# Patient Record
Sex: FEMALE | Race: WHITE | NOT HISPANIC OR LATINO | Employment: OTHER | URBAN - METROPOLITAN AREA
[De-identification: names, ages, dates, MRNs, and addresses within clinical notes are randomized per-mention and may not be internally consistent; named-entity substitution may affect disease eponyms.]

---

## 2017-01-12 ENCOUNTER — TELEPHONE (OUTPATIENT)
Dept: INTERNAL MEDICINE | Facility: CLINIC | Age: 82
End: 2017-01-12

## 2017-01-12 DIAGNOSIS — M48.061 SPINAL STENOSIS OF LUMBAR REGION: Primary | ICD-10-CM

## 2017-01-12 RX ORDER — OXYCODONE HYDROCHLORIDE 5 MG/1
5 TABLET ORAL EVERY 4 HOURS PRN
Qty: 120 TABLET | Refills: 0 | Status: SHIPPED | OUTPATIENT
Start: 2017-01-12 | End: 2017-02-16

## 2017-01-12 NOTE — TELEPHONE ENCOUNTER
Reason for Call:  Medication or medication refill:    Do you use a [facility]?  Name of the pharmacy and phone number for the current request:  [facility] [address] [facility] [phone]    Name of the medication requested: oxycodone    Other request: none    Can we leave a detailed message on this number? YES    Phone number patient can be reached at: Home number on file [phone] (home)    Best Time: any    Call taken on 1/12/2017 at 11:59 AM by Natasha Conner

## 2017-02-15 DIAGNOSIS — M48.061 SPINAL STENOSIS OF LUMBAR REGION: ICD-10-CM

## 2017-02-15 NOTE — TELEPHONE ENCOUNTER
Reason for Call:  Medication or medication refill:Med  Do you use a [facility]?  Name of the pharmacy and phone number for the current request:  [facility] [address] [facility] - [phone]    Name of the medication requested: oxyCODONE (ROXICODONE) 5 MG IR tablet    Other request: Plz mail script, needs refill    Can we leave a detailed message on this number? YES    Phone number patient can be reached at: Home number on file [phone] (home)    Best Time: anytime    Call taken on 2/15/2017 at 3:04 PM by HIRAM ANSARI

## 2017-02-16 RX ORDER — OXYCODONE HYDROCHLORIDE 5 MG/1
5 TABLET ORAL EVERY 4 HOURS PRN
Qty: 120 TABLET | Refills: 0 | Status: SHIPPED | OUTPATIENT
Start: 2017-02-16 | End: 2017-03-21

## 2017-02-16 NOTE — TELEPHONE ENCOUNTER
Oxycodone      Last Written Prescription Date:  1/12/17  Last Fill Quantity: 120,   # refills: 0  Last Office Visit with [facility] prescribing provider: 8/18/16  Future Office visit:    Next 5 appointments (look out 90 days)     Feb 28, 2017  2:20 PM CST   SHORT with Emily Marie MD   [facility]    [address]  [facility] [address]   [phone]                   Routing refill request to provider for review/approval because:  Drug not on the [facility] or [facility] refill protocol or controlled substance

## 2017-02-21 DIAGNOSIS — E78.5 HYPERLIPIDEMIA LDL GOAL <100: ICD-10-CM

## 2017-02-21 DIAGNOSIS — E11.9 TYPE 2 DIABETES MELLITUS WITHOUT COMPLICATION (H): ICD-10-CM

## 2017-02-21 LAB
ANION GAP SERPL CALCULATED.3IONS-SCNC: 7 MMOL/L (ref 3–14)
BUN SERPL-MCNC: 11 MG/DL (ref 7–30)
CALCIUM SERPL-MCNC: 9.5 MG/DL (ref 8.5–10.1)
CHLORIDE SERPL-SCNC: 103 MMOL/L (ref 94–109)
CHOLEST SERPL-MCNC: 155 MG/DL
CO2 SERPL-SCNC: 31 MMOL/L (ref 20–32)
CREAT SERPL-MCNC: 0.62 MG/DL (ref 0.52–1.04)
GFR SERPL CREATININE-BSD FRML MDRD: NORMAL ML/MIN/1.7M2
GLUCOSE SERPL-MCNC: 81 MG/DL (ref 70–99)
HBA1C MFR BLD: 6.7 % (ref 4.3–6)
HDLC SERPL-MCNC: 64 MG/DL
LDLC SERPL CALC-MCNC: 72 MG/DL
NONHDLC SERPL-MCNC: 91 MG/DL
POTASSIUM SERPL-SCNC: 3.9 MMOL/L (ref 3.4–5.3)
SODIUM SERPL-SCNC: 141 MMOL/L (ref 133–144)
TRIGL SERPL-MCNC: 95 MG/DL

## 2017-02-21 PROCEDURE — 83036 HEMOGLOBIN GLYCOSYLATED A1C: CPT | Performed by: INTERNAL MEDICINE

## 2017-02-21 PROCEDURE — 36415 COLL VENOUS BLD VENIPUNCTURE: CPT | Performed by: INTERNAL MEDICINE

## 2017-02-21 PROCEDURE — 80048 BASIC METABOLIC PNL TOTAL CA: CPT | Performed by: INTERNAL MEDICINE

## 2017-02-21 PROCEDURE — 80061 LIPID PANEL: CPT | Performed by: INTERNAL MEDICINE

## 2017-02-22 ENCOUNTER — TELEPHONE (OUTPATIENT)
Dept: INTERNAL MEDICINE | Facility: CLINIC | Age: 82
End: 2017-02-22

## 2017-02-22 DIAGNOSIS — J06.9 UPPER RESPIRATORY TRACT INFECTION, UNSPECIFIED TYPE: Primary | ICD-10-CM

## 2017-02-22 RX ORDER — CODEINE PHOSPHATE AND GUAIFENESIN 10; 100 MG/5ML; MG/5ML
1 SOLUTION ORAL EVERY 4 HOURS PRN
Qty: 120 ML | Refills: 1 | Status: SHIPPED | OUTPATIENT
Start: 2017-02-22 | End: 2017-06-15

## 2017-02-22 NOTE — TELEPHONE ENCOUNTER
Pt states she has a cold and is coughing all night for the last 2-3 nights. She is asking for a prescription cough syrup. The OTC is not working for her. She thinks she had Robitussin AC before.     Last OV 8/18/16.     Provider approval needed.

## 2017-02-25 ENCOUNTER — APPOINTMENT (OUTPATIENT)
Dept: GENERAL RADIOLOGY | Facility: CLINIC | Age: 82
DRG: 190 | End: 2017-02-25
Attending: EMERGENCY MEDICINE
Payer: COMMERCIAL

## 2017-02-25 ENCOUNTER — HOSPITAL ENCOUNTER (INPATIENT)
Facility: CLINIC | Age: 82
LOS: 3 days | Discharge: HOME HEALTH | DRG: 190 | End: 2017-02-28
Attending: EMERGENCY MEDICINE | Admitting: INTERNAL MEDICINE
Payer: COMMERCIAL

## 2017-02-25 DIAGNOSIS — R09.02 HYPOXIA: ICD-10-CM

## 2017-02-25 DIAGNOSIS — J44.1 COPD EXACERBATION (H): ICD-10-CM

## 2017-02-25 LAB
ANION GAP SERPL CALCULATED.3IONS-SCNC: 8 MMOL/L (ref 3–14)
BASOPHILS # BLD AUTO: 0 10E9/L (ref 0–0.2)
BASOPHILS NFR BLD AUTO: 0.1 %
BUN SERPL-MCNC: 17 MG/DL (ref 7–30)
CALCIUM SERPL-MCNC: 9.2 MG/DL (ref 8.5–10.1)
CHLORIDE SERPL-SCNC: 104 MMOL/L (ref 94–109)
CO2 SERPL-SCNC: 28 MMOL/L (ref 20–32)
CREAT SERPL-MCNC: 0.62 MG/DL (ref 0.52–1.04)
DIFFERENTIAL METHOD BLD: ABNORMAL
EOSINOPHIL # BLD AUTO: 0.1 10E9/L (ref 0–0.7)
EOSINOPHIL NFR BLD AUTO: 0.9 %
ERYTHROCYTE [DISTWIDTH] IN BLOOD BY AUTOMATED COUNT: 13.5 % (ref 10–15)
FLUAV+FLUBV AG SPEC QL: NEGATIVE
FLUAV+FLUBV AG SPEC QL: NORMAL
GFR SERPL CREATININE-BSD FRML MDRD: ABNORMAL ML/MIN/1.7M2
GLUCOSE BLDC GLUCOMTR-MCNC: 183 MG/DL (ref 70–99)
GLUCOSE BLDC GLUCOMTR-MCNC: 216 MG/DL (ref 70–99)
GLUCOSE BLDC GLUCOMTR-MCNC: 264 MG/DL (ref 70–99)
GLUCOSE SERPL-MCNC: 193 MG/DL (ref 70–99)
HCT VFR BLD AUTO: 43.5 % (ref 35–47)
HGB BLD-MCNC: 14.3 G/DL (ref 11.7–15.7)
IMM GRANULOCYTES # BLD: 0 10E9/L (ref 0–0.4)
IMM GRANULOCYTES NFR BLD: 0.1 %
INTERPRETATION ECG - MUSE: NORMAL
LYMPHOCYTES # BLD AUTO: 1.6 10E9/L (ref 0.8–5.3)
LYMPHOCYTES NFR BLD AUTO: 19.5 %
MCH RBC QN AUTO: 27.3 PG (ref 26.5–33)
MCHC RBC AUTO-ENTMCNC: 32.9 G/DL (ref 31.5–36.5)
MCV RBC AUTO: 83 FL (ref 78–100)
MONOCYTES # BLD AUTO: 0.5 10E9/L (ref 0–1.3)
MONOCYTES NFR BLD AUTO: 5.8 %
NEUTROPHILS # BLD AUTO: 6 10E9/L (ref 1.6–8.3)
NEUTROPHILS NFR BLD AUTO: 73.6 %
NRBC # BLD AUTO: 0 10*3/UL
NRBC BLD AUTO-RTO: 0 /100
NT-PROBNP SERPL-MCNC: 149 PG/ML (ref 0–1800)
PLATELET # BLD AUTO: 145 10E9/L (ref 150–450)
POTASSIUM SERPL-SCNC: 3.7 MMOL/L (ref 3.4–5.3)
PROCALCITONIN SERPL-MCNC: NORMAL NG/ML
RBC # BLD AUTO: 5.23 10E12/L (ref 3.8–5.2)
SODIUM SERPL-SCNC: 140 MMOL/L (ref 133–144)
SPECIMEN SOURCE: NORMAL
WBC # BLD AUTO: 8.1 10E9/L (ref 4–11)

## 2017-02-25 PROCEDURE — 99223 1ST HOSP IP/OBS HIGH 75: CPT | Mod: AI | Performed by: INTERNAL MEDICINE

## 2017-02-25 PROCEDURE — 25000132 ZZH RX MED GY IP 250 OP 250 PS 637: Performed by: INTERNAL MEDICINE

## 2017-02-25 PROCEDURE — 25000128 H RX IP 250 OP 636: Performed by: INTERNAL MEDICINE

## 2017-02-25 PROCEDURE — 25000125 ZZHC RX 250: Performed by: EMERGENCY MEDICINE

## 2017-02-25 PROCEDURE — 80048 BASIC METABOLIC PNL TOTAL CA: CPT | Performed by: EMERGENCY MEDICINE

## 2017-02-25 PROCEDURE — 83880 ASSAY OF NATRIURETIC PEPTIDE: CPT | Performed by: EMERGENCY MEDICINE

## 2017-02-25 PROCEDURE — 99285 EMERGENCY DEPT VISIT HI MDM: CPT | Mod: 25

## 2017-02-25 PROCEDURE — 84145 PROCALCITONIN (PCT): CPT | Performed by: EMERGENCY MEDICINE

## 2017-02-25 PROCEDURE — 00000146 ZZHCL STATISTIC GLUCOSE BY METER IP

## 2017-02-25 PROCEDURE — 12000000 ZZH R&B MED SURG/OB

## 2017-02-25 PROCEDURE — 71020 XR CHEST 2 VW: CPT

## 2017-02-25 PROCEDURE — 94640 AIRWAY INHALATION TREATMENT: CPT

## 2017-02-25 PROCEDURE — 85025 COMPLETE CBC W/AUTO DIFF WBC: CPT | Performed by: EMERGENCY MEDICINE

## 2017-02-25 PROCEDURE — 25000131 ZZH RX MED GY IP 250 OP 636 PS 637: Performed by: INTERNAL MEDICINE

## 2017-02-25 PROCEDURE — 93005 ELECTROCARDIOGRAM TRACING: CPT

## 2017-02-25 PROCEDURE — 87804 INFLUENZA ASSAY W/OPTIC: CPT | Performed by: EMERGENCY MEDICINE

## 2017-02-25 RX ORDER — POTASSIUM CHLORIDE 1.5 G/1.58G
20-40 POWDER, FOR SOLUTION ORAL
Status: DISCONTINUED | OUTPATIENT
Start: 2017-02-25 | End: 2017-02-28 | Stop reason: HOSPADM

## 2017-02-25 RX ORDER — PREDNISONE 20 MG/1
40 TABLET ORAL ONCE
Status: COMPLETED | OUTPATIENT
Start: 2017-02-25 | End: 2017-02-25

## 2017-02-25 RX ORDER — IPRATROPIUM BROMIDE AND ALBUTEROL SULFATE 2.5; .5 MG/3ML; MG/3ML
3 SOLUTION RESPIRATORY (INHALATION) EVERY 4 HOURS PRN
Status: DISCONTINUED | OUTPATIENT
Start: 2017-02-25 | End: 2017-02-28 | Stop reason: HOSPADM

## 2017-02-25 RX ORDER — BISACODYL 10 MG
10 SUPPOSITORY, RECTAL RECTAL DAILY PRN
Status: DISCONTINUED | OUTPATIENT
Start: 2017-02-25 | End: 2017-02-28 | Stop reason: HOSPADM

## 2017-02-25 RX ORDER — POLYETHYLENE GLYCOL 3350 17 G/17G
17 POWDER, FOR SOLUTION ORAL DAILY PRN
Status: DISCONTINUED | OUTPATIENT
Start: 2017-02-25 | End: 2017-02-28 | Stop reason: HOSPADM

## 2017-02-25 RX ORDER — IPRATROPIUM BROMIDE AND ALBUTEROL SULFATE 2.5; .5 MG/3ML; MG/3ML
3 SOLUTION RESPIRATORY (INHALATION) ONCE
Status: COMPLETED | OUTPATIENT
Start: 2017-02-25 | End: 2017-02-25

## 2017-02-25 RX ORDER — NICOTINE POLACRILEX 4 MG
15-30 LOZENGE BUCCAL
Status: DISCONTINUED | OUTPATIENT
Start: 2017-02-25 | End: 2017-02-28 | Stop reason: HOSPADM

## 2017-02-25 RX ORDER — ONDANSETRON 2 MG/ML
4 INJECTION INTRAMUSCULAR; INTRAVENOUS EVERY 6 HOURS PRN
Status: DISCONTINUED | OUTPATIENT
Start: 2017-02-25 | End: 2017-02-28 | Stop reason: HOSPADM

## 2017-02-25 RX ORDER — ACETAMINOPHEN 650 MG/1
650 SUPPOSITORY RECTAL EVERY 4 HOURS PRN
Status: DISCONTINUED | OUTPATIENT
Start: 2017-02-25 | End: 2017-02-28 | Stop reason: HOSPADM

## 2017-02-25 RX ORDER — PREDNISONE 20 MG/1
40 TABLET ORAL DAILY
Status: DISCONTINUED | OUTPATIENT
Start: 2017-02-26 | End: 2017-02-28 | Stop reason: HOSPADM

## 2017-02-25 RX ORDER — ASPIRIN 81 MG/1
81 TABLET ORAL DAILY
Status: DISCONTINUED | OUTPATIENT
Start: 2017-02-26 | End: 2017-02-28 | Stop reason: HOSPADM

## 2017-02-25 RX ORDER — OXYCODONE HYDROCHLORIDE 5 MG/1
5 TABLET ORAL EVERY 4 HOURS PRN
Status: DISCONTINUED | OUTPATIENT
Start: 2017-02-25 | End: 2017-02-28 | Stop reason: HOSPADM

## 2017-02-25 RX ORDER — AMOXICILLIN 250 MG
1-2 CAPSULE ORAL 2 TIMES DAILY PRN
Status: DISCONTINUED | OUTPATIENT
Start: 2017-02-25 | End: 2017-02-28 | Stop reason: HOSPADM

## 2017-02-25 RX ORDER — POTASSIUM CHLORIDE 7.45 MG/ML
10 INJECTION INTRAVENOUS
Status: DISCONTINUED | OUTPATIENT
Start: 2017-02-25 | End: 2017-02-28 | Stop reason: HOSPADM

## 2017-02-25 RX ORDER — BENZONATATE 100 MG/1
100 CAPSULE ORAL 3 TIMES DAILY PRN
Status: DISCONTINUED | OUTPATIENT
Start: 2017-02-25 | End: 2017-02-28 | Stop reason: HOSPADM

## 2017-02-25 RX ORDER — LIDOCAINE 40 MG/G
CREAM TOPICAL
Status: DISCONTINUED | OUTPATIENT
Start: 2017-02-25 | End: 2017-02-28 | Stop reason: HOSPADM

## 2017-02-25 RX ORDER — SODIUM PHOSPHATE,MONO-DIBASIC 19G-7G/118
1 ENEMA (ML) RECTAL
Status: DISCONTINUED | OUTPATIENT
Start: 2017-02-25 | End: 2017-02-28 | Stop reason: HOSPADM

## 2017-02-25 RX ORDER — DEXTROSE MONOHYDRATE 25 G/50ML
25-50 INJECTION, SOLUTION INTRAVENOUS
Status: DISCONTINUED | OUTPATIENT
Start: 2017-02-25 | End: 2017-02-28 | Stop reason: HOSPADM

## 2017-02-25 RX ORDER — GLIPIZIDE 5 MG/1
5 TABLET ORAL
Status: DISCONTINUED | OUTPATIENT
Start: 2017-02-26 | End: 2017-02-28 | Stop reason: HOSPADM

## 2017-02-25 RX ORDER — ALBUTEROL SULFATE 90 UG/1
2 AEROSOL, METERED RESPIRATORY (INHALATION) EVERY 4 HOURS PRN
Status: DISCONTINUED | OUTPATIENT
Start: 2017-02-25 | End: 2017-02-28 | Stop reason: HOSPADM

## 2017-02-25 RX ORDER — AZITHROMYCIN 250 MG/1
250 TABLET, FILM COATED ORAL DAILY
Status: DISCONTINUED | OUTPATIENT
Start: 2017-02-26 | End: 2017-02-28 | Stop reason: HOSPADM

## 2017-02-25 RX ORDER — PRAVASTATIN SODIUM 40 MG
80 TABLET ORAL EVERY EVENING
Status: DISCONTINUED | OUTPATIENT
Start: 2017-02-25 | End: 2017-02-28 | Stop reason: HOSPADM

## 2017-02-25 RX ORDER — ACETAMINOPHEN 325 MG/1
650 TABLET ORAL EVERY 4 HOURS PRN
Status: DISCONTINUED | OUTPATIENT
Start: 2017-02-25 | End: 2017-02-28 | Stop reason: HOSPADM

## 2017-02-25 RX ORDER — ONDANSETRON 4 MG/1
4 TABLET, ORALLY DISINTEGRATING ORAL EVERY 6 HOURS PRN
Status: DISCONTINUED | OUTPATIENT
Start: 2017-02-25 | End: 2017-02-28 | Stop reason: HOSPADM

## 2017-02-25 RX ORDER — POTASSIUM CHLORIDE 1500 MG/1
20-40 TABLET, EXTENDED RELEASE ORAL
Status: DISCONTINUED | OUTPATIENT
Start: 2017-02-25 | End: 2017-02-28 | Stop reason: HOSPADM

## 2017-02-25 RX ORDER — NALOXONE HYDROCHLORIDE 0.4 MG/ML
.1-.4 INJECTION, SOLUTION INTRAMUSCULAR; INTRAVENOUS; SUBCUTANEOUS
Status: DISCONTINUED | OUTPATIENT
Start: 2017-02-25 | End: 2017-02-28 | Stop reason: HOSPADM

## 2017-02-25 RX ORDER — POTASSIUM CHLORIDE 29.8 MG/ML
20 INJECTION INTRAVENOUS
Status: DISCONTINUED | OUTPATIENT
Start: 2017-02-25 | End: 2017-02-28 | Stop reason: HOSPADM

## 2017-02-25 RX ADMIN — Medication 1 TABLET: at 14:23

## 2017-02-25 RX ADMIN — Medication 1 CAPSULE: at 17:49

## 2017-02-25 RX ADMIN — OXYCODONE HYDROCHLORIDE 5 MG: 5 TABLET ORAL at 14:23

## 2017-02-25 RX ADMIN — SENNOSIDES AND DOCUSATE SODIUM 1 TABLET: 8.6; 5 TABLET ORAL at 18:30

## 2017-02-25 RX ADMIN — PRAVASTATIN SODIUM 80 MG: 40 TABLET ORAL at 19:52

## 2017-02-25 RX ADMIN — INSULIN ASPART 4 UNITS: 100 INJECTION, SOLUTION INTRAVENOUS; SUBCUTANEOUS at 18:46

## 2017-02-25 RX ADMIN — ACETAMINOPHEN 650 MG: 325 TABLET, FILM COATED ORAL at 14:23

## 2017-02-25 RX ADMIN — OXYCODONE HYDROCHLORIDE 5 MG: 5 TABLET ORAL at 22:06

## 2017-02-25 RX ADMIN — ACETAMINOPHEN 650 MG: 325 TABLET, FILM COATED ORAL at 18:30

## 2017-02-25 RX ADMIN — PREDNISONE 40 MG: 20 TABLET ORAL at 11:46

## 2017-02-25 RX ADMIN — IPRATROPIUM BROMIDE AND ALBUTEROL SULFATE 3 ML: .5; 3 SOLUTION RESPIRATORY (INHALATION) at 10:25

## 2017-02-25 RX ADMIN — AZITHROMYCIN MONOHYDRATE 500 MG: 500 INJECTION, POWDER, LYOPHILIZED, FOR SOLUTION INTRAVENOUS at 14:25

## 2017-02-25 RX ADMIN — OXYCODONE HYDROCHLORIDE 5 MG: 5 TABLET ORAL at 18:30

## 2017-02-25 RX ADMIN — FLUTICASONE FUROATE AND VILANTEROL TRIFENATATE 1 PUFF: 100; 25 POWDER RESPIRATORY (INHALATION) at 22:04

## 2017-02-25 RX ADMIN — GUAIFENESIN 10 ML: 100 SOLUTION ORAL at 22:04

## 2017-02-25 ASSESSMENT — ENCOUNTER SYMPTOMS
CHILLS: 0
SHORTNESS OF BREATH: 1
COUGH: 1
FEVER: 0

## 2017-02-25 NOTE — PLAN OF CARE
BP a little elevated (174/77, 161/91). O2 92-93% on 1.5 L O2. Ambulating SBA with cane. Infrequent cough. Lungs diminished at bases. Rates lower back pain 5/10, decreased with tylenol and oxycodone. Occasional cramping in LLE. PCD's on in bed. Voiding adequately.    Blood sugar elevated at 183 this afternoon. Contacted pharmacy for insulin pen, waiting >1 hour and still not available. Will recheck blood sugar before dinner.

## 2017-02-25 NOTE — LETTER
Transition Communication Hand-off for Care Transitions to Next Level of Care Provider    Name: Celeste Chacko  MRN #: [id number]  Primary Care Provider: Emily Marie  Primary Care MD Name: Dr Marie  Primary Clinic: [facility] [address] [facility] [address]  [facility] [address]  Primary Care Clinic Name: [facility]  Reason for Hospitalization:  Acute respiratory failure with hypoxia (H) [J96.01]  Acute bronchitis, unspecified organism [J20.9]  Admit Date/Time: 2/25/2017  8:55 AM  Discharge Date: 2/28/17         Reason for Communication Hand-off Referral: Admission diagnoses: COPD    Discharge Plan:Home with home care    Concern for non-adherence with plan of care:   Y/N No    Already enrolled in Tele-monitoring program and name of program:  No  Follow-up specialty is recommended:No  Follow-up plan:  Future Appointments  Date Time Provider Department Center   3/6/2017 4:40 PM [facility]       Any outstanding tests or procedures:        Referrals     Future Labs/Procedures    Home care nursing referral     Comments:    RN skilled nursing visit. RN to assess respiratory status and medication check  Your provider has ordered home care nursing services. If you have not been contacted within 2 days of your discharge please call the inpatient department phone number at [phone] .    Home Care PT Referral for Hospital Discharge     Comments:    PT to eval and treat    Your provider has ordered home care - physical therapy. If you have not been contacted within 2 days of your discharge please call the department phone number listed on the top of this document.          Key Recommendations:  Pt discharged home today with [facility] RN and PT.  She was able to be weaned off O2.  ANABELL score 3 and education given. Pt understands her meds well.   Minnie Ramos    AVS/Discharge Summary is the source of truth; this is a helpful guide for improved communication of patient story

## 2017-02-25 NOTE — IP AVS SNAPSHOT
[facility] [address] [facility]    [address] [place] [address]    Phone:  [phone]                                       After Visit Summary   2/25/2017    Celeste Chacko    MRN: [id number]           After Visit Summary Signature Page     I have received my discharge instructions, and my questions have been answered. I have discussed any challenges I see with this plan with the nurse or doctor.    ..........................................................................................................................................  Patient/Patient Representative Signature      ..........................................................................................................................................  Patient Representative Print Name and Relationship to Patient    ..................................................               ................................................  Date                                            Time    ..........................................................................................................................................  Reviewed by Signature/Title    ...................................................              ..............................................  Date                                                            Time

## 2017-02-25 NOTE — PHARMACY-ADMISSION MEDICATION HISTORY
Admission medication history interview status for the 2/25/2017  admission is complete. See EPIC admission navigator for prior to admission medications     Medication history source reliability:Good    Actions taken by pharmacist (provider contacted, etc):None     Additional medication history information not noted on PTA med list :    -Changed senna/docusate from once to twice daily  -Patient takes her fosamax ever Sunday   -Removed gabapentin as patient said this medication was not helping and she has stopped taking it starting this week    Medication reconciliation/reorder completed by provider prior to medication history? No    Time spent in this activity: 30 mins    Prior to Admission medications    Medication Sig Last Dose Taking? Auth Provider   Multiple Vitamins-Minerals (MULTIVITAMIN ADULT PO) Take 1 tablet by mouth daily 2/24/2017 at Unknown time Yes Unknown, Entered By History   guaiFENesin-codeine (ROBITUSSIN AC) 100-10 MG/5ML SOLN solution Take 5 mLs by mouth every 4 hours as needed for cough 2/24/2017 at prn Yes Darline Hawkins MD   oxyCODONE (ROXICODONE) 5 MG IR tablet Take 1 tablet (5 mg) by mouth every 4 hours as needed for moderate to severe pain 2/25/2017 at Unknown time Yes Emily Marie MD   amoxicillin (AMOXIL) 500 MG capsule TAKE FOUR CAPSULES (2 GRAMS) BY MOUTH ONE HOUR BEFORE DENTAL APPOINTMENT  at prn Yes Reported, Patient   glipiZIDE (GLUCOTROL) 5 MG tablet Take 1 tablet (5 mg) by mouth every morning (before breakfast) 2/24/2017 at Unknown time Yes Emily Marie MD   tiotropium (SPIRIVA HANDIHALER) 18 MCG inhalation capsule Inhale contents of one capsule daily. 2/24/2017 at Unknown time Yes Emily Marie MD   alendronate (FOSAMAX) 70 MG tablet Take 1 tablet (70 mg) by mouth every 7 days Take with 8 ounces water and wait at least 30 minutes before eating, do not lie down until after eating 2/19/2017 Yes Emily Marie MD   fluticasone-salmeterol (ADVAIR DISKUS) 250-50 MCG/DOSE diskus  inhaler Inhale 1 puff into the lungs 2 times daily 2/25/2017 at Unknown time Yes Emily Marie MD   ASPIRIN EC PO Take 81 mg by mouth daily 2/25/2017 at Unknown time Yes Reported, Patient   senna-docusate (SENOKOT-S;PERICOLACE) 8.6-50 MG per tablet Take 1 tablet by mouth 2 times daily  2/24/2017 at Unknown time Yes Reported, Patient   albuterol (ALBUTEROL) 108 (90 BASE) MCG/ACT inhaler Inhale 2 puffs into the lungs every 4 hours as needed for shortness of breath / dyspnea 2/25/2017 at Unknown time Yes Emily Marie MD   pravastatin (PRAVACHOL) 80 MG tablet Take 1 tablet (80 mg) by mouth every evening 2/24/2017 at Unknown time Yes Emily Marie MD   Acetaminophen (TYLENOL PO) Take 650 mg by mouth 3 times daily Patient takes TID with Oxycodone.  Yes Reported, Patient   calcium-vitamin D (CALTRATE) 600-400 MG-UNIT per tablet Take 1 tablet by mouth daily 2/24/2017 at Unknown time Yes Reported, Patient   Glucosamine-Chondroitin (GLUCOSAMINE CHONDROITIN COMPLX) 500-250 MG CAPS Take 1 tablet by mouth 2 times daily 2/24/2017 at Unknown time Yes Liss Saravia MD   Calcium Polycarbophil (FIBERCON PO) Take 1 tablet by mouth once , one in the morning and one in the evening 2/24/2017 at Unknown time Yes Unknown, Entered By History   VITAMIN E PO Take 400 Int'l Units by mouth daily 2/24/2017 at Unknown time Yes Unknown, Entered By History   ascorbic acid (VITAMIN C) 500 MG tablet Take 1 tablet (500 mg) by mouth daily 2/24/2017 at Unknown time Yes Cynthia, Ronna, MD   ONETOUCH DELICA LANCETS 33G MISC    Reported, Patient   ONE TOUCH ULTRA test strip    Reported, Patient

## 2017-02-25 NOTE — ED PROVIDER NOTES
"  History     Chief Complaint:  Shortness of Breath    HPI   Celeste Chacko is a 85 year old female who presents with shortness of breath. The patient reports that she developed a \"cold\" a few days ago that has increasingly gotten worse with productive cough with yellow sputum, cold sweats, and shortness of breath last night. This morning, she states that her shortness of breath was \"scary\" and she called and ambulance and was brought to the ED for evaluation. While in the ED, she states that she feels better with oxygen. She noted that she had difficulty sleeping last night and is currently fatigued but denies current fever, chest pain, or new leg swelling. She denies a history of heart failure.    Of note, the patient has chronic back pain for which she takes oxycodone. While in the ED, she reports back pain and brought her medication from home to take.     Allergies:  Sulfamethoxazole     Medications:    Robitussin  Roxicodone   Amoxil  Glucotrol  Spiriva Handihaler  Fosamax  Advair  Neurontin  Aspirin  Senokot  Albuterol  Pravachol  Tylenol  Caltrate  Glucosamine  Fibercon  Vitamin E  Vitamin C     Past Medical History:    Chronic airway obstruction  Complete rupture of rotator cuff  Diosrder of bone and cartilage, unspecified  History of blood transfusion  Mixed hyperlipidemia  Osteoarthritis  Skin cancer  Spinal stenosis  Type II diabetes mellitus  Chronic back pain    Past Surgical History:    Breast biopsies  Pilonidal sinus repair  T&A  Shoulder arthroplasty  Right shoulder replacement, RCT repair  Arthroplasty knee  Inject sacroiliac joint x3  Inject epidural lumbar/sacral single, left    Family History:  Father: Arthritis  Brother: Diabetes, breast cancer, cerebrovascular disease    Social History:  Relationship status: Single  The patient formerly smoked, 0.5 packs/day for 54 years (Quit date 4/27/2000).   The patient drinks alcohol once a week.      Review of Systems   Constitutional: Negative for " "chills and fever.   Respiratory: Positive for cough and shortness of breath.    Cardiovascular: Negative for chest pain and leg swelling.   All other systems reviewed and are negative.      Physical Exam     Patient Vitals for the past 24 hrs:   BP Temp Temp src Heart Rate Resp SpO2 Height Weight   02/25/17 1025 150/80 - - - - 92 % - -   02/25/17 0902 169/76 - - - - - - -   02/25/17 0901 - 98.4  F (36.9  C) Oral - - - - -   02/25/17 0858 - - - 92 18 95 % 1.6 m (5' 3\") 64.4 kg (142 lb)         Physical Exam  Gen: Pleasant, appears stated age.    Eye:   Pupils are equal, round, and reactive.     Sclera non-injected.    ENT:   Moist mucus membranes.     Normal tongue.    Oropharynx without lesions.    Cardiac:     Normal rate and regular rhythm.    No murmurs, gallops, or rubs.    Pulmonary:     Decreased air movement throughout, no wheezes or rhonchi.   Speaking in full sentences.     No respiratory distress.    Abdomen:     Normal active bowel sounds.     Abdomen is soft and non-distended, without focal tenderness.    Musculoskeletal:     Normal movement of all extremities without evidence for deficit.    Extremities:    No edema.   Calves non-tender   2+ radial pulses    Skin:   Warm and dry.    Neurologic:    Non-focal exam without asymmetric weakness or numbness.    Normal tone    Psychiatric:     Normal affect with appropriate interaction with examiner.      Emergency Department Course     ECG @ 1054  Indication: Shortness of breath  Rate 87 bpm.   KS interval 156 ms.   QRS duration 88 ms.   QT/QTc 380/457 ms.   P-R-T axes -18.  Notes: normal sinus rhythm. No previous EKG  Time read 1059    Imaging:  Radiographic findings were communicated with the patient who voiced understanding of the findings.    Chest XR per radiology:   IMPRESSION: Previously seen left basilar minimal atelectatic changes  resolved. Mild hyperaeration of the lungs may indicate obstructive  airways disease. Otherwise currently there is no " active  cardiopulmonary disease. Reverse type right shoulder arthroplasty  again noted.    Laboratory:  CBC: WBC 8.1 (WNL) HGB 14.3 (WNL)  (L) RBC 5.23 (H)  BMP: Cr 0.62 (WNL) Glucose 193 (H)  Rest WNL  BNP: 149 (WNL)  Influenza A/B antigen: A:Negative  B:Negative     Interventions:  1025: Duoneb, 3 mLs, Nebulizer  1146: Deltasone, 40 mg, PO    ED Course:  The patient arrived in triage where her vitals were measured and recorded. The patient was then escorted back to the emergency department.  Nursing notes and past medical history reviewed.   I performed a physical examination of the patient as documented above.  I explained the plan with the patient who consents to this.   Blood was drawn and sent to the laboratory for testing, see above results.   A peripheral IV was established.   The patient underwent the above imaging studies.   The patient received the above interventions.   1031: Patient was rechecked and is sating at 89% on room air. No respiratory distress.  Feels that oxygen helped more than nebs.  Ambulatory sat is 85%.    1205: Discussed the patient with Dr. Jaimes from the hospitalist service.  1210: Patient and daughter Irene were updated.  I personally reviewed the laboratory and imaging results with the Patient and answered all related questions prior to admitting.  Findings and plan explained to the Patient who consents to admission. Discussed the patient with Dr. Jaimes, who will admit the patient to a in-patient bed for further monitoring, evaluation, and treatment.    Impression & Plan      Medical Decision Making:  This is 85 year old female with history of COPD, although no exacerbation for some time who presents today with cough for the last week and increased difficulty breathing over the last day. On exam, the patient has some decreased air movement throughout. She is afebrile. She's been hypoxic throughout her ED stay, requiring 2-3 liters to maintain sats. She is hypoxic to  85% with ambulation. Chest radiograph does not demonstrate any pneumonia and there is no evidence of fluid overload. Flu swab is negative. She is feeling a little bit better with supportive care including duoneb and supplemental oxygen. She was given a dose of prednisone. She will be admitted to the hospital for additional nebs and treatment.     Diagnosis:    ICD-10-CM    1. Acute respiratory failure with hypoxia (H) J96.01    2. Acute bronchitis, unspecified organism J20.9        Disposition:   Admit to an in[facility] bed under the care of Dr. Jaimes.       Sharlene MCCULLOUGH, am serving as a scribe on 2/25/2017 at 9:23 AM to personally document services performed by Enma Aparicio MD, based on my observations and the provider's statements to me.         Enma Aparicio MD  02/25/17 3093

## 2017-02-25 NOTE — ED NOTES
Bed: ED29  Expected date:   Expected time:   Means of arrival:   Comments:  blanca - 515 - SOB eta [id number]

## 2017-02-25 NOTE — ED NOTES
"[facility]  ED Nurse Handoff Report    ED Chief complaint: Shortness of Breath (Pt has felt SAOB since this am and has had a bad cold for the last week)      ED Diagnosis:   Final diagnoses:   Acute respiratory failure with hypoxia (H)   Acute bronchitis, unspecified organism       Code Status:  Not on File    Allergies:   Allergies   Allergen Reactions     Sulfamethoxazole Anaphylaxis and Hives       Activity level:  Stand with Assist     Needed?: No    Isolation: No  Infection: Not Applicable    Bariatric?: No      Vital Signs:   Vitals:    02/25/17 0858 02/25/17 0901 02/25/17 0902 02/25/17 1025   BP:   169/76 150/80   Resp: 18      Temp:  98.4  F (36.9  C)     TempSrc:  Oral     SpO2: 95%   92%   Weight: 64.4 kg (142 lb)      Height: 1.6 m (5' 3\")          Cardiac Rhythm: ,        Pain level: 0-10 Pain Scale: 0    Is this patient confused?: No    Patient Report: Initial Complaint: Pt presented to the ED complaining of worsening SOB this AM.  Pt states she has had a cold for the last week.    Focused Assessment: Pt O2 sats in the mid 80s without supplemental O2, lung sounds diminished in bases, pt denies pain, pt slightly hypertensive.  Hypoxia resolved with 2 liters O2 via NC.  Tests Performed: BMP, CBC, Influenza Swab, 12 lead EKG, Chest X-ray, BNP.  Abnormal Results: None  Treatments provided: Pt given duoneb x1.    Family Comments: Daughter at bedside, involved in cares    OBS brochure/video discussed/provided to patient: Yes    ED Medications:   Medications   ipratropium - albuterol 0.5 mg/2.5 mg/3 mL (DUONEB) neb solution 3 mL (3 mLs Nebulization Given 2/25/17 1025)       Drips infusing?:  No      ED NURSE PHONE NUMBER: [phone]         "

## 2017-02-25 NOTE — IP AVS SNAPSHOT
MRN:[id number]                      After Visit Summary   2/25/2017    Celeste Chacko    MRN: [id number]           Thank you!     Thank you for choosing [facility] for your care. Our goal is always to provide you with excellent care. Hearing back from our patients is one way we can continue to improve our services. Please take a few minutes to complete the written survey that you may receive in the mail after you visit with us. Thank you!        Patient Information     Date Of Birth          5/11/1931        About your hospital stay     You were admitted on:  February 25, 2017 You last received care in the:  [facility] [address] [facility]    You were discharged on:  February 28, 2017        Reason for your hospital stay       This is an 85 year old female admitted with a COPD exacerbation.                  Who to Call     For medical emergencies, please call 911.  For non-urgent questions about your medical care, please call your primary care provider or clinic, [phone]          Attending Provider     Provider Specialty    Enma Aparicio MD Emergency Medicine    [facility]    [facility]       Primary Care Provider Office Phone # Fax #    Emily Marie [phone]       [facility] [address] [place] [address]        After Care Instructions     Activity       Your activity upon discharge: activity as tolerated            Diet       Follow this diet upon discharge: Orders Placed This Encounter      Combination Diet Regular Diet Adult                  Follow-up Appointments     Follow-up and recommended labs and tests        Follow up with primary care provider, Emily Marie, within 7 days for hospital follow- up.  No follow up labs or test are needed.                  Your next 10 appointments already scheduled     Mar 06, 2017  4:40 PM CST   Office Visit with Emily aMrie,  "MD   [facility]    [address]  [facility] [id number] [phone]           Bring a current list of meds and any records pertaining to this visit.  For Physicals, please bring immunization records and any forms needing to be filled out.  Please arrive 10 minutes early to complete paperwork.              Additional Services     Home Care PT Referral for Hospital Discharge       PT to eval and treat    Your provider has ordered home care - physical therapy. If you have not been contacted within 2 days of your discharge please call the department phone number listed on the top of this document.            Home care nursing referral       RN skilled nursing visit. RN to assess respiratory status and medication check  Your provider has ordered home care nursing services. If you have not been contacted within 2 days of your discharge please call the inpatient department phone number at [phone] .                  Further instructions from your care team       [facility] will call you before coming to your home. Their number is [phone].    Pending Results     No orders found from 2/23/2017 to 2/26/2017.            Statement of Approval     Ordered          02/28/17 1144  I have reviewed and agree with all the recommendations and orders detailed in this document.  EFFECTIVE NOW     Approved and electronically signed by:  Clint Lugo MD             Admission Information     Date & Time Provider Department Dept. Phone    2/25/2017 Arthur Jaimes MD [place] [address] [facility] [phone]      Your Vitals Were     Blood Pressure Pulse Temperature Respirations Height Weight    119/44 (BP Location: Right arm) 75 98.4  F (36.9  C) (Oral) 18 1.6 m (5' 3\") 64.1 kg (141 lb 6.4 oz)    Pulse Oximetry BMI (Body Mass Index)                92% 25.05 kg/m2          MyChart Information     [facility] lets you send messages to your " "doctor, view your test results, renew your prescriptions, schedule appointments and more. To sign up, go to [email or web address] . Click on \"Log in\" on the left side of the screen, which will take you to the Welcome page. Then click on \"Sign up Now\" on the right side of the page.     You will be asked to enter the access code listed below, as well as some personal information. Please follow the directions to create your username and password.     Your access code is: [email or web address]  Expires: 2017  9:16 AM     Your access code will  in 90 days. If you need help or a new code, please call your [facility] clinic or [phone].        Care EveryWhere ID     This is your Care EveryWhere ID. This could be used by other organizations to access your [facility] medical records  [id number]           Review of your medicines      START taking        Dose / Directions    azithromycin 250 MG tablet   Commonly known as:  ZITHROMAX   Indication:  Community Acquired Pneumonia        Dose:  250 mg   Start taking on:  3/1/2017   Take 1 tablet (250 mg) by mouth daily   Quantity:  1 tablet   Refills:  0       predniSONE 20 MG tablet   Commonly known as:  DELTASONE        Dose:  40 mg   Start taking on:  3/1/2017   Take 2 tablets (40 mg) by mouth daily   Quantity:  2 tablet   Refills:  0         CONTINUE these medicines which have NOT CHANGED        Dose / Directions    albuterol 108 (90 BASE) MCG/ACT Inhaler   Commonly known as:  albuterol   Used for:  Chronic obstructive pulmonary disease, unspecified COPD type (H)        Dose:  2 puff   Inhale 2 puffs into the lungs every 4 hours as needed for shortness of breath / dyspnea   Quantity:  1 Inhaler   Refills:  6       alendronate 70 MG tablet   Commonly known as:  FOSAMAX   Used for:  Osteopenia        Dose:  70 mg   Take 1 tablet (70 mg) by mouth every 7 days Take with 8 ounces water and wait at least 30 minutes before eating, do not lie down until after eating   Quantity:  " 12 tablet   Refills:  3       amoxicillin 500 MG capsule   Commonly known as:  AMOXIL        TAKE FOUR CAPSULES (2 GRAMS) BY MOUTH ONE HOUR BEFORE DENTAL APPOINTMENT   Refills:  3       ascorbic acid 500 MG tablet   Commonly known as:  VITAMIN C        Dose:  500 mg   Take 1 tablet (500 mg) by mouth daily   Quantity:  30 tablet   Refills:  0       ASPIRIN EC PO        Dose:  81 mg   Take 81 mg by mouth daily   Refills:  0       calcium-vitamin D 600-400 MG-UNIT per tablet   Commonly known as:  CALTRATE        Dose:  1 tablet   Take 1 tablet by mouth daily   Refills:  0       FIBERCON PO        Dose:  1 tablet   Take 1 tablet by mouth once , one in the morning and one in the evening   Refills:  0       fluticasone-salmeterol 250-50 MCG/DOSE diskus inhaler   Commonly known as:  ADVAIR DISKUS   Used for:  Chronic obstructive pulmonary disease, unspecified COPD type (H)        Dose:  1 puff   Inhale 1 puff into the lungs 2 times daily   Quantity:  3 Inhaler   Refills:  3       glipiZIDE 5 MG tablet   Commonly known as:  GLUCOTROL   Used for:  Type 2 diabetes mellitus without complication (H)        Dose:  5 mg   Take 1 tablet (5 mg) by mouth every morning (before breakfast)   Quantity:  90 tablet   Refills:  1       Glucosamine-Chondroitin 500-250 MG Caps   Commonly known as:  GLUCOSAMINE CHONDROITIN COMPLX   Used for:  Generalized osteoarthrosis, unspecified site        Dose:  1 tablet   Take 1 tablet by mouth 2 times daily   Refills:  0       guaiFENesin-codeine 100-10 MG/5ML Soln solution   Commonly known as:  ROBITUSSIN AC   Used for:  Upper respiratory tract infection, unspecified type        Dose:  1 tsp.   Take 5 mLs by mouth every 4 hours as needed for cough   Quantity:  120 mL   Refills:  1       MULTIVITAMIN ADULT PO        Dose:  1 tablet   Take 1 tablet by mouth daily   Refills:  0       ONE TOUCH ULTRA test strip   Generic drug:  blood glucose monitoring        Refills:  0       ONETOUCH DELICA LANCETS 33G  Misc        Refills:  0       oxyCODONE 5 MG IR tablet   Commonly known as:  ROXICODONE   Used for:  Spinal stenosis of lumbar region        Dose:  5 mg   Take 1 tablet (5 mg) by mouth every 4 hours as needed for moderate to severe pain   Quantity:  120 tablet   Refills:  0       pravastatin 80 MG tablet   Commonly known as:  PRAVACHOL   Used for:  Hyperlipidemia LDL goal <100        Dose:  80 mg   Take 1 tablet (80 mg) by mouth every evening   Quantity:  90 tablet   Refills:  3       senna-docusate 8.6-50 MG per tablet   Commonly known as:  SENOKOT-S;PERICOLACE        Dose:  1 tablet   Take 1 tablet by mouth 2 times daily   Refills:  0       tiotropium 18 MCG capsule   Commonly known as:  SPIRIVA HANDIHALER   Used for:  Chronic obstructive pulmonary disease, unspecified COPD type (H)        Inhale contents of one capsule daily.   Quantity:  90 capsule   Refills:  3       TYLENOL PO        Dose:  650 mg   Take 650 mg by mouth 3 times daily Patient takes TID with Oxycodone.   Refills:  0       VITAMIN E PO        Dose:  400 Int'l Units   Take 400 Int'l Units by mouth daily   Refills:  0         STOP taking     MULTIVITAMIN TABS   OR                Where to get your medicines      These medications were sent to [facility] [place], [place] - [address]  [address] [facility] [address], [place] [address]     Phone:  [phone]     azithromycin 250 MG tablet    predniSONE 20 MG tablet                Protect others around you: Learn how to safely use, store and throw away your medicines at www.disposemymeds.org.             Medication List: This is a list of all your medications and when to take them. Check marks below indicate your daily home schedule. Keep this list as a reference.      Medications           Morning Afternoon Evening Bedtime As Needed    albuterol 108 (90 BASE) MCG/ACT Inhaler   Commonly known as:  albuterol   Inhale 2 puffs into the lungs every 4 hours as needed for shortness of breath /  dyspnea                                   alendronate 70 MG tablet   Commonly known as:  FOSAMAX   Take 1 tablet (70 mg) by mouth every 7 days Take with 8 ounces water and wait at least 30 minutes before eating, do not lie down until after eating                                amoxicillin 500 MG capsule   Commonly known as:  AMOXIL   TAKE FOUR CAPSULES (2 GRAMS) BY MOUTH ONE HOUR BEFORE DENTAL APPOINTMENT                                ascorbic acid 500 MG tablet   Commonly known as:  VITAMIN C   Take 1 tablet (500 mg) by mouth daily   Last time this was given:  500 mg on 2/28/2017  8:36 AM                                   ASPIRIN EC PO   Take 81 mg by mouth daily   Last time this was given:  81 mg on 2/28/2017  8:39 AM                                azithromycin 250 MG tablet   Commonly known as:  ZITHROMAX   Take 1 tablet (250 mg) by mouth daily   Start taking on:  3/1/2017   Last time this was given:  250 mg on 2/28/2017 10:02 AM                                   calcium-vitamin D 600-400 MG-UNIT per tablet   Commonly known as:  CALTRATE   Take 1 tablet by mouth daily   Last time this was given:  1 tablet on 2/28/2017  8:42 AM                                   FIBERCON PO   Take 1 tablet by mouth once , one in the morning and one in the evening   Last time this was given:  625 mg on 2/28/2017  8:36 AM                                      fluticasone-salmeterol 250-50 MCG/DOSE diskus inhaler   Commonly known as:  ADVAIR DISKUS   Inhale 1 puff into the lungs 2 times daily                                   glipiZIDE 5 MG tablet   Commonly known as:  GLUCOTROL   Take 1 tablet (5 mg) by mouth every morning (before breakfast)   Last time this was given:  5 mg on 2/28/2017  8:35 AM                                   Glucosamine-Chondroitin 500-250 MG Caps   Commonly known as:  GLUCOSAMINE CHONDROITIN COMPLX   Take 1 tablet by mouth 2 times daily                                      guaiFENesin-codeine 100-10 MG/5ML Soln  solution   Commonly known as:  ROBITUSSIN AC   Take 5 mLs by mouth every 4 hours as needed for cough                                   MULTIVITAMIN ADULT PO   Take 1 tablet by mouth daily                                   ONE TOUCH ULTRA test strip   Generic drug:  blood glucose monitoring                                BOOGIETOUCH DELICA LANCETS 33G Misc                                oxyCODONE 5 MG IR tablet   Commonly known as:  ROXICODONE   Take 1 tablet (5 mg) by mouth every 4 hours as needed for moderate to severe pain   Last time this was given:  5 mg on 2/28/2017  1:01 PM                                   pravastatin 80 MG tablet   Commonly known as:  PRAVACHOL   Take 1 tablet (80 mg) by mouth every evening   Last time this was given:  80 mg on 2/27/2017  9:14 PM                                   predniSONE 20 MG tablet   Commonly known as:  DELTASONE   Take 2 tablets (40 mg) by mouth daily   Start taking on:  3/1/2017   Last time this was given:  40 mg on 2/28/2017  8:37 AM                                   senna-docusate 8.6-50 MG per tablet   Commonly known as:  SENOKOT-S;PERICOLACE   Take 1 tablet by mouth 2 times daily   Last time this was given:  1 tablet on 2/28/2017  8:36 AM                                      tiotropium 18 MCG capsule   Commonly known as:  SPIRIVA HANDIHALER   Inhale contents of one capsule daily.                                   TYLENOL PO   Take 650 mg by mouth 3 times daily Patient takes TID with Oxycodone.   Last time this was given:  650 mg on 2/28/2017  1:01 PM                            With oxycodone.       VITAMIN E PO   Take 400 Int'l Units by mouth daily   Last time this was given:  400 Units on 2/27/2017  8:09 AM

## 2017-02-25 NOTE — H&P
PRIMARY CARE PROVIDER:  [facility] Marie MD        DATE OF ADMISSION:  02/25/2017      CHIEF COMPLAINT:  Shortness of breath, cough.      HISTORY OF PRESENT ILLNESS:  Ms. Celeste Chacko is an 85-year-old female with known history of COPD, not on home oxygen, hyperlipidemia, osteoarthritis, diabetes mellitus type 2, chronic back pain who presents to the emergency room with shortness of breath.  The patient has been battling a cold for the past 1 week or so.  She has gotten weaker than normal.  For the past couple of days she also has started noticing that she was getting more dyspneic.  She waited out it yesterday as she got slightly better towards the end of the day, however, this morning when she got up she was weak and her shortness of breath had progressed so came to the emergency room.  In questioning further, she has been coughing.  Initially she was coughing quite a bit and was prescribed codeine by her primary care provider after which the cough has been suppressed, but intermittently she is still coughing yellowish sputum production.  She has not had any fever.  Denies any chest pain.  No prolonged travel history or bed bound state.  Denies orthopnea or PND.  No ankle swelling.  No diarrhea or vomiting.  No dysuria, urinary urgency or frequency.  No lightheadedness or dizzy spells.  No falls.  She denies any sick contacts.      In the emergency room, the patient was evaluated there by Dr. Aparicio.  Basic lab work showed normal sodium and potassium.  BNP was normal.  Procalcitonin was less than 0.05.  CBC was unremarkable except for a platelet of 145.  Flu swab was negative.  Chest x-ray showed improvement of left basilar atelectasis, mild hyperaeration of the lungs.     PAST MEDICAL HISTORY:  Past Medical History   Diagnosis Date     Chronic airway obstruction, not elsewhere classified      Complete rupture of rotator cuff      Disorder of bone and cartilage, unspecified      History of blood transfusion      Mixed  "hyperlipidemia 4/00     Osteoarthritis      Personal history of other malignant neoplasm of skin      Spinal stenosis      Type II or unspecified type diabetes mellitus without mention of complication, not stated as uncontrolled         SOCIAL AND PERSONAL HISTORY:  Social History     Social History     Marital status: Single     Spouse name: N/A     Number of children: N/A     Years of education: N/A     Occupational History     Not on file.     Social History Main Topics     Smoking status: Former Smoker     Packs/day: 0.50     Years: 54.00     Types: Cigarettes     Quit date: 4/27/2000     Smokeless tobacco: Never Used      Comment: using nicotine gum     Alcohol use 0.5 oz/week     1 Glasses of wine per week      Comment: \"A glass of wine once a week.\"     Drug use: No     Sexual activity: Not Currently     Other Topics Concern     Not on file     Social History Narrative     FAMILY HISTORY:  Family History   Problem Relation Age of Onset     Arthritis Father      DIABETES Brother      CANCER Brother      breast     CEREBROVASCULAR DISEASE Brother      HOME MEDICATION:  Prescriptions Prior to Admission   Medication Sig Dispense Refill Last Dose     Multiple Vitamins-Minerals (MULTIVITAMIN ADULT PO) Take 1 tablet by mouth daily   2/24/2017 at Unknown time     guaiFENesin-codeine (ROBITUSSIN AC) 100-10 MG/5ML SOLN solution Take 5 mLs by mouth every 4 hours as needed for cough 120 mL 1 2/24/2017 at prn     oxyCODONE (ROXICODONE) 5 MG IR tablet Take 1 tablet (5 mg) by mouth every 4 hours as needed for moderate to severe pain 120 tablet 0 2/25/2017 at Unknown time     amoxicillin (AMOXIL) 500 MG capsule TAKE FOUR CAPSULES (2 GRAMS) BY MOUTH ONE HOUR BEFORE DENTAL APPOINTMENT  3  at prn     glipiZIDE (GLUCOTROL) 5 MG tablet Take 1 tablet (5 mg) by mouth every morning (before breakfast) 90 tablet 1 2/24/2017 at Unknown time     tiotropium (SPIRIVA HANDIHALER) 18 MCG inhalation capsule Inhale contents of one capsule " daily. 90 capsule 3 2/24/2017 at Unknown time     alendronate (FOSAMAX) 70 MG tablet Take 1 tablet (70 mg) by mouth every 7 days Take with 8 ounces water and wait at least 30 minutes before eating, do not lie down until after eating 12 tablet 3 2/19/2017     fluticasone-salmeterol (ADVAIR DISKUS) 250-50 MCG/DOSE diskus inhaler Inhale 1 puff into the lungs 2 times daily 3 Inhaler 3 2/25/2017 at Unknown time     ASPIRIN EC PO Take 81 mg by mouth daily   2/25/2017 at Unknown time     senna-docusate (SENOKOT-S;PERICOLACE) 8.6-50 MG per tablet Take 1 tablet by mouth 2 times daily    2/24/2017 at Unknown time     albuterol (ALBUTEROL) 108 (90 BASE) MCG/ACT inhaler Inhale 2 puffs into the lungs every 4 hours as needed for shortness of breath / dyspnea 1 Inhaler 6 2/25/2017 at Unknown time     pravastatin (PRAVACHOL) 80 MG tablet Take 1 tablet (80 mg) by mouth every evening 90 tablet 3 2/24/2017 at Unknown time     Acetaminophen (TYLENOL PO) Take 650 mg by mouth 3 times daily Patient takes TID with Oxycodone.   11/25/2016 at 1015     calcium-vitamin D (CALTRATE) 600-400 MG-UNIT per tablet Take 1 tablet by mouth daily   2/24/2017 at Unknown time     Glucosamine-Chondroitin (GLUCOSAMINE CHONDROITIN COMPLX) 500-250 MG CAPS Take 1 tablet by mouth 2 times daily   2/24/2017 at Unknown time     Calcium Polycarbophil (FIBERCON PO) Take 1 tablet by mouth once , one in the morning and one in the evening   2/24/2017 at Unknown time     VITAMIN E PO Take 400 Int'l Units by mouth daily   2/24/2017 at Unknown time     ascorbic acid (VITAMIN C) 500 MG tablet Take 1 tablet (500 mg) by mouth daily 30 tablet  2/24/2017 at Unknown time     ONETOUCH DELICA LANCETS 33G MISC    Unknown at Unknown time     ONE TOUCH ULTRA test strip    Unknown at Unknown time       REVIEW OF SYSTEMS:  -A complete review of systems was done and was negative for anything other than that mentioned on HPI.    PHYSICAL EXAMINATION:  Vital signs:  Temp: 96.3  F (35.7  " C) Temp src: Oral BP: 153/83 Pulse: 93 Heart Rate: 92 Resp: 15 SpO2: 93 % O2 Device: Nasal cannula Oxygen Delivery: 2 LPM Height: 160 cm (5' 3\") Weight: 64.1 kg (141 lb 6.4 oz)  Estimated body mass index is 25.05 kg/(m^2) as calculated from the following:    Height as of this encounter: 1.6 m (5' 3\").    Weight as of this encounter: 64.1 kg (141 lb 6.4 oz).     GENERAL: Awake alert oriented ×3, appears comfortable  HEENT: No pallor or icterus  NECK: Supple with good range of motion, normal JVD  RESPIRATORY: Scattered expiratory wheeze bilaterally, normal effort of breathing  CVS: Regular rate and rhythm no murmur  GI: Soft, nontender, normoactive bowel sounds   SKIN: Warm and dry, no rashes  MSK: No joint deformity or effusion  NEURO: Awake alert oriented ×3, CN 2-12 intact, nonfocal    DIAGNOSTIC DATA:  -normal WBC except mild thrombocytopenia, flu-negative  -CXR without infiltrate  -12 lead EKG- NSR, no ischemic changes    ASSESSMENT AND PLAN:  Mr. Celeste Chacko is an 85-year-old female with known history of COPD, not on home oxygen, hyperlipidemia, diabetes mellitus type 2 who presents to the emergency room with a 1-week history of upper respiratory tract symptoms, cough and dyspnea.   1.  COPD, acute exacerbation, most likely due to acute bronchitis with hypoxia.  The patient presents with worsening dyspnea for the past 2 days after she initially started with a cold.  She does have cough with yellowish sputum production.  There is no pneumonia on her x-ray, however, this could be bronchitis.  More than likely this is a viral illness.  I will put her on prednisone 60 mg daily.  Continue her Advair.  Will also put her on DuoNebs.  More than likely this represents a viral illness, however, given her history of COPD, I will empirically cover with azithromycin for 5 days.  This was discussed with the patient.  In the emergency room, the patient's oxygenation dropped to 85% on room air with ambulation.  At this time, " she will be on oxygen, will wean as tolerated.   2.  Diabetes mellitus type 2.  She is on glipizide at home.  Her last hemoglobin A1c was on 2017 and it was 6.7.  At this time, I am not going to repeat A1c.  I will put her on a high intensity sliding scale, continue her glipizide.  I anticipate her sugars will be difficult to control because of the use of prednisone.   3.  Chronic back pain.  She is on Oxycodone IR 5 mg every 4 hours as needed, will continue that.   4.  Hyperlipidemia.  She is on pravastatin 80 mg every evening, will continue that.      Anticipated length of stay more than 2 midnights.      CODE STATUS:  Full code.         PIETER LOPEZ MD             D: 2017 12:55   T: 2017 13:34   MT: JENNIFER      Name:     LEXII LIVINGSTON   MRN:      [id number]        Account:      [id number]   :      1931           Admitted:     [id number]      Document: [id number]

## 2017-02-25 NOTE — ED NOTES
Pt oxygen sats around 84-85% when walking down the crooks without supplemental O2, pt stated she felt weak but was unable to ambulate for 40 feet with cane.

## 2017-02-26 ENCOUNTER — APPOINTMENT (OUTPATIENT)
Dept: PHYSICAL THERAPY | Facility: CLINIC | Age: 82
DRG: 190 | End: 2017-02-26
Attending: INTERNAL MEDICINE
Payer: COMMERCIAL

## 2017-02-26 LAB
ANION GAP SERPL CALCULATED.3IONS-SCNC: 9 MMOL/L (ref 3–14)
BUN SERPL-MCNC: 13 MG/DL (ref 7–30)
CALCIUM SERPL-MCNC: 8.7 MG/DL (ref 8.5–10.1)
CHLORIDE SERPL-SCNC: 106 MMOL/L (ref 94–109)
CO2 SERPL-SCNC: 27 MMOL/L (ref 20–32)
CREAT SERPL-MCNC: 0.58 MG/DL (ref 0.52–1.04)
ERYTHROCYTE [DISTWIDTH] IN BLOOD BY AUTOMATED COUNT: 13.4 % (ref 10–15)
GFR SERPL CREATININE-BSD FRML MDRD: ABNORMAL ML/MIN/1.7M2
GLUCOSE BLDC GLUCOMTR-MCNC: 129 MG/DL (ref 70–99)
GLUCOSE BLDC GLUCOMTR-MCNC: 174 MG/DL (ref 70–99)
GLUCOSE BLDC GLUCOMTR-MCNC: 204 MG/DL (ref 70–99)
GLUCOSE BLDC GLUCOMTR-MCNC: 267 MG/DL (ref 70–99)
GLUCOSE BLDC GLUCOMTR-MCNC: 81 MG/DL (ref 70–99)
GLUCOSE SERPL-MCNC: 138 MG/DL (ref 70–99)
HCT VFR BLD AUTO: 43.4 % (ref 35–47)
HGB BLD-MCNC: 14.1 G/DL (ref 11.7–15.7)
MCH RBC QN AUTO: 27 PG (ref 26.5–33)
MCHC RBC AUTO-ENTMCNC: 32.5 G/DL (ref 31.5–36.5)
MCV RBC AUTO: 83 FL (ref 78–100)
PLATELET # BLD AUTO: 161 10E9/L (ref 150–450)
POTASSIUM SERPL-SCNC: 3.4 MMOL/L (ref 3.4–5.3)
RBC # BLD AUTO: 5.22 10E12/L (ref 3.8–5.2)
SODIUM SERPL-SCNC: 142 MMOL/L (ref 133–144)
WBC # BLD AUTO: 8.3 10E9/L (ref 4–11)

## 2017-02-26 PROCEDURE — 36415 COLL VENOUS BLD VENIPUNCTURE: CPT | Performed by: INTERNAL MEDICINE

## 2017-02-26 PROCEDURE — 80048 BASIC METABOLIC PNL TOTAL CA: CPT | Performed by: INTERNAL MEDICINE

## 2017-02-26 PROCEDURE — 40000193 ZZH STATISTIC PT WARD VISIT

## 2017-02-26 PROCEDURE — 25000132 ZZH RX MED GY IP 250 OP 250 PS 637

## 2017-02-26 PROCEDURE — 12000000 ZZH R&B MED SURG/OB

## 2017-02-26 PROCEDURE — 97116 GAIT TRAINING THERAPY: CPT | Mod: GP

## 2017-02-26 PROCEDURE — 99233 SBSQ HOSP IP/OBS HIGH 50: CPT | Performed by: INTERNAL MEDICINE

## 2017-02-26 PROCEDURE — 97161 PT EVAL LOW COMPLEX 20 MIN: CPT | Mod: GP

## 2017-02-26 PROCEDURE — 25000132 ZZH RX MED GY IP 250 OP 250 PS 637: Performed by: INTERNAL MEDICINE

## 2017-02-26 PROCEDURE — 00000146 ZZHCL STATISTIC GLUCOSE BY METER IP

## 2017-02-26 PROCEDURE — 25000125 ZZHC RX 250: Performed by: INTERNAL MEDICINE

## 2017-02-26 PROCEDURE — 85027 COMPLETE CBC AUTOMATED: CPT | Performed by: INTERNAL MEDICINE

## 2017-02-26 RX ORDER — AMOXICILLIN 250 MG
1 CAPSULE ORAL 2 TIMES DAILY
Status: DISCONTINUED | OUTPATIENT
Start: 2017-02-26 | End: 2017-02-28 | Stop reason: HOSPADM

## 2017-02-26 RX ORDER — ELECTROLYTES/DEXTROSE
SOLUTION, ORAL ORAL DAILY
Status: DISCONTINUED | OUTPATIENT
Start: 2017-02-26 | End: 2017-02-26 | Stop reason: CLARIF

## 2017-02-26 RX ORDER — MULTIVITAMIN,THERAPEUTIC
1 TABLET ORAL DAILY
Status: DISCONTINUED | OUTPATIENT
Start: 2017-02-26 | End: 2017-02-28 | Stop reason: HOSPADM

## 2017-02-26 RX ORDER — VITAMIN E 268 MG
400 CAPSULE ORAL DAILY
Status: DISCONTINUED | OUTPATIENT
Start: 2017-02-26 | End: 2017-02-28 | Stop reason: HOSPADM

## 2017-02-26 RX ORDER — CALCIUM POLYCARBOPHIL 625 MG 625 MG/1
625 TABLET ORAL 2 TIMES DAILY
Status: DISCONTINUED | OUTPATIENT
Start: 2017-02-26 | End: 2017-02-28 | Stop reason: HOSPADM

## 2017-02-26 RX ORDER — ASCORBIC ACID 500 MG
500 TABLET ORAL DAILY
Status: DISCONTINUED | OUTPATIENT
Start: 2017-02-26 | End: 2017-02-28 | Stop reason: HOSPADM

## 2017-02-26 RX ADMIN — ACETAMINOPHEN 650 MG: 325 TABLET, FILM COATED ORAL at 20:02

## 2017-02-26 RX ADMIN — Medication 1 CAPSULE: at 18:02

## 2017-02-26 RX ADMIN — Medication 1 TABLET: at 07:55

## 2017-02-26 RX ADMIN — OXYCODONE HYDROCHLORIDE 5 MG: 5 TABLET ORAL at 20:02

## 2017-02-26 RX ADMIN — Medication 1 CAPSULE: at 07:55

## 2017-02-26 RX ADMIN — INSULIN ASPART 6 UNITS: 100 INJECTION, SOLUTION INTRAVENOUS; SUBCUTANEOUS at 17:43

## 2017-02-26 RX ADMIN — OXYCODONE HYDROCHLORIDE AND ACETAMINOPHEN 500 MG: 500 TABLET ORAL at 10:38

## 2017-02-26 RX ADMIN — OXYCODONE HYDROCHLORIDE 5 MG: 5 TABLET ORAL at 13:37

## 2017-02-26 RX ADMIN — Medication 1 MG: at 00:04

## 2017-02-26 RX ADMIN — AZITHROMYCIN 250 MG: 250 TABLET, FILM COATED ORAL at 10:38

## 2017-02-26 RX ADMIN — ASPIRIN 81 MG: 81 TABLET, COATED ORAL at 07:55

## 2017-02-26 RX ADMIN — SENNOSIDES AND DOCUSATE SODIUM 1 TABLET: 8.6; 5 TABLET ORAL at 07:55

## 2017-02-26 RX ADMIN — CALCIUM POLYCARBOPHIL 625 MG: 625 TABLET, FILM COATED ORAL at 13:32

## 2017-02-26 RX ADMIN — PRAVASTATIN SODIUM 80 MG: 40 TABLET ORAL at 20:02

## 2017-02-26 RX ADMIN — VITAMIN E CAP 400 UNIT 400 UNITS: 400 CAP at 13:32

## 2017-02-26 RX ADMIN — PREDNISONE 40 MG: 20 TABLET ORAL at 07:55

## 2017-02-26 RX ADMIN — SENNOSIDES AND DOCUSATE SODIUM 1 TABLET: 8.6; 5 TABLET ORAL at 20:03

## 2017-02-26 RX ADMIN — GLIPIZIDE 5 MG: 5 TABLET ORAL at 07:02

## 2017-02-26 RX ADMIN — UMECLIDINIUM 1 PUFF: 62.5 AEROSOL, POWDER ORAL at 07:57

## 2017-02-26 RX ADMIN — INSULIN ASPART 3 UNITS: 100 INJECTION, SOLUTION INTRAVENOUS; SUBCUTANEOUS at 13:32

## 2017-02-26 RX ADMIN — CALCIUM POLYCARBOPHIL 625 MG: 625 TABLET, FILM COATED ORAL at 20:02

## 2017-02-26 RX ADMIN — FLUTICASONE FUROATE AND VILANTEROL TRIFENATATE 1 PUFF: 100; 25 POWDER RESPIRATORY (INHALATION) at 20:06

## 2017-02-26 RX ADMIN — THERA TABS 1 TABLET: TAB at 10:38

## 2017-02-26 RX ADMIN — OXYCODONE HYDROCHLORIDE 5 MG: 5 TABLET ORAL at 01:52

## 2017-02-26 RX ADMIN — ACETAMINOPHEN 650 MG: 325 TABLET, FILM COATED ORAL at 13:37

## 2017-02-26 ASSESSMENT — PAIN DESCRIPTION - DESCRIPTORS: DESCRIPTORS: ACHING;SORE

## 2017-02-26 NOTE — PLAN OF CARE
Problem: Goal Outcome Summary  Goal: Goal Outcome Summary  Outcome: No Change  Pt alert/oriented x4, up w/ 1/ cane, VSS on 3L O2, 89% on 2L, SOB w/ exertion. Pt c/o of chronic lower back pain, oxy q4 hours with some relief. Pt unable to sleep, PRN melatonin given. D/c pending O2 sats.

## 2017-02-26 NOTE — PLAN OF CARE
Problem: Goal Outcome Summary  Goal: Goal Outcome Summary  Outcome: No Change  Ambulated halls x2 SBA with oxygen at 3L. BAKER, had to stop a couple of times on walk. Infrequent NPC, especially with deep breathing. LS diminished. BG higher d/t steroids. Not on oxygen at baseline, patient would like to attempt weaning tomorrow morning. DC 1-2 days to home.

## 2017-02-26 NOTE — PROGRESS NOTES
Patient transferred up from Observation. Pt settled in bed. Up with 1/walker. VSS. 2L oxgyen 93%. , SSI given. Oxycodone/Tylenol given with relief. Pt c/o of constipation. Senna given.  Pt hoping to d/c home soon.

## 2017-02-26 NOTE — PROGRESS NOTES
" 02/26/17 0930   Quick Adds   Type of Visit Initial PT Evaluation   Living Environment   Lives With alone   Living Arrangements condominium  (elevator accessible)   Home Accessibility tub/shower is not walk in   Number of Stairs to Enter Home 0   Number of Stairs Within Home 0   Transportation Available car;family or friend will provide  (pt drives at baseline)   Living Environment Comment Pt lives alone in an accessible condo, has a cleaning service 1x/mo, pt IND with bathing/showering, shopping, cooking, laundry.   Self-Care   Dominant Hand right   Usual Activity Tolerance good   Current Activity Tolerance moderate   Regular Exercise yes   Activity/Exercise Type walking   Exercise Amount/Frequency 3-5 times/wk  (3x/wk at [employer])   Equipment Currently Used at Home other (see comments)  (tripod based cane)   Functional Level Prior   Ambulation 1-->assistive equipment   Transferring 0-->independent   Toileting 0-->independent   Bathing 0-->independent   Dressing 0-->independent   Eating 0-->independent   Communication 0-->understands/communicates without difficulty   Swallowing 0-->swallows foods/liquids without difficulty   Cognition 0 - no cognition issues reported   Fall history within last six months no   Which of the above functional risks had a recent onset or change? ambulation  (2/2 feeling SOB, fatigue)   Prior Functional Level Comment uses small tripod based cane   General Information   Onset of Illness/Injury or Date of Surgery - Date 02/25/17   Referring Physician Arthur Jaimes MD   Patient/Family Goals Statement hopeful to return home at discharge   Pertinent History of Current Problem (include personal factors and/or comorbidities that impact the POC) 85 year old female who presents with shortness of breath. The patient reports that she developed a \"cold\" a few days ago that has increasingly gotten worse with productive cough with yellow sputum, cold sweats, and shortness of breath last " night   Precautions/Limitations fall precautions;oxygen therapy device and L/min  (2-3L)   General Observations Pt on 3L with SpO2 at 98% on PT entry, turned to 2L pt able to maintain sats >94%     General Info Comments Activity: up with assist   Cognitive Status Examination   Orientation orientation to person, place and time   Level of Consciousness alert   Follows Commands and Answers Questions 100% of the time;able to follow multistep instructions   Personal Safety and Judgment intact   Memory intact   Pain Assessment   Patient Currently in Pain No  (pt does have chronic low back pain at baseline)   Integumentary/Edema   Integumentary/Edema no deficits were identifed   Posture    Posture Forward head position   Range of Motion (ROM)   ROM Quick Adds No deficits were identified   ROM Comment B LEs WFL against gravity   Strength   Manual Muscle Testing Quick Adds No deficits were identified   Strength Comments B LEs WFL 4/5 globally   Bed Mobility   Bed Mobility Comments pt IND with supine<>sit   Transfer Skills   Transfer Comments pt IND with sit<>stand   Gait   Gait Comments gait 1x15' with tripod based cane and SBA/assist for O2 tank and line   Balance   Balance Comments pt with fair balance with gait with use of cane, no LOB or pathway deviations noted   Sensory Examination   Sensory Perception no deficits were identified   Sensory Perception Comments per pt report   General Therapy Interventions   Planned Therapy Interventions gait training;progressive activity/exercise   Clinical Impression   Criteria for Skilled Therapeutic Intervention yes, treatment indicated   PT Diagnosis difficulty with gait   Influenced by the following impairments decreased respiratory status, decreased activity tolerance   Functional limitations due to impairments difficulty with gait   Clinical Presentation Evolving/Changing   Clinical Presentation Rationale 85-year-old female with known history of COPD, not on home oxygen, O2 needs  "fluctuating during stay between 2-3L   Clinical Decision Making (Complexity) Low complexity   Therapy Frequency` daily   Predicted Duration of Therapy Intervention (days/wks) 2 days   Anticipated Discharge Disposition Home with Home Therapy  (home safety eval)   Risk & Benefits of therapy have been explained Yes   Patient, Family & other staff in agreement with plan of care Yes   Health system TM \"6 Clicks\"   2016, Trustees of [facility], under license to "[employer]".  All rights reserved.   6 Clicks Short Forms Basic Mobility Inpatient Short Form   [facility]  \"6 Clicks\" V.2 Basic Mobility Inpatient Short Form   1. Turning from your back to your side while in a flat bed without using bedrails? 4 - None   2. Moving from lying on your back to sitting on the side of a flat bed without using bedrails? 4 - None   3. Moving to and from a bed to a chair (including a wheelchair)? 4 - None   4. Standing up from a chair using your arms (e.g., wheelchair, or bedside chair)? 4 - None   5. To walk in hospital room? 3 - A Little   6. Climbing 3-5 steps with a railing? 3 - A Little   Basic Mobility Raw Score (Score out of 24.Lower scores equate to lower levels of function) 22   Total Evaluation Time   Total Evaluation Time (Minutes) 10     "

## 2017-02-26 NOTE — PLAN OF CARE
Problem: Goal Outcome Summary  Goal: Goal Outcome Summary  PT: Order received, chart reviewed, eval completed and treatment. Pt is an 84 y/o male admitted 2/25/17 for increasing shortness of breath. Pt with PMH for COPD and does not use home O2. Pt IND at baseline with daily activities, has cleaning service 1x/month, uses small tripod based cane for gait, [facility] 3x/wk [facility]. Currently pt IND with supine<>sit, sit<>stand, SBA for gait with cane x300' (pt requires 5 standing rest breaks 2/2 fatigue and feeling short of breath, O2 sats >90% throughout). Pt limited at this time by decreased respiratory status and fatigue, balance and strength within functional limits at this time. PT recommendation: home with home safety eval/home PT to progress activity tolerance. If pt requires home O2 at discharge recommend pulmonary rehab.      Recommend ambulation 4x/day while here with nursing staff, pt and nursing aide in agreement.

## 2017-02-26 NOTE — PROGRESS NOTES
[facility]    Hospitalist Progress Note    Assessment & Plan   Mr. Celeste Chacko is an 85-year-old female with known history of COPD, not on home oxygen, hyperlipidemia, diabetes mellitus type 2 who presented to the emergency room with a 1-week history of upper respiratory tract symptoms, cough and dyspnea.     1. COPD, acute exacerbation, most likely due to acute bronchitis with hypoxia.   -The patient presents with worsening dyspnea for the past 2 days after she initially started with a cold.   -he does have cough with yellowish sputum production.   -Continue prednisone 40 mg daily  -Continue  Advair.   -DuoNebs p.r.n.   -Continue azithromycin for 5 days total.   -Wean oxygen as tolerated  -Patient is still requiring 2 L oxygen anticipate will need another day or two in the hospital    2. Diabetes mellitus type 2.   -She is on glipizide at home.   -Her last hemoglobin A1c on 02/21/2017 was 6.7.   -high intensity sliding scale   -continue her glipizide.   - Blood sugars reasonably well controlled    3. Chronic back pain.   -Continue prior to admission Oxycodone IR 5 mg every 4 hours as needed.    4. Hyperlipidemia.  Continue pravastatin 80 mg every evening.         D/W: RN  DVT Prophylaxis: Pneumatic Compression Devices  Code Status: Full Code    Disposition: Expected discharge in 1-3 days    Arthur Jaimes MD    Interval History   Dyspnea slightly better.  Unable to wean off oxygen.  Intermittent cough.  No chest pain    -Data reviewed today: I reviewed all new labs and imaging results over the last 24 hours. I personally reviewed no images or EKG's today.    Physical Exam   Temp: 99  F (37.2  C) Temp src: Oral BP: 158/78 Pulse: 82 Heart Rate: 73 Resp: 16 SpO2: 93 % O2 Device: Nasal cannula Oxygen Delivery: 3 LPM  Vitals:    02/25/17 0858 02/25/17 1142   Weight: 64.4 kg (142 lb) 64.1 kg (141 lb 6.4 oz)     Vital Signs with Ranges  Temp:  [96.1  F (35.6  C)-99  F (37.2  C)] 99  F (37.2   C)  Pulse:  [82-93] 82  Heart Rate:  [73-93] 73  Resp:  [15-18] 16  BP: (124-174)/(69-91) 158/78  SpO2:  [89 %-95 %] 93 %  I/O last 3 completed shifts:  In: 1120 [P.O.:1120]  Out: 1300 [Urine:1300]    Constitutional: AAOX3, NAD, Appears comfortable  HEENT: Moist oral mucosa, no oral lesions, No pallor or icterus  Neck- Supple, Good ROM, No JVD  Respiratory:  Scattered wheezes bilaterally, Normal WOB  Cardiovascular: RRR, No murmur  GI: Soft, Non- tender, BS- normoactive, No Guarding/rebound/rigidity  Skin/Integument: Warm and dry, no rashes  MSK: No joint deformity or swelling, no edema  Neuro: CN- grossly intact      Medications        vitamin E  400 Units Oral Daily     ascorbic acid  500 mg Oral Daily     calcium polycarbophil  625 mg Oral BID     MULTIVITAMIN ADULT   Oral Daily     senna-docusate  1 tablet Oral BID     sodium chloride (PF)  3 mL Intracatheter Q8H     predniSONE  40 mg Oral Daily     azithromycin  250 mg Oral Daily     aspirin EC EC tablet 81 mg  81 mg Oral Daily     calcium-vitamin D  1 tablet Oral Daily     fluticasone-vilanterol  1 puff Inhalation Daily     glipiZIDE  5 mg Oral QAM AC     glucosamine-chondroitin  1 capsule Oral BID     pravastatin  80 mg Oral QPM     umeclidinium  1 puff Inhalation Daily     insulin aspart  1-10 Units Subcutaneous TID AC     insulin aspart  1-7 Units Subcutaneous At Bedtime       Data     Recent Labs  Lab 02/26/17  0833 02/25/17  0905 02/21/17  0825   WBC 8.3 8.1  --    HGB 14.1 14.3  --    MCV 83 83  --     145*  --     140 141   POTASSIUM 3.4 3.7 3.9   CHLORIDE 106 104 103   CO2 27 28 31   BUN 13 17 11   CR 0.58 0.62 0.62   ANIONGAP 9 8 7   NARCISA 8.7 9.2 9.5   * 193* 81       Recent Results (from the past 24 hour(s))   XR Chest 2 Views    Narrative    CHEST TWO VIEWS  2/25/2017 9:50 AM     HISTORY: Shortness of breath.    COMPARISON: 7/22/2011.      Impression    IMPRESSION: Previously seen left basilar minimal atelectatic  changes  resolved. Mild hyperaeration of the lungs may indicate obstructive  airways disease. Otherwise currently there is no active  cardiopulmonary disease. Reverse type right shoulder arthroplasty  again noted.    KAYA SANTOS MD

## 2017-02-27 LAB
GLUCOSE BLDC GLUCOMTR-MCNC: 116 MG/DL (ref 70–99)
GLUCOSE BLDC GLUCOMTR-MCNC: 197 MG/DL (ref 70–99)
GLUCOSE BLDC GLUCOMTR-MCNC: 209 MG/DL (ref 70–99)
GLUCOSE BLDC GLUCOMTR-MCNC: 216 MG/DL (ref 70–99)
GLUCOSE BLDC GLUCOMTR-MCNC: 99 MG/DL (ref 70–99)

## 2017-02-27 PROCEDURE — 25000125 ZZHC RX 250: Performed by: INTERNAL MEDICINE

## 2017-02-27 PROCEDURE — 00000146 ZZHCL STATISTIC GLUCOSE BY METER IP

## 2017-02-27 PROCEDURE — 99232 SBSQ HOSP IP/OBS MODERATE 35: CPT | Performed by: INTERNAL MEDICINE

## 2017-02-27 PROCEDURE — 25000132 ZZH RX MED GY IP 250 OP 250 PS 637: Performed by: INTERNAL MEDICINE

## 2017-02-27 PROCEDURE — 25000132 ZZH RX MED GY IP 250 OP 250 PS 637

## 2017-02-27 PROCEDURE — 40000275 ZZH STATISTIC RCP TIME EA 10 MIN

## 2017-02-27 PROCEDURE — 12000000 ZZH R&B MED SURG/OB

## 2017-02-27 RX ADMIN — IPRATROPIUM BROMIDE AND ALBUTEROL SULFATE 3 ML: .5; 3 SOLUTION RESPIRATORY (INHALATION) at 08:46

## 2017-02-27 RX ADMIN — ACETAMINOPHEN 650 MG: 325 TABLET, FILM COATED ORAL at 13:00

## 2017-02-27 RX ADMIN — VITAMIN E CAP 400 UNIT 400 UNITS: 400 CAP at 08:09

## 2017-02-27 RX ADMIN — OXYCODONE HYDROCHLORIDE 5 MG: 5 TABLET ORAL at 08:15

## 2017-02-27 RX ADMIN — INSULIN ASPART 3 UNITS: 100 INJECTION, SOLUTION INTRAVENOUS; SUBCUTANEOUS at 13:43

## 2017-02-27 RX ADMIN — OXYCODONE HYDROCHLORIDE 5 MG: 5 TABLET ORAL at 13:00

## 2017-02-27 RX ADMIN — AZITHROMYCIN 250 MG: 250 TABLET, FILM COATED ORAL at 10:21

## 2017-02-27 RX ADMIN — PREDNISONE 40 MG: 20 TABLET ORAL at 08:09

## 2017-02-27 RX ADMIN — Medication 1 CAPSULE: at 16:56

## 2017-02-27 RX ADMIN — ASPIRIN 81 MG: 81 TABLET, COATED ORAL at 08:09

## 2017-02-27 RX ADMIN — OXYCODONE HYDROCHLORIDE 5 MG: 5 TABLET ORAL at 17:55

## 2017-02-27 RX ADMIN — PRAVASTATIN SODIUM 80 MG: 40 TABLET ORAL at 21:14

## 2017-02-27 RX ADMIN — FLUTICASONE FUROATE AND VILANTEROL TRIFENATATE 1 PUFF: 100; 25 POWDER RESPIRATORY (INHALATION) at 21:15

## 2017-02-27 RX ADMIN — OXYCODONE HYDROCHLORIDE 5 MG: 5 TABLET ORAL at 22:11

## 2017-02-27 RX ADMIN — CALCIUM POLYCARBOPHIL 625 MG: 625 TABLET, FILM COATED ORAL at 21:14

## 2017-02-27 RX ADMIN — SENNOSIDES AND DOCUSATE SODIUM 1 TABLET: 8.6; 5 TABLET ORAL at 21:15

## 2017-02-27 RX ADMIN — UMECLIDINIUM 1 PUFF: 62.5 AEROSOL, POWDER ORAL at 09:51

## 2017-02-27 RX ADMIN — ACETAMINOPHEN 650 MG: 325 TABLET, FILM COATED ORAL at 17:55

## 2017-02-27 RX ADMIN — Medication 1 MG: at 21:38

## 2017-02-27 RX ADMIN — GLIPIZIDE 5 MG: 5 TABLET ORAL at 06:30

## 2017-02-27 RX ADMIN — OXYCODONE HYDROCHLORIDE AND ACETAMINOPHEN 500 MG: 500 TABLET ORAL at 08:09

## 2017-02-27 RX ADMIN — Medication 1 CAPSULE: at 08:09

## 2017-02-27 RX ADMIN — Medication 1 MG: at 01:19

## 2017-02-27 RX ADMIN — INSULIN ASPART 3 UNITS: 100 INJECTION, SOLUTION INTRAVENOUS; SUBCUTANEOUS at 18:18

## 2017-02-27 RX ADMIN — Medication 1 TABLET: at 08:09

## 2017-02-27 RX ADMIN — CALCIUM POLYCARBOPHIL 625 MG: 625 TABLET, FILM COATED ORAL at 08:09

## 2017-02-27 RX ADMIN — THERA TABS 1 TABLET: TAB at 08:09

## 2017-02-27 RX ADMIN — SENNOSIDES AND DOCUSATE SODIUM 1 TABLET: 8.6; 5 TABLET ORAL at 08:12

## 2017-02-27 NOTE — PROGRESS NOTES
[facility]    Hospitalist Progress Note    Assessment & Plan   Mr. Celeste Chacko is an 85-year-old female with known history of COPD, not on home oxygen, hyperlipidemia, diabetes mellitus type 2 who presented to the emergency room with a 1-week history of upper respiratory tract symptoms, cough and dyspnea.     1. COPD, acute exacerbation, most likely due to acute bronchitis with hypoxia.   -The patient presented with worsening dyspnea for the past 2 days after she initially started with a cold.   -does have cough with yellowish sputum production.   -Continue prednisone 40 mg daily  -Continue Breo-ellipta.   -DuoNebs p.r.n.   -Continue azithromycin for 5 days total.   -Wean oxygen as tolerated  -Patient is still requiring 2 L oxygen; especially desaturating with ambulation  -anticipate will need another day or two in the hospital  -PT recommending pulmonary rehabilitation if she requires O2 at discharge    2. Diabetes mellitus type 2.   -She is on glipizide at home.   -Her last hemoglobin A1c on 02/21/2017 was 6.7.   -high intensity sliding scale   -continue her glipizide.   -Blood sugars reasonably well controlled    3. Chronic back pain.   -Continue prior to admission Oxycodone IR 5 mg every 4 hours as needed.    4. Hyperlipidemia.  Continue pravastatin 80 mg every evening.         D/W: RN  DVT Prophylaxis: Pneumatic Compression Devices  Code Status: Full Code    Disposition: Expected discharge in 1-2 days; likely due to/28 if weaned off oxygen    Arthur Jaimes MD    Interval History   Desaturating with activity.  Dyspnea slightly better today.  Unable to wean off oxygen.  Intermittent cough.        -Data reviewed today: I reviewed all new labs and imaging results over the last 24 hours. I personally reviewed no images or EKG's today.    Physical Exam   Temp: 98.4  F (36.9  C) Temp src: Oral BP: 177/84 Pulse: 81 Heart Rate: 74 Resp: 18 SpO2: 95 % O2 Device: Nasal cannula Oxygen Delivery: 1  LPM  Vitals:    02/25/17 0858 02/25/17 1142   Weight: 64.4 kg (142 lb) 64.1 kg (141 lb 6.4 oz)     Vital Signs with Ranges  Temp:  [97.9  F (36.6  C)-98.4  F (36.9  C)] 98.4  F (36.9  C)  Pulse:  [81] 81  Heart Rate:  [74-75] 74  Resp:  [16-18] 18  BP: (125-177)/(57-84) 177/84  SpO2:  [90 %-95 %] 95 %  I/O last 3 completed shifts:  In: 480 [P.O.:480]  Out: -     Constitutional: AAOX3, NAD, Appears comfortable  Respiratory:  Scattered wheezes bilaterally, Normal WOB  Cardiovascular: RRR, No murmur  GI: Soft, Non- tender, BS- normoactive, No Guarding/rebound/rigidity  Skin/Integument: Warm and dry, no rashes  MSK: No joint deformity or swelling, no edema  Neuro: CN- grossly intact      Medications        vitamin E  400 Units Oral Daily     ascorbic acid  500 mg Oral Daily     calcium polycarbophil  625 mg Oral BID     senna-docusate  1 tablet Oral BID     multivitamin, therapeutic  1 tablet Oral Daily     sodium chloride (PF)  3 mL Intracatheter Q8H     predniSONE  40 mg Oral Daily     azithromycin  250 mg Oral Daily     aspirin EC EC tablet 81 mg  81 mg Oral Daily     calcium-vitamin D  1 tablet Oral Daily     fluticasone-vilanterol  1 puff Inhalation Daily     glipiZIDE  5 mg Oral QAM AC     glucosamine-chondroitin  1 capsule Oral BID     pravastatin  80 mg Oral QPM     umeclidinium  1 puff Inhalation Daily     insulin aspart  1-10 Units Subcutaneous TID AC     insulin aspart  1-7 Units Subcutaneous At Bedtime       Data     Recent Labs  Lab 02/26/17  0833 02/25/17  0905 02/21/17  0825   WBC 8.3 8.1  --    HGB 14.1 14.3  --    MCV 83 83  --     145*  --     140 141   POTASSIUM 3.4 3.7 3.9   CHLORIDE 106 104 103   CO2 27 28 31   BUN 13 17 11   CR 0.58 0.62 0.62   ANIONGAP 9 8 7   NARCISA 8.7 9.2 9.5   * 193* 81       No results found for this or any previous visit (from the past 24 hour(s)).

## 2017-02-27 NOTE — PLAN OF CARE
Problem: Goal Outcome Summary  Goal: Goal Outcome Summary  Outcome: Improving  Pt. Alert and oriented x4. VSS. LS with expiratory wheezes. Oxygen sats dropping to mid 80s on RA. Back on Oxygen 1LPM, sats 88-91%. . SBA

## 2017-02-27 NOTE — PROGRESS NOTES
Patient has been assessed for Home Oxygen needs. Oxygen readings:    *Pulse oximetry (SpO2) = 89% on room air at rest while awake.    *SpO2 improved to 93% on 2liters/minute at rest.    *SpO2 = 83% on room air during activity/with exercise.    *SpO2 improved to 93% on 2liters/minute during activity/with exercise.

## 2017-02-27 NOTE — PLAN OF CARE
Problem: Goal Outcome Summary  Goal: Goal Outcome Summary  Outcome: Improving  VSS and WNL for this pt. Pt transfers with SBA, LS clear with mild exp wheezes. Pt continue to need O2 to maintain sats above 92%. BS have been 115 and 197 and continues to complain of pain to back requiring oxy. Plan is for discharge in 1 to 2 days, continue plan of care, will pass on and continue to monitor.

## 2017-02-27 NOTE — PLAN OF CARE
Problem: Discharge Planning  Goal: Discharge Planning (Adult, OB, Behavioral, Peds)  Outcome: Improving  RN: Alert and oriented X 4, up with SBA with GB/cane. Ambulated X 1 in hallway this shift with mild dyspnea present. O2 stable with ambulation. Weaned to RA, O2 89-95% at rest. LS diminished. LBP controlled with oxycodone/tylenol. Requested O2 for overnight for comfort. Plan to d/c home tomorrow if respiratory status remains stable.

## 2017-02-28 VITALS
SYSTOLIC BLOOD PRESSURE: 119 MMHG | HEART RATE: 75 BPM | RESPIRATION RATE: 18 BRPM | DIASTOLIC BLOOD PRESSURE: 44 MMHG | HEIGHT: 63 IN | BODY MASS INDEX: 25.05 KG/M2 | OXYGEN SATURATION: 92 % | TEMPERATURE: 98.4 F | WEIGHT: 141.4 LBS

## 2017-02-28 LAB
GLUCOSE BLDC GLUCOMTR-MCNC: 108 MG/DL (ref 70–99)
GLUCOSE BLDC GLUCOMTR-MCNC: 117 MG/DL (ref 70–99)
GLUCOSE BLDC GLUCOMTR-MCNC: 213 MG/DL (ref 70–99)
GLUCOSE BLDC GLUCOMTR-MCNC: 292 MG/DL (ref 70–99)

## 2017-02-28 PROCEDURE — 00000146 ZZHCL STATISTIC GLUCOSE BY METER IP

## 2017-02-28 PROCEDURE — 25000132 ZZH RX MED GY IP 250 OP 250 PS 637: Performed by: INTERNAL MEDICINE

## 2017-02-28 PROCEDURE — 25000132 ZZH RX MED GY IP 250 OP 250 PS 637

## 2017-02-28 PROCEDURE — 99239 HOSP IP/OBS DSCHRG MGMT >30: CPT | Performed by: INTERNAL MEDICINE

## 2017-02-28 PROCEDURE — 25000125 ZZHC RX 250: Performed by: INTERNAL MEDICINE

## 2017-02-28 RX ORDER — PREDNISONE 20 MG/1
40 TABLET ORAL DAILY
Qty: 1 TABLET | Refills: 0 | Status: SHIPPED | OUTPATIENT
Start: 2017-03-01 | End: 2017-02-28

## 2017-02-28 RX ORDER — AZITHROMYCIN 250 MG/1
250 TABLET, FILM COATED ORAL DAILY
Qty: 1 TABLET | Refills: 0 | Status: SHIPPED | OUTPATIENT
Start: 2017-03-01 | End: 2017-03-06

## 2017-02-28 RX ORDER — PREDNISONE 20 MG/1
40 TABLET ORAL DAILY
Qty: 2 TABLET | Refills: 0 | Status: SHIPPED | OUTPATIENT
Start: 2017-03-01 | End: 2017-03-06

## 2017-02-28 RX ADMIN — ASPIRIN 81 MG: 81 TABLET, COATED ORAL at 08:39

## 2017-02-28 RX ADMIN — CALCIUM POLYCARBOPHIL 625 MG: 625 TABLET, FILM COATED ORAL at 08:36

## 2017-02-28 RX ADMIN — ACETAMINOPHEN 650 MG: 325 TABLET, FILM COATED ORAL at 08:35

## 2017-02-28 RX ADMIN — ACETAMINOPHEN 650 MG: 325 TABLET, FILM COATED ORAL at 13:01

## 2017-02-28 RX ADMIN — GLIPIZIDE 5 MG: 5 TABLET ORAL at 08:35

## 2017-02-28 RX ADMIN — Medication 1 CAPSULE: at 08:38

## 2017-02-28 RX ADMIN — OXYCODONE HYDROCHLORIDE 5 MG: 5 TABLET ORAL at 08:38

## 2017-02-28 RX ADMIN — THERA TABS 1 TABLET: TAB at 08:37

## 2017-02-28 RX ADMIN — OXYCODONE HYDROCHLORIDE 5 MG: 5 TABLET ORAL at 13:01

## 2017-02-28 RX ADMIN — UMECLIDINIUM 1 PUFF: 62.5 AEROSOL, POWDER ORAL at 08:40

## 2017-02-28 RX ADMIN — Medication 1 TABLET: at 08:42

## 2017-02-28 RX ADMIN — OXYCODONE HYDROCHLORIDE AND ACETAMINOPHEN 500 MG: 500 TABLET ORAL at 08:36

## 2017-02-28 RX ADMIN — PREDNISONE 40 MG: 20 TABLET ORAL at 08:37

## 2017-02-28 RX ADMIN — INSULIN ASPART 3 UNITS: 100 INJECTION, SOLUTION INTRAVENOUS; SUBCUTANEOUS at 14:40

## 2017-02-28 RX ADMIN — SENNOSIDES AND DOCUSATE SODIUM 1 TABLET: 8.6; 5 TABLET ORAL at 08:36

## 2017-02-28 RX ADMIN — AZITHROMYCIN 250 MG: 250 TABLET, FILM COATED ORAL at 10:02

## 2017-02-28 NOTE — PLAN OF CARE
Problem: Goal Outcome Summary  Goal: Goal Outcome Summary  Outcome: Improving  Alert and oriented x 4. VS stable, on 1L NC and did complain of some chorionic back pain. She took Oxycodone and Tylenol x1 with good pain relief. She was up walking the halls x1 and was very stable. Plan to continue to wean off of O2.

## 2017-02-28 NOTE — PLAN OF CARE
Problem: Goal Outcome Summary  Goal: Goal Outcome Summary  Outcome: Improving  VSS on 1 L, up ind with cane (calling appropriately), A&Ox4. Pt asked about more oxygen on nights, discussed that her O2 sat was within range, decided she was ok on 1L. Voided x 2, lung sounds diminished, clear. Continue to monitor.

## 2017-02-28 NOTE — PROGRESS NOTES
Care Coordinator met with pt concerning discharging home today.  Her O2 has been weaned.  Pt was given the ANABELL questionnaire and then given the High level COPD education.  She understands her control and rescue COPD medications.  Home care orders are in.  Discussed with pt and she wants to have [facility].  Referral was sent.  A follow-up appointment has been scheduled for 3/6/17.  Pt has transportation home from her family.

## 2017-02-28 NOTE — PROGRESS NOTES
[facility]  Met with pt to discuss plans for HC.  Pt to be discharged home today and has agreed to have [facility] follow with services of SN and PT. Patient care support center processing referral.  Pt verbalized understanding that initial visit is scheduled for 3/1/17 or 3/2/17.  Pt has 24 hour phone number for [facility] for any questions or concerns. Patient also agreed to 60 day free [facility] trial. [facility] team will contact patient to initiate services.

## 2017-02-28 NOTE — PLAN OF CARE
Problem: Goal Outcome Summary  Goal: Goal Outcome Summary  PT: Attempted to see patient for PT session. Pt states she is discharging in a couple hours but needs to eat lunch and get things ready to go. Declined any PT right now but states she will have home PT. Pt has been up independently in her room with her cane.     PT goals not met.     Physical Therapy Discharge Summary     Reason for therapy discharge:    Discharged to home with home therapy.     Progress towards therapy goal(s). See goals on Care Plan in [facility] electronic health record for goal details.  Goals not met.  Barriers to achieving goals:   discharge from facility.     Therapy recommendation(s):    Continued therapy is recommended.  Rationale/Recommendations:  eval and treat with home PT.

## 2017-02-28 NOTE — PLAN OF CARE
Problem: Goal Outcome Summary  Goal: Goal Outcome Summary  Outcome: No Change  Pt is A&Ox4, VSS on RA. Baseline back pain.  Tolerating diet, voiding adequately. Up independently. AVS reviewed with patient, d/c meds given, waiting on belongings from security.

## 2017-02-28 NOTE — PLAN OF CARE
Problem: Goal Outcome Summary  Goal: Goal Outcome Summary  Outcome: Improving  Pt able to wean down to 1 liter and asked to have it on during the night. Pt states she would feel anxious if it was off wants to try and wean in the am.  Complained of some back pain and medicated once with relief. Pt up with sba and tolerated well.

## 2017-02-28 NOTE — DISCHARGE INSTRUCTIONS
[facility] will call you before coming to your home. Their number is [phone].

## 2017-03-01 ENCOUNTER — CARE COORDINATION (OUTPATIENT)
Dept: CARE COORDINATION | Facility: CLINIC | Age: 82
End: 2017-03-01

## 2017-03-01 ENCOUNTER — TELEPHONE (OUTPATIENT)
Dept: INTERNAL MEDICINE | Facility: CLINIC | Age: 82
End: 2017-03-01

## 2017-03-01 ENCOUNTER — DOCUMENTATION ONLY (OUTPATIENT)
Dept: CARE COORDINATION | Facility: CLINIC | Age: 82
End: 2017-03-01

## 2017-03-01 NOTE — TELEPHONE ENCOUNTER
IP F/U    Date: 2/28/17  Diagnosis:   Is patient active in care coordination? Yes - Route to Care Coordination (P 92615)  Was patient in TCU? No    Next 5 appointments (look out 90 days)     Mar 06, 2017  4:40 PM CST   [facility]    [address]  [facility] [address]   [phone]

## 2017-03-01 NOTE — DISCHARGE SUMMARY
HOSPITAL COURSE:  Celeste Chacko is an 85-year-old female admitted with shortness of breath, Chest x-ray demonstrates no acute infiltrate.  The patient was felt to have a COPD exacerbation treated with prednisone, azithromycin and DuoNebs.  Oxygen was gradually weaned off and patient was discharged to home.      DISCHARGE DIAGNOSES:   1.  Chronic obstructive pulmonary disease exacerbation.     2.  Diabetes mellitus type 2.     3.  Chronic back pain.    4.  Hyperlipidemia.     5.  Complete rotator cuff tear.   6.  Disorders of bone and cartilage, unspecified.   7.  History of blood transfusion.     8.  Osteoarthritis   9.  History of skin cancer.     10.  Spinal stenosis.      DISCHARGE MEDICATIONS:   1.  Azithromycin 250 mg daily.   2.  Prednisone 40 mg daily.   3.  Multivitamin daily.   4.  Robitussin-AC 5 mL every 4 hours as needed.   5.  Oxycodone 5 mg every 4 hours as needed.   6.  Amoxicillin 2 grams prior to dental appointment.   7.  Glipizide 5 mg daily.   8.  Spiriva 18 mcg daily.   9.  Alendronate 70 mg weekly.   10.  Advair 250/50 one puff twice daily.   11.  Aspirin 81 mg daily.   12.  Senokot-S 1 tab twice daily.   13.  Albuterol 2 puffs every 4 hours as needed.   14.  Pravastatin 80 mg daily.   15.  Acetaminophen 650 three times daily with oxycodone.   16.  Calcium plus vitamin D 1 tab daily.   17.  Glucosamine chondroitin 1 tab twice daily.   18.  FiberCon 1 tab twice daily.   19.  Vitamin E 400 international units daily.   20.  Vitamin C 500 mg daily.      DISCHARGE PHYSICAL EXAMINATION:   VITAL SIGNS:  Temperature 98.1, pulse 75, respiratory rate 18, blood pressure 119/44, satting 92% on room air.   GENERAL:  Well-nourished, well-developed, elderly female sitting comfortably in a chair.   HEENT:  Atraumatic, normocephalic.   NECK:  Supple without any lymphadenopathy.   LUNGS:  Demonstrate scattered rhonchi.   HEART:  Regular rate and rhythm without murmurs, gallops, rubs.   ABDOMEN:  Bowel sounds  positive.  Soft, nontender, nondistended, no hepatosplenomegaly.   EXTREMITIES:  No clubbing, cyanosis or edema.      DISPOSITION:  The patient will be discharged to home.      FOLLOWUP:  With primary care physician in 1 week.      Greater than 30 minutes discharge planning were spent with this patient.         NEISHA HUTCHINS MD             D: 2017 15:55   T: 2017 18:47   MT:       Name:     LEXII LIVINGSTON   MRN:      [id number]        Account:        [id number]   :      [id number]           Admit Date:                                       Discharge Date: 2017      Document: [id number]

## 2017-03-01 NOTE — PROGRESS NOTES
Clinic Care Coordination Contact  Care Team Conversations    Received a Care transition referral. Reviewed chart and pt discharged home with [facility].  Pt's RN/CM is Emelia Hallman and will sent a request for notification once pt is ready to discharge from services and provide contact information for CC.  Will continue to partner with home care to provide care coordination for pt.    Kane Prado RN/CC  Care Coordinator [facility]  [phone]

## 2017-03-02 ENCOUNTER — CARE COORDINATION (OUTPATIENT)
Dept: CARE COORDINATION | Facility: CLINIC | Age: 82
End: 2017-03-02

## 2017-03-02 ENCOUNTER — TELEPHONE (OUTPATIENT)
Dept: INTERNAL MEDICINE | Facility: CLINIC | Age: 82
End: 2017-03-02

## 2017-03-02 NOTE — TELEPHONE ENCOUNTER
[facility] calls for orders.     2x a week for 2 weeks  1 x a week for 1 week     For strengthening, safety and fall prevention.     [phone]    Also saw message below.     Call to pt and informed her of [facility] for [facility]5.00 to [facility]. [phone]. She is going to call them tomorrow. They need 24 hour notice.     There is also [facility] transportation for [facility] residents ([phone]) but she lives in [place]. She is going to call them though to see if they can give her the # for [place] volunteer transportation. services.

## 2017-03-02 NOTE — PROGRESS NOTES
Clinic Care Coordination Contact  Care Team Conversations    Spoke to Triage RN who stated that pt needs transportation resources. Pt's daughter has exhausted her time off and thus cannot provide transportation for pt. SW provided Triage RN with the following options:  -[employer] (PCP will need to complete part of the application - link given to Triage RN)  -[place]: for seniors 55+ within city limits    Pt is reportedly open to [facility]. MARCELO will update RN clinic care coordinator as pt has an appointment with PCP on 03/06/17.    JAYLYN Caceres, LGSW  Social Work - Care Coordinator  [facility]- [place], [place], and [place]  Phone: [phone]

## 2017-03-02 NOTE — TELEPHONE ENCOUNTER
Patient has a hospital follow up appt with PCP on Mon 3/6/17 and calls to report that she has transportation problems.  Daughter has already missed enough work and pt does not have any friends, family, neighbor, [employer] people that she can ask for a ride.      Doesn't want to take a cab to appt as she is concerned about being late for appt or getting caught in traffic not to mention the cost.    Does not want to apply for [employer] as she believes them to have problems picking up and dropping off on time.     Discussed [employer] but pt states she is not low income.  But did accept the phone number to see if they had ideas.    Given phone number for [email or web address]     Given phone number for [facility].    Patient does not use a computer so any other contact information needs to be given by phone or in person.      NEERU Fong R.N.

## 2017-03-02 NOTE — PROGRESS NOTES
[facility] now requests orders and shares plan of care/discharge summaries for some patients through [employer].  Please REPLY TO THIS MESSAGE in order to give authorization for orders when needed.  This is considered a verbal order, you will still receive a faxed copy of orders for signature.  Thank you for your assistance in improving collaboration for our patients.    ORDER    Skilled nurse visit 2week4, 1week5 plus 3 PRN visits for cardiopulmonary assessment, activity tolerance, safety assessment  PT eval and treat  OT eval and treat  HA service starting week of 3/5/17 for bathing assistance and other personal cares

## 2017-03-06 ENCOUNTER — OFFICE VISIT (OUTPATIENT)
Dept: INTERNAL MEDICINE | Facility: CLINIC | Age: 82
End: 2017-03-06
Payer: COMMERCIAL

## 2017-03-06 VITALS
SYSTOLIC BLOOD PRESSURE: 123 MMHG | TEMPERATURE: 98 F | RESPIRATION RATE: 12 BRPM | WEIGHT: 140 LBS | HEART RATE: 93 BPM | HEIGHT: 63 IN | BODY MASS INDEX: 24.8 KG/M2 | DIASTOLIC BLOOD PRESSURE: 68 MMHG | OXYGEN SATURATION: 90 %

## 2017-03-06 DIAGNOSIS — J44.9 CHRONIC OBSTRUCTIVE PULMONARY DISEASE, UNSPECIFIED COPD TYPE (H): Primary | ICD-10-CM

## 2017-03-06 DIAGNOSIS — E11.9 TYPE 2 DIABETES MELLITUS WITHOUT COMPLICATION, WITHOUT LONG-TERM CURRENT USE OF INSULIN (H): ICD-10-CM

## 2017-03-06 DIAGNOSIS — R25.1 TREMOR: ICD-10-CM

## 2017-03-06 PROCEDURE — 99496 TRANSJ CARE MGMT HIGH F2F 7D: CPT | Performed by: INTERNAL MEDICINE

## 2017-03-06 RX ORDER — ALBUTEROL SULFATE 90 UG/1
2 AEROSOL, METERED RESPIRATORY (INHALATION) EVERY 6 HOURS PRN
Qty: 1 INHALER | Refills: 11 | Status: SHIPPED | OUTPATIENT
Start: 2017-03-06 | End: 2018-05-21

## 2017-03-06 NOTE — PROGRESS NOTES
SUBJECTIVE:                                                    Celeste Chacko is a 85 year old female who presents to clinic today for the following health issues:      Hospital Follow-up Visit:    Hospital/Nursing Home/IP Rehab Facility: [facility]  Date of Admission: 02/25/2017  Date of Discharge: 02/28/2017  Reason(s) for Admission: COPD Exacerbation            Problems taking medications regularly:  None       Medication changes since discharge: None       Problems adhering to non-medication therapy:  None    Summary of hospitalization:  [facility] discharge summary reviewed  Diagnostic Tests/Treatments reviewed.  Follow up needed: none  Other Healthcare Providers Involved in Patient s Care:         None  Update since discharge: improved.   She reports her breathing is not back to baseline but continues to improve. She has some cough but no significant mucus production. No fever, chills. No chest pains.   She was notified that insurance is not covering advair so wondering about alternatives.   Her appetite is not optimal yet.     She reports she has noted more tremor the past year. Not interfering with eating or activities. It seems more pronounced with more neb treat,ments.   Her sugars were mildly elevated. She had had regular labs not long before admission.     Patient Active Problem List   Diagnosis     Disorder of bone and cartilage     Generalized osteoarthrosis, unspecified site     Chronic airway obstruction (H)     Spinal stenosis     Type 2 diabetes mellitus (HCC)     HYPERLIPIDEMIA LDL GOAL <100     History of basal cell carcinoma     Lumbago     Controlled substance agreement signed     History of actinic keratoses     Chronic pain syndrome     Current Outpatient Prescriptions   Medication Sig Dispense Refill     albuterol (ALBUTEROL) 108 (90 BASE) MCG/ACT Inhaler Inhale 2 puffs into the lungs every 6 hours as needed for shortness of breath / dyspnea or wheezing 1 Inhaler 11  "    Multiple Vitamins-Minerals (MULTIVITAMIN ADULT PO) Take 1 tablet by mouth daily       oxyCODONE (ROXICODONE) 5 MG IR tablet Take 1 tablet (5 mg) by mouth every 4 hours as needed for moderate to severe pain 120 tablet 0     amoxicillin (AMOXIL) 500 MG capsule TAKE FOUR CAPSULES (2 GRAMS) BY MOUTH ONE HOUR BEFORE DENTAL APPOINTMENT  3     glipiZIDE (GLUCOTROL) 5 MG tablet Take 1 tablet (5 mg) by mouth every morning (before breakfast) 90 tablet 1     tiotropium (SPIRIVA HANDIHALER) 18 MCG inhalation capsule Inhale contents of one capsule daily. 90 capsule 3     alendronate (FOSAMAX) 70 MG tablet Take 1 tablet (70 mg) by mouth every 7 days Take with 8 ounces water and wait at least 30 minutes before eating, do not lie down until after eating 12 tablet 3     fluticasone-salmeterol (ADVAIR DISKUS) 250-50 MCG/DOSE diskus inhaler Inhale 1 puff into the lungs 2 times daily 3 Inhaler 3     ASPIRIN EC PO Take 81 mg by mouth daily       senna-docusate (SENOKOT-S;PERICOLACE) 8.6-50 MG per tablet Take 1 tablet by mouth 2 times daily        albuterol (ALBUTEROL) 108 (90 BASE) MCG/ACT inhaler Inhale 2 puffs into the lungs every 4 hours as needed for shortness of breath / dyspnea 1 Inhaler 6     pravastatin (PRAVACHOL) 80 MG tablet Take 1 tablet (80 mg) by mouth every evening 90 tablet 3        Social History   Substance Use Topics     Smoking status: Former Smoker     Packs/day: 0.50     Years: 54.00     Types: Cigarettes     Quit date: 4/27/2000     Smokeless tobacco: Never Used      Comment: using nicotine gum     Alcohol use 0.5 oz/week     1 Glasses of wine per week      Comment: \"A glass of wine once a week.\"      ROS:   No fever, chills, chest pain, palpitations, lightheadedness, abdominal pain, bowel changes, sinus pain     Objective:  Patient alert in NAD  /68 (BP Location: Right arm, Patient Position: Chair, Cuff Size: Adult Large)  Pulse 93  Temp 98  F (36.7  C) (Oral)  Resp 12  Ht 5' 3\" (1.6 m)  Wt 140 lb (63.5 " kg)  SpO2 90%  Breastfeeding? No  BMI 24.8 kg/m2    Lungs: decreased breath sounds, mild wheezes, no crackles.   Some tremor with rest and use, no cogwheeling    ASSESSMENT:     (J44.9) Chronic obstructive pulmonary disease, unspecified COPD type (H)  (primary encounter diagnosis)  Comment: improving, continue nebs, meds  Plan: albuterol (ALBUTEROL) 108 (90 BASE) MCG/ACT         Inhaler        Given names of alternative inhalers, can check with insurance and call for alternative    (R25.1) Tremor  Comment: reassured likely benign essential tremor  Plan: refer neurology if worsening    (E11.9) Type 2 diabetes mellitus without complication, without long-term current use of insulin (H)  Comment: controlled  Plan: Basic metabolic panel, Hemoglobin A1c, Albumin         Random Urine Quantitative, TSH with free T4         reflex        Due for recheck August         Post Discharge Medication Reconciliation: discharge medications reconciled, continue medications without change.  Plan of care communicated with patient     Coding guidelines for this visit:  Type of Medical   Decision Making Face-to-Face Visit       within 7 Days of discharge Face-to-Face Visit        within 14 days of discharge   Moderate Complexity [id number]   High Complexity [id number]

## 2017-03-06 NOTE — MR AVS SNAPSHOT
"              After Visit Summary   3/6/2017    Celeste Chacko    MRN: [id number]           Patient Information     Date Of Birth          5/11/1931        Visit Information        Provider Department      3/6/2017 4:40 PM Emily Marie MD [facility]        Today's Diagnoses     Chronic obstructive pulmonary disease, unspecified COPD type (H)          Care Instructions    Alternatives to the advair would be Dulera 200-5, 2 puffs twice a day, Symbicort 160-4.5 1 puff twice a day or Breo 200-25 1 puff daily.         Follow-ups after your visit        Who to contact     If you have questions or need follow up information about today's clinic visit or your schedule please contact [facility] directly at [phone].  Normal or non-critical lab and imaging results will be communicated to you by [employer], letter or phone within 4 business days after the clinic has received the results. If you do not hear from us within 7 days, please contact the clinic through [employer] or phone. If you have a critical or abnormal lab result, we will notify you by phone as soon as possible.  Submit refill requests through [employer] or call your pharmacy and they will forward the refill request to us. Please allow 3 business days for your refill to be completed.          Additional Information About Your Visit        MyChart Information     veriCAR lets you send messages to your doctor, view your test results, renew your prescriptions, schedule appointments and more. To sign up, go to [email or web address] . Click on \"Log in\" on the left side of the screen, which will take you to the Welcome page. Then click on \"Sign up Now\" on the right side of the page.     You will be asked to enter the access code listed below, as well as some personal information. Please follow the directions to create your username and password.     Your access code is: [email or web address]  Expires: 5/26/2017  9:16 AM     Your access code will " " in 90 days. If you need help or a new code, please call your [place] clinic or [phone].        Care EveryWhere ID     This is your Care EveryWhere ID. This could be used by other organizations to access your [place] medical records  [id number]        Your Vitals Were     Pulse Temperature Respirations Height Pulse Oximetry Breastfeeding?    93 98  F (36.7  C) (Oral) 12 5' 3\" (1.6 m) 90% No    BMI (Body Mass Index)                   24.8 kg/m2            Blood Pressure from Last 3 Encounters:   17 123/68   17 119/44   16 178/87    Weight from Last 3 Encounters:   17 140 lb (63.5 kg)   17 141 lb 6.4 oz (64.1 kg)   16 143 lb 1.6 oz (64.9 kg)              Today, you had the following     No orders found for display         Today's Medication Changes          These changes are accurate as of: 3/6/17  5:06 PM.  If you have any questions, ask your nurse or doctor.               These medicines have changed or have updated prescriptions.        Dose/Directions    * albuterol 108 (90 BASE) MCG/ACT Inhaler   Commonly known as:  albuterol   This may have changed:  Another medication with the same name was added. Make sure you understand how and when to take each.   Used for:  Chronic obstructive pulmonary disease, unspecified COPD type (H)   Changed by:  Emily Marie MD        Dose:  2 puff   Inhale 2 puffs into the lungs every 4 hours as needed for shortness of breath / dyspnea   Quantity:  1 Inhaler   Refills:  6       * albuterol 108 (90 BASE) MCG/ACT Inhaler   Commonly known as:  albuterol   This may have changed:  You were already taking a medication with the same name, and this prescription was added. Make sure you understand how and when to take each.   Used for:  Chronic obstructive pulmonary disease, unspecified COPD type (H)   Changed by:  Emily Marie MD        Dose:  2 puff   Inhale 2 puffs into the lungs every 6 hours as needed for shortness of breath / dyspnea " or wheezing   Quantity:  1 Inhaler   Refills:  11       * Notice:  This list has 2 medication(s) that are the same as other medications prescribed for you. Read the directions carefully, and ask your doctor or other care provider to review them with you.         Where to get your medicines      Some of these will need a paper prescription and others can be bought over the counter.  Ask your nurse if you have questions.     Bring a paper prescription for each of these medications     albuterol 108 (90 BASE) MCG/ACT Inhaler                Primary Care Provider Office Phone # Fax #    Emily Marie [phone]       [facility] [address] [facility] [address]  [facility] [address]        Thank you!     Thank you for choosing [facility]  for your care. Our goal is always to provide you with excellent care. Hearing back from our patients is one way we can continue to improve our services. Please take a few minutes to complete the written survey that you may receive in the mail after your visit with us. Thank you!             Your Updated Medication List - Protect others around you: Learn how to safely use, store and throw away your medicines at www.disposemymeds.org.          This list is accurate as of: 3/6/17  5:06 PM.  Always use your most recent med list.                   Brand Name Dispense Instructions for use    * albuterol 108 (90 BASE) MCG/ACT Inhaler    albuterol    1 Inhaler    Inhale 2 puffs into the lungs every 4 hours as needed for shortness of breath / dyspnea       * albuterol 108 (90 BASE) MCG/ACT Inhaler    albuterol    1 Inhaler    Inhale 2 puffs into the lungs every 6 hours as needed for shortness of breath / dyspnea or wheezing       alendronate 70 MG tablet    FOSAMAX    12 tablet    Take 1 tablet (70 mg) by mouth every 7 days Take with 8 ounces water and wait at least 30 minutes before eating, do not lie down until after eating       amoxicillin 500 MG capsule     AMOXIL     TAKE FOUR CAPSULES (2 GRAMS) BY MOUTH ONE HOUR BEFORE DENTAL APPOINTMENT       ascorbic acid 500 MG tablet    VITAMIN C    30 tablet    Take 1 tablet (500 mg) by mouth daily       ASPIRIN EC PO      Take 81 mg by mouth daily       calcium-vitamin D 600-400 MG-UNIT per tablet    CALTRATE     Take 1 tablet by mouth daily       FIBERCON PO      Take 1 tablet by mouth once , one in the morning and one in the evening       fluticasone-salmeterol 250-50 MCG/DOSE diskus inhaler    ADVAIR DISKUS    3 Inhaler    Inhale 1 puff into the lungs 2 times daily       glipiZIDE 5 MG tablet    GLUCOTROL    90 tablet    Take 1 tablet (5 mg) by mouth every morning (before breakfast)       Glucosamine-Chondroitin 500-250 MG Caps    GLUCOSAMINE CHONDROITIN COMPLX     Take 1 tablet by mouth 2 times daily       guaiFENesin-codeine 100-10 MG/5ML Soln solution    ROBITUSSIN AC    120 mL    Take 5 mLs by mouth every 4 hours as needed for cough       MULTIVITAMIN ADULT PO      Take 1 tablet by mouth daily       ONE TOUCH ULTRA test strip   Generic drug:  blood glucose monitoring      Reported on 3/6/2017       ONETOUCH DELICA LANCETS 33G Misc      Reported on 3/6/2017       oxyCODONE 5 MG IR tablet    ROXICODONE    120 tablet    Take 1 tablet (5 mg) by mouth every 4 hours as needed for moderate to severe pain       pravastatin 80 MG tablet    PRAVACHOL    90 tablet    Take 1 tablet (80 mg) by mouth every evening       senna-docusate 8.6-50 MG per tablet    SENOKOT-S;PERICOLACE     Take 1 tablet by mouth 2 times daily       tiotropium 18 MCG capsule    SPIRIVA HANDIHALER    90 capsule    Inhale contents of one capsule daily.       TYLENOL PO      Take 650 mg by mouth 3 times daily Patient takes TID with Oxycodone.       VITAMIN E PO      Take 400 Int'l Units by mouth daily       * Notice:  This list has 2 medication(s) that are the same as other medications prescribed for you. Read the directions carefully, and ask your doctor or  other care provider to review them with you.

## 2017-03-06 NOTE — PATIENT INSTRUCTIONS
Alternatives to the advair would be Dulera 200-5, 2 puffs twice a day, Symbicort 160-4.5 1 puff twice a day or Breo 200-25 1 puff daily.

## 2017-03-06 NOTE — NURSING NOTE
"Chief Complaint   Patient presents with     Hospital F/U       Initial /68 (BP Location: Right arm, Patient Position: Chair, Cuff Size: Adult Large)  Pulse 93  Temp 98  F (36.7  C) (Oral)  Resp 12  Ht 5' 3\" (1.6 m)  Wt 140 lb (63.5 kg)  SpO2 90%  Breastfeeding? No  BMI 24.8 kg/m2 Estimated body mass index is 24.8 kg/(m^2) as calculated from the following:    Height as of this encounter: 5' 3\" (1.6 m).    Weight as of this encounter: 140 lb (63.5 kg).  Medication Reconciliation: complete   Janny HIGUERA      "

## 2017-03-07 ENCOUNTER — TELEPHONE (OUTPATIENT)
Dept: INTERNAL MEDICINE | Facility: CLINIC | Age: 82
End: 2017-03-07

## 2017-03-07 NOTE — TELEPHONE ENCOUNTER
Fax received from [facility] for review and signature.  Put in Dr. Marie's in basket.

## 2017-03-09 ENCOUNTER — TELEPHONE (OUTPATIENT)
Dept: INTERNAL MEDICINE | Facility: CLINIC | Age: 82
End: 2017-03-09

## 2017-03-11 PROBLEM — J44.1 COPD EXACERBATION (H): Status: RESOLVED | Noted: 2017-02-25 | Resolved: 2017-03-11

## 2017-03-13 ENCOUNTER — TELEPHONE (OUTPATIENT)
Dept: INTERNAL MEDICINE | Facility: CLINIC | Age: 82
End: 2017-03-13

## 2017-03-13 NOTE — TELEPHONE ENCOUNTER
Fax received from [facility] for review and signature.  Put in Dr. Marie's in basket.

## 2017-03-14 DIAGNOSIS — E11.9 TYPE 2 DIABETES MELLITUS WITHOUT COMPLICATION, WITHOUT LONG-TERM CURRENT USE OF INSULIN (H): Primary | ICD-10-CM

## 2017-03-14 NOTE — TELEPHONE ENCOUNTER
Glipizide         Last Written Prescription Date: 08/18/16  Last Fill Quantity: 90, # refills: 1  Last Office Visit with [facility] or [facility] prescribing provider:  03/06/17        BP Readings from Last 3 Encounters:   03/06/17 123/68   02/28/17 119/44   11/25/16 178/87     Lab Results   Component Value Date    MICROL 9 08/11/2016     No results found for: MICROALBUMIN  Creatinine   Date Value Ref Range Status   02/26/2017 0.58 0.52 - 1.04 mg/dL Final   ]  GFR Estimate   Date Value Ref Range Status   02/26/2017 >90  Non  GFR Calc   >60 mL/min/1.7m2 Final   02/25/2017 >90  Non  GFR Calc   >60 mL/min/1.7m2 Final   02/21/2017 >90  Non  GFR Calc   >60 mL/min/1.7m2 Final     GFR Estimate If Black   Date Value Ref Range Status   02/26/2017 >90   GFR Calc   >60 mL/min/1.7m2 Final   02/25/2017 >90   GFR Calc   >60 mL/min/1.7m2 Final   02/21/2017 >90   GFR Calc   >60 mL/min/1.7m2 Final     Lab Results   Component Value Date    CHOL 155 02/21/2017     Lab Results   Component Value Date    HDL 64 02/21/2017     Lab Results   Component Value Date    LDL 72 02/21/2017     Lab Results   Component Value Date    TRIG 95 02/21/2017     Lab Results   Component Value Date    CHOLHDLRATIO 3.4 07/23/2015     Lab Results   Component Value Date    AST 28 01/27/2009     Lab Results   Component Value Date    ALT 41 04/17/2014     Lab Results   Component Value Date    A1C 6.7 02/21/2017    A1C 6.6 08/11/2016    A1C 6.7 02/02/2016    A1C 6.9 07/23/2015    A1C 6.8 02/12/2015     Potassium   Date Value Ref Range Status   02/26/2017 3.4 3.4 - 5.3 mmol/L Final       Labs showing if normal/abnormal  Lab Results   Component Value Date    UCRR 57 08/11/2016    MICROL 9 08/11/2016     Lab Results   Component Value Date    CHOL 155 02/21/2017    TRIG 95 02/21/2017    HDL 64 02/21/2017    LDL 72 02/21/2017    VLDL 28 07/23/2015    CHOLHDLRATIO 3.4 07/23/2015      Lab Results   Component Value Date    A1C 6.7 (H) 02/21/2017    A1C 6.6 (H) 08/11/2016    A1C 6.7 (H) 02/02/2016

## 2017-03-15 ENCOUNTER — CARE COORDINATION (OUTPATIENT)
Dept: CARE COORDINATION | Facility: CLINIC | Age: 82
End: 2017-03-15

## 2017-03-15 RX ORDER — GLIPIZIDE 5 MG/1
5 TABLET ORAL
Qty: 90 TABLET | Refills: 1 | Status: SHIPPED | OUTPATIENT
Start: 2017-03-15 | End: 2017-09-01

## 2017-03-15 NOTE — PROGRESS NOTES
Clinic Care Coordination Contact  Care Team Conversations    Reviewed chart and reached out to [facility] and they reported that pt continues to receive home care services.  Will continue to partner with home care to provide care coordination for pt.    Kane Prado RN/CC  Care Coordinator [facility]  [phone]

## 2017-03-20 ENCOUNTER — TELEPHONE (OUTPATIENT)
Dept: INTERNAL MEDICINE | Facility: CLINIC | Age: 82
End: 2017-03-20

## 2017-03-20 NOTE — TELEPHONE ENCOUNTER
Pt calling.  Having 2 dental appts tomorrow--12:40p (f/u from piece of tooth embedded in gum) and 1:45p (endodontist appt--issue with crown).    Takes Amox prior to dental appts.  Does she need to take abx only once to cover both appts?    Please advise, thanks.

## 2017-03-21 DIAGNOSIS — M48.061 SPINAL STENOSIS OF LUMBAR REGION: ICD-10-CM

## 2017-03-21 RX ORDER — OXYCODONE HYDROCHLORIDE 5 MG/1
5 TABLET ORAL EVERY 4 HOURS PRN
Qty: 120 TABLET | Refills: 0 | Status: SHIPPED | OUTPATIENT
Start: 2017-03-21 | End: 2017-04-20

## 2017-03-21 NOTE — TELEPHONE ENCOUNTER
oxycodone      Last Written Prescription Date:  2/16/17  Last Fill Quantity: 120,   # refills: 0  [facility] provider: 3/6/17  Future Office visit:       Routing refill request to provider for review/approval because:  Drug not on the [facility] refill protocol or controlled substance    Asks to be mailed to [facility] [place]

## 2017-03-31 ENCOUNTER — CARE COORDINATION (OUTPATIENT)
Dept: CARE COORDINATION | Facility: CLINIC | Age: 82
End: 2017-03-31

## 2017-03-31 NOTE — PROGRESS NOTES
Clinic Care Coordination Contact  OUTREACH    Referral Information:  Referral Source: CTS  Reason for Contact: Home Care discharge follow up        Universal Utilization:   ED Visits in last year:  (na)  Hospital visits in last year:  (na)  Last PCP appointment:  (na)  Missed Appointments:  (na)  Concerns:  (na)  Multiple Providers or Specialists:  (na)    Clinical Concerns:  Current Medical Concerns: Pt stated she has been doing very well since home care discharged her from services.  We discussed care coordination and she stated that at this time she would not be interested but would reach ou t if needs presented.  She accepted contact information.    Medication Management:  SHe is compliant with her medicaitons and has no questions.     Functional Status:  Mobility Status:  (na)  Equipment Currently Used at Home: other (see comments) (tripod based cane)             Psychosocial:  Current living arrangement:: Not Assessed     Resources and Interventions:  Current Resources:  ;          Advanced Care Plans/Directives on file::  (na)     Patient/Caregiver understanding: Pt has a good understanding of her medical management  Frequency of Care Coordination:  (na)  Upcoming appointment: 03/06/17     Plan: Pt will follow up as needed and as scheduled with providers  Pt is not active in care coordination at this time.    Kane Prado RN/CC  Care Coordinator [facility]  [phone]

## 2017-04-12 ENCOUNTER — OFFICE VISIT (OUTPATIENT)
Dept: URGENT CARE | Facility: URGENT CARE | Age: 82
End: 2017-04-12
Payer: COMMERCIAL

## 2017-04-12 VITALS
HEART RATE: 86 BPM | WEIGHT: 142.6 LBS | SYSTOLIC BLOOD PRESSURE: 118 MMHG | HEIGHT: 63 IN | DIASTOLIC BLOOD PRESSURE: 70 MMHG | OXYGEN SATURATION: 92 % | BODY MASS INDEX: 25.27 KG/M2 | TEMPERATURE: 98.1 F

## 2017-04-12 DIAGNOSIS — R19.7 DIARRHEA, UNSPECIFIED TYPE: Primary | ICD-10-CM

## 2017-04-12 PROCEDURE — 99213 OFFICE O/P EST LOW 20 MIN: CPT | Performed by: FAMILY MEDICINE

## 2017-04-12 NOTE — MR AVS SNAPSHOT
"              After Visit Summary   4/12/2017    Celeste Chacko    MRN: [id number]           Patient Information     Date Of Birth          5/11/1931        Visit Information        Provider Department      4/12/2017 3:45 PM Clint Browne,  [facility]        Today's Diagnoses     Diarrhea, unspecified type    -  1       Follow-ups after your visit        Future tests that were ordered for you today     Open Future Orders        Priority Expected Expires Ordered    Enteric Bacteria and Virus Panel by DIVINA Stool Routine  4/12/2018 4/12/2017    Clostridium difficile Toxin B PCR Routine 4/12/2017 4/19/2017 4/12/2017    Ova and Parasite Exam Routine Routine  4/12/2018 4/12/2017            Who to contact     If you have questions or need follow up information about today's clinic visit or your schedule please contact [facility] directly at [phone].  Normal or non-critical lab and imaging results will be communicated to you by MyChart, letter or phone within 4 business days after the clinic has received the results. If you do not hear from us within 7 days, please contact the clinic through MyChart or phone. If you have a critical or abnormal lab result, we will notify you by phone as soon as possible.  Submit refill requests through [email or web address] or call your pharmacy and they will forward the refill request to us. Please allow 3 business days for your refill to be completed.          Additional Information About Your Visit        MyChart Information     [email or web address] lets you send messages to your doctor, view your test results, renew your prescriptions, schedule appointments and more. To sign up, go to [email or web address] . Click on \"Log in\" on the left side of the screen, which will take you to the Welcome page. Then click on \"Sign up Now\" on the right side of the page.     You will be asked to enter the access code listed below, as well as some personal " "information. Please follow the directions to create your username and password.     Your access code is: [email or web address]  Expires: 2017 10:16 AM     Your access code will  in 90 days. If you need help or a new code, please call your [place] clinic or [phone].        Care EveryWhere ID     This is your Care EveryWhere ID. This could be used by other organizations to access your [facility] medical records  [id number]        Your Vitals Were     Pulse Temperature Height Pulse Oximetry BMI (Body Mass Index)       86 98.1  F (36.7  C) (Oral) 5' 3\" (1.6 m) 92% 25.26 kg/m2        Blood Pressure from Last 3 Encounters:   17 118/70   17 123/68   17 119/44    Weight from Last 3 Encounters:   17 142 lb 9.6 oz (64.7 kg)   17 140 lb (63.5 kg)   17 141 lb 6.4 oz (64.1 kg)               Primary Care Provider Office Phone # Fax #    Emily Marie [phone]       [facility] [address]  [facility] [address]        Thank you!     Thank you for choosing [facility]  for your care. Our goal is always to provide you with excellent care. Hearing back from our patients is one way we can continue to improve our services. Please take a few minutes to complete the written survey that you may receive in the mail after your visit with us. Thank you!             Your Updated Medication List - Protect others around you: Learn how to safely use, store and throw away your medicines at www.disposemymeds.org.          This list is accurate as of: 17  4:18 PM.  Always use your most recent med list.                   Brand Name Dispense Instructions for use    * albuterol 108 (90 BASE) MCG/ACT Inhaler    albuterol    1 Inhaler    Inhale 2 puffs into the lungs every 4 hours as needed for shortness of breath / dyspnea       * albuterol 108 (90 BASE) MCG/ACT Inhaler    albuterol    1 Inhaler    Inhale 2 puffs into the lungs every 6 " hours as needed for shortness of breath / dyspnea or wheezing       alendronate 70 MG tablet    FOSAMAX    12 tablet    Take 1 tablet (70 mg) by mouth every 7 days Take with 8 ounces water and wait at least 30 minutes before eating, do not lie down until after eating       amoxicillin 500 MG capsule    AMOXIL     TAKE FOUR CAPSULES (2 GRAMS) BY MOUTH ONE HOUR BEFORE DENTAL APPOINTMENT       ascorbic acid 500 MG tablet    VITAMIN C    30 tablet    Take 1 tablet (500 mg) by mouth daily       ASPIRIN EC PO      Take 81 mg by mouth daily       calcium-vitamin D 600-400 MG-UNIT per tablet    CALTRATE     Take 1 tablet by mouth daily       FIBERCON PO      Take 1 tablet by mouth once , one in the morning and one in the evening       fluticasone-salmeterol 250-50 MCG/DOSE diskus inhaler    ADVAIR DISKUS    3 Inhaler    Inhale 1 puff into the lungs 2 times daily       glipiZIDE 5 MG tablet    GLUCOTROL    90 tablet    Take 1 tablet (5 mg) by mouth every morning (before breakfast)       Glucosamine-Chondroitin 500-250 MG Caps    GLUCOSAMINE CHONDROITIN COMPLX     Take 1 tablet by mouth 2 times daily       guaiFENesin-codeine 100-10 MG/5ML Soln solution    ROBITUSSIN AC    120 mL    Take 5 mLs by mouth every 4 hours as needed for cough       MULTIVITAMIN ADULT PO      Take 1 tablet by mouth daily       ONE TOUCH ULTRA test strip   Generic drug:  blood glucose monitoring      Reported on 4/12/2017       ONETOUCH DELICA LANCETS 33G Misc      Reported on 4/12/2017       oxyCODONE 5 MG IR tablet    ROXICODONE    120 tablet    Take 1 tablet (5 mg) by mouth every 4 hours as needed for moderate to severe pain       pravastatin 80 MG tablet    PRAVACHOL    90 tablet    Take 1 tablet (80 mg) by mouth every evening       senna-docusate 8.6-50 MG per tablet    SENOKOT-S;PERICOLACE     Take 1 tablet by mouth 2 times daily Reported on 4/12/2017       tiotropium 18 MCG capsule    SPIRIVA HANDIHALER    90 capsule    Inhale contents of one  capsule daily.       TYLENOL PO      Take 650 mg by mouth 3 times daily Patient takes TID with Oxycodone.       VITAMIN E PO      Take 400 Int'l Units by mouth daily       * Notice:  This list has 2 medication(s) that are the same as other medications prescribed for you. Read the directions carefully, and ask your doctor or other care provider to review them with you.

## 2017-04-12 NOTE — NURSING NOTE
"Chief Complaint   Patient presents with     Diarrhea     loose brown stool, sore  1x week        Initial /70 (BP Location: Left arm, Patient Position: Chair, Cuff Size: Adult Regular)  Pulse 86  Temp 98.1  F (36.7  C) (Oral)  Ht 5' 3\" (1.6 m)  Wt 142 lb 9.6 oz (64.7 kg)  SpO2 92%  BMI 25.26 kg/m2 Estimated body mass index is 25.26 kg/(m^2) as calculated from the following:    Height as of this encounter: 5' 3\" (1.6 m).    Weight as of this encounter: 142 lb 9.6 oz (64.7 kg).  Medication Reconciliation: complete    "

## 2017-04-13 NOTE — PROGRESS NOTES
"SUBJECTIVE:  Chief Complaint   Patient presents with     Diarrhea     loose brown stool, sore  1x week    .ident whose symptoms began 5-6 days ago and include diarrhea.    Symptoms are still present and moderate.    Aggravating factors: nothing.    Alleviating factors:nothing  Associated symptoms:Pain:No  Fever: no noted fevers  Diarrhea:  consists of multiple stools/day and is persisting  Stools: watery and runny  Appetite: fair  Risk factors: none    Past Medical History:   Diagnosis Date     Chronic airway obstruction, not elsewhere classified      Complete rupture of rotator cuff      Disorder of bone and cartilage, unspecified      History of blood transfusion      Mixed hyperlipidemia 4/00     Osteoarthritis      Personal history of other malignant neoplasm of skin      Spinal stenosis      Type II or unspecified type diabetes mellitus without mention of complication, not stated as uncontrolled      Allergies   Allergen Reactions     Sulfamethoxazole Anaphylaxis and Hives     Social History   Substance Use Topics     Smoking status: Former Smoker     Packs/day: 0.50     Years: 54.00     Types: Cigarettes     Quit date: 4/27/2000     Smokeless tobacco: Never Used      Comment: using nicotine gum     Alcohol use 0.5 oz/week     1 Glasses of wine per week      Comment: \"A glass of wine once a week.\"       ROS:  SKIN: no rash  HEENT: no ST  LUNGS: no cough  ABD: cramping abd discomfort    OBJECTIVE:  /70 (BP Location: Left arm, Patient Position: Chair, Cuff Size: Adult Regular)  Pulse 86  Temp 98.1  F (36.7  C) (Oral)  Ht 5' 3\" (1.6 m)  Wt 142 lb 9.6 oz (64.7 kg)  SpO2 92%  BMI 25.26 kg/q5DHEHSBQ APPEARANCE: healthy, alert and no distress  ABDOMEN: soft, hyperactive bowel sounds, non tender  SKIN: no suspicious lesions or rashes      ICD-10-CM    1. Diarrhea, unspecified type R19.7 Enteric Bacteria and Virus Panel by [facility] Stool     Clostridium difficile Toxin B PCR     Ova and Parasite Exam Routine "     Diet: small amounts clear fluids frequently,soups,juices,water,advance diet as toleratedSee EPIC for orders: for  lab, imaging, meds.Follow up with primary physician if not improving

## 2017-04-20 ENCOUNTER — TELEPHONE (OUTPATIENT)
Dept: ANESTHESIOLOGY | Facility: CLINIC | Age: 82
End: 2017-04-20

## 2017-04-20 DIAGNOSIS — M48.061 SPINAL STENOSIS OF LUMBAR REGION: ICD-10-CM

## 2017-04-20 RX ORDER — OXYCODONE HYDROCHLORIDE 5 MG/1
5 TABLET ORAL EVERY 4 HOURS PRN
Qty: 120 TABLET | Refills: 0 | Status: SHIPPED | OUTPATIENT
Start: 2017-04-20 | End: 2017-05-22

## 2017-04-20 NOTE — TELEPHONE ENCOUNTER
Pt requesting Oxycodone refill. Mail to pharmacy. CSA signed.     Oxycodone       Last Written Prescription Date:  3/21/17  Last Fill Quantity: 120,   # refills: 0    Last Office Visit with [facility] prescribing provider: 3/6/17    Future Office visit:       Routing refill request to provider for review/approval because:  Drug not on the [facility] refill protocol or controlled substance

## 2017-04-20 NOTE — TELEPHONE ENCOUNTER
LPN read through the Pre-procedure instructions with pt.     Pt is scheduled to come for Bilateral SI joint injections with Dr. Holland on 4/25/17.    Pt verbalized understanding to holding appropriate medication per protocol, and was agreeable to NPO policy and needing a .  Pt asked to hold Asa and Vitamin E 5-7 days before, per protocol.     Instructions printed and emailed to patient.   [email or web address]    Alicia Davis LPN

## 2017-04-21 ENCOUNTER — TELEPHONE (OUTPATIENT)
Dept: ANESTHESIOLOGY | Facility: CLINIC | Age: 82
End: 2017-04-21

## 2017-04-21 DIAGNOSIS — M46.1 SACROILIITIS (H): Primary | ICD-10-CM

## 2017-04-21 NOTE — TELEPHONE ENCOUNTER
----- Message from Brielle Bloom sent at 4/17/2017  2:26 PM CDT -----  This is a [place] Patient and she would like a SI injection so I will need a worksheet order and the okay.    brielle

## 2017-04-25 ENCOUNTER — HOSPITAL ENCOUNTER (OUTPATIENT)
Facility: AMBULATORY SURGERY CENTER | Age: 82
End: 2017-04-25
Attending: ANESTHESIOLOGY

## 2017-04-25 ENCOUNTER — SURGERY (OUTPATIENT)
Age: 82
End: 2017-04-25

## 2017-04-25 VITALS
HEART RATE: 79 BPM | BODY MASS INDEX: 24.8 KG/M2 | TEMPERATURE: 97.9 F | SYSTOLIC BLOOD PRESSURE: 151 MMHG | OXYGEN SATURATION: 94 % | HEIGHT: 63 IN | WEIGHT: 140 LBS | RESPIRATION RATE: 18 BRPM | DIASTOLIC BLOOD PRESSURE: 69 MMHG

## 2017-04-25 LAB — GLUCOSE BLDC GLUCOMTR-MCNC: 120 MG/DL (ref 70–99)

## 2017-04-25 RX ORDER — BUPIVACAINE HYDROCHLORIDE 2.5 MG/ML
INJECTION, SOLUTION EPIDURAL; INFILTRATION; INTRACAUDAL PRN
Status: DISCONTINUED | OUTPATIENT
Start: 2017-04-25 | End: 2017-04-25 | Stop reason: HOSPADM

## 2017-04-25 RX ORDER — TRIAMCINOLONE ACETONIDE 40 MG/ML
INJECTION, SUSPENSION INTRA-ARTICULAR; INTRAMUSCULAR PRN
Status: DISCONTINUED | OUTPATIENT
Start: 2017-04-25 | End: 2017-04-25 | Stop reason: HOSPADM

## 2017-04-25 RX ORDER — LIDOCAINE HYDROCHLORIDE 10 MG/ML
INJECTION, SOLUTION EPIDURAL; INFILTRATION; INTRACAUDAL; PERINEURAL PRN
Status: DISCONTINUED | OUTPATIENT
Start: 2017-04-25 | End: 2017-04-25 | Stop reason: HOSPADM

## 2017-04-25 RX ADMIN — TRIAMCINOLONE ACETONIDE 80 MG: 40 INJECTION, SUSPENSION INTRA-ARTICULAR; INTRAMUSCULAR at 12:12

## 2017-04-25 RX ADMIN — BUPIVACAINE HYDROCHLORIDE 4 ML: 2.5 INJECTION, SOLUTION EPIDURAL; INFILTRATION; INTRACAUDAL at 12:09

## 2017-04-25 RX ADMIN — LIDOCAINE HYDROCHLORIDE 5 ML: 10 INJECTION, SOLUTION EPIDURAL; INFILTRATION; INTRACAUDAL; PERINEURAL at 12:10

## 2017-04-25 NOTE — IP AVS SNAPSHOT
MRN:[id number]                      After Visit Summary   4/25/2017    Celeste Chacko    MRN: [id number]           Thank you!     Thank you for choosing [facility] for your care. Our goal is always to provide you with excellent care. Hearing back from our patients is one way we can continue to improve our services. Please take a few minutes to complete the written survey that you may receive in the mail after you visit with us. Thank you!        Patient Information     Date Of Birth          5/11/1931        About your hospital stay     You were admitted on:  April 25, 2017 [facility]    You were discharged on:  April 25, 2017       Who to Call     For medical emergencies, please call 911.  For non-urgent questions about your medical care, please call your primary care provider or clinic, [phone]  For questions related to your surgery, please call your surgery clinic        Attending Provider     Provider Specialty    Ascencion Esposito MD Anesthesiology       Primary Care Provider Office Phone # Fax #    Emily Marie [phone]       [facility] [address] [address]        Further instructions from your care team       Home Care Instructions after a Sacroiliac Joint Injection    The sacroiliac joints lie next to the spine and connect the sacrum(the bottom of the spine) with the hip on both sides. There are two sacroiliac joints, one on the right and one on the left. Joint inflammation and/or dysfunction in this area can cause pain. In a sacroiliac joint injection, a local anesthetic (numbing medicine) is injected in or near the joint space. Steroids are often used to help with the anti-inflammatory process.     Activity  -You may resume most normal activity levels with the exception of strenuous activity. It is important for us to know if your pain with normal activity is relieved after  this injection.  -DO NOT shower for 24 hours  -DO NOT remove bandaid for 24 hours    Pain  -You may experience soreness at the injection site for one or two days  -You may use an ice pack for 20 minutes every 2 hours for the first 24 hours  -You may use a heating pad after the first 24 hours  -You may use Tylenol (acetaminophen) every 4 hours or other pain medicines as     directed by your physician    You may experience numbness radiating into your legs. This numbness may last several hours. Until sensation returns to normal; please use caution in walking, climbing stairs, and stepping out of your vehicle, etc.    DID YOU RECEIVE SEDATION TODAY?  No    If you received sedation please follow these additional safety measures.  Sedation medicine, if given, may remain active for many hours. It is important for the next 24 hours that you do not:  -Drive a car  -Operate machines or power tools  -Consume alcohol, including beer  -Sign any important papers or legal documents    DID YOU RECEIVE STEROIDS TODAY?  Yes    Common side effects of steroids:  Not everyone will experience corticosteroid side effects. If side effects are experienced, they will gradually subside in the 7-10 day period following an injection. Most common side effects include:  -Flushed face and/or chest  -Feeling of warmth, particularly in the face but could be an overall feeling of warmth  -Increased blood sugar in diabetic patients  -Menstrual irregularities my occur. If taking hormone-based birth control an alternate method of birth control is recommended  -Sleep disturbances and/or mood swings are possible  -Leg cramps      PLEASE KEEP TRACK OF YOUR SYMPTOMS AND NOTE YOUR IMPROVEMENT FOR YOUR DOCTOR.     Please contact us if you have:  -Severe pain  -Fever more than 101.5 degrees Fahrenheit  -Signs of infection at the injection site (redness, swelling, or drainage)    If you have questions, please contact our office at [phone] between the  "hours of 7:00 am and 3:00 pm Monday through Friday. After office hours you can contact the on call provider by dialing [phone]. If you need immediate attention, we recommend that you go to a hospital emergency room or dial 911.      Pending Results     Date and Time Order Name Status Description    2017 1140 XR SURGERY RENETTA FLUORO LESS THAN 5 MIN W STILLS In process             Admission Information     Date & Time Provider Department Dept. Phone    2017 Ascencion Esposito MD [facility] [phone]      Your Vitals Were     Blood Pressure Pulse Temperature Respirations Height Weight    184/82 80 97.9  F (36.6  C) (Oral) 18 1.6 m (5' 3\") 63.5 kg (140 lb)    Pulse Oximetry BMI (Body Mass Index)                95% 24.8 kg/m2          [email or web address] Information     [employer] is an electronic gateway that provides easy, online access to your medical records. With [employer], you can request a clinic appointment, read your test results, renew a prescription or communicate with your care team.     To sign up for [employer] visit the website at [email or web address]   You will be asked to enter the access code listed below, as well as some personal information. Please follow the directions to create your username and password.     Your access code is: [email or web address]  Expires: 2017 10:16 AM     Your access code will  in 90 days. If you need help or a new code, please contact your [facility] or call [phone] for assistance.        Care EveryWhere ID     This is your Care EveryWhere ID. This could be used by other organizations to access your [place] medical records  [id number]           Review of your medicines      UNREVIEWED medicines. Ask your doctor about these medicines        Dose / Directions    * albuterol 108 (90 BASE) MCG/ACT Inhaler   Commonly known as:  albuterol   Used for:  Chronic obstructive pulmonary disease, unspecified COPD type " (H)        Dose:  2 puff   Inhale 2 puffs into the lungs every 4 hours as needed for shortness of breath / dyspnea   Quantity:  1 Inhaler   Refills:  6       * albuterol 108 (90 BASE) MCG/ACT Inhaler   Commonly known as:  albuterol   Used for:  Chronic obstructive pulmonary disease, unspecified COPD type (H)        Dose:  2 puff   Inhale 2 puffs into the lungs every 6 hours as needed for shortness of breath / dyspnea or wheezing   Quantity:  1 Inhaler   Refills:  11       alendronate 70 MG tablet   Commonly known as:  FOSAMAX   Used for:  Osteopenia        Dose:  70 mg   Take 1 tablet (70 mg) by mouth every 7 days Take with 8 ounces water and wait at least 30 minutes before eating, do not lie down until after eating   Quantity:  12 tablet   Refills:  3       amoxicillin 500 MG capsule   Commonly known as:  AMOXIL        TAKE FOUR CAPSULES (2 GRAMS) BY MOUTH ONE HOUR BEFORE DENTAL APPOINTMENT   Refills:  3       ascorbic acid 500 MG tablet   Commonly known as:  VITAMIN C        Dose:  500 mg   Take 1 tablet (500 mg) by mouth daily   Quantity:  30 tablet   Refills:  0       ASPIRIN EC PO        Dose:  81 mg   Take 81 mg by mouth daily   Refills:  0       calcium-vitamin D 600-400 MG-UNIT per tablet   Commonly known as:  CALTRATE        Dose:  1 tablet   Take 1 tablet by mouth daily   Refills:  0       FIBERCON PO        Dose:  1 tablet   Take 1 tablet by mouth once , one in the morning and one in the evening   Refills:  0       fluticasone-salmeterol 250-50 MCG/DOSE diskus inhaler   Commonly known as:  ADVAIR DISKUS   Used for:  Chronic obstructive pulmonary disease, unspecified COPD type (H)        Dose:  1 puff   Inhale 1 puff into the lungs 2 times daily   Quantity:  3 Inhaler   Refills:  3       glipiZIDE 5 MG tablet   Commonly known as:  GLUCOTROL   Used for:  Type 2 diabetes mellitus without complication, without long-term current use of insulin (H)        Dose:  5 mg   Take 1 tablet (5 mg) by mouth every  morning (before breakfast)   Quantity:  90 tablet   Refills:  1       Glucosamine-Chondroitin 500-250 MG Caps   Commonly known as:  GLUCOSAMINE CHONDROITIN COMPLX   Used for:  Generalized osteoarthrosis, unspecified site        Dose:  1 tablet   Take 1 tablet by mouth 2 times daily   Refills:  0       guaiFENesin-codeine 100-10 MG/5ML Soln solution   Commonly known as:  ROBITUSSIN AC   Used for:  Upper respiratory tract infection, unspecified type        Dose:  1 tsp.   Take 5 mLs by mouth every 4 hours as needed for cough   Quantity:  120 mL   Refills:  1       MULTIVITAMIN ADULT PO        Dose:  1 tablet   Take 1 tablet by mouth daily   Refills:  0       oxyCODONE 5 MG IR tablet   Commonly known as:  ROXICODONE   Used for:  Spinal stenosis of lumbar region        Dose:  5 mg   Take 1 tablet (5 mg) by mouth every 4 hours as needed for moderate to severe pain   Quantity:  120 tablet   Refills:  0       pravastatin 80 MG tablet   Commonly known as:  PRAVACHOL   Used for:  Hyperlipidemia LDL goal <100        Dose:  80 mg   Take 1 tablet (80 mg) by mouth every evening   Quantity:  90 tablet   Refills:  3       senna-docusate 8.6-50 MG per tablet   Commonly known as:  SENOKOT-S;PERICOLACE        Dose:  1 tablet   Take 1 tablet by mouth 2 times daily Reported on 4/12/2017   Refills:  0       tiotropium 18 MCG capsule   Commonly known as:  SPIRIVA HANDIHALER   Used for:  Chronic obstructive pulmonary disease, unspecified COPD type (H)        Inhale contents of one capsule daily.   Quantity:  90 capsule   Refills:  3       TYLENOL PO        Dose:  650 mg   Take 650 mg by mouth 3 times daily Patient takes TID with Oxycodone.   Refills:  0       VITAMIN E PO        Dose:  400 Int'l Units   Take 400 Int'l Units by mouth daily   Refills:  0       * Notice:  This list has 2 medication(s) that are the same as other medications prescribed for you. Read the directions carefully, and ask your doctor or other care provider to review  them with you.      CONTINUE these medicines which have NOT CHANGED        Dose / Directions    ONE TOUCH ULTRA test strip   Generic drug:  blood glucose monitoring        Reported on 4/12/2017   Refills:  0       ONETOUCH DELICA LANCETS 33G Misc        Reported on 4/12/2017   Refills:  0                Protect others around you: Learn how to safely use, store and throw away your medicines at www.disposemymeds.org.             Medication List: This is a list of all your medications and when to take them. Check marks below indicate your daily home schedule. Keep this list as a reference.      Medications           Morning Afternoon Evening Bedtime As Needed    * albuterol 108 (90 BASE) MCG/ACT Inhaler   Commonly known as:  albuterol   Inhale 2 puffs into the lungs every 4 hours as needed for shortness of breath / dyspnea                                * albuterol 108 (90 BASE) MCG/ACT Inhaler   Commonly known as:  albuterol   Inhale 2 puffs into the lungs every 6 hours as needed for shortness of breath / dyspnea or wheezing                                alendronate 70 MG tablet   Commonly known as:  FOSAMAX   Take 1 tablet (70 mg) by mouth every 7 days Take with 8 ounces water and wait at least 30 minutes before eating, do not lie down until after eating                                amoxicillin 500 MG capsule   Commonly known as:  AMOXIL   TAKE FOUR CAPSULES (2 GRAMS) BY MOUTH ONE HOUR BEFORE DENTAL APPOINTMENT                                ascorbic acid 500 MG tablet   Commonly known as:  VITAMIN C   Take 1 tablet (500 mg) by mouth daily                                ASPIRIN EC PO   Take 81 mg by mouth daily                                calcium-vitamin D 600-400 MG-UNIT per tablet   Commonly known as:  CALTRATE   Take 1 tablet by mouth daily                                FIBERCON PO   Take 1 tablet by mouth once , one in the morning and one in the evening                                fluticasone-salmeterol  250-50 MCG/DOSE diskus inhaler   Commonly known as:  ADVAIR DISKUS   Inhale 1 puff into the lungs 2 times daily                                glipiZIDE 5 MG tablet   Commonly known as:  GLUCOTROL   Take 1 tablet (5 mg) by mouth every morning (before breakfast)                                Glucosamine-Chondroitin 500-250 MG Caps   Commonly known as:  GLUCOSAMINE CHONDROITIN COMPLX   Take 1 tablet by mouth 2 times daily                                guaiFENesin-codeine 100-10 MG/5ML Soln solution   Commonly known as:  ROBITUSSIN AC   Take 5 mLs by mouth every 4 hours as needed for cough                                MULTIVITAMIN ADULT PO   Take 1 tablet by mouth daily                                ONE TOUCH ULTRA test strip   Reported on 4/12/2017   Generic drug:  blood glucose monitoring                                ONETOUCH DELICA LANCETS 33G Misc   Reported on 4/12/2017                                oxyCODONE 5 MG IR tablet   Commonly known as:  ROXICODONE   Take 1 tablet (5 mg) by mouth every 4 hours as needed for moderate to severe pain                                pravastatin 80 MG tablet   Commonly known as:  PRAVACHOL   Take 1 tablet (80 mg) by mouth every evening                                senna-docusate 8.6-50 MG per tablet   Commonly known as:  SENOKOT-S;PERICOLACE   Take 1 tablet by mouth 2 times daily Reported on 4/12/2017                                tiotropium 18 MCG capsule   Commonly known as:  SPIRIVA HANDIHALER   Inhale contents of one capsule daily.                                TYLENOL PO   Take 650 mg by mouth 3 times daily Patient takes TID with Oxycodone.                                VITAMIN E PO   Take 400 Int'l Units by mouth daily                                * Notice:  This list has 2 medication(s) that are the same as other medications prescribed for you. Read the directions carefully, and ask your doctor or other care provider to review them with you.

## 2017-04-25 NOTE — IP AVS SNAPSHOT
[facility]    [address] [place] [address]    Phone:  [phone]    Fax:  [phone]                                       After Visit Summary   4/25/2017    Celeste Chacko    MRN: [id number]           After Visit Summary Signature Page     I have received my discharge instructions, and my questions have been answered. I have discussed any challenges I see with this plan with the nurse or doctor.    ..........................................................................................................................................  Patient/Patient Representative Signature      ..........................................................................................................................................  Patient Representative Print Name and Relationship to Patient    ..................................................               ................................................  Date                                            Time    ..........................................................................................................................................  Reviewed by Signature/Title    ...................................................              ..............................................  Date                                                            Time

## 2017-04-25 NOTE — DISCHARGE INSTRUCTIONS
Home Care Instructions after a Sacroiliac Joint Injection    The sacroiliac joints lie next to the spine and connect the sacrum(the bottom of the spine) with the hip on both sides. There are two sacroiliac joints, one on the right and one on the left. Joint inflammation and/or dysfunction in this area can cause pain. In a sacroiliac joint injection, a local anesthetic (numbing medicine) is injected in or near the joint space. Steroids are often used to help with the anti-inflammatory process.     Activity  -You may resume most normal activity levels with the exception of strenuous activity. It is important for us to know if your pain with normal activity is relieved after this injection.  -DO NOT shower for 24 hours  -DO NOT remove bandaid for 24 hours    Pain  -You may experience soreness at the injection site for one or two days  -You may use an ice pack for 20 minutes every 2 hours for the first 24 hours  -You may use a heating pad after the first 24 hours  -You may use Tylenol (acetaminophen) every 4 hours or other pain medicines as     directed by your physician    You may experience numbness radiating into your legs. This numbness may last several hours. Until sensation returns to normal; please use caution in walking, climbing stairs, and stepping out of your vehicle, etc.    DID YOU RECEIVE SEDATION TODAY?  No    If you received sedation please follow these additional safety measures.  Sedation medicine, if given, may remain active for many hours. It is important for the next 24 hours that you do not:  -Drive a car  -Operate machines or power tools  -Consume alcohol, including beer  -Sign any important papers or legal documents    DID YOU RECEIVE STEROIDS TODAY?  Yes    Common side effects of steroids:  Not everyone will experience corticosteroid side effects. If side effects are experienced, they will gradually subside in the 7-10 day period following an injection. Most common side effects  include:  -Flushed face and/or chest  -Feeling of warmth, particularly in the face but could be an overall feeling of warmth  -Increased blood sugar in diabetic patients  -Menstrual irregularities my occur. If taking hormone-based birth control an alternate method of birth control is recommended  -Sleep disturbances and/or mood swings are possible  -Leg cramps      PLEASE KEEP TRACK OF YOUR SYMPTOMS AND NOTE YOUR IMPROVEMENT FOR YOUR DOCTOR.     Please contact us if you have:  -Severe pain  -Fever more than 101.5 degrees Fahrenheit  -Signs of infection at the injection site (redness, swelling, or drainage)    If you have questions, please contact our office at [phone] between the hours of 7:00 am and 3:00 pm Monday through Friday. After office hours you can contact the on call provider by dialing [phone]. If you need immediate attention, we recommend that you go to a hospital emergency room or dial [phone].

## 2017-05-08 ENCOUNTER — RADIANT APPOINTMENT (OUTPATIENT)
Dept: GENERAL RADIOLOGY | Facility: CLINIC | Age: 82
End: 2017-05-08
Attending: PHYSICIAN ASSISTANT
Payer: COMMERCIAL

## 2017-05-08 ENCOUNTER — OFFICE VISIT (OUTPATIENT)
Dept: URGENT CARE | Facility: URGENT CARE | Age: 82
End: 2017-05-08
Payer: COMMERCIAL

## 2017-05-08 VITALS
OXYGEN SATURATION: 95 % | HEART RATE: 84 BPM | TEMPERATURE: 98.5 F | DIASTOLIC BLOOD PRESSURE: 70 MMHG | BODY MASS INDEX: 24.3 KG/M2 | WEIGHT: 137.2 LBS | SYSTOLIC BLOOD PRESSURE: 130 MMHG

## 2017-05-08 DIAGNOSIS — M79.644 PAIN OF RIGHT THUMB: ICD-10-CM

## 2017-05-08 DIAGNOSIS — M79.644 PAIN OF RIGHT THUMB: Primary | ICD-10-CM

## 2017-05-08 PROCEDURE — 73140 X-RAY EXAM OF FINGER(S): CPT | Mod: RT

## 2017-05-08 PROCEDURE — 99213 OFFICE O/P EST LOW 20 MIN: CPT | Performed by: PHYSICIAN ASSISTANT

## 2017-05-08 NOTE — MR AVS SNAPSHOT
"              After Visit Summary   2017    Celeste Chacko    MRN: [id number]           Patient Information     Date Of Birth          1931        Visit Information        Provider Department      2017 3:40 PM Ritchie Laureano PA-C [facility]        Today's Diagnoses     Pain of right thumb    -  1       Follow-ups after your visit        Who to contact     If you have questions or need follow up information about today's clinic visit or your schedule please contact [facility] directly at [phone].  Normal or non-critical lab and imaging results will be communicated to you by basnohart, letter or phone within 4 business days after the clinic has received the results. If you do not hear from us within 7 days, please contact the clinic through basnohart or phone. If you have a critical or abnormal lab result, we will notify you by phone as soon as possible.  Submit refill requests through [employer]omsys or call your pharmacy and they will forward the refill request to us. Please allow 3 business days for your refill to be completed.          Additional Information About Your Visit        MyChart Information     Xoomsys lets you send messages to your doctor, view your test results, renew your prescriptions, schedule appointments and more. To sign up, go to [email or web address] . Click on \"Log in\" on the left side of the screen, which will take you to the Welcome page. Then click on \"Sign up Now\" on the right side of the page.     You will be asked to enter the access code listed below, as well as some personal information. Please follow the directions to create your username and password.     Your access code is: [email or web address]  Expires: 2017 10:16 AM     Your access code will  in 90 days. If you need help or a new code, please call your [place] clinic or [phone].        Care EveryWhere ID     This is your Care EveryWhere ID. This could " be used by other organizations to access your [place] medical records  [id number]        Your Vitals Were     Pulse Temperature Pulse Oximetry BMI (Body Mass Index)          84 98.5  F (36.9  C) (Oral) 95% 24.3 kg/m2         Blood Pressure from Last 3 Encounters:   05/08/17 130/70   04/25/17 151/69   04/12/17 118/70    Weight from Last 3 Encounters:   05/08/17 137 lb 3.2 oz (62.2 kg)   04/25/17 140 lb (63.5 kg)   04/12/17 142 lb 9.6 oz (64.7 kg)               Primary Care Provider Office Phone # Fax #    Emily Marie [phone]       [facility] [address] [facility] [address]  [facility] [address]        Thank you!     Thank you for choosing [facility]  for your care. Our goal is always to provide you with excellent care. Hearing back from our patients is one way we can continue to improve our services. Please take a few minutes to complete the written survey that you may receive in the mail after your visit with us. Thank you!             Your Updated Medication List - Protect others around you: Learn how to safely use, store and throw away your medicines at www.disposemymeds.org.          This list is accurate as of: 5/8/17 11:59 PM.  Always use your most recent med list.                   Brand Name Dispense Instructions for use    * albuterol 108 (90 BASE) MCG/ACT Inhaler    albuterol    1 Inhaler    Inhale 2 puffs into the lungs every 4 hours as needed for shortness of breath / dyspnea       * albuterol 108 (90 BASE) MCG/ACT Inhaler    albuterol    1 Inhaler    Inhale 2 puffs into the lungs every 6 hours as needed for shortness of breath / dyspnea or wheezing       alendronate 70 MG tablet    FOSAMAX    12 tablet    Take 1 tablet (70 mg) by mouth every 7 days Take with 8 ounces water and wait at least 30 minutes before eating, do not lie down until after eating       amoxicillin 500 MG capsule    AMOXIL     TAKE FOUR CAPSULES (2 GRAMS) BY MOUTH ONE HOUR  BEFORE DENTAL APPOINTMENT       ascorbic acid 500 MG tablet    VITAMIN C    30 tablet    Take 1 tablet (500 mg) by mouth daily       ASPIRIN EC PO      Take 81 mg by mouth daily       calcium-vitamin D 600-400 MG-UNIT per tablet    CALTRATE     Take 1 tablet by mouth daily       FIBERCON PO      Take 1 tablet by mouth once , one in the morning and one in the evening       fluticasone-salmeterol 250-50 MCG/DOSE diskus inhaler    ADVAIR DISKUS    3 Inhaler    Inhale 1 puff into the lungs 2 times daily       glipiZIDE 5 MG tablet    GLUCOTROL    90 tablet    Take 1 tablet (5 mg) by mouth every morning (before breakfast)       Glucosamine-Chondroitin 500-250 MG Caps    GLUCOSAMINE CHONDROITIN COMPLX     Take 1 tablet by mouth 2 times daily       guaiFENesin-codeine 100-10 MG/5ML Soln solution    ROBITUSSIN AC    120 mL    Take 5 mLs by mouth every 4 hours as needed for cough       MULTIVITAMIN ADULT PO      Take 1 tablet by mouth daily       ONE TOUCH ULTRA test strip   Generic drug:  blood glucose monitoring      Reported on 4/12/2017       ONETOUCH DELICA LANCETS 33G Misc      Reported on 4/12/2017       oxyCODONE 5 MG IR tablet    ROXICODONE    120 tablet    Take 1 tablet (5 mg) by mouth every 4 hours as needed for moderate to severe pain       pravastatin 80 MG tablet    PRAVACHOL    90 tablet    Take 1 tablet (80 mg) by mouth every evening       senna-docusate 8.6-50 MG per tablet    SENOKOT-S;PERICOLACE     Take 1 tablet by mouth 2 times daily Reported on 4/12/2017       tiotropium 18 MCG capsule    SPIRIVA HANDIHALER    90 capsule    Inhale contents of one capsule daily.       TYLENOL PO      Take 650 mg by mouth 3 times daily Patient takes TID with Oxycodone.       VITAMIN E PO      Take 400 Int'l Units by mouth daily       * Notice:  This list has 2 medication(s) that are the same as other medications prescribed for you. Read the directions carefully, and ask your doctor or other care provider to review them  with you.

## 2017-05-08 NOTE — NURSING NOTE
"Chief Complaint   Patient presents with     Hand Injury     Rt thumb injury from fall, swelling, pain when applying pressure x 4 days.        Initial /70 (BP Location: Left arm, Patient Position: Chair, Cuff Size: Adult Regular)  Pulse 84  Temp 98.5  F (36.9  C) (Oral)  Wt 137 lb 3.2 oz (62.2 kg)  SpO2 95%  BMI 24.3 kg/m2 Estimated body mass index is 24.3 kg/(m^2) as calculated from the following:    Height as of 4/25/17: 5' 3\" (1.6 m).    Weight as of this encounter: 137 lb 3.2 oz (62.2 kg).  Medication Reconciliation: complete    "

## 2017-05-09 NOTE — PROGRESS NOTES
"SUBJECTIVE:  Chief Complaint   Patient presents with     Hand Injury     Rt thumb injury from fall, swelling, pain when applying pressure x 4 days.      Celeste Chacko is a 85 year old female presents with a chief complaint of right thumb injury with tenderness and bruising .  The injury occurred 2 day(s) ago.   The injury happened while at home. How: injured.  The patient complained of mild pain  and has not had decreased ROM.  Pain exacerbated by movement.  Relieved by rest.  She treated it initially with no therapy. This is the first time this type of injury has occurred to this patient.     Past Medical History:   Diagnosis Date     Chronic airway obstruction, not elsewhere classified      Complete rupture of rotator cuff      Disorder of bone and cartilage, unspecified      History of blood transfusion      Mixed hyperlipidemia 4/00     Osteoarthritis      Personal history of other malignant neoplasm of skin      Spinal stenosis      Type II or unspecified type diabetes mellitus without mention of complication, not stated as uncontrolled      Allergies   Allergen Reactions     Sulfamethoxazole Anaphylaxis and Hives     Social History   Substance Use Topics     Smoking status: Former Smoker     Packs/day: 0.50     Years: 54.00     Types: Cigarettes     Quit date: 4/27/2000     Smokeless tobacco: Never Used      Comment: using nicotine gum     Alcohol use 0.5 oz/week     1 Glasses of wine per week      Comment: \"A glass of wine once a week.\"       ROS:  CONSTITUTIONAL:NEGATIVE for fever, chills, change in weight  INTEGUMENTARY/SKIN: POSITIVE for mild bruising of thumb  MUSCULOSKELETAL: Positive for right thumb injury, tenderness, localized pain  NEURO: NEGATIVE for weakness, dizziness or paresthesias    EXAM:   /70 (BP Location: Left arm, Patient Position: Chair, Cuff Size: Adult Regular)  Pulse 84  Temp 98.5  F (36.9  C) (Oral)  Wt 137 lb 3.2 oz (62.2 kg)  SpO2 95%  BMI 24.3 kg/m2  Gen: " healthy,alert,no distress  Extremity: thumb has point tenderness, localized swelling.   There is not compromise to the distal circulation.  Pulses are +2 and CRT is brisk  EXTREMITIES: peripheral pulses normal  MS:  Positive for right thumb tenderness, localized pain with DROM  SKIN: Positive for right thumb bruising  NEURO: Normal strength and tone, sensory exam grossly normal, mentation intact and speech normal    X-RAY was done and negative for acute changes including fracture Xray read by Ritchie Laureano at time of visit    ASSESSMENT/PLAN:      ICD-10-CM    1. Pain of right thumb M79.644 XR Finger Right G/E 2 Views       Rest, Ice  Return to activity as tolerated  Follow up as needed

## 2017-05-18 ENCOUNTER — CARE COORDINATION (OUTPATIENT)
Dept: ANESTHESIOLOGY | Facility: CLINIC | Age: 82
End: 2017-05-18

## 2017-05-18 NOTE — PROGRESS NOTES
LPN called pt to follow up after procedure:    Doctor:  Amalia  Procedure Performed: Bilateral SI joint injection  Diagnosis: Sacroilitis  Date of Procedure:4/25/17   Patient stated their % of Overall Pain relief: 20%  Follow up appointment: Pt will call to schedule another injection when pain increases. Or will follow up in clinic as needed.  Comments:  Pt stated that she is taking one less pain pill a day, and is able to wait longer in between doses.   (Pt stats that they normally take 5 oxycodone a day and is able to take only 4 a day now.)       Alicia Davis LPN

## 2017-05-22 DIAGNOSIS — M48.061 SPINAL STENOSIS OF LUMBAR REGION: ICD-10-CM

## 2017-05-22 RX ORDER — OXYCODONE HYDROCHLORIDE 5 MG/1
5 TABLET ORAL EVERY 4 HOURS PRN
Qty: 120 TABLET | Refills: 0 | Status: SHIPPED | OUTPATIENT
Start: 2017-05-22 | End: 2017-06-19

## 2017-05-22 NOTE — TELEPHONE ENCOUNTER
Pt calls for refill of Oxycodone.  Last written 4/20/17 #120 with 0 refills.  Last OV 3/6/17.    Please mail to the [facility] in [place].

## 2017-05-22 NOTE — PROCEDURES
Sacro-iliac Joint Injection    The patient s identity, the procedure to be performed and the specific site of the procedure was verified in accordance with [facility] Protocol.    Procedure Note:   Informed consent was obtained and the patient was positioned comfortably in the prone position.  There was no evidence of infection at the sites of needle insertion.  The patient was prepped and draped in a sterile fashion.  Skeletal landmarks were identified with the fluoroscope.  22 gauge spinal needle was then placed within the Sacro-iliac joint in the lower 1/3 of the joint.    The ligaments medial and superior to the joint were not infiltrated with local anesthetic and steroid.     SIDE:  Bilateral  Medication: 40mg of Depo-Medrol  3ml of 0.25% Bupivacaine    The patient was given discharge instructions and verbalizes understanding, including understanding of those signs and symptoms that would require emergency care.     Counseling: Greater than 50% of this patient visit was spent in counseling the patient regarding the treatment of their pain, coordinating their overall treatment plan and assessing their progress.

## 2017-05-30 DIAGNOSIS — E78.5 HYPERLIPIDEMIA LDL GOAL <100: ICD-10-CM

## 2017-05-30 RX ORDER — PRAVASTATIN SODIUM 80 MG/1
TABLET ORAL
Qty: 90 TABLET | Refills: 2 | Status: SHIPPED | OUTPATIENT
Start: 2017-05-30 | End: 2018-03-09

## 2017-05-30 NOTE — TELEPHONE ENCOUNTER
Pravastatin     Last Written Prescription Date: 4/28/16   Last Fill Quantity: 90, # refills: 3  Last Office Visit with [facility], [facility] or [facility] prescribing provider: 3/6/17       Lab Results   Component Value Date    CHOL 155 02/21/2017     Lab Results   Component Value Date    HDL 64 02/21/2017     Lab Results   Component Value Date    LDL 72 02/21/2017     Lab Results   Component Value Date    TRIG 95 02/21/2017     Lab Results   Component Value Date    CHOLHDLRATIO 3.4 07/23/2015       Labs showing if normal/abnormal  Lab Results   Component Value Date    CHOL 155 02/21/2017    TRIG 95 02/21/2017    HDL 64 02/21/2017    LDL 72 02/21/2017    VLDL 28 07/23/2015    CHOLHDLRATIO 3.4 07/23/2015     Prescription approved per [facility] Refill Protocol.

## 2017-06-09 ENCOUNTER — TELEPHONE (OUTPATIENT)
Dept: INTERNAL MEDICINE | Facility: CLINIC | Age: 82
End: 2017-06-09

## 2017-06-09 DIAGNOSIS — J44.9 CHRONIC OBSTRUCTIVE PULMONARY DISEASE, UNSPECIFIED COPD TYPE (H): Primary | ICD-10-CM

## 2017-06-09 RX ORDER — PREDNISONE 20 MG/1
40 TABLET ORAL DAILY
Qty: 10 TABLET | Refills: 0 | Status: SHIPPED | OUTPATIENT
Start: 2017-06-09 | End: 2017-06-14

## 2017-06-09 NOTE — TELEPHONE ENCOUNTER
Pt transferred to triage for SOB. Pt stated she has COPD but for the past 2-4d she has been having increase in SOB w/movement, like walking. Pt is has Albuterol inhaler, Spiriva, Advair. Pt says they help but not completely. Pt does not have O2 at home. Pt sched for appt on 6/15, ok to wait til then? Denies any kind of illness. Pt has air conditioning at home.

## 2017-06-09 NOTE — TELEPHONE ENCOUNTER
Is she having a cough? Any mucus production? Was she able to sleep or is she awakening with dyspnea? Is she short of breath with just a few steps or can she walk a block? Is is getting worse every day or stable?

## 2017-06-09 NOTE — TELEPHONE ENCOUNTER
Called pt-relayed below.       1. Pt does not have cough  2. Pt sometimes spits up a little mucous, but no more than usual  3. Pt could walk a block, if she took her time and walked slow. SOB usually occurs when she is walking fast, or rushing  4. Pt feels its stable

## 2017-06-13 ENCOUNTER — TRANSFERRED RECORDS (OUTPATIENT)
Dept: HEALTH INFORMATION MANAGEMENT | Facility: CLINIC | Age: 82
End: 2017-06-13

## 2017-06-15 ENCOUNTER — TELEPHONE (OUTPATIENT)
Dept: INTERNAL MEDICINE | Facility: CLINIC | Age: 82
End: 2017-06-15

## 2017-06-15 ENCOUNTER — OFFICE VISIT (OUTPATIENT)
Dept: INTERNAL MEDICINE | Facility: CLINIC | Age: 82
End: 2017-06-15
Payer: COMMERCIAL

## 2017-06-15 VITALS
BODY MASS INDEX: 24.64 KG/M2 | DIASTOLIC BLOOD PRESSURE: 78 MMHG | SYSTOLIC BLOOD PRESSURE: 140 MMHG | OXYGEN SATURATION: 90 % | TEMPERATURE: 97.7 F | HEIGHT: 63 IN | HEART RATE: 107 BPM | WEIGHT: 139.1 LBS

## 2017-06-15 DIAGNOSIS — J44.1 CHRONIC OBSTRUCTIVE PULMONARY DISEASE WITH ACUTE EXACERBATION (H): Primary | ICD-10-CM

## 2017-06-15 PROCEDURE — 99213 OFFICE O/P EST LOW 20 MIN: CPT | Performed by: NURSE PRACTITIONER

## 2017-06-15 NOTE — MR AVS SNAPSHOT
"              After Visit Summary   6/15/2017    Celeste Chacko    MRN: [id number]           Patient Information     Date Of Birth          1931        Visit Information        Provider Department      6/15/2017 2:20 PM Elisa Landry NP [facility]        Today's Diagnoses     Chronic obstructive pulmonary disease with acute exacerbation (H)    -  1       Follow-ups after your visit        Who to contact     If you have questions or need follow up information about today's clinic visit or your schedule please contact [facility] directly at [phone].  Normal or non-critical lab and imaging results will be communicated to you by rollApphart, letter or phone within 4 business days after the clinic has received the results. If you do not hear from us within 7 days, please contact the clinic through [employer] or phone. If you have a critical or abnormal lab result, we will notify you by phone as soon as possible.  Submit refill requests through [employer] or call your pharmacy and they will forward the refill request to us. Please allow 3 business days for your refill to be completed.          Additional Information About Your Visit        MyChart Information     Enubila lets you send messages to your doctor, view your test results, renew your prescriptions, schedule appointments and more. To sign up, go to [email or web address] . Click on \"Log in\" on the left side of the screen, which will take you to the Welcome page. Then click on \"Sign up Now\" on the right side of the page.     You will be asked to enter the access code listed below, as well as some personal information. Please follow the directions to create your username and password.     Your access code is: [email or web address]  Expires: 2017  3:22 PM     Your access code will  in 90 days. If you need help or a new code, please call your [facility] or [phone].        Care EveryWhere ID     This is your Care " "EveryWhere ID. This could be used by other organizations to access your [place] medical records  [id number]        Your Vitals Were     Pulse Temperature Height Pulse Oximetry BMI (Body Mass Index)       107 97.7  F (36.5  C) (Oral) 5' 3\" (1.6 m) 90% 24.64 kg/m2        Blood Pressure from Last 3 Encounters:   06/15/17 140/78   05/08/17 130/70   04/25/17 151/69    Weight from Last 3 Encounters:   06/15/17 139 lb 1.6 oz (63.1 kg)   05/08/17 137 lb 3.2 oz (62.2 kg)   04/25/17 140 lb (63.5 kg)              Today, you had the following     No orders found for display       Primary Care Provider Office Phone # Fax #    Emily Marie [phone]       [facility] [address] [facility] [address]  [facility] [address]        Thank you!     Thank you for choosing [facility]  for your care. Our goal is always to provide you with excellent care. Hearing back from our patients is one way we can continue to improve our services. Please take a few minutes to complete the written survey that you may receive in the mail after your visit with us. Thank you!             Your Updated Medication List - Protect others around you: Learn how to safely use, store and throw away your medicines at www.disposemymeds.org.          This list is accurate as of: 6/15/17  3:22 PM.  Always use your most recent med list.                   Brand Name Dispense Instructions for use    * albuterol 108 (90 BASE) MCG/ACT Inhaler    albuterol    1 Inhaler    Inhale 2 puffs into the lungs every 4 hours as needed for shortness of breath / dyspnea       * albuterol 108 (90 BASE) MCG/ACT Inhaler    albuterol    1 Inhaler    Inhale 2 puffs into the lungs every 6 hours as needed for shortness of breath / dyspnea or wheezing       alendronate 70 MG tablet    FOSAMAX    12 tablet    Take 1 tablet (70 mg) by mouth every 7 days Take with 8 ounces water and wait at least 30 minutes before eating, do not lie down until after eating "       amoxicillin 500 MG capsule    AMOXIL     TAKE FOUR CAPSULES (2 GRAMS) BY MOUTH ONE HOUR BEFORE DENTAL APPOINTMENT       ascorbic acid 500 MG tablet    VITAMIN C    30 tablet    Take 1 tablet (500 mg) by mouth daily       ASPIRIN EC PO      Take 81 mg by mouth daily       calcium-vitamin D 600-400 MG-UNIT per tablet    CALTRATE     Take 1 tablet by mouth daily       FIBERCON PO      Take 1 tablet by mouth once , one in the morning and one in the evening       fluticasone-salmeterol 250-50 MCG/DOSE diskus inhaler    ADVAIR DISKUS    3 Inhaler    Inhale 1 puff into the lungs 2 times daily       glipiZIDE 5 MG tablet    GLUCOTROL    90 tablet    Take 1 tablet (5 mg) by mouth every morning (before breakfast)       Glucosamine-Chondroitin 500-250 MG Caps    GLUCOSAMINE CHONDROITIN COMPLX     Take 1 tablet by mouth 2 times daily       MULTIVITAMIN ADULT PO      Take 1 tablet by mouth daily       ONE TOUCH ULTRA test strip   Generic drug:  blood glucose monitoring      Reported on 4/12/2017       ONETOUCH DELICA LANCETS 33G Misc      Reported on 4/12/2017       oxyCODONE 5 MG IR tablet    ROXICODONE    120 tablet    Take 1 tablet (5 mg) by mouth every 4 hours as needed for moderate to severe pain       pravastatin 80 MG tablet    PRAVACHOL    90 tablet    TAKE 1 TABLET (80 MG) BY MOUTH EVERY EVENING       senna-docusate 8.6-50 MG per tablet    SENOKOT-S;PERICOLACE     Take 1 tablet by mouth 2 times daily Reported on 4/12/2017       tiotropium 18 MCG capsule    SPIRIVA HANDIHALER    90 capsule    Inhale contents of one capsule daily.       TYLENOL PO      Take 650 mg by mouth 3 times daily Patient takes TID with Oxycodone.       VITAMIN E PO      Take 400 Int'l Units by mouth daily       * Notice:  This list has 2 medication(s) that are the same as other medications prescribed for you. Read the directions carefully, and ask your doctor or other care provider to review them with you.

## 2017-06-15 NOTE — PROGRESS NOTES
SUBJECTIVE:                                                    Celeste Chacko is a 86 year old female who presents to clinic today for the following health issues:    COPD Follow-Up    Symptoms are currently: improved    Current fatigue or dyspnea with ambulation: stable     Shortness of breath: Only when exerting herself too much - seems to be worse in the morning    Increased or change in Cough/Sputum: No    Fever(s): No    Baseline ambulation without stopping to rest 1-2 blocks. Able to walk up 1 flights of stairs without stopping to rest.    Any ER/UC or hospital admissions since your last visit? No     History   Smoking Status     Former Smoker     Packs/day: 0.50     Years: 54.00     Types: Cigarettes     Quit date: 4/27/2000   Smokeless Tobacco     Never Used     Comment: using nicotine gum     No results found for: FEV1, CDF1JBI       Amount of exercise or physical activity: 2-3 days/week for an average of 30-45 minutes    Problems taking medications regularly: No    Medication side effects: none    Diet: low fat/cholesterol and diabetic        Patient Active Problem List   Diagnosis     Disorder of bone and cartilage     Generalized osteoarthrosis, unspecified site     Chronic airway obstruction (H)     Spinal stenosis     Type 2 diabetes mellitus (HCC)     HYPERLIPIDEMIA LDL GOAL <100     History of basal cell carcinoma     Lumbago     Controlled substance agreement signed     History of actinic keratoses     Chronic pain syndrome     Past Surgical History:   Procedure Laterality Date     ARTHROPLASTY KNEE Left 9/15/2014    Procedure: ARTHROPLASTY KNEE;  Surgeon: Carlos Alberto Kaminski MD;  Location: [facility] OR     C NONSPECIFIC PROCEDURE      Breast biopsies      C NONSPECIFIC PROCEDURE      Pilonidal sinus repair      C NONSPECIFIC PROCEDURE      T & A      C NONSPECIFIC PROCEDURE  5/02    Shoulder arthropasty     C NONSPECIFIC PROCEDURE  5/07    Right shoulder replacement, RCT repair     HEAD & NECK SURGERY   "05/14/15    forehead-skin cancer removed     INJECT EPIDURAL LUMBAR / SACRAL SINGLE Left 7/14/2016    Procedure: INJECT EPIDURAL LUMBAR / SACRAL SINGLE;  Surgeon: Luisito New MD;  [facility]     INJECT SACROILIAC JOINT N/A 12/1/2015    Procedure: INJECT SACROILIAC JOINT;  Surgeon: Luisito New MD;  Location: [facility]     INJECT SACROILIAC JOINT Bilateral 5/19/2016    Procedure: INJECT SACROILIAC JOINT;  Surgeon: Luisito New MD;  [facility]     INJECT SACROILIAC JOINT Bilateral 11/25/2016    Procedure: INJECT SACROILIAC JOINT;  Surgeon: Elmira Bennett MD;  [facility]     INJECT SACROILIAC JOINT Bilateral 4/25/2017    Procedure: INJECT SACROILIAC JOINT;  Bilateral Sacroiliac Joint Injection   Injection of local anesthetic and steroid into area around spine for pain relief;  Surgeon: Alvaro Holland MD;  Location: [facility]       Social History   Substance Use Topics     Smoking status: Former Smoker     Packs/day: 0.50     Years: 54.00     Types: Cigarettes     Quit date: 4/27/2000     Smokeless tobacco: Never Used      Comment: using nicotine gum     Alcohol use 0.5 oz/week     1 Glasses of wine per week      Comment: \"A glass of wine once a week.\"     Family History   Problem Relation Age of Onset     Arthritis Father      DIABETES Brother      CANCER Brother      breast     CEREBROVASCULAR DISEASE Brother          Current Outpatient Prescriptions   Medication Sig Dispense Refill     pravastatin (PRAVACHOL) 80 MG tablet TAKE 1 TABLET (80 MG) BY MOUTH EVERY EVENING 90 tablet 2     oxyCODONE (ROXICODONE) 5 MG IR tablet Take 1 tablet (5 mg) by mouth every 4 hours as needed for moderate to severe pain 120 tablet 0     glipiZIDE (GLUCOTROL) 5 MG tablet Take 1 tablet (5 mg) by mouth every morning (before breakfast) 90 tablet 1     albuterol (ALBUTEROL) 108 (90 BASE) MCG/ACT Inhaler Inhale 2 puffs into the lungs every 6 hours as needed for shortness of breath / dyspnea or wheezing 1 Inhaler 11     " Multiple Vitamins-Minerals (MULTIVITAMIN ADULT PO) Take 1 tablet by mouth daily       tiotropium (SPIRIVA HANDIHALER) 18 MCG inhalation capsule Inhale contents of one capsule daily. 90 capsule 3     alendronate (FOSAMAX) 70 MG tablet Take 1 tablet (70 mg) by mouth every 7 days Take with 8 ounces water and wait at least 30 minutes before eating, do not lie down until after eating 12 tablet 3     fluticasone-salmeterol (ADVAIR DISKUS) 250-50 MCG/DOSE diskus inhaler Inhale 1 puff into the lungs 2 times daily 3 Inhaler 3     ASPIRIN EC PO Take 81 mg by mouth daily       senna-docusate (SENOKOT-S;PERICOLACE) 8.6-50 MG per tablet Take 1 tablet by mouth 2 times daily Reported on 4/12/2017       albuterol (ALBUTEROL) 108 (90 BASE) MCG/ACT inhaler Inhale 2 puffs into the lungs every 4 hours as needed for shortness of breath / dyspnea 1 Inhaler 6     Acetaminophen (TYLENOL PO) Take 650 mg by mouth 3 times daily Patient takes TID with Oxycodone.       ONETOUCH DELICA LANCETS 33G MISC Reported on 4/12/2017       ONE TOUCH ULTRA test strip Reported on 4/12/2017       calcium-vitamin D (CALTRATE) 600-400 MG-UNIT per tablet Take 1 tablet by mouth daily       Glucosamine-Chondroitin (GLUCOSAMINE CHONDROITIN COMPLX) 500-250 MG CAPS Take 1 tablet by mouth 2 times daily       Calcium Polycarbophil (FIBERCON PO) Take 1 tablet by mouth once , one in the morning and one in the evening       VITAMIN E PO Take 400 Int'l Units by mouth daily       ascorbic acid (VITAMIN C) 500 MG tablet Take 1 tablet (500 mg) by mouth daily 30 tablet      amoxicillin (AMOXIL) 500 MG capsule TAKE FOUR CAPSULES (2 GRAMS) BY MOUTH ONE HOUR BEFORE DENTAL APPOINTMENT  3     BP Readings from Last 3 Encounters:   06/15/17 140/78   05/08/17 130/70   04/25/17 151/69    Wt Readings from Last 3 Encounters:   06/15/17 139 lb 1.6 oz (63.1 kg)   05/08/17 137 lb 3.2 oz (62.2 kg)   04/25/17 140 lb (63.5 kg)                    Reviewed and updated as needed this visit by  "clinical staff  Tobacco  Allergies  Med Hx  Surg Hx  Fam Hx  Soc Hx      Reviewed and updated as needed this visit by Provider         ROS:  C: NEGATIVE for fever, chills, change in weight  E/M: NEGATIVE for ear, mouth and throat problems  RESP:NEGATIVE for wheezing  CV: NEGATIVE for chest pain, palpitations or peripheral edema    OBJECTIVE:                                                    /78 (BP Location: Right arm, Patient Position: Chair, Cuff Size: Adult Regular)  Pulse 107  Temp 97.7  F (36.5  C) (Oral)  Ht 5' 3\" (1.6 m)  Wt 139 lb 1.6 oz (63.1 kg)  SpO2 90%  BMI 24.64 kg/m2  Body mass index is 24.64 kg/(m^2).  GENERAL: no distress, frail and elderly  HENT: ear canals and TM's normal, nose and mouth without ulcers or lesions  NECK: no adenopathy, no asymmetry, masses, or scars and thyroid normal to palpation  RESP: lungs clear to auscultation - no rales, rhonchi or wheezes  CV: regular rate and rhythm, normal S1 S2, no S3 or S4, no murmur, click or rub, no peripheral edema and peripheral pulses strong         ASSESSMENT/PLAN:                                                              ICD-10-CM    1. Chronic obstructive pulmonary disease with acute exacerbation (H) J44.1        Continue all inhalers      Elisa Landry NP  [facility]    "

## 2017-06-15 NOTE — TELEPHONE ENCOUNTER
Call from pt stating she just saw Elisa Landry this afternoon. States she forgot to ask her an important question. States she is planning to take a 5 day trip to [place] to visit her sister sometime this summer. States it will not be a strenuous trip but wants provider's opinion on if she thinks this will be OK or if she has any input on a time line for how long this exacerbation may last and when may be a good (or better) time to travel.

## 2017-06-16 ASSESSMENT — PATIENT HEALTH QUESTIONNAIRE - PHQ9: SUM OF ALL RESPONSES TO PHQ QUESTIONS 1-9: 2

## 2017-06-16 NOTE — TELEPHONE ENCOUNTER
Pt calls, they had a power outage and voicemail was not working. Pt informed of recommendation from Denisse

## 2017-06-19 DIAGNOSIS — M48.061 SPINAL STENOSIS OF LUMBAR REGION: ICD-10-CM

## 2017-06-19 DIAGNOSIS — F11.20 NARCOTIC DEPENDENCE (H): Primary | ICD-10-CM

## 2017-06-19 DIAGNOSIS — Z79.899 CONTROLLED SUBSTANCE AGREEMENT SIGNED: ICD-10-CM

## 2017-06-19 RX ORDER — OXYCODONE HYDROCHLORIDE 5 MG/1
5 TABLET ORAL EVERY 4 HOURS PRN
Qty: 120 TABLET | Refills: 0 | Status: SHIPPED | OUTPATIENT
Start: 2017-06-19 | End: 2017-07-21

## 2017-06-19 NOTE — TELEPHONE ENCOUNTER
Pt is calling for 2 reasons.   Oxycodone. MAIL to pharmacy         Last Written Prescription Date:  5/22/17  Last Fill Quantity: 120,   # refills: 0    Last Office Visit with [facility] or  Health prescribing provider: 6/15/17  Future Office visit:       Routing refill request to provider for review/approval because:  Drug not on the [facility], [facility] or  Health refill protocol or controlled substance    2. Would like Dr Marie to call her Cell #[phone], tomorrow is OK.   She would like to travel to [place] this summer to see her sister one more time, but has concerns due to her breathing and would like to discuss ideas with Dr Marie about this.   Please call her.

## 2017-06-19 NOTE — TELEPHONE ENCOUNTER
I am not sure I will get to call her but you can tell her she should not fly, high risk for her degree of COPD. There can also be issues with driving a long way if there were any acute changes it may be hard to get to a hospital or clinic, being out in hot and humid weather can cause worsening of COPD. Overall I would recommend against long travel but it is her choice.   rx printed, mail.

## 2017-06-20 NOTE — TELEPHONE ENCOUNTER
Pt calls back. Gave her the message. She would still like Dr Marie to give her a call to discuss. She wants to discuss having Prednisone on hand as a possibility.

## 2017-06-21 NOTE — TELEPHONE ENCOUNTER
Pt calls back. She scheduled an appt with Dr Marie next week for other issues. She will discuss then.

## 2017-06-29 ENCOUNTER — OFFICE VISIT (OUTPATIENT)
Dept: INTERNAL MEDICINE | Facility: CLINIC | Age: 82
End: 2017-06-29
Payer: COMMERCIAL

## 2017-06-29 VITALS
TEMPERATURE: 98 F | HEART RATE: 104 BPM | HEIGHT: 63 IN | OXYGEN SATURATION: 90 % | DIASTOLIC BLOOD PRESSURE: 72 MMHG | RESPIRATION RATE: 16 BRPM | SYSTOLIC BLOOD PRESSURE: 148 MMHG | BODY MASS INDEX: 25.18 KG/M2 | WEIGHT: 142.1 LBS

## 2017-06-29 DIAGNOSIS — J44.9 COPD, SEVERE (H): Primary | ICD-10-CM

## 2017-06-29 PROCEDURE — 99213 OFFICE O/P EST LOW 20 MIN: CPT | Performed by: INTERNAL MEDICINE

## 2017-06-29 NOTE — PROGRESS NOTES
SUBJECTIVE:                                                    Celeste Chacko is a 86 year old female who presents to clinic today for the following health issues:      COPD follow-up: She is primarily here to discuss traveling with her COPD. She is not normally on oxygen, she and does not understand issues with flying.  She reports her breathing is fairly stable now. She has some questions about the inhalers. She has some questions about potential triggers.    Patient Active Problem List   Diagnosis     Disorder of bone and cartilage     Generalized osteoarthrosis, unspecified site     Chronic airway obstruction (H)     Spinal stenosis     Type 2 diabetes mellitus (HCC)     HYPERLIPIDEMIA LDL GOAL <100     History of basal cell carcinoma     Lumbago     Controlled substance agreement signed     History of actinic keratoses     Chronic pain syndrome     Narcotic dependence (H)      Current Outpatient Prescriptions   Medication Sig Dispense Refill     oxyCODONE (ROXICODONE) 5 MG IR tablet Take 1 tablet (5 mg) by mouth every 4 hours as needed for moderate to severe pain 120 tablet 0     pravastatin (PRAVACHOL) 80 MG tablet TAKE 1 TABLET (80 MG) BY MOUTH EVERY EVENING 90 tablet 2     glipiZIDE (GLUCOTROL) 5 MG tablet Take 1 tablet (5 mg) by mouth every morning (before breakfast) 90 tablet 1     albuterol (ALBUTEROL) 108 (90 BASE) MCG/ACT Inhaler Inhale 2 puffs into the lungs every 6 hours as needed for shortness of breath / dyspnea or wheezing 1 Inhaler 11     Multiple Vitamins-Minerals (MULTIVITAMIN ADULT PO) Take 1 tablet by mouth daily       tiotropium (SPIRIVA HANDIHALER) 18 MCG inhalation capsule Inhale contents of one capsule daily. 90 capsule 3     alendronate (FOSAMAX) 70 MG tablet Take 1 tablet (70 mg) by mouth every 7 days Take with 8 ounces water and wait at least 30 minutes before eating, do not lie down until after eating 12 tablet 3     fluticasone-salmeterol (ADVAIR DISKUS) 250-50 MCG/DOSE diskus  "inhaler Inhale 1 puff into the lungs 2 times daily 3 Inhaler 3     ASPIRIN EC PO Take 81 mg by mouth daily       senna-docusate (SENOKOT-S;PERICOLACE) 8.6-50 MG per tablet Take 1 tablet by mouth 2 times daily Reported on 4/12/2017       Acetaminophen (TYLENOL PO) Take 325 mg by mouth 3 times daily Patient takes TID with Oxycodone.        ONETOUCH DELICA LANCETS 33G MISC Reported on 4/12/2017       ONE TOUCH ULTRA test strip Reported on 4/12/2017       calcium-vitamin D (CALTRATE) 600-400 MG-UNIT per tablet Take 1 tablet by mouth daily       Glucosamine-Chondroitin (GLUCOSAMINE CHONDROITIN COMPLX) 500-250 MG CAPS Take 1 tablet by mouth 2 times daily       Calcium Polycarbophil (FIBERCON PO) Take 1 tablet by mouth once , one in the morning and one in the evening       VITAMIN E PO Take 400 Int'l Units by mouth daily       ascorbic acid (VITAMIN C) 500 MG tablet Take 1 tablet (500 mg) by mouth daily 30 tablet      amoxicillin (AMOXIL) 500 MG capsule TAKE FOUR CAPSULES (2 GRAMS) BY MOUTH ONE HOUR BEFORE DENTAL APPOINTMENT  3      Social History   Substance Use Topics     Smoking status: Former Smoker     Packs/day: 0.50     Years: 54.00     Types: Cigarettes     Quit date: 4/27/2000     Smokeless tobacco: Never Used      Comment: using nicotine gum     Alcohol use 0.5 oz/week     1 Glasses of wine per week      Comment: \"A glass of wine once a week.\"        Reviewed and updated as needed this visit by clinical staff  Tobacco  Allergies  Meds  Med Hx  Surg Hx  Fam Hx  Soc Hx      Reviewed and updated as needed this visit by Provider         ROS:  Negative    OBJECTIVE:     /72  Pulse 104  Temp 98  F (36.7  C) (Oral)  Resp 16  Ht 5' 3\" (1.6 m)  Wt 142 lb 1.6 oz (64.5 kg)  SpO2 90%  BMI 25.17 kg/m2  Body mass index is 25.17 kg/(m^2).    Not examined.       ASSESSMENT/PLAN:             1. COPD, severe (H)  Discussed with her the problems with, potential Issues with becoming ill on a flight or when " traveling.  Advised that she would have to have oxygen if she were to consider traveling. She can consider checking with insurance about qualifying for oxygen for flying, airline restrictions and issues. She may consider. She is otherwise stable.           Emily Marie MD  [facility]    20 minutes spent with the patient, >50% of time spent counseling about oxygen, travel with COPD.

## 2017-06-29 NOTE — MR AVS SNAPSHOT
"              After Visit Summary   2017    Celeste Chacko    MRN: [id number]           Patient Information     Date Of Birth          1931        Visit Information        Provider Department      2017 3:40 PM Emily Marie MD [facility]        Today's Diagnoses     COPD, severe (H)    -  1       Follow-ups after your visit        Who to contact     If you have questions or need follow up information about today's clinic visit or your schedule please contact [facility] directly at [phone].  Normal or non-critical lab and imaging results will be communicated to you by MyChart, letter or phone within 4 business days after the clinic has received the results. If you do not hear from us within 7 days, please contact the clinic through [employer] or phone. If you have a critical or abnormal lab result, we will notify you by phone as soon as possible.  Submit refill requests through [employer] or call your pharmacy and they will forward the refill request to us. Please allow 3 business days for your refill to be completed.          Additional Information About Your Visit        MyChart Information     [employer] lets you send messages to your doctor, view your test results, renew your prescriptions, schedule appointments and more. To sign up, go to [email or web address] . Click on \"Log in\" on the left side of the screen, which will take you to the Welcome page. Then click on \"Sign up Now\" on the right side of the page.     You will be asked to enter the access code listed below, as well as some personal information. Please follow the directions to create your username and password.     Your access code is: [email or web address]  Expires: 2017  3:22 PM     Your access code will  in 90 days. If you need help or a new code, please call your [facility] or [phone].        Care EveryWhere ID     This is your Care EveryWhere ID. This could be used by other organizations " "to access your [facility] medical records  [id number]        Your Vitals Were     Pulse Temperature Respirations Height Pulse Oximetry BMI (Body Mass Index)    104 98  F (36.7  C) (Oral) 16 5' 3\" (1.6 m) 90% 25.17 kg/m2       Blood Pressure from Last 3 Encounters:   06/29/17 148/72   06/15/17 140/78   05/08/17 130/70    Weight from Last 3 Encounters:   06/29/17 142 lb 1.6 oz (64.5 kg)   06/15/17 139 lb 1.6 oz (63.1 kg)   05/08/17 137 lb 3.2 oz (62.2 kg)              Today, you had the following     No orders found for display         Today's Medication Changes          These changes are accurate as of: 6/29/17 11:59 PM.  If you have any questions, ask your nurse or doctor.               These medicines have changed or have updated prescriptions.        Dose/Directions    albuterol 108 (90 BASE) MCG/ACT Inhaler   Commonly known as:  albuterol   This may have changed:  Another medication with the same name was removed. Continue taking this medication, and follow the directions you see here.   Used for:  Chronic obstructive pulmonary disease, unspecified COPD type (H)   Changed by:  Emily Marie MD        Dose:  2 puff   Inhale 2 puffs into the lungs every 6 hours as needed for shortness of breath / dyspnea or wheezing   Quantity:  1 Inhaler   Refills:  11                Primary Care Provider Office Phone # Fax #    Emily Marie [phone]       [facility] [address]  [facility] [address]        Equal Access to Services     SIMRAN PALACIO : Hadii harvey ferro Sobrittany, waaxda luqadaha, qaybta kaalmada omar harris. So [facility] [phone].    ATENCIÓN: Si habla español, tiene a harvey disposición servicios gratuitos de asistencia lingüística. Llame al [phone].    We comply with applicable federal civil rights laws and [place] laws. We do not discriminate on the basis of race, color, national origin, age, disability sex, sexual " orientation or gender identity.            Thank you!     Thank you for choosing [facility]  for your care. Our goal is always to provide you with excellent care. Hearing back from our patients is one way we can continue to improve our services. Please take a few minutes to complete the written survey that you may receive in the mail after your visit with us. Thank you!             Your Updated Medication List - Protect others around you: Learn how to safely use, store and throw away your medicines at www.disposemymeds.org.          This list is accurate as of: 6/29/17 11:59 PM.  Always use your most recent med list.                   Brand Name Dispense Instructions for use Diagnosis    albuterol 108 (90 BASE) MCG/ACT Inhaler    albuterol    1 Inhaler    Inhale 2 puffs into the lungs every 6 hours as needed for shortness of breath / dyspnea or wheezing    Chronic obstructive pulmonary disease, unspecified COPD type (H)       alendronate 70 MG tablet    FOSAMAX    12 tablet    Take 1 tablet (70 mg) by mouth every 7 days Take with 8 ounces water and wait at least 30 minutes before eating, do not lie down until after eating    Osteopenia       amoxicillin 500 MG capsule    AMOXIL     TAKE FOUR CAPSULES (2 GRAMS) BY MOUTH ONE HOUR BEFORE DENTAL APPOINTMENT        ascorbic acid 500 MG tablet    VITAMIN C    30 tablet    Take 1 tablet (500 mg) by mouth daily        ASPIRIN EC PO      Take 81 mg by mouth daily        calcium-vitamin D 600-400 MG-UNIT per tablet    CALTRATE     Take 1 tablet by mouth daily        FIBERCON PO      Take 1 tablet by mouth once , one in the morning and one in the evening        fluticasone-salmeterol 250-50 MCG/DOSE diskus inhaler    ADVAIR DISKUS    3 Inhaler    Inhale 1 puff into the lungs 2 times daily    Chronic obstructive pulmonary disease, unspecified COPD type (H)       glipiZIDE 5 MG tablet    GLUCOTROL    90 tablet    Take 1 tablet (5 mg) by mouth every morning  (before breakfast)    Type 2 diabetes mellitus without complication, without long-term current use of insulin (H)       Glucosamine-Chondroitin 500-250 MG Caps    GLUCOSAMINE CHONDROITIN COMPLX     Take 1 tablet by mouth 2 times daily    Generalized osteoarthrosis, unspecified site       MULTIVITAMIN ADULT PO      Take 1 tablet by mouth daily        ONE TOUCH ULTRA test strip   Generic drug:  blood glucose monitoring      Reported on 4/12/2017        ONETOUCH DELICA LANCETS 33G Misc      Reported on 4/12/2017        oxyCODONE 5 MG IR tablet    ROXICODONE    120 tablet    Take 1 tablet (5 mg) by mouth every 4 hours as needed for moderate to severe pain    Spinal stenosis of lumbar region       pravastatin 80 MG tablet    PRAVACHOL    90 tablet    TAKE 1 TABLET (80 MG) BY MOUTH EVERY EVENING    Hyperlipidemia LDL goal <100       senna-docusate 8.6-50 MG per tablet    SENOKOT-S;PERICOLACE     Take 1 tablet by mouth 2 times daily Reported on 4/12/2017        tiotropium 18 MCG capsule    SPIRIVA HANDIHALER    90 capsule    Inhale contents of one capsule daily.    Chronic obstructive pulmonary disease, unspecified COPD type (H)       TYLENOL PO      Take 325 mg by mouth 3 times daily Patient takes TID with Oxycodone.        VITAMIN E PO      Take 400 Int'l Units by mouth daily

## 2017-07-14 ENCOUNTER — TELEPHONE (OUTPATIENT)
Dept: ANESTHESIOLOGY | Facility: CLINIC | Age: 82
End: 2017-07-14

## 2017-07-14 DIAGNOSIS — M46.1 SACROILIITIS (H): Primary | ICD-10-CM

## 2017-07-14 NOTE — PROGRESS NOTES
Celeste called to request to schedule a repeat bilateral SI joint injection. She states the last injection she had in April has relieved her pain enough so that she is taking one less oxycodone per day and she is satisfied with this.     Celeste requested pre-procedure instructions be sent by mail.

## 2017-07-19 ENCOUNTER — TELEPHONE (OUTPATIENT)
Dept: ANESTHESIOLOGY | Facility: CLINIC | Age: 82
End: 2017-07-19

## 2017-07-19 NOTE — TELEPHONE ENCOUNTER
Left message on patients phone stating that her procedure on 7/31 needed to be rescheduled due to a conflict in Dr. Bennett's schedule. Patient was asked to call back and schedule a time with Brielle at [phone]. It was stated that Dr. Bennett opened a new morning for procedures on July 28th.

## 2017-07-21 DIAGNOSIS — M48.061 SPINAL STENOSIS OF LUMBAR REGION: ICD-10-CM

## 2017-07-21 RX ORDER — OXYCODONE HYDROCHLORIDE 5 MG/1
5 TABLET ORAL EVERY 4 HOURS PRN
Qty: 120 TABLET | Refills: 0 | Status: SHIPPED | OUTPATIENT
Start: 2017-07-21 | End: 2017-08-22

## 2017-07-21 NOTE — TELEPHONE ENCOUNTER
Oxycodone      Last Written Prescription Date:  6/19/17  Last Fill Quantity: 120,   # refills: 0  Last Office Visit with [facility] or [facility] provider: 6/29/17  Future Office visit:    Next 5 appointments (look out 90 days)     Sep 01, 2017 12:40 PM CDT   SHORT with Emily Marie MD   [facility]    [address]  [facility] [id number] [phone]                   Routing refill request to provider for review/approval because:  Drug not on the [facility], [facility] or [facility] refill protocol or controlled substance.   Signed CSA form in chart.  Please Mail to [facility] in [place].  Call Patient when Rx mailed.

## 2017-07-21 NOTE — LETTER
[facility]  [address], [address]  [place], [place]  [address]                                            TEL:[phone]  FAX:[phone]      Celeste Chacko  [address]    [facility][1] [phone]

## 2017-07-28 ENCOUNTER — HOSPITAL ENCOUNTER (OUTPATIENT)
Facility: AMBULATORY SURGERY CENTER | Age: 82
End: 2017-07-28
Attending: ANESTHESIOLOGY

## 2017-07-28 ENCOUNTER — SURGERY (OUTPATIENT)
Age: 82
End: 2017-07-28

## 2017-07-28 VITALS
RESPIRATION RATE: 16 BRPM | HEART RATE: 80 BPM | WEIGHT: 140 LBS | DIASTOLIC BLOOD PRESSURE: 59 MMHG | TEMPERATURE: 97.1 F | BODY MASS INDEX: 24.8 KG/M2 | SYSTOLIC BLOOD PRESSURE: 161 MMHG | HEIGHT: 63 IN | OXYGEN SATURATION: 94 %

## 2017-07-28 RX ORDER — IOPAMIDOL 408 MG/ML
INJECTION, SOLUTION INTRATHECAL PRN
Status: DISCONTINUED | OUTPATIENT
Start: 2017-07-28 | End: 2017-07-28 | Stop reason: HOSPADM

## 2017-07-28 RX ORDER — LIDOCAINE HYDROCHLORIDE 10 MG/ML
INJECTION, SOLUTION EPIDURAL; INFILTRATION; INTRACAUDAL; PERINEURAL PRN
Status: DISCONTINUED | OUTPATIENT
Start: 2017-07-28 | End: 2017-07-28 | Stop reason: HOSPADM

## 2017-07-28 RX ORDER — BUPIVACAINE HYDROCHLORIDE 2.5 MG/ML
INJECTION, SOLUTION EPIDURAL; INFILTRATION; INTRACAUDAL PRN
Status: DISCONTINUED | OUTPATIENT
Start: 2017-07-28 | End: 2017-07-28 | Stop reason: HOSPADM

## 2017-07-28 RX ORDER — TRIAMCINOLONE ACETONIDE 40 MG/ML
INJECTION, SUSPENSION INTRA-ARTICULAR; INTRAMUSCULAR PRN
Status: DISCONTINUED | OUTPATIENT
Start: 2017-07-28 | End: 2017-07-28 | Stop reason: HOSPADM

## 2017-07-28 RX ADMIN — LIDOCAINE HYDROCHLORIDE 5 ML: 10 INJECTION, SOLUTION EPIDURAL; INFILTRATION; INTRACAUDAL; PERINEURAL at 12:00

## 2017-07-28 RX ADMIN — IOPAMIDOL 1 ML: 408 INJECTION, SOLUTION INTRATHECAL at 11:50

## 2017-07-28 RX ADMIN — TRIAMCINOLONE ACETONIDE 1 ML: 40 INJECTION, SUSPENSION INTRA-ARTICULAR; INTRAMUSCULAR at 11:55

## 2017-07-28 RX ADMIN — BUPIVACAINE HYDROCHLORIDE 9 ML: 2.5 INJECTION, SOLUTION EPIDURAL; INFILTRATION; INTRACAUDAL at 11:56

## 2017-07-28 NOTE — OP NOTE
Patient: Celeste Chacko Age: 86 year old   MRN: [id number] Attending: Dr. eBnnett     Date of Visit: July 28, 2017      PAIN MEDICINE CLINIC PROCEDURE NOTE    ATTENDING CLINICIAN:    Elmira Bennett MD    ASSISTANT CLINICIAN:  Kai Douglas MD    PREPROCEDURE DIAGNOSES:  1.  Bilateral low back pain   2.  Sacroiliitis - bilateral    POSTPROCEDURE DIAGNOSES:  1.  Bilateral low back pain   2.  Sacroiliitis - bilateral      PROCEDURE(S) PERFORMED:  1.  Bilateral sacroiliac joint injection  2.  Bilateral Sacroiliac joint arthrography  3.  Fluoroscopic guidance for the above-named procedure(s)      ANESTHESIA:  Local.    BLOOD LOSS:  Minimal.    DRAINS AND SPECIMENS:  None.    COMPLICATIONS:  None.    INDICATIONS:  Celeste Chacko is a 86 year old female with a history of  chronic low back pain secondary to sacrolitis .  She has good pain relief with prior injection. The last injection was performed in April 2017 by Dr. Holland. She had 3 months of pain relief from last injection. The patient stated that the patient was in their usual state of health and denied recent anticoagulant use or recent infections.  Therefore, the plan is to perform above mentioned procedures.     Procedure Details:  The patient was met in the procedure room, where the patient was identified by name, medical record number and date of birth.  All of the patient s last minute questions were answered. Written informed consent was obtained and saved in the electronic medical record, after the risks, benefits, and alternatives were discussed with the patient.      A formal time-out procedure was performed, as per protocol, including patient name, title of procedure, and site of procedure, and all in the room concurred.  Routine monitors were applied.      The patient was placed in the prone position on the procedure room table.  All pressure points were checked and comfortably padded.  Routine monitors were placed.  Vital signs were stable.    A  chlorhexidine prep was completed followed by sterile draping per standard procedure.     AP fluoroscopic guidance was used to identify the right SI joint(s), with slight contralateral oblique tilt, until the joint was maximally visualized.   After 1% lidocaine infiltration using a 25 gauge 1.5 inch needle, a 3.5 inch spinal needle was introduced and advanced through the anesthetized plane and advanced to the joint space.  After negative aspiration for heme, we injected 0.5 ml of Isovue contrast into SI joint. Images obtained.      After negative aspiration for heme, 2.5 mL of a treatment mixture containing 1 mL of triamcinolone (40 mg/ml) and 9 mL of bupivacaine 0.25% was injected into the right SI joint, then the needle was slightly withdrawn and 2.5 mL of the above treatment solution was injected into the periarticular space.  The needle was then removed.  The same procedure was performed on the left side.    Light pressure was held at the puncture site(s) to prevent ecchymosis and oozing.  The patient's skin was cleansed, and hemostasis was confirmed.  Band-aids were applied to the needle injection site(s).      Condition:    The patient remained awake and alert throughout the procedure.  The patient tolerated the procedure well and was monitored for approximately 15 minutes afterward in the post procedure area.  There were no immediate post procedure complications noted.  The patient was then discharged to home as per protocol.      Pre-procedure pain score: 6/10  Post-procedure pain score: 2/10

## 2017-07-28 NOTE — DISCHARGE INSTRUCTIONS
Home Care Instructions after a Sacroiliac Joint Injection    The sacroiliac joints lie next to the spine and connect the sacrum(the bottom of the spine) with the hip on both sides. There are two sacroiliac joints, one on the right and one on the left. Joint inflammation and/or dysfunction in this area can cause pain. In a sacroiliac joint injection, a local anesthetic (numbing medicine) is injected in or near the joint space. Steroids are often used to help with the anti-inflammatory process.     Activity  -You may resume most normal activity levels with the exception of strenuous activity. It is important for us to know if your pain with normal activity is relieved after this injection.  -DO NOT shower for 24 hours  -DO NOT remove bandaid for 24 hours    Pain  -You may experience soreness at the injection site for one or two days  -You may use an ice pack for 20 minutes every 2 hours for the first 24 hours  -You may use a heating pad after the first 24 hours  -You may use Tylenol (acetaminophen) every 4 hours or other pain medicines as     directed by your physician    You may experience numbness radiating into your legs. This numbness may last several hours. Until sensation returns to normal; please use caution in walking, climbing stairs, and stepping out of your vehicle, etc.    DID YOU RECEIVE SEDATION TODAY?  No    If you received sedation please follow these additional safety measures.  Sedation medicine, if given, may remain active for many hours. It is important for the next 24 hours that you do not:  -Drive a car  -Operate machines or power tools  -Consume alcohol, including beer  -Sign any important papers or legal documents    DID YOU RECEIVE STEROIDS TODAY?  Yes    Common side effects of steroids:  Not everyone will experience corticosteroid side effects. If side effects are experienced, they will gradually subside in the 7-10 day period following an injection. Most common side effects  include:  -Flushed face and/or chest  -Feeling of warmth, particularly in the face but could be an overall feeling of warmth  -Increased blood sugar in diabetic patients  -Menstrual irregularities my occur. If taking hormone-based birth control an alternate method of birth control is recommended  -Sleep disturbances and/or mood swings are possible  -Leg cramps      PLEASE KEEP TRACK OF YOUR SYMPTOMS AND NOTE YOUR IMPROVEMENT FOR YOUR DOCTOR.     Please contact us if you have:  -Severe pain  -Fever more than 101.5 degrees Fahrenheit  -Signs of infection at the injection site (redness, swelling, or drainage)    If you have questions, please contact our office at [phone] between the hours of 7:00 am and 3:00 pm Monday through Friday. After office hours you can contact the on call provider by dialing [phone]. If you need immediate attention, we recommend that you go to a hospital emergency room or dial [phone].

## 2017-07-28 NOTE — IP AVS SNAPSHOT
MRN:[id number]                      After Visit Summary   7/28/2017    Celeste Chacko    MRN: [id number]           Thank you!     Thank you for choosing [facility] for your care. Our goal is always to provide you with excellent care. Hearing back from our patients is one way we can continue to improve our services. Please take a few minutes to complete the written survey that you may receive in the mail after you visit with us. Thank you!        Patient Information     Date Of Birth          5/11/1931        About your hospital stay     You were admitted on:  July 28, 2017 [facility]    You were discharged on:  July 28, 2017       Who to Call     For medical emergencies, please call 911.  For non-urgent questions about your medical care, please call your primary care provider or clinic, [phone]  For questions related to your surgery, please call your surgery clinic        Attending Provider     Provider Specialty    Elmira Bennett MD Anesthesiology       Primary Care Provider Office Phone # Fax #    Emily Marie [phone]      Your next 10 appointments already scheduled     Aug 22, 2017  8:30 AM CDT   [facility]    [address]  [facility] [address] [phone]           Patient must bring picture ID. Patient should be prepared to give a urine specimen  Please do not eat 10-12 hours before your appointment if you are coming in fasting for labs on lipids, cholesterol, or glucose (sugar). Pregnant women should follow their Care Team instructions. Water with medications is okay. Do not drink coffee or other fluids. If you have concerns about taking  your medications, please ask at office or if scheduling via [employer], send a message by clicking on Secure Messaging, Message Your Care Team.            Sep 01, 2017 12:40 PM CDT   [facility]  [facility]    [address]  [facility] [address] [phone]              Further instructions from your care team       Home Care Instructions after a Sacroiliac Joint Injection    The sacroiliac joints lie next to the spine and connect the sacrum(the bottom of the spine) with the hip on both sides. There are two sacroiliac joints, one on the right and one on the left. Joint inflammation and/or dysfunction in this area can cause pain. In a sacroiliac joint injection, a local anesthetic (numbing medicine) is injected in or near the joint space. Steroids are often used to help with the anti-inflammatory process.     Activity  -You may resume most normal activity levels with the exception of strenuous activity. It is important for us to know if your pain with normal activity is relieved after this injection.  -DO NOT shower for 24 hours  -DO NOT remove bandaid for 24 hours    Pain  -You may experience soreness at the injection site for one or two days  -You may use an ice pack for 20 minutes every 2 hours for the first 24 hours  -You may use a heating pad after the first 24 hours  -You may use Tylenol (acetaminophen) every 4 hours or other pain medicines as     directed by your physician    You may experience numbness radiating into your legs. This numbness may last several hours. Until sensation returns to normal; please use caution in walking, climbing stairs, and stepping out of your vehicle, etc.    DID YOU RECEIVE SEDATION TODAY?  No    If you received sedation please follow these additional safety measures.  Sedation medicine, if given, may remain active for many hours. It is important for the next 24 hours that you do not:  -Drive a car  -Operate machines or power tools  -Consume alcohol, including beer  -Sign any important papers or legal documents    DID YOU RECEIVE STEROIDS TODAY?  Yes    Common side effects of steroids:  Not  "everyone will experience corticosteroid side effects. If side effects are experienced, they will gradually subside in the 7-10 day period following an injection. Most common side effects include:  -Flushed face and/or chest  -Feeling of warmth, particularly in the face but could be an overall feeling of warmth  -Increased blood sugar in diabetic patients  -Menstrual irregularities my occur. If taking hormone-based birth control an alternate method of birth control is recommended  -Sleep disturbances and/or mood swings are possible  -Leg cramps      PLEASE KEEP TRACK OF YOUR SYMPTOMS AND NOTE YOUR IMPROVEMENT FOR YOUR DOCTOR.     Please contact us if you have:  -Severe pain  -Fever more than 101.5 degrees Fahrenheit  -Signs of infection at the injection site (redness, swelling, or drainage)    If you have questions, please contact our office at [phone] between the hours of 7:00 am and 3:00 pm Monday through Friday. After office hours you can contact the on call provider by dialing [phone]. If you need immediate attention, we recommend that you go to a hospital emergency room or dial [phone].        Pending Results     No orders found from 7/26/2017 to 7/29/2017.            Admission Information     Date & Time Provider Department Dept. Phone    7/28/2017 Elmira Bennett MD [facility] [phone]      Your Vitals Were     Blood Pressure Pulse Temperature Respirations Height Weight    161/59 80 97.1  F (36.2  C) (Temporal) 16 1.6 m (5' 3\") 63.5 kg (140 lb)    Pulse Oximetry BMI (Body Mass Index)                94% 24.8 kg/m2          [employer] Information     [employer] is an electronic gateway that provides easy, online access to your medical records. With [employer], you can request a clinic appointment, read your test results, renew a prescription or communicate with your care team.     To sign up for [employer] visit the website at [email or web address]   You will be asked to enter the " access code listed below, as well as some personal information. Please follow the directions to create your username and password.     Your access code is: [email or web address]  Expires: 2017  3:22 PM     Your access code will  in 90 days. If you need help or a new code, please contact your [facility] or call [phone] for assistance.        Care EveryWhere ID     This is your Care EveryWhere ID. This could be used by other organizations to access your [facility] medical records  [id number]        Equal Access to Services     [facility]: Hadii harvey goins hadmae Sobrittany, waaxda luqadaha, qaybdarcie kaalmacalvin harris, omar chao . So [facility] [phone].    ATENCIÓN: Si habla español, tiene a harvey disposición servicios gratuitos de asistencia lingüística. David al [phone].    We comply with applicable federal civil rights laws and [place] laws. We do not discriminate on the basis of race, color, national origin, age, disability sex, sexual orientation or gender identity.               Review of your medicines      UNREVIEWED medicines. Ask your doctor about these medicines        Dose / Directions    albuterol 108 (90 BASE) MCG/ACT Inhaler   Commonly known as:  albuterol   Used for:  Chronic obstructive pulmonary disease, unspecified COPD type (H)        Dose:  2 puff   Inhale 2 puffs into the lungs every 6 hours as needed for shortness of breath / dyspnea or wheezing   Quantity:  1 Inhaler   Refills:  11       alendronate 70 MG tablet   Commonly known as:  FOSAMAX   Used for:  Osteopenia        Dose:  70 mg   Take 1 tablet (70 mg) by mouth every 7 days Take with 8 ounces water and wait at least 30 minutes before eating, do not lie down until after eating   Quantity:  12 tablet   Refills:  3       amoxicillin 500 MG capsule   Commonly known as:  AMOXIL        TAKE FOUR CAPSULES (2 GRAMS) BY MOUTH ONE HOUR BEFORE DENTAL APPOINTMENT   Refills:  3        ascorbic acid 500 MG tablet   Commonly known as:  VITAMIN C        Dose:  500 mg   Take 1 tablet (500 mg) by mouth daily   Quantity:  30 tablet   Refills:  0       ASPIRIN EC PO        Dose:  81 mg   Take 81 mg by mouth daily   Refills:  0       calcium-vitamin D 600-400 MG-UNIT per tablet   Commonly known as:  CALTRATE        Dose:  1 tablet   Take 1 tablet by mouth daily   Refills:  0       FIBERCON PO        Dose:  1 tablet   Take 1 tablet by mouth once , one in the morning and one in the evening   Refills:  0       fluticasone-salmeterol 250-50 MCG/DOSE diskus inhaler   Commonly known as:  ADVAIR DISKUS   Used for:  Chronic obstructive pulmonary disease, unspecified COPD type (H)        Dose:  1 puff   Inhale 1 puff into the lungs 2 times daily   Quantity:  3 Inhaler   Refills:  3       glipiZIDE 5 MG tablet   Commonly known as:  GLUCOTROL   Used for:  Type 2 diabetes mellitus without complication, without long-term current use of insulin (H)        Dose:  5 mg   Take 1 tablet (5 mg) by mouth every morning (before breakfast)   Quantity:  90 tablet   Refills:  1       Glucosamine-Chondroitin 500-250 MG Caps   Commonly known as:  GLUCOSAMINE CHONDROITIN COMPLX   Used for:  Generalized osteoarthrosis, unspecified site        Dose:  1 tablet   Take 1 tablet by mouth 2 times daily   Refills:  0       MULTIVITAMIN ADULT PO        Dose:  1 tablet   Take 1 tablet by mouth daily   Refills:  0       oxyCODONE 5 MG IR tablet   Commonly known as:  ROXICODONE   Used for:  Spinal stenosis of lumbar region        Dose:  5 mg   Take 1 tablet (5 mg) by mouth every 4 hours as needed for moderate to severe pain   Quantity:  120 tablet   Refills:  0       pravastatin 80 MG tablet   Commonly known as:  PRAVACHOL   Used for:  Hyperlipidemia LDL goal <100        TAKE 1 TABLET (80 MG) BY MOUTH EVERY EVENING   Quantity:  90 tablet   Refills:  2       senna-docusate 8.6-50 MG per tablet   Commonly known as:  SENOKOT-S;PERICOLACE         Dose:  1 tablet   Take 1 tablet by mouth 2 times daily Reported on 4/12/2017   Refills:  0       tiotropium 18 MCG capsule   Commonly known as:  SPIRIVA HANDIHALER   Used for:  Chronic obstructive pulmonary disease, unspecified COPD type (H)        Inhale contents of one capsule daily.   Quantity:  90 capsule   Refills:  3       TYLENOL PO        Dose:  325 mg   Take 325 mg by mouth 3 times daily Patient takes TID with Oxycodone.   Refills:  0       VITAMIN E PO        Dose:  400 Int'l Units   Take 400 Int'l Units by mouth daily   Refills:  0         CONTINUE these medicines which have NOT CHANGED        Dose / Directions    ONE TOUCH ULTRA test strip   Generic drug:  blood glucose monitoring        Reported on 4/12/2017   Refills:  0       ONETOUCH DELICA LANCETS 33G Misc        Reported on 4/12/2017   Refills:  0                Protect others around you: Learn how to safely use, store and throw away your medicines at www.disposemymeds.org.             Medication List: This is a list of all your medications and when to take them. Check marks below indicate your daily home schedule. Keep this list as a reference.      Medications           Morning Afternoon Evening Bedtime As Needed    albuterol 108 (90 BASE) MCG/ACT Inhaler   Commonly known as:  albuterol   Inhale 2 puffs into the lungs every 6 hours as needed for shortness of breath / dyspnea or wheezing                                alendronate 70 MG tablet   Commonly known as:  FOSAMAX   Take 1 tablet (70 mg) by mouth every 7 days Take with 8 ounces water and wait at least 30 minutes before eating, do not lie down until after eating                                amoxicillin 500 MG capsule   Commonly known as:  AMOXIL   TAKE FOUR CAPSULES (2 GRAMS) BY MOUTH ONE HOUR BEFORE DENTAL APPOINTMENT                                ascorbic acid 500 MG tablet   Commonly known as:  VITAMIN C   Take 1 tablet (500 mg) by mouth daily                                ASPIRIN EC  PO   Take 81 mg by mouth daily                                calcium-vitamin D 600-400 MG-UNIT per tablet   Commonly known as:  CALTRATE   Take 1 tablet by mouth daily                                FIBERCON PO   Take 1 tablet by mouth once , one in the morning and one in the evening                                fluticasone-salmeterol 250-50 MCG/DOSE diskus inhaler   Commonly known as:  ADVAIR DISKUS   Inhale 1 puff into the lungs 2 times daily                                glipiZIDE 5 MG tablet   Commonly known as:  GLUCOTROL   Take 1 tablet (5 mg) by mouth every morning (before breakfast)                                Glucosamine-Chondroitin 500-250 MG Caps   Commonly known as:  GLUCOSAMINE CHONDROITIN COMPLX   Take 1 tablet by mouth 2 times daily                                MULTIVITAMIN ADULT PO   Take 1 tablet by mouth daily                                ONE TOUCH ULTRA test strip   Reported on 4/12/2017   Generic drug:  blood glucose monitoring                                ONETOUCH DELICA LANCETS 33G Misc   Reported on 4/12/2017                                oxyCODONE 5 MG IR tablet   Commonly known as:  ROXICODONE   Take 1 tablet (5 mg) by mouth every 4 hours as needed for moderate to severe pain                                pravastatin 80 MG tablet   Commonly known as:  PRAVACHOL   TAKE 1 TABLET (80 MG) BY MOUTH EVERY EVENING                                senna-docusate 8.6-50 MG per tablet   Commonly known as:  SENOKOT-S;PERICOLACE   Take 1 tablet by mouth 2 times daily Reported on 4/12/2017                                tiotropium 18 MCG capsule   Commonly known as:  SPIRIVA HANDIHALER   Inhale contents of one capsule daily.                                TYLENOL PO   Take 325 mg by mouth 3 times daily Patient takes TID with Oxycodone.                                VITAMIN E PO   Take 400 Int'l Units by mouth daily

## 2017-07-28 NOTE — H&P
"Abbreviated History and Physical    Patient Name: Celeste Chacko  YOB: 1931    Reason for Procedure: low back pain    History:    Past Medical History:   Diagnosis Date     Chronic airway obstruction, not elsewhere classified      Complete rupture of rotator cuff      Disorder of bone and cartilage, unspecified      History of blood transfusion      Mixed hyperlipidemia 4/00     Osteoarthritis      Personal history of other malignant neoplasm of skin      Spinal stenosis      Type II or unspecified type diabetes mellitus without mention of complication, not stated as uncontrolled        History of obstructive sleep apnea? no    History of problems with sedation? no    Physical exam:  /84  Pulse 74  Temp 97.1  F (36.2  C) (Temporal)  Resp 16  Ht 1.6 m (5' 3\")  Wt 63.5 kg (140 lb)  SpO2 94%  BMI 24.8 kg/m2  General: in no apparent distress   Neuro: At least antigravity strength noted in all 4 extremities  Respiratory: Clear to ausculation bilaterally   Cardiac: Regular rate and rhythm  Skin: No rashes or lesions on exposed areas of skin    Medications:   Current Outpatient Prescriptions   Medication     oxyCODONE (ROXICODONE) 5 MG IR tablet     pravastatin (PRAVACHOL) 80 MG tablet     glipiZIDE (GLUCOTROL) 5 MG tablet     albuterol (ALBUTEROL) 108 (90 BASE) MCG/ACT Inhaler     Multiple Vitamins-Minerals (MULTIVITAMIN ADULT PO)     amoxicillin (AMOXIL) 500 MG capsule     tiotropium (SPIRIVA HANDIHALER) 18 MCG inhalation capsule     alendronate (FOSAMAX) 70 MG tablet     fluticasone-salmeterol (ADVAIR DISKUS) 250-50 MCG/DOSE diskus inhaler     ASPIRIN EC PO     senna-docusate (SENOKOT-S;PERICOLACE) 8.6-50 MG per tablet     Acetaminophen (TYLENOL PO)     ONETOUCH DELICA LANCETS 33G MISC     ONE TOUCH ULTRA test strip     calcium-vitamin D (CALTRATE) 600-400 MG-UNIT per tablet     Glucosamine-Chondroitin (GLUCOSAMINE CHONDROITIN COMPLX) 500-250 MG CAPS     ascorbic acid (VITAMIN C) 500 MG " tablet     Calcium Polycarbophil (FIBERCON PO)     VITAMIN E PO     No current facility-administered medications for this encounter.        Allergies:     Allergies   Allergen Reactions     Sulfamethoxazole Anaphylaxis and Hives       ASA Classification: 3    OK for local anesthetic use.     Elmira Bennett MD  Pain Medicine  [facility] Department of Anesthesia  7/28/2017

## 2017-08-21 ENCOUNTER — TELEPHONE (OUTPATIENT)
Dept: INTERNAL MEDICINE | Facility: CLINIC | Age: 82
End: 2017-08-21

## 2017-08-21 DIAGNOSIS — M48.061 SPINAL STENOSIS OF LUMBAR REGION: ICD-10-CM

## 2017-08-21 NOTE — TELEPHONE ENCOUNTER
Oxycodone      Last Written Prescription Date:  7/21/2017  Last Fill Quantity: 120,   # refills: 0  Last Office Visit with [facility] prescribing provider: 6/29/2017  Future Office visit:    Next 5 appointments (look out 90 days)     Sep 01, 2017 12:40 PM CDT   SHORT with Emily Marie MD   [facility]    [address]  [facility] [address]   [phone]                   Routing refill request to provider for review/approval because:  Drug not on the [facility], [facility] or [facility] refill protocol or controlled substance

## 2017-08-21 NOTE — TELEPHONE ENCOUNTER
Reason for Call:  Medication or medication refill:Refill     Do you use a [facility]?  Name of the pharmacy and phone number for the current request:  [facility] [address] [facility] - [phone]    Name of the medication requested: Oxycododone    Other request: Needs refill has about 6pills left    Can we leave a detailed message on this number? YES    Phone number patient can be reached at: Cell number on file:    Telephone Information:   Mobile [phone]       Best Time: anytime    Call taken on 8/21/2017 at 2:35 PM by HIRAM ANSARI

## 2017-08-22 DIAGNOSIS — E11.9 TYPE 2 DIABETES MELLITUS WITHOUT COMPLICATION, WITHOUT LONG-TERM CURRENT USE OF INSULIN (H): ICD-10-CM

## 2017-08-22 DIAGNOSIS — M85.80 OSTEOPENIA, UNSPECIFIED LOCATION: Primary | ICD-10-CM

## 2017-08-22 LAB
ANION GAP SERPL CALCULATED.3IONS-SCNC: 7 MMOL/L (ref 3–14)
BUN SERPL-MCNC: 16 MG/DL (ref 7–30)
CALCIUM SERPL-MCNC: 9.2 MG/DL (ref 8.5–10.1)
CHLORIDE SERPL-SCNC: 107 MMOL/L (ref 94–109)
CO2 SERPL-SCNC: 29 MMOL/L (ref 20–32)
CREAT SERPL-MCNC: 0.66 MG/DL (ref 0.52–1.04)
CREAT UR-MCNC: 97 MG/DL
GFR SERPL CREATININE-BSD FRML MDRD: 85 ML/MIN/1.7M2
GLUCOSE SERPL-MCNC: 85 MG/DL (ref 70–99)
HBA1C MFR BLD: 7.1 % (ref 4.3–6)
MICROALBUMIN UR-MCNC: 31 MG/L
MICROALBUMIN/CREAT UR: 32.51 MG/G CR (ref 0–25)
POTASSIUM SERPL-SCNC: 3.6 MMOL/L (ref 3.4–5.3)
SODIUM SERPL-SCNC: 143 MMOL/L (ref 133–144)
TSH SERPL DL<=0.005 MIU/L-ACNC: 1.68 MU/L (ref 0.4–4)

## 2017-08-22 PROCEDURE — 83036 HEMOGLOBIN GLYCOSYLATED A1C: CPT | Performed by: INTERNAL MEDICINE

## 2017-08-22 PROCEDURE — 36415 COLL VENOUS BLD VENIPUNCTURE: CPT | Performed by: INTERNAL MEDICINE

## 2017-08-22 PROCEDURE — 84443 ASSAY THYROID STIM HORMONE: CPT | Performed by: INTERNAL MEDICINE

## 2017-08-22 PROCEDURE — 80048 BASIC METABOLIC PNL TOTAL CA: CPT | Performed by: INTERNAL MEDICINE

## 2017-08-22 PROCEDURE — 82043 UR ALBUMIN QUANTITATIVE: CPT | Performed by: INTERNAL MEDICINE

## 2017-08-22 RX ORDER — OXYCODONE HYDROCHLORIDE 5 MG/1
5 TABLET ORAL EVERY 4 HOURS PRN
Qty: 120 TABLET | Refills: 0 | Status: SHIPPED | OUTPATIENT
Start: 2017-08-22 | End: 2017-09-01

## 2017-08-22 RX ORDER — ALENDRONATE SODIUM 70 MG/1
70 TABLET ORAL
Qty: 12 TABLET | Refills: 3 | Status: SHIPPED | OUTPATIENT
Start: 2017-08-22 | End: 2018-07-15

## 2017-08-22 NOTE — TELEPHONE ENCOUNTER
Alendronate    Last Written Prescription Date: 08/18/16  Last Fill Quantity: 12, # refills: 3  Last Office Visit with [facility] or [facility] prescribing provider: 06/29/17  Next 5 appointments (look out 90 days)     Sep 01, 2017 12:40 PM CDT   SHORT with Emily Marie MD   [facility] ([facility])    [address]  [facility] [id number] [phone]                   DEXA Scan:  Last order of DX HIP/PELVIS/SPINE was found on 9/2/2015 from [facility] Appointment on 9/2/2015     No order of DX HIP/PELVIS/SPINE W LAT FRACTION ANALYSIS is found.       Creatinine   Date Value Ref Range Status   02/26/2017 0.58 0.52 - 1.04 mg/dL Final

## 2017-08-22 NOTE — TELEPHONE ENCOUNTER
Spoke to patient and confirmed that prescription will be mail to [facility] pharmacy on [address] in [place] not [facility].

## 2017-08-30 ENCOUNTER — TRANSFERRED RECORDS (OUTPATIENT)
Dept: HEALTH INFORMATION MANAGEMENT | Facility: CLINIC | Age: 82
End: 2017-08-30

## 2017-08-31 ENCOUNTER — CARE COORDINATION (OUTPATIENT)
Dept: ANESTHESIOLOGY | Facility: CLINIC | Age: 82
End: 2017-08-31

## 2017-09-01 ENCOUNTER — OFFICE VISIT (OUTPATIENT)
Dept: INTERNAL MEDICINE | Facility: CLINIC | Age: 82
End: 2017-09-01
Payer: COMMERCIAL

## 2017-09-01 VITALS
RESPIRATION RATE: 16 BRPM | SYSTOLIC BLOOD PRESSURE: 136 MMHG | WEIGHT: 140.7 LBS | TEMPERATURE: 98.3 F | DIASTOLIC BLOOD PRESSURE: 68 MMHG | BODY MASS INDEX: 24.93 KG/M2 | HEIGHT: 63 IN | HEART RATE: 82 BPM | OXYGEN SATURATION: 92 %

## 2017-09-01 DIAGNOSIS — F11.20 NARCOTIC DEPENDENCE (H): ICD-10-CM

## 2017-09-01 DIAGNOSIS — M48.061 SPINAL STENOSIS OF LUMBAR REGION: ICD-10-CM

## 2017-09-01 DIAGNOSIS — E11.9 TYPE 2 DIABETES MELLITUS WITHOUT COMPLICATION, WITHOUT LONG-TERM CURRENT USE OF INSULIN (H): Primary | ICD-10-CM

## 2017-09-01 DIAGNOSIS — Z23 NEED FOR PROPHYLACTIC VACCINATION AND INOCULATION AGAINST INFLUENZA: ICD-10-CM

## 2017-09-01 DIAGNOSIS — J44.9 CHRONIC OBSTRUCTIVE PULMONARY DISEASE, UNSPECIFIED COPD TYPE (H): ICD-10-CM

## 2017-09-01 DIAGNOSIS — E78.5 HYPERLIPIDEMIA LDL GOAL <100: ICD-10-CM

## 2017-09-01 PROCEDURE — 99207 C FOOT EXAM  NO CHARGE: CPT | Performed by: INTERNAL MEDICINE

## 2017-09-01 PROCEDURE — 90662 IIV NO PRSV INCREASED AG IM: CPT | Performed by: INTERNAL MEDICINE

## 2017-09-01 PROCEDURE — G0008 ADMIN INFLUENZA VIRUS VAC: HCPCS | Performed by: INTERNAL MEDICINE

## 2017-09-01 PROCEDURE — 99214 OFFICE O/P EST MOD 30 MIN: CPT | Mod: 25 | Performed by: INTERNAL MEDICINE

## 2017-09-01 RX ORDER — OXYCODONE HYDROCHLORIDE 5 MG/1
TABLET ORAL
Qty: 150 TABLET | Refills: 0
Start: 2017-08-19 | End: 2017-09-11

## 2017-09-01 NOTE — MR AVS SNAPSHOT
"              After Visit Summary   9/1/2017    Celeste Chacko    MRN: [id number]           Patient Information     Date Of Birth          5/11/1931        Visit Information        Provider Department      9/1/2017 12:40 PM Emily Marie MD [facility]        Today's Diagnoses     Need for prophylactic vaccination and inoculation against influenza    -  1    Spinal stenosis of lumbar region        Chronic obstructive pulmonary disease, unspecified COPD type (H)        Type 2 diabetes mellitus without complication, without long-term current use of insulin (H)          Care Instructions    For the toe you can ice it when sore. You can try using Aspercream with lidocaine rubbed on it also.           Follow-ups after your visit        Follow-up notes from your care team     Return in about 6 months (around 3/1/2018) for Lab Work with urine and see me a week later.      Who to contact     If you have questions or need follow up information about today's clinic visit or your schedule please contact [facility] directly at [phone].  Normal or non-critical lab and imaging results will be communicated to you by [employer], letter or phone within 4 business days after the clinic has received the results. If you do not hear from us within 7 days, please contact the clinic through [employer] or phone. If you have a critical or abnormal lab result, we will notify you by phone as soon as possible.  Submit refill requests through "[employer] or call your pharmacy and they will forward the refill request to us. Please allow 3 business days for your refill to be completed.          Additional Information About Your Visit        NetMinderhart Information     "[employer] lets you send messages to your doctor, view your test results, renew your prescriptions, schedule appointments and more. To sign up, go to [email or web address] . Click on \"Log in\" on the left side of the screen, which will take you to the Welcome " "page. Then click on \"Sign up Now\" on the right side of the page.     You will be asked to enter the access code listed below, as well as some personal information. Please follow the directions to create your username and password.     Your access code is: [email or web address]  Expires: 2017  3:22 PM     Your access code will  in 90 days. If you need help or a new code, please call your [place] clinic or [phone].        Care EveryWhere ID     This is your Care EveryWhere ID. This could be used by other organizations to access your [place] medical records  [id number]        Your Vitals Were     Pulse Temperature Respirations Height Pulse Oximetry BMI (Body Mass Index)    82 98.3  F (36.8  C) (Oral) 16 5' 3\" (1.6 m) 92% 24.92 kg/m2       Blood Pressure from Last 3 Encounters:   17 136/68   17 161/59   17 148/72    Weight from Last 3 Encounters:   17 140 lb 11.2 oz (63.8 kg)   17 140 lb (63.5 kg)   17 142 lb 1.6 oz (64.5 kg)              We Performed the Following     FLU VACCINE, INCREASED ANTIGEN, PRESV FREE, AGE 65+ [49838]     Vaccine Administration, Initial [88026]          Today's Medication Changes          These changes are accurate as of: 17  1:23 PM.  If you have any questions, ask your nurse or doctor.               These medicines have changed or have updated prescriptions.        Dose/Directions    oxyCODONE 5 MG IR tablet   Commonly known as:  ROXICODONE   This may have changed:    - how much to take  - how to take this  - when to take this  - reasons to take this  - additional instructions   Used for:  Spinal stenosis of lumbar region   Changed by:  Emily Marie MD        May take 5 tabs per day   Quantity:  150 tablet   Refills:  0            Where to get your medicines      Some of these will need a paper prescription and others can be bought over the counter.  Ask your nurse if you have questions.     You don't need a prescription for these " medications     oxyCODONE 5 MG IR tablet                Primary Care Provider Office Phone # Fax #    Emily Marie [phone]       [address]  [facility] [address]        Equal Access to Services     DAVID PALACIO : Dolores harvey goins noelo Sobrittany, waaxda luqadaha, qaybta kaalmada adeegyada, omar gaitann zainab abad zhanna monson. So [facility] [phone].    ATENCIÓN: Si habla español, tiene a harvey disposición servicios gratuitos de asistencia lingüística. Llame al [phone].    We comply with applicable federal civil rights laws and [place] laws. We do not discriminate on the basis of race, color, national origin, age, disability sex, sexual orientation or gender identity.            Thank you!     Thank you for choosing [facility]  for your care. Our goal is always to provide you with excellent care. Hearing back from our patients is one way we can continue to improve our services. Please take a few minutes to complete the written survey that you may receive in the mail after your visit with us. Thank you!             Your Updated Medication List - Protect others around you: Learn how to safely use, store and throw away your medicines at www.disposemymeds.org.          This list is accurate as of: 9/1/17  1:23 PM.  Always use your most recent med list.                   Brand Name Dispense Instructions for use Diagnosis    albuterol 108 (90 BASE) MCG/ACT Inhaler    PROAIR HFA    1 Inhaler    Inhale 2 puffs into the lungs every 6 hours as needed for shortness of breath / dyspnea or wheezing    Chronic obstructive pulmonary disease, unspecified COPD type (H)       alendronate 70 MG tablet    FOSAMAX    12 tablet    Take 1 tablet (70 mg) by mouth every 7 days    Osteopenia, unspecified location       amoxicillin 500 MG capsule    AMOXIL     TAKE FOUR CAPSULES (2 GRAMS) BY MOUTH ONE HOUR BEFORE DENTAL APPOINTMENT        ascorbic acid 500 MG tablet    VITAMIN C    30  tablet    Take 1 tablet (500 mg) by mouth daily        ASPIRIN EC PO      Take 81 mg by mouth daily        calcium-vitamin D 600-400 MG-UNIT per tablet    CALTRATE     Take 1 tablet by mouth daily        FIBERCON PO      Take 1 tablet by mouth once , one in the morning and one in the evening        fluticasone-salmeterol 250-50 MCG/DOSE diskus inhaler    ADVAIR DISKUS    3 Inhaler    Inhale 1 puff into the lungs 2 times daily    Chronic obstructive pulmonary disease, unspecified COPD type (H)       glipiZIDE 5 MG tablet    GLUCOTROL    90 tablet    Take 1 tablet (5 mg) by mouth every morning (before breakfast)    Type 2 diabetes mellitus without complication, without long-term current use of insulin (H)       Glucosamine-Chondroitin 500-250 MG Caps    GLUCOSAMINE CHONDROITIN COMPLX     Take 1 tablet by mouth 2 times daily    Generalized osteoarthrosis, unspecified site       MULTIVITAMIN ADULT PO      Take 1 tablet by mouth daily        ONE TOUCH ULTRA test strip   Generic drug:  blood glucose monitoring      Reported on 4/12/2017        ONETOUCH DELICA LANCETS 33G Misc      Reported on 4/12/2017        oxyCODONE 5 MG IR tablet    ROXICODONE    150 tablet    May take 5 tabs per day    Spinal stenosis of lumbar region       pravastatin 80 MG tablet    PRAVACHOL    90 tablet    TAKE 1 TABLET (80 MG) BY MOUTH EVERY EVENING    Hyperlipidemia LDL goal <100       senna-docusate 8.6-50 MG per tablet    SENOKOT-S;PERICOLACE     Take 1 tablet by mouth 2 times daily Reported on 4/12/2017        tiotropium 18 MCG capsule    SPIRIVA HANDIHALER    90 capsule    Inhale contents of one capsule daily.    Chronic obstructive pulmonary disease, unspecified COPD type (H)       TYLENOL PO      Take 325 mg by mouth 6 times daily Patient takes TID with Oxycodone.        VITAMIN E PO      Take 400 Int'l Units by mouth daily

## 2017-09-01 NOTE — NURSING NOTE
"Chief Complaint   Patient presents with     RECHECK     LOV DM visit- 08/18/2016: labs completed     Recheck Medication     Discuss increasing oxycontin. Pt taking 4 a day but would like to take 5 a day.      Toe Pain     Right big toe pain occasionally at night.        Initial /68  Pulse 82  Temp 98.3  F (36.8  C) (Oral)  Resp 16  Ht 5' 3\" (1.6 m)  Wt 140 lb 11.2 oz (63.8 kg)  SpO2 92%  BMI 24.92 kg/m2 Estimated body mass index is 24.92 kg/(m^2) as calculated from the following:    Height as of this encounter: 5' 3\" (1.6 m).    Weight as of this encounter: 140 lb 11.2 oz (63.8 kg).  Medication Reconciliation: complete      Irene Carter, DAVIDA      "

## 2017-09-01 NOTE — PATIENT INSTRUCTIONS
For the toe you can ice it when sore. You can try using Aspercream with lidocaine rubbed on it also.

## 2017-09-01 NOTE — PROGRESS NOTES
SUBJECTIVE:   Celeste Chacko is a 86 year old female who presents to clinic today for the following health issues:      Diabetes Follow-up      Patient is checking blood sugars: not at all    Diabetic concerns: None     Symptoms of hypoglycemia (low blood sugar): shaky     Paresthesias (numbness or burning in feet) or sores: Yes possible right big toe.     Date of last diabetic eye exam: 2 days ago    Hyperlipidemia Follow-Up      Rate your low fat/cholesterol diet?: good    Taking statin?  Yes, leg cramps at night     Other lipid medications/supplements?:  none          Amount of exercise or physical activity: usually walking 3 times a week but not lately.     Problems taking medications regularly: No    Medication side effects: none  Diet: low salt        Other problems:   1. COPD: gradually worsening. She is regular with her inhalers  2. Chronic pain: She reports some gradual worsening of her pain. The oxycodone is not lasting the full 6 hours. She is still on for a day but is requesting increased to 5 a day.  3.       Current concerns:   She's been having pain in her right great toe at the PIP for about a year. This can vary, seems burning when at present, mostly noticeable at night when she is trying to go to sleep. Can be very sensitive with any pressure on it. It does not swell up or get red and hot.    Patient Active Problem List   Diagnosis     Disorder of bone and cartilage     Generalized osteoarthrosis, unspecified site     COPD, severe (H)     Spinal stenosis     Type 2 diabetes mellitus (HCC)     HYPERLIPIDEMIA LDL GOAL <100     History of basal cell carcinoma     Lumbago     Controlled substance agreement signed     History of actinic keratoses     Chronic pain syndrome     Narcotic dependence (H)       Current Outpatient Prescriptions   Medication Sig Dispense Refill     alendronate (FOSAMAX) 70 MG tablet Take 1 tablet (70 mg) by mouth every 7 days 12 tablet 3     oxyCODONE (ROXICODONE) 5 MG IR  tablet Take 1 tablet (5 mg) by mouth every 4 hours as needed for moderate to severe pain 120 tablet 0     pravastatin (PRAVACHOL) 80 MG tablet TAKE 1 TABLET (80 MG) BY MOUTH EVERY EVENING 90 tablet 2     glipiZIDE (GLUCOTROL) 5 MG tablet Take 1 tablet (5 mg) by mouth every morning (before breakfast) 90 tablet 1     albuterol (ALBUTEROL) 108 (90 BASE) MCG/ACT Inhaler Inhale 2 puffs into the lungs every 6 hours as needed for shortness of breath / dyspnea or wheezing 1 Inhaler 11     Multiple Vitamins-Minerals (MULTIVITAMIN ADULT PO) Take 1 tablet by mouth daily       tiotropium (SPIRIVA HANDIHALER) 18 MCG inhalation capsule Inhale contents of one capsule daily. 90 capsule 3     fluticasone-salmeterol (ADVAIR DISKUS) 250-50 MCG/DOSE diskus inhaler Inhale 1 puff into the lungs 2 times daily 3 Inhaler 3     ASPIRIN EC PO Take 81 mg by mouth daily       senna-docusate (SENOKOT-S;PERICOLACE) 8.6-50 MG per tablet Take 1 tablet by mouth 2 times daily Reported on 4/12/2017       Acetaminophen (TYLENOL PO) Take 325 mg by mouth 6 times daily Patient takes TID with Oxycodone.        calcium-vitamin D (CALTRATE) 600-400 MG-UNIT per tablet Take 1 tablet by mouth daily       Glucosamine-Chondroitin (GLUCOSAMINE CHONDROITIN COMPLX) 500-250 MG CAPS Take 1 tablet by mouth 2 times daily       Calcium Polycarbophil (FIBERCON PO) Take 1 tablet by mouth once , one in the morning and one in the evening       VITAMIN E PO Take 400 Int'l Units by mouth daily       ascorbic acid (VITAMIN C) 500 MG tablet Take 1 tablet (500 mg) by mouth daily 30 tablet      amoxicillin (AMOXIL) 500 MG capsule TAKE FOUR CAPSULES (2 GRAMS) BY MOUTH ONE HOUR BEFORE DENTAL APPOINTMENT  3     ONETOUCH DELICA LANCETS 33G MISC Reported on 4/12/2017       ONE TOUCH ULTRA test strip Reported on 4/12/2017         Social History   Substance Use Topics     Smoking status: Former Smoker     Packs/day: 0.50     Years: 54.00     Types: Cigarettes     Quit date: 4/27/2000      "Smokeless tobacco: Never Used      Comment: using nicotine gum     Alcohol use 0.5 oz/week     1 Glasses of wine per week      Comment: \"A glass of wine once a week.\"        ROS:  General: no fever, chills  Weight: stable  Vision:negative. Last eye exam 8/17.  ENT: negative  Respiratory as above.  Cardiac: no chest pain or pressure  Abdominal: no nausea, vomiting, abdominal pain, bowel changes  Vascular no complaints of claudication  Neurologic:no complaints of neuropathy  Feet no lesions, in grown nails, edema   : no polyuria, hematuria, dysuria    Objective:  Patient alert in NAD  /68  Pulse 82  Temp 98.3  F (36.8  C) (Oral)  Resp 16  Ht 5' 3\" (1.6 m)  Wt 140 lb 11.2 oz (63.8 kg)  SpO2 92%  BMI 24.92 kg/m2       Wt Readings from Last 4 Encounters:   09/01/17 140 lb 11.2 oz (63.8 kg)   07/28/17 140 lb (63.5 kg)   06/29/17 142 lb 1.6 oz (64.5 kg)   06/15/17 139 lb 1.6 oz (63.1 kg)       CV: CV: normal S1, S2 without murmur, S3 or S4.  Carotid pulses: full  LUNGS: Decreased breath sounsd, minimal wheezing  Feet: pulses full, normal capillary refill  No lesions, sores or skin changes  Nails normal  Sensation able to feel fine filament  There is minimal tenderness at the right PIP joint, there is moderate deformity present    Lab Results   Component Value Date    A1C 7.1 08/22/2017    A1C 6.7 02/21/2017    A1C 6.6 08/11/2016    A1C 6.7 02/02/2016    A1C 6.9 07/23/2015    A1C 6.8 02/12/2015    A1C 7.7 09/16/2014    A1C 7.1 08/12/2014    A1C 9.7 04/17/2014    A1C 7.0 09/27/2013     Lab Results   Component Value Date    CHOL 155 02/21/2017    HDL 64 02/21/2017    LDL 72 02/21/2017    TRIG 95 02/21/2017    CHOLHDLRATIO 3.4 07/23/2015       ASSESSMENT:   (E11.9) Type 2 diabetes mellitus without complication, without long-term current use of insulin (H)  (primary encounter diagnosis)  Comment: Well-controlled, Urine protein is very slightly elevated, will recheck next time. Blood pressure is good today.  Plan: " glipiZIDE (GLUCOTROL) 5 MG tablet, FOOT EXAM        Continue medication, recheck 6 months    (J44.9) Chronic obstructive pulmonary disease, unspecified COPD type (H)  Comment: Slowly progressive disease,   Plan: fluticasone-salmeterol (ADVAIR DISKUS) 250-50         MCG/DOSE diskus inhaler, tiotropium (SPIRIVA         HANDIHALER) 18 MCG capsule        Continue meds    (F11.20) Narcotic dependence (H)  Comment: Painful spinal stenosis, gradually worsening  Plan: We will increase her oxycodone to 5 per day    (M48.06) Spinal stenosis of lumbar region  Comment:   Plan: oxyCODONE (ROXICODONE) 5 MG IR tablet            (E78.5) Hyperlipidemia LDL goal <100  Comment: controlled  Plan: continue med    (Z23) Need for prophylactic vaccination and inoculation against influenza  Comment:   Plan: FLU VACCINE, INCREASED ANTIGEN, PRESV FREE, AGE        65+ [34346], Vaccine Administration, Initial         [78404]            Emily Marie MD  [facility]           Injectable Influenza Immunization Documentation    1.  Is the person to be vaccinated sick today?  No    2. Does the person to be vaccinated have an allergy to eggs or to a component of the vaccine?  No    3. Has the person to be vaccinated today ever had a serious reaction to influenza vaccine in the past?  No    4. Has the person to be vaccinated ever had Guillain-Elberta syndrome?  No     Form completed by  Irene Carter [facility]

## 2017-09-04 RX ORDER — GLIPIZIDE 5 MG/1
5 TABLET ORAL
Qty: 90 TABLET | Refills: 3 | Status: SHIPPED | OUTPATIENT
Start: 2017-09-04 | End: 2018-09-10

## 2017-09-04 RX ORDER — TIOTROPIUM BROMIDE 18 UG/1
CAPSULE ORAL; RESPIRATORY (INHALATION)
Qty: 90 CAPSULE | Refills: 3 | Status: SHIPPED | OUTPATIENT
Start: 2017-09-04 | End: 2018-09-07

## 2017-09-11 ENCOUNTER — TELEPHONE (OUTPATIENT)
Dept: INTERNAL MEDICINE | Facility: CLINIC | Age: 82
End: 2017-09-11

## 2017-09-11 DIAGNOSIS — M48.061 SPINAL STENOSIS OF LUMBAR REGION: ICD-10-CM

## 2017-09-11 NOTE — TELEPHONE ENCOUNTER
Reason for Call:  Medication or medication refill:    Do you use a [facility]?  Name of the pharmacy and phone number for the current request:  [facility] in [place]    Name of the medication requested: oxycodone    Other request: none    Can we leave a detailed message on this number? YES    Phone number patient can be reached at: Home number on file [phone] (home)    Best Time: any    Call taken on 9/11/2017 at 2:29 PM by Natasha Conner

## 2017-09-11 NOTE — TELEPHONE ENCOUNTER
Mail to [place], [place].  Signed CSA form in chart.    Oxycodone      Last Written Prescription Date:  8/19/17  Last Fill Quantity: 150,   # refills: 0  Last Office Visit with [facility] or  Health prescribing provider: 9/1/17  Future Office visit:       Routing refill request to provider for review/approval because:  Drug not on the [facility] or  Health refill protocol or controlled substance.

## 2017-09-12 RX ORDER — OXYCODONE HYDROCHLORIDE 5 MG/1
TABLET ORAL
Qty: 150 TABLET | Refills: 0 | Status: SHIPPED | OUTPATIENT
Start: 2017-09-12 | End: 2017-10-19

## 2017-10-18 ENCOUNTER — TELEPHONE (OUTPATIENT)
Dept: INTERNAL MEDICINE | Facility: CLINIC | Age: 82
End: 2017-10-18

## 2017-10-18 NOTE — TELEPHONE ENCOUNTER
Fax received from [facility] for review and signature.  Put in Dr. Marie's in basket.

## 2017-10-19 DIAGNOSIS — M48.062 SPINAL STENOSIS OF LUMBAR REGION WITH NEUROGENIC CLAUDICATION: Primary | ICD-10-CM

## 2017-10-19 RX ORDER — OXYCODONE HYDROCHLORIDE 5 MG/1
TABLET ORAL
Qty: 150 TABLET | Refills: 0 | Status: SHIPPED | OUTPATIENT
Start: 2017-10-19 | End: 2017-11-24

## 2017-10-19 NOTE — TELEPHONE ENCOUNTER
Prescription mail to [facility] in [place]. Spoke to patient- advised mail will go out tomorrow morning. Pt understands no questions.

## 2017-10-19 NOTE — TELEPHONE ENCOUNTER
Pt calling for refill on Oxycodone--req to be mailed to pharm.    Oxycodone      Last Written Prescription Date:  9-12-17  Last Fill Quantity: 150,   # refills: 0  Future Office visit: 9-1-17      Routing refill request to provider for review/approval because:  Drug not on the [facility] or [facility] refill protocol or controlled substance    Signed CSA form in chart.    Please advise, thanks.

## 2017-10-25 ENCOUNTER — TELEPHONE (OUTPATIENT)
Dept: ANESTHESIOLOGY | Facility: CLINIC | Age: 82
End: 2017-10-25

## 2017-10-25 NOTE — TELEPHONE ENCOUNTER
Call back to pt regarding procedure scheduling.  Pt had SI joint injection on 07/28/17 and is requesting a repeat injection.  Pt last seen in clinic 11/15/16.  RNCC will follow up with Dr. Bennett about a possible repeat injection or if pt needs to be seen in clinic for follow up.

## 2017-10-25 NOTE — LETTER
October 27, 2017      Celeste Chacko  [address]    [facility] [address]        Dear Celeste,      Enclosed are the pre-procedure instructions and scheduling information for your injection.  Please feel free to contact the clinic at any time if needed.       Thanks,  Zulema Randle, RN, BSN  RN Care Coordinator   [facility]  [phone]                                        Please call Brielle at [phone] to change or cancel your scheduled procedure. She is available Monday - Friday from 9-5:30pm, if she is unavailable to take your call, please leave her a voice message with your name, birth date, and phone number and she will call you back.      Your procedure: Bilateral sacroiliac joint injection    On the day of the procedure  1. Arrive 1 hour earlier than your scheduled time, to the [facility]  Address: [address] [place] [id number]  2. Check in on the 5th floor for your procedure    A nurse will call you 2 weeks after your procedure to follow up.    If you must reschedule your procedure more than two times, you must follow up in clinic before rescheduling again.      Patient Pre-Procedure Instructions    CAUTION - FAILURE TO FOLLOW THESE PRE-PROCEDURE INSTRUCTIONS WILL RESULT IN YOUR PROCEDURE BEING RESCHEDULED.            You MUST have a  TO TAKE YOU HOME after your procedure. Transportation by Taxi or Para-transit must have a responsible adult accompany you home (other than the ). Travel by bus or light rail is not acceptable transportation. You must provide your 's full name and contact number at time of check in.   Fasting Protocol You may have NOTHING SOLID TO EAT FOR 8 HOURS prior to arrival at the procedure area.   Broth and candy are considered solid food and require an eight hour fast.   You may have CLEAR LIQUIDS UP TO 2 HOURS prior to arrival at the clinic.   Clear liquids include water, clear  fruit juice (no pulp) carbonated beverages, ice, black coffee, black tea (no milk or cream), chewing gum (un-swallowed), and/or clear jello (no fruit or milk). No alcohol containing beverages.   Medications If you take any medications,  DO NOT STOP. Take your medications as usual the morning of your procedure with a sip of water AT LEAST 2 HOURS PRIOR TO ARRIVAL.    Antibiotics If you are currently taking antibiotics, you must complete the entire dose 7 days prior to your scheduled procedure. You must be clear of any signs or symptoms of infection. If you begin antibiotics, please contact our clinic for instructions.   Fever, Chills, or Rash If you experience a fever of higher than 100 degrees, chills, rash, or open wounds during the one week prior to your procedure, please call the clinic.         Medication Hold List  **Patients under Cardiology/Neurology care should consult their provider prior to the pain procedure to verify pre-procedure medication instructions. The information below contains general guidelines.**    Blood Thinners If you are taking daily ASPIRIN, PLAVIX, OR OTHER BLOOD THINNERS SUCH AS COUMADIN/WARFARIN, we will need your prescribing doctor to sign a release permitting you to stop these medications. Once approved by your prescribing doctor - STOP ALL BLOOD THINNERS BASED ON THE TIME TABLE BELOW PRIOR TO YOUR PROCEDURE. If you have been instructed to stop WARFARIN(COUMADIN), you must have an INR lab drawn the day before your procedure. . Your INR must be within normal limits before we can perform your injection. MEDICATIONS CAN BE RESTARTED AFTER YOUR PROCEDURE.    14 DAY HOLD  Ticlid (ticlopidine)    10 DAY HOLD  Effient (Prasugel)    3 DAY HOLD  Xarelto (rivaroxaban) 7 DAY HOLD  Anacin, Bufferin, Ecotrin, Excedrin, Aggrenox (Aspirin)  Brilinta (ticagrelor)  Coumadin (Warfarin)  Pradexa (Dabigatran)  Elmiron (Pentosan)  Plavix (Clopidogrel Bisulfate)  Pletal (Cilostazol)    24 DAY  HOLD  Lovenox (enoxaparin)  Agrylin (Anagrelide)        Non-steroidal Anti-inflammatories (NSAIDs) DO NOT TAKE any non-steroidal anti-inflammatory medications (NSAIDs) listed on the table below. MEDICATIONS CAN BE RESTARTED AFTER YOUR PROCEDURE. Celebrex is OK to take and does not need to be discontinued.     Medications to stop:  3 DAY HOLD  Advil, Motrin (Ibuprofen)  Arthrotec (diciofenac sodium/misoprostol)  Clinoril (Sulindac)  Indocin (Indomethacin)  Lodine (Etodolac)  Toradol (Ketorolac)  Vicoprofen (Hydrocodone and Ibuprofen)  Voltaren (Diclotenac)    14 DAY HOLD  Daypro (Oxaprozin)  Feldene (Piroxicam)   7 DAY HOLD  Aleve (Naproxen sodium)  Darvon compound (contains aspirin)  Naprosyn (Naproxen)  Norgesic Forte (contains aspirin)  Mobic (Meloxicam)  Oruvall (Ketoprofen)  Percodan (contains aspirin)  Relafen (Nabumetone)  Salsalate  Trilisate  Vitamin E (more than 400 mg per day)  Any medication containing aspirin                To speak with a nurse, schedule/reschedule/cancel a clinic appointment, or request a medication refill call: [phone]     You can also reach us by AudioTag: [email or web address]

## 2017-10-27 DIAGNOSIS — M46.1 SACROILIITIS (H): Primary | ICD-10-CM

## 2017-10-27 NOTE — NURSING NOTE
Pre-procedure instructions sent via mail to pt.  Address verified with pt.     Zulema Randle RN, BSN

## 2017-10-27 NOTE — TELEPHONE ENCOUNTER
Call back to pt to follow up.  Dr. Bennett is agreeable to pt repeating bilateral SI joint injection.  Pt notified that pre-procedure instructions will be mailed to her, address verified.     Zulema Randle RN, BSN

## 2017-11-10 ENCOUNTER — HOSPITAL ENCOUNTER (OUTPATIENT)
Facility: AMBULATORY SURGERY CENTER | Age: 82
End: 2017-11-10
Attending: ANESTHESIOLOGY

## 2017-11-10 ENCOUNTER — SURGERY (OUTPATIENT)
Age: 82
End: 2017-11-10

## 2017-11-10 VITALS
OXYGEN SATURATION: 96 % | TEMPERATURE: 97.1 F | WEIGHT: 144 LBS | HEART RATE: 81 BPM | DIASTOLIC BLOOD PRESSURE: 78 MMHG | HEIGHT: 63 IN | SYSTOLIC BLOOD PRESSURE: 166 MMHG | BODY MASS INDEX: 25.52 KG/M2 | RESPIRATION RATE: 16 BRPM

## 2017-11-10 RX ORDER — LIDOCAINE HYDROCHLORIDE 10 MG/ML
INJECTION, SOLUTION EPIDURAL; INFILTRATION; INTRACAUDAL; PERINEURAL PRN
Status: DISCONTINUED | OUTPATIENT
Start: 2017-11-10 | End: 2017-11-10 | Stop reason: HOSPADM

## 2017-11-10 RX ORDER — IOPAMIDOL 408 MG/ML
INJECTION, SOLUTION INTRATHECAL PRN
Status: DISCONTINUED | OUTPATIENT
Start: 2017-11-10 | End: 2017-11-10 | Stop reason: HOSPADM

## 2017-11-10 RX ADMIN — IOPAMIDOL 0.5 ML: 408 INJECTION, SOLUTION INTRATHECAL at 09:57

## 2017-11-10 RX ADMIN — LIDOCAINE HYDROCHLORIDE 16 ML: 10 INJECTION, SOLUTION EPIDURAL; INFILTRATION; INTRACAUDAL; PERINEURAL at 09:54

## 2017-11-10 NOTE — OP NOTE
Patient: Celeste Chacko Age: 86 year old   MRN: [id number] Attending: Dr. Bennett      Date of Visit: November 10, 2017        PAIN MEDICINE CLINIC PROCEDURE NOTE     ATTENDING CLINICIAN:    Elmira Bennett MD       PREPROCEDURE DIAGNOSES:  1.  Bilateral low back pain   2.  Sacroiliitis - bilateral     POSTPROCEDURE DIAGNOSES:  1.  Bilateral low back pain   2.  Sacroiliitis - bilateral        PROCEDURE(S) PERFORMED:  1.  Bilateral sacroiliac joint injection  2.  Bilateral Sacroiliac joint arthrography  3.  Fluoroscopic guidance for the above-named procedure(s)        ANESTHESIA:  Local.     BLOOD LOSS:  Minimal.     DRAINS AND SPECIMENS:  None.     COMPLICATIONS:  None.     INDICATIONS:  Celeste Chacko is a 86 year old female with a history of  chronic low back pain secondary to sacrolitis .  She has good pain relief with prior injection. The last injection was performed in 7/28/2017 by me. She had 3 months of pain relief from last injection. The patient stated that the patient was in their usual state of health and denied recent anticoagulant use or recent infections.  Therefore, the plan is to perform above mentioned procedures.      Procedure Details:  The patient was met in the procedure room, where the patient was identified by name, medical record number and date of birth.  All of the patient s last minute questions were answered. Written informed consent was obtained and saved in the electronic medical record, after the risks, benefits, and alternatives were discussed with the patient.       A formal time-out procedure was performed, as per protocol, including patient name, title of procedure, and site of procedure, and all in the room concurred.  Routine monitors were applied.       The patient was placed in the prone position on the procedure room table.  All pressure points were checked and comfortably padded.  Routine monitors were placed.  Vital signs were stable.     A chlorhexidine prep was completed  followed by sterile draping per standard procedure.      AP fluoroscopic guidance was used to identify the right SI joint(s), with slight contralateral oblique tilt, until the joint was maximally visualized.   After 1% lidocaine infiltration using a 25 gauge 1.5 inch needle, a 3.5 inch spinal needle was introduced and advanced through the anesthetized plane and advanced to the joint space.  After negative aspiration for heme, we injected 0.5 ml of Isovue contrast into SI joint. Images obtained.       After negative aspiration for heme, 2.5 mL of a treatment mixture containing 1 mL of triamcinolone (40 mg/ml) and 9 mL of bupivacaine 0.25% was injected into the right SI joint, then the needle was slightly withdrawn and 2.5 mL of the above treatment solution was injected into the periarticular space.  The needle was then removed.  The same procedure was performed on the left side.     Light pressure was held at the puncture site(s) to prevent ecchymosis and oozing.  The patient's skin was cleansed, and hemostasis was confirmed.  Band-aids were applied to the needle injection site(s).       Condition:     The patient remained awake and alert throughout the procedure.  The patient tolerated the procedure well and was monitored for approximately 15 minutes afterward in the post procedure area.  There were no immediate post procedure complications noted.  The patient was then discharged to home as per protocol.       Pre-procedure pain score: 4/10  Post-procedure pain score: 0/10

## 2017-11-10 NOTE — IP AVS SNAPSHOT
MRN:[id number]                      After Visit Summary   11/10/2017    Celeste Chacko    MRN: [id number]           Thank you!     Thank you for choosing [facility] for your care. Our goal is always to provide you with excellent care. Hearing back from our patients is one way we can continue to improve our services. Please take a few minutes to complete the written survey that you may receive in the mail after you visit with us. Thank you!        Patient Information     Date Of Birth          5/11/1931        About your hospital stay     You were admitted on:  November 10, 2017 [facility]    You were discharged on:  November 10, 2017       Who to Call     For medical emergencies, please call 911.  For non-urgent questions about your medical care, please call your primary care provider or clinic, [phone]  For questions related to your surgery, please call your surgery clinic        Attending Provider     Provider Elmira Olvera MD Anesthesiology       Primary Care Provider Office Phone # Fax #    Emily Marie [phone]      Your next 10 appointments already scheduled     Dec 21, 2017  1:00 PM CST   New Visit with Dewayne Harrison MD   [facility] ([facility])    [address] [address] [phone]              Further instructions from your care team       Home Care Instructions after a Sacroiliac Joint Injection    The sacroiliac joints lie next to the spine and connect the sacrum(the bottom of the spine) with the hip on both sides. There are two sacroiliac joints, one on the right and one on the left. Joint inflammation and/or dysfunction in this area can cause pain. In a sacroiliac joint injection, a local anesthetic (numbing medicine) is injected in or near the joint space. Steroids are often used to help with the  anti-inflammatory process.     Activity  -You may resume most normal activity levels with the exception of strenuous activity. It is important for us to know if your pain with normal activity is relieved after this injection.  -DO NOT shower for 24 hours  -DO NOT remove bandaid for 24 hours    Pain  -You may experience soreness at the injection site for one or two days  -You may use an ice pack for 20 minutes every 2 hours for the first 24 hours  -You may use a heating pad after the first 24 hours  -You may use Tylenol (acetaminophen) every 4 hours or other pain medicines as     directed by your physician    You may experience numbness radiating into your legs. This numbness may last several hours. Until sensation returns to normal; please use caution in walking, climbing stairs, and stepping out of your vehicle, etc.    DID YOU RECEIVE SEDATION TODAY?  No    If you received sedation please follow these additional safety measures.  Sedation medicine, if given, may remain active for many hours. It is important for the next 24 hours that you do not:  -Drive a car  -Operate machines or power tools  -Consume alcohol, including beer  -Sign any important papers or legal documents    DID YOU RECEIVE STEROIDS TODAY?  Yes    Common side effects of steroids:  Not everyone will experience corticosteroid side effects. If side effects are experienced, they will gradually subside in the 7-10 day period following an injection. Most common side effects include:  -Flushed face and/or chest  -Feeling of warmth, particularly in the face but could be an overall feeling of warmth  -Increased blood sugar in diabetic patients  -Menstrual irregularities my occur. If taking hormone-based birth control an alternate method of birth control is recommended  -Sleep disturbances and/or mood swings are possible  -Leg cramps      PLEASE KEEP TRACK OF YOUR SYMPTOMS AND NOTE YOUR IMPROVEMENT FOR YOUR DOCTOR.     Please contact us if you  "have:  -Severe pain  -Fever more than 101.5 degrees Fahrenheit  -Signs of infection at the injection site (redness, swelling, or drainage)    If you have questions, please contact our office at [phone] between the hours of 7:00 am and 3:00 pm Monday through Friday. After office hours you can contact the on call provider by dialing [phone]. If you need immediate attention, we recommend that you go to a hospital emergency room or dial 911.      Pending Results     Date and Time Order Name Status Description    11/10/2017 0921 XR SURGERY RENETTA FLUORO LESS THAN 5 MIN W STILLS In process             Admission Information     Date & Time Provider Department Dept. Phone    11/10/2017 Elmira Bennett MD [facility] [phone]      Your Vitals Were     Blood Pressure Pulse Temperature Respirations Height Weight    178/88 79 97.1  F (36.2  C) (Temporal) 16 1.6 m (5' 3\") 65.3 kg (144 lb)    Pulse Oximetry BMI (Body Mass Index)                98% 25.51 kg/m2          MyChart Information     [employer] is an electronic gateway that provides easy, online access to your medical records. With [employer], you can request a clinic appointment, read your test results, renew a prescription or communicate with your care team.     To sign up for [employer] visit the website at [email or web address]   You will be asked to enter the access code listed below, as well as some personal information. Please follow the directions to create your username and password.     Your access code is: [email or web address]  Expires: 2018 10:05 AM     Your access code will  in 90 days. If you need help or a new code, please contact your [facility] or call [phone] for assistance.        Care EveryWhere ID     This is your Care EveryWhere ID. This could be used by other organizations to access your [place] medical records  [id number]        Equal Access to Services     DAVID PALACIO AH: " Hadii aad ku hadbrielleo Soomaali, waaxda luqadaha, qaybta kaalmada adelydia, omar shayin hayaan danielmiller abad lajsscout ermias. So [facility] [phone].    ATENCIÓN: Si habla jake, tiene a harvye disposición servicios gratuitos de asistencia lingüística. Llame al [phone].    We comply with applicable federal civil rights laws and [place] laws. We do not discriminate on the basis of race, color, national origin, age, disability, sex, sexual orientation, or gender identity.               Review of your medicines      UNREVIEWED medicines. Ask your doctor about these medicines        Dose / Directions    albuterol 108 (90 BASE) MCG/ACT Inhaler   Commonly known as:  PROAIR HFA   Used for:  Chronic obstructive pulmonary disease, unspecified COPD type (H)        Dose:  2 puff   Inhale 2 puffs into the lungs every 6 hours as needed for shortness of breath / dyspnea or wheezing   Quantity:  1 Inhaler   Refills:  11       alendronate 70 MG tablet   Commonly known as:  FOSAMAX   Used for:  Osteopenia, unspecified location        Dose:  70 mg   Take 1 tablet (70 mg) by mouth every 7 days   Quantity:  12 tablet   Refills:  3       amoxicillin 500 MG capsule   Commonly known as:  AMOXIL        TAKE FOUR CAPSULES (2 GRAMS) BY MOUTH ONE HOUR BEFORE DENTAL APPOINTMENT   Refills:  3       ascorbic acid 500 MG tablet   Commonly known as:  VITAMIN C        Dose:  500 mg   Take 1 tablet (500 mg) by mouth daily   Quantity:  30 tablet   Refills:  0       ASPIRIN EC PO        Dose:  81 mg   Take 81 mg by mouth daily   Refills:  0       calcium-vitamin D 600-400 MG-UNIT per tablet   Commonly known as:  CALTRATE        Dose:  1 tablet   Take 1 tablet by mouth daily   Refills:  0       FIBERCON PO        Dose:  1 tablet   Take 1 tablet by mouth once , one in the morning and one in the evening   Refills:  0       fluticasone-salmeterol 250-50 MCG/DOSE diskus inhaler   Commonly known as:  ADVAIR DISKUS   Used for:  Chronic obstructive pulmonary  disease, unspecified COPD type (H)        Dose:  1 puff   Inhale 1 puff into the lungs 2 times daily   Quantity:  3 Inhaler   Refills:  3       glipiZIDE 5 MG tablet   Commonly known as:  GLUCOTROL   Used for:  Type 2 diabetes mellitus without complication, without long-term current use of insulin (H)        Dose:  5 mg   Take 1 tablet (5 mg) by mouth every morning (before breakfast)   Quantity:  90 tablet   Refills:  3       Glucosamine-Chondroitin 500-250 MG Caps   Commonly known as:  GLUCOSAMINE CHONDROITIN COMPLX   Used for:  Generalized osteoarthrosis, unspecified site        Dose:  1 tablet   Take 1 tablet by mouth 2 times daily   Refills:  0       MULTIVITAMIN ADULT PO        Dose:  1 tablet   Take 1 tablet by mouth daily   Refills:  0       oxyCODONE IR 5 MG tablet   Commonly known as:  ROXICODONE   Used for:  Spinal stenosis of lumbar region with neurogenic claudication        May take 5 tabs per day   Quantity:  150 tablet   Refills:  0       pravastatin 80 MG tablet   Commonly known as:  PRAVACHOL   Used for:  Hyperlipidemia LDL goal <100        TAKE 1 TABLET (80 MG) BY MOUTH EVERY EVENING   Quantity:  90 tablet   Refills:  2       senna-docusate 8.6-50 MG per tablet   Commonly known as:  SENOKOT-S;PERICOLACE        Dose:  1 tablet   Take 1 tablet by mouth 2 times daily Reported on 4/12/2017   Refills:  0       tiotropium 18 MCG capsule   Commonly known as:  SPIRIVA HANDIHALER   Used for:  Chronic obstructive pulmonary disease, unspecified COPD type (H)        Inhale contents of one capsule daily.   Quantity:  90 capsule   Refills:  3       TYLENOL PO        Dose:  325 mg   Take 325 mg by mouth 6 times daily Patient takes TID with Oxycodone.   Refills:  0       VITAMIN E PO        Dose:  400 Int'l Units   Take 400 Int'l Units by mouth daily   Refills:  0         CONTINUE these medicines which have NOT CHANGED        Dose / Directions    ONETOUCH DELICA LANCETS 33G Misc        Reported on 4/12/2017    Refills:  0       ONETOUCH ULTRA test strip   Generic drug:  blood glucose monitoring        Reported on 4/12/2017   Refills:  0                Protect others around you: Learn how to safely use, store and throw away your medicines at www.disposemymeds.org.             Medication List: This is a list of all your medications and when to take them. Check marks below indicate your daily home schedule. Keep this list as a reference.      Medications           Morning Afternoon Evening Bedtime As Needed    albuterol 108 (90 BASE) MCG/ACT Inhaler   Commonly known as:  PROAIR HFA   Inhale 2 puffs into the lungs every 6 hours as needed for shortness of breath / dyspnea or wheezing                                alendronate 70 MG tablet   Commonly known as:  FOSAMAX   Take 1 tablet (70 mg) by mouth every 7 days                                amoxicillin 500 MG capsule   Commonly known as:  AMOXIL   TAKE FOUR CAPSULES (2 GRAMS) BY MOUTH ONE HOUR BEFORE DENTAL APPOINTMENT                                ascorbic acid 500 MG tablet   Commonly known as:  VITAMIN C   Take 1 tablet (500 mg) by mouth daily                                ASPIRIN EC PO   Take 81 mg by mouth daily                                calcium-vitamin D 600-400 MG-UNIT per tablet   Commonly known as:  CALTRATE   Take 1 tablet by mouth daily                                FIBERCON PO   Take 1 tablet by mouth once , one in the morning and one in the evening                                fluticasone-salmeterol 250-50 MCG/DOSE diskus inhaler   Commonly known as:  ADVAIR DISKUS   Inhale 1 puff into the lungs 2 times daily                                glipiZIDE 5 MG tablet   Commonly known as:  GLUCOTROL   Take 1 tablet (5 mg) by mouth every morning (before breakfast)                                Glucosamine-Chondroitin 500-250 MG Caps   Commonly known as:  GLUCOSAMINE CHONDROITIN COMPLX   Take 1 tablet by mouth 2 times daily                                 MULTIVITAMIN ADULT PO   Take 1 tablet by mouth daily                                ONETOUCH DELICA LANCETS 33G Misc   Reported on 4/12/2017                                ONETOUCH ULTRA test strip   Reported on 4/12/2017   Generic drug:  blood glucose monitoring                                oxyCODONE IR 5 MG tablet   Commonly known as:  ROXICODONE   May take 5 tabs per day                                pravastatin 80 MG tablet   Commonly known as:  PRAVACHOL   TAKE 1 TABLET (80 MG) BY MOUTH EVERY EVENING                                senna-docusate 8.6-50 MG per tablet   Commonly known as:  SENOKOT-S;PERICOLACE   Take 1 tablet by mouth 2 times daily Reported on 4/12/2017                                tiotropium 18 MCG capsule   Commonly known as:  SPIRIVA HANDIHALER   Inhale contents of one capsule daily.                                TYLENOL PO   Take 325 mg by mouth 6 times daily Patient takes TID with Oxycodone.                                VITAMIN E PO   Take 400 Int'l Units by mouth daily

## 2017-11-10 NOTE — DISCHARGE INSTRUCTIONS
Home Care Instructions after a Sacroiliac Joint Injection    The sacroiliac joints lie next to the spine and connect the sacrum(the bottom of the spine) with the hip on both sides. There are two sacroiliac joints, one on the right and one on the left. Joint inflammation and/or dysfunction in this area can cause pain. In a sacroiliac joint injection, a local anesthetic (numbing medicine) is injected in or near the joint space. Steroids are often used to help with the anti-inflammatory process.     Activity  -You may resume most normal activity levels with the exception of strenuous activity. It is important for us to know if your pain with normal activity is relieved after this injection.  -DO NOT shower for 24 hours  -DO NOT remove bandaid for 24 hours    Pain  -You may experience soreness at the injection site for one or two days  -You may use an ice pack for 20 minutes every 2 hours for the first 24 hours  -You may use a heating pad after the first 24 hours  -You may use Tylenol (acetaminophen) every 4 hours or other pain medicines as     directed by your physician    You may experience numbness radiating into your legs. This numbness may last several hours. Until sensation returns to normal; please use caution in walking, climbing stairs, and stepping out of your vehicle, etc.    DID YOU RECEIVE SEDATION TODAY?  No    If you received sedation please follow these additional safety measures.  Sedation medicine, if given, may remain active for many hours. It is important for the next 24 hours that you do not:  -Drive a car  -Operate machines or power tools  -Consume alcohol, including beer  -Sign any important papers or legal documents    DID YOU RECEIVE STEROIDS TODAY?  Yes    Common side effects of steroids:  Not everyone will experience corticosteroid side effects. If side effects are experienced, they will gradually subside in the 7-10 day period following an injection. Most common side effects  include:  -Flushed face and/or chest  -Feeling of warmth, particularly in the face but could be an overall feeling of warmth  -Increased blood sugar in diabetic patients  -Menstrual irregularities my occur. If taking hormone-based birth control an alternate method of birth control is recommended  -Sleep disturbances and/or mood swings are possible  -Leg cramps      PLEASE KEEP TRACK OF YOUR SYMPTOMS AND NOTE YOUR IMPROVEMENT FOR YOUR DOCTOR.     Please contact us if you have:  -Severe pain  -Fever more than 101.5 degrees Fahrenheit  -Signs of infection at the injection site (redness, swelling, or drainage)    If you have questions, please contact our office at [phone] between the hours of 7:00 am and 3:00 pm Monday through Friday. After office hours you can contact the on call provider by dialing [phone]. If you need immediate attention, we recommend that you go to a hospital emergency room or dial [phone].

## 2017-11-10 NOTE — H&P
"Abbreviated History and Physical    Patient Name: Celeste Chacko  YOB: 1931    Reason for Procedure: back pain    History:    Past Medical History:   Diagnosis Date     Chronic airway obstruction, not elsewhere classified      Complete rupture of rotator cuff      Disorder of bone and cartilage, unspecified      History of blood transfusion      Mixed hyperlipidemia 4/00     Osteoarthritis      Personal history of other malignant neoplasm of skin      Spinal stenosis      Type II or unspecified type diabetes mellitus without mention of complication, not stated as uncontrolled        History of obstructive sleep apnea? no    History of problems with sedation? no    Physical exam:  /78  Pulse 81  Temp 97.1  F (36.2  C) (Temporal)  Resp 16  Ht 1.6 m (5' 3\")  Wt 65.3 kg (144 lb)  SpO2 96%  BMI 25.51 kg/m2  General: in no apparent distress   Neuro: At least antigravity strength noted in all 4 extremities  Respiratory: Clear to ausculation bilaterally   Cardiac: Regular rate and rhythm  Skin: No rashes or lesions on exposed areas of skin    Medications:   Current Outpatient Prescriptions   Medication     oxyCODONE (ROXICODONE) 5 MG IR tablet     fluticasone-salmeterol (ADVAIR DISKUS) 250-50 MCG/DOSE diskus inhaler     glipiZIDE (GLUCOTROL) 5 MG tablet     pravastatin (PRAVACHOL) 80 MG tablet     albuterol (ALBUTEROL) 108 (90 BASE) MCG/ACT Inhaler     Multiple Vitamins-Minerals (MULTIVITAMIN ADULT PO)     ASPIRIN EC PO     senna-docusate (SENOKOT-S;PERICOLACE) 8.6-50 MG per tablet     Acetaminophen (TYLENOL PO)     calcium-vitamin D (CALTRATE) 600-400 MG-UNIT per tablet     Glucosamine-Chondroitin (GLUCOSAMINE CHONDROITIN COMPLX) 500-250 MG CAPS     Calcium Polycarbophil (FIBERCON PO)     VITAMIN E PO     ascorbic acid (VITAMIN C) 500 MG tablet     tiotropium (SPIRIVA HANDIHALER) 18 MCG capsule     alendronate (FOSAMAX) 70 MG tablet     amoxicillin (AMOXIL) 500 MG capsule     ONETOUCH DELICA " LANCETS 33G MISC     ONE TOUCH ULTRA test strip     No current facility-administered medications for this encounter.        Allergies:     Allergies   Allergen Reactions     Sulfamethoxazole Anaphylaxis and Hives       ASA Classification: 2    OK for local anesthetic use.     Elmira Bennett MD  Pain Medicine  [facility] Department of Anesthesia  11/10/2017

## 2017-11-10 NOTE — IP AVS SNAPSHOT
[facility]    [address] [place] [id number]    Phone:  [phone]    Fax:  [phone]                                       After Visit Summary   11/10/2017    Celeste Chacko    MRN: [id number]           After Visit Summary Signature Page     I have received my discharge instructions, and my questions have been answered. I have discussed any challenges I see with this plan with the nurse or doctor.    ..........................................................................................................................................  Patient/Patient Representative Signature      ..........................................................................................................................................  Patient Representative Print Name and Relationship to Patient    ..................................................               ................................................  Date                                            Time    ..........................................................................................................................................  Reviewed by Signature/Title    ...................................................              ..............................................  Date                                                            Time

## 2017-11-24 ENCOUNTER — TELEPHONE (OUTPATIENT)
Dept: INTERNAL MEDICINE | Facility: CLINIC | Age: 82
End: 2017-11-24

## 2017-11-24 DIAGNOSIS — M48.062 SPINAL STENOSIS OF LUMBAR REGION WITH NEUROGENIC CLAUDICATION: ICD-10-CM

## 2017-11-24 NOTE — TELEPHONE ENCOUNTER
Reason for Call:  Medication or medication refill:    Do you use a [facility]?  Name of the pharmacy and phone number for the current request:  [id number] [address] [place] - [phone] Fax [phone]    Name of the medication requested: oxyCODONE (ROXICODONE) 5 MG IR tablet    Other request: Please mail Rx for above medication to [facility] in [place]    Can we leave a detailed message on this number? YES    Phone number patient can be reached at: Home number on file [phone] (home)    Best Time: any    Call taken on 11/24/2017 at 11:02 AM by Margarita Tamayo

## 2017-11-24 NOTE — TELEPHONE ENCOUNTER
Mail to [facility] pharmacy    oxycodone      Last Office Visit:    Last Written Prescription Date:  140/19/17  Last Fill Quantity: 150,   # refills: 0  Future Office visit:       Routing refill request to provider for review/approval because:  Drug not on the [facility], P or [facility] refill protocol or controlled substance

## 2017-11-27 NOTE — TELEPHONE ENCOUNTER
Pt calling.  States she will be out of med on 12-1-17.    Routed to partner.    Please advise, thanks.

## 2017-11-28 NOTE — TELEPHONE ENCOUNTER
PCP not in office today.  Routed to partner d/t pt will be out of med on 12-1-17.    Please advise, thanks.

## 2017-11-30 RX ORDER — OXYCODONE HYDROCHLORIDE 5 MG/1
TABLET ORAL
Qty: 150 TABLET | Refills: 0 | Status: SHIPPED | OUTPATIENT
Start: 2017-11-30 | End: 2017-12-26

## 2017-11-30 NOTE — TELEPHONE ENCOUNTER
Spoke to patient- advised Dr Cynthia holt for her to  at . Pt agrees with plan,. Will  on Friday.

## 2017-12-21 ENCOUNTER — TELEPHONE (OUTPATIENT)
Dept: INTERNAL MEDICINE | Facility: CLINIC | Age: 82
End: 2017-12-21

## 2017-12-21 ENCOUNTER — OFFICE VISIT (OUTPATIENT)
Dept: DERMATOLOGY | Facility: CLINIC | Age: 82
End: 2017-12-21
Payer: COMMERCIAL

## 2017-12-21 VITALS
WEIGHT: 144 LBS | SYSTOLIC BLOOD PRESSURE: 145 MMHG | HEIGHT: 63 IN | HEART RATE: 82 BPM | DIASTOLIC BLOOD PRESSURE: 73 MMHG | BODY MASS INDEX: 25.52 KG/M2 | OXYGEN SATURATION: 97 %

## 2017-12-21 DIAGNOSIS — L82.1 SK (SEBORRHEIC KERATOSIS): ICD-10-CM

## 2017-12-21 DIAGNOSIS — D18.00 ANGIOMA: ICD-10-CM

## 2017-12-21 DIAGNOSIS — Z85.828 HISTORY OF SKIN CANCER: Primary | ICD-10-CM

## 2017-12-21 DIAGNOSIS — L81.4 LENTIGO: ICD-10-CM

## 2017-12-21 DIAGNOSIS — M48.062 SPINAL STENOSIS OF LUMBAR REGION WITH NEUROGENIC CLAUDICATION: ICD-10-CM

## 2017-12-21 PROCEDURE — 99203 OFFICE O/P NEW LOW 30 MIN: CPT | Performed by: DERMATOLOGY

## 2017-12-21 ASSESSMENT — PAIN SCALES - GENERAL: PAINLEVEL: NO PAIN (0)

## 2017-12-21 NOTE — TELEPHONE ENCOUNTER
Reason for Call:  Medication or medication refill:    Do you use a [facility]?  Name of the pharmacy and phone number for the current request:  [facility] [id number] [facility] - [phone]    Name of the medication requested: oxycodone    Other request: has 10 day supply, calling in early due to holiday, please mail RX    Can we leave a detailed message on this number? YES    Phone number patient can be reached at: Home number on file [phone] (home)    Best Time: any    Call taken on 12/21/2017 at 1:36 PM by Linnette Oswald

## 2017-12-21 NOTE — PROGRESS NOTES
Celeste Chacko is a 86 year old year old female patient here today for hx of non-melanoma skin cancer.  She notes browm spots on neck.  .  Patient states this has been present for a while.  Patient reports the following symptoms:  none .  Patient reports the following previous treatments none.  Patient reports the following modifying factors none.  Associated symptoms: none.  Patient has no other skin complaints today.  Remainder of the HPI, Meds, PMH, Allergies, FH, and SH was reviewed in chart.    Pertinent Hx:   Non-melanoma skin cancer   Past Medical History:   Diagnosis Date     Chronic airway obstruction, not elsewhere classified      Complete rupture of rotator cuff      Disorder of bone and cartilage, unspecified      History of blood transfusion      Mixed hyperlipidemia 4/00     Osteoarthritis      Personal history of other malignant neoplasm of skin      Spinal stenosis      Type II or unspecified type diabetes mellitus without mention of complication, not stated as uncontrolled        Past Surgical History:   Procedure Laterality Date     ARTHROPLASTY KNEE Left 9/15/2014    Procedure: ARTHROPLASTY KNEE;  Surgeon: Carlos Alberto Kaminski MD;  Location: [facility] OR     C NONSPECIFIC PROCEDURE      Breast biopsies      C NONSPECIFIC PROCEDURE      Pilonidal sinus repair      C NONSPECIFIC PROCEDURE      T & A      C NONSPECIFIC PROCEDURE  5/02    Shoulder arthropasty     C NONSPECIFIC PROCEDURE  5/07    Right shoulder replacement, RCT repair     HEAD & NECK SURGERY  05/14/15    forehead-skin cancer removed     INJECT EPIDURAL LUMBAR / SACRAL SINGLE Left 7/14/2016    Procedure: INJECT EPIDURAL LUMBAR / SACRAL SINGLE;  Surgeon: Luisito New MD;  Location:  OR     INJECT SACROILIAC JOINT N/A 12/1/2015    Procedure: INJECT SACROILIAC JOINT;  Surgeon: Luisito New MD;  Location:  GI     INJECT SACROILIAC JOINT Bilateral 5/19/2016    Procedure: INJECT SACROILIAC JOINT;  Surgeon: Luisito New MD;  Location:   "OR     INJECT SACROILIAC JOINT Bilateral 11/25/2016    Procedure: INJECT SACROILIAC JOINT;  Surgeon: Elmira Bennett MD;  Location: [facility] OR     INJECT SACROILIAC JOINT Bilateral 4/25/2017    Procedure: INJECT SACROILIAC JOINT;  Bilateral Sacroiliac Joint Injection   Injection of local anesthetic and steroid into area around spine for pain relief;  Surgeon: Alvaro Holland MD;  Location: [facility] OR     INJECT SACROILIAC JOINT Bilateral 7/28/2017    Procedure: INJECT SACROILIAC JOINT;  Bilateral Sacroiliac Joint Injection;  Surgeon: Elmira Bennett MD;  Location: [facility] OR     INJECT SACROILIAC JOINT Bilateral 11/10/2017    Procedure: INJECT SACROILIAC JOINT;  Bilateral Sacroiliac Joint Injection;  Surgeon: Elmira Bennett MD;  Location: [facility] OR        Family History   Problem Relation Age of Onset     Arthritis Father      DIABETES Brother      CANCER Brother      breast     CEREBROVASCULAR DISEASE Brother        Social History     Social History     Marital status: Single     Spouse name: N/A     Number of children: N/A     Years of education: N/A     Occupational History     Not on file.     Social History Main Topics     Smoking status: Former Smoker     Packs/day: 0.50     Years: 54.00     Types: Cigarettes     Quit date: 4/27/2000     Smokeless tobacco: Never Used      Comment: using nicotine gum     Alcohol use 0.5 oz/week     1 Glasses of wine per week      Comment: \"A glass of wine once a week.\"     Drug use: No     Sexual activity: Not Currently     Other Topics Concern     Not on file     Social History Narrative       Outpatient Encounter Prescriptions as of 12/21/2017   Medication Sig Dispense Refill     oxyCODONE IR (ROXICODONE) 5 MG tablet May take 5 tabs per day 150 tablet 0     fluticasone-salmeterol (ADVAIR DISKUS) 250-50 MCG/DOSE diskus inhaler Inhale 1 puff into the lungs 2 times daily 3 Inhaler 3     tiotropium (SPIRIVA HANDIHALER) 18 MCG capsule Inhale contents of one capsule daily. 90 capsule 3     " glipiZIDE (GLUCOTROL) 5 MG tablet Take 1 tablet (5 mg) by mouth every morning (before breakfast) 90 tablet 3     alendronate (FOSAMAX) 70 MG tablet Take 1 tablet (70 mg) by mouth every 7 days 12 tablet 3     pravastatin (PRAVACHOL) 80 MG tablet TAKE 1 TABLET (80 MG) BY MOUTH EVERY EVENING 90 tablet 2     albuterol (ALBUTEROL) 108 (90 BASE) MCG/ACT Inhaler Inhale 2 puffs into the lungs every 6 hours as needed for shortness of breath / dyspnea or wheezing 1 Inhaler 11     Multiple Vitamins-Minerals (MULTIVITAMIN ADULT PO) Take 1 tablet by mouth daily       amoxicillin (AMOXIL) 500 MG capsule TAKE FOUR CAPSULES (2 GRAMS) BY MOUTH ONE HOUR BEFORE DENTAL APPOINTMENT  3     ASPIRIN EC PO Take 81 mg by mouth daily       senna-docusate (SENOKOT-S;PERICOLACE) 8.6-50 MG per tablet Take 1 tablet by mouth 2 times daily Reported on 4/12/2017       Acetaminophen (TYLENOL PO) Take 325 mg by mouth 6 times daily Patient takes TID with Oxycodone.        ONETOUCH DELICA LANCETS 33G MISC Reported on 4/12/2017       ONE TOUCH ULTRA test strip Reported on 4/12/2017       calcium-vitamin D (CALTRATE) 600-400 MG-UNIT per tablet Take 1 tablet by mouth daily       Glucosamine-Chondroitin (GLUCOSAMINE CHONDROITIN COMPLX) 500-250 MG CAPS Take 1 tablet by mouth 2 times daily       Calcium Polycarbophil (FIBERCON PO) Take 1 tablet by mouth once , one in the morning and one in the evening       VITAMIN E PO Take 400 Int'l Units by mouth daily       ascorbic acid (VITAMIN C) 500 MG tablet Take 1 tablet (500 mg) by mouth daily 30 tablet      No facility-administered encounter medications on file as of 12/21/2017.              Review Of Systems  Skin: As above  Eyes: negative  Ears/Nose/Throat: negative  Respiratory: No shortness of breath, dyspnea on exertion, cough, or hemoptysis  Cardiovascular: negative  Gastrointestinal: negative  Genitourinary: negative  Musculoskeletal: negative  Neurologic: negative  Psychiatric:  "negative  Hematologic/Lymphatic/Immunologic: negative  Endocrine: negative      O:   NAD, WDWN, Alert & Oriented, Mood & Affect wnl, Vitals stable   Here today alone   /73  Pulse 82  Ht 1.6 m (5' 3\")  Wt 65.3 kg (144 lb)  SpO2 97%  BMI 25.51 kg/m2   General appearance normal   Vitals stable   Alert, oriented and in no acute distress      Following lymph nodes palpated: Occipital, Cervical, Supraclavicular no lad   Stuck on papules and brown macules on trunk and ext    Red papules on trunk           The remainder of the full exam was unremarkable; the following areas were examined:  conjunctiva/lids, oral mucosa, neck, peripheral vascular system, abdomen, lymph nodes, digits/nails, eccrine and apocrine glands, scalp/hair, face, neck, chest, abdomen, buttocks, back, RUE, LUE, RLE, LLE       Eyes: Conjunctivae/lids:Normal     ENT: Lips, buccal mucosa, tongue: normal    MSK:Normal    Cardiovascular: peripheral edema none    Pulm: Breathing Normal    Lymph Nodes: No Head and Neck Lymphadenopathy     Neuro/Psych: Orientation:Normal; Mood/Affect:Normal      A/P:  1. Hx of non-melanoma skin cancer, seborrheic keratosis, lentigo, angioma  BENIGN LESIONS DISCUSSED WITH PATIENT:  I discussed the specifics of tumor, prognosis, and genetics of benign lesions.  I explained that treatment of these lesions would be purely cosmetic and not medically neccessary.  I discussed with patient different removal options including excision, cautery and /or laser.      Nature and genetics of benign skin lesions dicussed with patient.  Signs and Symptoms of skin cancer discussed with patient.  Patient encouraged to perform monthly skin exams.  UV precautions reviewed with patient.  Skin care regimen reviewed with patient: Eliminate harsh soaps, i.e. Dial, zest, irsih spring; Mild soaps such as Cetaphil or Dove sensitive skin, avoid hot or cold showers, aggressive use of emollients including vanicream, cetaphil or cerave discussed " with patient.    Risks of non-melanoma skin cancer discussed with patient   Return to clinic 12 months

## 2017-12-21 NOTE — MR AVS SNAPSHOT
"              After Visit Summary   2017    Celeste Chacko    MRN: [id number]           Patient Information     Date Of Birth          1931        Visit Information        Provider Department      2017 1:00 PM Dewayne Harrison MD [facility]        Today's Diagnoses     History of skin cancer    -  1    Lentigo        SK (seborrheic keratosis)        Angioma           Follow-ups after your visit        Who to contact     If you have questions or need follow up information about today's clinic visit or your schedule please contact [facility] directly at [phone].  Normal or non-critical lab and imaging results will be communicated to you by evolsohart, letter or phone within 4 business days after the clinic has received the results. If you do not hear from us within 7 days, please contact the clinic through evolsohart or phone. If you have a critical or abnormal lab result, we will notify you by phone as soon as possible.  Submit refill requests through Cell[employer] or call your pharmacy and they will forward the refill request to us. Please allow 3 business days for your refill to be completed.          Additional Information About Your Visit        MyChart Information     [employer] lets you send messages to your doctor, view your test results, renew your prescriptions, schedule appointments and more. To sign up, go to [email or web address] . Click on \"Log in\" on the left side of the screen, which will take you to the Welcome page. Then click on \"Sign up Now\" on the right side of the page.     You will be asked to enter the access code listed below, as well as some personal information. Please follow the directions to create your username and password.     Your access code is: [email or web address]  Expires: 2018 10:05 AM     Your access code will  in 90 days. If you need help or a new code, please call your [facility] or [phone].      " "  Care EveryWhere ID     This is your Care EveryWhere ID. This could be used by other organizations to access your [facility] medical records  [id number]        Your Vitals Were     Pulse Height Pulse Oximetry BMI (Body Mass Index)          82 1.6 m (5' 3\") 97% 25.51 kg/m2         Blood Pressure from Last 3 Encounters:   12/21/17 145/73   11/10/17 166/78   09/01/17 136/68    Weight from Last 3 Encounters:   12/21/17 65.3 kg (144 lb)   11/10/17 65.3 kg (144 lb)   09/01/17 63.8 kg (140 lb 11.2 oz)              Today, you had the following     No orders found for display       Primary Care Provider Office Phone # Fax #    Emily Marie [phone]       [address]  [facility] [address]        Equal Access to Services     [facility]: Hadii aad ku hadasho Soomaali, waaxda luqadaha, qaybta kaalmada adeegyada, omar ervin haybinta chao . So [facility] [phone].    ATENCIÓN: Si habla español, tiene a harvey disposición servicios gratuitos de asistencia lingüística. Sherryame al [phone].    We comply with applicable federal civil rights laws and [place] laws. We do not discriminate on the basis of race, color, national origin, age, disability, sex, sexual orientation, or gender identity.            Thank you!     Thank you for choosing [facility]  for your care. Our goal is always to provide you with excellent care. Hearing back from our patients is one way we can continue to improve our services. Please take a few minutes to complete the written survey that you may receive in the mail after your visit with us. Thank you!             Your Updated Medication List - Protect others around you: Learn how to safely use, store and throw away your medicines at www.disposemymeds.org.          This list is accurate as of: 12/21/17  1:31 PM.  Always use your most recent med list.                   Brand Name Dispense Instructions for use Diagnosis    albuterol 108 (90 " BASE) MCG/ACT Inhaler    PROAIR HFA    1 Inhaler    Inhale 2 puffs into the lungs every 6 hours as needed for shortness of breath / dyspnea or wheezing    Chronic obstructive pulmonary disease, unspecified COPD type (H)       alendronate 70 MG tablet    FOSAMAX    12 tablet    Take 1 tablet (70 mg) by mouth every 7 days    Osteopenia, unspecified location       amoxicillin 500 MG capsule    AMOXIL     TAKE FOUR CAPSULES (2 GRAMS) BY MOUTH ONE HOUR BEFORE DENTAL APPOINTMENT        ascorbic acid 500 MG tablet    VITAMIN C    30 tablet    Take 1 tablet (500 mg) by mouth daily        ASPIRIN EC PO      Take 81 mg by mouth daily        calcium-vitamin D 600-400 MG-UNIT per tablet    CALTRATE     Take 1 tablet by mouth daily        FIBERCON PO      Take 1 tablet by mouth once , one in the morning and one in the evening        fluticasone-salmeterol 250-50 MCG/DOSE diskus inhaler    ADVAIR DISKUS    3 Inhaler    Inhale 1 puff into the lungs 2 times daily    Chronic obstructive pulmonary disease, unspecified COPD type (H)       glipiZIDE 5 MG tablet    GLUCOTROL    90 tablet    Take 1 tablet (5 mg) by mouth every morning (before breakfast)    Type 2 diabetes mellitus without complication, without long-term current use of insulin (H)       Glucosamine-Chondroitin 500-250 MG Caps    GLUCOSAMINE CHONDROITIN COMPLX     Take 1 tablet by mouth 2 times daily    Generalized osteoarthrosis, unspecified site       MULTIVITAMIN ADULT PO      Take 1 tablet by mouth daily        ONETOUCH DELICA LANCETS 33G Misc      Reported on 4/12/2017        ONETOUCH ULTRA test strip   Generic drug:  blood glucose monitoring      Reported on 4/12/2017        oxyCODONE IR 5 MG tablet    ROXICODONE    150 tablet    May take 5 tabs per day    Spinal stenosis of lumbar region with neurogenic claudication       pravastatin 80 MG tablet    PRAVACHOL    90 tablet    TAKE 1 TABLET (80 MG) BY MOUTH EVERY EVENING    Hyperlipidemia LDL goal <100        senna-docusate 8.6-50 MG per tablet    SENOKOT-S;PERICOLACE     Take 1 tablet by mouth 2 times daily Reported on 4/12/2017        tiotropium 18 MCG capsule    SPIRIVA HANDIHALER    90 capsule    Inhale contents of one capsule daily.    Chronic obstructive pulmonary disease, unspecified COPD type (H)       TYLENOL PO      Take 325 mg by mouth 6 times daily Patient takes TID with Oxycodone.        VITAMIN E PO      Take 400 Int'l Units by mouth daily

## 2017-12-21 NOTE — NURSING NOTE
"Chief Complaint   Patient presents with     Derm Problem     skin check       Initial /73  Pulse 82  Ht 1.6 m (5' 3\")  Wt 65.3 kg (144 lb)  SpO2 97%  BMI 25.51 kg/m2 Estimated body mass index is 25.51 kg/(m^2) as calculated from the following:    Height as of this encounter: 1.6 m (5' 3\").    Weight as of this encounter: 65.3 kg (144 lb).  Medication Reconciliation: complete    "

## 2017-12-21 NOTE — LETTER
12/21/2017         RE: Celeste Chacko  [address]    [place] [address]        Dear Colleague,    [facility] you [facility] Please see a copy of my visit note below.    Celeste Chacko is a 86 year old year old female patient here today for hx of non-melanoma skin cancer.  She notes browm spots on neck.  .  Patient states this has been present for a while.  Patient reports the following symptoms:  none .  Patient reports the following previous treatments none.  Patient reports the following modifying factors none.  Associated symptoms: none.  Patient has no other skin complaints today.  Remainder of the HPI, Meds, PMH, Allergies, FH, and SH was reviewed in chart.    Pertinent Hx:   Non-melanoma skin cancer   Past Medical History:   Diagnosis Date     Chronic airway obstruction, not elsewhere classified      Complete rupture of rotator cuff      Disorder of bone and cartilage, unspecified      History of blood transfusion      Mixed hyperlipidemia 4/00     Osteoarthritis      Personal history of other malignant neoplasm of skin      Spinal stenosis      Type II or unspecified type diabetes mellitus without mention of complication, not stated as uncontrolled        Past Surgical History:   Procedure Laterality Date     ARTHROPLASTY KNEE Left 9/15/2014    Procedure: ARTHROPLASTY KNEE;  Surgeon: Carlos Alberto Kaminski MD;  Location: [facility] OR     C NONSPECIFIC PROCEDURE      Breast biopsies      C NONSPECIFIC PROCEDURE      Pilonidal sinus repair      C NONSPECIFIC PROCEDURE      T & A      C NONSPECIFIC PROCEDURE  5/02    Shoulder arthropasty     C NONSPECIFIC PROCEDURE  5/07    Right shoulder replacement, RCT repair     HEAD & NECK SURGERY  05/14/15    forehead-skin cancer removed     INJECT EPIDURAL LUMBAR / SACRAL SINGLE Left 7/14/2016    Procedure: INJECT EPIDURAL LUMBAR / SACRAL SINGLE;  Surgeon: Luisito New MD;  Location:  "[facility] OR     INJECT SACROILIAC JOINT N/A 12/1/2015    Procedure: INJECT SACROILIAC JOINT;  Surgeon: Luisito New MD;  Location: [facility]     INJECT SACROILIAC JOINT Bilateral 5/19/2016    Procedure: INJECT SACROILIAC JOINT;  Surgeon: Luisito New MD;  [facility]     INJECT SACROILIAC JOINT Bilateral 11/25/2016    Procedure: INJECT SACROILIAC JOINT;  Surgeon: Elmira Bennett MD;  [facility]     INJECT SACROILIAC JOINT Bilateral 4/25/2017    Procedure: INJECT SACROILIAC JOINT;  Bilateral Sacroiliac Joint Injection   Injection of local anesthetic and steroid into area around spine for pain relief;  Surgeon: Alvaro Holland MD;  [facility]     INJECT SACROILIAC JOINT Bilateral 7/28/2017    Procedure: INJECT SACROILIAC JOINT;  Bilateral Sacroiliac Joint Injection;  Surgeon: Elmira Bennett MD;  [facility]     INJECT SACROILIAC JOINT Bilateral 11/10/2017    Procedure: INJECT SACROILIAC JOINT;  Bilateral Sacroiliac Joint Injection;  Surgeon: Elmira Bennett MD;  [facility]        Family History   Problem Relation Age of Onset     Arthritis Father      DIABETES Brother      CANCER Brother      breast     CEREBROVASCULAR DISEASE Brother        Social History     Social History     Marital status: Single     Spouse name: N/A     Number of children: N/A     Years of education: N/A     Occupational History     Not on file.     Social History Main Topics     Smoking status: Former Smoker     Packs/day: 0.50     Years: 54.00     Types: Cigarettes     Quit date: 4/27/2000     Smokeless tobacco: Never Used      Comment: using nicotine gum     Alcohol use 0.5 oz/week     1 Glasses of wine per week      Comment: \"A glass of wine once a week.\"     Drug use: No     Sexual activity: Not Currently     Other Topics Concern     Not on file     Social History Narrative       Outpatient Encounter Prescriptions as of 12/21/2017   Medication Sig Dispense Refill     oxyCODONE IR (ROXICODONE) 5 MG tablet May take 5 " tabs per day 150 tablet 0     fluticasone-salmeterol (ADVAIR DISKUS) 250-50 MCG/DOSE diskus inhaler Inhale 1 puff into the lungs 2 times daily 3 Inhaler 3     tiotropium (SPIRIVA HANDIHALER) 18 MCG capsule Inhale contents of one capsule daily. 90 capsule 3     glipiZIDE (GLUCOTROL) 5 MG tablet Take 1 tablet (5 mg) by mouth every morning (before breakfast) 90 tablet 3     alendronate (FOSAMAX) 70 MG tablet Take 1 tablet (70 mg) by mouth every 7 days 12 tablet 3     pravastatin (PRAVACHOL) 80 MG tablet TAKE 1 TABLET (80 MG) BY MOUTH EVERY EVENING 90 tablet 2     albuterol (ALBUTEROL) 108 (90 BASE) MCG/ACT Inhaler Inhale 2 puffs into the lungs every 6 hours as needed for shortness of breath / dyspnea or wheezing 1 Inhaler 11     Multiple Vitamins-Minerals (MULTIVITAMIN ADULT PO) Take 1 tablet by mouth daily       amoxicillin (AMOXIL) 500 MG capsule TAKE FOUR CAPSULES (2 GRAMS) BY MOUTH ONE HOUR BEFORE DENTAL APPOINTMENT  3     ASPIRIN EC PO Take 81 mg by mouth daily       senna-docusate (SENOKOT-S;PERICOLACE) 8.6-50 MG per tablet Take 1 tablet by mouth 2 times daily Reported on 4/12/2017       Acetaminophen (TYLENOL PO) Take 325 mg by mouth 6 times daily Patient takes TID with Oxycodone.        ONETOUCH DELICA LANCETS 33G MISC Reported on 4/12/2017       ONE TOUCH ULTRA test strip Reported on 4/12/2017       calcium-vitamin D (CALTRATE) 600-400 MG-UNIT per tablet Take 1 tablet by mouth daily       Glucosamine-Chondroitin (GLUCOSAMINE CHONDROITIN COMPLX) 500-250 MG CAPS Take 1 tablet by mouth 2 times daily       Calcium Polycarbophil (FIBERCON PO) Take 1 tablet by mouth once , one in the morning and one in the evening       VITAMIN E PO Take 400 Int'l Units by mouth daily       ascorbic acid (VITAMIN C) 500 MG tablet Take 1 tablet (500 mg) by mouth daily 30 tablet      No facility-administered encounter medications on file as of 12/21/2017.              Review Of Systems  Skin: As above  Eyes:  "negative  Ears/Nose/Throat: negative  Respiratory: No shortness of breath, dyspnea on exertion, cough, or hemoptysis  Cardiovascular: negative  Gastrointestinal: negative  Genitourinary: negative  Musculoskeletal: negative  Neurologic: negative  Psychiatric: negative  Hematologic/Lymphatic/Immunologic: negative  Endocrine: negative      O:   NAD, WDWN, Alert & Oriented, Mood & Affect wnl, Vitals stable   Here today alone   /73  Pulse 82  Ht 1.6 m (5' 3\")  Wt 65.3 kg (144 lb)  SpO2 97%  BMI 25.51 kg/m2   General appearance normal   Vitals stable   Alert, oriented and in no acute distress      Following lymph nodes palpated: Occipital, Cervical, Supraclavicular no lad   Stuck on papules and brown macules on trunk and ext    Red papules on trunk           The remainder of the full exam was unremarkable; the following areas were examined:  conjunctiva/lids, oral mucosa, neck, peripheral vascular system, abdomen, lymph nodes, digits/nails, eccrine and apocrine glands, scalp/hair, face, neck, chest, abdomen, buttocks, back, RUE, LUE, RLE, LLE       Eyes: Conjunctivae/lids:Normal     ENT: Lips, buccal mucosa, tongue: normal    MSK:Normal    Cardiovascular: peripheral edema none    Pulm: Breathing Normal    Lymph Nodes: No Head and Neck Lymphadenopathy     Neuro/Psych: Orientation:Normal; Mood/Affect:Normal      A/P:  1. Hx of non-melanoma skin cancer, seborrheic keratosis, lentigo, angioma  BENIGN LESIONS DISCUSSED WITH PATIENT:  I discussed the specifics of tumor, prognosis, and genetics of benign lesions.  I explained that treatment of these lesions would be purely cosmetic and not medically neccessary.  I discussed with patient different removal options including excision, cautery and /or laser.      Nature and genetics of benign skin lesions dicussed with patient.  Signs and Symptoms of skin cancer discussed with patient.  Patient encouraged to perform monthly skin exams.  UV precautions reviewed with " patient.  Skin care regimen reviewed with patient: Eliminate harsh soaps, i.e. Dial, zest, irsih spring; Mild soaps such as Cetaphil or Dove sensitive skin, avoid hot or cold showers, aggressive use of emollients including vanicream, cetaphil or cerave discussed with patient.    Risks of non-melanoma skin cancer discussed with patient   Return to clinic 12 months      Again, thank you for allowing me to participate in the care of your patient.        Sincerely,        Dewayne Harrison MD

## 2017-12-22 NOTE — TELEPHONE ENCOUNTER
ZENAIDA signed. Mail to selected pharmacy.    Oxycodone      Last Written Prescription Date:  11/30/17  Last Fill Quantity: 150,   # refills: 0  Last Office Visit: 9/1/17  Future Office visit:       Routing refill request to provider for review/approval because:  Drug not on the FMG, UMP or  Health refill protocol or controlled substance    RX monitoring program ([place]MP) reviewed: unable to access for this provider    [place] profile:  [email or web address]

## 2017-12-26 RX ORDER — OXYCODONE HYDROCHLORIDE 5 MG/1
TABLET ORAL
Qty: 150 TABLET | Refills: 0 | Status: SHIPPED | OUTPATIENT
Start: 2017-12-26 | End: 2018-02-01

## 2018-01-04 ENCOUNTER — DOCUMENTATION ONLY (OUTPATIENT)
Dept: ANESTHESIOLOGY | Facility: CLINIC | Age: 83
End: 2018-01-04

## 2018-01-14 ENCOUNTER — RADIANT APPOINTMENT (OUTPATIENT)
Dept: GENERAL RADIOLOGY | Facility: CLINIC | Age: 83
End: 2018-01-14
Attending: PHYSICIAN ASSISTANT
Payer: COMMERCIAL

## 2018-01-14 ENCOUNTER — OFFICE VISIT (OUTPATIENT)
Dept: URGENT CARE | Facility: URGENT CARE | Age: 83
End: 2018-01-14
Payer: COMMERCIAL

## 2018-01-14 VITALS
TEMPERATURE: 97.4 F | BODY MASS INDEX: 25.07 KG/M2 | SYSTOLIC BLOOD PRESSURE: 181 MMHG | HEART RATE: 82 BPM | RESPIRATION RATE: 16 BRPM | WEIGHT: 141.5 LBS | OXYGEN SATURATION: 90 % | DIASTOLIC BLOOD PRESSURE: 74 MMHG

## 2018-01-14 DIAGNOSIS — R06.02 SOB (SHORTNESS OF BREATH): ICD-10-CM

## 2018-01-14 DIAGNOSIS — R09.89 CHEST CONGESTION: Primary | ICD-10-CM

## 2018-01-14 DIAGNOSIS — R09.89 CHEST CONGESTION: ICD-10-CM

## 2018-01-14 DIAGNOSIS — E11.8 TYPE 2 DIABETES MELLITUS WITH COMPLICATION, WITHOUT LONG-TERM CURRENT USE OF INSULIN (H): ICD-10-CM

## 2018-01-14 DIAGNOSIS — R05.9 COUGH: ICD-10-CM

## 2018-01-14 DIAGNOSIS — J44.1 COPD EXACERBATION (H): ICD-10-CM

## 2018-01-14 PROCEDURE — 99214 OFFICE O/P EST MOD 30 MIN: CPT | Mod: 25 | Performed by: PHYSICIAN ASSISTANT

## 2018-01-14 PROCEDURE — 94640 AIRWAY INHALATION TREATMENT: CPT | Performed by: PHYSICIAN ASSISTANT

## 2018-01-14 PROCEDURE — 71046 X-RAY EXAM CHEST 2 VIEWS: CPT | Mod: FY

## 2018-01-14 RX ORDER — LEVALBUTEROL INHALATION SOLUTION 0.63 MG/3ML
SOLUTION RESPIRATORY (INHALATION)
Start: 2018-01-14 | End: 2018-03-09

## 2018-01-14 RX ORDER — PREDNISONE 20 MG/1
20 TABLET ORAL 2 TIMES DAILY
Qty: 10 TABLET | Refills: 0 | Status: SHIPPED | OUTPATIENT
Start: 2018-01-14 | End: 2018-03-09

## 2018-01-14 RX ORDER — AZITHROMYCIN 250 MG/1
TABLET, FILM COATED ORAL
Qty: 6 TABLET | Refills: 0 | Status: SHIPPED | OUTPATIENT
Start: 2018-01-14 | End: 2018-03-09

## 2018-01-14 NOTE — NURSING NOTE
"Chief Complaint   Patient presents with     Urgent Care     Pt states cold sxs x 9days has COPD        Initial /74  Pulse 82  Temp 97.4  F (36.3  C) (Oral)  Resp 16  Wt 141 lb 8 oz (64.2 kg)  SpO2 90%  BMI 25.07 kg/m2 Estimated body mass index is 25.07 kg/(m^2) as calculated from the following:    Height as of 12/21/17: 5' 3\" (1.6 m).    Weight as of this encounter: 141 lb 8 oz (64.2 kg).  Medication Reconciliation: complete      "

## 2018-01-14 NOTE — MR AVS SNAPSHOT
"              After Visit Summary   2018    Celeste Chacko    MRN: [id number]           Patient Information     Date Of Birth          1931        Visit Information        Provider Department      2018 3:05 PM Ritchie Laureano PA-C [facility]        Today's Diagnoses     Chest congestion    -  1    Cough        SOB (shortness of breath)        COPD exacerbation (H)        Type 2 diabetes mellitus with complication, without long-term current use of insulin (H)           Follow-ups after your visit        Who to contact     If you have questions or need follow up information about today's clinic visit or your schedule please contact [facility] directly at [phone].  Normal or non-critical lab and imaging results will be communicated to you by MyChart, letter or phone within 4 business days after the clinic has received the results. If you do not hear from us within 7 days, please contact the clinic through [employer] or phone. If you have a critical or abnormal lab result, we will notify you by phone as soon as possible.  Submit refill requests through [employer] or call your pharmacy and they will forward the refill request to us. Please allow 3 business days for your refill to be completed.          Additional Information About Your Visit        MyChart Information     [employer] lets you send messages to your doctor, view your test results, renew your prescriptions, schedule appointments and more. To sign up, go to [email or web address] . Click on \"Log in\" on the left side of the screen, which will take you to the Welcome page. Then click on \"Sign up Now\" on the right side of the page.     You will be asked to enter the access code listed below, as well as some personal information. Please follow the directions to create your username and password.     Your access code is: [email or web address]  Expires: 2018 10:05 AM     Your access code will  " in 90 days. If you need help or a new code, please call your [place] clinic or [phone].        Care EveryWhere ID     This is your Care EveryWhere ID. This could be used by other organizations to access your [place] medical records  [id number]        Your Vitals Were     Pulse Temperature Respirations Pulse Oximetry BMI (Body Mass Index)       82 97.4  F (36.3  C) (Oral) 16 90% 25.07 kg/m2        Blood Pressure from Last 3 Encounters:   01/14/18 181/74   12/21/17 145/73   11/10/17 166/78    Weight from Last 3 Encounters:   01/14/18 141 lb 8 oz (64.2 kg)   12/21/17 144 lb (65.3 kg)   11/10/17 144 lb (65.3 kg)              We Performed the Following     INHALATION/NEBULIZER TREATMENT, INITIAL     INHALATION/NEBULIZER TREATMENT, INITIAL     LEVALBUTEROL UNIT DOSE, 0.5 MG          Today's Medication Changes          These changes are accurate as of: 1/14/18  6:11 PM.  If you have any questions, ask your nurse or doctor.               Start taking these medicines.        Dose/Directions    azithromycin 250 MG tablet   Commonly known as:  ZITHROMAX   Used for:  Chest congestion, Cough, COPD exacerbation (H)   Started by:  Ritchie Laureano PA-C        2 tabs po qd day 1, then 1 tab po qd days 2-5   Quantity:  6 tablet   Refills:  0       levalbuterol 0.63 MG/3ML neb solution   Commonly known as:  XOPENEX   Used for:  Chest congestion, Cough, SOB (shortness of breath)   Started by:  Ritchie Laureano PA-C        I vial in neb   Refills:  0       predniSONE 20 MG tablet   Commonly known as:  DELTASONE   Used for:  COPD exacerbation (H), SOB (shortness of breath)   Started by:  Ritchie Laureano PA-C        Dose:  20 mg   Take 1 tablet (20 mg) by mouth 2 times daily   Quantity:  10 tablet   Refills:  0            Where to get your medicines      These medications were sent to [facility]/pharmacy #[id number] [place], [place] - [address] [address]     Phone:  [phone]     azithromycin  250 MG tablet    predniSONE 20 MG tablet         Some of these will need a paper prescription and others can be bought over the counter.  Ask your nurse if you have questions.     You don't need a prescription for these medications     levalbuterol 0.63 MG/3ML neb solution                Primary Care Provider Office Phone # Fax #    Emily Marie [phone]       Claude AYOUBTRACY [facility] [address]  [facility] [address]        Equal Access to Services     DAVID PALACIO : Hadii aad ku hadasho Soomaali, waaxda luqadaha, qaybta kaalmada adeegyada, waxay idiin hayaan adeeg mirianraphaeldar chao . So [facility] [phone].    ATENCIÓN: Si habla español, tiene a harvey disposición servicios gratuitos de asistencia lingüística. David al [phone].    We comply with applicable federal civil rights laws and [place] laws. We do not discriminate on the basis of race, color, national origin, age, disability, sex, sexual orientation, or gender identity.            Thank you!     Thank you for choosing [facility]  for your care. Our goal is always to provide you with excellent care. Hearing back from our patients is one way we can continue to improve our services. Please take a few minutes to complete the written survey that you may receive in the mail after your visit with us. Thank you!             Your Updated Medication List - Protect others around you: Learn how to safely use, store and throw away your medicines at www.disposemymeds.org.          This list is accurate as of: 1/14/18  6:11 PM.  Always use your most recent med list.                   Brand Name Dispense Instructions for use Diagnosis    albuterol 108 (90 BASE) MCG/ACT Inhaler    PROAIR HFA    1 Inhaler    Inhale 2 puffs into the lungs every 6 hours as needed for shortness of breath / dyspnea or wheezing    Chronic obstructive pulmonary disease, unspecified COPD type (H)       alendronate 70 MG tablet    FOSAMAX    12 tablet    Take 1  tablet (70 mg) by mouth every 7 days    Osteopenia, unspecified location       amoxicillin 500 MG capsule    AMOXIL     TAKE FOUR CAPSULES (2 GRAMS) BY MOUTH ONE HOUR BEFORE DENTAL APPOINTMENT        ascorbic acid 500 MG tablet    VITAMIN C    30 tablet    Take 1 tablet (500 mg) by mouth daily        ASPIRIN EC PO      Take 81 mg by mouth daily        azithromycin 250 MG tablet    ZITHROMAX    6 tablet    2 tabs po qd day 1, then 1 tab po qd days 2-5    Chest congestion, Cough, COPD exacerbation (H)       calcium-vitamin D 600-400 MG-UNIT per tablet    CALTRATE     Take 1 tablet by mouth daily        FIBERCON PO      Take 1 tablet by mouth once , one in the morning and one in the evening        fluticasone-salmeterol 250-50 MCG/DOSE diskus inhaler    ADVAIR DISKUS    3 Inhaler    Inhale 1 puff into the lungs 2 times daily    Chronic obstructive pulmonary disease, unspecified COPD type (H)       glipiZIDE 5 MG tablet    GLUCOTROL    90 tablet    Take 1 tablet (5 mg) by mouth every morning (before breakfast)    Type 2 diabetes mellitus without complication, without long-term current use of insulin (H)       Glucosamine-Chondroitin 500-250 MG Caps    GLUCOSAMINE CHONDROITIN COMPLX     Take 1 tablet by mouth 2 times daily    Generalized osteoarthrosis, unspecified site       levalbuterol 0.63 MG/3ML neb solution    XOPENEX     I vial in neb    Chest congestion, Cough, SOB (shortness of breath)       MULTIVITAMIN ADULT PO      Take 1 tablet by mouth daily        ONETOUCH DELICA LANCETS 33G Misc      Reported on 4/12/2017        ONETOUCH ULTRA test strip   Generic drug:  blood glucose monitoring      Reported on 4/12/2017        oxyCODONE IR 5 MG tablet    ROXICODONE    150 tablet    May take 5 tabs per day    Spinal stenosis of lumbar region with neurogenic claudication       pravastatin 80 MG tablet    PRAVACHOL    90 tablet    TAKE 1 TABLET (80 MG) BY MOUTH EVERY EVENING    Hyperlipidemia LDL goal <100       predniSONE  20 MG tablet    DELTASONE    10 tablet    Take 1 tablet (20 mg) by mouth 2 times daily    COPD exacerbation (H), SOB (shortness of breath)       senna-docusate 8.6-50 MG per tablet    SENOKOT-S;PERICOLACE     Take 1 tablet by mouth 2 times daily Reported on 4/12/2017        tiotropium 18 MCG capsule    SPIRIVA HANDIHALER    90 capsule    Inhale contents of one capsule daily.    Chronic obstructive pulmonary disease, unspecified COPD type (H)       TYLENOL PO      Take 325 mg by mouth 6 times daily Patient takes TID with Oxycodone.        VITAMIN E PO      Take 400 Int'l Units by mouth daily

## 2018-01-15 NOTE — PROGRESS NOTES
"SUBJECTIVE:   Celeste Chacko is a 86 year old female presenting with a chief complaint of coughing, chest congestion, COPD with worsening breathing.  Onset of symptoms was 9 day(s) ago.  Course of illness is worsening.    Severity moderate  Current and Associated symptoms: coughing, chest congestion  Treatment measures tried include OTC Cough med.  Predisposing factors include recent illness, COPD.    Past Medical History:   Diagnosis Date     Chronic airway obstruction, not elsewhere classified      Complete rupture of rotator cuff      Disorder of bone and cartilage, unspecified      History of blood transfusion      Mixed hyperlipidemia 4/00     Osteoarthritis      Personal history of other malignant neoplasm of skin      Spinal stenosis      Type II or unspecified type diabetes mellitus without mention of complication, not stated as uncontrolled         Allergies   Allergen Reactions     Sulfamethoxazole Anaphylaxis and Hives         Social History   Substance Use Topics     Smoking status: Former Smoker     Packs/day: 0.50     Years: 54.00     Types: Cigarettes     Quit date: 4/27/2000     Smokeless tobacco: Never Used      Comment: using nicotine gum     Alcohol use 0.5 oz/week     1 Glasses of wine per week      Comment: \"A glass of wine once a week.\"       ROS:  CONSTITUTIONAL:NEGATIVE for fever, chills, change in weight  INTEGUMENTARY/SKIN: NEGATIVE for worrisome rashes, moles or lesions  ENT/MOUTH: POSITIVE for nasal congestion  RESP:POSITIVE for cough-productive, dyspnea on exertion and Hx COPD  CV: NEGATIVE for chest pain, palpitations or peripheral edema  GI: NEGATIVE for nausea, abdominal pain, heartburn, or change in bowel habits  MUSCULOSKELETAL: NEGATIVE for significant arthralgias or myalgia  NEURO: NEGATIVE for weakness, dizziness or paresthesias    OBJECTIVE  :/74  Pulse 82  Temp 97.4  F (36.3  C) (Oral)  Resp 16  Wt 141 lb 8 oz (64.2 kg)  SpO2 90%  BMI 25.07 kg/m2  GENERAL " APPEARANCE: healthy, alert and no distress  EYES: EOMI,  PERRL, conjunctiva clear  HENT: TM's normal bilaterally and nasal turbinates erythematous, swollen  NECK: supple, nontender, no lymphadenopathy  RESP: Positive for wheezing, COPD exacerbation, coughing  CV: regular rates and rhythm, normal S1 S2, no murmur noted  NEURO: Normal strength and tone, sensory exam grossly normal,  normal speech and mentation  SKIN: no suspicious lesions or rashes    Chest xray Negative for acute findings, read by Ritchie Laureano PA-C [facility] at time of visit.    Xopenex 0.63% neb given in clinic    ASSESSMENT/PLAN      ICD-10-CM    1. Chest congestion R09.89 XR Chest 2 Views     levalbuterol (XOPENEX) 0.63 MG/3ML neb solution     azithromycin (ZITHROMAX) 250 MG tablet   2. Cough R05 XR Chest 2 Views     levalbuterol (XOPENEX) 0.63 MG/3ML neb solution     azithromycin (ZITHROMAX) 250 MG tablet   3. SOB (shortness of breath) R06.02 levalbuterol (XOPENEX) 0.63 MG/3ML neb solution     INHALATION/NEBULIZER TREATMENT, INITIAL     predniSONE (DELTASONE) 20 MG tablet     LEVALBUTEROL UNIT DOSE, 0.5 MG     INHALATION/NEBULIZER TREATMENT, INITIAL   4. COPD exacerbation (H) J44.1 azithromycin (ZITHROMAX) 250 MG tablet     predniSONE (DELTASONE) 20 MG tablet   5. Type 2 diabetes mellitus with complication, without long-term current use of insulin (H) E11.8 Monitor blood sugars       Chest xray to rule out Pneumonia  Prednisone for wheezing, COPD  zpak  Follow up as needed  Go to the ED if symptoms worsen  See orders in Epic

## 2018-02-01 DIAGNOSIS — M48.062 SPINAL STENOSIS OF LUMBAR REGION WITH NEUROGENIC CLAUDICATION: ICD-10-CM

## 2018-02-01 RX ORDER — OXYCODONE HYDROCHLORIDE 5 MG/1
TABLET ORAL
Qty: 150 TABLET | Refills: 0 | Status: SHIPPED | OUTPATIENT
Start: 2018-02-01 | End: 2018-03-09

## 2018-02-01 NOTE — TELEPHONE ENCOUNTER
Pt calling for refill on Oxy.    Mail rx to pharm.    Oxycodone      Last Written Prescription Date:  12-26-17  Last Fill Quantity: 150,   # refills: 0  Last Office Visit: 9-1-17  Future Office visit:    Next 5 appointments (look out 90 days)     Mar 02, 2018  1:20 PM CST   [facility]    [address]  [facility] [address]   [phone]                   Routing refill request to provider for review/approval because:  Drug not on the FMG, UMP or [facility] refill protocol or controlled substance    Signed CSA form in chart.    RX monitoring program ([place]MP) reviewed: access not granted by provider.    [place]MP profile:  [email or web address]    Please advise, thanks.

## 2018-03-01 DIAGNOSIS — E78.5 HYPERLIPIDEMIA LDL GOAL <100: ICD-10-CM

## 2018-03-01 DIAGNOSIS — E11.9 TYPE 2 DIABETES MELLITUS WITHOUT COMPLICATION, WITHOUT LONG-TERM CURRENT USE OF INSULIN (H): ICD-10-CM

## 2018-03-01 LAB
ANION GAP SERPL CALCULATED.3IONS-SCNC: 3 MMOL/L (ref 3–14)
BUN SERPL-MCNC: 15 MG/DL (ref 7–30)
CALCIUM SERPL-MCNC: 8.9 MG/DL (ref 8.5–10.1)
CHLORIDE SERPL-SCNC: 105 MMOL/L (ref 94–109)
CHOLEST SERPL-MCNC: 151 MG/DL
CO2 SERPL-SCNC: 32 MMOL/L (ref 20–32)
CREAT SERPL-MCNC: 0.61 MG/DL (ref 0.52–1.04)
CREAT UR-MCNC: 92 MG/DL
GFR SERPL CREATININE-BSD FRML MDRD: >90 ML/MIN/1.7M2
GLUCOSE SERPL-MCNC: 90 MG/DL (ref 70–99)
HBA1C MFR BLD: 6.8 % (ref 4.3–6)
HDLC SERPL-MCNC: 53 MG/DL
LDLC SERPL CALC-MCNC: 72 MG/DL
MICROALBUMIN UR-MCNC: 43 MG/L
MICROALBUMIN/CREAT UR: 46.73 MG/G CR (ref 0–25)
NONHDLC SERPL-MCNC: 98 MG/DL
POTASSIUM SERPL-SCNC: 3.7 MMOL/L (ref 3.4–5.3)
SODIUM SERPL-SCNC: 140 MMOL/L (ref 133–144)
TRIGL SERPL-MCNC: 128 MG/DL

## 2018-03-01 PROCEDURE — 80048 BASIC METABOLIC PNL TOTAL CA: CPT | Performed by: INTERNAL MEDICINE

## 2018-03-01 PROCEDURE — 82043 UR ALBUMIN QUANTITATIVE: CPT | Performed by: INTERNAL MEDICINE

## 2018-03-01 PROCEDURE — 36415 COLL VENOUS BLD VENIPUNCTURE: CPT | Performed by: INTERNAL MEDICINE

## 2018-03-01 PROCEDURE — 83036 HEMOGLOBIN GLYCOSYLATED A1C: CPT | Performed by: INTERNAL MEDICINE

## 2018-03-01 PROCEDURE — 80061 LIPID PANEL: CPT | Performed by: INTERNAL MEDICINE

## 2018-03-07 ENCOUNTER — TELEPHONE (OUTPATIENT)
Dept: INTERNAL MEDICINE | Facility: CLINIC | Age: 83
End: 2018-03-07

## 2018-03-07 DIAGNOSIS — M48.062 SPINAL STENOSIS OF LUMBAR REGION WITH NEUROGENIC CLAUDICATION: ICD-10-CM

## 2018-03-07 RX ORDER — OXYCODONE HYDROCHLORIDE 5 MG/1
TABLET ORAL
Qty: 150 TABLET | Refills: 0 | Status: CANCELLED | OUTPATIENT
Start: 2018-03-07

## 2018-03-07 NOTE — TELEPHONE ENCOUNTER
Oxycodone  MAIL to [facility]., Call pt Once mailed.        Last Written Prescription Date:  2/1/18  Last Fill Quantity: 150,   # refills: 0    Last Office Visit: 9/1/17    Future Office visit:    Next 5 appointments (look out 90 days)     Mar 09, 2018  1:00 PM CST   SHORT with Emily Marie MD   [facility]    [address] [place]  [facility] [id number] [phone]                 Routing refill request to provider for review/approval because:  Drug not on the FMG, UMP or [facility] refill protocol or controlled substance

## 2018-03-07 NOTE — TELEPHONE ENCOUNTER
Reason for Call:  Medication or medication refill:    Do you use a [facility]?  Name of the pharmacy and phone number for the current request:  [address] [facility]    Name of the medication requested: oxycodone    Other request:Pt is hoping this gets done today.  Pls call pt when this is mailed.    Can we leave a detailed message on this number? YES    Phone number patient can be reached at: Home number on file [phone] (home)    Best Time: any    Call taken on 3/7/2018 at 11:26 AM by Natasha Conner

## 2018-03-09 ENCOUNTER — OFFICE VISIT (OUTPATIENT)
Dept: INTERNAL MEDICINE | Facility: CLINIC | Age: 83
End: 2018-03-09
Payer: COMMERCIAL

## 2018-03-09 DIAGNOSIS — E11.9 TYPE 2 DIABETES MELLITUS WITHOUT COMPLICATION, WITHOUT LONG-TERM CURRENT USE OF INSULIN (H): Primary | ICD-10-CM

## 2018-03-09 DIAGNOSIS — M48.062 SPINAL STENOSIS OF LUMBAR REGION WITH NEUROGENIC CLAUDICATION: ICD-10-CM

## 2018-03-09 DIAGNOSIS — F11.20 NARCOTIC DEPENDENCE (H): ICD-10-CM

## 2018-03-09 DIAGNOSIS — E78.5 HYPERLIPIDEMIA LDL GOAL <100: ICD-10-CM

## 2018-03-09 DIAGNOSIS — J44.9 CHRONIC OBSTRUCTIVE PULMONARY DISEASE, UNSPECIFIED COPD TYPE (H): ICD-10-CM

## 2018-03-09 PROCEDURE — 99214 OFFICE O/P EST MOD 30 MIN: CPT | Performed by: INTERNAL MEDICINE

## 2018-03-09 RX ORDER — OXYCODONE HYDROCHLORIDE 5 MG/1
TABLET ORAL
Qty: 150 TABLET | Refills: 0 | Status: SHIPPED | OUTPATIENT
Start: 2018-03-09 | End: 2018-04-09

## 2018-03-09 RX ORDER — PRAVASTATIN SODIUM 80 MG/1
80 TABLET ORAL DAILY
Qty: 90 TABLET | Refills: 3 | Status: SHIPPED | OUTPATIENT
Start: 2018-03-09 | End: 2019-03-28 | Stop reason: SINTOL

## 2018-03-09 NOTE — MR AVS SNAPSHOT
"              After Visit Summary   3/9/2018    Celeste Chacko    MRN: [id number]           Patient Information     Date Of Birth          5/11/1931        Visit Information        Provider Department      3/9/2018 1:00 PM Emily Marie MD [facility]        Today's Diagnoses     Type 2 diabetes mellitus without complication, without long-term current use of insulin (H)    -  1    Chronic obstructive pulmonary disease, unspecified COPD type (H)        Narcotic dependence (H)        Spinal stenosis of lumbar region with neurogenic claudication        Hyperlipidemia LDL goal <100           Follow-ups after your visit        Future tests that were ordered for you today     Open Future Orders        Priority Expected Expires Ordered    Basic metabolic panel Routine 9/10/2018 10/10/2018 3/10/2018    Hemoglobin A1c Routine 9/10/2018 10/10/2018 3/10/2018            Who to contact     If you have questions or need follow up information about today's clinic visit or your schedule please contact [facility] directly at [phone].  Normal or non-critical lab and imaging results will be communicated to you by MyChart, letter or phone within 4 business days after the clinic has received the results. If you do not hear from us within 7 days, please contact the clinic through advisorCONNECThart or phone. If you have a critical or abnormal lab result, we will notify you by phone as soon as possible.  Submit refill requests through [email or web address] or call your pharmacy and they will forward the refill request to us. Please allow 3 business days for your refill to be completed.          Additional Information About Your Visit        advisorCONNECThart Information     [email or web address] lets you send messages to your doctor, view your test results, renew your prescriptions, schedule appointments and more. To sign up, go to [email or web address] . Click on \"Log in\" on the left side of the screen, which will take you to the Welcome page. " "Then click on \"Sign up Now\" on the right side of the page.     You will be asked to enter the access code listed below, as well as some personal information. Please follow the directions to create your username and password.     Your access code is: [email or web address]  Expires: 2018 10:06 AM     Your access code will  in 90 days. If you need help or a new code, please call your [place] clinic or [phone].        Care EveryWhere ID     This is your Care EveryWhere ID. This could be used by other organizations to access your [place] medical records  [id number]        Your Vitals Were     Pulse Temperature Respirations Pulse Oximetry BMI (Body Mass Index)       92 99.1  F (37.3  C) (Oral) 16 92% 25.23 kg/m2        Blood Pressure from Last 3 Encounters:   18 148/62   18 181/74   17 145/73    Weight from Last 3 Encounters:   18 142 lb 6.4 oz (64.6 kg)   18 141 lb 8 oz (64.2 kg)   17 144 lb (65.3 kg)                 Today's Medication Changes          These changes are accurate as of 3/9/18 11:59 PM.  If you have any questions, ask your nurse or doctor.               Start taking these medicines.        Dose/Directions    fluticasone-salmeterol 500-50 MCG/DOSE diskus inhaler   Commonly known as:  ADVAIR   Used for:  Chronic obstructive pulmonary disease, unspecified COPD type (H)   Replaces:  fluticasone-salmeterol 250-50 MCG/DOSE diskus inhaler   Started by:  Emily Marie MD        Dose:  1 puff   Inhale 1 puff into the lungs 2 times daily   Quantity:  3 Inhaler   Refills:  3         These medicines have changed or have updated prescriptions.        Dose/Directions    pravastatin 80 MG tablet   Commonly known as:  PRAVACHOL   This may have changed:  See the new instructions.   Used for:  Hyperlipidemia LDL goal <100   Changed by:  Emily Marie MD        Dose:  80 mg   Take 1 tablet (80 mg) by mouth daily   Quantity:  90 tablet   Refills:  3         Stop taking these " medicines if you haven't already. Please contact your care team if you have questions.     fluticasone-salmeterol 250-50 MCG/DOSE diskus inhaler   Commonly known as:  ADVAIR DISKUS   Replaced by:  fluticasone-salmeterol 500-50 MCG/DOSE diskus inhaler   Stopped by:  Emily Marie MD           levalbuterol 0.63 MG/3ML neb solution   Commonly known as:  XOPENEX   Stopped by:  Emily Marie MD                Where to get your medicines      Some of these will need a paper prescription and others can be bought over the counter.  Ask your nurse if you have questions.     Bring a paper prescription for each of these medications     fluticasone-salmeterol 500-50 MCG/DOSE diskus inhaler    oxyCODONE IR 5 MG tablet    pravastatin 80 MG tablet                Primary Care Provider Office Phone # Fax #    Emily Marie [phone]       [address] [place] [address]        Equal Access to Services     [facility]: Hadii aad ku hadasho Soomaali, waaxda luqadaha, qaybta kaalmada adeegyada, waxay shayin sherif chao . So [facility] [phone].    ATENCIÓN: Si habla español, tiene a harvey disposición servicios gratuitos de asistencia lingüística. Ll[facility] [phone].    We comply with applicable federal civil rights laws and [place] laws. We do not discriminate on the basis of race, color, national origin, age, disability, sex, sexual orientation, or gender identity.            Thank you!     Thank you for choosing [facility]  for your care. Our goal is always to provide you with excellent care. Hearing back from our patients is one way we can continue to improve our services. Please take a few minutes to complete the written survey that you may receive in the mail after your visit with us. Thank you!             Your Updated Medication List - Protect others around you: Learn how to safely use, store and throw away your medicines at www.disposemymeds.org.          This  list is accurate as of 3/9/18 11:59 PM.  Always use your most recent med list.                   Brand Name Dispense Instructions for use Diagnosis    albuterol 108 (90 BASE) MCG/ACT Inhaler    PROAIR HFA    1 Inhaler    Inhale 2 puffs into the lungs every 6 hours as needed for shortness of breath / dyspnea or wheezing    Chronic obstructive pulmonary disease, unspecified COPD type (H)       alendronate 70 MG tablet    FOSAMAX    12 tablet    Take 1 tablet (70 mg) by mouth every 7 days    Osteopenia, unspecified location       amoxicillin 500 MG capsule    AMOXIL     TAKE FOUR CAPSULES (2 GRAMS) BY MOUTH ONE HOUR BEFORE DENTAL APPOINTMENT        ascorbic acid 500 MG tablet    VITAMIN C    30 tablet    Take 1 tablet (500 mg) by mouth daily        ASPIRIN EC PO      Take 81 mg by mouth daily        calcium-vitamin D 600-400 MG-UNIT per tablet    CALTRATE     Take 1 tablet by mouth daily        FIBERCON PO      Take 1 tablet by mouth once , one in the morning and one in the evening        fluticasone-salmeterol 500-50 MCG/DOSE diskus inhaler    ADVAIR    3 Inhaler    Inhale 1 puff into the lungs 2 times daily    Chronic obstructive pulmonary disease, unspecified COPD type (H)       glipiZIDE 5 MG tablet    GLUCOTROL    90 tablet    Take 1 tablet (5 mg) by mouth every morning (before breakfast)    Type 2 diabetes mellitus without complication, without long-term current use of insulin (H)       Glucosamine-Chondroitin 500-250 MG Caps    GLUCOSAMINE CHONDROITIN COMPLX     Take 1 tablet by mouth 2 times daily    Generalized osteoarthrosis, unspecified site       MULTIVITAMIN ADULT PO      Take 1 tablet by mouth daily        ONETOUCH DELICA LANCETS 33G Misc      Reported on 4/12/2017        ONETOUCH ULTRA test strip   Generic drug:  blood glucose monitoring      Reported on 4/12/2017        oxyCODONE IR 5 MG tablet    ROXICODONE    150 tablet    May take 5 tabs per day    Spinal stenosis of lumbar region with neurogenic  claudication       pravastatin 80 MG tablet    PRAVACHOL    90 tablet    Take 1 tablet (80 mg) by mouth daily    Hyperlipidemia LDL goal <100       senna-docusate 8.6-50 MG per tablet    SENOKOT-S;PERICOLACE     Take 1 tablet by mouth 2 times daily Reported on 4/12/2017        tiotropium 18 MCG capsule    SPIRIVA HANDIHALER    90 capsule    Inhale contents of one capsule daily.    Chronic obstructive pulmonary disease, unspecified COPD type (H)       TYLENOL PO      Take 325 mg by mouth 6 times daily Patient takes TID with Oxycodone.        VITAMIN E PO      Take 400 Int'l Units by mouth daily

## 2018-03-09 NOTE — PROGRESS NOTES
SUBJECTIVE:   Celeste Chacko is a 86 year old female who presents to clinic today for the following health issues:      Diabetes Follow-up      Patient is checking blood sugars: not at all    Diabetic concerns: None     Symptoms of hypoglycemia (low blood sugar): shaky     Paresthesias (numbness or burning in feet) or sores: Yes hands tingly     Date of last diabetic eye exam: 07/2017    BP Readings from Last 2 Encounters:   01/14/18 181/74   12/21/17 145/73     Hemoglobin A1C (%)   Date Value   03/01/2018 6.8 (H)   08/22/2017 7.1 (H)     LDL Cholesterol Calculated (mg/dL)   Date Value   03/01/2018 72   02/21/2017 72     Hyperlipidemia Follow-Up      Rate your low fat/cholesterol diet?: good    Taking statin?  Yes, no muscle aches from statin    Other lipid medications/supplements?:  none      Amount of exercise or physical activity: walking    Problems taking medications regularly: No    Medication side effects: none    Diet: regular (no restrictions)        Other problems:   COPD: She reports gradual worsening of dyspnea on exertion.  This is been over a period of few months.  She is not having significant cough.  Chronic pain syndrome: Her back is bothering her more.  She reports she is not taking very much Tylenol, only 1 of the 325 mg tablets twice a day.  Narcotic dependence: She feels she may be developing being a little tolerance to her current dose.      Current concerns:   No acute concerns    Patient Active Problem List   Diagnosis     Disorder of bone and cartilage     Generalized osteoarthrosis, unspecified site     COPD, severe (H)     Spinal stenosis of lumbar region with neurogenic claudication     Type 2 diabetes mellitus (HCC)     HYPERLIPIDEMIA LDL GOAL <100     History of basal cell carcinoma     Lumbago     Controlled substance agreement signed     History of actinic keratoses     Chronic pain syndrome     Narcotic dependence (H)       Current Outpatient Prescriptions   Medication Sig Dispense  Refill     pravastatin (PRAVACHOL) 80 MG tablet Take 1 tablet (80 mg) by mouth daily 90 tablet 3     oxyCODONE IR (ROXICODONE) 5 MG tablet May take 5 tabs per day 150 tablet 0     fluticasone-salmeterol (ADVAIR) 500-50 MCG/DOSE diskus inhaler Inhale 1 puff into the lungs 2 times daily 3 Inhaler 3     tiotropium (SPIRIVA HANDIHALER) 18 MCG capsule Inhale contents of one capsule daily. 90 capsule 3     glipiZIDE (GLUCOTROL) 5 MG tablet Take 1 tablet (5 mg) by mouth every morning (before breakfast) 90 tablet 3     alendronate (FOSAMAX) 70 MG tablet Take 1 tablet (70 mg) by mouth every 7 days 12 tablet 3     albuterol (ALBUTEROL) 108 (90 BASE) MCG/ACT Inhaler Inhale 2 puffs into the lungs every 6 hours as needed for shortness of breath / dyspnea or wheezing 1 Inhaler 11     Multiple Vitamins-Minerals (MULTIVITAMIN ADULT PO) Take 1 tablet by mouth daily       ASPIRIN EC PO Take 81 mg by mouth daily       senna-docusate (SENOKOT-S;PERICOLACE) 8.6-50 MG per tablet Take 1 tablet by mouth 2 times daily Reported on 4/12/2017       Acetaminophen (TYLENOL PO) Take 325 mg by mouth 6 times daily Patient takes TID with Oxycodone.        calcium-vitamin D (CALTRATE) 600-400 MG-UNIT per tablet Take 1 tablet by mouth daily       Glucosamine-Chondroitin (GLUCOSAMINE CHONDROITIN COMPLX) 500-250 MG CAPS Take 1 tablet by mouth 2 times daily       Calcium Polycarbophil (FIBERCON PO) Take 1 tablet by mouth once , one in the morning and one in the evening       VITAMIN E PO Take 400 Int'l Units by mouth daily       ascorbic acid (VITAMIN C) 500 MG tablet Take 1 tablet (500 mg) by mouth daily 30 tablet      amoxicillin (AMOXIL) 500 MG capsule TAKE FOUR CAPSULES (2 GRAMS) BY MOUTH ONE HOUR BEFORE DENTAL APPOINTMENT  3     ONETOUCH DELICA LANCETS 33G MISC Reported on 4/12/2017       ONE TOUCH ULTRA test strip Reported on 4/12/2017         Social History   Substance Use Topics     Smoking status: Former Smoker     Packs/day: 0.50     Years:  "54.00     Types: Cigarettes     Quit date: 4/27/2000     Smokeless tobacco: Never Used      Comment: using nicotine gum     Alcohol use 0.5 oz/week     1 Glasses of wine per week      Comment: \"A glass of wine once a week.\"        ROS:  General: no fever, chills  Weight: stable  Vision:negative. Last eye exam 7/17.  ENT: negative  Respiratory as above.  Cardiac: no chest pain or pressure  Abdominal: no nausea, vomiting, abdominal pain, bowel changes  Vascular no complaints of claudication  Neurologic:no complaints of neuropathy  Feet no lesions, in grown nails, edema   : no polyuria, hematuria, dysuria    Objective:  Patient alert in NAD  /62  Pulse 92  Temp 99.1  F (37.3  C) (Oral)  Resp 16  Wt 142 lb 6.4 oz (64.6 kg)  SpO2 92%  BMI 25.23 kg/m2       Wt Readings from Last 4 Encounters:   03/09/18 142 lb 6.4 oz (64.6 kg)   01/14/18 141 lb 8 oz (64.2 kg)   12/21/17 144 lb (65.3 kg)   11/10/17 144 lb (65.3 kg)       CV: CV: normal S1, S2 without murmur, S3 or S4.  Carotid pulses: full  LUNGS: clear but with diminished breath sounds      Lab Results   Component Value Date    A1C 6.8 03/01/2018    A1C 7.1 08/22/2017    A1C 6.7 02/21/2017    A1C 6.6 08/11/2016    A1C 6.7 02/02/2016       Lab Results   Component Value Date    CHOL 151 03/01/2018    HDL 53 03/01/2018    LDL 72 03/01/2018    TRIG 128 03/01/2018    CHOLHDLRATIO 3.4 07/23/2015                    ASSESSMENT/PLAN:             1. Type 2 diabetes mellitus without complication, without long-term current use of insulin (H)  Well-controlled, continue medication, recheck in 6 months.    2. Chronic obstructive pulmonary disease, unspecified COPD type (H)  Mild increase in symptoms, recommend increase her Advair dose, if not improving may consider referral to pulmonary  - fluticasone-salmeterol (ADVAIR) 500-50 MCG/DOSE diskus inhaler; Inhale 1 puff into the lungs 2 times daily  Dispense: 3 Inhaler; Refill: 3    3. Narcotic dependence (H)  Advised it would " not recommend changing her current dose, add Tylenol 1000 mg 3 times a day    4. Spinal stenosis of lumbar region with neurogenic claudication  Ongoing symptoms, as above  - oxyCODONE IR (ROXICODONE) 5 MG tablet; May take 5 tabs per day  Dispense: 150 tablet; Refill: 0    5. Hyperlipidemia LDL goal <100  Stable, continue med  - pravastatin (PRAVACHOL) 80 MG tablet; Take 1 tablet (80 mg) by mouth daily  Dispense: 90 tablet; Refill: 3        Emily Marie MD  [facility]

## 2018-03-09 NOTE — NURSING NOTE
"Chief Complaint   Patient presents with     Diabetes     Labs completed     Shortness of Breath     o2 is 88%- SOB for a few months- sx worsening with any exertion.       Initial /62  Pulse 92  Temp 99.1  F (37.3  C) (Oral)  Resp 16  Wt 142 lb 6.4 oz (64.6 kg)  SpO2 (!) 88%  BMI 25.23 kg/m2 Estimated body mass index is 25.23 kg/(m^2) as calculated from the following:    Height as of 12/21/17: 5' 3\" (1.6 m).    Weight as of this encounter: 142 lb 6.4 oz (64.6 kg).  Medication Reconciliation: complete      Irene Carter, DAVIDA      "

## 2018-03-10 VITALS
BODY MASS INDEX: 25.23 KG/M2 | DIASTOLIC BLOOD PRESSURE: 62 MMHG | RESPIRATION RATE: 16 BRPM | TEMPERATURE: 99.1 F | OXYGEN SATURATION: 92 % | WEIGHT: 142.4 LBS | HEART RATE: 92 BPM | SYSTOLIC BLOOD PRESSURE: 148 MMHG

## 2018-03-10 PROBLEM — F11.20 NARCOTIC DEPENDENCE (H): Status: ACTIVE | Noted: 2017-06-19

## 2018-03-28 ENCOUNTER — TELEPHONE (OUTPATIENT)
Dept: ANESTHESIOLOGY | Facility: CLINIC | Age: 83
End: 2018-03-28

## 2018-03-28 NOTE — TELEPHONE ENCOUNTER
Celeste called in today to schedule an injection with the [facility]. She did not specify a provider request for the procedure. It does not appear that she has been seen since last year. She can be reached at [phone]. Please call to schedule.

## 2018-03-29 DIAGNOSIS — M46.1 BILATERAL SACROILIITIS (H): Primary | ICD-10-CM

## 2018-04-02 ENCOUNTER — TELEPHONE (OUTPATIENT)
Dept: ANESTHESIOLOGY | Facility: CLINIC | Age: 83
End: 2018-04-02

## 2018-04-02 NOTE — TELEPHONE ENCOUNTER
[facility] Call Center    Phone Message    May a detailed message be left on voicemail: yes    Reason for Call: Symptoms or Concerns     If patient has red-flag symptoms, warm transfer to triage line    Current symptom or concern: pain    Symptoms have been present for:  1 week(s)    Has patient previously been seen for this? Yes    By Dr Bennett: Dr Bennett    Date: 11/10/17    Are there any new or worsening symptoms? Yes: yes      Action Taken: Message routed to:  [facility] ([facility] pain

## 2018-04-03 NOTE — TELEPHONE ENCOUNTER
LPN called pt to follow up with them after their call stating that they were having pain.     Pt stated that they had already scheduled an SI joint injection for 5/4/18 with Dr. Holland.   Pt was assisted to schedule a follow up appointment with Dr. Bennett on 5/18/18.    Pt was directed to contact the clinic if they were to have questions or concerns before hand.     Alicia Davis LPN

## 2018-04-09 DIAGNOSIS — M48.062 SPINAL STENOSIS OF LUMBAR REGION WITH NEUROGENIC CLAUDICATION: ICD-10-CM

## 2018-04-09 RX ORDER — OXYCODONE HYDROCHLORIDE 5 MG/1
TABLET ORAL
Qty: 150 TABLET | Refills: 0 | Status: SHIPPED | OUTPATIENT
Start: 2018-04-09 | End: 2018-05-07

## 2018-04-09 NOTE — TELEPHONE ENCOUNTER
Spoke with pt.  Need to mail rx to [facility].    Oxycodone      Last Written Prescription Date:  3-9-18  Last Fill Quantity: 150,   # refills: 0  Last Office Visit: 3-9-18  Future Office visit:       Routing refill request to provider for review/approval because:  Drug not on the [facility], [facility] or [facility] refill protocol or controlled substance    Signed CSA form in chart.    [place]: access not granted by provider.    [place]  [email or web address]    Please advise, thanks.

## 2018-04-09 NOTE — TELEPHONE ENCOUNTER
Reason for Call:  Medication or medication refill:    Do you use a [facility]?  Name of the pharmacy and phone number for the current request:  P/U    Name of the medication requested: oxycodone    Other request: Pt says she is late in calling it in and will need to  the script in the office.    Can we leave a detailed message on this number? YES    Phone number patient can be reached at: Home number on file [phone] (home)    Best Time: any    Call taken on 4/9/2018 at 12:20 PM by Natasha Conner

## 2018-04-09 NOTE — TELEPHONE ENCOUNTER
Mailed rx to [facility] in [place]- spoke to patient and relayed message.

## 2018-04-11 ENCOUNTER — TELEPHONE (OUTPATIENT)
Dept: ANESTHESIOLOGY | Facility: CLINIC | Age: 83
End: 2018-04-11

## 2018-04-11 NOTE — TELEPHONE ENCOUNTER
Patient called and left message stating she is returning a call from April in the [facility]. She can be reached at [phone].

## 2018-04-11 NOTE — TELEPHONE ENCOUNTER
I called Celeste to let her know that Dr. Holland is unavailable for her procedure on 5/4/2018. She was upset that she has to wait an additional 2 weeks to have her procedure done.     I added her to the wait list and let her know that I would call her as soon as something opens up.

## 2018-04-13 ENCOUNTER — TELEPHONE (OUTPATIENT)
Dept: ANESTHESIOLOGY | Facility: CLINIC | Age: 83
End: 2018-04-13

## 2018-05-04 ENCOUNTER — HOSPITAL ENCOUNTER (OUTPATIENT)
Facility: AMBULATORY SURGERY CENTER | Age: 83
End: 2018-05-04
Payer: COMMERCIAL

## 2018-05-04 ENCOUNTER — SURGERY (OUTPATIENT)
Age: 83
End: 2018-05-04

## 2018-05-04 ENCOUNTER — OFFICE VISIT (OUTPATIENT)
Dept: URGENT CARE | Facility: URGENT CARE | Age: 83
End: 2018-05-04
Payer: COMMERCIAL

## 2018-05-04 VITALS
BODY MASS INDEX: 25.05 KG/M2 | HEART RATE: 94 BPM | DIASTOLIC BLOOD PRESSURE: 68 MMHG | TEMPERATURE: 98.1 F | RESPIRATION RATE: 20 BRPM | OXYGEN SATURATION: 92 % | SYSTOLIC BLOOD PRESSURE: 138 MMHG | WEIGHT: 141.4 LBS

## 2018-05-04 VITALS
RESPIRATION RATE: 16 BRPM | OXYGEN SATURATION: 95 % | DIASTOLIC BLOOD PRESSURE: 92 MMHG | SYSTOLIC BLOOD PRESSURE: 166 MMHG

## 2018-05-04 DIAGNOSIS — M48.062 SPINAL STENOSIS OF LUMBAR REGION WITH NEUROGENIC CLAUDICATION: ICD-10-CM

## 2018-05-04 DIAGNOSIS — E11.9 TYPE 2 DIABETES MELLITUS WITHOUT COMPLICATION, WITHOUT LONG-TERM CURRENT USE OF INSULIN (H): ICD-10-CM

## 2018-05-04 DIAGNOSIS — R03.0 ELEVATED BLOOD PRESSURE READING: ICD-10-CM

## 2018-05-04 DIAGNOSIS — G89.4 CHRONIC PAIN SYNDROME: Primary | ICD-10-CM

## 2018-05-04 LAB — GLUCOSE BLDC GLUCOMTR-MCNC: 75 MG/DL (ref 70–99)

## 2018-05-04 PROCEDURE — 99214 OFFICE O/P EST MOD 30 MIN: CPT | Performed by: PHYSICIAN ASSISTANT

## 2018-05-04 RX ORDER — LISINOPRIL 2.5 MG/1
2.5 TABLET ORAL DAILY
Qty: 30 TABLET | Refills: 0 | Status: SHIPPED | OUTPATIENT
Start: 2018-05-04 | End: 2018-05-17

## 2018-05-04 RX ORDER — HYDROCHLOROTHIAZIDE 12.5 MG/1
12.5 TABLET ORAL DAILY
Qty: 30 TABLET | Refills: 0 | Status: SHIPPED | OUTPATIENT
Start: 2018-05-04 | End: 2018-05-04

## 2018-05-04 NOTE — MR AVS SNAPSHOT
"              After Visit Summary   5/4/2018    Celeste Chacko    MRN: [id number]           Patient Information     Date Of Birth          5/11/1931        Visit Information        Provider Department      5/4/2018 1:10 PM Ritchie Laureano, ANG [facility]        Today's Diagnoses     Chronic pain syndrome    -  1    Type 2 diabetes mellitus without complication, without long-term current use of insulin (H)        Spinal stenosis of lumbar region with neurogenic claudication        Elevated blood pressure reading           Follow-ups after your visit        Your next 10 appointments already scheduled     May 18, 2018 10:40 AM CDT   (Arrive by 10:25 AM)   Return Visit with Elmira Bennett MD   [facility] ([facility])    [address]  [address] [facility] [address] [phone]              Who to contact     If you have questions or need follow up information about today's clinic visit or your schedule please contact [facility] directly at [phone].  Normal or non-critical lab and imaging results will be communicated to you by MyChart, letter or phone within 4 business days after the clinic has received the results. If you do not hear from us within 7 days, please contact the clinic through Zartishart or phone. If you have a critical or abnormal lab result, we will notify you by phone as soon as possible.  Submit refill requests through Userlike Live Chat or call your pharmacy and they will forward the refill request to us. Please allow 3 business days for your refill to be completed.          Additional Information About Your Visit        MyChart Information     Userlike Live Chat lets you send messages to your doctor, view your test results, renew your prescriptions, schedule appointments and more. To sign up, go to [email or web address] . Click on \"Log in\" on the left side of the screen, which will " "take you to the Welcome page. Then click on \"Sign up Now\" on the right side of the page.     You will be asked to enter the access code listed below, as well as some personal information. Please follow the directions to create your username and password.     Your access code is: [email or web address]  Expires: 2018 11:06 AM     Your access code will  in 90 days. If you need help or a new code, please call your [facility] or [phone].        Care EveryWhere ID     This is your Care EveryWhere ID. This could be used by other organizations to access your [facility] medical records  [id number]        Your Vitals Were     Pulse Temperature Respirations Pulse Oximetry BMI (Body Mass Index)       94 98.1  F (36.7  C) (Oral) 20 92% 25.05 kg/m2        Blood Pressure from Last 3 Encounters:   18 138/68   18 (!) 166/92   18 148/62    Weight from Last 3 Encounters:   18 141 lb 6.4 oz (64.1 kg)   18 142 lb 6.4 oz (64.6 kg)   18 141 lb 8 oz (64.2 kg)              Today, you had the following     No orders found for display         Today's Medication Changes          These changes are accurate as of 18  2:12 PM.  If you have any questions, ask your nurse or doctor.               Start taking these medicines.        Dose/Directions    hydrochlorothiazide 12.5 MG Tabs tablet   Used for:  Elevated blood pressure reading   Started by:  Ritchie Laureano PA-C        Dose:  12.5 mg   Take 1 tablet (12.5 mg) by mouth daily   Quantity:  30 tablet   Refills:  0            Where to get your medicines      These medications were sent to [facility]/pharmacy #[id number] - [place], [place] - [address]  [address] [place] [address]     Phone:  [phone]     hydrochlorothiazide 12.5 MG Tabs tablet                Primary Care Provider Office Phone # Fax #    Emily Marie [phone]       [address] [place] [address]        Equal Access to Services     " [facility]ID [facility] : Hadii aad ku kasie Espino, waaxda luqadaha, qaybta kaalmada adelydia, omar aziza haybinta velaraphaeldar chao . So [facility] [phone].    ATENCIÓN: Si habla español, tiene a harvey disposición servicios gratuitos de asistencia lingüística. Llame al [phone].    We comply with applicable federal civil rights laws and [place] laws. We do not discriminate on the basis of race, color, national origin, age, disability, sex, sexual orientation, or gender identity.            Thank you!     Thank you for choosing [facility]  for your care. Our goal is always to provide you with excellent care. Hearing back from our patients is one way we can continue to improve our services. Please take a few minutes to complete the written survey that you may receive in the mail after your visit with us. Thank you!             Your Updated Medication List - Protect others around you: Learn how to safely use, store and throw away your medicines at www.disposemymeds.org.          This list is accurate as of 5/4/18  2:12 PM.  Always use your most recent med list.                   Brand Name Dispense Instructions for use Diagnosis    albuterol 108 (90 Base) MCG/ACT Inhaler    PROAIR HFA    1 Inhaler    Inhale 2 puffs into the lungs every 6 hours as needed for shortness of breath / dyspnea or wheezing    Chronic obstructive pulmonary disease, unspecified COPD type (H)       alendronate 70 MG tablet    FOSAMAX    12 tablet    Take 1 tablet (70 mg) by mouth every 7 days    Osteopenia, unspecified location       amoxicillin 500 MG capsule    AMOXIL     TAKE FOUR CAPSULES (2 GRAMS) BY MOUTH ONE HOUR BEFORE DENTAL APPOINTMENT        ascorbic acid 500 MG tablet    VITAMIN C    30 tablet    Take 1 tablet (500 mg) by mouth daily        ASPIRIN EC PO      Take 81 mg by mouth daily        calcium-vitamin D 600-400 MG-UNIT per tablet    CALTRATE     Take 1 tablet by mouth daily        FIBERCON PO       Take 1 tablet by mouth once , one in the morning and one in the evening        fluticasone-salmeterol 500-50 MCG/DOSE diskus inhaler    ADVAIR    3 Inhaler    Inhale 1 puff into the lungs 2 times daily    Chronic obstructive pulmonary disease, unspecified COPD type (H)       glipiZIDE 5 MG tablet    GLUCOTROL    90 tablet    Take 1 tablet (5 mg) by mouth every morning (before breakfast)    Type 2 diabetes mellitus without complication, without long-term current use of insulin (H)       Glucosamine-Chondroitin 500-250 MG Caps    GLUCOSAMINE CHONDROITIN COMPLX     Take 1 tablet by mouth 2 times daily    Generalized osteoarthrosis, unspecified site       hydrochlorothiazide 12.5 MG Tabs tablet     30 tablet    Take 1 tablet (12.5 mg) by mouth daily    Elevated blood pressure reading       MULTIVITAMIN ADULT PO      Take 1 tablet by mouth daily        ONETOUCH DELICA LANCETS 33G Misc      Reported on 4/12/2017        ONETOUCH ULTRA test strip   Generic drug:  blood glucose monitoring      Reported on 4/12/2017        oxyCODONE IR 5 MG tablet    ROXICODONE    150 tablet    May take 5 tabs per day    Spinal stenosis of lumbar region with neurogenic claudication       pravastatin 80 MG tablet    PRAVACHOL    90 tablet    Take 1 tablet (80 mg) by mouth daily    Hyperlipidemia LDL goal <100       senna-docusate 8.6-50 MG per tablet    SENOKOT-S;PERICOLACE     Take 1 tablet by mouth 2 times daily Reported on 4/12/2017        tiotropium 18 MCG capsule    SPIRIVA HANDIHALER    90 capsule    Inhale contents of one capsule daily.    Chronic obstructive pulmonary disease, unspecified COPD type (H)       TYLENOL PO      Take 325 mg by mouth 6 times daily Patient takes TID with Oxycodone.        VITAMIN E PO      Take 400 Int'l Units by mouth daily

## 2018-05-04 NOTE — OR NURSING
Patient positioned and prepped.  B/P significantly elevated.  Checked opposite arm. Still elevated.  MD notified.  Decision made not to continue with procedure at this time.  Will recheck.  Patient has been advised to follow up with MD regarding hypertension.

## 2018-05-04 NOTE — OR NURSING
Patient in recovery phase and blood pressure trends down. Dr Caraballo in to see patient and tells her to follow up with with primary care today, if not able to get into see primary doctor she should go to urgent care. Instructed on dangers of elevated blood pressure as well as S/S of CVA. Patient and daughter verbalize understanding

## 2018-05-04 NOTE — PROGRESS NOTES
"SUBJECTIVE:   Celeste Chacko is a 86 year old female presenting with a chief complaint of having ongoing chronic pain shots, diabetes and today had a really high blood pressure of 217/86.  Onset of symptoms was noticed today .  Course of illness is seems to be improved.    Severity moderate  Current and Associated symptoms: states that her legs have been weaker at times  Treatment measures tried include seeing her PCP and getting shots for her chronic pain.  Predisposing factors include hx of elevated blood pressures.    Past Medical History:   Diagnosis Date     Chronic airway obstruction, not elsewhere classified      Complete rupture of rotator cuff      Disorder of bone and cartilage, unspecified      History of blood transfusion      Mixed hyperlipidemia 4/00     Osteoarthritis      Personal history of other malignant neoplasm of skin      Spinal stenosis      Type II or unspecified type diabetes mellitus without mention of complication, not stated as uncontrolled         Allergies   Allergen Reactions     Sulfamethoxazole Anaphylaxis and Hives         Social History   Substance Use Topics     Smoking status: Former Smoker     Packs/day: 0.50     Years: 54.00     Types: Cigarettes     Quit date: 4/27/2000     Smokeless tobacco: Never Used      Comment: using nicotine gum     Alcohol use 0.5 oz/week     1 Glasses of wine per week      Comment: \"A glass of wine once a week.\"       ROS:  CONSTITUTIONAL:NEGATIVE for fever, chills, change in weight  INTEGUMENTARY/SKIN: NEGATIVE for worrisome rashes, moles or lesions  ENT/MOUTH: NEGATIVE for ear, mouth and throat problems  RESP:NEGATIVE for significant cough or SOB  CV: NEGATIVE for chest pain, palpitations or peripheral edema  GI: NEGATIVE for nausea, abdominal pain, heartburn, or change in bowel habits  MUSCULOSKELETAL: NEGATIVE for significant arthralgias or myalgia  NEURO: NEGATIVE for weakness, dizziness or paresthesias    OBJECTIVE  :/68  Pulse 94  Temp " 98.1  F (36.7  C) (Oral)  Resp 20  Wt 141 lb 6.4 oz (64.1 kg)  SpO2 92%  BMI 25.05 kg/m2  GENERAL APPEARANCE: healthy, alert and no distress  EYES: EOMI,  PERRL, conjunctiva clear  HENT: ear canals and TM's normal.  Nose and mouth without ulcers, erythema or lesions  NECK: supple, nontender, no lymphadenopathy  RESP: lungs clear to auscultation - no rales, rhonchi or wheezes  CV: regular rates and rhythm, normal S1 S2, no murmur noted  ABDOMEN:  soft, nontender, no HSM or masses and bowel sounds normal  NEURO: Normal strength and tone, sensory exam grossly normal,  normal speech and mentation  SKIN: no suspicious lesions or rashes    ASSESSMENT/PLAN:  /    ICD-10-CM    1. Chronic pain syndrome G89.4 Continue with pain clinic    2. Type 2 diabetes mellitus without complication, without long-term current use of insulin (H) E11.9 Monitor blood sugars   3. Spinal stenosis of lumbar region with neurogenic claudication M48.062    4. Elevated blood pressure reading R03.0 lisinopril (PRINIVIL/ZESTRIL) 2.5 MG tablet       Will start patient on lisinopril 2.5 mg  Advised to monitor blood pressure, if it falls below 120/80 then stop meds  Follow up with PCP next week  See orders in Epic

## 2018-05-07 DIAGNOSIS — M48.062 SPINAL STENOSIS OF LUMBAR REGION WITH NEUROGENIC CLAUDICATION: ICD-10-CM

## 2018-05-07 RX ORDER — OXYCODONE HYDROCHLORIDE 5 MG/1
TABLET ORAL
Qty: 150 TABLET | Refills: 0 | Status: SHIPPED | OUTPATIENT
Start: 2018-05-07 | End: 2018-06-11

## 2018-05-07 NOTE — TELEPHONE ENCOUNTER
Oxycodone    MAIL RX TO [facility]      Last Written Prescription Date:  04/09/18  Last Fill Quantity: 150,   # refills: 0  Last Office Visit: 03/09/18  Future Office visit:    Next 5 appointments (look out 90 days)     May 10, 2018  2:40 PM CDT   Office Visit with Emily Marie MD   [facility] ([facility])    [address]  [facility] [address] [phone]                   Routing refill request to provider for review/approval because:  Drug not on the [facility], [facility] or [facility] refill protocol or controlled substance

## 2018-05-08 NOTE — OP NOTE
Procedure cancelled secondary to hypertensive urgency with SBP in 200s when patient was brought into the procedure room.  Patient was monitored for approximately 30 minutes after being brought back to recovery area where her BP returned to the 160s systolic.  She denied any signs of end organ damage including headache, blurry vision, chest pain, shortness of breath, and was A&O x 4 with normal affect.  She and her accompanying family member were instructed to see her PCP ASAP or go to the ED if she developed any of the above signs or anything concerning to her or her family.     Breanna Caraballo MD

## 2018-05-10 ENCOUNTER — OFFICE VISIT (OUTPATIENT)
Dept: INTERNAL MEDICINE | Facility: CLINIC | Age: 83
End: 2018-05-10
Payer: COMMERCIAL

## 2018-05-10 VITALS
RESPIRATION RATE: 16 BRPM | TEMPERATURE: 98.9 F | BODY MASS INDEX: 25.37 KG/M2 | WEIGHT: 143.2 LBS | SYSTOLIC BLOOD PRESSURE: 162 MMHG | OXYGEN SATURATION: 90 % | DIASTOLIC BLOOD PRESSURE: 68 MMHG

## 2018-05-10 DIAGNOSIS — R49.0 HOARSENESS: ICD-10-CM

## 2018-05-10 DIAGNOSIS — M48.062 SPINAL STENOSIS OF LUMBAR REGION WITH NEUROGENIC CLAUDICATION: ICD-10-CM

## 2018-05-10 DIAGNOSIS — G56.03 BILATERAL CARPAL TUNNEL SYNDROME: ICD-10-CM

## 2018-05-10 DIAGNOSIS — I10 BENIGN ESSENTIAL HYPERTENSION: Primary | ICD-10-CM

## 2018-05-10 PROCEDURE — 99214 OFFICE O/P EST MOD 30 MIN: CPT | Performed by: INTERNAL MEDICINE

## 2018-05-10 NOTE — PROGRESS NOTES
SUBJECTIVE:   Celeste Chacko is a 86 year old female who presents to clinic today for the following health issues:        ED/UC Followup:    Facility:  [facility]  Date of visit: 05/04/2018  Reason for visit: elevated BP  Current Status: stable     She was at the [facility] to have a spinal injection done however her blood pressure was 166/92.  They would not do her injection and referred her to the [facility] urgent care.  At the urgent care blood pressure came down and was 138/68 by the time she left.  The physician there started her on lisinopril 2.5 mg.  She is taking this in the morning and does report some very mild lightheadedness.  She has not been having any acute headaches, chest pain, dyspnea.  She has not been monitoring her blood pressure at home.    Other concerns:  1.  She would like to transfer her pain management to [facility].  She is started at the [facility] when [facility] was not taking new patients after providers were leaving.  She finds the drive is very difficult for her.  She has been getting some improvement with injections.  2.  She complains of hoarseness for some months.  It can vary, throat is not hurting but frequently is dry.  She has not really had any cold type symptoms.  She is not having reflux.  She may have some mild postnasal drainage   3.  She complains of hand tingling on both sides.  She has been wearing braces but intermittently and only at night.    Patient Active Problem List   Diagnosis     Disorder of bone and cartilage     Generalized osteoarthrosis, unspecified site     COPD, severe (H)     Spinal stenosis of lumbar region with neurogenic claudication     Type 2 diabetes mellitus without complication, without long-term current use of insulin (H)     HYPERLIPIDEMIA LDL GOAL <100     History of basal cell carcinoma     Lumbago     Controlled substance agreement signed     History of actinic keratoses     Chronic pain syndrome     Narcotic  dependence (H)     Current Outpatient Prescriptions   Medication Sig Dispense Refill     Acetaminophen (TYLENOL PO) Take 325 mg by mouth 6 times daily Patient takes TID with Oxycodone.        albuterol (ALBUTEROL) 108 (90 BASE) MCG/ACT Inhaler Inhale 2 puffs into the lungs every 6 hours as needed for shortness of breath / dyspnea or wheezing 1 Inhaler 11     alendronate (FOSAMAX) 70 MG tablet Take 1 tablet (70 mg) by mouth every 7 days 12 tablet 3     amoxicillin (AMOXIL) 500 MG capsule TAKE FOUR CAPSULES (2 GRAMS) BY MOUTH ONE HOUR BEFORE DENTAL APPOINTMENT  3     ascorbic acid (VITAMIN C) 500 MG tablet Take 1 tablet (500 mg) by mouth daily 30 tablet      ASPIRIN EC PO Take 81 mg by mouth daily       Calcium Polycarbophil (FIBERCON PO) Take 1 tablet by mouth once , one in the morning and one in the evening       calcium-vitamin D (CALTRATE) 600-400 MG-UNIT per tablet Take 1 tablet by mouth daily 1200mg   1000 mg of vitamin d       fluticasone-salmeterol (ADVAIR) 500-50 MCG/DOSE diskus inhaler Inhale 1 puff into the lungs 2 times daily 3 Inhaler 3     glipiZIDE (GLUCOTROL) 5 MG tablet Take 1 tablet (5 mg) by mouth every morning (before breakfast) 90 tablet 3     Glucosamine-Chondroitin (GLUCOSAMINE CHONDROITIN COMPLX) 500-250 MG CAPS Take 1 tablet by mouth 2 times daily       lisinopril (PRINIVIL/ZESTRIL) 2.5 MG tablet Take 1 tablet (2.5 mg) by mouth daily 30 tablet 0     Multiple Vitamins-Minerals (MULTIVITAMIN ADULT PO) Take 1 tablet by mouth daily       NONFORMULARY Natra sleeping med takes 1 at night.       oxyCODONE IR (ROXICODONE) 5 MG tablet May take 5 tabs per day 150 tablet 0     pravastatin (PRAVACHOL) 80 MG tablet Take 1 tablet (80 mg) by mouth daily 90 tablet 3     senna-docusate (SENOKOT-S;PERICOLACE) 8.6-50 MG per tablet Take 1 tablet by mouth 2 times daily Reported on 4/12/2017       tiotropium (SPIRIVA HANDIHALER) 18 MCG capsule Inhale contents of one capsule daily. 90 capsule 3     VITAMIN E PO Take  "400 Int'l Units by mouth daily       ONE TOUCH ULTRA test strip Reported on 4/12/2017       ONETOUCH DELICA LANCETS 33G MISC Reported on 4/12/2017        Social History   Substance Use Topics     Smoking status: Former Smoker     Packs/day: 0.50     Years: 54.00     Types: Cigarettes     Quit date: 4/27/2000     Smokeless tobacco: Never Used      Comment: using nicotine gum     Alcohol use 0.5 oz/week     1 Glasses of wine per week      Comment: \"A glass of wine once a week.\"        Reviewed and updated as needed this visit by clinical staff       Reviewed and updated as needed this visit by Provider         ROS:  No fever, chills, headache, sinus pain, sore throat, heartburn    OBJECTIVE:     /68  Temp 98.9  F (37.2  C) (Oral)  Resp 16  Wt 143 lb 3.2 oz (65 kg)  SpO2 90%  BMI 25.37 kg/m2  Body mass index is 25.37 kg/(m^2).    Her voice is a little hoarse  Not otherwise examined  ASSESSMENT/PLAN:       1. Benign essential hypertension  Advised that the tiny dose of lisinopril she was started on is not likely to bring her blood pressure down substantially in a short period of time.  Blood pressure is still fairly high today and we would like to get it under control quickly so she can get her injection. Advised to gradually increase and take the medication at bedtime to avoid lightheadedness.  If she is not seeing response may need to use quicker acting medication such as Norvasc.  It is not clear that she truly has hypertension at this time but we will try to keep it down so she can get her injection and help improve her pain which may be influencing her blood pressure.    2. Spinal stenosis of lumbar region with neurogenic claudication  Refer to the [facility]  - PAIN MANAGEMENT REFERRAL    3. Bilateral carpal tunnel syndrome  Advised that she should wear splints at night and as much as she can during the day, talked about what types of splints to wear and advised that if it is not " improving surgery could be considered an option    4. Hoarseness  Recommend referral to ENT for a throat exam for persistent hoarseness.  - OTOLARYNGOLOGY REFERRAL        Emily Marie MD  [facility]

## 2018-05-10 NOTE — PATIENT INSTRUCTIONS
Tomorrow night increase the lisinopril to 2 tablets (5 mg) at bedtime. If you still see BP over 150 by Monday, increase to either 3 tablets or up to 4 tablets if you feel comfortable going up that much.     Call in a few BP readings on Monday the 21st.

## 2018-05-10 NOTE — MR AVS SNAPSHOT
After Visit Summary   5/10/2018    Celeste Chacko    MRN: [id number]           Patient Information     Date Of Birth          5/11/1931        Visit Information        Provider Department      5/10/2018 2:40 PM Emily Marie MD [facility]        Today's Diagnoses     Hoarseness    -  1      Care Instructions    Tomorrow night increase the lisinopril to 2 tablets (5 mg) at bedtime. If you still see BP over 150 by Monday, increase to either 3 tablets or up to 4 tablets if you feel comfortable going up that much.     Call in a few BP readings on Monday the 21st.             Follow-ups after your visit        Additional Services     OTOLARYNGOLOGY REFERRAL       Your provider has referred you to: [facility]: [facility] [phone]   [email or web address]    Please be aware that coverage of these services is subject to the terms and limitations of your health insurance plan.  Call member services at your health plan with any benefit or coverage questions.      Please bring the following with you to your appointment:    (1) Any X-Rays, CTs or MRIs which have been performed.  Contact the facility where they were done to arrange for  prior to your scheduled appointment.   (2) List of current medications  (3) This referral request   (4) Any documents/labs given to you for this referral                  Your next 10 appointments already scheduled     May 18, 2018 10:40 AM CDT   (Arrive by 10:25 AM)   Return Visit with Elmira Bennett MD   [facility] ([facility])    [address] [place] [id number] [phone]              Who to contact     If you have questions or need follow up information about today's clinic visit or your schedule please contact [facility] directly at [phone].  Normal or  "non-critical lab and imaging results will be communicated to you by MyChart, letter or phone within 4 business days after the clinic has received the results. If you do not hear from us within 7 days, please contact the clinic through Hybrid Log[employer] or phone. If you have a critical or abnormal lab result, we will notify you by phone as soon as possible.  Submit refill requests through hoozin or call your pharmacy and they will forward the refill request to us. Please allow 3 business days for your refill to be completed.          Additional Information About Your Visit        hoozin Information     hoozin lets you send messages to your doctor, view your test results, renew your prescriptions, schedule appointments and more. To sign up, go to [email or web address] . Click on \"Log in\" on the left side of the screen, which will take you to the Welcome page. Then click on \"Sign up Now\" on the right side of the page.     You will be asked to enter the access code listed below, as well as some personal information. Please follow the directions to create your username and password.     Your access code is: [email or web address]  Expires: 2018 11:06 AM     Your access code will  in 90 days. If you need help or a new code, please call your [place] clinic or [phone].        Care EveryWhere ID     This is your Care EveryWhere ID. This could be used by other organizations to access your [place] medical records  [id number]        Your Vitals Were     Temperature Respirations Pulse Oximetry BMI (Body Mass Index)          98.9  F (37.2  C) (Oral) 16 90% 25.37 kg/m2         Blood Pressure from Last 3 Encounters:   05/10/18 162/68   18 138/68   18 (!) 166/92    Weight from Last 3 Encounters:   05/10/18 143 lb 3.2 oz (65 kg)   18 141 lb 6.4 oz (64.1 kg)   18 142 lb 6.4 oz (64.6 kg)              We Performed the Following     OTOLARYNGOLOGY REFERRAL        Primary Care Provider Office Phone # Fax #    " Emily Marie [phone]       [address] [facility] [address]  [facility] [address]        Equal Access to Services     DAVID PALACIO : Hadii aad ku hadbriellerashmi Suadbrittany, luis ecalvin friedmanerrolha, lillianata kazoie harris, omar gorman danielmiller abad laEricbinta monson. So [facility] [phone].    ATENCIÓN: Si habla español, tiene a harvey disposición servicios gratuitos de asistencia lingüística. Llame al [phone].    We comply with applicable federal civil rights laws and [place] laws. We do not discriminate on the basis of race, color, national origin, age, disability, sex, sexual orientation, or gender identity.            Thank you!     Thank you for choosing [facility]  for your care. Our goal is always to provide you with excellent care. Hearing back from our patients is one way we can continue to improve our services. Please take a few minutes to complete the written survey that you may receive in the mail after your visit with us. Thank you!             Your Updated Medication List - Protect others around you: Learn how to safely use, store and throw away your medicines at www.disposemymeds.org.          This list is accurate as of 5/10/18  3:31 PM.  Always use your most recent med list.                   Brand Name Dispense Instructions for use Diagnosis    albuterol 108 (90 Base) MCG/ACT Inhaler    PROAIR HFA    1 Inhaler    Inhale 2 puffs into the lungs every 6 hours as needed for shortness of breath / dyspnea or wheezing    Chronic obstructive pulmonary disease, unspecified COPD type (H)       alendronate 70 MG tablet    FOSAMAX    12 tablet    Take 1 tablet (70 mg) by mouth every 7 days    Osteopenia, unspecified location       amoxicillin 500 MG capsule    AMOXIL     TAKE FOUR CAPSULES (2 GRAMS) BY MOUTH ONE HOUR BEFORE DENTAL APPOINTMENT        ascorbic acid 500 MG tablet    VITAMIN C    30 tablet    Take 1 tablet (500 mg) by mouth daily        ASPIRIN EC PO      Take 81 mg by mouth daily         calcium-vitamin D 600-400 MG-UNIT per tablet    CALTRATE     Take 1 tablet by mouth daily 1200mg  1000 mg of vitamin d        FIBERCON PO      Take 1 tablet by mouth once , one in the morning and one in the evening        fluticasone-salmeterol 500-50 MCG/DOSE diskus inhaler    ADVAIR    3 Inhaler    Inhale 1 puff into the lungs 2 times daily    Chronic obstructive pulmonary disease, unspecified COPD type (H)       glipiZIDE 5 MG tablet    GLUCOTROL    90 tablet    Take 1 tablet (5 mg) by mouth every morning (before breakfast)    Type 2 diabetes mellitus without complication, without long-term current use of insulin (H)       Glucosamine-Chondroitin 500-250 MG Caps    GLUCOSAMINE CHONDROITIN COMPLX     Take 1 tablet by mouth 2 times daily    Generalized osteoarthrosis, unspecified site       lisinopril 2.5 MG tablet    PRINIVIL/Zestril    30 tablet    Take 1 tablet (2.5 mg) by mouth daily    Elevated blood pressure reading       MULTIVITAMIN ADULT PO      Take 1 tablet by mouth daily        NONFORMULARY      Natra sleeping med takes 1 at night.        ONETOUCH DELICA LANCETS 33G Misc      Reported on 4/12/2017        ONETOUCH ULTRA test strip   Generic drug:  blood glucose monitoring      Reported on 4/12/2017        oxyCODONE IR 5 MG tablet    ROXICODONE    150 tablet    May take 5 tabs per day    Spinal stenosis of lumbar region with neurogenic claudication       pravastatin 80 MG tablet    PRAVACHOL    90 tablet    Take 1 tablet (80 mg) by mouth daily    Hyperlipidemia LDL goal <100       senna-docusate 8.6-50 MG per tablet    SENOKOT-S;PERICOLACE     Take 1 tablet by mouth 2 times daily Reported on 4/12/2017        tiotropium 18 MCG capsule    SPIRIVA HANDIHALER    90 capsule    Inhale contents of one capsule daily.    Chronic obstructive pulmonary disease, unspecified COPD type (H)       TYLENOL PO      Take 325 mg by mouth 6 times daily Patient takes TID with Oxycodone.        VITAMIN E PO      Take 400  Int'l Units by mouth daily

## 2018-05-15 PROBLEM — I10 BENIGN ESSENTIAL HYPERTENSION: Status: ACTIVE | Noted: 2018-05-15

## 2018-05-16 ENCOUNTER — TELEPHONE (OUTPATIENT)
Dept: INTERNAL MEDICINE | Facility: CLINIC | Age: 83
End: 2018-05-16

## 2018-05-16 DIAGNOSIS — R03.0 ELEVATED BLOOD PRESSURE READING: ICD-10-CM

## 2018-05-16 NOTE — TELEPHONE ENCOUNTER
Patient concerned about elevated BP readings.  Seen in UC 5/4/18 started on Lisinopril 2.5 mg.  Saw PCP 5/10/18 and readings were still high so received the following instructions:  Instructions   Tomorrow night increase the lisinopril to 2 tablets (5 mg) at bedtime. If you still see BP over 150 by Monday, increase to either 3 tablets or up to 4 tablets if you feel comfortable going up that much.      Call in a few BP readings on Monday the 21st.      Increased to 7.5 mg (3 tabs) on Mon. Evening 5/14 as systolic was still 170s  Last evening 10pm 194/83 then at 12am 170/83 so took 10 mg Lisinopril  This morning BP is 161/85    Patient takes Lisinopril at HS per MD instructions.  Denies HA, feels a little weak overall this morning.  Concerned about taking a neighbor to colonoscopy this afternoon without getting further instructions on how to manage hypertension.  NEERU Fong R.N.

## 2018-05-17 RX ORDER — AMLODIPINE BESYLATE 2.5 MG/1
2.5 TABLET ORAL DAILY
Qty: 30 TABLET | Refills: 1 | Status: SHIPPED | OUTPATIENT
Start: 2018-05-17 | End: 2018-07-12

## 2018-05-17 RX ORDER — LISINOPRIL 10 MG/1
10 TABLET ORAL DAILY
Qty: 30 TABLET | Refills: 1 | Status: SHIPPED | OUTPATIENT
Start: 2018-05-17 | End: 2018-05-22

## 2018-05-17 NOTE — TELEPHONE ENCOUNTER
Reviewed Dr Marie's office note from 5/10/2018.    Continue lisinopril at 10 mg daily.   Add amlodipine 5 mg once daily.     Faxed Rx's for both meds to her pharmacy.   Call us with update next week after Dr Marie has returned to the office.     Please advise pt.

## 2018-05-17 NOTE — TELEPHONE ENCOUNTER
Pt calls back.     She has been taking Lisinopril 10 mg, the night before last.   Last night she took Lisinopril 12.5 mg (took one extra-on her own).      Last night her BP was 178/81.  This AM her BP is 172/83    She is getting worried, and doesn't want to wait for Dr Marie next week.     She only has 5 pills of the Lisinopril left, so needs a new refill.     Please advise for the BP.

## 2018-05-21 ENCOUNTER — TELEPHONE (OUTPATIENT)
Dept: INTERNAL MEDICINE | Facility: CLINIC | Age: 83
End: 2018-05-21

## 2018-05-21 DIAGNOSIS — I10 BENIGN ESSENTIAL HYPERTENSION: Primary | ICD-10-CM

## 2018-05-21 DIAGNOSIS — J44.9 CHRONIC OBSTRUCTIVE PULMONARY DISEASE, UNSPECIFIED COPD TYPE (H): ICD-10-CM

## 2018-05-21 NOTE — TELEPHONE ENCOUNTER
Advise increase lisinopril to 20 mg daily. Clarify what amlodipine she is taking; Dr. Ley had recommended 5 mg but I only see order for 2.5 mg.

## 2018-05-21 NOTE — TELEPHONE ENCOUNTER
Reason for Call:  Other call back    Detailed comments: blood pressure readings May 18 179/85 May 163/80 May 20 155/80 May 36424/80.  Call pt with questions about medicines she took.     Phone Number Patient can be reached at: Home number on file [phone] (home)    Best Time: any    Can we leave a detailed message on this number? YES    Call taken on 5/21/2018 at 1:49 PM by Dianna Lindsey

## 2018-05-22 ENCOUNTER — TRANSFERRED RECORDS (OUTPATIENT)
Dept: HEALTH INFORMATION MANAGEMENT | Facility: CLINIC | Age: 83
End: 2018-05-22

## 2018-05-22 RX ORDER — ALBUTEROL SULFATE 90 UG/1
AEROSOL, METERED RESPIRATORY (INHALATION)
Qty: 8.5 INHALER | Refills: 2 | Status: SHIPPED | OUTPATIENT
Start: 2018-05-22 | End: 2018-12-28

## 2018-05-22 RX ORDER — LISINOPRIL 10 MG/1
20 TABLET ORAL DAILY
Qty: 60 TABLET | Refills: 1 | Status: SHIPPED | OUTPATIENT
Start: 2018-05-22 | End: 2018-06-26 | Stop reason: DRUGHIGH

## 2018-05-22 NOTE — TELEPHONE ENCOUNTER
"      Requested Prescriptions   Pending Prescriptions Disp Refills     PROAIR  (90 Base) MCG/ACT inhaler [Pharmacy Med Name: PROAIR HFA 90 MCG INHALER] 8.5 Inhaler 2     Sig: INHALE 2 PUFFS INTO LUNGS EVERY 6HRS AS NEEDED FOR SHORTNESS OF BREATH/DYSPNEA/WHEEZING    Asthma Maintenance Inhalers - Anticholinergics Passed    5/21/2018  3:05 PM       Passed - Patient is age 12 years or older       Passed - Recent (12 mo) or future (30 days) visit within the authorizing provider's specialty    Patient had office visit in the last 12 months or has a visit in the next 30 days with authorizing provider or within the authorizing provider's specialty.  See \"Patient Info\" tab in inbasket, or \"Choose Columns\" in Meds & Orders section of the refill encounter.              "

## 2018-05-22 NOTE — TELEPHONE ENCOUNTER
Call to pt and advised.     She will run out of the lisinopril early since will be taking 2 pills.     She states she takes Amlodipine 2.5 mg one daily.

## 2018-06-05 ENCOUNTER — TELEPHONE (OUTPATIENT)
Dept: INTERNAL MEDICINE | Facility: CLINIC | Age: 83
End: 2018-06-05

## 2018-06-05 DIAGNOSIS — I10 BENIGN ESSENTIAL HYPERTENSION: Primary | ICD-10-CM

## 2018-06-05 RX ORDER — LISINOPRIL 40 MG/1
40 TABLET ORAL DAILY
Qty: 30 TABLET | Refills: 1 | Status: SHIPPED | OUTPATIENT
Start: 2018-06-05 | End: 2018-07-17

## 2018-06-05 NOTE — TELEPHONE ENCOUNTER
Pt called. Stated that she was suppose to call in 2wks with BP numbers-See 5/21 phone note    Pt stated all BP's are after taking medication. Pt denies any symptoms.        5/31- 715am 161/50  6/1- 1pm 166/81  6/2- 12pm 179/79  6/3- 9am 164/70  6/4- 515pm 171/81  6/4- 1030am 160/78

## 2018-06-05 NOTE — TELEPHONE ENCOUNTER
Recommend she increase lisinopril to 40 mg daily.  I will order 40 mg tablets now but she can use up to 10 mg by taking 4. Call in readings again in 2-3 weeks.

## 2018-06-11 DIAGNOSIS — M48.062 SPINAL STENOSIS OF LUMBAR REGION WITH NEUROGENIC CLAUDICATION: ICD-10-CM

## 2018-06-12 RX ORDER — OXYCODONE HYDROCHLORIDE 5 MG/1
TABLET ORAL
Qty: 150 TABLET | Refills: 0 | Status: SHIPPED | OUTPATIENT
Start: 2018-06-12 | End: 2018-07-06

## 2018-06-26 ENCOUNTER — TELEPHONE (OUTPATIENT)
Dept: INTERNAL MEDICINE | Facility: CLINIC | Age: 83
End: 2018-06-26

## 2018-06-26 NOTE — TELEPHONE ENCOUNTER
Advise that these are improving. It can take some weeks to see the full effect of the dose change on lisinopril. Recommend she continue current dose, suggest she check 2-3 per week and send me some more in 3-4 weeks.

## 2018-06-26 NOTE — TELEPHONE ENCOUNTER
Pt calling with BP readings.    6/26  161/80  6/25  161/79  6/24  155/79  6/23  152/72  6/22  141/70  6/21  154/79    Please advise.    Pt wondering about emailing info, gave her info to get signed up on [employer] and support number.

## 2018-07-06 ENCOUNTER — TELEPHONE (OUTPATIENT)
Dept: INTERNAL MEDICINE | Facility: CLINIC | Age: 83
End: 2018-07-06

## 2018-07-06 DIAGNOSIS — M48.062 SPINAL STENOSIS OF LUMBAR REGION WITH NEUROGENIC CLAUDICATION: ICD-10-CM

## 2018-07-06 RX ORDER — OXYCODONE HYDROCHLORIDE 5 MG/1
TABLET ORAL
Qty: 150 TABLET | Refills: 0 | Status: SHIPPED | OUTPATIENT
Start: 2018-07-06 | End: 2018-08-09

## 2018-07-06 NOTE — TELEPHONE ENCOUNTER
Signed CSA form in chart.  Mail to: [facility] [address]      Oxycodone      Last Written Prescription Date:  6/12/18  Last Fill Quantity: 150,   # refills: 0  Last Office Visit: 5/10/18  Future Office visit:       Routing refill request to provider for review/approval because:  Drug not on the FMG, UMP or  Health refill protocol or controlled substance    RX [place] reviewed:  reviewed- no concerns    [place]  [email or web address]

## 2018-07-06 NOTE — TELEPHONE ENCOUNTER
Reason for Call:  Medication or medication refill:    Do you use a [facility]?  Name of the pharmacy and phone number for the current request:  [address] [facility]    Name of the medication requested: Oxycodone 5mg    Other request: no    Can we leave a detailed message on this number? YES    Phone number patient can be reached at: Home number on file [phone] (home)    Best Time: any    Call taken on 7/6/2018 at 11:12 AM by Dianna Lindsey

## 2018-07-12 DIAGNOSIS — R03.0 ELEVATED BLOOD PRESSURE READING: ICD-10-CM

## 2018-07-12 RX ORDER — AMLODIPINE BESYLATE 2.5 MG/1
TABLET ORAL
Qty: 30 TABLET | Refills: 1 | Status: SHIPPED | OUTPATIENT
Start: 2018-07-12 | End: 2018-09-04

## 2018-07-12 NOTE — TELEPHONE ENCOUNTER
"Requested Prescriptions   Pending Prescriptions Disp Refills     amLODIPine (NORVASC) 2.5 MG tablet [Pharmacy Med Name: AMLODIPINE BESYLATE 2.5 MG TAB] 30 tablet 1    Last Written Prescription Date:  05/17/2018  Last Fill Quantity: 30,  # refills: 1   Last office visit: 5/10/2018 with prescribing provider:     Future Office Visit:   Sig: TAKE 1 TABLET BY MOUTH EVERY DAY    Calcium Channel Blockers Protocol  Failed    7/12/2018  8:17 AM       Failed - Blood pressure under 140/90 in past 12 months    BP Readings from Last 3 Encounters:   05/10/18 162/68   05/04/18 138/68   05/04/18 (!) 166/92                Passed - Recent (12 mo) or future (30 days) visit within the authorizing provider's specialty    Patient had office visit in the last 12 months or has a visit in the next 30 days with authorizing provider or within the authorizing provider's specialty.  See \"Patient Info\" tab in inbasket, or \"Choose Columns\" in Meds & Orders section of the refill encounter.           Passed - Patient is age 18 or older       Passed - No active pregnancy on record       Passed - Normal serum creatinine on file in past 12 months    Recent Labs   Lab Test  03/01/18   0842   CR  0.61            Passed - No positive pregnancy test in past 12 months        "

## 2018-07-15 DIAGNOSIS — M85.80 OSTEOPENIA, UNSPECIFIED LOCATION: ICD-10-CM

## 2018-07-16 NOTE — TELEPHONE ENCOUNTER
"Requested Prescriptions   Pending Prescriptions Disp Refills     alendronate (FOSAMAX) 70 MG tablet [Pharmacy Med Name: ALENDRONATE SODIUM 70 MG TAB] 12 tablet 3    Last Written Prescription Date:  08/22/2017  Last Fill Quantity: 12  # refills: 3   Last office visit: 5/10/2018 with prescribing provider:    Future Office Visit:   Sig: TAKE 1 TABLET (70 MG) BY MOUTH EVERY 7 DAYS    Bisphosphonates Failed    7/15/2018  1:34 PM       Failed - Dexa on file within past 2 years    Please review last Dexa result.          Passed - Recent (12 mo) or future (30 days) visit within the authorizing provider's specialty    Patient had office visit in the last 12 months or has a visit in the next 30 days with authorizing provider or within the authorizing provider's specialty.  See \"Patient Info\" tab in inbasket, or \"Choose Columns\" in Meds & Orders section of the refill encounter.           Passed - Patient is age 18 or older       Passed - Normal serum creatinine on file within past 12 months    Recent Labs   Lab Test  03/01/18   0842   CR  0.61             "

## 2018-07-17 DIAGNOSIS — I10 BENIGN ESSENTIAL HYPERTENSION: ICD-10-CM

## 2018-07-17 NOTE — TELEPHONE ENCOUNTER
"Requested Prescriptions   Pending Prescriptions Disp Refills     lisinopril (PRINIVIL/ZESTRIL) 40 MG tablet  Last Written Prescription Date:  6/5/18  Last Fill Quantity: 30,  # refills: 1   Last office visit: 5/10/2018 with prescribing provider:  5/10/18   Future Office Visit:     30 tablet 1     Sig: Take 1 tablet (40 mg) by mouth daily    ACE Inhibitors (Including Combos) Protocol Failed    7/17/2018  9:02 AM       Failed - Blood pressure under 140/90 in past 12 months    BP Readings from Last 3 Encounters:   05/10/18 162/68   05/04/18 138/68   05/04/18 (!) 166/92                Passed - Recent (12 mo) or future (30 days) visit within the authorizing provider's specialty    Patient had office visit in the last 12 months or has a visit in the next 30 days with authorizing provider or within the authorizing provider's specialty.  See \"Patient Info\" tab in inbasket, or \"Choose Columns\" in Meds & Orders section of the refill encounter.           Passed - Patient is age 18 or older       Passed - No active pregnancy on record       Passed - Normal serum creatinine on file in past 12 months    Recent Labs   Lab Test  03/01/18   0842   CR  0.61            Passed - Normal serum potassium on file in past 12 months    Recent Labs   Lab Test  03/01/18   0842   POTASSIUM  3.7            Passed - No positive pregnancy test in past 12 months          "

## 2018-07-18 RX ORDER — ALENDRONATE SODIUM 70 MG/1
TABLET ORAL
Qty: 4 TABLET | Refills: 0 | Status: SHIPPED | OUTPATIENT
Start: 2018-07-18 | End: 2018-08-21

## 2018-07-20 RX ORDER — LISINOPRIL 40 MG/1
40 TABLET ORAL DAILY
Qty: 30 TABLET | Refills: 1 | Status: SHIPPED | OUTPATIENT
Start: 2018-07-20 | End: 2018-09-10

## 2018-07-26 ENCOUNTER — TELEPHONE (OUTPATIENT)
Dept: INTERNAL MEDICINE | Facility: CLINIC | Age: 83
End: 2018-07-26

## 2018-07-26 NOTE — TELEPHONE ENCOUNTER
Patient called. Stated she was suppose to call with some BP numbers. All numbers are below are over a 2wks period, and are taken in the am.       144/75  145/74  150/78  153/78  155/81  166/81

## 2018-07-26 NOTE — TELEPHONE ENCOUNTER
Called patient-relayed below. Patient is not sure about the times, since she did not write the times down, but thinks it was around 715am-12N. Patient will do below, and this time write time of day, then call back with update in 4-5wks

## 2018-07-26 NOTE — TELEPHONE ENCOUNTER
What time of the morning? The BP is always higher in am, especially the first hour someone is up. I recommend she do some readings in the late morning, afternoon and evening to see if lower. Call in 4-5 in a week.

## 2018-08-08 ENCOUNTER — TELEPHONE (OUTPATIENT)
Dept: INTERNAL MEDICINE | Facility: CLINIC | Age: 83
End: 2018-08-08

## 2018-08-08 DIAGNOSIS — M48.062 SPINAL STENOSIS OF LUMBAR REGION WITH NEUROGENIC CLAUDICATION: ICD-10-CM

## 2018-08-08 NOTE — TELEPHONE ENCOUNTER
Reason for Call:  Medication or medication refill:    Do you use a [facility]?  Name of the pharmacy and phone number for the current request:  [address]    Name of the medication requested: oxycodone    Other request: Pt says she has a 7 day supply left.    Can we leave a detailed message on this number? YES    Phone number patient can be reached at: Home number on file [phone] (home)    Best Time: any    Call taken on 8/8/2018 at 2:00 PM by Natasha Conner

## 2018-08-09 RX ORDER — OXYCODONE HYDROCHLORIDE 5 MG/1
TABLET ORAL
Qty: 150 TABLET | Refills: 0 | Status: SHIPPED | OUTPATIENT
Start: 2018-08-09 | End: 2018-09-10

## 2018-08-09 NOTE — TELEPHONE ENCOUNTER
Oxycodone refill request. MAIL to [facility]. Pt has 7 days supply left.         Last Written Prescription Date:  7/6/18  Last Fill Quantity: 150,   # refills: 0    Last Office Visit: 5/10/18    Future Office visit:       Routing refill request to provider for review/approval because:  Drug not on the FMG, P or [facility] refill protocol or controlled substance

## 2018-08-09 NOTE — TELEPHONE ENCOUNTER
Rx mailed to [facility].  Detailed message left on Patient's voice mail.

## 2018-08-21 DIAGNOSIS — M85.80 OSTEOPENIA, UNSPECIFIED LOCATION: ICD-10-CM

## 2018-08-22 ENCOUNTER — RADIANT APPOINTMENT (OUTPATIENT)
Dept: BONE DENSITY | Facility: CLINIC | Age: 83
End: 2018-08-22
Attending: INTERNAL MEDICINE
Payer: COMMERCIAL

## 2018-08-22 DIAGNOSIS — M85.80 OSTEOPENIA, UNSPECIFIED LOCATION: ICD-10-CM

## 2018-08-22 PROCEDURE — 77080 DXA BONE DENSITY AXIAL: CPT | Performed by: INTERNAL MEDICINE

## 2018-08-22 NOTE — TELEPHONE ENCOUNTER
"Requested Prescriptions   Pending Prescriptions Disp Refills     alendronate (FOSAMAX) 70 MG tablet [Pharmacy Med Name: ALENDRONATE SODIUM 70 MG TAB] 4 tablet 0    Last Written Prescription Date:  07/18/2018  Last Fill Quantity: 4,  # refills: 0   Last office visit: 5/10/2018 with prescribing provider:     Future Office Visit:   Next 5 appointments (look out 90 days)     Sep 10, 2018  2:00 PM CDT   [facility]   [facility]    [address]  [facility] [address]   [phone]                Sig: TAKE 1 TABLET (70 MG) BY MOUTH EVERY 7 DAYS    Bisphosphonates Failed    8/21/2018 12:31 PM       Failed - Dexa on file within past 2 years    Please review last Dexa result.          Passed - Recent (12 mo) or future (30 days) visit within the authorizing provider's specialty    Patient had office visit in the last 12 months or has a visit in the next 30 days with authorizing provider or within the authorizing provider's specialty.  See \"Patient Info\" tab in inbasket, or \"Choose Columns\" in Meds & Orders section of the refill encounter.           Passed - Patient is age 18 or older       Passed - Normal serum creatinine on file within past 12 months    Recent Labs   Lab Test  03/01/18   0842   CR  0.61             "

## 2018-08-25 RX ORDER — ALENDRONATE SODIUM 70 MG/1
TABLET ORAL
Qty: 4 TABLET | Refills: 6 | Status: SHIPPED | OUTPATIENT
Start: 2018-08-25 | End: 2019-03-03

## 2018-08-25 NOTE — TELEPHONE ENCOUNTER
Dexa done 8/22/18    Prescription approved per [facility] Refill Protocol.

## 2018-08-30 DIAGNOSIS — E11.9 TYPE 2 DIABETES MELLITUS WITHOUT COMPLICATION, WITHOUT LONG-TERM CURRENT USE OF INSULIN (H): ICD-10-CM

## 2018-08-30 LAB
ANION GAP SERPL CALCULATED.3IONS-SCNC: 9 MMOL/L (ref 3–14)
BUN SERPL-MCNC: 15 MG/DL (ref 7–30)
CALCIUM SERPL-MCNC: 9.1 MG/DL (ref 8.5–10.1)
CHLORIDE SERPL-SCNC: 105 MMOL/L (ref 94–109)
CO2 SERPL-SCNC: 29 MMOL/L (ref 20–32)
CREAT SERPL-MCNC: 0.6 MG/DL (ref 0.52–1.04)
GFR SERPL CREATININE-BSD FRML MDRD: >90 ML/MIN/1.7M2
GLUCOSE SERPL-MCNC: 90 MG/DL (ref 70–99)
HBA1C MFR BLD: 6.7 % (ref 0–5.6)
POTASSIUM SERPL-SCNC: 3.8 MMOL/L (ref 3.4–5.3)
SODIUM SERPL-SCNC: 143 MMOL/L (ref 133–144)

## 2018-08-30 PROCEDURE — 36415 COLL VENOUS BLD VENIPUNCTURE: CPT | Performed by: INTERNAL MEDICINE

## 2018-08-30 PROCEDURE — 83036 HEMOGLOBIN GLYCOSYLATED A1C: CPT | Performed by: INTERNAL MEDICINE

## 2018-08-30 PROCEDURE — 80048 BASIC METABOLIC PNL TOTAL CA: CPT | Performed by: INTERNAL MEDICINE

## 2018-09-04 DIAGNOSIS — R03.0 ELEVATED BLOOD PRESSURE READING: ICD-10-CM

## 2018-09-05 NOTE — TELEPHONE ENCOUNTER
"Requested Prescriptions   Pending Prescriptions Disp Refills     amLODIPine (NORVASC) 2.5 MG tablet [Pharmacy Med Name: AMLODIPINE BESYLATE 2.5 MG TAB] 30 tablet 1    Last Written Prescription Date:  07/12/2018  Last Fill Quantity: 30,  # refills: 1   Last office visit: 5/10/2018 with prescribing provider:     Future Office Visit:   Next 5 appointments (look out 90 days)     Sep 10, 2018  2:00 PM CDT   SHORT with [facility] MD   [facility]    [address]  [facility] [address]   [phone]                Sig: TAKE 1 TABLET BY MOUTH EVERY DAY    Calcium Channel Blockers Protocol  Failed    9/4/2018  2:04 AM       Failed - Blood pressure under 140/90 in past 12 months    BP Readings from Last 3 Encounters:   05/10/18 162/68   05/04/18 138/68   05/04/18 (!) 166/92                Passed - Recent (12 mo) or future (30 days) visit within the authorizing provider's specialty    Patient had office visit in the last 12 months or has a visit in the next 30 days with authorizing provider or within the authorizing provider's specialty.  See \"Patient Info\" tab in inbasket, or \"Choose Columns\" in Meds & Orders section of the refill encounter.           Passed - Patient is age 18 or older       Passed - No active pregnancy on record       Passed - Normal serum creatinine on file in past 12 months    Recent Labs   Lab Test  08/30/18   0811   CR  0.60            Passed - No positive pregnancy test in past 12 months        "

## 2018-09-06 RX ORDER — AMLODIPINE BESYLATE 2.5 MG/1
TABLET ORAL
Qty: 30 TABLET | Refills: 1 | Status: SHIPPED | OUTPATIENT
Start: 2018-09-06 | End: 2018-09-10

## 2018-09-10 ENCOUNTER — OFFICE VISIT (OUTPATIENT)
Dept: INTERNAL MEDICINE | Facility: CLINIC | Age: 83
End: 2018-09-10
Payer: COMMERCIAL

## 2018-09-10 VITALS
BODY MASS INDEX: 25.3 KG/M2 | OXYGEN SATURATION: 91 % | RESPIRATION RATE: 16 BRPM | WEIGHT: 142.8 LBS | DIASTOLIC BLOOD PRESSURE: 54 MMHG | SYSTOLIC BLOOD PRESSURE: 130 MMHG | TEMPERATURE: 98.3 F | HEART RATE: 95 BPM

## 2018-09-10 DIAGNOSIS — E78.5 HYPERLIPIDEMIA LDL GOAL <100: ICD-10-CM

## 2018-09-10 DIAGNOSIS — Z23 NEED FOR PROPHYLACTIC VACCINATION AND INOCULATION AGAINST INFLUENZA: ICD-10-CM

## 2018-09-10 DIAGNOSIS — R61 NIGHT SWEATS: ICD-10-CM

## 2018-09-10 DIAGNOSIS — E11.9 TYPE 2 DIABETES MELLITUS WITHOUT COMPLICATION, WITHOUT LONG-TERM CURRENT USE OF INSULIN (H): Primary | ICD-10-CM

## 2018-09-10 DIAGNOSIS — R25.2 LEG CRAMPS: ICD-10-CM

## 2018-09-10 DIAGNOSIS — J44.9 COPD, SEVERE (H): ICD-10-CM

## 2018-09-10 DIAGNOSIS — M48.062 SPINAL STENOSIS OF LUMBAR REGION WITH NEUROGENIC CLAUDICATION: ICD-10-CM

## 2018-09-10 DIAGNOSIS — I10 BENIGN ESSENTIAL HYPERTENSION: ICD-10-CM

## 2018-09-10 PROCEDURE — 99207 C FOOT EXAM  NO CHARGE: CPT | Performed by: INTERNAL MEDICINE

## 2018-09-10 PROCEDURE — G0008 ADMIN INFLUENZA VIRUS VAC: HCPCS | Performed by: INTERNAL MEDICINE

## 2018-09-10 PROCEDURE — 90662 IIV NO PRSV INCREASED AG IM: CPT | Performed by: INTERNAL MEDICINE

## 2018-09-10 PROCEDURE — 99214 OFFICE O/P EST MOD 30 MIN: CPT | Mod: 25 | Performed by: INTERNAL MEDICINE

## 2018-09-10 RX ORDER — LISINOPRIL 40 MG/1
40 TABLET ORAL DAILY
Qty: 90 TABLET | Refills: 1 | Status: SHIPPED | OUTPATIENT
Start: 2018-09-10 | End: 2019-04-24

## 2018-09-10 RX ORDER — AMLODIPINE BESYLATE 2.5 MG/1
2.5 TABLET ORAL DAILY
Qty: 90 TABLET | Refills: 1 | Status: SHIPPED | OUTPATIENT
Start: 2018-09-10 | End: 2019-03-05

## 2018-09-10 RX ORDER — OXYCODONE HYDROCHLORIDE 5 MG/1
TABLET ORAL
Qty: 150 TABLET | Refills: 0 | Status: SHIPPED | OUTPATIENT
Start: 2018-09-10 | End: 2018-10-08

## 2018-09-10 RX ORDER — GLIPIZIDE 5 MG/1
5 TABLET ORAL
Qty: 90 TABLET | Refills: 3 | Status: SHIPPED | OUTPATIENT
Start: 2018-09-10 | End: 2019-09-02

## 2018-09-10 NOTE — PROGRESS NOTES
SUBJECTIVE:   Celeste Chacko is a 87 year old female who presents to clinic today for the following health issues:      Diabetes Follow-up      Patient is checking blood sugars: not at all    Diabetic concerns: None     Symptoms of hypoglycemia (low blood sugar): shaky     Paresthesias (numbness or burning in feet) or sores: No     Date of last diabetic eye exam: 09/2018    Diabetes Management Resources    Hyperlipidemia Follow-Up      Rate your low fat/cholesterol diet?: fair    Taking statin?  Yes, no muscle aches from statin    Other lipid medications/supplements?:  none    Hypertension Follow-up      Outpatient blood pressures are being checked at home.  Results are 139/70, 141/74, 160/78.    Low Salt Diet: low salt    BP Readings from Last 2 Encounters:   05/10/18 162/68   05/04/18 138/68     Hemoglobin A1C (%)   Date Value   08/30/2018 6.7 (H)   03/01/2018 6.8 (H)     LDL Cholesterol Calculated (mg/dL)   Date Value   03/01/2018 72   02/21/2017 72       Amount of exercise or physical activity[place] for 30 min each    Problems taking medications regularly: No    Medication side effects: none    Diet: low salt      Other problems:   COPD: stable, did not really bother her this summer  Spinal stenosis/chronic pain/narcotic dependence: Overall stable with her pain medication      Current concerns:   1.  Complains of having leg cramps.  This is the lower legs mostly the front and the side legs, occurring about 3 times a week and it is usually in the evening, rarely during the night.  She has known spinal stenosis with chronic neurogenic claudication and various joint issues involving her lower extremities.    2.  Complaints she is had some sweating at night.  This is not drenching, just mild.  No fever or chills, no weight loss, appetite is good, no other focal symptoms    Patient Active Problem List   Diagnosis     Disorder of bone and cartilage     COPD, severe (H)     Spinal  stenosis of lumbar region with neurogenic claudication     Type 2 diabetes mellitus without complication, without long-term current use of insulin (H)     HYPERLIPIDEMIA LDL GOAL <100     History of basal cell carcinoma     Lumbago     Controlled substance agreement signed     Chronic pain syndrome     Narcotic dependence (H)     Benign essential hypertension       Current Outpatient Prescriptions   Medication Sig Dispense Refill     Acetaminophen (TYLENOL PO) Take 325 mg by mouth 6 times daily Patient takes TID with Oxycodone.        alendronate (FOSAMAX) 70 MG tablet TAKE 1 TABLET (70 MG) BY MOUTH EVERY 7 DAYS 4 tablet 6     amLODIPine (NORVASC) 2.5 MG tablet TAKE 1 TABLET BY MOUTH EVERY DAY 30 tablet 1     amoxicillin (AMOXIL) 500 MG capsule TAKE FOUR CAPSULES (2 GRAMS) BY MOUTH ONE HOUR BEFORE DENTAL APPOINTMENT  3     ascorbic acid (VITAMIN C) 500 MG tablet Take 1 tablet (500 mg) by mouth daily 30 tablet      ASPIRIN EC PO Take 81 mg by mouth daily       Calcium Polycarbophil (FIBERCON PO) Take 1 tablet by mouth once , one in the morning and one in the evening       calcium-vitamin D (CALTRATE) 600-400 MG-UNIT per tablet Take 1 tablet by mouth daily 1200mg   1000 mg of vitamin d       fluticasone-salmeterol (ADVAIR) 500-50 MCG/DOSE diskus inhaler Inhale 1 puff into the lungs 2 times daily 3 Inhaler 3     glipiZIDE (GLUCOTROL) 5 MG tablet Take 1 tablet (5 mg) by mouth every morning (before breakfast) 90 tablet 3     Glucosamine-Chondroitin (GLUCOSAMINE CHONDROITIN COMPLX) 500-250 MG CAPS Take 1 tablet by mouth 2 times daily       lisinopril (PRINIVIL/ZESTRIL) 40 MG tablet Take 1 tablet (40 mg) by mouth daily 30 tablet 1     Multiple Vitamins-Minerals (MULTIVITAMIN ADULT PO) Take 1 tablet by mouth daily       NONFORMULARY Natra sleeping med takes 1 at night.       ONE TOUCH ULTRA test strip Reported on 4/12/2017       ONETOUCH DELICA LANCETS 33G MISC Reported on 4/12/2017       oxyCODONE IR (ROXICODONE) 5 MG  "tablet May take 5 tabs per day 150 tablet 0     pravastatin (PRAVACHOL) 80 MG tablet Take 1 tablet (80 mg) by mouth daily 90 tablet 3     PROAIR  (90 Base) MCG/ACT inhaler INHALE 2 PUFFS INTO LUNGS EVERY 6HRS AS NEEDED FOR SHORTNESS OF BREATH/DYSPNEA/WHEEZING 8.5 Inhaler 2     senna-docusate (SENOKOT-S;PERICOLACE) 8.6-50 MG per tablet Take 1 tablet by mouth 2 times daily Reported on 4/12/2017       SPIRIVA HANDIHALER 18 MCG capsule INHALE CONTENTS OF ONE CAPSULE DAILY. 90 capsule 3     VITAMIN E PO Take 400 Int'l Units by mouth daily         Social History   Substance Use Topics     Smoking status: Former Smoker     Packs/day: 0.50     Years: 54.00     Types: Cigarettes     Quit date: 4/27/2000     Smokeless tobacco: Never Used      Comment: using nicotine gum     Alcohol use 0.5 oz/week     1 Glasses of wine per week      Comment: \"A glass of wine once a week.\"        ROS:  General: no fever, chills  Weight: stable  Vision:negative. Last eye exam 9/18.  ENT: negative  Respiratory stable.  Cardiac: no chest pain or pressure  Abdominal: no nausea, vomiting, abdominal pain, bowel changes  Vascular no complaints of claudication  Neurologic:no complaints of neuropathy  Feet no lesions, in grown nails, edema   : no polyuria, hematuria, dysuria    Objective:  Patient alert in NAD  /54  Pulse 95  Temp 98.3  F (36.8  C) (Oral)  Resp 16  Wt 142 lb 12.8 oz (64.8 kg)  SpO2 91%  BMI 25.3 kg/m2       Wt Readings from Last 4 Encounters:   09/10/18 142 lb 12.8 oz (64.8 kg)   05/10/18 143 lb 3.2 oz (65 kg)   05/04/18 141 lb 6.4 oz (64.1 kg)   03/09/18 142 lb 6.4 oz (64.6 kg)     No cervical adenopathy present  CV: CV: normal S1, S2 without murmur, S3 or S4.  Carotid pulses: full  LUNGS: clear  Feet: pulses full, normal capillary refill  No lesions, sores or skin changes  Nails normal  Sensation able to feel fine filament    Lab Results   Component Value Date    A1C 6.7 08/30/2018    A1C 6.8 03/01/2018    A1C " 7.1 08/22/2017    A1C 6.7 02/21/2017    A1C 6.6 08/11/2016       Lab Results   Component Value Date    CHOL 151 03/01/2018    HDL 53 03/01/2018    LDL 72 03/01/2018    TRIG 128 03/01/2018    CHOLHDLRATIO 3.4 07/23/2015              ASSESSMENT/PLAN:             1. Type 2 diabetes mellitus without complication, without long-term current use of insulin (H)  Well-controlled, continue medication, recheck in 6 months  - glipiZIDE (GLUCOTROL) 5 MG tablet; Take 1 tablet (5 mg) by mouth every morning (before breakfast)  Dispense: 90 tablet; Refill: 3  - FOOT EXAM    2. COPD, severe (H)  Stable, continue inhalers  Advised night sweats could be a variety of things including related to lung disease, they are not severe, no abnormal findings.  Follow-up if fever, worsening    3. Benign essential hypertension  Well-controlled in clinic today, she reports some variability at home so wanted to get a machine, we discussed use of home machines, consider bringing him in for nurse check to check its reliability, continue medication  - lisinopril (PRINIVIL/ZESTRIL) 40 MG tablet; Take 1 tablet (40 mg) by mouth daily  Dispense: 90 tablet; Refill: 1    4. Spinal stenosis of lumbar region with neurogenic claudication  Overall fairly stable, continue pain medication  - oxyCODONE IR (ROXICODONE) 5 MG tablet; May take 5 tabs per day  Dispense: 150 tablet; Refill: 0    5. Hyperlipidemia LDL goal <100  Stable, continue medication    6. Leg cramps  Advised it could be related to pravastatin, suggest she hold it for little bit, if it helps, start back at half dose.  May consider insoles for support, advised on stretches.    7. Need for prophylactic vaccination and inoculation against influenza    - FLU VACCINE, INCREASED ANTIGEN, PRESV FREE, AGE 65+ [29711]  - ADMIN INFLUENZA (For MEDICARE Patients ONLY) []        Emily Marie MD  [facility]      Injectable Influenza Immunization Documentation    1.  Is the person to be  vaccinated sick today?   No    2. Does the person to be vaccinated have an allergy to a component   of the vaccine?   No  Egg Allergy Algorithm Link    3. Has the person to be vaccinated ever had a serious reaction   to influenza vaccine in the past?   No    4. Has the person to be vaccinated ever had Guillain-Barré syndrome?   No    Form completed by  Irene Carter [facility]

## 2018-09-10 NOTE — NURSING NOTE
/54  Pulse 95  Temp 98.3  F (36.8  C) (Oral)  Resp 16  Wt 142 lb 12.8 oz (64.8 kg)  SpO2 91%  BMI 25.3 kg/m2

## 2018-09-10 NOTE — MR AVS SNAPSHOT
After Visit Summary   9/10/2018    Celeste Chacko    MRN: [id number]           Patient Information     Date Of Birth          5/11/1931        Visit Information        Provider Department      9/10/2018 2:00 PM Emily Marie MD [facility]        Today's Diagnoses     Need for prophylactic vaccination and inoculation against influenza    -  1    Elevated blood pressure reading        Benign essential hypertension        Spinal stenosis of lumbar region with neurogenic claudication        Type 2 diabetes mellitus without complication, without long-term current use of insulin (H)          Care Instructions    Omron brand blood pressure machine.   Stop the Pravastatin to see if helps leg cramps. If no change in 2-3 weeks, go back on it.     If it helps decrease the cramps, wait a few weeks then try taking 1/2 tablet daily.           Follow-ups after your visit        Follow-up notes from your care team     Return in about 2 weeks (around 9/24/2018) for BP Recheck with home machine.      Who to contact     If you have questions or need follow up information about today's clinic visit or your schedule please contact [facility] directly at [phone].  Normal or non-critical lab and imaging results will be communicated to you by MyChart, letter or phone within 4 business days after the clinic has received the results. If you do not hear from us within 7 days, please contact the clinic through MyChart or phone. If you have a critical or abnormal lab result, we will notify you by phone as soon as possible.  Submit refill requests through [employer] or call your pharmacy and they will forward the refill request to us. Please allow 3 business days for your refill to be completed.          Additional Information About Your Visit        Care EveryWhere ID     This is your Care EveryWhere ID. This could be used by other organizations to access your [facility]  records  [id number]        Your Vitals Were     Pulse Temperature Respirations Pulse Oximetry BMI (Body Mass Index)       95 98.3  F (36.8  C) (Oral) 16 91% 25.3 kg/m2        Blood Pressure from Last 3 Encounters:   09/10/18 130/54   05/10/18 162/68   05/04/18 138/68    Weight from Last 3 Encounters:   09/10/18 142 lb 12.8 oz (64.8 kg)   05/10/18 143 lb 3.2 oz (65 kg)   05/04/18 141 lb 6.4 oz (64.1 kg)              We Performed the Following     ADMIN INFLUENZA (For MEDICARE Patients ONLY) []     FLU VACCINE, INCREASED ANTIGEN, PRESV FREE, AGE 65+ [90134]          Today's Medication Changes          These changes are accurate as of 9/10/18  2:50 PM.  If you have any questions, ask your nurse or doctor.               These medicines have changed or have updated prescriptions.        Dose/Directions    amLODIPine 2.5 MG tablet   Commonly known as:  NORVASC   This may have changed:  See the new instructions.   Used for:  Elevated blood pressure reading   Changed by:  Emily Marie MD        Dose:  2.5 mg   Take 1 tablet (2.5 mg) by mouth daily   Quantity:  90 tablet   Refills:  1            Where to get your medicines      These medications were sent to [facility]/pharmacy #[id number] [address] [address]     Phone:  [phone]     amLODIPine 2.5 MG tablet    glipiZIDE 5 MG tablet    lisinopril 40 MG tablet         Some of these will need a paper prescription and others can be bought over the counter.  Ask your nurse if you have questions.     Bring a paper prescription for each of these medications     oxyCODONE IR 5 MG tablet               Information about OPIOIDS     PRESCRIPTION OPIOIDS: WHAT YOU NEED TO KNOW   We gave you an opioid (narcotic) pain medicine. It is important to manage your pain, but opioids are not always the best choice. You should first try all the other options your care team gave you. Take this medicine for as short a time (and as few  doses) as possible.    Some activities can increase your pain, such as bandage changes or therapy sessions. It may help to take your pain medicine 30 to 60 minutes before these activities. Reduce your stress by getting enough sleep, working on hobbies you enjoy and practicing relaxation or meditation. Talk to your care team about ways to manage your pain beyond prescription opioids.    These medicines have risks:    DO NOT drive when on new or higher doses of pain medicine. These medicines can affect your alertness and reaction times, and you could be arrested for driving under the influence (DUI). If you need to use opioids long-term, talk to your care team about driving.    DO NOT operate heavy machinery    DO NOT do any other dangerous activities while taking these medicines.    DO NOT drink any alcohol while taking these medicines.     If the opioid prescribed includes acetaminophen, DO NOT take with any other medicines that contain acetaminophen. Read all labels carefully. Look for the word  acetaminophen  or  Tylenol.  Ask your pharmacist if you have questions or are unsure.    You can get addicted to pain medicines, especially if you have a history of addiction (chemical, alcohol or substance dependence). Talk to your care team about ways to reduce this risk.    All opioids tend to cause constipation. Drink plenty of water and eat foods that have a lot of fiber, such as fruits, vegetables, prune juice, apple juice and high-fiber cereal. Take a laxative (Miralax, milk of magnesia, Colace, Senna) if you don t move your bowels at least every other day. Other side effects include upset stomach, sleepiness, dizziness, throwing up, tolerance (needing more of the medicine to have the same effect), physical dependence and slowed breathing.    Store your pills in a secure place, locked if possible. We will not replace any lost or stolen medicine. If you don t finish your medicine, please throw away (dispose) as  directed by your pharmacist. The [place] Pollution Control Agency has more information about safe disposal: [email or web address]://[email or web address].[email or web address].us/[email or web address]-green/[email or web address]-[email or web address]-medications         Primary Care Provider Office Phone # Fax #    Emily Marie [phone]       [address]  [facility] [address]        Equal Access to Services     [facility]: Hadii aad ku hadasho Soomaali, waaxda luqadaha, qaybta kaalmada adeegyada, waxay idiin hayaan adeeg kharash la'aan . So [facility] [phone].    ATENCIÓN: Si habla español, tiene a harvey disposición servicios gratuitos de asistencia lingüística. David al [phone].    We comply with applicable federal civil rights laws and [place] laws. We do not discriminate on the basis of race, color, national origin, age, disability, sex, sexual orientation, or gender identity.            Thank you!     Thank you for choosing [facility]  for your care. Our goal is always to provide you with excellent care. Hearing back from our patients is one way we can continue to improve our services. Please take a few minutes to complete the written survey that you may receive in the mail after your visit with us. Thank you!             Your Updated Medication List - Protect others around you: Learn how to safely use, store and throw away your medicines at www.disposemymeds.org.          This list is accurate as of 9/10/18  2:50 PM.  Always use your most recent med list.                   Brand Name Dispense Instructions for use Diagnosis    alendronate 70 MG tablet    FOSAMAX    4 tablet    TAKE 1 TABLET (70 MG) BY MOUTH EVERY 7 DAYS    Osteopenia, unspecified location       amLODIPine 2.5 MG tablet    NORVASC    90 tablet    Take 1 tablet (2.5 mg) by mouth daily    Elevated blood pressure reading       amoxicillin 500 MG capsule    AMOXIL     TAKE FOUR CAPSULES (2 GRAMS) BY MOUTH ONE HOUR BEFORE DENTAL APPOINTMENT        ascorbic acid 500 MG tablet     VITAMIN C    30 tablet    Take 1 tablet (500 mg) by mouth daily        ASPIRIN EC PO      Take 81 mg by mouth daily        calcium carbonate 600 mg-vitamin D 400 units 600-400 MG-UNIT per tablet    CALTRATE     Take 1 tablet by mouth daily 1200mg  1000 mg of vitamin d        FIBERCON PO      Take 1 tablet by mouth once , one in the morning and one in the evening        fluticasone-salmeterol 500-50 MCG/DOSE diskus inhaler    ADVAIR    3 Inhaler    Inhale 1 puff into the lungs 2 times daily    Chronic obstructive pulmonary disease, unspecified COPD type (H)       glipiZIDE 5 MG tablet    GLUCOTROL    90 tablet    Take 1 tablet (5 mg) by mouth every morning (before breakfast)    Type 2 diabetes mellitus without complication, without long-term current use of insulin (H)       Glucosamine-Chondroitin 500-250 MG Caps    GLUCOSAMINE CHONDROITIN COMPLX     Take 1 tablet by mouth 2 times daily    Generalized osteoarthrosis, unspecified site       lisinopril 40 MG tablet    PRINIVIL/ZESTRIL    90 tablet    Take 1 tablet (40 mg) by mouth daily    Benign essential hypertension       MULTIVITAMIN ADULT PO      Take 1 tablet by mouth daily        NONFORMULARY      Natra sleeping med takes 1 at night.        ONETOUCH DELICA LANCETS 33G Misc      Reported on 4/12/2017        ONETOUCH ULTRA test strip   Generic drug:  blood glucose monitoring      Reported on 4/12/2017        oxyCODONE IR 5 MG tablet    ROXICODONE    150 tablet    May take 5 tabs per day    Spinal stenosis of lumbar region with neurogenic claudication       pravastatin 80 MG tablet    PRAVACHOL    90 tablet    Take 1 tablet (80 mg) by mouth daily    Hyperlipidemia LDL goal <100       PROAIR  (90 Base) MCG/ACT inhaler   Generic drug:  albuterol     8.5 Inhaler    INHALE 2 PUFFS INTO LUNGS EVERY 6HRS AS NEEDED FOR SHORTNESS OF BREATH/DYSPNEA/WHEEZING    Chronic obstructive pulmonary disease, unspecified COPD type (H)       senna-docusate 8.6-50 MG per tablet     SENOKOT-S;PERICOLACE     Take 1 tablet by mouth 2 times daily Reported on 4/12/2017        SPIRIVA HANDIHALER 18 MCG capsule   Generic drug:  tiotropium     90 capsule    INHALE CONTENTS OF ONE CAPSULE DAILY.    Chronic obstructive pulmonary disease, unspecified COPD type (H)       TYLENOL PO      Take 325 mg by mouth 6 times daily Patient takes TID with Oxycodone.        VITAMIN E PO      Take 400 Int'l Units by mouth daily

## 2018-09-10 NOTE — PATIENT INSTRUCTIONS
Omron brand blood pressure machine.   Stop the Pravastatin to see if helps leg cramps. If no change in 2-3 weeks, go back on it.     If it helps decrease the cramps, wait a few weeks then try taking 1/2 tablet daily.

## 2018-10-02 ENCOUNTER — ALLIED HEALTH/NURSE VISIT (OUTPATIENT)
Dept: NURSING | Facility: CLINIC | Age: 83
End: 2018-10-02
Payer: COMMERCIAL

## 2018-10-02 VITALS — SYSTOLIC BLOOD PRESSURE: 126 MMHG | DIASTOLIC BLOOD PRESSURE: 54 MMHG

## 2018-10-02 DIAGNOSIS — I10 BENIGN ESSENTIAL HYPERTENSION: Primary | ICD-10-CM

## 2018-10-02 PROCEDURE — 99207 ZZC NO CHARGE NURSE ONLY: CPT

## 2018-10-02 NOTE — Clinical Note
Patient's in clinic blood pressure 126/54. Patient has brought her new new arm cuff along with her, her blood pressure was 134/63.

## 2018-10-02 NOTE — PROGRESS NOTES
Advise BP here is good, home machine is reading higher but was also fine. Call if BP is over 160/90.     , routing note:       Patient's in clinic blood pressure 126/54. Patient has brought her new new arm cuff along with her, her blood pressure was 134/63.

## 2018-10-02 NOTE — MR AVS SNAPSHOT
After Visit Summary   10/2/2018    Celeste Chacko    MRN: [id number]           Patient Information     Date Of Birth          5/11/1931        Visit Information        Provider Department      10/2/2018 3:30 PM RI IM NURSE [facility]        Today's Diagnoses     Benign essential hypertension    -  1       Follow-ups after your visit        Your next 10 appointments already scheduled     Oct 02, 2018  3:30 PM CDT   Nurse Only with RI IM NURSE   [facility] ([facility]    [address]  [facility] [id number] [phone]              Who to contact     If you have questions or need follow up information about today's clinic visit or your schedule please contact [facility] directly at [phone].  Normal or non-critical lab and imaging results will be communicated to you by MyChart, letter or phone within 4 business days after the clinic has received the results. If you do not hear from us within 7 days, please contact the clinic through MyChart or phone. If you have a critical or abnormal lab result, we will notify you by phone as soon as possible.  Submit refill requests through [employer] or call your pharmacy and they will forward the refill request to us. Please allow 3 business days for your refill to be completed.          Additional Information About Your Visit        Care EveryWhere ID     This is your Care EveryWhere ID. This could be used by other organizations to access your [facility] medical records  [id number]        Your Vitals Were     Breastfeeding?                   No            Blood Pressure from Last 3 Encounters:   10/02/18 126/54   09/10/18 130/54   05/10/18 162/68    Weight from Last 3 Encounters:   09/10/18 142 lb 12.8 oz (64.8 kg)   05/10/18 143 lb 3.2 oz (65 kg)   05/04/18 141 lb 6.4 oz (64.1 kg)              Today, you had the following     No orders found for display       Primary Care Provider  Office Phone # Fax #    Emily Marie [phone]       [address] [facility] [address]  [facility] [address]        Equal Access to Services     DAVID PALACIO : Hadii aad ku hadbriellerashmi Espino, walaineda lujohnnyerrolha, qaannta kalynetteda steven, omar shayin hayaascout sanchezmiller abad zhanna monson. So [facility] [phone].    ATENCIÓN: Si habla español, tiene a harvey disposición servicios gratuitos de asistencia lingüística. Llame al [phone].    We comply with applicable federal civil rights laws and [place] laws. We do not discriminate on the basis of race, color, national origin, age, disability, sex, sexual orientation, or gender identity.            Thank you!     Thank you for choosing [facility]  for your care. Our goal is always to provide you with excellent care. Hearing back from our patients is one way we can continue to improve our services. Please take a few minutes to complete the written survey that you may receive in the mail after your visit with us. Thank you!             Your Updated Medication List - Protect others around you: Learn how to safely use, store and throw away your medicines at www.disposemymeds.org.          This list is accurate as of 10/2/18  1:36 PM.  Always use your most recent med list.                   Brand Name Dispense Instructions for use Diagnosis    alendronate 70 MG tablet    FOSAMAX    4 tablet    TAKE 1 TABLET (70 MG) BY MOUTH EVERY 7 DAYS    Osteopenia, unspecified location       amLODIPine 2.5 MG tablet    NORVASC    90 tablet    Take 1 tablet (2.5 mg) by mouth daily        amoxicillin 500 MG capsule    AMOXIL     TAKE FOUR CAPSULES (2 GRAMS) BY MOUTH ONE HOUR BEFORE DENTAL APPOINTMENT        ascorbic acid 500 MG tablet    VITAMIN C    30 tablet    Take 1 tablet (500 mg) by mouth daily        ASPIRIN EC PO      Take 81 mg by mouth daily        calcium carbonate 600 mg-vitamin D 400 units 600-400 MG-UNIT per tablet    CALTRATE     Take 1 tablet by mouth daily  1200mg  1000 mg of vitamin d        FIBERCON PO      Take 1 tablet by mouth once , one in the morning and one in the evening        fluticasone-salmeterol 500-50 MCG/DOSE diskus inhaler    ADVAIR    3 Inhaler    Inhale 1 puff into the lungs 2 times daily    Chronic obstructive pulmonary disease, unspecified COPD type (H)       glipiZIDE 5 MG tablet    GLUCOTROL    90 tablet    Take 1 tablet (5 mg) by mouth every morning (before breakfast)    Type 2 diabetes mellitus without complication, without long-term current use of insulin (H)       Glucosamine-Chondroitin 500-250 MG Caps    GLUCOSAMINE CHONDROITIN COMPLX     Take 1 tablet by mouth 2 times daily    Generalized osteoarthrosis, unspecified site       lisinopril 40 MG tablet    PRINIVIL/ZESTRIL    90 tablet    Take 1 tablet (40 mg) by mouth daily    Benign essential hypertension       MULTIVITAMIN ADULT PO      Take 1 tablet by mouth daily        NONFORMULARY      Natra sleeping med takes 1 at night.        ONETOUCH DELICA LANCETS 33G Misc      Reported on 4/12/2017        ONETOUCH ULTRA test strip   Generic drug:  blood glucose monitoring      Reported on 4/12/2017        oxyCODONE IR 5 MG tablet    ROXICODONE    150 tablet    May take 5 tabs per day    Spinal stenosis of lumbar region with neurogenic claudication       pravastatin 80 MG tablet    PRAVACHOL    90 tablet    Take 1 tablet (80 mg) by mouth daily    Hyperlipidemia LDL goal <100       PROAIR  (90 Base) MCG/ACT inhaler   Generic drug:  albuterol     8.5 Inhaler    INHALE 2 PUFFS INTO LUNGS EVERY 6HRS AS NEEDED FOR SHORTNESS OF BREATH/DYSPNEA/WHEEZING    Chronic obstructive pulmonary disease, unspecified COPD type (H)       senna-docusate 8.6-50 MG per tablet    SENOKOT-S;PERICOLACE     Take 1 tablet by mouth 2 times daily Reported on 4/12/2017        SPIRIVA HANDIHALER 18 MCG capsule   Generic drug:  tiotropium     90 capsule    INHALE CONTENTS OF ONE CAPSULE DAILY.    Chronic obstructive  pulmonary disease, unspecified COPD type (H)       TYLENOL PO      Take 325 mg by mouth 6 times daily Patient takes TID with Oxycodone.        VITAMIN E PO      Take 400 Int'l Units by mouth daily

## 2018-10-05 ENCOUNTER — APPOINTMENT (RX ONLY)
Dept: URBAN - METROPOLITAN AREA CLINIC 52 | Facility: CLINIC | Age: 83
Setting detail: DERMATOLOGY
End: 2018-10-05

## 2018-10-05 ENCOUNTER — APPOINTMENT (RX ONLY)
Dept: URBAN - METROPOLITAN AREA CLINIC 51 | Facility: CLINIC | Age: 83
Setting detail: DERMATOLOGY
End: 2018-10-05

## 2018-10-05 DIAGNOSIS — L57.0 ACTINIC KERATOSIS: ICD-10-CM

## 2018-10-05 DIAGNOSIS — L57.8 OTHER SKIN CHANGES DUE TO CHRONIC EXPOSURE TO NONIONIZING RADIATION: ICD-10-CM

## 2018-10-05 PROBLEM — L29.8 OTHER PRURITUS: Status: ACTIVE | Noted: 2018-10-05

## 2018-10-05 PROBLEM — H91.90 UNSPECIFIED HEARING LOSS, UNSPECIFIED EAR: Status: ACTIVE | Noted: 2018-10-05

## 2018-10-05 PROBLEM — I48.91 UNSPECIFIED ATRIAL FIBRILLATION: Status: ACTIVE | Noted: 2018-10-05

## 2018-10-05 PROBLEM — M12.9 ARTHROPATHY, UNSPECIFIED: Status: ACTIVE | Noted: 2018-10-05

## 2018-10-05 PROCEDURE — 17000 DESTRUCT PREMALG LESION: CPT

## 2018-10-05 PROCEDURE — 99213 OFFICE O/P EST LOW 20 MIN: CPT | Mod: 25

## 2018-10-05 PROCEDURE — ? COUNSELING

## 2018-10-05 PROCEDURE — 17003 DESTRUCT PREMALG LES 2-14: CPT

## 2018-10-05 PROCEDURE — ? LIQUID NITROGEN

## 2018-10-05 ASSESSMENT — LOCATION SIMPLE DESCRIPTION DERM
LOCATION SIMPLE: RIGHT PRETIBIAL REGION
LOCATION SIMPLE: LEFT TEMPLE
LOCATION SIMPLE: LEFT TEMPLE
LOCATION SIMPLE: NECK
LOCATION SIMPLE: RIGHT UPPER BACK
LOCATION SIMPLE: RIGHT PRETIBIAL REGION
LOCATION SIMPLE: NECK
LOCATION SIMPLE: RIGHT UPPER BACK

## 2018-10-05 ASSESSMENT — LOCATION DETAILED DESCRIPTION DERM
LOCATION DETAILED: RIGHT SUPERIOR MEDIAL UPPER BACK
LOCATION DETAILED: RIGHT PROXIMAL PRETIBIAL REGION
LOCATION DETAILED: RIGHT PROXIMAL PRETIBIAL REGION
LOCATION DETAILED: RIGHT SUPERIOR MEDIAL UPPER BACK
LOCATION DETAILED: RIGHT SUPERIOR LATERAL NECK
LOCATION DETAILED: RIGHT SUPERIOR LATERAL NECK
LOCATION DETAILED: LEFT MID TEMPLE
LOCATION DETAILED: LEFT MID TEMPLE

## 2018-10-05 ASSESSMENT — LOCATION ZONE DERM
LOCATION ZONE: TRUNK
LOCATION ZONE: LEG
LOCATION ZONE: FACE
LOCATION ZONE: NECK
LOCATION ZONE: FACE
LOCATION ZONE: NECK
LOCATION ZONE: TRUNK
LOCATION ZONE: LEG

## 2018-10-05 NOTE — PROCEDURE: LIQUID NITROGEN
Consent: The patient's consent was obtained including but not limited to risks of crusting, scabbing, blistering, scarring, darker or lighter pigmentary change, recurrence, incomplete removal and infection.
Detail Level: Detailed
Render Post-Care Instructions In Note?: no
Duration Of Freeze Thaw-Cycle (Seconds): 0
Post-Care Instructions: I reviewed with the patient in detail post-care instructions. Patient is to wear sunprotection, and avoid picking at any of the treated lesions. Pt may apply Vaseline to crusted or scabbing areas.
Number Of Freeze-Thaw Cycles: 3 freeze-thaw cycles

## 2018-10-05 NOTE — PROCEDURE: LIQUID NITROGEN
Consent: The patient's consent was obtained including but not limited to risks of crusting, scabbing, blistering, scarring, darker or lighter pigmentary change, recurrence, incomplete removal and infection.
Detail Level: Detailed
Duration Of Freeze Thaw-Cycle (Seconds): 0
Post-Care Instructions: I reviewed with the patient in detail post-care instructions. Patient is to wear sunprotection, and avoid picking at any of the treated lesions. Pt may apply Vaseline to crusted or scabbing areas.
Render Post-Care Instructions In Note?: no
Number Of Freeze-Thaw Cycles: 3 freeze-thaw cycles

## 2018-10-08 DIAGNOSIS — M48.062 SPINAL STENOSIS OF LUMBAR REGION WITH NEUROGENIC CLAUDICATION: ICD-10-CM

## 2018-10-08 RX ORDER — OXYCODONE HYDROCHLORIDE 5 MG/1
TABLET ORAL
Qty: 150 TABLET | Refills: 0 | Status: SHIPPED | OUTPATIENT
Start: 2018-10-08 | End: 2018-11-05

## 2018-10-08 NOTE — TELEPHONE ENCOUNTER
Rx put in envelope and will be walked over to the hospital mail box to go out in today's mail.     Pt was advised.

## 2018-10-08 NOTE — TELEPHONE ENCOUNTER
PT calls for refill of Oxycodone. MAIL to [facility] pharmacy.   Use PRE-STAMPED Envelope, then give to Kirsten to take.     Last refill on 9/10/18 for #150    Last OV 9/10/18    Routing refill request to provider for review/approval because:  Drug not on the [facility] refill protocol

## 2018-10-26 ENCOUNTER — TELEPHONE (OUTPATIENT)
Dept: INTERNAL MEDICINE | Facility: CLINIC | Age: 83
End: 2018-10-26

## 2018-10-26 ENCOUNTER — OFFICE VISIT (OUTPATIENT)
Dept: URGENT CARE | Facility: URGENT CARE | Age: 83
End: 2018-10-26
Payer: COMMERCIAL

## 2018-10-26 ENCOUNTER — HOSPITAL ENCOUNTER (OUTPATIENT)
Dept: ULTRASOUND IMAGING | Facility: CLINIC | Age: 83
Discharge: HOME | End: 2018-10-26
Attending: FAMILY MEDICINE | Admitting: FAMILY MEDICINE
Payer: COMMERCIAL

## 2018-10-26 ENCOUNTER — RADIANT APPOINTMENT (OUTPATIENT)
Dept: GENERAL RADIOLOGY | Facility: CLINIC | Age: 83
End: 2018-10-26
Attending: FAMILY MEDICINE
Payer: COMMERCIAL

## 2018-10-26 VITALS
DIASTOLIC BLOOD PRESSURE: 60 MMHG | RESPIRATION RATE: 16 BRPM | TEMPERATURE: 98.7 F | OXYGEN SATURATION: 93 % | BODY MASS INDEX: 25.14 KG/M2 | WEIGHT: 141.9 LBS | SYSTOLIC BLOOD PRESSURE: 140 MMHG | HEART RATE: 90 BPM

## 2018-10-26 DIAGNOSIS — M79.605 PAIN OF LEFT LOWER EXTREMITY: ICD-10-CM

## 2018-10-26 DIAGNOSIS — M79.605 PAIN OF LEFT LOWER EXTREMITY: Primary | ICD-10-CM

## 2018-10-26 PROCEDURE — 73590 X-RAY EXAM OF LOWER LEG: CPT | Mod: LT

## 2018-10-26 PROCEDURE — 99214 OFFICE O/P EST MOD 30 MIN: CPT | Performed by: FAMILY MEDICINE

## 2018-10-26 PROCEDURE — 93971 EXTREMITY STUDY: CPT | Mod: LT

## 2018-10-26 NOTE — MR AVS SNAPSHOT
After Visit Summary   10/26/2018    Celeste Chacko    MRN: [id number]           Patient Information     Date Of Birth          5/11/1931        Visit Information        Provider Department      10/26/2018 10:35 AM Steve Bonner MD [facility]        Today's Diagnoses     Pain of left lower extremity    -  1       Follow-ups after your visit        Your next 10 appointments already scheduled     Nov 09, 2018 12:10 PM CST   [facility] with Della Nicole PT   [facility] ([facility] [place]  )    [address] [id number] [phone]            Jan 02, 2019  1:00 PM CST   [facility]    [address] [place] [id number] [phone]              Who to contact     If you have questions or need follow up information about today's clinic visit or your schedule please contact [facility] directly at [phone].  Normal or non-critical lab and imaging results will be communicated to you by MyChart, letter or phone within 4 business days after the clinic has received the results. If you do not hear from us within 7 days, please contact the clinic through MyChart or phone. If you have a critical or abnormal lab result, we will notify you by phone as soon as possible.  Submit refill requests through [employer] or call your pharmacy and they will forward the refill request to us. Please allow 3 business days for your refill to be completed.          Additional Information About Your Visit        Care EveryWhere ID     This is your Care EveryWhere ID. This could be used by other organizations to access your [facility] medical records  [id number]        Your Vitals Were     Pulse Temperature Respirations Pulse Oximetry BMI (Body Mass Index)       90 98.7   F (37.1  C) (Tympanic) 16 93% 25.14 kg/m2        Blood Pressure from Last 3 Encounters:   10/30/18 148/76   10/27/18 148/72   10/26/18 140/60    Weight from Last 3 Encounters:   10/30/18 141 lb 4.8 oz (64.1 kg)   10/27/18 141 lb (64 kg)   10/26/18 141 lb 14.4 oz (64.4 kg)               Primary Care Provider Office Phone # Fax #    Emily Marie [phone]       [address] [facility] [address]  [facility] [address]        Equal Access to Services     [facility]: Hadii aad ku hadasho Soomaali, waaxda luqadaha, qaybta kaalmada adeegyada, omar chao . So [facility] [phone].    ATENCIÓN: Si habla español, tiene a harvey disposición servicios gratuitos de asistencia lingüística. Llame al [phone].    We comply with applicable federal civil rights laws and [place] laws. We do not discriminate on the basis of race, color, national origin, age, disability, sex, sexual orientation, or gender identity.            Thank you!     Thank you for choosing [facility]  for your care. Our goal is always to provide you with excellent care. Hearing back from our patients is one way we can continue to improve our services. Please take a few minutes to complete the written survey that you may receive in the mail after your visit with us. Thank you!             Your Updated Medication List - Protect others around you: Learn how to safely use, store and throw away your medicines at www.disposemymeds.org.          This list is accurate as of 10/26/18 11:59 PM.  Always use your most recent med list.                   Brand Name Dispense Instructions for use Diagnosis    alendronate 70 MG tablet    FOSAMAX    4 tablet    TAKE 1 TABLET (70 MG) BY MOUTH EVERY 7 DAYS    Osteopenia, unspecified location       amLODIPine 2.5 MG tablet    NORVASC    90 tablet    Take 1 tablet (2.5 mg) by mouth daily        amoxicillin 500 MG capsule    AMOXIL     TAKE FOUR CAPSULES (2 GRAMS) BY  MOUTH ONE HOUR BEFORE DENTAL APPOINTMENT        ascorbic acid 500 MG tablet    VITAMIN C    30 tablet    Take 1 tablet (500 mg) by mouth daily        ASPIRIN EC PO      Take 81 mg by mouth daily        calcium carbonate 600 mg-vitamin D 400 units 600-400 MG-UNIT per tablet    CALTRATE     Take 1 tablet by mouth daily 1200mg  1000 mg of vitamin d        FIBERCON PO      Take 1 tablet by mouth once , one in the morning and one in the evening        fluticasone-salmeterol 500-50 MCG/DOSE diskus inhaler    ADVAIR    3 Inhaler    Inhale 1 puff into the lungs 2 times daily    Chronic obstructive pulmonary disease, unspecified COPD type (H)       glipiZIDE 5 MG tablet    GLUCOTROL    90 tablet    Take 1 tablet (5 mg) by mouth every morning (before breakfast)    Type 2 diabetes mellitus without complication, without long-term current use of insulin (H)       Glucosamine-Chondroitin 500-250 MG Caps    GLUCOSAMINE CHONDROITIN COMPLX     Take 1 tablet by mouth 2 times daily    Generalized osteoarthrosis, unspecified site       lisinopril 40 MG tablet    PRINIVIL/ZESTRIL    90 tablet    Take 1 tablet (40 mg) by mouth daily    Benign essential hypertension       MULTIVITAMIN ADULT PO      Take 1 tablet by mouth daily        NONFORMULARY      Natra sleeping med takes 1 at night.        ONETOUCH DELICA LANCETS 33G Misc      Reported on 4/12/2017        ONETOUCH ULTRA test strip   Generic drug:  blood glucose monitoring      Reported on 4/12/2017        pravastatin 80 MG tablet    PRAVACHOL    90 tablet    Take 1 tablet (80 mg) by mouth daily    Hyperlipidemia LDL goal <100       PROAIR  (90 Base) MCG/ACT inhaler   Generic drug:  albuterol     8.5 Inhaler    INHALE 2 PUFFS INTO LUNGS EVERY 6HRS AS NEEDED FOR SHORTNESS OF BREATH/DYSPNEA/WHEEZING    Chronic obstructive pulmonary disease, unspecified COPD type (H)       senna-docusate 8.6-50 MG per tablet    SENOKOT-S;PERICOLACE     Take 1 tablet by mouth 2 times daily Reported  on 4/12/2017        SPIRIVA HANDIHALER 18 MCG capsule   Generic drug:  tiotropium     90 capsule    INHALE CONTENTS OF ONE CAPSULE DAILY.    Chronic obstructive pulmonary disease, unspecified COPD type (H)       TYLENOL PO      Take 325 mg by mouth 6 times daily Patient takes TID with Oxycodone.        VITAMIN E PO      Take 400 Int'l Units by mouth daily

## 2018-10-26 NOTE — TELEPHONE ENCOUNTER
Call to pt and advised.   She states the pain is on the Calf on the left side, hard to define. Feels more like the muscle.   Down from the knee to ankle.     It is a Constant pain. Movement or activity doesn't make a difference.   Started  yesterday. Couldn't sleep at all last night.     She already takes Tylenol 8 tabs a day.     She is going to try her Lidocaine cream and try drinking more water over the weekend. She states she hasn't been drinking very much fluid, just coffee.     If not improving on Monday, she will schedule OV.     DALIA

## 2018-10-26 NOTE — PROGRESS NOTES
SUBJECTIVE:   Celeste Chacko is a 87 year old female presenting with a chief complaint of   Chief Complaint   Patient presents with     Urgent Care     Pt states left leg pain sxs 1x days      She has had pain over the last day that seems to be increasing in intensity.  Left lower leg lateral.  No fever no chills she is concerned about a blood clot.    She is on pain medication but she is not on blood thinners  .    This did wake her up from sleep last night she is not complaining that her leg is cold    She is an established patient of [facility].  She has no complaint of shortness of breath        Review of Systems   Musculoskeletal:        Left lower leg musculoskeletal pain       Past Medical History:   Diagnosis Date     Chronic airway obstruction, not elsewhere classified      Complete rupture of rotator cuff      Disorder of bone and cartilage, unspecified      History of blood transfusion      Mixed hyperlipidemia 4/00     Osteoarthritis      Personal history of other malignant neoplasm of skin      Spinal stenosis      Type II or unspecified type diabetes mellitus without mention of complication, not stated as uncontrolled      Family History   Problem Relation Age of Onset     Arthritis Father      Diabetes Brother      Cancer Brother      breast     Cerebrovascular Disease Brother      Current Outpatient Prescriptions   Medication Sig Dispense Refill     Acetaminophen (TYLENOL PO) Take 325 mg by mouth 6 times daily Patient takes TID with Oxycodone.        alendronate (FOSAMAX) 70 MG tablet TAKE 1 TABLET (70 MG) BY MOUTH EVERY 7 DAYS 4 tablet 6     amLODIPine (NORVASC) 2.5 MG tablet Take 1 tablet (2.5 mg) by mouth daily 90 tablet 1     amoxicillin (AMOXIL) 500 MG capsule TAKE FOUR CAPSULES (2 GRAMS) BY MOUTH ONE HOUR BEFORE DENTAL APPOINTMENT  3     ascorbic acid (VITAMIN C) 500 MG tablet Take 1 tablet (500 mg) by mouth daily 30 tablet      ASPIRIN EC PO Take 81 mg by mouth daily       Calcium Polycarbophil  "(FIBERCON PO) Take 1 tablet by mouth once , one in the morning and one in the evening       calcium-vitamin D (CALTRATE) 600-400 MG-UNIT per tablet Take 1 tablet by mouth daily 1200mg   1000 mg of vitamin d       fluticasone-salmeterol (ADVAIR) 500-50 MCG/DOSE diskus inhaler Inhale 1 puff into the lungs 2 times daily 3 Inhaler 3     glipiZIDE (GLUCOTROL) 5 MG tablet Take 1 tablet (5 mg) by mouth every morning (before breakfast) 90 tablet 3     Glucosamine-Chondroitin (GLUCOSAMINE CHONDROITIN COMPLX) 500-250 MG CAPS Take 1 tablet by mouth 2 times daily       lisinopril (PRINIVIL/ZESTRIL) 40 MG tablet Take 1 tablet (40 mg) by mouth daily 90 tablet 1     Multiple Vitamins-Minerals (MULTIVITAMIN ADULT PO) Take 1 tablet by mouth daily       NONFORMULARY Natra sleeping med takes 1 at night.       ONE TOUCH ULTRA test strip Reported on 4/12/2017       ONETOUCH DELICA LANCETS 33G MISC Reported on 4/12/2017       oxyCODONE IR (ROXICODONE) 5 MG tablet May take 5 tabs per day 150 tablet 0     pravastatin (PRAVACHOL) 80 MG tablet Take 1 tablet (80 mg) by mouth daily 90 tablet 3     PROAIR  (90 Base) MCG/ACT inhaler INHALE 2 PUFFS INTO LUNGS EVERY 6HRS AS NEEDED FOR SHORTNESS OF BREATH/DYSPNEA/WHEEZING 8.5 Inhaler 2     senna-docusate (SENOKOT-S;PERICOLACE) 8.6-50 MG per tablet Take 1 tablet by mouth 2 times daily Reported on 4/12/2017       SPIRIVA HANDIHALER 18 MCG capsule INHALE CONTENTS OF ONE CAPSULE DAILY. 90 capsule 3     VITAMIN E PO Take 400 Int'l Units by mouth daily       Social History   Substance Use Topics     Smoking status: Former Smoker     Packs/day: 0.50     Years: 54.00     Types: Cigarettes     Quit date: 4/27/2000     Smokeless tobacco: Never Used      Comment: using nicotine gum     Alcohol use 0.5 oz/week     1 Glasses of wine per week      Comment: \"A glass of wine once a week.\"       OBJECTIVE  /60  Pulse 90  Temp 98.7  F (37.1  C) (Tympanic)  Resp 16  Wt 141 lb 14.4 oz (64.4 kg)  SpO2 " 93%  BMI 25.14 kg/m2    Physical Exam   Constitutional: She is oriented to person, place, and time. She appears well-developed.   HENT:   Head: Normocephalic.   Right Ear: External ear normal.   Eyes: Pupils are equal, round, and reactive to light.   Neck: Normal range of motion.   Cardiovascular: Normal rate.    Pulmonary/Chest: Effort normal.   Abdominal: Soft.   Musculoskeletal:   Range of motion was appropriate for her age she had no edema in the lower extremities no obvious deformities.    Some redness of her skin on the left lower side of her lower extremity with minimal pain to palpation.         Neurological: She is alert and oriented to person, place, and time.   Skin: Skin is warm.   Psychiatric: She has a normal mood and affect.   Nursing note and vitals reviewed.      Labs:  No results found for this or any previous visit (from the past 24 hour(s)).    X-Ray was done, my findings are: However we will be sending her for an ultrasound of the lower leg    ASSESSMENT:      ICD-10-CM    1. Pain of left lower extremity M79.605 XR Tibia & Fibula Left 2 Views     US Lower Extremity Venous Duplex Left        Medical Decision Making:    Differential Diagnosis:  Musculoskeletal pain, DVT, leg trauma, cellulitis,    Serious Comorbid Conditions:  Adult:  None    PLAN:    1.  We will be setting her up for a lower extremity ultrasound.  If this is positive she will need anticoagulation and she will go through the emergency room        Followup:    She needs to be seen by her primary care in the next 10 days    I am awaiting the ultrasound report.  If it is positive she will be anticoagulated if it is negative she will use for pain medication and she will follow-up with her primary care.      Addendum; several hours later the ultrasound was done and this is negative she did not appear to have a DVT and I thought this was low risk but could not be completely ruled out.  In addition she had no shortness of breath no  evidence that she is having a PE    Remainder of plan is as stated above        There are no Patient Instructions on file for this visit.

## 2018-10-26 NOTE — TELEPHONE ENCOUNTER
Patient calls stating she had US of left lower extremity today and was informed it was negative. Requesting to know if she should up her dose of oxycodone for next couple days because her current dose she takes for her back isn't helping her leg pain. Also open to suggestions of what else to do for the pain as she doesn't want to take too much medication either.    Provider please review and advise. Thanks.

## 2018-10-26 NOTE — TELEPHONE ENCOUNTER
Reason for Call:  Other pt was in Urgent Care 10/26/18 in morning-  This is FYI     Detailed comments: left leg hurt from knee and down.  Xray shows nothing Pt going for MRI to [facility] appt 10/26/18 at 2pm. Pt thinks its a blood clot as she's had them before.    Phone Number Patient can be reached at: Home number on file [phone] (home)    Best Time: any    Can we leave a detailed message on this number? YES    Call taken on 10/26/2018 at 12:30 PM by Dianna Lindsey

## 2018-10-26 NOTE — TELEPHONE ENCOUNTER
She should not increase her oxycodone. Please get more information about where exactly the pain is located or where the worst pain is located, knee joint, calf muscle, lateral muscle? What is the pain like? What else has she tried?

## 2018-10-27 ENCOUNTER — HOSPITAL ENCOUNTER (EMERGENCY)
Facility: CLINIC | Age: 83
Discharge: HOME | End: 2018-10-27
Attending: EMERGENCY MEDICINE | Admitting: EMERGENCY MEDICINE
Payer: COMMERCIAL

## 2018-10-27 VITALS
BODY MASS INDEX: 24.98 KG/M2 | DIASTOLIC BLOOD PRESSURE: 72 MMHG | RESPIRATION RATE: 20 BRPM | WEIGHT: 141 LBS | HEART RATE: 89 BPM | SYSTOLIC BLOOD PRESSURE: 148 MMHG | HEIGHT: 63 IN | TEMPERATURE: 97.8 F | OXYGEN SATURATION: 96 %

## 2018-10-27 DIAGNOSIS — G89.29 CHRONIC LEFT-SIDED LOW BACK PAIN WITH LEFT-SIDED SCIATICA: ICD-10-CM

## 2018-10-27 DIAGNOSIS — M54.42 CHRONIC LEFT-SIDED LOW BACK PAIN WITH LEFT-SIDED SCIATICA: ICD-10-CM

## 2018-10-27 LAB
ALBUMIN UR-MCNC: 30 MG/DL
APPEARANCE UR: ABNORMAL
BILIRUB UR QL STRIP: NEGATIVE
COLOR UR AUTO: YELLOW
GLUCOSE UR STRIP-MCNC: NEGATIVE MG/DL
HGB UR QL STRIP: ABNORMAL
KETONES UR STRIP-MCNC: 5 MG/DL
LEUKOCYTE ESTERASE UR QL STRIP: ABNORMAL
NITRATE UR QL: NEGATIVE
PH UR STRIP: 5.5 PH (ref 5–7)
RBC #/AREA URNS AUTO: 1 /HPF (ref 0–2)
SOURCE: ABNORMAL
SP GR UR STRIP: 1.03 (ref 1–1.03)
UROBILINOGEN UR STRIP-MCNC: 2 MG/DL (ref 0–2)
WBC #/AREA URNS AUTO: 1 /HPF (ref 0–5)

## 2018-10-27 PROCEDURE — 99284 EMERGENCY DEPT VISIT MOD MDM: CPT | Mod: 25

## 2018-10-27 PROCEDURE — 81001 URINALYSIS AUTO W/SCOPE: CPT | Performed by: EMERGENCY MEDICINE

## 2018-10-27 PROCEDURE — 25000132 ZZH RX MED GY IP 250 OP 250 PS 637: Performed by: EMERGENCY MEDICINE

## 2018-10-27 PROCEDURE — 76775 US EXAM ABDO BACK WALL LIM: CPT

## 2018-10-27 RX ORDER — LIDOCAINE 4 G/G
1 PATCH TOPICAL
Status: DISCONTINUED | OUTPATIENT
Start: 2018-10-27 | End: 2018-10-28 | Stop reason: HOSPADM

## 2018-10-27 RX ORDER — LIDOCAINE 50 MG/G
1 PATCH TOPICAL EVERY 24 HOURS
Qty: 10 PATCH | Refills: 0 | Status: SHIPPED | OUTPATIENT
Start: 2018-10-27 | End: 2018-11-06

## 2018-10-27 RX ADMIN — LIDOCAINE 1 PATCH: 560 PATCH PERCUTANEOUS; TOPICAL; TRANSDERMAL at 21:48

## 2018-10-27 ASSESSMENT — ENCOUNTER SYMPTOMS
CONSTIPATION: 0
FEVER: 0
BACK PAIN: 1
ABDOMINAL PAIN: 0
DIARRHEA: 0

## 2018-10-27 NOTE — ED AVS SNAPSHOT
Emergency Department    [address] [place] [id number]    Phone:  [phone]    Fax:  [phone]                                       Celeste Chacko   MRN: [id number]    Department:   Emergency Department   Date of Visit:  10/27/2018           After Visit Summary Signature Page     I have received my discharge instructions, and my questions have been answered. I have discussed any challenges I see with this plan with the nurse or doctor.    ..........................................................................................................................................  Patient/Patient Representative Signature      ..........................................................................................................................................  Patient Representative Print Name and Relationship to Patient    ..................................................               ................................................  Date                                   Time    ..........................................................................................................................................  Reviewed by Signature/Title    ...................................................              ..............................................  Date                                               Time          22EPIC Rev 08/18

## 2018-10-27 NOTE — ED AVS SNAPSHOT
Emergency Department    [address] [facility] [address]    Phone:  [phone]    Fax:  [phone]                                       Celeste Chacko   MRN: [id number]    Department:   Emergency Department   Date of Visit:  10/27/2018           Patient Information     Date Of Birth          5/11/1931        Your diagnoses for this visit were:     Chronic left-sided low back pain with left-sided sciatica        You were seen by Ralph Oliveira MD.      Follow-up Information     Call Emily Marie MD.    Specialty:  Internal Medicine    Contact information:    [address]  [facility] [address]  [phone]          Follow up with  Emergency Department.    Specialty:  EMERGENCY MEDICINE    Why:  As needed, If symptoms worsen    Contact information:    [address] [facility] [address] [phone]        Discharge Instructions       Discharge Instructions  Back Pain  You were seen today for back pain. Back pain can have many causes, but most will get better without surgery or other specific treatment. Sometimes there is a herniated ( slipped ) disc. We do not usually do MRI scans to look for these right away, since most herniated discs will get better on their own with time.  Today, we did not find any evidence that your back pain was caused by a serious condition. However, sometimes symptoms develop over time and cannot be found during an emergency visit, so it is very important that you follow up with your primary provider.  Generally, every Emergency Department visit should have a follow-up clinic visit with either a primary or a specialty clinic/provider. Please follow-up as instructed by your emergency provider today.    Return to the Emergency Department if:    You develop a fever with your back pain.     You have weakness or change in sensation in one or both legs.    You lose control of your bowels or bladder, or cannot empty your bladder (cannot  maurice).    Your pain gets much worse.     Follow-up with your provider:    Unless your pain has completely gone away, please make an appointment with your provider within one week. Most of the routine care for back pain is available in a clinic and not the Emergency Department. You may need further management of your back pain, such as more pain medication, imaging such as an X-ray or MRI, or physical therapy.    What can I do to help myself?    Remain Active -- People are often afraid that they will hurt their back further or delay recovery by remaining active, but this is one of the best things you can do for your back. In fact, staying in bed for a long time to rest is not recommended. Studies have shown that people with low back pain recover faster when they remain active. Movement helps to bring blood flow to the muscles and relieve muscle spasms as well as preventing loss of muscle strength.    Heat -- Using a heating pad can help with low back pain during the first few weeks. Do not sleep with a heating pad, as you can be burned.     Pain medications - You may take a pain medication such as Tylenol  (acetaminophen), Advil , Motrin  (ibuprofen) or Aleve  (naproxen).  If you were given a prescription for medicine here today, be sure to read all of the information (including the package insert) that comes with your prescription.  This will include important information about the medicine, its side effects, and any warnings that you need to know about.  The pharmacist who fills the prescription can provide more information and answer questions you may have about the medicine.  If you have questions or concerns that the pharmacist cannot address, please call or return to the Emergency Department.   Remember that you can always come back to the Emergency Department if you are not able to see your regular provider in the amount of time listed above, if you get any new symptoms, or if there is anything that worries  "you.        * Sciatica    Sciatica (\"Lumbar Radiculopathy\") causes a pain that spreads from the lower back down into the buttock, hip and leg. Sometimes leg pain can occur without any back pain. Sciatica is due to irritation or pressure on a spinal nerve as it comes out of the spinal canal. This is most often due to a bulge or rupture of a nearby spinal disk (the cartilage cushion between each spinal bone), which presses on a nearby nerve. Other causes include spinal stenosis (narrowing of the spinal canal) and spasm of the piriformis muscle (a muscle in the buttocks that the sciatic nerve passes through).  Sciatica may begin after a sudden twisting/bending force (such as in a car accident), or sometimes after a simple awkward movement. In either case, muscle spasm is commonly present and contributes to the pain.  The diagnosis of sciatica is made from the symptoms and physical exam. Unless you had a physical injury (such as a car accident or fall), X-rays are usually not ordered for the initial evaluation of sciatica because the nerves and disks cannot be seen on an X-ray. Most sciatica (80-90%) gets better with time.  What can I do about my low back pain?  There are three main things you can do to ease low back pain and help it go away.    Use heat or cold packs.    Take medicine as directed.    Use positions, movements and exercises. Stay active! Too much rest can make your symptoms worse.  Using heat or cold packs  Try cold packs or gentle heat to ease your pain. Use whichever gives the most relief. Apply the cold pack or heat for 15 minutes at a time, as often as needed.  Taking medicine  If taking over-the-counter medicine:    Take ibuprofen (Advil, Motrin) 600 mg. three times a day as needed for pain.  OR  ? Take Aleve (naproxen sodium) 220 to 440 mg. two times a day as needed for pain  If your doctor prescribed a muscle relaxant (cyclobenzapine 10 mg.):    Take one half ( ) to 1 tablet at bedtime    Do not " drive when taking this medicine. This drug may make you sleepy.  Using positions, movements and exercises  Research tells us that moving your joints and muscles can help you recover from back pain. Such activity should be simple and gentle.  Use the positions below as well as walking to help relieve your discomfort. Try taking a short walk every 3 to 4 hours during the day. Walk for a few minutes inside your home or take longer walks outside, on a treadmill or at a mall. Slowly increase the amount of time you walk. Expect discomfort when you begin, but it should lessen as your back starts to recover.  Finding a position that is comfortable  When your back pain is new, you may find that certain positions will ease your pain. Gently try each of the following positions until you find one that eases your pain. Once you find a position of comfort, use it as often as you like while you recover. Return to your daily routine as soon as possible.     Lie on your back with your legs bent. You can do this by placing a pillow under your knees or lie on the floor and rest your lower legs on the seat of a chair.    Lie on your side with your knees bent and place a pillow between your knees.    Lie on your stomach over pillows.  When should I call my doctor?  Your back pain should improve over the first couple of weeks. As it improves, you should be able to return to your normal activities. But call your doctor if:    You have a sudden change in your ability to control? your bladder or bowels.    You begin to feel tingling in your groin or legs.    The pain spreads down your leg and into your foot.    Your toes, feet or leg muscles begin to feel weak.    You feel generally unwell or sick.    Your pain gets worse.    7779-5433 [employer]. [address] [place] [address], [place] [address]. All rights reserved. This information is not intended as a substitute for professional medical care. Always follow your healthcare  professional's instructions.  This information has been modified by your health care provider with permission from the publisher.          Your next 10 appointments already scheduled     Jan 02, 2019  1:00 PM CST   Return Visit with Alicia Neal PA-C   [facility] ([facility])    [address] [id number] [phone]              24 Hour Appointment Hotline       To make an appointment at any [facility], call [phone] ([phone]). If you don't have a family doctor or clinic, we will help you find one. [facility] are conveniently located to serve the needs of you and your family.          ED Discharge Orders     [facility] PT, HAND, AND CHIROPRACTIC REFERRAL       Physical Therapy, Hand Therapy and Chiropractic Care are available through:  *[facility]  *Hand Therapy (Occupational Therapy or Physical Therapy)  *[facility]    Call one number to schedule at any of the above locations: [phone].    Physical therapy, Hand therapy and/or Chiropractic care has been recommended by your physician as an excellent treatment option to reduce pain and help people return to normal activities, including sports.  Therapy and/or chiropractic care services are a great complement or alternative to expensive and invasive surgery, injections, or long-term use of prescription medications. The primary goal is to identify the underlying problem and provide you the tools to manage your condition on your own.     Please be aware that coverage of these services is subject to the terms and limitations of your health insurance plan.  Call member services at your health plan with any benefit or coverage questions.      Please bring the following to your appointment:  *Your personal calendar for scheduling future appointments  *Comfortable clothing                     Review of your medicines       START taking        Dose / Directions Last dose taken    lidocaine 5 % Patch   Commonly known as:  LIDODERM   Dose:  1 patch   Quantity:  10 patch        Place 1 patch onto the skin every 24 hours for 10 days   Refills:  0          Our records show that you are taking the medicines listed below. If these are incorrect, please call your family doctor or clinic.        Dose / Directions Last dose taken    alendronate 70 MG tablet   Commonly known as:  FOSAMAX   Quantity:  4 tablet        TAKE 1 TABLET (70 MG) BY MOUTH EVERY 7 DAYS   Refills:  6        amLODIPine 2.5 MG tablet   Commonly known as:  NORVASC   Dose:  2.5 mg   Quantity:  90 tablet        Take 1 tablet (2.5 mg) by mouth daily   Refills:  1        amoxicillin 500 MG capsule   Commonly known as:  AMOXIL        TAKE FOUR CAPSULES (2 GRAMS) BY MOUTH ONE HOUR BEFORE DENTAL APPOINTMENT   Refills:  3        ascorbic acid 500 MG tablet   Commonly known as:  VITAMIN C   Dose:  500 mg   Quantity:  30 tablet        Take 1 tablet (500 mg) by mouth daily   Refills:  0        ASPIRIN EC PO   Dose:  81 mg        Take 81 mg by mouth daily   Refills:  0        calcium carbonate 600 mg-vitamin D 400 units 600-400 MG-UNIT per tablet   Commonly known as:  CALTRATE   Dose:  1 tablet        Take 1 tablet by mouth daily 1200mg  1000 mg of vitamin d   Refills:  0        FIBERCON PO   Dose:  1 tablet        Take 1 tablet by mouth once , one in the morning and one in the evening   Refills:  0        fluticasone-salmeterol 500-50 MCG/DOSE diskus inhaler   Commonly known as:  ADVAIR   Dose:  1 puff   Quantity:  3 Inhaler        Inhale 1 puff into the lungs 2 times daily   Refills:  3        glipiZIDE 5 MG tablet   Commonly known as:  GLUCOTROL   Dose:  5 mg   Quantity:  90 tablet        Take 1 tablet (5 mg) by mouth every morning (before breakfast)   Refills:  3        Glucosamine-Chondroitin 500-250 MG Caps   Commonly known as:  GLUCOSAMINE CHONDROITIN COMPLX   Dose:  1 tablet         Take 1 tablet by mouth 2 times daily   Refills:  0        lisinopril 40 MG tablet   Commonly known as:  PRINIVIL/ZESTRIL   Dose:  40 mg   Quantity:  90 tablet        Take 1 tablet (40 mg) by mouth daily   Refills:  1        MULTIVITAMIN ADULT PO   Dose:  1 tablet        Take 1 tablet by mouth daily   Refills:  0        NONFORMULARY        Natra sleeping med takes 1 at night.   Refills:  0        ONETOUCH DELICA LANCETS 33G Misc        Reported on 4/12/2017   Refills:  0        ONETOUCH ULTRA test strip   Generic drug:  blood glucose monitoring        Reported on 4/12/2017   Refills:  0        oxyCODONE IR 5 MG tablet   Commonly known as:  ROXICODONE   Quantity:  150 tablet        May take 5 tabs per day   Refills:  0        pravastatin 80 MG tablet   Commonly known as:  PRAVACHOL   Dose:  80 mg   Quantity:  90 tablet        Take 1 tablet (80 mg) by mouth daily   Refills:  3        PROAIR  (90 Base) MCG/ACT inhaler   Quantity:  8.5 Inhaler   Generic drug:  albuterol        INHALE 2 PUFFS INTO LUNGS EVERY 6HRS AS NEEDED FOR SHORTNESS OF BREATH/DYSPNEA/WHEEZING   Refills:  2        senna-docusate 8.6-50 MG per tablet   Commonly known as:  SENOKOT-S;PERICOLACE   Dose:  1 tablet        Take 1 tablet by mouth 2 times daily Reported on 4/12/2017   Refills:  0        SPIRIVA HANDIHALER 18 MCG capsule   Quantity:  90 capsule   Generic drug:  tiotropium        INHALE CONTENTS OF ONE CAPSULE DAILY.   Refills:  3        TYLENOL PO   Dose:  325 mg        Take 325 mg by mouth 6 times daily Patient takes TID with Oxycodone.   Refills:  0        VITAMIN E PO   Dose:  400 Int'l Units        Take 400 Int'l Units by mouth daily   Refills:  0                Prescriptions were sent or printed at these locations (1 Prescription)                   Other Prescriptions                Printed at Department/Unit printer (1 of 1)         lidocaine (LIDODERM) 5 % Patch                Procedures and tests performed during your visit      POC US RETROPERITONEAL LIMITED    UA with Microscopic      Orders Needing Specimen Collection     None      Pending Results     No orders found from 10/25/2018 to 10/28/2018.            Pending Culture Results     No orders found from 10/25/2018 to 10/28/2018.            Pending Results Instructions     If you had any lab results that were not finalized at the time of your Discharge, you can call the ED Lab Result RN at [phone]. You will be contacted by this team for any positive Lab results or changes in treatment. The nurses are available 7 days a week from 10A to 6:30P.  You can leave a message 24 hours per day and they will return your call.        Test Results From Your Hospital Stay        10/27/2018 10:12 PM      Component Results     Component Value Ref Range & Units Status    Color Urine Yellow  Final    Appearance Urine Slightly Cloudy  Final    Glucose Urine Negative NEG^Negative mg/dL Final    Bilirubin Urine Negative NEG^Negative Final    Ketones Urine 5 (A) NEG^Negative mg/dL Final    Specific Gravity Urine 1.026 1.003 - 1.035 Final    Blood Urine Trace (A) NEG^Negative Final    pH Urine 5.5 5.0 - 7.0 pH Final    Protein Albumin Urine 30 (A) NEG^Negative mg/dL Final    Urobilinogen mg/dL 2.0 0.0 - 2.0 mg/dL Final    Nitrite Urine Negative NEG^Negative Final    Leukocyte Esterase Urine Small (A) NEG^Negative Corrected    CORRECTED ON 10/27 AT 2212: PREVIOUSLY REPORTED AS Negative    Source Midstream Urine  Final    WBC Urine 1 0 - 5 /HPF Final    RBC Urine 1 0 - 2 /HPF Final         10/27/2018 10:03 PM      [facility] Procedure Note      Limited Bedside ED Aorta Ultrasound:    PROCEDURE: PERFORMED BY: Dr. Ralph Oliveira  INDICATIONS:  Back Pain    PROBE:  Low frequency convex probe  BODY LOCATION: Abdomen  FINDINGS:  The ultrasound was performed using longitudinal and transverse views.   Normal: Abdominal aorta < 4 cm.  MEASUREMENT:  1.5 cm    INTERPRETATION:  The  evaluation of the aorta was of normal caliber (ie < 4cm in the transverse/longitudinal views) without evidence of aneurysm or dilation.  Aorta visualized in entirety from insertion into the intra-abdominal cavity to bifurcation into iliac vessels. There was no abdominal free fluid.  IMAGE DOCUMENTATION: Images were archived to PACs system.                  Clinical Quality Measure: Blood Pressure Screening     Your blood pressure was checked while you were in the emergency department today. The last reading we obtained was  BP: 156/60 . Please read the guidelines below about what these numbers mean and what you should do about them.  If your systolic blood pressure (the top number) is less than 120 and your diastolic blood pressure (the bottom number) is less than 80, then your blood pressure is normal. There is nothing more that you need to do about it.  If your systolic blood pressure (the top number) is 120-139 or your diastolic blood pressure (the bottom number) is 80-89, your blood pressure may be higher than it should be. You should have your blood pressure rechecked within a year by a primary care provider.  If your systolic blood pressure (the top number) is 140 or greater or your diastolic blood pressure (the bottom number) is 90 or greater, you may have high blood pressure. High blood pressure is treatable, but if left untreated over time it can put you at risk for heart attack, stroke, or kidney failure. You should have your blood pressure rechecked by a primary care provider within the next 4 weeks.  If your provider in the emergency department today gave you specific instructions to follow-up with your doctor or provider even sooner than that, you should follow that instruction and not wait for up to 4 weeks for your follow-up visit.        Thank you for choosing [facility]       Thank you for choosing [facility] for your care. Our goal is always to provide you with excellent care. Hearing back from our  patients is one way we can continue to improve our services. Please take a few minutes to complete the written survey that you may receive in the mail after you visit with us. Thank you!        Care EveryWhere ID     This is your Care EveryWhere ID. This could be used by other organizations to access your [facility] medical records  [id number]        Equal Access to Services     DAVID PALACIO : Dolores Espino, paul mcdonald, omar corral. So [facility] [phone].    ATENCIÓN: Si habla español, tiene a harvey disposición servicios gratuitos de asistencia lingüística. Llame al [phone].    We comply with applicable federal civil rights laws and [place] laws. We do not discriminate on the basis of race, color, national origin, age, disability, sex, sexual orientation, or gender identity.            After Visit Summary       This is your record. Keep this with you and show to your community pharmacist(s) and doctor(s) at your next visit.

## 2018-10-28 NOTE — DISCHARGE INSTRUCTIONS
Discharge Instructions  Back Pain  You were seen today for back pain. Back pain can have many causes, but most will get better without surgery or other specific treatment. Sometimes there is a herniated ( slipped ) disc. We do not usually do MRI scans to look for these right away, since most herniated discs will get better on their own with time.  Today, we did not find any evidence that your back pain was caused by a serious condition. However, sometimes symptoms develop over time and cannot be found during an emergency visit, so it is very important that you follow up with your primary provider.  Generally, every Emergency Department visit should have a follow-up clinic visit with either a primary or a specialty clinic/provider. Please follow-up as instructed by your emergency provider today.    Return to the Emergency Department if:    You develop a fever with your back pain.     You have weakness or change in sensation in one or both legs.    You lose control of your bowels or bladder, or cannot empty your bladder (cannot pee).    Your pain gets much worse.     Follow-up with your provider:    Unless your pain has completely gone away, please make an appointment with your provider within one week. Most of the routine care for back pain is available in a clinic and not the Emergency Department. You may need further management of your back pain, such as more pain medication, imaging such as an X-ray or MRI, or physical therapy.    What can I do to help myself?    Remain Active -- People are often afraid that they will hurt their back further or delay recovery by remaining active, but this is one of the best things you can do for your back. In fact, staying in bed for a long time to rest is not recommended. Studies have shown that people with low back pain recover faster when they remain active. Movement helps to bring blood flow to the muscles and relieve muscle spasms as well as preventing loss of muscle  "strength.    Heat -- Using a heating pad can help with low back pain during the first few weeks. Do not sleep with a heating pad, as you can be burned.     Pain medications - You may take a pain medication such as Tylenol  (acetaminophen), Advil , Motrin  (ibuprofen) or Aleve  (naproxen).  If you were given a prescription for medicine here today, be sure to read all of the information (including the package insert) that comes with your prescription.  This will include important information about the medicine, its side effects, and any warnings that you need to know about.  The pharmacist who fills the prescription can provide more information and answer questions you may have about the medicine.  If you have questions or concerns that the pharmacist cannot address, please call or return to the Emergency Department.   Remember that you can always come back to the Emergency Department if you are not able to see your regular provider in the amount of time listed above, if you get any new symptoms, or if there is anything that worries you.        * Sciatica    Sciatica (\"Lumbar Radiculopathy\") causes a pain that spreads from the lower back down into the buttock, hip and leg. Sometimes leg pain can occur without any back pain. Sciatica is due to irritation or pressure on a spinal nerve as it comes out of the spinal canal. This is most often due to a bulge or rupture of a nearby spinal disk (the cartilage cushion between each spinal bone), which presses on a nearby nerve. Other causes include spinal stenosis (narrowing of the spinal canal) and spasm of the piriformis muscle (a muscle in the buttocks that the sciatic nerve passes through).  Sciatica may begin after a sudden twisting/bending force (such as in a car accident), or sometimes after a simple awkward movement. In either case, muscle spasm is commonly present and contributes to the pain.  The diagnosis of sciatica is made from the symptoms and physical exam. " Unless you had a physical injury (such as a car accident or fall), X-rays are usually not ordered for the initial evaluation of sciatica because the nerves and disks cannot be seen on an X-ray. Most sciatica (80-90%) gets better with time.  What can I do about my low back pain?  There are three main things you can do to ease low back pain and help it go away.    Use heat or cold packs.    Take medicine as directed.    Use positions, movements and exercises. Stay active! Too much rest can make your symptoms worse.  Using heat or cold packs  Try cold packs or gentle heat to ease your pain. Use whichever gives the most relief. Apply the cold pack or heat for 15 minutes at a time, as often as needed.  Taking medicine  If taking over-the-counter medicine:    Take ibuprofen (Advil, Motrin) 600 mg. three times a day as needed for pain.  OR  ? Take Aleve (naproxen sodium) 220 to 440 mg. two times a day as needed for pain  If your doctor prescribed a muscle relaxant (cyclobenzapine 10 mg.):    Take one half ( ) to 1 tablet at bedtime    Do not drive when taking this medicine. This drug may make you sleepy.  Using positions, movements and exercises  Research tells us that moving your joints and muscles can help you recover from back pain. Such activity should be simple and gentle.  Use the positions below as well as walking to help relieve your discomfort. Try taking a short walk every 3 to 4 hours during the day. Walk for a few minutes inside your home or take longer walks outside, on a treadmill or at a mall. Slowly increase the amount of time you walk. Expect discomfort when you begin, but it should lessen as your back starts to recover.  Finding a position that is comfortable  When your back pain is new, you may find that certain positions will ease your pain. Gently try each of the following positions until you find one that eases your pain. Once you find a position of comfort, use it as often as you like while you  recover. Return to your daily routine as soon as possible.     Lie on your back with your legs bent. You can do this by placing a pillow under your knees or lie on the floor and rest your lower legs on the seat of a chair.    Lie on your side with your knees bent and place a pillow between your knees.    Lie on your stomach over pillows.  When should I call my doctor?  Your back pain should improve over the first couple of weeks. As it improves, you should be able to return to your normal activities. But call your doctor if:    You have a sudden change in your ability to control? your bladder or bowels.    You begin to feel tingling in your groin or legs.    The pain spreads down your leg and into your foot.    Your toes, feet or leg muscles begin to feel weak.    You feel generally unwell or sick.    Your pain gets worse.    2685-5123 [employer]. [address] [place] [address], [place] [address]. All rights reserved. This information is not intended as a substitute for professional medical care. Always follow your healthcare professional's instructions.  This information has been modified by your health care provider with permission from the publisher.

## 2018-10-28 NOTE — ED PROVIDER NOTES
History     Chief Complaint:  Leg Pain     HPI   Celeste Chacko is a 87 year old female with degenerative disc disease who presents to the emergency department today for evaluation of leg pain. The patient was seen here yesterday, sent from  for an ultrasound after a negative XR of her leg. Results of both are below. She states that she has been having pain in her lower left leg for the past two days, similar in nature to that of a prior clot in her ankle.  She does have chronic lower back pain secondary to degenerative disc disease. She has oxycodone to treat her DDD, but that has not been helping much for her leg or back pain. She has also been taking Tylenol and ibuprofen. She just took Tylenol, ibuprofen, and oxycodone prior to arrival and is having relief of her symptoms, but states the pain gets up to a 9/10. Of note, in December of last year she had a check up with her PMD and was taken off of her pravastatin because she was noting some cramping muscle spasms in her left leg. This helped, but she has started taking that again within the past week. She states that her pain is not at all similar to the cramping she had in December. She further denies any injuries, abdominal pain, chest pain, fever, diarrhea, and constipation.    TIBIA AND FIBULA LEFT TWO VIEW   10/26/2018 11:36 AM   No acute fracture or dislocation. Knee arthroplasty.    VENOUS ULTRASOUND LEFT LOWER EXTREMITY  10/26/2018 2:26 PM   Negative for deep venous thrombosis in the left lower  extremity.    Allergies:  Sulfamethoxazole    Medications:    Acetaminophen (TYLENOL PO)  alendronate (FOSAMAX) 70 MG tablet  amLODIPine (NORVASC) 2.5 MG tablet  amoxicillin (AMOXIL) 500 MG capsule  ascorbic acid (VITAMIN C) 500 MG tablet  ASPIRIN EC PO  Calcium Polycarbophil (FIBERCON PO)  calcium-vitamin D (CALTRATE) 600-400 MG-UNIT per tablet  fluticasone-salmeterol (ADVAIR) 500-50 MCG/DOSE diskus inhaler  glipiZIDE (GLUCOTROL) 5 MG  "tablet  Glucosamine-Chondroitin (GLUCOSAMINE CHONDROITIN COMPLX) 500-250 MG CAPS  lisinopril (PRINIVIL/ZESTRIL) 40 MG tablet  Multiple Vitamins-Minerals (MULTIVITAMIN ADULT PO)  NONFORMULARY  ONE TOUCH ULTRA test strip  ONETOUCH DELICA LANCETS 33G MISC  oxyCODONE IR (ROXICODONE) 5 MG tablet  pravastatin (PRAVACHOL) 80 MG tablet  PROAIR  (90 Base) MCG/ACT inhaler  senna-docusate (SENOKOT-S;PERICOLACE) 8.6-50 MG per tablet  SPIRIVA HANDIHALER 18 MCG capsule  VITAMIN E PO    Past Medical History:    Complete rupture of rotator cuff   Disorder of bone and cartilage, unspecified    Osteoarthritis   Spinal stenosis    Past Surgical History:    SI Joint Injections    Family History:    CVD Brother    Social History:  The patient was accompanied to the ED by her daughter.  Smoking Status: Former Smoker  Smokeless Tobacco: Positive  Alcohol Use: Positive   Marital Status:  Single     Review of Systems   Constitutional: Negative for fever.   Cardiovascular: Negative for chest pain.   Gastrointestinal: Negative for abdominal pain, constipation and diarrhea.   Musculoskeletal: Positive for back pain.        Leg pain (L)   All other systems reviewed and are negative.    Physical Exam     Patient Vitals for the past 24 hrs:   BP Temp Temp src Heart Rate Resp SpO2 Height Weight   10/27/18 2040 156/60 97.8  F (36.6  C) Temporal 100 17 95 % 1.6 m (5' 3\") 64 kg (141 lb)      Physical Exam  General: Appears well-developed and well-nourished.   Head: No signs of trauma.   Mouth/Throat: Oropharynx is clear and moist.   Eyes: Conjunctivae are normal. Pupils are equal, round, and reactive to light.   Neck: Normal range of motion. No nuchal rigidity. No cervical adenopathy  CV: Normal rate and regular rhythm.    Resp: Effort normal and breath sounds normal. No respiratory distress.   GI: Soft. There is no tenderness.  No rebound or guarding.  Normal bowel sounds.  No CVA tenderness.  MSK: Normal range of motion. no edema. No Calf " tenderness.  Mild left SI tenderness and pain with left straight leg raise.  +neurovascularly intact distally.   Neuro: The patient is alert and oriented to person, place, and time.  PERRLA, EOMI, strength in upper/lower extremities normal and symmetrical.   Sensation normal including in saddle region. Speech normal.  GCS 15  Skin: Skin is warm and dry. No rash noted.   Psych: normal mood and affect. behavior is normal.       Emergency Department Course     Imaging:  Radiology findings were communicated with the patient who voiced understanding of the findings.    Limited Bedside ED Aorta Ultrasound:  The evaluation of the aorta was of normal caliber (ie < 4cm in the transverse/longitudinal views) without evidence of aneurysm or dilation.  Aorta visualized in entirety from insertion into the intra-abdominal cavity to bifurcation into iliac vessels. There was no abdominal free fluid.  Performed and interpreted by Ralph Oliveira MD     Laboratory:  Laboratory findings were communicated with the patient who voiced understanding of the findings.    UA: Ketones 5, Blood trace, Albumin 30, LeukEst small    Interventions:  2148 Lidocaine 4% patch x1 TD    Emergency Department Course:    2110 Nursing notes and vitals reviewed.    2117 I performed an exam of the patient as documented above.     2130 Bedside ultrasound. Results above.    2156 The patient provided a urine sample here in the emergency department. This was sent for laboratory testing, findings above.     2224 I personally reviewed the lab and imaging results with the patient and answered all related questions prior to discharge.    Impression & Plan      Medical Decision Making:  Celeste Chacko is a 87 year old female who presents to the emergency department today for evaluation of back pain and leg pain.  It seems that she has chronic low back pain, but approximately a week ago she had some worsening pain along some pain to the left leg.  She been seen in  the clinic for evaluation for a blood clot as she has had a history of this and this was negative and she also had a normal x-ray.  Given the continuation of her symptoms, she came to the ER.  My evaluation, her symptoms seem to be very much musculoskeletal in nature with symptoms consistent with sciatica on straight leg test.  I did do a bedside ultrasound to evaluate the aorta which was normal in caliber and UA did not from signs of infection or blood.  Given the remainder of her exam was normal, I do not feel that further imaging or workup was indicated.  Patient is on chronic opiates for her back pain and I discussed that these may have limited utility for her chronic symptoms.  She had been using Tylenol and ibuprofen with some relief and recommend she continue with this, but did discuss that she should try and limit the ibuprofen use as it can have negative consequences potentially.  She is also given a Lidoderm patch.  We discussed the importance of physical activity and physical therapy and the patient was appreciative of the physical therapy referral.  She is recommended for the primary care doctor to return the ER for any new worsening symptoms or further concerns.    Diagnosis:    ICD-10-CM    1. Chronic left-sided low back pain with left-sided sciatica M54.42     G89.29      Disposition:   Discharge    Scribe Disclosure:  I, Suleman Chacko, am serving as a scribe at 9:07 PM on 10/27/2018 to document services personally performed by Ralph Oliveira MD based on my observations and the provider's statements to me.      EMERGENCY DEPARTMENT       Ralph Oliveira MD  10/28/18 0037

## 2018-10-30 ENCOUNTER — OFFICE VISIT (OUTPATIENT)
Dept: INTERNAL MEDICINE | Facility: CLINIC | Age: 83
End: 2018-10-30
Payer: COMMERCIAL

## 2018-10-30 VITALS
OXYGEN SATURATION: 97 % | WEIGHT: 141.3 LBS | RESPIRATION RATE: 24 BRPM | TEMPERATURE: 97.8 F | DIASTOLIC BLOOD PRESSURE: 76 MMHG | SYSTOLIC BLOOD PRESSURE: 148 MMHG | HEART RATE: 100 BPM | BODY MASS INDEX: 25.03 KG/M2

## 2018-10-30 DIAGNOSIS — M79.662 PAIN OF LEFT LOWER LEG: Primary | ICD-10-CM

## 2018-10-30 PROCEDURE — 99214 OFFICE O/P EST MOD 30 MIN: CPT | Performed by: INTERNAL MEDICINE

## 2018-10-30 NOTE — PROGRESS NOTES
SUBJECTIVE:   Celeste Chacko is a 87 year old female who presents to clinic today for the following health issues:      ED/UC Followup:    Facility:  [facility]  Date of visit: 10/27/2018  Reason for visit: Left lower leg pain   Current Status: no change she had initially gone to urgent care on 10/26/18, that note is incomplete but she did have an x-ray and an ultrasound which ruled out DVT.  She went to the ED the next day due to continued pain.  They thought it may be sciatica but did not change anything for treatment.  She is following up today.     She reports that the pain is primarily in the lower leg, laterally, below the knee.  This is  sometimes sharp, sometimes deep aching but not a burning type sensation.  She occasionally gets some soreness in the lateral thigh towards the groin.  Her low back pain is overall stable.  It hurts to walk after a while, it really bothers lying on the left side,  is very sensitive with any pressure on it and it is interfering with her sleep a lot.  The knee itself is not necessarily particularly painful with standing and walking.  She was given lidocaine patches to wear on her back which seemed to help her back a little bit but have not change she isd the leg pain at all.    Using a little bit of Advil, not sure if not helping much.  She has an appointment for physical therapy later today that was arranged through the ED.     Patient Active Problem List   Diagnosis     Disorder of bone and cartilage     COPD, severe (H)     Spinal stenosis of lumbar region with neurogenic claudication     Type 2 diabetes mellitus without complication, without long-term current use of insulin (H)     HYPERLIPIDEMIA LDL GOAL <100     History of basal cell carcinoma     Controlled substance agreement signed     Chronic pain syndrome     Narcotic dependence (H)     Benign essential hypertension     Current Outpatient Prescriptions   Medication Sig Dispense Refill     Acetaminophen (TYLENOL PO) Take  325 mg by mouth 6 times daily Patient takes TID with Oxycodone.        alendronate (FOSAMAX) 70 MG tablet TAKE 1 TABLET (70 MG) BY MOUTH EVERY 7 DAYS 4 tablet 6     amLODIPine (NORVASC) 2.5 MG tablet Take 1 tablet (2.5 mg) by mouth daily 90 tablet 1     amoxicillin (AMOXIL) 500 MG capsule TAKE FOUR CAPSULES (2 GRAMS) BY MOUTH ONE HOUR BEFORE DENTAL APPOINTMENT  3     ascorbic acid (VITAMIN C) 500 MG tablet Take 1 tablet (500 mg) by mouth daily 30 tablet      ASPIRIN EC PO Take 81 mg by mouth daily       Calcium Polycarbophil (FIBERCON PO) Take 1 tablet by mouth once , one in the morning and one in the evening       calcium-vitamin D (CALTRATE) 600-400 MG-UNIT per tablet Take 1 tablet by mouth daily 1200mg   1000 mg of vitamin d       fluticasone-salmeterol (ADVAIR) 500-50 MCG/DOSE diskus inhaler Inhale 1 puff into the lungs 2 times daily 3 Inhaler 3     glipiZIDE (GLUCOTROL) 5 MG tablet Take 1 tablet (5 mg) by mouth every morning (before breakfast) 90 tablet 3     Glucosamine-Chondroitin (GLUCOSAMINE CHONDROITIN COMPLX) 500-250 MG CAPS Take 1 tablet by mouth 2 times daily       lidocaine (LIDODERM) 5 % Patch Place 1 patch onto the skin every 24 hours for 10 days 10 patch 0     lisinopril (PRINIVIL/ZESTRIL) 40 MG tablet Take 1 tablet (40 mg) by mouth daily 90 tablet 1     Multiple Vitamins-Minerals (MULTIVITAMIN ADULT PO) Take 1 tablet by mouth daily       NONFORMULARY Natra sleeping med takes 1 at night.       ONE TOUCH ULTRA test strip Reported on 4/12/2017       ONETOUCH DELICA LANCETS 33G MISC Reported on 4/12/2017       oxyCODONE IR (ROXICODONE) 5 MG tablet May take 5 tabs per day 150 tablet 0     pravastatin (PRAVACHOL) 80 MG tablet Take 1 tablet (80 mg) by mouth daily 90 tablet 3     PROAIR  (90 Base) MCG/ACT inhaler INHALE 2 PUFFS INTO LUNGS EVERY 6HRS AS NEEDED FOR SHORTNESS OF BREATH/DYSPNEA/WHEEZING 8.5 Inhaler 2     senna-docusate (SENOKOT-S;PERICOLACE) 8.6-50 MG per tablet Take 1 tablet by mouth 2  "times daily Reported on 4/12/2017       SPIRIVA HANDIHALER 18 MCG capsule INHALE CONTENTS OF ONE CAPSULE DAILY. 90 capsule 3     VITAMIN E PO Take 400 Int'l Units by mouth daily        Social History   Substance Use Topics     Smoking status: Former Smoker     Packs/day: 0.50     Years: 54.00     Types: Cigarettes     Quit date: 4/27/2000     Smokeless tobacco: Never Used      Comment: using nicotine gum     Alcohol use 0.5 oz/week     1 Glasses of wine per week      Comment: \"A glass of wine once a week.\"          Reviewed and updated as needed this visit by clinical staff       Reviewed and updated as needed this visit by Provider         ROS:  No fever, chills, numbness, tingling or weakness, no loss of control of bowel or bladder, no gait changes, no abdominal pain, no hematuria.      OBJECTIVE:     /76  Pulse 100  Temp 97.8  F (36.6  C) (Oral)  Resp 24  Wt 141 lb 4.8 oz (64.1 kg)  SpO2 97%  BMI 25.03 kg/m2  Body mass index is 25.03 kg/(m^2).    There is moderate tenderness of the muscles of the lower lateral left leg.  There is some tenderness of the lateral thigh muscles and IT band.  There is significant tenderness along the lateral knee and proximal fibula.  With waking there is some imbalance of her pelvis, little bit of limping    Note: She reported that she did have some thoughts of feeling she might be better off dead in the past few days since this leg pain started but she reports she would not do anything to hurt herself.    ASSESSMENT/PLAN:       1. Pain of left lower leg   her pain is probably primarily muscular, possibly partly knee.  It is less consistent with sciatica because of the character of the pain, affected is not radiating all the way from the buttock and thigh into the lower leg.  She is markedly tender, especially at the IT band, possible fibular area.  Recommend she go ahead with the physical therapy and I will contact a therapist to advise her to focus on the lower leg as " well as her back.  Recommend she consider trying the lidocaine patch or lidocaine cream around the left knee and muscles to see if that helps her sleep.  Advised on some gentle stretching of the IT band and a clam exercise.      Emily Marie MD  [facility]

## 2018-10-30 NOTE — MR AVS SNAPSHOT
After Visit Summary   10/30/2018    Celeste Chacko    MRN: [id number]           Patient Information     Date Of Birth          5/11/1931        Visit Information        Provider Department      10/30/2018 10:00 AM Emily Marie MD [facility]        Today's Diagnoses     Pain of left lower leg    -  1       Follow-ups after your visit        Your next 10 appointments already scheduled     Oct 31, 2018  4:00 PM CDT   (Arrive by 3:45 PM)   [facility] with Shirley Ann PT   [facility] ([facility]  )    [address] [id number] [phone]            Jan 02, 2019  1:00 PM CST   Return Visit with Alicia Neal PA-C   [facility] ([facility]    [address] [place] [id number] [phone]              Who to contact     If you have questions or need follow up information about today's clinic visit or your schedule please contact [facility] directly at [phone].  Normal or non-critical lab and imaging results will be communicated to you by MyChart, letter or phone within 4 business days after the clinic has received the results. If you do not hear from us within 7 days, please contact the clinic through MyChart or phone. If you have a critical or abnormal lab result, we will notify you by phone as soon as possible.  Submit refill requests through [employer]t or call your pharmacy and they will forward the refill request to us. Please allow 3 business days for your refill to be completed.          Additional Information About Your Visit        Care EveryWhere ID     This is your Care EveryWhere ID. This could be used by other organizations to access your [facility] medical records  [id number]        Your Vitals Were     Pulse Temperature Respirations Pulse Oximetry BMI (Body Mass Index)       100 97.8  F (36.6  C)  (Oral) 24 97% 25.03 kg/m2        Blood Pressure from Last 3 Encounters:   10/30/18 148/76   10/27/18 148/72   10/26/18 140/60    Weight from Last 3 Encounters:   10/30/18 141 lb 4.8 oz (64.1 kg)   10/27/18 141 lb (64 kg)   10/26/18 141 lb 14.4 oz (64.4 kg)              Today, you had the following     No orders found for display       Primary Care Provider Office Phone # Fax #    Emily Marie [phone]       [address]  [facility] [address]        Equal Access to Services     [facility]: Hadii harvey goins hadbrielleo Sobrittany, waaxda lujohnnyadaha, qaybta kaalmada steven, omar chao . So [facility] [phone].    ATENCIÓN: Si habla español, tiene a harvey disposición servicios gratuitos de asistencia lingüística. [facility] [phone].    We comply with applicable federal civil rights laws and [place] laws. We do not discriminate on the basis of race, color, national origin, age, disability, sex, sexual orientation, or gender identity.            Thank you!     Thank you for choosing [facility]  for your care. Our goal is always to provide you with excellent care. Hearing back from our patients is one way we can continue to improve our services. Please take a few minutes to complete the written survey that you may receive in the mail after your visit with us. Thank you!             Your Updated Medication List - Protect others around you: Learn how to safely use, store and throw away your medicines at www.disposemymeds.org.          This list is accurate as of 10/30/18 11:59 PM.  Always use your most recent med list.                   Brand Name Dispense Instructions for use Diagnosis    alendronate 70 MG tablet    FOSAMAX    4 tablet    TAKE 1 TABLET (70 MG) BY MOUTH EVERY 7 DAYS    Osteopenia, unspecified location       amLODIPine 2.5 MG tablet    NORVASC    90 tablet    Take 1 tablet (2.5 mg) by mouth daily        amoxicillin 500 MG capsule    AMOXIL      TAKE FOUR CAPSULES (2 GRAMS) BY MOUTH ONE HOUR BEFORE DENTAL APPOINTMENT        ascorbic acid 500 MG tablet    VITAMIN C    30 tablet    Take 1 tablet (500 mg) by mouth daily        ASPIRIN EC PO      Take 81 mg by mouth daily        calcium carbonate 600 mg-vitamin D 400 units 600-400 MG-UNIT per tablet    CALTRATE     Take 1 tablet by mouth daily 1200mg  1000 mg of vitamin d        FIBERCON PO      Take 1 tablet by mouth once , one in the morning and one in the evening        fluticasone-salmeterol 500-50 MCG/DOSE diskus inhaler    ADVAIR    3 Inhaler    Inhale 1 puff into the lungs 2 times daily    Chronic obstructive pulmonary disease, unspecified COPD type (H)       glipiZIDE 5 MG tablet    GLUCOTROL    90 tablet    Take 1 tablet (5 mg) by mouth every morning (before breakfast)    Type 2 diabetes mellitus without complication, without long-term current use of insulin (H)       Glucosamine-Chondroitin 500-250 MG Caps    GLUCOSAMINE CHONDROITIN COMPLX     Take 1 tablet by mouth 2 times daily    Generalized osteoarthrosis, unspecified site       lidocaine 5 % Patch    LIDODERM    10 patch    Place 1 patch onto the skin every 24 hours for 10 days        lisinopril 40 MG tablet    PRINIVIL/ZESTRIL    90 tablet    Take 1 tablet (40 mg) by mouth daily    Benign essential hypertension       MULTIVITAMIN ADULT PO      Take 1 tablet by mouth daily        NONFORMULARY      Natra sleeping med takes 1 at night.        ANA DELICA LANCETS 33G Misc      Reported on 4/12/2017        ONETOUCH ULTRA test strip   Generic drug:  blood glucose monitoring      Reported on 4/12/2017        oxyCODONE IR 5 MG tablet    ROXICODONE    150 tablet    May take 5 tabs per day    Spinal stenosis of lumbar region with neurogenic claudication       pravastatin 80 MG tablet    PRAVACHOL    90 tablet    Take 1 tablet (80 mg) by mouth daily    Hyperlipidemia LDL goal <100       PROAIR  (90 Base) MCG/ACT inhaler   Generic drug:   albuterol     8.5 Inhaler    INHALE 2 PUFFS INTO LUNGS EVERY 6HRS AS NEEDED FOR SHORTNESS OF BREATH/DYSPNEA/WHEEZING    Chronic obstructive pulmonary disease, unspecified COPD type (H)       senna-docusate 8.6-50 MG per tablet    SENOKOT-S;PERICOLACE     Take 1 tablet by mouth 2 times daily Reported on 4/12/2017        SPIRIVA HANDIHALER 18 MCG capsule   Generic drug:  tiotropium     90 capsule    INHALE CONTENTS OF ONE CAPSULE DAILY.    Chronic obstructive pulmonary disease, unspecified COPD type (H)       TYLENOL PO      Take 325 mg by mouth 6 times daily Patient takes TID with Oxycodone.        VITAMIN E PO      Take 400 Int'l Units by mouth daily

## 2018-10-30 NOTE — NURSING NOTE
/76  Pulse 100  Temp 97.8  F (36.6  C) (Oral)  Resp 24  Wt 141 lb 4.8 oz (64.1 kg)  SpO2 97%  BMI 25.03 kg/m2

## 2018-10-31 ENCOUNTER — THERAPY VISIT (OUTPATIENT)
Dept: PHYSICAL THERAPY | Facility: CLINIC | Age: 83
End: 2018-10-31
Attending: EMERGENCY MEDICINE
Payer: COMMERCIAL

## 2018-10-31 DIAGNOSIS — M54.42 CHRONIC LEFT-SIDED LOW BACK PAIN WITH LEFT-SIDED SCIATICA: ICD-10-CM

## 2018-10-31 DIAGNOSIS — G89.29 CHRONIC LEFT-SIDED LOW BACK PAIN WITH LEFT-SIDED SCIATICA: ICD-10-CM

## 2018-10-31 DIAGNOSIS — M54.16 LUMBAR RADICULOPATHY: Primary | ICD-10-CM

## 2018-10-31 PROCEDURE — 97161 PT EVAL LOW COMPLEX 20 MIN: CPT | Mod: GP | Performed by: PHYSICAL THERAPIST

## 2018-10-31 PROCEDURE — 97110 THERAPEUTIC EXERCISES: CPT | Mod: GP | Performed by: PHYSICAL THERAPIST

## 2018-10-31 ASSESSMENT — PATIENT HEALTH QUESTIONNAIRE - PHQ9: SUM OF ALL RESPONSES TO PHQ QUESTIONS 1-9: 5

## 2018-10-31 NOTE — PROGRESS NOTES
[facility] Initial Evaluation  Subjective:  Patient is a 87 year old female presenting with rehab back hpi. The history is provided by the patient. No  was used.   Celeste Chacko is a 87 year old female with a lumbar condition.      This is a new condition  Insidious onset of R lower leg/lateral calf/shin pain on 10/26.  Went urgent care on 10/26/18, had an x-ray and an ultrasound which ruled out DVT.  She went to the ED the next day due to continued pain.  They thought it may be sciatica but did not change anything for treatment.  She reports having L lateral hip and thigh pain that has gradually developed since onset.  Symptoms have gradually improved.  She does report a history of LB issues for many years--reports DDD and stenosis.    Site of Pain: L hip/lateral thigh, L lateral calf/shin.    Pain is described as aching and sharp and is constant and reported as 4/10.   Pain is the same all the time.  Exacerbated by: walking 1/4, going out--has limited social outings. and relieved by rest.  Since onset symptoms are gradually improving.  Special testing: CT and US to r/o DVT in L calf--negative.  Previous treatment: none.    General health as reported by patient is fair.  Pertinent medical history includes:  Diabetes, high blood pressure, menopausal and osteoarthritis.  Medical allergies: yes (sulfa).  Other surgeries include:  No.  Current medications:  Anti-inflammatory, meds to increase bone density and high blood pressure medication.  Current occupation is Retired.        Barriers include:  None as reported by the patient.    Red flags:  None as reported by the patient.                        Objective:    Gait:  Using SEC, normal gait                Ankle/Foot Evaluation  ROM:        Strength wnl ankle: no L calf pain with double-leg calf raises.                 Lumbar/SI Evaluation  ROM:    AROM Lumbar:   Flexion:          WNL, no pain  Ext:                    25%, no  pain   Side Bend:        Left:     Right:   Rotation:           Left:     Right:   Side Glide:        Left:  50%, produced L hip/thigh pain    Right:  50%, NE        Strength: SHAMIR--NE during or after, abolished pain with L SG after                                                      Hip Evaluation  Hip PROM:  : mildly restricted hip IR and ER but no pain.                          Hip Strength:  : L hip strength:  flexion 5/5, abduction 4/5--no pain.                                         General     ROS    Assessment/Plan:    Patient is a 87 year old female with L LE complaints.  Symptoms were mild with evaluation today.  The only movement that produced pain--L hip/lateral thigh--was L SG.  This pain was abolished after repeated lumbar extension in lying.  No pain produced with hip ROM or strength testing or with L calf testing.  Treatment will focus on lumbar extension exercises and posture training to improve mechanics, decrease pain, and improve overall mobility and function.      Patient has the following significant findings with corresponding treatment plan.                Diagnosis 1:  L thigh and calf pain--lumbar  Pain -  self management, education, directional preference exercise and home program  Decreased ROM/flexibility - therapeutic exercise and home program  Decreased function - therapeutic activities and home program  Impaired posture - neuro re-education and home program    Therapy Evaluation Codes:   1) History comprised of:   Personal factors that impact the plan of care:      None.    Comorbidity factors that impact the plan of care are:      Diabetes.     Medications impacting care: None.  2) Examination of Body Systems comprised of:   Body structures and functions that impact the plan of care:      L LE.   Activity limitations that impact the plan of care are:      Walking.  3) Clinical presentation characteristics are:   Stable/Uncomplicated.  4) Decision-Making    Low complexity using  standardized patient assessment instrument and/or measureable assessment of functional outcome.  Cumulative Therapy Evaluation is: Low complexity.    Previous and current functional limitations:  (See Goal Flow Sheet for this information)    Short term and Long term goals: (See Goal Flow Sheet for this information)     Communication ability:  Patient appears to be able to clearly communicate and understand verbal and written communication and follow directions correctly.  Treatment Explanation - The following has been discussed with the patient:   RX ordered/plan of care  Anticipated outcomes  Possible risks and side effects  This patient would benefit from PT intervention to resume normal activities.   Rehab potential is good.    Frequency:  1 X week, once daily  Duration:  for 4 weeks tapering to 2 X a month over 1 month  Discharge Plan:  Achieve all LTG.  Independent in home treatment program.  Reach maximal therapeutic benefit.    Please refer to the daily flowsheet for treatment today, total treatment time and time spent performing 1:1 timed codes.

## 2018-11-05 DIAGNOSIS — M48.062 SPINAL STENOSIS OF LUMBAR REGION WITH NEUROGENIC CLAUDICATION: ICD-10-CM

## 2018-11-05 NOTE — TELEPHONE ENCOUNTER
Patient calling for refill on Oxycodone.    Mail prescription to [facility] pharmacy.    Oxycodone      Last Written Prescription Date:  10-8-18  Last Fill Quantity: 150,   # refills: 0  Last Office Visit: 10-30-18  Future Office visit:    Next 5 appointments (look out 90 days)     Jan 02, 2019  1:00 PM CST   Return Visit with Alicia Neal PA-C   [facility] ([facility]    [address] [phone] [phone]                   Routing refill request to provider for review/approval because:  Drug not on the FMG, UMP or  Health refill protocol or controlled substance    Signed CSA form in chart.    RX monitoring [place] ([place]) reviewed:  reviewed- no concerns.  Med last dispensed to patient on 10-12-18.    [place] profile:  [email or web address]

## 2018-11-06 RX ORDER — OXYCODONE HYDROCHLORIDE 5 MG/1
TABLET ORAL
Qty: 150 TABLET | Refills: 0 | Status: SHIPPED | OUTPATIENT
Start: 2018-11-06 | End: 2018-12-06

## 2018-11-09 ENCOUNTER — THERAPY VISIT (OUTPATIENT)
Dept: PHYSICAL THERAPY | Facility: CLINIC | Age: 83
End: 2018-11-09
Payer: COMMERCIAL

## 2018-11-09 DIAGNOSIS — M54.16 LUMBAR RADICULOPATHY: ICD-10-CM

## 2018-11-09 PROCEDURE — 97110 THERAPEUTIC EXERCISES: CPT | Mod: GP | Performed by: PHYSICAL THERAPIST

## 2018-11-19 ENCOUNTER — THERAPY VISIT (OUTPATIENT)
Dept: PHYSICAL THERAPY | Facility: CLINIC | Age: 83
End: 2018-11-19
Payer: COMMERCIAL

## 2018-11-19 DIAGNOSIS — M54.16 LUMBAR RADICULOPATHY: ICD-10-CM

## 2018-11-19 PROCEDURE — 97110 THERAPEUTIC EXERCISES: CPT | Mod: GP | Performed by: PHYSICAL THERAPIST

## 2018-11-28 ENCOUNTER — THERAPY VISIT (OUTPATIENT)
Dept: PHYSICAL THERAPY | Facility: CLINIC | Age: 83
End: 2018-11-28
Payer: COMMERCIAL

## 2018-11-28 DIAGNOSIS — M54.16 LUMBAR RADICULOPATHY: ICD-10-CM

## 2018-11-28 PROCEDURE — 97110 THERAPEUTIC EXERCISES: CPT | Mod: GP | Performed by: PHYSICAL THERAPIST

## 2018-12-05 ENCOUNTER — OFFICE VISIT (OUTPATIENT)
Dept: INTERNAL MEDICINE | Facility: CLINIC | Age: 83
End: 2018-12-05
Payer: COMMERCIAL

## 2018-12-05 VITALS
HEART RATE: 97 BPM | SYSTOLIC BLOOD PRESSURE: 124 MMHG | BODY MASS INDEX: 25.04 KG/M2 | TEMPERATURE: 98.8 F | HEIGHT: 63 IN | OXYGEN SATURATION: 91 % | RESPIRATION RATE: 16 BRPM | WEIGHT: 141.3 LBS | DIASTOLIC BLOOD PRESSURE: 56 MMHG

## 2018-12-05 DIAGNOSIS — J44.9 COPD, SEVERE (H): Primary | ICD-10-CM

## 2018-12-05 PROCEDURE — 99213 OFFICE O/P EST LOW 20 MIN: CPT | Performed by: NURSE PRACTITIONER

## 2018-12-05 ASSESSMENT — ANXIETY QUESTIONNAIRES
7. FEELING AFRAID AS IF SOMETHING AWFUL MIGHT HAPPEN: NOT AT ALL
1. FEELING NERVOUS, ANXIOUS, OR ON EDGE: NOT AT ALL
2. NOT BEING ABLE TO STOP OR CONTROL WORRYING: NOT AT ALL
IF YOU CHECKED OFF ANY PROBLEMS ON THIS QUESTIONNAIRE, HOW DIFFICULT HAVE THESE PROBLEMS MADE IT FOR YOU TO DO YOUR WORK, TAKE CARE OF THINGS AT HOME, OR GET ALONG WITH OTHER PEOPLE: NOT DIFFICULT AT ALL
3. WORRYING TOO MUCH ABOUT DIFFERENT THINGS: NOT AT ALL
5. BEING SO RESTLESS THAT IT IS HARD TO SIT STILL: NOT AT ALL
6. BECOMING EASILY ANNOYED OR IRRITABLE: NOT AT ALL
GAD7 TOTAL SCORE: 0

## 2018-12-05 ASSESSMENT — PATIENT HEALTH QUESTIONNAIRE - PHQ9: 5. POOR APPETITE OR OVEREATING: NOT AT ALL

## 2018-12-05 NOTE — PROGRESS NOTES
SUBJECTIVE:   Celeste Chacko is a 87 year old female who presents to clinic today for the following health issues:      BAKER      Duration: 2-3 weeks    Description (location/character/radiation): reports feeling shallow breathing with walk hallways of her building, not when crossing a room.    Intensity:  mild    Accompanying signs and symptoms: no wheezing, URI sx    History (similar episodes/previous evaluation): h/o COPD    Precipitating or alleviating factors: using albuterol in AM, advair before noon and PM, Spiriva daily    Therapies tried and outcome: None           Problem list and histories reviewed & adjusted, as indicated.  Additional history: as documented    Patient Active Problem List   Diagnosis     Disorder of bone and cartilage     COPD, severe (H)     Spinal stenosis of lumbar region with neurogenic claudication     Type 2 diabetes mellitus without complication, without long-term current use of insulin (H)     HYPERLIPIDEMIA LDL GOAL <100     History of basal cell carcinoma     Controlled substance agreement signed     Chronic pain syndrome     Narcotic dependence (H)     Benign essential hypertension     Lumbar radiculopathy     Past Surgical History:   Procedure Laterality Date     ARTHROPLASTY KNEE Left 9/15/2014    Procedure: ARTHROPLASTY KNEE;  Surgeon: Carlos Alberto Kaminski MD;  Location: [facility] OR     C NONSPECIFIC PROCEDURE      Breast biopsies      C NONSPECIFIC PROCEDURE      Pilonidal sinus repair      C NONSPECIFIC PROCEDURE      T & A      C NONSPECIFIC PROCEDURE  5/02    Shoulder arthropasty     C NONSPECIFIC PROCEDURE  5/07    Right shoulder replacement, RCT repair     HEAD & NECK SURGERY  05/14/15    forehead-skin cancer removed     INJECT EPIDURAL LUMBAR / SACRAL SINGLE Left 7/14/2016    Procedure: INJECT EPIDURAL LUMBAR / SACRAL SINGLE;  Surgeon: Luisito New MD;  Location: [facility]     INJECT SACROILIAC JOINT N/A 12/1/2015    Procedure: INJECT SACROILIAC JOINT;  Surgeon: Luisito New  "MD CARLOS;  Location: [facility]     INJECT SACROILIAC JOINT Bilateral 5/19/2016    Procedure: INJECT SACROILIAC JOINT;  Surgeon: Luisito New MD;  [facility]: [facility]     INJECT SACROILIAC JOINT Bilateral 11/25/2016    Procedure: INJECT SACROILIAC JOINT;  Surgeon: Elmira Bennett MD;  Location: [facility]     INJECT SACROILIAC JOINT Bilateral 4/25/2017    Procedure: INJECT SACROILIAC JOINT;  Bilateral Sacroiliac Joint Injection   Injection of local anesthetic and steroid into area around spine for pain relief;  Surgeon: Alvaro Holland MD;  Location: [facility] OR     INJECT SACROILIAC JOINT Bilateral 7/28/2017    Procedure: INJECT SACROILIAC JOINT;  Bilateral Sacroiliac Joint Injection;  Surgeon: Elmira Bennett MD;  Location: [facility] OR     INJECT SACROILIAC JOINT Bilateral 11/10/2017    Procedure: INJECT SACROILIAC JOINT;  Bilateral Sacroiliac Joint Injection;  Surgeon: Elmira Bennett MD;  Location: [facility] OR       Social History   Substance Use Topics     Smoking status: Former Smoker     Packs/day: 0.50     Years: 54.00     Types: Cigarettes     Quit date: 4/27/2000     Smokeless tobacco: Never Used      Comment: using nicotine gum     Alcohol use 0.5 oz/week     1 Glasses of wine per week      Comment: \"A glass of wine once a week.\"     Family History   Problem Relation Age of Onset     Arthritis Father      Diabetes Brother      Cancer Brother      breast     Cerebrovascular Disease Brother          Current Outpatient Prescriptions   Medication Sig Dispense Refill     Acetaminophen (TYLENOL PO) Take 325 mg by mouth 6 times daily Patient takes TID with Oxycodone.        alendronate (FOSAMAX) 70 MG tablet TAKE 1 TABLET (70 MG) BY MOUTH EVERY 7 DAYS 4 tablet 6     amLODIPine (NORVASC) 2.5 MG tablet Take 1 tablet (2.5 mg) by mouth daily 90 tablet 1     amoxicillin (AMOXIL) 500 MG capsule TAKE FOUR CAPSULES (2 GRAMS) BY MOUTH ONE HOUR BEFORE DENTAL APPOINTMENT  3     ascorbic acid (VITAMIN C) 500 MG tablet Take 1 tablet (500 mg) by mouth " daily 30 tablet      ASPIRIN EC PO Take 81 mg by mouth daily       Calcium Polycarbophil (FIBERCON PO) Take 1 tablet by mouth once , one in the morning and one in the evening       calcium-vitamin D (CALTRATE) 600-400 MG-UNIT per tablet Take 1 tablet by mouth daily 1200mg   1000 mg of vitamin d       fluticasone-salmeterol (ADVAIR) 500-50 MCG/DOSE diskus inhaler Inhale 1 puff into the lungs 2 times daily 3 Inhaler 3     glipiZIDE (GLUCOTROL) 5 MG tablet Take 1 tablet (5 mg) by mouth every morning (before breakfast) 90 tablet 3     Glucosamine-Chondroitin (GLUCOSAMINE CHONDROITIN COMPLX) 500-250 MG CAPS Take 1 tablet by mouth 2 times daily       lisinopril (PRINIVIL/ZESTRIL) 40 MG tablet Take 1 tablet (40 mg) by mouth daily 90 tablet 1     Multiple Vitamins-Minerals (MULTIVITAMIN ADULT PO) Take 1 tablet by mouth daily       NONFORMULARY Natra sleeping med takes 1 at night.       oxyCODONE IR (ROXICODONE) 5 MG tablet May take 5 tabs per day 150 tablet 0     pravastatin (PRAVACHOL) 80 MG tablet Take 1 tablet (80 mg) by mouth daily 90 tablet 3     PROAIR  (90 Base) MCG/ACT inhaler INHALE 2 PUFFS INTO LUNGS EVERY 6HRS AS NEEDED FOR SHORTNESS OF BREATH/DYSPNEA/WHEEZING 8.5 Inhaler 2     senna-docusate (SENOKOT-S;PERICOLACE) 8.6-50 MG per tablet Take 1 tablet by mouth 2 times daily Reported on 4/12/2017       SPIRIVA HANDIHALER 18 MCG capsule INHALE CONTENTS OF ONE CAPSULE DAILY. 90 capsule 3     VITAMIN E PO Take 400 Int'l Units by mouth daily       BP Readings from Last 3 Encounters:   12/05/18 124/56   10/30/18 148/76   10/27/18 148/72    Wt Readings from Last 3 Encounters:   12/05/18 141 lb 4.8 oz (64.1 kg)   10/30/18 141 lb 4.8 oz (64.1 kg)   10/27/18 141 lb (64 kg)                    Reviewed and updated as needed this visit by clinical staff  Tobacco  Allergies  Meds  Med Hx  Surg Hx  Fam Hx  Soc Hx      Reviewed and updated as needed this visit by Provider         ROS:  CONSTITUTIONAL: NEGATIVE for  "fever, chills, change in weight  ENT/MOUTH: NEGATIVE for ear, mouth and throat problems  CV: NEGATIVE for chest pain, palpitations or peripheral edema  ROS otherwise negative    OBJECTIVE:     /56  Pulse 97  Temp 98.8  F (37.1  C) (Oral)  Resp 16  Ht 5' 3\" (1.6 m)  Wt 141 lb 4.8 oz (64.1 kg)  SpO2 91%  BMI 25.03 kg/m2  Body mass index is 25.03 kg/(m^2).  GENERAL: alert, no distress, frail and elderly  RESP: lungs clear to auscultation - no rales, rhonchi or wheezes  CV: regular rate and rhythm, normal S1 S2, no S3 or S4, no murmur, click or rub, no peripheral edema and peripheral pulses strong    O2 sat on RA ambulating 89-90    ASSESSMENT/PLAN:               ICD-10-CM    1. COPD, severe (H) J44.9        F/u PCP 2 weeks.  Adjust inhaler schedule and albuterol prior to activities.    Elisa Landry NP  [facility]    "

## 2018-12-05 NOTE — MR AVS SNAPSHOT
After Visit Summary   12/5/2018    Celeste Chcako    MRN: [id number]           Patient Information     Date Of Birth          5/11/1931        Visit Information        Provider Department      12/5/2018 2:20 PM Elisa Landry NP [facility]        Today's Diagnoses     COPD, severe (H)    -  1       Follow-ups after your visit        Follow-up notes from your care team     Return in about 2 weeks (around 12/19/2018), or pcp, for follow up visit.      Your next 10 appointments already scheduled     Dec 11, 2018  2:00 PM CST   [facility] with Shirley Ann, PT   [facility] ([facility] [place]  )    [address] [address] [phone]            Jan 02, 2019  1:00 PM CST   Return Visit with Alicia Neal PA-C   [facility] ([facility])    [address] [place] [address] [phone]              Who to contact     If you have questions or need follow up information about today's clinic visit or your schedule please contact [facility] directly at [phone].  Normal or non-critical lab and imaging results will be communicated to you by MyChart, letter or phone within 4 business days after the clinic has received the results. If you do not hear from us within 7 days, please contact the clinic through MyChart or phone. If you have a critical or abnormal lab result, we will notify you by phone as soon as possible.  Submit refill requests through [employer] or call your pharmacy and they will forward the refill request to us. Please allow 3 business days for your refill to be completed.          Additional Information About Your Visit        MyChart Information     [employer] lets you send messages to your doctor, view your test results, renew your prescriptions, schedule appointments and more. To sign up, go to  [email or web address] . Click on \"Log in\" on the left side of the screen, which will take you to the Welcome page. Then click on \"Sign up Now\" on the right side of the page.     You will be asked to enter the access code listed below, as well as some personal information. Please follow the directions to create your username and password.     Your access code is: [email or web address]  Expires: 2019  8:31 PM     Your access code will  in 90 days. If you need help or a new code, please call your [place] clinic or [phone].        Care EveryWhere ID     This is your Care EveryWhere ID. This could be used by other organizations to access your [place] medical records  [id number]        Your Vitals Were     Pulse Temperature Respirations Height Pulse Oximetry BMI (Body Mass Index)    97 98.8  F (37.1  C) (Oral) 16 5' 3\" (1.6 m) 91% 25.03 kg/m2       Blood Pressure from Last 3 Encounters:   18 124/56   10/30/18 148/76   10/27/18 148/72    Weight from Last 3 Encounters:   18 141 lb 4.8 oz (64.1 kg)   10/30/18 141 lb 4.8 oz (64.1 kg)   10/27/18 141 lb (64 kg)              Today, you had the following     No orders found for display       Primary Care Provider Office Phone # Fax #    Emily Marie [phone]       [facility] [address] [facility] [address]  [facility] [address]        Equal Access to Services     [facility]: Hadii harvey goins hadasho Sogamaali, waaxda luqadaha, qaybta kaalmada steven, omar chao . So [facility] [phone].    ATENCIÓN: Si habla español, tiene a harvey disposición servicios gratuitos de asistencia lingüística. Llame al [phone].    We comply with applicable federal civil rights laws and [place] laws. We do not discriminate on the basis of race, color, national origin, age, disability, sex, sexual orientation, or gender identity.            Thank you!     Thank you for choosing [facility]  for your care. Our goal is always to " provide you with excellent care. Hearing back from our patients is one way we can continue to improve our services. Please take a few minutes to complete the written survey that you may receive in the mail after your visit with us. Thank you!             Your Updated Medication List - Protect others around you: Learn how to safely use, store and throw away your medicines at www.disposemymeds.org.          This list is accurate as of 12/5/18  3:12 PM.  Always use your most recent med list.                   Brand Name Dispense Instructions for use Diagnosis    alendronate 70 MG tablet    FOSAMAX    4 tablet    TAKE 1 TABLET (70 MG) BY MOUTH EVERY 7 DAYS    Osteopenia, unspecified location       amLODIPine 2.5 MG tablet    NORVASC    90 tablet    Take 1 tablet (2.5 mg) by mouth daily        amoxicillin 500 MG capsule    AMOXIL     TAKE FOUR CAPSULES (2 GRAMS) BY MOUTH ONE HOUR BEFORE DENTAL APPOINTMENT        ASPIRIN EC PO      Take 81 mg by mouth daily        calcium carbonate 600 mg-vitamin D 400 units 600-400 MG-UNIT per tablet    CALTRATE     Take 1 tablet by mouth daily 1200mg  1000 mg of vitamin d        FIBERCON PO      Take 1 tablet by mouth once , one in the morning and one in the evening        fluticasone-salmeterol 500-50 MCG/DOSE inhaler    ADVAIR    3 Inhaler    Inhale 1 puff into the lungs 2 times daily    Chronic obstructive pulmonary disease, unspecified COPD type (H)       glipiZIDE 5 MG tablet    GLUCOTROL    90 tablet    Take 1 tablet (5 mg) by mouth every morning (before breakfast)    Type 2 diabetes mellitus without complication, without long-term current use of insulin (H)       Glucosamine-Chondroitin 500-250 MG Caps    GLUCOSAMINE CHONDROITIN COMPLX     Take 1 tablet by mouth 2 times daily    Generalized osteoarthrosis, unspecified site       lisinopril 40 MG tablet    PRINIVIL/ZESTRIL    90 tablet    Take 1 tablet (40 mg) by mouth daily    Benign essential hypertension       MULTIVITAMIN  ADULT PO      Take 1 tablet by mouth daily        NONFORMULARY      Natra sleeping med takes 1 at night.        oxyCODONE 5 MG tablet    ROXICODONE    150 tablet    May take 5 tabs per day    Spinal stenosis of lumbar region with neurogenic claudication       pravastatin 80 MG tablet    PRAVACHOL    90 tablet    Take 1 tablet (80 mg) by mouth daily    Hyperlipidemia LDL goal <100       PROAIR  (90 Base) MCG/ACT inhaler   Generic drug:  albuterol     8.5 Inhaler    INHALE 2 PUFFS INTO LUNGS EVERY 6HRS AS NEEDED FOR SHORTNESS OF BREATH/DYSPNEA/WHEEZING    Chronic obstructive pulmonary disease, unspecified COPD type (H)       senna-docusate 8.6-50 MG tablet    SENOKOT-S/PERICOLACE     Take 1 tablet by mouth 2 times daily Reported on 4/12/2017        SPIRIVA HANDIHALER 18 MCG inhaled capsule   Generic drug:  tiotropium     90 capsule    INHALE CONTENTS OF ONE CAPSULE DAILY.    Chronic obstructive pulmonary disease, unspecified COPD type (H)       TYLENOL PO      Take 325 mg by mouth 6 times daily Patient takes TID with Oxycodone.        vitamin C 500 MG tablet    ASCORBIC ACID    30 tablet    Take 1 tablet (500 mg) by mouth daily        VITAMIN E PO      Take 400 Int'l Units by mouth daily

## 2018-12-06 DIAGNOSIS — M48.062 SPINAL STENOSIS OF LUMBAR REGION WITH NEUROGENIC CLAUDICATION: ICD-10-CM

## 2018-12-06 NOTE — TELEPHONE ENCOUNTER
oxycodone      Last Written Prescription Date:  11/6/18  Last Fill Quantity: 150,   # refills: 0  Last Office Visit: Landry  Future Office visit:    Next 5 appointments (look out 90 days)     Dec 28, 2018  1:20 PM CST   SHORT with Emily Marie MD   [facility]    [address]  [facility] [address]   [phone]            Jan 02, 2019  1:00 PM CST   Return Visit with Alicia Neal PA-C   [facility]    [address] [address]   [phone]                   Routing refill request to provider for review/approval because:  Drug not on the [facility], [facility] or [facility] refill protocol or controlled substance

## 2018-12-07 RX ORDER — OXYCODONE HYDROCHLORIDE 5 MG/1
TABLET ORAL
Qty: 150 TABLET | Refills: 0 | Status: SHIPPED | OUTPATIENT
Start: 2018-12-07 | End: 2019-01-14

## 2018-12-07 ASSESSMENT — ANXIETY QUESTIONNAIRES: GAD7 TOTAL SCORE: 0

## 2018-12-18 ENCOUNTER — THERAPY VISIT (OUTPATIENT)
Dept: PHYSICAL THERAPY | Facility: CLINIC | Age: 83
End: 2018-12-18
Payer: COMMERCIAL

## 2018-12-18 DIAGNOSIS — M54.16 LUMBAR RADICULOPATHY: ICD-10-CM

## 2018-12-18 PROCEDURE — 97110 THERAPEUTIC EXERCISES: CPT | Mod: GP | Performed by: PHYSICAL THERAPIST

## 2018-12-18 PROCEDURE — 97530 THERAPEUTIC ACTIVITIES: CPT | Mod: GP | Performed by: PHYSICAL THERAPIST

## 2018-12-19 NOTE — PROGRESS NOTES
Subjective:  HPI  Oswestry Score: 22.22 %                 Objective:  System    Physical Exam    General     ROS    Assessment/Plan:    DISCHARGE REPORT    Progress reporting period is from 10/31/2018 to 12/18/2018.       SUBJECTIVE  Subjective: Patient reports her L LE pain is better but not completely gone.  She reports 75% improvement since her initial visit.  She can walk farther than previously and is more limited by shortness of breath than her L LE.  She can walk at least 0.2 miles at the mall before sitting.  She continues to have episodes of L LE pain to the calf at least once per day, but it is much less than previously.      Current Pain level: 1/10.     Initial Pain level: 4/10.   Changes in function:  Yes, improved walking.  Adverse reaction to treatment or activity: None    OBJECTIVE  Objective: Lumbar AROM:  flexion WNL, NE; extension 15%, NE.  Reports decreased pain after SHAMIR and MAREN positioning.         ASSESSMENT/PLAN  Patient has been seen for 5 visits with treatment focusing on lumbar extension exercises and posture training to address her L LE pain of lumbar origin.  She has responded well to lumbar extension exercises with consistent decrease in symptoms and improved function overall.  She has a good understanding of her HEP and should do well continuing with this independently at this point to further improve her function and mobility.    Updated problem list and treatment plan: Diagnosis 1:  L lumbar radiulopathy  Pain -  self management, education, directional preference exercise and home program  Decreased ROM/flexibility - home program  Decreased function - home program  Impaired posture - home program  STG/LTGs have been met or progress has been made towards goals:  Yes (See Goal flow sheet completed today.)  Assessment of Progress: The patient's condition is improving.  Self Management Plans:  Patient has been instructed in a home treatment program.  Patient is independent in a home  treatment program.  Patient  has been instructed in self management of symptoms.  Patient is independent in self management of symptoms.  Celeste continues to require the following intervention to meet STG and LTG's:  PT intervention is no longer required to meet STG/LTG.    Recommendations:  This patient is ready to be discharged from therapy and continue their home treatment program.    Please refer to the daily flowsheet for treatment today, total treatment time and time spent performing 1:1 timed codes.

## 2018-12-28 ENCOUNTER — OFFICE VISIT (OUTPATIENT)
Dept: INTERNAL MEDICINE | Facility: CLINIC | Age: 83
End: 2018-12-28
Payer: COMMERCIAL

## 2018-12-28 VITALS
HEART RATE: 99 BPM | DIASTOLIC BLOOD PRESSURE: 68 MMHG | TEMPERATURE: 98.4 F | OXYGEN SATURATION: 93 % | WEIGHT: 138.4 LBS | SYSTOLIC BLOOD PRESSURE: 108 MMHG | RESPIRATION RATE: 17 BRPM | BODY MASS INDEX: 24.52 KG/M2

## 2018-12-28 DIAGNOSIS — J44.9 CHRONIC OBSTRUCTIVE PULMONARY DISEASE, UNSPECIFIED COPD TYPE (H): ICD-10-CM

## 2018-12-28 PROCEDURE — 99213 OFFICE O/P EST LOW 20 MIN: CPT | Performed by: INTERNAL MEDICINE

## 2018-12-28 RX ORDER — ALBUTEROL SULFATE 90 UG/1
2 AEROSOL, METERED RESPIRATORY (INHALATION) EVERY 6 HOURS PRN
Qty: 1 INHALER | Refills: 11 | Status: SHIPPED | OUTPATIENT
Start: 2018-12-28 | End: 2019-09-30

## 2018-12-28 NOTE — NURSING NOTE
"/68   Pulse 99   Temp 98.4  F (36.9  C) (Oral)   Resp 17   Wt 62.8 kg (138 lb 6.4 oz)   SpO2 93%   BMI 24.52 kg/m    Patient in for follow up on breathing issues. Still having SOB although \"it's not as bad\" per pt.  Kristen Armstrong, CMA    "

## 2018-12-28 NOTE — PROGRESS NOTES
SUBJECTIVE:   Celeste Chacko is a 87 year old female who presents to clinic today for the following health issues:      Follow-up COPD: She had been seen here about 3 weeks ago with some increased dyspnea.  There has been no clear triggers such as viral allergies.  Her symptoms were primarily with exertion so it was recommended she do her steroid inhaler first thing in the morning and evening, approximately 12 hours apart, rather than at noon and p.m.  She is having improvement in her symptoms for about the last week or so.  Her symptoms stayed fairly stable though she did feel a little better but with her morning walk after moving the inhaler timing.  She has not tended to take her albuterol before walking.  That was suggested she try to do that but she tends to forget a fair amount.  She does not take her inhaler with her if she walks at the mall.    She still has no significant cough, chest pain, sore throat or other URI symptoms.    She has no other acute concerns today.    Patient Active Problem List   Diagnosis     Disorder of bone and cartilage     COPD, severe (H)     Spinal stenosis of lumbar region with neurogenic claudication     Type 2 diabetes mellitus without complication, without long-term current use of insulin (H)     HYPERLIPIDEMIA LDL GOAL <100     History of basal cell carcinoma     Controlled substance agreement signed     Chronic pain syndrome     Narcotic dependence (H)     Benign essential hypertension     Lumbar radiculopathy     Current Outpatient Medications   Medication Sig Dispense Refill     Acetaminophen (TYLENOL PO) Take 325 mg by mouth 6 times daily Patient takes TID with Oxycodone.        albuterol (PROAIR HFA) 108 (90 Base) MCG/ACT inhaler Inhale 2 puffs into the lungs every 6 hours as needed for shortness of breath / dyspnea or wheezing 1 Inhaler 11     alendronate (FOSAMAX) 70 MG tablet TAKE 1 TABLET (70 MG) BY MOUTH EVERY 7 DAYS 4 tablet 6     amLODIPine (NORVASC) 2.5 MG  tablet Take 1 tablet (2.5 mg) by mouth daily 90 tablet 1     ascorbic acid (VITAMIN C) 500 MG tablet Take 1 tablet (500 mg) by mouth daily 30 tablet      ASPIRIN EC PO Take 81 mg by mouth daily       Calcium Polycarbophil (FIBERCON PO) Take 1 tablet by mouth once , one in the morning and one in the evening       calcium-vitamin D (CALTRATE) 600-400 MG-UNIT per tablet Take 1 tablet by mouth daily 1200mg   1000 mg of vitamin d       fluticasone-salmeterol (ADVAIR) 500-50 MCG/DOSE diskus inhaler Inhale 1 puff into the lungs 2 times daily 3 Inhaler 3     glipiZIDE (GLUCOTROL) 5 MG tablet Take 1 tablet (5 mg) by mouth every morning (before breakfast) 90 tablet 3     Glucosamine-Chondroitin (GLUCOSAMINE CHONDROITIN COMPLX) 500-250 MG CAPS Take 1 tablet by mouth 2 times daily       lisinopril (PRINIVIL/ZESTRIL) 40 MG tablet Take 1 tablet (40 mg) by mouth daily 90 tablet 1     Multiple Vitamins-Minerals (MULTIVITAMIN ADULT PO) Take 1 tablet by mouth daily       NONFORMULARY Natra sleeping med takes 1 at night.       oxyCODONE (ROXICODONE) 5 MG tablet May take 5 tabs per day 150 tablet 0     pravastatin (PRAVACHOL) 80 MG tablet Take 1 tablet (80 mg) by mouth daily 90 tablet 3     senna-docusate (SENOKOT-S;PERICOLACE) 8.6-50 MG per tablet Take 1 tablet by mouth 2 times daily Reported on 2017       SPIRIVA HANDIHALER 18 MCG capsule INHALE CONTENTS OF ONE CAPSULE DAILY. 90 capsule 3     VITAMIN E PO Take 400 Int'l Units by mouth daily       amoxicillin (AMOXIL) 500 MG capsule TAKE FOUR CAPSULES (2 GRAMS) BY MOUTH ONE HOUR BEFORE DENTAL APPOINTMENT  3      Social History     Tobacco Use     Smoking status: Former Smoker     Packs/day: 0.50     Years: 54.00     Pack years: 27.00     Types: Cigarettes     Last attempt to quit: 2000     Years since quittin.6     Smokeless tobacco: Never Used     Tobacco comment: using nicotine gum   Substance Use Topics     Alcohol use: Yes     Alcohol/week: 0.5 oz     Types: 1  "Glasses of wine per week     Comment: \"A glass of wine once a week.\"     Drug use: No             ROS:  No fever, chills, sore throat, postnasal drainage, sinus pain, significant cough, chest pain, palpitations, PND, orthopnea, edema    OBJECTIVE:     /68   Pulse 99   Temp 98.4  F (36.9  C) (Oral)   Resp 17   Wt 62.8 kg (138 lb 6.4 oz)   SpO2 93%   BMI 24.52 kg/m    Body mass index is 24.52 kg/m .    Lungs: Mild decrease in breath sounds throughout, no wheezes  CV: normal S1, S2 without murmur, S3 or S4.       ASSESSMENT/PLAN:       1. Chronic obstructive pulmonary disease, unspecified COPD type (H)  She has some mild increase in dyspnea on exertion which is improving.  She may have had a very minor exacerbation without clear underlying triggers such as a viral URI or allergies.  Encouraged her to continue the every 12-hour inhaler at 12 hours and suggest she consider caring her albuterol in her purse so that she can use it at the mall if she has forgotten to use it before going to the mall, try to remember to use it before exertion.  Follow-up in 6 months or sooner if there is worsening breathing issues, new cough or fevers.  - albuterol (PROAIR HFA) 108 (90 Base) MCG/ACT inhaler; Inhale 2 puffs into the lungs every 6 hours as needed for shortness of breath / dyspnea or wheezing  Dispense: 1 Inhaler; Refill: 11        Emily Marie MD  [facility]    "

## 2019-01-10 DIAGNOSIS — M48.062 SPINAL STENOSIS OF LUMBAR REGION WITH NEUROGENIC CLAUDICATION: ICD-10-CM

## 2019-01-10 NOTE — TELEPHONE ENCOUNTER
Celeste is needing a letter sent over to her [facility] on [address] to refill her Oxy medication. Please call Celeste back if there is a problem with this or if you have any questions. She can be reached at [phone].

## 2019-01-14 RX ORDER — OXYCODONE HYDROCHLORIDE 5 MG/1
TABLET ORAL
Qty: 150 TABLET | Refills: 0 | Status: SHIPPED | OUTPATIENT
Start: 2019-01-14 | End: 2019-03-05

## 2019-01-14 NOTE — TELEPHONE ENCOUNTER
Oxycodone   MAIL to [facility] pharmacy        Last Written Prescription Date:  12/7/18  Last Fill Quantity: 150,   # refills: 0    Last Office Visit: 12/28/18    Future Office visit:    Next 5 appointments (look out 90 days)    Jan 23, 2019  1:00 PM CST  Return Visit with Alicia Neal PA-C  [facility] ([facility]) [address] [address] [phone]           Routing refill request to provider for review/approval because:  Drug not on the [facility], [facility] or [facility] refill protocol or controlled substance

## 2019-01-22 ENCOUNTER — TRANSFERRED RECORDS (OUTPATIENT)
Dept: HEALTH INFORMATION MANAGEMENT | Facility: CLINIC | Age: 84
End: 2019-01-22

## 2019-02-13 ENCOUNTER — OFFICE VISIT (OUTPATIENT)
Dept: DERMATOLOGY | Facility: CLINIC | Age: 84
End: 2019-02-13
Payer: COMMERCIAL

## 2019-02-13 VITALS — OXYGEN SATURATION: 91 % | HEART RATE: 92 BPM | DIASTOLIC BLOOD PRESSURE: 72 MMHG | SYSTOLIC BLOOD PRESSURE: 150 MMHG

## 2019-02-13 DIAGNOSIS — L82.0 INFLAMED SEBORRHEIC KERATOSIS: Primary | ICD-10-CM

## 2019-02-13 DIAGNOSIS — L82.1 SEBORRHEIC KERATOSES: ICD-10-CM

## 2019-02-13 DIAGNOSIS — D22.9 MULTIPLE BENIGN NEVI: ICD-10-CM

## 2019-02-13 DIAGNOSIS — D18.01 CHERRY ANGIOMA: ICD-10-CM

## 2019-02-13 DIAGNOSIS — L81.4 LENTIGINES: ICD-10-CM

## 2019-02-13 DIAGNOSIS — Z85.828 HISTORY OF BASAL CELL CARCINOMA (BCC): ICD-10-CM

## 2019-02-13 PROCEDURE — 17110 DESTRUCTION B9 LES UP TO 14: CPT | Performed by: PHYSICIAN ASSISTANT

## 2019-02-13 PROCEDURE — 99213 OFFICE O/P EST LOW 20 MIN: CPT | Mod: 25 | Performed by: PHYSICIAN ASSISTANT

## 2019-02-13 RX ORDER — CALCIUM CARBONATE/VITAMIN D3 500-10/5ML
LIQUID (ML) ORAL
COMMUNITY
End: 2020-07-10

## 2019-02-13 NOTE — LETTER
2/13/2019         RE: Celeste Chacko  [address]  [place] [facility] [place] [id number]        Dear Colleague,    Thank you for referring your patient, Celeste Chacko, to the [facility]. Please see a copy of my visit note below.    HPI:  Celeste Chacko is a 87 year old female patient here today for FBE. Has a history of BCC on glabella. Pt has an irritated spot on chest .  Patient states this has been present for a while.  Patient reports the following symptoms: rubs on clothing and jewelry .  Patient reports the following previous treatments: none.  Patient reports the following modifying factors: none.  Associated symptoms: none.  Patient has no other skin complaints today.  Remainder of the HPI, Meds, PMH, Allergies, FH, and SH was reviewed in chart.    Pertinent Hx:   BCC   Past Medical History:   Diagnosis Date     Basal cell carcinoma      Chronic airway obstruction, not elsewhere classified      Complete rupture of rotator cuff      Disorder of bone and cartilage, unspecified      History of blood transfusion      Mixed hyperlipidemia 4/00     Osteoarthritis      Personal history of other malignant neoplasm of skin      Spinal stenosis      Type II or unspecified type diabetes mellitus without mention of complication, not stated as uncontrolled        Past Surgical History:   Procedure Laterality Date     ARTHROPLASTY KNEE Left 9/15/2014    Procedure: ARTHROPLASTY KNEE;  Surgeon: Carlos Alberto Kaminski MD;  Location: [facility] OR     C NONSPECIFIC PROCEDURE      Breast biopsies      C NONSPECIFIC PROCEDURE      Pilonidal sinus repair      C NONSPECIFIC PROCEDURE      T & A      C NONSPECIFIC PROCEDURE  5/02    Shoulder arthropasty     C NONSPECIFIC PROCEDURE  5/07    Right shoulder replacement, RCT repair     HEAD & NECK SURGERY  05/14/15    forehead-skin cancer removed     INJECT EPIDURAL LUMBAR / SACRAL SINGLE Left 7/14/2016    Procedure: INJECT EPIDURAL LUMBAR / SACRAL SINGLE;   Surgeon: Luisito New MD;  Location: [facility]     INJECT SACROILIAC JOINT N/A 2015    Procedure: INJECT SACROILIAC JOINT;  Surgeon: Luisito New MD;  Location: [facility]     INJECT SACROILIAC JOINT Bilateral 2016    Procedure: INJECT SACROILIAC JOINT;  Surgeon: Luisito New MD;  Location: [facility]     INJECT SACROILIAC JOINT Bilateral 2016    Procedure: INJECT SACROILIAC JOINT;  Surgeon: Elmira Bennett MD;  Location: [facility]     INJECT SACROILIAC JOINT Bilateral 2017    Procedure: INJECT SACROILIAC JOINT;  Bilateral Sacroiliac Joint Injection   Injection of local anesthetic and steroid into area around spine for pain relief;  Surgeon: Alvaro Holland MD;  Location: [facility]     INJECT SACROILIAC JOINT Bilateral 2017    Procedure: INJECT SACROILIAC JOINT;  Bilateral Sacroiliac Joint Injection;  Surgeon: Elmira Bennett MD;  Location: [facility]     INJECT SACROILIAC JOINT Bilateral 11/10/2017    Procedure: INJECT SACROILIAC JOINT;  Bilateral Sacroiliac Joint Injection;  Surgeon: Elmira Bennett MD;  Location: [facility]        Family History   Problem Relation Age of Onset     Arthritis Father      Diabetes Brother      Cancer Brother         breast     Cerebrovascular Disease Brother        Social History     Socioeconomic History     Marital status: Single     Spouse name: Not on file     Number of children: Not on file     Years of education: Not on file     Highest education level: Not on file   Social Needs     Financial resource strain: Not on file     Food insecurity - worry: Not on file     Food insecurity - inability: Not on file     Transportation needs - medical: Not on file     Transportation needs - non-medical: Not on file   Occupational History     Not on file   Tobacco Use     Smoking status: Former Smoker     Packs/day: 0.50     Years: 54.00     Pack years: 27.00     Types: Cigarettes     Last attempt to quit: 2000     Years since quittin.8     Smokeless tobacco: Never Used  "    Tobacco comment: using nicotine gum   Substance and Sexual Activity     Alcohol use: Yes     Alcohol/week: 0.5 oz     Types: 1 Glasses of wine per week     Comment: \"A glass of wine once a week.\"     Drug use: No     Sexual activity: Not Currently   Other Topics Concern     Parent/sibling w/ CABG, MI or angioplasty before 65F 55M? Not Asked   Social History Narrative     Not on file       Outpatient Encounter Medications as of 2019   Medication Sig Dispense Refill     magnesium oxide 400 MG CAPS        Acetaminophen (TYLENOL PO) Take 325 mg by mouth 6 times daily Patient takes TID with Oxycodone.        albuterol (PROAIR HFA) 108 (90 Base) MCG/ACT inhaler Inhale 2 puffs into the lungs every 6 hours as needed for shortness of breath / dyspnea or wheezing 1 Inhaler 11     alendronate (FOSAMAX) 70 MG tablet TAKE 1 TABLET (70 MG) BY MOUTH EVERY 7 DAYS 4 tablet 6     amLODIPine (NORVASC) 2.5 MG tablet Take 1 tablet (2.5 mg) by mouth daily 90 tablet 1     amoxicillin (AMOXIL) 500 MG capsule TAKE FOUR CAPSULES (2 GRAMS) BY MOUTH ONE HOUR BEFORE DENTAL APPOINTMENT  3     ascorbic acid (VITAMIN C) 500 MG tablet Take 1 tablet (500 mg) by mouth daily 30 tablet      ASPIRIN EC PO Take 81 mg by mouth daily       Calcium Polycarbophil (FIBERCON PO) Take 1 tablet by mouth once , one in the morning and one in the evening       calcium-vitamin D (CALTRATE) 600-400 MG-UNIT per tablet Take 1 tablet by mouth daily 1200mg   1000 mg of vitamin d       fluticasone-salmeterol (ADVAIR) 500-50 MCG/DOSE diskus inhaler Inhale 1 puff into the lungs 2 times daily 3 Inhaler 3     glipiZIDE (GLUCOTROL) 5 MG tablet Take 1 tablet (5 mg) by mouth every morning (before breakfast) 90 tablet 3     Glucosamine-Chondroitin (GLUCOSAMINE CHONDROITIN COMPLX) 500-250 MG CAPS Take 1 tablet by mouth 2 times daily       [] lidocaine (LIDODERM) 5 % Patch Place 1 patch onto the skin every 24 hours for 10 days 10 patch 0     lisinopril " (PRINIVIL/ZESTRIL) 40 MG tablet Take 1 tablet (40 mg) by mouth daily 90 tablet 1     Multiple Vitamins-Minerals (MULTIVITAMIN ADULT PO) Take 1 tablet by mouth daily       NONFORMULARY Natra sleeping med takes 1 at night.       oxyCODONE (ROXICODONE) 5 MG tablet May take 5 tabs per day 150 tablet 0     pravastatin (PRAVACHOL) 80 MG tablet Take 1 tablet (80 mg) by mouth daily 90 tablet 3     senna-docusate (SENOKOT-S;PERICOLACE) 8.6-50 MG per tablet Take 1 tablet by mouth 2 times daily Reported on 4/12/2017       SPIRIVA HANDIHALER 18 MCG capsule INHALE CONTENTS OF ONE CAPSULE DAILY. 90 capsule 3     VITAMIN E PO Take 400 Int'l Units by mouth daily       No facility-administered encounter medications on file as of 2/13/2019.        Review Of Systems:  Skin: As above  Eyes: negative  Ears/Nose/Throat: negative  Respiratory: No shortness of breath, dyspnea on exertion, cough, or hemoptysis  Cardiovascular: negative  Gastrointestinal: negative  Genitourinary: negative  Musculoskeletal: negative  Neurologic: negative  Psychiatric: negative  Hematologic/Lymphatic/Immunologic: negative  Endocrine: negative      Objective:     /72   Pulse 92   SpO2 91%   Eyes: Conjunctivae/lids: Normal   ENT: Lips:  Normal  MSK: Normal  Cardiovascular: Peripheral edema none  Pulm: Breathing Normal  Neuro/Psych: Orientation: Normal; Mood/Affect: Normal, NAD, WDWN  Pt accompanied by: self  Following areas examined: Scalp, face, eyelids, lips, neck, chest, abdomen, back, buttocks, and R&L upper and lower extremities.  Lopez skin type:i   Findings:  Red smooth well-defined macules on trunk and extremities.  Brown, stuck-on scaly appearing papules on trunk and extremities.  Well circumscribed macules with symmetric color distribution on trunk and extremities 2-3mm.  Peguero WD smooth macules on face, neck, trunk, and extremities.  Inflamed skin colored smooth pedunculated papules on left upper chest x 1  Hypopigmented smooth patch on  glabella    Assessment and Plan:     1) Cherry angiomas, Seborrheic keratoses, Benign nevi, Lentigines     I discussed the specifics of tumor, prognosis, and genetics of benign lesions.  I explained that treatment of these lesions would be purely cosmetic and not medically neccessary.  I discussed with patient different removal options including excision, cryotherapy, cautery and /or laser.  Lesion may recur and/or may not completely resolve. May need additional treatment.     2) history of BCC  No evidence of recurrence.  Signs and Symptoms of non-melanoma skin cancer and ABCDEs of melanoma reviewed with patient. Patient encouraged to perform monthly self skin exams and educated on how to perform them. UV precautions reviewed with patient. Patient was asked about new or changing moles/lesions on body.   Wear a sunscreen with at least SPF 30 on your face, ears, neck and V of the chest daily. Wear sunscreen on other areas of the body if those areas are exposed to the sun throughout the day. Sunscreens can contain physical and/or chemical blockers. Physical blockers are less likely to clog pores, these include zinc oxide and titanium dioxide. Reapply every two hour and after swimming. Sunscreen examples include Neutrogena, CeraVe, Blue Lizard, Elta MD and many others.    3) ISK x 1  Benign etiology and course of lesion.  LN2: Treated with LN2 for 5s for 1-2 cycles. Warned risks of blistering, pain, pigment change, scarring, and incomplete resolution.  Advised patient to return if lesions do not completely resolve within 2-3 months.  Wound care sheet given.      Proper skin care from [facility]:    -Eliminate harsh soaps as they strip the natural oils from the skin, often resulting in dry itchy skin ( i.e. Dial, Zest, [place] Spring)  -Use mild soaps such as Cetaphil or Dove Sensitive Skin in the shower. You do not need to use soap on arms, legs, and trunk every time you shower unless visibly soiled.   -Avoid  hot or cold showers.  -After showering, lightly dry off and apply moisturizing within 2-3 minutes. This will help trap moisture in the skin.   -Aggressive use of a moisturizer at least 1-2 times a day to the entire body (including -Vanicream, Cetaphil, Aquaphor or Cerave) and moisturize hands after every washing.  -We recommend using moisturizers that come in a tub that needs to be scooped out, not a pump. This has more of an oil base. It will hold moisture in your skin much better than a water base moisturizer. The above recommended are non-pore clogging.       Follow up in 1 year      Again, thank you for allowing me to participate in the care of your patient.        Sincerely,        Alicia Neal PA-C

## 2019-02-13 NOTE — PATIENT INSTRUCTIONS
WOUND CARE INSTRUCTIONS  FOR CRYOSURGERY        This area treated with liquid nitrogen will form a blister. You do not need to bandage the area until after the blister forms and breaks (which may be a few days).  When the blister breaks, begin daily dressing changes as follows:    1) Clean and dry the area with tap water using clean Q-tip or sterile gauze pad.    2) Apply Polysporin ointment or Bacitracin ointment over entire wound.  Do NOT use Neosporin ointment.    3) Cover the wound with a band-aid or sterile non-stick gauze pad and micropore paper tape.      REPEAT THESE INSTRUCTIONS AT LEAST ONCE A DAY UNTIL THE WOUND HAS COMPLETELY HEALED.        It is an old wives tale that a wound heals better when it is exposed to air and allowed to dry out. The wound will heal faster with a better cosmetic result if it is kept moist with ointment and covered with a bandage.  Do not let the wound dry out.      IMPORTANT INFORMATION ON REVERSE SIDE    Supplies Needed:     *Cotton tipped applicators (Q-tips)   *Polysporin ointment or Bacitracin ointment (NOT NEOSPORIN)   *Band-aids, or non stick gauze pads and micropore paper tape                PATIENT INFORMATION    During the healing process you will notice a number of changes. All wounds develop a small halo of redness surrounding the wound.  This means healing is occurring. Severe itching with extensive redness usually indicates sensitivity to the ointment or bandage tape used to dress the wound.  You should call our office if this develops.      Swelling and/or discoloration around your surgical site is common, particularly when performed around the eye.    All wounds normally drain.  The larger the wound the more drainage there will be.  After 7-10 days, you will notice the wound beginning to shrink and new skin will begin to grow.  The wound is healed when you can see skin has formed over the entire area.  A healed wound has a healthy, shiny look to the surface and is  red to dark pink in color to normalize.  Wounds may take approximately 4-6 weeks to heal.  Larger wounds may take 6-8 weeks.  After the wound is healed you may discontinue dressing changes.    You may experience a sensation of tightness as your wound heals. This is normal and will gradually subside.    Your healed wound may be sensitive to temperature changes. This sensitivity improves with time, but if you re having a lot of discomfort, try to avoid temperature extremes.    Patients frequently experience itching after their wound appears to have healed because of the continue healing under the skin.  Plain Vaseline will help relieve the itching.             Proper skin care from [facility]:    -Eliminate harsh soaps as they strip the natural oils from the skin, often resulting in dry itchy skin ( i.e. Dial, Zest, [place] Spring)  -Use mild soaps such as Cetaphil or Dove Sensitive Skin in the shower. You do not need to use soap on arms, legs, and trunk every time you shower unless visibly soiled.   -Avoid hot or cold showers.  -After showering, lightly dry off and apply moisturizing within 2-3 minutes. This will help trap moisture in the skin.   -Aggressive use of a moisturizer at least 1-2 times a day to the entire body (including -Vanicream, Cetaphil, Aquaphor or Cerave) and moisturize hands after every washing.  -We recommend using moisturizers that come in a tub that needs to be scooped out, not a pump. This has more of an oil base. It will hold moisture in your skin much better than a water base moisturizer. The above recommended are non-pore clogging.         Wear a sunscreen with at least SPF 30 on your face, ears, neck and V of the chest daily. Wear sunscreen on other areas of the body if those areas are exposed to the sun throughout the day. Sunscreens can contain physical and/or chemical blockers. Physical blockers are less likely to clog pores, these include zinc oxide and titanium dioxide.  Reapply every two hour and after swimming. Sunscreen examples include Neutrogena, CeraVe, Blue Lizard, Elta MD and many others.    UV radiation  UVA radiation remains constant throughout the day and throughout the year. It is a longer wavelength than UVB and therefore penetrates deeper into the skin leading to immediate and delayed tanning, photoaging, and skin cancer. 70-80% of UVA and UVB radiation occurs between the hours of 10am-2pm.  UVB radiation  UVB radiation causes the most harmful effects and is more significant during the summer months. However, snow and ice can reflect UVB radiation leading to skin damage during the winter months as well. UVB radiation is responsible for tanning, burning, inflammation, delayed erythema (pinkness), pigmentation (brown spots), and skin cancer.   Just because you do not burn or are not developing a tan does not mean that you are not damaging your skin. A 15 minute drive to and from work for 30 years an lead to chronic sun damage of the skin. It is important to wear a broad spectrum (both UVA and UVB) sunscreen EVERY day with at least 30 SPF. Apply to face, ears, neck and v of the chest as this is where most of our sun exposure is. Reapply sunscreen every two hours if you plan on being outside.   Dane Hunter. Clinical Dermatology: A Color Guide to Diagnosis and Therapy. [employer], 2016.

## 2019-02-13 NOTE — PROGRESS NOTES
HPI:  Celeste Chacko is a 87 year old female patient here today for FBE. Has a history of BCC on glabella. Pt has an irritated spot on chest .  Patient states this has been present for a while.  Patient reports the following symptoms: rubs on clothing and jewelry .  Patient reports the following previous treatments: none.  Patient reports the following modifying factors: none.  Associated symptoms: none.  Patient has no other skin complaints today.  Remainder of the HPI, Meds, PMH, Allergies, FH, and SH was reviewed in chart.    Pertinent Hx:   BCC   Past Medical History:   Diagnosis Date     Basal cell carcinoma      Chronic airway obstruction, not elsewhere classified      Complete rupture of rotator cuff      Disorder of bone and cartilage, unspecified      History of blood transfusion      Mixed hyperlipidemia 4/00     Osteoarthritis      Personal history of other malignant neoplasm of skin      Spinal stenosis      Type II or unspecified type diabetes mellitus without mention of complication, not stated as uncontrolled        Past Surgical History:   Procedure Laterality Date     ARTHROPLASTY KNEE Left 9/15/2014    Procedure: ARTHROPLASTY KNEE;  Surgeon: Carlos Alberto Kaminski MD;  Location: [facility]     C NONSPECIFIC PROCEDURE      Breast biopsies      C NONSPECIFIC PROCEDURE      Pilonidal sinus repair      C NONSPECIFIC PROCEDURE      T & A      C NONSPECIFIC PROCEDURE  5/02    Shoulder arthropasty     C NONSPECIFIC PROCEDURE  5/07    Right shoulder replacement, RCT repair     HEAD & NECK SURGERY  05/14/15    forehead-skin cancer removed     INJECT EPIDURAL LUMBAR / SACRAL SINGLE Left 7/14/2016    Procedure: INJECT EPIDURAL LUMBAR / SACRAL SINGLE;  Surgeon: Luisito New MD;  [facility]     INJECT SACROILIAC JOINT N/A 12/1/2015    Procedure: INJECT SACROILIAC JOINT;  Surgeon: Luisito New MD;  Location: [facility]     INJECT SACROILIAC JOINT Bilateral 5/19/2016    Procedure: INJECT SACROILIAC JOINT;  Surgeon:  "Luisito New MD;  [facility]     INJECT SACROILIAC JOINT Bilateral 2016    Procedure: INJECT SACROILIAC JOINT;  Surgeon: Elmira Bennett MD;  [facility]     INJECT SACROILIAC JOINT Bilateral 2017    Procedure: INJECT SACROILIAC JOINT;  Bilateral Sacroiliac Joint Injection   Injection of local anesthetic and steroid into area around spine for pain relief;  Surgeon: Alvaro Holland MD;  [facility]     INJECT SACROILIAC JOINT Bilateral 2017    Procedure: INJECT SACROILIAC JOINT;  Bilateral Sacroiliac Joint Injection;  Surgeon: Elmira Bennett MD;  [facility]     INJECT SACROILIAC JOINT Bilateral 11/10/2017    Procedure: INJECT SACROILIAC JOINT;  Bilateral Sacroiliac Joint Injection;  Surgeon: Elmira Bennett MD;  [facility]        Family History   Problem Relation Age of Onset     Arthritis Father      Diabetes Brother      Cancer Brother         breast     Cerebrovascular Disease Brother        Social History     Socioeconomic History     Marital status: Single     Spouse name: Not on file     Number of children: Not on file     Years of education: Not on file     Highest education level: Not on file   Social Needs     Financial resource strain: Not on file     Food insecurity - worry: Not on file     Food insecurity - inability: Not on file     Transportation needs - medical: Not on file     Transportation needs - non-medical: Not on file   Occupational History     Not on file   Tobacco Use     Smoking status: Former Smoker     Packs/day: 0.50     Years: 54.00     Pack years: 27.00     Types: Cigarettes     Last attempt to quit: 2000     Years since quittin.8     Smokeless tobacco: Never Used     Tobacco comment: using nicotine gum   Substance and Sexual Activity     Alcohol use: Yes     Alcohol/week: 0.5 oz     Types: 1 Glasses of wine per week     Comment: \"A glass of wine once a week.\"     Drug use: No     Sexual activity: Not Currently   Other Topics " Concern     Parent/sibling w/ CABG, MI or angioplasty before 65F 55M? Not Asked   Social History Narrative     Not on file       Outpatient Encounter Medications as of 2019   Medication Sig Dispense Refill     magnesium oxide 400 MG CAPS        Acetaminophen (TYLENOL PO) Take 325 mg by mouth 6 times daily Patient takes TID with Oxycodone.        albuterol (PROAIR HFA) 108 (90 Base) MCG/ACT inhaler Inhale 2 puffs into the lungs every 6 hours as needed for shortness of breath / dyspnea or wheezing 1 Inhaler 11     alendronate (FOSAMAX) 70 MG tablet TAKE 1 TABLET (70 MG) BY MOUTH EVERY 7 DAYS 4 tablet 6     amLODIPine (NORVASC) 2.5 MG tablet Take 1 tablet (2.5 mg) by mouth daily 90 tablet 1     amoxicillin (AMOXIL) 500 MG capsule TAKE FOUR CAPSULES (2 GRAMS) BY MOUTH ONE HOUR BEFORE DENTAL APPOINTMENT  3     ascorbic acid (VITAMIN C) 500 MG tablet Take 1 tablet (500 mg) by mouth daily 30 tablet      ASPIRIN EC PO Take 81 mg by mouth daily       Calcium Polycarbophil (FIBERCON PO) Take 1 tablet by mouth once , one in the morning and one in the evening       calcium-vitamin D (CALTRATE) 600-400 MG-UNIT per tablet Take 1 tablet by mouth daily 1200mg   1000 mg of vitamin d       fluticasone-salmeterol (ADVAIR) 500-50 MCG/DOSE diskus inhaler Inhale 1 puff into the lungs 2 times daily 3 Inhaler 3     glipiZIDE (GLUCOTROL) 5 MG tablet Take 1 tablet (5 mg) by mouth every morning (before breakfast) 90 tablet 3     Glucosamine-Chondroitin (GLUCOSAMINE CHONDROITIN COMPLX) 500-250 MG CAPS Take 1 tablet by mouth 2 times daily       [] lidocaine (LIDODERM) 5 % Patch Place 1 patch onto the skin every 24 hours for 10 days 10 patch 0     lisinopril (PRINIVIL/ZESTRIL) 40 MG tablet Take 1 tablet (40 mg) by mouth daily 90 tablet 1     Multiple Vitamins-Minerals (MULTIVITAMIN ADULT PO) Take 1 tablet by mouth daily       NONFORMULARY Natra sleeping med takes 1 at night.       oxyCODONE (ROXICODONE) 5 MG tablet May take 5 tabs  per day 150 tablet 0     pravastatin (PRAVACHOL) 80 MG tablet Take 1 tablet (80 mg) by mouth daily 90 tablet 3     senna-docusate (SENOKOT-S;PERICOLACE) 8.6-50 MG per tablet Take 1 tablet by mouth 2 times daily Reported on 4/12/2017       SPIRIVA HANDIHALER 18 MCG capsule INHALE CONTENTS OF ONE CAPSULE DAILY. 90 capsule 3     VITAMIN E PO Take 400 Int'l Units by mouth daily       No facility-administered encounter medications on file as of 2/13/2019.        Review Of Systems:  Skin: As above  Eyes: negative  Ears/Nose/Throat: negative  Respiratory: No shortness of breath, dyspnea on exertion, cough, or hemoptysis  Cardiovascular: negative  Gastrointestinal: negative  Genitourinary: negative  Musculoskeletal: negative  Neurologic: negative  Psychiatric: negative  Hematologic/Lymphatic/Immunologic: negative  Endocrine: negative      Objective:     /72   Pulse 92   SpO2 91%   Eyes: Conjunctivae/lids: Normal   ENT: Lips:  Normal  MSK: Normal  Cardiovascular: Peripheral edema none  Pulm: Breathing Normal  Neuro/Psych: Orientation: Normal; Mood/Affect: Normal, NAD, WDWN  Pt accompanied by: self  Following areas examined: Scalp, face, eyelids, lips, neck, chest, abdomen, back, buttocks, and R&L upper and lower extremities.  Lopez skin type:i   Findings:  Red smooth well-defined macules on trunk and extremities.  Brown, stuck-on scaly appearing papules on trunk and extremities.  Well circumscribed macules with symmetric color distribution on trunk and extremities 2-3mm.  Peguero WD smooth macules on face, neck, trunk, and extremities.  Inflamed skin colored smooth pedunculated papules on left upper chest x 1  Hypopigmented smooth patch on glabella    Assessment and Plan:     1) Cherry angiomas, Seborrheic keratoses, Benign nevi, Lentigines     I discussed the specifics of tumor, prognosis, and genetics of benign lesions.  I explained that treatment of these lesions would be purely cosmetic and not medically  neccessary.  I discussed with patient different removal options including excision, cryotherapy, cautery and /or laser.  Lesion may recur and/or may not completely resolve. May need additional treatment.     2) history of BCC  No evidence of recurrence.  Signs and Symptoms of non-melanoma skin cancer and ABCDEs of melanoma reviewed with patient. Patient encouraged to perform monthly self skin exams and educated on how to perform them. UV precautions reviewed with patient. Patient was asked about new or changing moles/lesions on body.   Wear a sunscreen with at least SPF 30 on your face, ears, neck and V of the chest daily. Wear sunscreen on other areas of the body if those areas are exposed to the sun throughout the day. Sunscreens can contain physical and/or chemical blockers. Physical blockers are less likely to clog pores, these include zinc oxide and titanium dioxide. Reapply every two hour and after swimming. Sunscreen examples include Neutrogena, CeraVe, Blue Lizard, Elta MD and many others.    3) ISK x 1  Benign etiology and course of lesion.  LN2: Treated with LN2 for 5s for 1-2 cycles. Warned risks of blistering, pain, pigment change, scarring, and incomplete resolution.  Advised patient to return if lesions do not completely resolve within 2-3 months.  Wound care sheet given.      Proper skin care from [facility]:    -Eliminate harsh soaps as they strip the natural oils from the skin, often resulting in dry itchy skin ( i.e. Dial, Zest, Zeny Spring)  -Use mild soaps such as Cetaphil or Dove Sensitive Skin in the shower. You do not need to use soap on arms, legs, and trunk every time you shower unless visibly soiled.   -Avoid hot or cold showers.  -After showering, lightly dry off and apply moisturizing within 2-3 minutes. This will help trap moisture in the skin.   -Aggressive use of a moisturizer at least 1-2 times a day to the entire body (including -Vanicream, Cetaphil, Aquaphor or Cerave)  and moisturize hands after every washing.  -We recommend using moisturizers that come in a tub that needs to be scooped out, not a pump. This has more of an oil base. It will hold moisture in your skin much better than a water base moisturizer. The above recommended are non-pore clogging.    Celeste to follow up with Primary Care provider regarding elevated blood pressure.         Follow up in 1 year

## 2019-02-13 NOTE — NURSING NOTE
"Initial /72   Pulse 92   SpO2 91%  Estimated body mass index is 24.52 kg/m  as calculated from the following:    Height as of 12/5/18: 1.6 m (5' 3\").    Weight as of 12/28/18: 62.8 kg (138 lb 6.4 oz). .    RAZIA Mcguire-BSN  [facility]  [phone]  "

## 2019-02-25 DIAGNOSIS — M48.062 SPINAL STENOSIS OF LUMBAR REGION WITH NEUROGENIC CLAUDICATION: ICD-10-CM

## 2019-02-25 NOTE — TELEPHONE ENCOUNTER
Pt would like to get a refill started asap. Pt has made an appt to see PCP (LEYDA) on 03/28. Please contact pt if there are any questions/concerns.     MEDICATION - oxyCODONE (ROXICODONE) 5 MG tablet     PHARMACY - [facility]; [address] [place] [address]; P - [phone], F - [phone]    PHONE - [phone]    DETAIL MSG - YES

## 2019-02-25 NOTE — TELEPHONE ENCOUNTER
Controlled Substance Refill Request for Oxycodone.   MAIL to [facility] in [place]    Last refill: 1/14/19 for #150    Last clinic visit: 12/28/18.   Has upcoming OV, 3/28/19.      Clinic visit frequency required: Q 3 months  Next appt: 3/28/19    Controlled substance agreement on file: Yes:  Date 5/2016.    Documentation in problem list reviewed:  Yes    Processing:  Mail to [facility] pharmacy

## 2019-03-03 DIAGNOSIS — M85.80 OSTEOPENIA, UNSPECIFIED LOCATION: ICD-10-CM

## 2019-03-05 DIAGNOSIS — I10 BENIGN ESSENTIAL HYPERTENSION: Primary | ICD-10-CM

## 2019-03-05 RX ORDER — ALENDRONATE SODIUM 70 MG/1
TABLET ORAL
Qty: 12 TABLET | Refills: 3 | Status: SHIPPED | OUTPATIENT
Start: 2019-03-05 | End: 2020-02-12

## 2019-03-05 RX ORDER — OXYCODONE HYDROCHLORIDE 5 MG/1
TABLET ORAL
Qty: 150 TABLET | Refills: 0 | Status: SHIPPED | OUTPATIENT
Start: 2019-03-05 | End: 2019-03-28

## 2019-03-05 RX ORDER — AMLODIPINE BESYLATE 2.5 MG/1
TABLET ORAL
Qty: 90 TABLET | Refills: 1 | Status: SHIPPED | OUTPATIENT
Start: 2019-03-05 | End: 2019-09-30

## 2019-03-05 NOTE — TELEPHONE ENCOUNTER
Patient calls to check on status of request. She left message for request 1 week ago and cannot wait for it to be mailed to [facility]. Patient requesting to  prescription at  instead today due to delay in processing.     Routed to covering provider to review.     Please call patient back when prescription is ready for .   PHONE - [phone]  DETAIL MSG - YES

## 2019-03-05 NOTE — TELEPHONE ENCOUNTER
Contacted patient, informed her prescription was approved and placed at  for her to  since there was a delay in processing her request.

## 2019-03-05 NOTE — TELEPHONE ENCOUNTER
Last OV 12/28/18    BP Readings from Last 3 Encounters:   02/13/19 150/72   12/28/18 108/68   12/05/18 124/56     Prescription approved per [facility] Refill Protocol.

## 2019-03-21 DIAGNOSIS — E78.5 HYPERLIPIDEMIA LDL GOAL <100: ICD-10-CM

## 2019-03-21 DIAGNOSIS — E11.9 TYPE 2 DIABETES MELLITUS WITHOUT COMPLICATION, WITHOUT LONG-TERM CURRENT USE OF INSULIN (H): ICD-10-CM

## 2019-03-21 DIAGNOSIS — R61 NIGHT SWEATS: ICD-10-CM

## 2019-03-21 DIAGNOSIS — M48.062 SPINAL STENOSIS OF LUMBAR REGION WITH NEUROGENIC CLAUDICATION: ICD-10-CM

## 2019-03-21 LAB
AMPHETAMINES UR QL: NOT DETECTED NG/ML
ANION GAP SERPL CALCULATED.3IONS-SCNC: 6 MMOL/L (ref 3–14)
BARBITURATES UR QL SCN: NOT DETECTED NG/ML
BASOPHILS # BLD AUTO: 0 10E9/L (ref 0–0.2)
BASOPHILS NFR BLD AUTO: 0.5 %
BENZODIAZ UR QL SCN: NOT DETECTED NG/ML
BUN SERPL-MCNC: 17 MG/DL (ref 7–30)
BUPRENORPHINE UR QL: NOT DETECTED NG/ML
CALCIUM SERPL-MCNC: 9.5 MG/DL (ref 8.5–10.1)
CANNABINOIDS UR QL: NOT DETECTED NG/ML
CHLORIDE SERPL-SCNC: 106 MMOL/L (ref 94–109)
CHOLEST SERPL-MCNC: 206 MG/DL
CO2 SERPL-SCNC: 30 MMOL/L (ref 20–32)
COCAINE UR QL SCN: NOT DETECTED NG/ML
CREAT SERPL-MCNC: 0.56 MG/DL (ref 0.52–1.04)
CREAT UR-MCNC: 23 MG/DL
D-METHAMPHET UR QL: NOT DETECTED NG/ML
DIFFERENTIAL METHOD BLD: ABNORMAL
EOSINOPHIL # BLD AUTO: 0.1 10E9/L (ref 0–0.7)
EOSINOPHIL NFR BLD AUTO: 3.2 %
ERYTHROCYTE [DISTWIDTH] IN BLOOD BY AUTOMATED COUNT: 14.6 % (ref 10–15)
GFR SERPL CREATININE-BSD FRML MDRD: 83 ML/MIN/{1.73_M2}
GLUCOSE SERPL-MCNC: 104 MG/DL (ref 70–99)
HBA1C MFR BLD: 6.9 % (ref 0–5.6)
HCT VFR BLD AUTO: 43.8 % (ref 35–47)
HDLC SERPL-MCNC: 59 MG/DL
HGB BLD-MCNC: 13.8 G/DL (ref 11.7–15.7)
LDLC SERPL CALC-MCNC: 115 MG/DL
LYMPHOCYTES # BLD AUTO: 1.8 10E9/L (ref 0.8–5.3)
LYMPHOCYTES NFR BLD AUTO: 40 %
MCH RBC QN AUTO: 26.5 PG (ref 26.5–33)
MCHC RBC AUTO-ENTMCNC: 31.5 G/DL (ref 31.5–36.5)
MCV RBC AUTO: 84 FL (ref 78–100)
METHADONE UR QL SCN: NOT DETECTED NG/ML
MICROALBUMIN UR-MCNC: 26 MG/L
MICROALBUMIN/CREAT UR: 110.26 MG/G CR (ref 0–25)
MONOCYTES # BLD AUTO: 0.5 10E9/L (ref 0–1.3)
MONOCYTES NFR BLD AUTO: 10.9 %
NEUTROPHILS # BLD AUTO: 2 10E9/L (ref 1.6–8.3)
NEUTROPHILS NFR BLD AUTO: 45.4 %
NONHDLC SERPL-MCNC: 147 MG/DL
OPIATES UR QL SCN: NOT DETECTED NG/ML
OXYCODONE UR QL SCN: ABNORMAL NG/ML
PCP UR QL SCN: NOT DETECTED NG/ML
PLATELET # BLD AUTO: 185 10E9/L (ref 150–450)
POTASSIUM SERPL-SCNC: 3.7 MMOL/L (ref 3.4–5.3)
PROPOXYPH UR QL: NOT DETECTED NG/ML
RBC # BLD AUTO: 5.21 10E12/L (ref 3.8–5.2)
SODIUM SERPL-SCNC: 142 MMOL/L (ref 133–144)
TRICYCLICS UR QL SCN: NOT DETECTED NG/ML
TRIGL SERPL-MCNC: 161 MG/DL
TSH SERPL DL<=0.005 MIU/L-ACNC: 1.44 MU/L (ref 0.4–4)
WBC # BLD AUTO: 4.4 10E9/L (ref 4–11)

## 2019-03-21 PROCEDURE — 84443 ASSAY THYROID STIM HORMONE: CPT | Performed by: INTERNAL MEDICINE

## 2019-03-21 PROCEDURE — 36415 COLL VENOUS BLD VENIPUNCTURE: CPT | Performed by: INTERNAL MEDICINE

## 2019-03-21 PROCEDURE — 80048 BASIC METABOLIC PNL TOTAL CA: CPT | Performed by: INTERNAL MEDICINE

## 2019-03-21 PROCEDURE — 82043 UR ALBUMIN QUANTITATIVE: CPT | Performed by: INTERNAL MEDICINE

## 2019-03-21 PROCEDURE — 85025 COMPLETE CBC W/AUTO DIFF WBC: CPT | Performed by: INTERNAL MEDICINE

## 2019-03-21 PROCEDURE — 83036 HEMOGLOBIN GLYCOSYLATED A1C: CPT | Performed by: INTERNAL MEDICINE

## 2019-03-21 PROCEDURE — 80061 LIPID PANEL: CPT | Performed by: INTERNAL MEDICINE

## 2019-03-21 PROCEDURE — 80306 DRUG TEST PRSMV INSTRMNT: CPT | Performed by: INTERNAL MEDICINE

## 2019-03-28 ENCOUNTER — OFFICE VISIT (OUTPATIENT)
Dept: INTERNAL MEDICINE | Facility: CLINIC | Age: 84
End: 2019-03-28
Payer: COMMERCIAL

## 2019-03-28 VITALS
RESPIRATION RATE: 14 BRPM | OXYGEN SATURATION: 93 % | BODY MASS INDEX: 24.63 KG/M2 | HEART RATE: 84 BPM | WEIGHT: 139 LBS | DIASTOLIC BLOOD PRESSURE: 64 MMHG | HEIGHT: 63 IN | SYSTOLIC BLOOD PRESSURE: 132 MMHG | TEMPERATURE: 97.8 F

## 2019-03-28 DIAGNOSIS — M48.062 SPINAL STENOSIS OF LUMBAR REGION WITH NEUROGENIC CLAUDICATION: ICD-10-CM

## 2019-03-28 DIAGNOSIS — E11.8 TYPE 2 DIABETES MELLITUS WITH COMPLICATION, WITHOUT LONG-TERM CURRENT USE OF INSULIN (H): Primary | ICD-10-CM

## 2019-03-28 DIAGNOSIS — J44.9 CHRONIC OBSTRUCTIVE PULMONARY DISEASE, UNSPECIFIED COPD TYPE (H): ICD-10-CM

## 2019-03-28 DIAGNOSIS — E78.5 HYPERLIPIDEMIA LDL GOAL <100: ICD-10-CM

## 2019-03-28 DIAGNOSIS — I10 BENIGN ESSENTIAL HYPERTENSION: ICD-10-CM

## 2019-03-28 DIAGNOSIS — F11.20 NARCOTIC DEPENDENCE (H): ICD-10-CM

## 2019-03-28 DIAGNOSIS — N18.1 CKD (CHRONIC KIDNEY DISEASE) STAGE 1, GFR 90 ML/MIN OR GREATER: ICD-10-CM

## 2019-03-28 PROCEDURE — 99214 OFFICE O/P EST MOD 30 MIN: CPT | Performed by: INTERNAL MEDICINE

## 2019-03-28 RX ORDER — PRAVASTATIN SODIUM 80 MG/1
80 TABLET ORAL DAILY
Qty: 90 TABLET | Refills: 3 | Status: CANCELLED | OUTPATIENT
Start: 2019-03-28

## 2019-03-28 RX ORDER — PRAVASTATIN SODIUM 40 MG
40 TABLET ORAL DAILY
Qty: 90 TABLET | Refills: 3 | Status: SHIPPED | OUTPATIENT
Start: 2019-03-28 | End: 2019-11-04

## 2019-03-28 RX ORDER — OXYCODONE HYDROCHLORIDE 5 MG/1
TABLET ORAL
Qty: 150 TABLET | Refills: 0 | Status: SHIPPED | OUTPATIENT
Start: 2019-03-28 | End: 2019-07-15

## 2019-03-28 RX ORDER — OXYCODONE HYDROCHLORIDE 5 MG/1
TABLET ORAL
Qty: 150 TABLET | Refills: 0 | Status: SHIPPED | OUTPATIENT
Start: 2019-03-28 | End: 2019-03-28

## 2019-03-28 ASSESSMENT — MIFFLIN-ST. JEOR: SCORE: 1037.63

## 2019-03-28 NOTE — PROGRESS NOTES
SUBJECTIVE:   Celeste Chacko is a 87 year old female who presents to clinic today for the following health issues:      Diabetes Follow-up      Patient is checking blood sugars: not at all    Diabetic concerns: None     Symptoms of hypoglycemia (low blood sugar): shaky     Paresthesias (numbness or burning in feet) or sores: No     Date of last diabetic eye exam: 09/2018    BP Readings from Last 2 Encounters:   03/28/19 132/64   02/13/19 150/72     Hemoglobin A1C (%)   Date Value   03/21/2019 6.9 (H)   08/30/2018 6.7 (H)     LDL Cholesterol Calculated (mg/dL)   Date Value   03/21/2019 115 (H)   03/01/2018 72       Diabetes Management Resources  Hyperlipidemia Follow-Up      Rate your low fat/cholesterol diet?: good    Taking statin?  Yes, she is having a lot of leg cramps when her dose is 80 mg but it has improved when she decreased to 40 mg, feels she could stay on this dose.    Other lipid medications/supplements?:  none    Hypertension Follow-up      Outpatient blood pressures are not being checked.    Low Salt Diet: low salt      Amount of exercise or physical activity: walks three times a week for 30 minutes.    Problems taking medications regularly: No    Medication side effects: none    Diet: low salt      Other problems:   COPD: Breathing is overall stable, no recent flares.  Chronic pain, narcotic dependence: Pain is stable and managed well.  She is compliant with her controlled substance agreement.        Current concerns:   none    Patient Active Problem List   Diagnosis     Disorder of bone and cartilage     COPD, severe (H)     Spinal stenosis of lumbar region with neurogenic claudication     Type 2 diabetes mellitus without complication, without long-term current use of insulin (H)     HYPERLIPIDEMIA LDL GOAL <100     History of basal cell carcinoma     Controlled substance agreement signed     Chronic pain syndrome     Narcotic dependence (H)     Benign essential hypertension     Lumbar  radiculopathy       Current Outpatient Medications   Medication Sig Dispense Refill     albuterol (PROAIR HFA) 108 (90 Base) MCG/ACT inhaler Inhale 2 puffs into the lungs every 6 hours as needed for shortness of breath / dyspnea or wheezing 1 Inhaler 11     alendronate (FOSAMAX) 70 MG tablet TAKE 1 TABLET (70 MG) BY MOUTH EVERY 7 DAYS 12 tablet 3     amLODIPine (NORVASC) 2.5 MG tablet TAKE 1 TABLET BY MOUTH EVERY DAY 90 tablet 1     ascorbic acid (VITAMIN C) 500 MG tablet Take 1 tablet (500 mg) by mouth daily 30 tablet      ASPIRIN EC PO Take 81 mg by mouth daily       Calcium Polycarbophil (FIBERCON PO) Take 1 tablet by mouth once , one in the morning and one in the evening       calcium-vitamin D (CALTRATE) 600-400 MG-UNIT per tablet Take 1 tablet by mouth daily 1200mg   1000 mg of vitamin d       fluticasone-salmeterol (ADVAIR) 500-50 MCG/DOSE diskus inhaler Inhale 1 puff into the lungs 2 times daily 3 Inhaler 3     glipiZIDE (GLUCOTROL) 5 MG tablet Take 1 tablet (5 mg) by mouth every morning (before breakfast) 90 tablet 3     Glucosamine-Chondroitin (GLUCOSAMINE CHONDROITIN COMPLX) 500-250 MG CAPS Take 1 tablet by mouth 2 times daily       lisinopril (PRINIVIL/ZESTRIL) 40 MG tablet Take 1 tablet (40 mg) by mouth daily 90 tablet 1     magnesium oxide 400 MG CAPS        Multiple Vitamins-Minerals (MULTIVITAMIN ADULT PO) Take 1 tablet by mouth daily       NONFORMULARY Natra sleeping med takes 1 at night.       oxyCODONE (ROXICODONE) 5 MG tablet May take 5 tabs per day 150 tablet 0     pravastatin (PRAVACHOL) 80 MG tablet Take 1 tablet (80 mg) by mouth daily 90 tablet 3     senna-docusate (SENOKOT-S;PERICOLACE) 8.6-50 MG per tablet Take 1 tablet by mouth 2 times daily Reported on 4/12/2017       SPIRIVA HANDIHALER 18 MCG capsule INHALE CONTENTS OF ONE CAPSULE DAILY. 90 capsule 3     VITAMIN E PO Take 400 Int'l Units by mouth daily       Acetaminophen (TYLENOL PO) Take 325 mg by mouth 6 times daily Patient takes TID  "with Oxycodone.          Social History     Tobacco Use     Smoking status: Former Smoker     Packs/day: 0.50     Years: 54.00     Pack years: 27.00     Types: Cigarettes     Last attempt to quit: 2000     Years since quittin.9     Smokeless tobacco: Never Used     Tobacco comment: using nicotine gum   Substance Use Topics     Alcohol use: Yes     Alcohol/week: 0.5 oz     Types: 1 Glasses of wine per week     Comment: \"A glass of wine once a week.\"     Drug use: No        ROS:  General: no fever, chills  Weight: stable  Vision:negative. Last eye exam .  ENT: negative  Respiratory stable dyspnea on exertion .  Cardiac: no chest pain or pressure  Abdominal: no nausea, vomiting, abdominal pain, bowel changes  Vascular no complaints of claudication  Neurologic:no complaints of neuropathy  Feet no lesions, in grown nails, edema   : no polyuria, hematuria, dysuria    Objective:  Patient alert in NAD  /64 (BP Location: Right arm, Patient Position: Sitting, Cuff Size: Adult Regular)   Pulse 84   Temp 97.8  F (36.6  C) (Oral)   Resp 14   Ht 1.605 m (5' 3.19\")   Wt 63 kg (139 lb)   SpO2 93%   Breastfeeding? No   BMI 24.48 kg/m         Wt Readings from Last 4 Encounters:   19 63 kg (139 lb)   18 62.8 kg (138 lb 6.4 oz)   18 64.1 kg (141 lb 4.8 oz)   10/30/18 64.1 kg (141 lb 4.8 oz)       CV: CV: normal S1, S2 without murmur, S3 or S4.  Carotid pulses: full  LUNGS: Decreased breath sounds but clear      Lab Results   Component Value Date    A1C 6.9 2019    A1C 6.7 2018    A1C 6.8 2018    A1C 7.1 2017    A1C 6.7 2017       Lab Results   Component Value Date    CHOL 206 2019    HDL 59 2019     2019    TRIG 161 2019    CHOLHDLRATIO 3.4 2015           ASSESSMENT/PLAN:         1. Type 2 diabetes mellitus with complication, without long-term current use of insulin (H)  Well-controlled, continue diet, recheck 6 " months    2. Narcotic dependence (H)  Stable, continue medication    3. Chronic obstructive pulmonary disease, unspecified COPD type (H)  Stable, continue medication    4. Benign essential hypertension  Well-controlled, urine protein has gone up so we will recheck next time, continue current medication    5. Hyperlipidemia LDL goal <100  LDL is gone up but she is intolerant to higher doses of statins so continue the 40 mg  - pravastatin (PRAVACHOL) 40 MG tablet; Take 1 tablet (40 mg) by mouth daily  Dispense: 90 tablet; Refill: 3    6. Spinal stenosis of lumbar region with neurogenic claudication  Stable, continue medication  - oxyCODONE (ROXICODONE) 5 MG tablet; May take 5 tabs per day  Dispense: 150 tablet; Refill: 0        Emily Marie MD  [facility]

## 2019-04-05 PROBLEM — N18.1 CKD (CHRONIC KIDNEY DISEASE) STAGE 1, GFR 90 ML/MIN OR GREATER: Status: ACTIVE | Noted: 2019-04-05

## 2019-04-24 DIAGNOSIS — I10 BENIGN ESSENTIAL HYPERTENSION: ICD-10-CM

## 2019-04-24 NOTE — TELEPHONE ENCOUNTER
"Requested Prescriptions   Pending Prescriptions Disp Refills     lisinopril (PRINIVIL/ZESTRIL) 40 MG tablet [Pharmacy Med Name: LISINOPRIL 40 MG TABLET] 90 tablet 1     Sig: TAKE 1 TABLET BY MOUTH EVERY DAY   Last Written Prescription Date:  09/10/2018  Last Fill Quantity: 90,  # refills: 1   Last office visit: 3/28/2019 with prescribing provider:     Future Office Visit:      ACE Inhibitors (Including Combos) Protocol Passed - 4/24/2019 10:41 AM        Passed - Blood pressure under 140/90 in past 12 months     BP Readings from Last 3 Encounters:   03/28/19 132/64   02/13/19 150/72   12/28/18 108/68                 Passed - Recent (12 mo) or future (30 days) visit within the authorizing provider's specialty     Patient had office visit in the last 12 months or has a visit in the next 30 days with authorizing provider or within the authorizing provider's specialty.  See \"Patient Info\" tab in inbasket, or \"Choose Columns\" in Meds & Orders section of the refill encounter.              Passed - Medication is active on med list        Passed - Patient is age 18 or older        Passed - No active pregnancy on record        Passed - Normal serum creatinine on file in past 12 months     Recent Labs   Lab Test 03/21/19  0805   CR 0.56             Passed - Normal serum potassium on file in past 12 months     Recent Labs   Lab Test 03/21/19  0805   POTASSIUM 3.7             Passed - No positive pregnancy test within past 12 months        "

## 2019-04-25 RX ORDER — LISINOPRIL 40 MG/1
TABLET ORAL
Qty: 90 TABLET | Refills: 3 | Status: SHIPPED | OUTPATIENT
Start: 2019-04-25 | End: 2020-04-14

## 2019-04-25 NOTE — TELEPHONE ENCOUNTER
Prescription approved per [facility] Refill Protocol.  Ciara Mehta RN

## 2019-05-08 DIAGNOSIS — J44.9 CHRONIC OBSTRUCTIVE PULMONARY DISEASE, UNSPECIFIED COPD TYPE (H): ICD-10-CM

## 2019-05-08 NOTE — TELEPHONE ENCOUNTER
"Requested Prescriptions   Pending Prescriptions Disp Refills     ADVAIR DISKUS 500-50 MCG/DOSE inhaler [Pharmacy Med Name: ADVAIR 500-50 DISKUS]  3     Sig: INHALE 1 PUFF INTO THE LUNGS 2 TIMES DAILY   Last Written Prescription Date:  03/09/2018  Last Fill Quantity: 3 inhaler,  # refills: 3   Last office visit: 3/28/2019 with prescribing provider:     Future Office Visit:      Inhaled Steroids Protocol Passed - 5/8/2019  1:35 AM        Passed - Patient is age 12 or older        Passed - Recent (12 mo) or future (30 days) visit within the authorizing provider's specialty     Patient had office visit in the last 12 months or has a visit in the next 30 days with authorizing provider or within the authorizing provider's specialty.  See \"Patient Info\" tab in inbasket, or \"Choose Columns\" in Meds & Orders section of the refill encounter.              Passed - Medication is active on med list        "

## 2019-05-18 ENCOUNTER — OFFICE VISIT (OUTPATIENT)
Dept: URGENT CARE | Facility: URGENT CARE | Age: 84
End: 2019-05-18
Payer: COMMERCIAL

## 2019-05-18 VITALS
HEART RATE: 85 BPM | DIASTOLIC BLOOD PRESSURE: 82 MMHG | SYSTOLIC BLOOD PRESSURE: 179 MMHG | TEMPERATURE: 97.9 F | WEIGHT: 141.7 LBS | OXYGEN SATURATION: 90 % | BODY MASS INDEX: 24.95 KG/M2

## 2019-05-18 DIAGNOSIS — S92.416A: Primary | ICD-10-CM

## 2019-05-18 DIAGNOSIS — L03.031 CELLULITIS OF TOE OF RIGHT FOOT: ICD-10-CM

## 2019-05-18 PROCEDURE — 99214 OFFICE O/P EST MOD 30 MIN: CPT | Performed by: PHYSICIAN ASSISTANT

## 2019-05-18 RX ORDER — CEPHALEXIN 500 MG/1
500 CAPSULE ORAL 3 TIMES DAILY
Qty: 30 CAPSULE | Refills: 0 | Status: SHIPPED | OUTPATIENT
Start: 2019-05-18 | End: 2019-09-30

## 2019-05-18 NOTE — PROGRESS NOTES
"SUBJECTIVE:   Celeste Chacko is a 88 year old female presenting with a chief complaint of Positive right great toe tenderness and erythema along the sides of great toe.  Onset of symptoms was 3 day(s) ago.  Course of illness is worsening.    Severity mild  Current and Associated symptoms: erythema, swelling around left great toe  Treatment measures tried include none.  Predisposing factors include fungus and hypertrophic nails.    Past Medical History:   Diagnosis Date     Basal cell carcinoma      Chronic airway obstruction, not elsewhere classified      Complete rupture of rotator cuff      Disorder of bone and cartilage, unspecified      History of blood transfusion      Mixed hyperlipidemia      Osteoarthritis      Personal history of other malignant neoplasm of skin      Spinal stenosis      Type II or unspecified type diabetes mellitus without mention of complication, not stated as uncontrolled         Allergies   Allergen Reactions     Sulfamethoxazole Anaphylaxis and Hives     Family History   Problem Relation Age of Onset     Arthritis Father      Diabetes Brother      Cancer Brother         breast     Cerebrovascular Disease Brother        Social History     Tobacco Use     Smoking status: Former Smoker     Packs/day: 0.50     Years: 54.00     Pack years: 27.00     Types: Cigarettes     Last attempt to quit: 2000     Years since quittin.0     Smokeless tobacco: Never Used     Tobacco comment: using nicotine gum   Substance Use Topics     Alcohol use: Yes     Alcohol/week: 0.5 oz     Types: 1 Glasses of wine per week     Comment: \"A glass of wine once a week.\"       ROS:  CONSTITUTIONAL:NEGATIVE for fever, chills, change in weight  INTEGUMENTARY/SKIN: POSITIVE for erythema around edge of right great toenail  ENT/MOUTH: NEGATIVE for ear, mouth and throat problems  RESP:NEGATIVE for significant cough or SOB  CV: NEGATIVE for chest pain, palpitations or peripheral edema  MUSCULOSKELETAL: POSITIVE " for right great toenail erythema, swelling  NEURO: NEGATIVE for weakness, dizziness or paresthesias    OBJECTIVE  :/82   Pulse 85   Temp 97.9  F (36.6  C) (Oral)   Wt 64.3 kg (141 lb 11.2 oz)   SpO2 90%   BMI 24.95 kg/m    GENERAL APPEARANCE: healthy, alert and no distress  RESP: lungs clear to auscultation - no rales, rhonchi or wheezes  CV: regular rates and rhythm, normal S1 S2, no murmur noted  ABDOMEN:  soft, nontender, no HSM or masses and bowel sounds normal  NEURO: Normal strength and tone, sensory exam grossly normal,  normal speech and mentation  SKIN: Positive for right great toe erythema, swelling    ASSESSMENT/PLAN      ICD-10-CM    1. Closed nondisplaced fracture of proximal phalanx of great toe, unspecified laterality, initial encounter S92.416A    2. Cellulitis of toe of right foot L03.031 cephALEXin (KEFLEX) 500 MG capsule       Orders Placed This Encounter     cephALEXin (KEFLEX) 500 MG capsule       Warm foot soaks  Tylenol  Follow up with Podiatry for hypertrophic toenails  See orders in Epic

## 2019-06-05 ENCOUNTER — OFFICE VISIT (OUTPATIENT)
Dept: URGENT CARE | Facility: URGENT CARE | Age: 84
End: 2019-06-05
Payer: COMMERCIAL

## 2019-06-05 ENCOUNTER — HOSPITAL ENCOUNTER (EMERGENCY)
Facility: CLINIC | Age: 84
Discharge: HOME | End: 2019-06-05
Attending: EMERGENCY MEDICINE | Admitting: EMERGENCY MEDICINE
Payer: COMMERCIAL

## 2019-06-05 VITALS
TEMPERATURE: 98.3 F | DIASTOLIC BLOOD PRESSURE: 86 MMHG | BODY MASS INDEX: 24.63 KG/M2 | SYSTOLIC BLOOD PRESSURE: 178 MMHG | OXYGEN SATURATION: 94 % | HEIGHT: 63 IN | WEIGHT: 139 LBS | RESPIRATION RATE: 18 BRPM

## 2019-06-05 VITALS
DIASTOLIC BLOOD PRESSURE: 73 MMHG | SYSTOLIC BLOOD PRESSURE: 145 MMHG | BODY MASS INDEX: 24.48 KG/M2 | OXYGEN SATURATION: 92 % | WEIGHT: 139 LBS | RESPIRATION RATE: 19 BRPM | TEMPERATURE: 99.9 F | HEART RATE: 100 BPM

## 2019-06-05 DIAGNOSIS — N90.89 VULVAR IRRITATION: ICD-10-CM

## 2019-06-05 DIAGNOSIS — N93.9 VAGINAL BLEEDING: Primary | ICD-10-CM

## 2019-06-05 LAB
ANION GAP SERPL CALCULATED.3IONS-SCNC: 6 MMOL/L (ref 3–14)
BASOPHILS # BLD AUTO: 0 10E9/L (ref 0–0.2)
BASOPHILS NFR BLD AUTO: 0.3 %
BUN SERPL-MCNC: 20 MG/DL (ref 7–30)
CALCIUM SERPL-MCNC: 9.8 MG/DL (ref 8.5–10.1)
CHLORIDE SERPL-SCNC: 106 MMOL/L (ref 94–109)
CO2 SERPL-SCNC: 30 MMOL/L (ref 20–32)
CREAT SERPL-MCNC: 0.59 MG/DL (ref 0.52–1.04)
DIFFERENTIAL METHOD BLD: NORMAL
EOSINOPHIL # BLD AUTO: 0.1 10E9/L (ref 0–0.7)
EOSINOPHIL NFR BLD AUTO: 0.8 %
ERYTHROCYTE [DISTWIDTH] IN BLOOD BY AUTOMATED COUNT: 13.8 % (ref 10–15)
GFR SERPL CREATININE-BSD FRML MDRD: 82 ML/MIN/{1.73_M2}
GLUCOSE SERPL-MCNC: 206 MG/DL (ref 70–99)
HCT VFR BLD AUTO: 41.3 % (ref 35–47)
HGB BLD-MCNC: 13.5 G/DL (ref 11.7–15.7)
IMM GRANULOCYTES # BLD: 0 10E9/L (ref 0–0.4)
IMM GRANULOCYTES NFR BLD: 0.4 %
LYMPHOCYTES # BLD AUTO: 1.9 10E9/L (ref 0.8–5.3)
LYMPHOCYTES NFR BLD AUTO: 25.7 %
MCH RBC QN AUTO: 27.3 PG (ref 26.5–33)
MCHC RBC AUTO-ENTMCNC: 32.7 G/DL (ref 31.5–36.5)
MCV RBC AUTO: 84 FL (ref 78–100)
MONOCYTES # BLD AUTO: 0.7 10E9/L (ref 0–1.3)
MONOCYTES NFR BLD AUTO: 9.1 %
NEUTROPHILS # BLD AUTO: 4.7 10E9/L (ref 1.6–8.3)
NEUTROPHILS NFR BLD AUTO: 63.7 %
NRBC # BLD AUTO: 0 10*3/UL
NRBC BLD AUTO-RTO: 0 /100
PLATELET # BLD AUTO: 184 10E9/L (ref 150–450)
POTASSIUM SERPL-SCNC: 4.3 MMOL/L (ref 3.4–5.3)
RBC # BLD AUTO: 4.94 10E12/L (ref 3.8–5.2)
SODIUM SERPL-SCNC: 142 MMOL/L (ref 133–144)
WBC # BLD AUTO: 7.4 10E9/L (ref 4–11)

## 2019-06-05 PROCEDURE — 99214 OFFICE O/P EST MOD 30 MIN: CPT | Performed by: PHYSICIAN ASSISTANT

## 2019-06-05 PROCEDURE — 85025 COMPLETE CBC W/AUTO DIFF WBC: CPT | Performed by: EMERGENCY MEDICINE

## 2019-06-05 PROCEDURE — 99283 EMERGENCY DEPT VISIT LOW MDM: CPT

## 2019-06-05 PROCEDURE — 80048 BASIC METABOLIC PNL TOTAL CA: CPT | Performed by: EMERGENCY MEDICINE

## 2019-06-05 ASSESSMENT — ENCOUNTER SYMPTOMS
ABDOMINAL PAIN: 0
ABDOMINAL DISTENTION: 0

## 2019-06-05 ASSESSMENT — MIFFLIN-ST. JEOR: SCORE: 1029.63

## 2019-06-05 NOTE — ED AVS SNAPSHOT
Emergency Department  [address] [place] [address]  Phone:  [phone]  Fax:  [phone]                                    Celeste Chacko   MRN: [id number]    Department:   Emergency Department   Date of Visit:  6/5/2019           After Visit Summary Signature Page    I have received my discharge instructions, and my questions have been answered. I have discussed any challenges I see with this plan with the nurse or doctor.    ..........................................................................................................................................  Patient/Patient Representative Signature      ..........................................................................................................................................  Patient Representative Print Name and Relationship to Patient    ..................................................               ................................................  Date                                   Time    ..........................................................................................................................................  Reviewed by Signature/Title    ...................................................              ..............................................  Date                                               Time          22EPIC Rev 08/18

## 2019-06-05 NOTE — PROGRESS NOTES
"SUBJECTIVE:   Celeste Chacko is a 88 year old female presenting with a chief complaint of vaginal bleeding.   Chief Complaint   Patient presents with     Urgent Care     vaginal spotting since Sunday.        She notes that she had vaginal spotting that began on Sunday and then it went away.  She notes today her vaginal spotting returned and she states it was a \"little bit\".  She notes that it then went away today.  Denies abdominal pain.  Denies nausea, vomiting, fever, chills, dysuria, urgency, and frequency.  Denies dizziness.  Notes she takes a baby ASA daily, but does not take coumadin or warfarin.  She notes she has not needed pads.  She states it is coming from her vagina because she stuck a qtip up in there.  She notes that she is not having intercourse.  She denies scratching herself.  She has no other concerns at this time.       Review of Systems  10 point ROS is negative except as mentioned above in HPI.       Past Medical History:   Diagnosis Date     Basal cell carcinoma      Chronic airway obstruction, not elsewhere classified      Complete rupture of rotator cuff      Disorder of bone and cartilage, unspecified      History of blood transfusion      Mixed hyperlipidemia 4/00     Osteoarthritis      Personal history of other malignant neoplasm of skin      Spinal stenosis      Type II or unspecified type diabetes mellitus without mention of complication, not stated as uncontrolled      Family History   Problem Relation Age of Onset     Arthritis Father      Diabetes Brother      Cancer Brother         breast     Cerebrovascular Disease Brother      Current Outpatient Medications   Medication Sig Dispense Refill     Acetaminophen (TYLENOL PO) Take 325 mg by mouth 6 times daily Patient takes TID with Oxycodone.        ADVAIR DISKUS 500-50 MCG/DOSE inhaler INHALE 1 PUFF INTO THE LUNGS 2 TIMES DAILY 3 Inhaler 3     albuterol (PROAIR HFA) 108 (90 Base) MCG/ACT inhaler Inhale 2 puffs into the lungs every 6 " hours as needed for shortness of breath / dyspnea or wheezing 1 Inhaler 11     alendronate (FOSAMAX) 70 MG tablet TAKE 1 TABLET (70 MG) BY MOUTH EVERY 7 DAYS 12 tablet 3     amLODIPine (NORVASC) 2.5 MG tablet TAKE 1 TABLET BY MOUTH EVERY DAY 90 tablet 1     ascorbic acid (VITAMIN C) 500 MG tablet Take 1 tablet (500 mg) by mouth daily 30 tablet      ASPIRIN EC PO Take 81 mg by mouth daily       Calcium Polycarbophil (FIBERCON PO) Take 1 tablet by mouth once , one in the morning and one in the evening       calcium-vitamin D (CALTRATE) 600-400 MG-UNIT per tablet Take 1 tablet by mouth daily 1200mg   1000 mg of vitamin d       cephALEXin (KEFLEX) 500 MG capsule Take 1 capsule (500 mg) by mouth 3 times daily 30 capsule 0     glipiZIDE (GLUCOTROL) 5 MG tablet Take 1 tablet (5 mg) by mouth every morning (before breakfast) 90 tablet 3     Glucosamine-Chondroitin (GLUCOSAMINE CHONDROITIN COMPLX) 500-250 MG CAPS Take 1 tablet by mouth 2 times daily       lisinopril (PRINIVIL/ZESTRIL) 40 MG tablet TAKE 1 TABLET BY MOUTH EVERY DAY 90 tablet 3     magnesium oxide 400 MG CAPS        Multiple Vitamins-Minerals (MULTIVITAMIN ADULT PO) Take 1 tablet by mouth daily       NONFORMULARY Natra sleeping med takes 1 at night.       oxyCODONE (ROXICODONE) 5 MG tablet May take 5 tabs per day 150 tablet 0     pravastatin (PRAVACHOL) 40 MG tablet Take 1 tablet (40 mg) by mouth daily 90 tablet 3     senna-docusate (SENOKOT-S;PERICOLACE) 8.6-50 MG per tablet Take 1 tablet by mouth 2 times daily Reported on 2017       SPIRIVA HANDIHALER 18 MCG capsule INHALE CONTENTS OF ONE CAPSULE DAILY. 90 capsule 3     VITAMIN E PO Take 400 Int'l Units by mouth daily       Social History     Tobacco Use     Smoking status: Former Smoker     Packs/day: 0.50     Years: 54.00     Pack years: 27.00     Types: Cigarettes     Last attempt to quit: 2000     Years since quittin.1     Smokeless tobacco: Never Used     Tobacco comment: using nicotine gum  "  Substance Use Topics     Alcohol use: Yes     Alcohol/week: 0.5 oz     Types: 1 Glasses of wine per week     Comment: \"A glass of wine once a week.\"       OBJECTIVE  /73   Pulse 100   Temp 99.9  F (37.7  C) (Oral)   Resp 19   Wt 63 kg (139 lb)   SpO2 92%   BMI 24.48 kg/m      Physical Exam   Constitutional: She appears well-developed and well-nourished. No distress.   HENT:   Head: Normocephalic and atraumatic.   Right Ear: External ear normal.   Left Ear: External ear normal.   Nose: Nose normal.   Neck: Normal range of motion.   Cardiovascular: Normal rate and regular rhythm.   Pulmonary/Chest: Effort normal and breath sounds normal.   Abdominal: Soft. There is no tenderness.   Genitourinary:   Genitourinary Comments: Declined vaginal exam (externally and with speculum).   Musculoskeletal: Normal range of motion.   Neurological: She is alert.   Skin: Skin is warm and dry.           ASSESSMENT:      ICD-10-CM    1. Vaginal bleeding N93.9           PLAN:    The patient was evaluated.  She declined vaginal exam (external and speculum).  I discussed that she should go to the ER and have a  exam as well as a pelvic US or CT of abdomen.  Consider ddx of malignancy with painless vaginal bleeding.  She should also have labs drawn.  She states that her vaginal bleeding has stopped right now.  She denies dizziness and feels fine.  She states that her family member will drive her to the ER.     The patient understands to seek emergency medical services if symptoms worsen.  All of the patient's questions were answered and there were no further questions.  The patient agreed with the plan and was discharged in stable condition.         Patient Instructions   Please go to the emergency department.         "

## 2019-06-06 NOTE — ED TRIAGE NOTES
Patient had spotting of blood on Sunday. It stopped for a few days. Today she is again having spotting of blood.

## 2019-06-06 NOTE — ED PROVIDER NOTES
"  History   Chief Complaint:  Vaginal Bleeding    HPI   Celeste Chacko is a 88 year old female, with a history of basal cell carcinoma  DM II, CKD, hypertension, and hyperlipidemia, who presents to the ED for evaluation of vaginal bleeding. The patient reports having some spotting 3 days ago, which ceased that day, but it began again today this morning. She states she went into urgent care and was instructed to come into the ED for further evaluation. The patient states the last time she had vaginal bleeding was during menopause over 20 years ago. The patient denies any vaginal pain, abdominal pain, abdominal bloating, vaginal discharge, or any other acute symptoms.     Allergies:  Sulfamethoxazole     Medications:    Advair Diskus  Albuterol  Fosamax  Norvasc  Aspirin  Glucotrol  Lisinopril  Pravachol  Senna-docusate  Spiriva Handihaler    Past Medical History:    Basal cell carcinoma  Mixed hyperlipidemia  Osteoarthritis  DM II  COPD  CKD  Essential hypertension    Past Surgical History:    Left knee arthroplasty  Pilonidal sinus repair  Tonsillectomy  Adenoidectomy  Right shoulder arthoplasty  Forehead skin cancer removed    Family History:    Arthritis  Diabetes  Breast cancer  Cerebrovascular disease    Social History:  Smoking status: Former-4/27/2000  Alcohol use: Yes  Marital Status:  Single [1]    Review of Systems   Gastrointestinal: Negative for abdominal distention and abdominal pain.   Genitourinary: Positive for vaginal bleeding. Negative for vaginal discharge and vaginal pain.   10 point review of systems performed and is negative except as above and in HPI.      Physical Exam     Patient Vitals for the past 24 hrs:   BP Temp Temp src Heart Rate Resp SpO2 Height Weight   06/05/19 2015 195/76 98.3  F (36.8  C) Oral 107 20 93 % 1.6 m (5' 3\") 63 kg (139 lb)     Physical Exam  General: Resting on the gurney, appears comfortable  Head:  The scalp, face, and head appear normal  Mouth/Throat: Mucus membranes " are moist  CV:  Regular rate    Normal S1 and S2  No pathological murmur   Resp:  Breath sounds clear and equal bilaterally    Non-labored, no retractions or accessory muscle use    No coarseness    No wheezing   GI:  Abdomen is soft, no rigidity    No tenderness to palpation  : Slight vulvar irritation of the labia majora with small area of skin breakdown on the right labia majora. No apparent true vaginal or cervical bleeding seen on exam. Limited speculum exam secondary to vaginal atrophy.  MS:  Normal motor assessment of all extremities.    Good capillary refill noted.      Skin:   No rash or lesions noted.  Neuro:   Speech is normal and fluent. No apparent deficit.  Psych: Awake. Alert.  Normal affect.      Appropriate interactions.    Emergency Department Course   Laboratory:  CBC: WNL (WBC 7.4, HGB 13.5, )    BMP:  (H), o/w WNL (Creatinine 0.59)    Emergency Department Course:  Past medical records, nursing notes, and vitals reviewed.  2143: I performed an exam of the patient and obtained history, as documented above.  IV inserted and blood drawn.    2258: I performed a pelvic examination.    2305: I rechecked the patient. Explained findings to patient.    Findings and plan explained to the Patient. Patient discharged home with instructions regarding supportive care, medications, and reasons to return. The importance of close follow-up was reviewed.     Impression & Plan    Medical Decision Making:  Celeste Chacko is a 88 year old female who presents to the emergency department for evaluation of vaginal bleeding.  On exam she does have vulvar irritation and a small area of mucosal oozing.  She was able to tolerate a limited speculum exam with a small speculum and I did not see any bleeding in the vaginal vault or from the cervical os.  I did explain that while I think this is most likely vulvar in nature but I did want her to follow closely with gynecology and that she will need a repeat exam  and potentially an ultrasound if she has any recurrent bleeding.  She understands whether she has recurrent bleeding or not she does need to follow-up with gynecology.  No ultrasound was obtained today as I believe this is most likely vulvar and have instructed her on use of a gita-care bottle as she reports irritation with wiping.  She is discharged with recommendation to follow-up in the next few weeks with gynecology.    Diagnosis:    ICD-10-CM    1. Vulvar irritation N90.89      Disposition:  Discharged to home.    Sunny Hernandez  6/5/2019    EMERGENCY DEPARTMENT  Scribe Disclosure:  I, Sunny Hernandez, am serving as a scribe at 9:43 PM on 6/5/2019 to document services personally performed by Ailyn Rice MD based on my observations and the provider's statements to me.         Ailyn Rice MD  06/07/19 2247

## 2019-07-12 DIAGNOSIS — M48.062 SPINAL STENOSIS OF LUMBAR REGION WITH NEUROGENIC CLAUDICATION: ICD-10-CM

## 2019-07-12 NOTE — TELEPHONE ENCOUNTER
oxyCODONE (ROXICODONE)    Last Written Prescription Date:  3/28/19  Last Fill Quantity: 150,   # refills: 0  Last Office Visit: 3/28/19  Future Office visit:       Routing refill request to provider for review/approval because:  Drug not on the [facility], [facility] or [facility] refill protocol or controlled substance

## 2019-07-15 RX ORDER — OXYCODONE HYDROCHLORIDE 5 MG/1
TABLET ORAL
Qty: 150 TABLET | Refills: 0 | Status: SHIPPED | OUTPATIENT
Start: 2019-07-15 | End: 2019-08-22

## 2019-07-15 NOTE — TELEPHONE ENCOUNTER
Oxycodone. MAIL to [facility] pharmacy.         Last Written Prescription Date:  3/28/19   (3 months worth).  Last Fill Quantity: 150,   # refills: 0    Last Office Visit: 3/28/19    Future Office visit:       Routing refill request to provider for review/approval because:  Drug not on the [facility], [facility] or [facility] refill protocol or controlled substance    Reviewed [facility] today, last dispensed on 6/8/19. No concerns.

## 2019-07-15 NOTE — TELEPHONE ENCOUNTER
Prescription mailed to [facility] in [place]. Spoke to patient and relayed Dr Marie's message. Pt said she will call to schedule in September.

## 2019-07-18 ENCOUNTER — APPOINTMENT (RX ONLY)
Dept: URBAN - METROPOLITAN AREA CLINIC 52 | Facility: CLINIC | Age: 84
Setting detail: DERMATOLOGY
End: 2019-07-18

## 2019-07-18 ENCOUNTER — APPOINTMENT (RX ONLY)
Dept: URBAN - METROPOLITAN AREA CLINIC 51 | Facility: CLINIC | Age: 84
Setting detail: DERMATOLOGY
End: 2019-07-18

## 2019-07-18 DIAGNOSIS — L23.89 ALLERGIC CONTACT DERMATITIS DUE TO OTHER AGENTS: ICD-10-CM

## 2019-07-18 DIAGNOSIS — L29.89 OTHER PRURITUS: ICD-10-CM

## 2019-07-18 DIAGNOSIS — L82.1 OTHER SEBORRHEIC KERATOSIS: ICD-10-CM

## 2019-07-18 DIAGNOSIS — L29.8 OTHER PRURITUS: ICD-10-CM

## 2019-07-18 DIAGNOSIS — L85.3 XEROSIS CUTIS: ICD-10-CM

## 2019-07-18 PROCEDURE — ? COUNSELING

## 2019-07-18 PROCEDURE — ? PRESCRIPTION

## 2019-07-18 PROCEDURE — 99214 OFFICE O/P EST MOD 30 MIN: CPT

## 2019-07-18 ASSESSMENT — LOCATION SIMPLE DESCRIPTION DERM
LOCATION SIMPLE: LEFT FOREARM
LOCATION SIMPLE: LEFT THIGH
LOCATION SIMPLE: RIGHT FOREARM
LOCATION SIMPLE: LEFT PRETIBIAL REGION
LOCATION SIMPLE: LEFT OCCIPITAL SCALP
LOCATION SIMPLE: LEFT OCCIPITAL SCALP
LOCATION SIMPLE: LEFT THIGH
LOCATION SIMPLE: RIGHT PRETIBIAL REGION
LOCATION SIMPLE: RIGHT PRETIBIAL REGION
LOCATION SIMPLE: LEFT PRETIBIAL REGION
LOCATION SIMPLE: LEFT FOREARM
LOCATION SIMPLE: RIGHT FOREARM

## 2019-07-18 ASSESSMENT — LOCATION ZONE DERM
LOCATION ZONE: LEG
LOCATION ZONE: SCALP
LOCATION ZONE: ARM
LOCATION ZONE: SCALP
LOCATION ZONE: LEG
LOCATION ZONE: ARM

## 2019-07-18 ASSESSMENT — LOCATION DETAILED DESCRIPTION DERM
LOCATION DETAILED: LEFT ANTERIOR PROXIMAL THIGH
LOCATION DETAILED: LEFT SUPERIOR OCCIPITAL SCALP
LOCATION DETAILED: RIGHT PROXIMAL DORSAL FOREARM
LOCATION DETAILED: LEFT PROXIMAL PRETIBIAL REGION
LOCATION DETAILED: LEFT PROXIMAL PRETIBIAL REGION
LOCATION DETAILED: LEFT ANTERIOR PROXIMAL THIGH
LOCATION DETAILED: RIGHT PROXIMAL DORSAL FOREARM
LOCATION DETAILED: RIGHT PROXIMAL PRETIBIAL REGION
LOCATION DETAILED: RIGHT PROXIMAL PRETIBIAL REGION
LOCATION DETAILED: LEFT PROXIMAL DORSAL FOREARM
LOCATION DETAILED: LEFT SUPERIOR OCCIPITAL SCALP
LOCATION DETAILED: LEFT PROXIMAL DORSAL FOREARM

## 2019-07-18 NOTE — HPI: RASH
How Severe Is Your Rash?: moderate
Is This A New Presentation, Or A Follow-Up?: Rash
Additional History: Pt states it is better now.

## 2019-07-22 DIAGNOSIS — J44.9 CHRONIC OBSTRUCTIVE PULMONARY DISEASE, UNSPECIFIED COPD TYPE (H): ICD-10-CM

## 2019-07-22 RX ORDER — TIOTROPIUM BROMIDE 18 UG/1
CAPSULE ORAL; RESPIRATORY (INHALATION)
Refills: 3 | OUTPATIENT
Start: 2019-07-22

## 2019-07-22 NOTE — TELEPHONE ENCOUNTER
Medication refused, refill requested too soon. Prescription ordered on 9/10918 for 90 day supply with 3 additional refill.

## 2019-07-22 NOTE — TELEPHONE ENCOUNTER
"Requested Prescriptions   Pending Prescriptions Disp Refills     SPIRIVA HANDIHALER 18 MCG inhaled capsule [Pharmacy Med Name: SPIRIVA 18  Last Written Prescription Date:  9/10/2018  Last Fill Quantity: 90,  # refills: 3   Last office visit: 3/28/2019 with prescribing provider:     Future Office Visit:   [facility] CP-HANDIHALER]  3     Sig: INHALE CONTENTS OF ONE CAPSULE DAILY.       Asthma Maintenance Inhalers - Anticholinergics Passed - 7/22/2019  2:13 PM        Passed - Patient is age 12 years or older        Passed - Recent (12 mo) or future (30 days) visit within the authorizing provider's specialty     Patient had office visit in the last 12 months or has a visit in the next 30 days with authorizing provider or within the authorizing provider's specialty.  See \"Patient Info\" tab in inbasket, or \"Choose Columns\" in Meds & Orders section of the refill encounter.              Passed - Medication is active on med list        "

## 2019-08-21 DIAGNOSIS — M48.062 SPINAL STENOSIS OF LUMBAR REGION WITH NEUROGENIC CLAUDICATION: ICD-10-CM

## 2019-08-21 NOTE — TELEPHONE ENCOUNTER
Requested Prescriptions   Pending Prescriptions Disp Refills     oxyCODONE (ROXICODONE) 5 MG tablet Last Written Prescription Date:  7/15/2019  Last Fill Quantity: 150 tablet,  # refills: 0   Last office visit: 3/28/2019 with prescribing provider: 3/28/2019   Future Office Visit:   150 tablet 0     Sig: May take 5 tabs per day       There is no refill protocol information for this order

## 2019-08-22 RX ORDER — OXYCODONE HYDROCHLORIDE 5 MG/1
TABLET ORAL
Qty: 150 TABLET | Refills: 0 | Status: SHIPPED | OUTPATIENT
Start: 2019-08-22 | End: 2019-09-30

## 2019-08-22 NOTE — TELEPHONE ENCOUNTER
Oxycodone         Last Written Prescription Date:  7/15/19  Last Fill Quantity: 150,   # refills: 0    Last Office Visit: 3/28/19    Future Office visit:       Routing refill request to provider for review/approval because:  Drug not on the [facility], [facility] or [facility] refill protocol or controlled substance

## 2019-08-24 DIAGNOSIS — J44.9 CHRONIC OBSTRUCTIVE PULMONARY DISEASE, UNSPECIFIED COPD TYPE (H): ICD-10-CM

## 2019-08-26 NOTE — TELEPHONE ENCOUNTER
"Requested Prescriptions   Pending Prescriptions Disp Refills     SPIRIVA HANDIHALER 18 MCG inhaled capsule [Pharmacy Med Name: SPIRIVA 18 MCG CP-HANDIHALER]  3     Sig: INHALE CONTENTS OF ONE CAPSULE DAILY.   Last Written Prescription Date:  09/10/2018  Last Fill Quantity: 90,  # refills: 03   Last office visit: 3/28/2019 with prescribing provider:     Future Office Visit:      Asthma Maintenance Inhalers - Anticholinergics Passed - 8/24/2019  4:05 PM        Passed - Patient is age 12 years or older        Passed - Recent (12 mo) or future (30 days) visit within the authorizing provider's specialty     Patient had office visit in the last 12 months or has a visit in the next 30 days with authorizing provider or within the authorizing provider's specialty.  See \"Patient Info\" tab in inbasket, or \"Choose Columns\" in Meds & Orders section of the refill encounter.              Passed - Medication is active on med list        "

## 2019-08-27 RX ORDER — TIOTROPIUM BROMIDE 18 UG/1
18 CAPSULE ORAL; RESPIRATORY (INHALATION) DAILY
Qty: 90 CAPSULE | Refills: 0 | Status: SHIPPED | OUTPATIENT
Start: 2019-08-27 | End: 2019-09-30

## 2019-08-27 NOTE — TELEPHONE ENCOUNTER
Spiriva refill request  Last OV 3/28/19    Prescription approved per [facility] Refill Protocol.

## 2019-09-02 DIAGNOSIS — E11.9 TYPE 2 DIABETES MELLITUS WITHOUT COMPLICATION, WITHOUT LONG-TERM CURRENT USE OF INSULIN (H): ICD-10-CM

## 2019-09-04 RX ORDER — GLIPIZIDE 5 MG/1
5 TABLET ORAL
Qty: 90 TABLET | Refills: 3 | Status: SHIPPED | OUTPATIENT
Start: 2019-09-04 | End: 2020-08-28

## 2019-09-04 NOTE — TELEPHONE ENCOUNTER
"Routing refill request to provider for review/approval because:  Allergy/interaction warning  Elevated BP          Requested Prescriptions   Pending Prescriptions Disp Refills     glipiZIDE (GLUCOTROL) 5 MG tablet [Pharmacy Med Name: GLIPIZIDE 5 MG TABLET] 90 tablet 3     Sig: TAKE 1 TABLET (5 MG) BY MOUTH EVERY MORNING (BEFORE BREAKFAST)       Sulfonylurea Agents Failed - 9/2/2019  1:27 AM        Failed - Blood pressure less than 140/90 in past 6 months     BP Readings from Last 3 Encounters:   06/05/19 178/86   06/05/19 145/73   05/18/19 179/82                 Passed - Patient has documented LDL within the past 12 mos.     Recent Labs   Lab Test 03/21/19  0805   *             Passed - Patient has had a Microalbumin in the past 15 mos.     Recent Labs   Lab Test 03/21/19  0805   MICROL 26   UMALCR 110.26*             Passed - Patient has documented A1c within the specified period of time.     If HgbA1C is 8 or greater, it needs to be on file within the past 3 months.  If less than 8, must be on file within the past 6 months.     Recent Labs   Lab Test 03/21/19  0805   A1C 6.9*             Passed - Medication is active on med list        Passed - Patient is age 18 or older        Passed - No active pregnancy on record        Passed - Patient has a recent creatinine (normal) within the past 12 mos.     Recent Labs   Lab Test 06/05/19  2204   CR 0.59             Passed - Patient has not had a positive pregnancy test within the past 12 mos.        Passed - Recent (6 mo) or future (30 days) visit within the authorizing provider's specialty     Patient had office visit in the last 6 months or has a visit in the next 30 days with authorizing provider or within the authorizing provider's specialty.  See \"Patient Info\" tab in inbasket, or \"Choose Columns\" in Meds & Orders section of the refill encounter.              "

## 2019-09-11 ENCOUNTER — TRANSFERRED RECORDS (OUTPATIENT)
Dept: HEALTH INFORMATION MANAGEMENT | Facility: CLINIC | Age: 84
End: 2019-09-11

## 2019-09-12 ENCOUNTER — RX ONLY (OUTPATIENT)
Age: 84
Setting detail: RX ONLY
End: 2019-09-12

## 2019-09-12 RX ORDER — BETAMETHASONE VALERATE 1 MG/G
1 CREAM TOPICAL BID
Qty: 45 | Refills: 0 | Status: ERX | COMMUNITY
Start: 2019-09-12

## 2019-09-23 DIAGNOSIS — E11.8 TYPE 2 DIABETES MELLITUS WITH COMPLICATION, WITHOUT LONG-TERM CURRENT USE OF INSULIN (H): ICD-10-CM

## 2019-09-23 LAB
ANION GAP SERPL CALCULATED.3IONS-SCNC: <1 MMOL/L (ref 3–14)
BUN SERPL-MCNC: 16 MG/DL (ref 7–30)
CALCIUM SERPL-MCNC: 8.9 MG/DL (ref 8.5–10.1)
CHLORIDE SERPL-SCNC: 107 MMOL/L (ref 94–109)
CO2 SERPL-SCNC: 32 MMOL/L (ref 20–32)
CREAT SERPL-MCNC: 0.54 MG/DL (ref 0.52–1.04)
CREAT UR-MCNC: 36 MG/DL
GFR SERPL CREATININE-BSD FRML MDRD: 84 ML/MIN/{1.73_M2}
GLUCOSE SERPL-MCNC: 105 MG/DL (ref 70–99)
HBA1C MFR BLD: 6.9 % (ref 0–5.6)
MICROALBUMIN UR-MCNC: 21 MG/L
MICROALBUMIN/CREAT UR: 58.1 MG/G CR (ref 0–25)
POTASSIUM SERPL-SCNC: 4 MMOL/L (ref 3.4–5.3)
SODIUM SERPL-SCNC: 139 MMOL/L (ref 133–144)

## 2019-09-23 PROCEDURE — 36415 COLL VENOUS BLD VENIPUNCTURE: CPT | Performed by: INTERNAL MEDICINE

## 2019-09-23 PROCEDURE — 82043 UR ALBUMIN QUANTITATIVE: CPT | Performed by: INTERNAL MEDICINE

## 2019-09-23 PROCEDURE — 83036 HEMOGLOBIN GLYCOSYLATED A1C: CPT | Performed by: INTERNAL MEDICINE

## 2019-09-23 PROCEDURE — 80048 BASIC METABOLIC PNL TOTAL CA: CPT | Performed by: INTERNAL MEDICINE

## 2019-09-30 ENCOUNTER — OFFICE VISIT (OUTPATIENT)
Dept: INTERNAL MEDICINE | Facility: CLINIC | Age: 84
End: 2019-09-30
Payer: COMMERCIAL

## 2019-09-30 VITALS
DIASTOLIC BLOOD PRESSURE: 70 MMHG | WEIGHT: 139 LBS | TEMPERATURE: 98.2 F | BODY MASS INDEX: 24.63 KG/M2 | RESPIRATION RATE: 16 BRPM | HEIGHT: 63 IN | SYSTOLIC BLOOD PRESSURE: 142 MMHG | OXYGEN SATURATION: 93 % | HEART RATE: 100 BPM

## 2019-09-30 DIAGNOSIS — F11.20 NARCOTIC DEPENDENCE (H): ICD-10-CM

## 2019-09-30 DIAGNOSIS — N18.1 CKD (CHRONIC KIDNEY DISEASE) STAGE 1, GFR 90 ML/MIN OR GREATER: ICD-10-CM

## 2019-09-30 DIAGNOSIS — Z23 NEED FOR PROPHYLACTIC VACCINATION AND INOCULATION AGAINST INFLUENZA: ICD-10-CM

## 2019-09-30 DIAGNOSIS — I10 BENIGN ESSENTIAL HYPERTENSION: ICD-10-CM

## 2019-09-30 DIAGNOSIS — R68.89 HEAT INTOLERANCE: ICD-10-CM

## 2019-09-30 DIAGNOSIS — E11.8 TYPE 2 DIABETES MELLITUS WITH COMPLICATION, WITHOUT LONG-TERM CURRENT USE OF INSULIN (H): Primary | ICD-10-CM

## 2019-09-30 DIAGNOSIS — E78.5 HYPERLIPIDEMIA LDL GOAL <100: ICD-10-CM

## 2019-09-30 DIAGNOSIS — R25.2 LEG CRAMPS: ICD-10-CM

## 2019-09-30 DIAGNOSIS — G89.4 CHRONIC PAIN SYNDROME: ICD-10-CM

## 2019-09-30 DIAGNOSIS — J44.9 CHRONIC OBSTRUCTIVE PULMONARY DISEASE, UNSPECIFIED COPD TYPE (H): ICD-10-CM

## 2019-09-30 DIAGNOSIS — R23.3 EASY BRUISING: ICD-10-CM

## 2019-09-30 DIAGNOSIS — M48.062 SPINAL STENOSIS OF LUMBAR REGION WITH NEUROGENIC CLAUDICATION: ICD-10-CM

## 2019-09-30 PROCEDURE — 99214 OFFICE O/P EST MOD 30 MIN: CPT | Mod: 25 | Performed by: INTERNAL MEDICINE

## 2019-09-30 PROCEDURE — 90662 IIV NO PRSV INCREASED AG IM: CPT | Performed by: INTERNAL MEDICINE

## 2019-09-30 PROCEDURE — G0008 ADMIN INFLUENZA VIRUS VAC: HCPCS | Performed by: INTERNAL MEDICINE

## 2019-09-30 PROCEDURE — 99207 C FOOT EXAM  NO CHARGE: CPT | Mod: 25 | Performed by: INTERNAL MEDICINE

## 2019-09-30 RX ORDER — AMLODIPINE BESYLATE 2.5 MG/1
2.5 TABLET ORAL DAILY
Qty: 90 TABLET | Refills: 1 | Status: SHIPPED | OUTPATIENT
Start: 2019-09-30 | End: 2020-04-13

## 2019-09-30 RX ORDER — TIOTROPIUM BROMIDE 18 UG/1
18 CAPSULE ORAL; RESPIRATORY (INHALATION) DAILY
Qty: 90 CAPSULE | Refills: 3 | Status: SHIPPED | OUTPATIENT
Start: 2019-09-30 | End: 2020-10-28

## 2019-09-30 RX ORDER — ALBUTEROL SULFATE 90 UG/1
2 AEROSOL, METERED RESPIRATORY (INHALATION) EVERY 6 HOURS PRN
Qty: 1 INHALER | Refills: 11 | Status: SHIPPED | OUTPATIENT
Start: 2019-09-30 | End: 2020-03-22

## 2019-09-30 RX ORDER — OXYCODONE HYDROCHLORIDE 5 MG/1
TABLET ORAL
Qty: 150 TABLET | Refills: 0 | Status: SHIPPED | OUTPATIENT
Start: 2019-09-30 | End: 2019-10-01

## 2019-09-30 ASSESSMENT — MIFFLIN-ST. JEOR: SCORE: 1029.63

## 2019-09-30 ASSESSMENT — PATIENT HEALTH QUESTIONNAIRE - PHQ9: SUM OF ALL RESPONSES TO PHQ QUESTIONS 1-9: 0

## 2019-09-30 NOTE — PROGRESS NOTES
Subjective     Celeste Chacko is a 88 year old female who presents to clinic today for the following health issues:    HPI   Diabetes Follow-up      How often are you checking your blood sugar? Not at all    What time of day are you checking your blood sugars (select all that apply)?      Have you had any blood sugars above 200?      Have you had any blood sugars below 70?      What symptoms do you notice when your blood sugar is low?  Shaky and Other: warm    What concerns do you have today about your diabetes? None     Do you have any of these symptoms? (Select all that apply)  Redness, sores, or blisters on feet and Excessive thirst     Have you had a diabetic eye exam in the last 12 months? Yes- Date of last eye exam: 09/02/2019 with [facility]    BP Readings from Last 2 Encounters:   06/05/19 178/86   06/05/19 145/73     Hemoglobin A1C (%)   Date Value   09/23/2019 6.9 (H)   03/21/2019 6.9 (H)     LDL Cholesterol Calculated (mg/dL)   Date Value   03/21/2019 115 (H)   03/01/2018 72       Diabetes Management Resources  Hyperlipidemia Follow-Up      Are you having any of the following symptoms? (Select all that apply)  Pain in calves when walking 1-2 blocks    Are you regularly taking any medication or supplement to lower your cholesterol?   Yes- pravastation    Are you having muscle aches or other side effects that you think could be caused by your cholesterol lowering medication?  Yes- msucle cramps      Hypertension Follow-up      Do you check your blood pressure regularly outside of the clinic? No     Are you following a low salt diet? No    Are your blood pressures ever more than 140 on the top number (systolic) OR more   than 90 on the bottom number (diastolic), for example 140/90? No      How many servings of fruits and vegetables do you eat daily?  4 or more    On average, how many sweetened beverages do you drink each day (soda, juice, sweet tea, etc)?   0    How many days per week do you miss  taking your medication? 0      Other problems:   1. COPD: very mild decrease in exercise capacity  2. Chronic pain: stable, feels a little better when moving around more.  She is due for refill of her medication.  3.  CKD stage I: Had her labs done with improvement in her urine protein      Current concerns:   1.   Leg cramps: She will occasionally have cramps in her feet but more often in her lower legs at night.  She is trying an over-the-counter supplement without significant improvement    2. She noticed that she bruises easily on her legs. These are purplish red at the surface.     3.  She has questions about what type of multivitamin she should take.    4.  She notes she is warmer at night, even in the evening time.  She does not actually have sweats for many years without significant change.    Patient Active Problem List   Diagnosis     Disorder of bone and cartilage     COPD, severe (H)     Spinal stenosis of lumbar region with neurogenic claudication     Type 2 diabetes mellitus with complication, without long-term current use of insulin (H)     HYPERLIPIDEMIA LDL GOAL <100     History of basal cell carcinoma     Controlled substance agreement signed     Chronic pain syndrome     Narcotic dependence (H)     Benign essential hypertension     Lumbar radiculopathy     CKD (chronic kidney disease) stage 1, GFR 90 ml/min or greater       Current Outpatient Medications   Medication Sig Dispense Refill     Acetaminophen (TYLENOL PO) Take 325 mg by mouth 6 times daily Patient takes TID with Oxycodone.        ADVAIR DISKUS 500-50 MCG/DOSE inhaler INHALE 1 PUFF INTO THE LUNGS 2 TIMES DAILY 3 Inhaler 3     albuterol (PROAIR HFA) 108 (90 Base) MCG/ACT inhaler Inhale 2 puffs into the lungs every 6 hours as needed for shortness of breath / dyspnea or wheezing 1 Inhaler 11     alendronate (FOSAMAX) 70 MG tablet TAKE 1 TABLET (70 MG) BY MOUTH EVERY 7 DAYS 12 tablet 3     amLODIPine (NORVASC) 2.5 MG tablet TAKE 1 TABLET BY  "MOUTH EVERY DAY 90 tablet 1     ascorbic acid (VITAMIN C) 500 MG tablet Take 1 tablet (500 mg) by mouth daily 30 tablet      ASPIRIN EC PO Take 81 mg by mouth daily       Calcium Polycarbophil (FIBERCON PO) Take 1 tablet by mouth once , one in the morning and one in the evening       calcium-vitamin D (CALTRATE) 600-400 MG-UNIT per tablet Take 1 tablet by mouth daily 1200mg   1000 mg of vitamin d       Glucosamine-Chondroitin (GLUCOSAMINE CHONDROITIN COMPLX) 500-250 MG CAPS Take 1 tablet by mouth 2 times daily       lisinopril (PRINIVIL/ZESTRIL) 40 MG tablet TAKE 1 TABLET BY MOUTH EVERY DAY 90 tablet 3     magnesium oxide 400 MG CAPS        Multiple Vitamins-Minerals (MULTIVITAMIN ADULT PO) Take 1 tablet by mouth daily       NONFORMULARY Natra sleeping med takes 1 at night.       oxyCODONE (ROXICODONE) 5 MG tablet May take 5 tabs per day 150 tablet 0     pravastatin (PRAVACHOL) 40 MG tablet Take 1 tablet (40 mg) by mouth daily 90 tablet 3     senna-docusate (SENOKOT-S;PERICOLACE) 8.6-50 MG per tablet Take 1 tablet by mouth 2 times daily Reported on 2017       tiotropium (SPIRIVA HANDIHALER) 18 MCG inhaled capsule Inhale 1 capsule (18 mcg) into the lungs daily 90 capsule 0     VITAMIN E PO Take 400 Int'l Units by mouth daily       glipiZIDE (GLUCOTROL) 5 MG tablet TAKE 1 TABLET (5 MG) BY MOUTH EVERY MORNING (BEFORE BREAKFAST) (Patient not taking: Reported on 2019) 90 tablet 3       Social History     Tobacco Use     Smoking status: Former Smoker     Packs/day: 0.50     Years: 54.00     Pack years: 27.00     Types: Cigarettes     Last attempt to quit: 2000     Years since quittin.4     Smokeless tobacco: Never Used     Tobacco comment: using nicotine gum   Substance Use Topics     Alcohol use: Yes     Alcohol/week: 0.8 standard drinks     Types: 1 Glasses of wine per week     Comment: \"A glass of wine once a week.\"     Drug use: No        ROS:  General: no fever, chills  Weight: " "stable  Vision:negative. Last eye exam 9/19.  ENT: negative  Respiratory see above.  Cardiac: no chest pain or pressure  Abdominal: no nausea, vomiting, abdominal pain, bowel changes  Vascular no complaints of claudication  Neurologic:no complaints of neuropathy  Feet no lesions, in grown nails, edema   : no polyuria, hematuria, dysuria    Objective:  Patient alert in NAD  BP (!) 142/70   Pulse 100   Temp 98.2  F (36.8  C) (Oral)   Resp 16   Ht 1.6 m (5' 3\")   Wt 63 kg (139 lb)   SpO2 93%   BMI 24.62 kg/m         Wt Readings from Last 4 Encounters:   09/30/19 63 kg (139 lb)   06/05/19 63 kg (139 lb)   06/05/19 63 kg (139 lb)   05/18/19 64.3 kg (141 lb 11.2 oz)       CV: CV: normal S1, S2 without murmur, S3 or S4.  Carotid pulses: full  LUNGS: clear  Feet: pulses full, normal capillary refill  No lesions, sores or skin changes  Nails normal  Sensation able to feel fine filament    Lab Results   Component Value Date    A1C 6.9 09/23/2019    A1C 6.9 03/21/2019    A1C 6.7 08/30/2018    A1C 6.8 03/01/2018    A1C 7.1 08/22/2017       Lab Results   Component Value Date    CHOL 206 03/21/2019    HDL 59 03/21/2019     03/21/2019    TRIG 161 03/21/2019    CHOLHDLRATIO 3.4 07/23/2015                      Assessment & Plan     1. Type 2 diabetes mellitus with complication, without long-term current use of insulin (H)  Well-controlled, follow-up check in 6 months, advised about use of over-the-counter multivitamin.  - FOOT EXAM    2. Chronic obstructive pulmonary disease, unspecified COPD type (H)  Mild decrease, continue medications  - albuterol (PROAIR HFA) 108 (90 Base) MCG/ACT inhaler; Inhale 2 puffs into the lungs every 6 hours as needed for shortness of breath / dyspnea or wheezing  Dispense: 1 Inhaler; Refill: 11  - tiotropium (SPIRIVA HANDIHALER) 18 MCG inhaled capsule; Inhale 1 capsule (18 mcg) into the lungs daily  Dispense: 90 capsule; Refill: 3    3. Chronic pain syndrome  Overall stable, stable use " of pain medication, she has tried to use 4/day when she can    4. Narcotic dependence (H)  Stable as above    5. Spinal stenosis of lumbar region with neurogenic claudication  Stable as above  - oxyCODONE (ROXICODONE) 5 MG tablet; May take 5 tabs per day  Dispense: 150 tablet; Refill: 0    6. Benign essential hypertension  Her goal is less than 150/90 so she is at goal and improved from last time.  Continue medication  - amLODIPine (NORVASC) 2.5 MG tablet; Take 1 tablet (2.5 mg) by mouth daily  Dispense: 90 tablet; Refill: 1    7. CKD (chronic kidney disease) stage 1, GFR 90 ml/min or greater  Stable with improved urine protein, continue medication    8. Hyperlipidemia LDL goal <100  Stable, unclear though if her leg cramps are related to her statin or not.  Recommend she hold it for 7 to 10 days, advised on stretches and exercises.  If she thinks the medication is causing her symptoms, call and we can try a lower dose or changing.    9. Need for prophylactic vaccination and inoculation against influenza    - INFLUENZA (HIGH DOSE) 3 VALENT VACCINE [51582]  - ADMIN INFLUENZA (For MEDICARE Patients ONLY) []    10.  Easy bruising: Advise its related to thin skin and aspirin, continue the aspirin    11.  Feeling warm: This does not likely represent any significant problem, no night sweats or fevers.  Call if any sudden change.          Return in about 6 months (around 3/30/2020) for Lab Work, Diabetes, see me a week after lab.    Emily Marie MD  [facility]

## 2019-09-30 NOTE — PATIENT INSTRUCTIONS
Hold the Pravastatin up to 10 days. If no change in the cramps, restart it.   If the cramps go away within a few days, it is likely due to the medication.   If they improve but not clear if it is the medication or the stretches, retry the medication.if you have significant increase again, you can tell it is the medication.     Let me know if you think the pravastatin is causing symptoms.

## 2019-09-30 NOTE — NURSING NOTE
"Vital signs:  Temp: 98.2  F (36.8  C) Temp src: Oral BP: (!) 142/70 Pulse: 100   Resp: 16 SpO2: 93 %     Height: 160 cm (5' 3\") Weight: 63 kg (139 lb)  Estimated body mass index is 24.62 kg/m  as calculated from the following:    Height as of this encounter: 1.6 m (5' 3\").    Weight as of this encounter: 63 kg (139 lb).        "

## 2019-10-01 DIAGNOSIS — M48.062 SPINAL STENOSIS OF LUMBAR REGION WITH NEUROGENIC CLAUDICATION: ICD-10-CM

## 2019-10-01 RX ORDER — OXYCODONE HYDROCHLORIDE 5 MG/1
TABLET ORAL
Qty: 150 TABLET | Refills: 0 | Status: SHIPPED | OUTPATIENT
Start: 2019-10-01 | End: 2019-11-06

## 2019-10-01 NOTE — TELEPHONE ENCOUNTER
The Oxycodone Rx returned Transmission failed.   Call to pharmacy. They have not received the Oxycodone Rx from yesterday. Pharmacist states their system was down for a little bit yesterday.     Please re-fax.

## 2019-11-04 ENCOUNTER — TELEPHONE (OUTPATIENT)
Dept: INTERNAL MEDICINE | Facility: CLINIC | Age: 84
End: 2019-11-04

## 2019-11-04 DIAGNOSIS — E78.5 HYPERLIPIDEMIA LDL GOAL <100: Primary | ICD-10-CM

## 2019-11-04 RX ORDER — ROSUVASTATIN CALCIUM 5 MG/1
5 TABLET, COATED ORAL DAILY
Qty: 30 TABLET | Refills: 6 | Status: SHIPPED | OUTPATIENT
Start: 2019-11-04 | End: 2020-04-14

## 2019-11-04 NOTE — TELEPHONE ENCOUNTER
Please inform patient that Dr. Marie is out of office for 1 week, I can recommend low-dose Crestor 5 mg once daily (has less muscle aches) instead of pravastatin, please check with patient , I can fax this if she is in agreement

## 2019-11-04 NOTE — TELEPHONE ENCOUNTER
Patient is calling requesting a different medication to replace Pravastatin. Patient reports having cramps in both legs. Ok to call and lm [phone]

## 2019-11-04 NOTE — TELEPHONE ENCOUNTER
Pt would like alternative medication. She reports the Pravastatin is causing bilateral leg cramps.     Please advise.     Last OV 9/30/19      Recent Labs   Lab Test 03/21/19  0805 03/01/18  0842  07/23/15  0804 02/12/15  0825   CHOL 206* 151   < > 179 163   HDL 59 53   < > 53 58   * 72   < > 98 72   TRIG 161* 128   < > 142 166*   CHOLHDLRATIO  --   --   --  3.4 2.8    < > = values in this interval not displayed.

## 2019-11-05 DIAGNOSIS — M48.062 SPINAL STENOSIS OF LUMBAR REGION WITH NEUROGENIC CLAUDICATION: ICD-10-CM

## 2019-11-05 NOTE — TELEPHONE ENCOUNTER
Requested Prescriptions   Pending Prescriptions Disp Refills     oxyCODONE (ROXICODONE) 5 MG tablet Last Written Prescription Date:  10/1/2019  Last Fill Quantity: 150 tablet,  # refills: 0   Last office visit: 9/30/2019 with prescribing provider:  9/30/2019  Future Office Visit:   150 tablet 0     Sig: May take 5 tabs per day       There is no refill protocol information for this order

## 2019-11-06 RX ORDER — OXYCODONE HYDROCHLORIDE 5 MG/1
TABLET ORAL
Qty: 150 TABLET | Refills: 0 | Status: SHIPPED | OUTPATIENT
Start: 2019-11-06 | End: 2019-12-19

## 2019-11-06 NOTE — TELEPHONE ENCOUNTER
Controlled Substance Refill Request for Oxycodone   Problem List Complete:  Yes  Patient is followed by Emily Marie MD for ongoing prescription of pain medication.  All refills should be approved by this provider, or covering partner.     Medication(s): oxycodone.   Maximum quantity per month: 120  Clinic visit frequency required: Q 6  months      Controlled substance agreement on file: Yes       Date(s): 4/26/16     Pain Clinic evaluation in the past: No     DIRE Total Score(s):  No flowsheet data found.     [place] reviewed:  reviewed- no concerns  [place]:  [email or web address]

## 2019-12-16 DIAGNOSIS — M48.062 SPINAL STENOSIS OF LUMBAR REGION WITH NEUROGENIC CLAUDICATION: ICD-10-CM

## 2019-12-16 NOTE — TELEPHONE ENCOUNTER
Pt calling for below refill request:    Controlled Substance Refill Request for Oxycodone  Problem List Complete:    No     PROVIDER TO CONSIDER COMPLETION OF PROBLEM LIST AND OVERVIEW/CONTROLLED SUBSTANCE AGREEMENT    Last Written Prescription Date:  11/6/19  Last Fill Quantity: 150,   # refills: 0    THE MOST RECENT OFFICE VISIT MUST BE WITHIN THE PAST 3 MONTHS. AT LEAST ONE FACE TO FACE VISIT MUST OCCUR EVERY 6 MONTHS. ADDITIONAL VISITS CAN BE VIRTUAL.  (THIS STATEMENT SHOULD BE DELETED.)    Last Office Visit with [facility] primary care provider: 9/30/19    Future Office visit:     Controlled substance agreement:   Encounter-Level CSA - 04/21/2016:    Controlled Substance Agreement - Scan on 5/3/2016  1:00 PM: [place] Controlled Substance Agreement, 4/25/16     Patient-Level CSA:    There are no patient-level csa.         Last Urine Drug Screen: No results found for: CDAUT, No results found for: COMDAT,   Cannabinoids (96-dxe-7-carboxy-9-THC)   Date Value Ref Range Status   03/21/2019 Not Detected NDET^Not Detected ng/mL Final     Comment:     Cutoff for a negative cannabinoid is 50 ng/mL or less.     Phencyclidine (Phencyclidine)   Date Value Ref Range Status   03/21/2019 Not Detected NDET^Not Detected ng/mL Final     Comment:     Cutoff for a negative PCP is 25 ng/mL or less.     Cocaine (Benzoylecgonine)   Date Value Ref Range Status   03/21/2019 Not Detected NDET^Not Detected ng/mL Final     Comment:     Cutoff for a negative cocaine is 150 ng/ml or less.     Methamphetamine (d-Methamphetamine)   Date Value Ref Range Status   03/21/2019 Not Detected NDET^Not Detected ng/mL Final     Comment:     Cutoff for a negative methamphetamine is 500 ng/ml or less.     Opiates (Morphine)   Date Value Ref Range Status   03/21/2019 Not Detected NDET^Not Detected ng/mL Final     Comment:     Cutoff for a negative opiate is 100 ng/ml or less.     Amphetamine (d-Amphetamine)   Date Value Ref Range Status   03/21/2019 Not Detected  NDET^Not Detected ng/mL Final     Comment:     Cutoff for a negative amphetamine is 500 ng/mL or less.     Benzodiazepines (Nordiazepam)   Date Value Ref Range Status   03/21/2019 Not Detected NDET^Not Detected ng/mL Final     Comment:     Cutoff for a negative benzodiazepine is 150 ng/ml or less.     Tricyclic Antidepressants (Desipramine)   Date Value Ref Range Status   03/21/2019 Not Detected NDET^Not Detected ng/mL Final     Comment:     Cutoff for a negative tricyclic antidepressant is 300 ng/ml or less.     Methadone (Methadone)   Date Value Ref Range Status   03/21/2019 Not Detected NDET^Not Detected ng/mL Final     Comment:     Cutoff for a negative methadone is 200 ng/ml or less.     Barbiturates (Butalbital)   Date Value Ref Range Status   03/21/2019 Not Detected NDET^Not Detected ng/mL Final     Comment:     Cutoff for a negative barbituate is 200 ng/ml or less.     Oxycodone (Oxycodone)   Date Value Ref Range Status   03/21/2019 Detected, Abnormal Result (A) NDET^Not Detected ng/mL Final     Comment:     Cutoff for a positive oxycodone is greater than 100 ng/ml.  This is an unconfirmed screening result to be used for medical purposes only.   Order [id number] for confirmation or individual confirmation tests to [facility].       Propoxyphene (Norpropoxyphene)   Date Value Ref Range Status   03/21/2019 Not Detected NDET^Not Detected ng/mL Final     Comment:     Cutoff for a negative propoxyphene is 300 ng/ml or less     Buprenorphine (Buprenorphine)   Date Value Ref Range Status   03/21/2019 Not Detected NDET^Not Detected ng/mL Final     Comment:     Cutoff for a negative buprenorphine is 10 ng/ml or less        Processing:  Rx to be electronically transmitted to pharmacy by provider      [email or web address]       checked in past 3 months?  No, route to RN

## 2019-12-18 NOTE — TELEPHONE ENCOUNTER
[place] reviewed:  not reviewed/not due - last done on 11/6/19  [place] profile:  [email or web address]

## 2019-12-19 RX ORDER — OXYCODONE HYDROCHLORIDE 5 MG/1
TABLET ORAL
Qty: 150 TABLET | Refills: 0 | Status: SHIPPED | OUTPATIENT
Start: 2019-12-19 | End: 2020-01-17

## 2019-12-19 NOTE — TELEPHONE ENCOUNTER
Patient calling about refill. Please advise when she can expect this to be done. Ok to call and raquel [phone]

## 2020-01-13 DIAGNOSIS — M48.062 SPINAL STENOSIS OF LUMBAR REGION WITH NEUROGENIC CLAUDICATION: ICD-10-CM

## 2020-01-13 NOTE — TELEPHONE ENCOUNTER
Requested Prescriptions   Pending Prescriptions Disp Refills     oxyCODONE (ROXICODONE) 5 MG tablet Last Written Prescription Date:  12/19/2019  Last Fill Quantity: 150 TABLET,  # refills: 0   Last office visit: 9/30/2019 with prescribing provider:  9/30/2019  Future Office Visit:   150 tablet 0     Sig: May take 5 tabs per day       There is no refill protocol information for this order

## 2020-01-15 NOTE — TELEPHONE ENCOUNTER
Oxycodone refill request.         Last Written Prescription Date:  12/19/19  Last Fill Quantity: 150,   # refills: 0    Last Office Visit: 9/30/19    Future Office visit:       Routing refill request to provider for review/approval because:  Drug not on the G, P or [facility] refill protocol or controlled substance

## 2020-01-17 RX ORDER — OXYCODONE HYDROCHLORIDE 5 MG/1
TABLET ORAL
Qty: 150 TABLET | Refills: 0 | Status: SHIPPED | OUTPATIENT
Start: 2020-01-17 | End: 2020-02-27

## 2020-02-10 DIAGNOSIS — M85.80 OSTEOPENIA, UNSPECIFIED LOCATION: ICD-10-CM

## 2020-02-10 NOTE — LETTER
[facility]  [address], [address]  [place], [place]  [address]                                            TEL:[phone]  FAX:[phone]        Celeste Chacko  [address]   [facility] [address]          February 12, 2020    Dear Celeste,     We have received a refill request for your medication (alendronate (FOSAMAX), from your pharmacy and noticed you are due for a follow-up . We have sent a one time refill to your pharmacy to allow you time to schedule your appointment. Please call [phone] to schedule this appointment before you are due for your next refill.     Taking care of your health is important to us and ongoing visits with your provider are vital to your care. We look forward to seeing you in the near future.      Thank you,     [facility]

## 2020-02-11 NOTE — TELEPHONE ENCOUNTER
"Requested Prescriptions   Pending Prescriptions Disp Refills     alendronate (FOSAMAX) 70 MG tablet [Pharmacy Med Name: ALENDRONATE  Last Written Prescription Date:  3/5/2019  Last Fill Quantity: 12,  # refills: 3   Last office visit: 9/30/2019 with prescribing provider:     Future Office Visit:   SODIUM 70 MG TAB] 12 tablet 3     Sig: TAKE 1 TABLET (70 MG) BY MOUTH EVERY 7 DAYS       Bisphosphonates Passed - 2/10/2020  1:03 PM        Passed - Recent (12 mo) or future (30 days) visit within the authorizing provider's specialty     Patient has had an office visit with the authorizing provider or a provider within the authorizing providers department within the previous 12 mos or has a future within next 30 days. See \"Patient Info\" tab in inbasket, or \"Choose Columns\" in Meds & Orders section of the refill encounter.              Passed - Dexa on file within past 2 years     Please review last Dexa result.           Passed - Medication is active on med list        Passed - Patient is age 18 or older        Passed - Normal serum creatinine on file within past 12 months     Recent Labs   Lab Test 09/23/19  0808   CR 0.54             "

## 2020-02-12 RX ORDER — ALENDRONATE SODIUM 70 MG/1
TABLET ORAL
Qty: 12 TABLET | Refills: 3 | Status: SHIPPED | OUTPATIENT
Start: 2020-02-12 | End: 2021-01-06

## 2020-02-12 NOTE — TELEPHONE ENCOUNTER
Per last office visit 9/30/19 :  Return in about 6 months (around 3/30/2020) for Lab Work, Diabetes, see me a week after lab.    Medication is being filled for 1 time refill only due to:  Patient needs to be seen because will be due for f/u next month.     Mailed patient letter.

## 2020-02-24 DIAGNOSIS — M48.062 SPINAL STENOSIS OF LUMBAR REGION WITH NEUROGENIC CLAUDICATION: ICD-10-CM

## 2020-02-24 NOTE — TELEPHONE ENCOUNTER
oxyCODONE (ROXICODONE)      Last Written Prescription Date:  1/17/20  Last Fill Quantity: 150,   # refills: 0  Last Office Visit: 9/30/19  Future Office visit:    Next 5 appointments (look out 90 days)    Mar 18, 2020  1:20 PM CDT  [facility] [address] [address] [phone]           Routing refill request to provider for review/approval because:  Drug not on the [facility], [facility] or [facility] refill protocol or controlled substance

## 2020-02-27 RX ORDER — OXYCODONE HYDROCHLORIDE 5 MG/1
TABLET ORAL
Qty: 150 TABLET | Refills: 0 | Status: SHIPPED | OUTPATIENT
Start: 2020-02-27 | End: 2020-03-30

## 2020-02-27 NOTE — TELEPHONE ENCOUNTER
Controlled Substance Refill Request for Oxycodone  Problem List Complete:    No     PROVIDER TO CONSIDER COMPLETION OF PROBLEM LIST AND OVERVIEW/CONTROLLED SUBSTANCE AGREEMENT    Last Written Prescription Date:  1/17/20  Last Fill Quantity: 150,   # refills: 0    THE MOST RECENT OFFICE VISIT MUST BE WITHIN THE PAST 3 MONTHS. AT LEAST ONE FACE TO FACE VISIT MUST OCCUR EVERY 6 MONTHS. ADDITIONAL VISITS CAN BE VIRTUAL.  (THIS STATEMENT SHOULD BE DELETED.)    Last Office Visit with [facility] primary care provider: 9/30/19    Future Office visit:   Next 5 appointments (look out 90 days)    Mar 18, 2020  1:20 PM CDT  [facility] [address] [address] [phone]          Controlled substance agreement:   Encounter-Level CSA - 04/21/2016:    Controlled Substance Agreement - Scan on 5/3/2016  1:00 PM: [place] Controlled Substance Agreement, 4/25/16     Patient-Level CSA:    There are no patient-level csa.         Last Urine Drug Screen: No results found for: CDAUT, No results found for: COMDAT,   Cannabinoids (60-xpz-1-carboxy-9-THC)   Date Value Ref Range Status   03/21/2019 Not Detected NDET^Not Detected ng/mL Final     Comment:     Cutoff for a negative cannabinoid is 50 ng/mL or less.     Phencyclidine (Phencyclidine)   Date Value Ref Range Status   03/21/2019 Not Detected NDET^Not Detected ng/mL Final     Comment:     Cutoff for a negative PCP is 25 ng/mL or less.     Cocaine (Benzoylecgonine)   Date Value Ref Range Status   03/21/2019 Not Detected NDET^Not Detected ng/mL Final     Comment:     Cutoff for a negative cocaine is 150 ng/ml or less.     Methamphetamine (d-Methamphetamine)   Date Value Ref Range Status   03/21/2019 Not Detected NDET^Not Detected ng/mL Final     Comment:     Cutoff for a negative methamphetamine is 500 ng/ml or less.     Opiates (Morphine)   Date Value Ref Range  Status   03/21/2019 Not Detected NDET^Not Detected ng/mL Final     Comment:     Cutoff for a negative opiate is 100 ng/ml or less.     Amphetamine (d-Amphetamine)   Date Value Ref Range Status   03/21/2019 Not Detected NDET^Not Detected ng/mL Final     Comment:     Cutoff for a negative amphetamine is 500 ng/mL or less.     Benzodiazepines (Nordiazepam)   Date Value Ref Range Status   03/21/2019 Not Detected NDET^Not Detected ng/mL Final     Comment:     Cutoff for a negative benzodiazepine is 150 ng/ml or less.     Tricyclic Antidepressants (Desipramine)   Date Value Ref Range Status   03/21/2019 Not Detected NDET^Not Detected ng/mL Final     Comment:     Cutoff for a negative tricyclic antidepressant is 300 ng/ml or less.     Methadone (Methadone)   Date Value Ref Range Status   03/21/2019 Not Detected NDET^Not Detected ng/mL Final     Comment:     Cutoff for a negative methadone is 200 ng/ml or less.     Barbiturates (Butalbital)   Date Value Ref Range Status   03/21/2019 Not Detected NDET^Not Detected ng/mL Final     Comment:     Cutoff for a negative barbituate is 200 ng/ml or less.     Oxycodone (Oxycodone)   Date Value Ref Range Status   03/21/2019 Detected, Abnormal Result (A) NDET^Not Detected ng/mL Final     Comment:     Cutoff for a positive oxycodone is greater than 100 ng/ml.  This is an unconfirmed screening result to be used for medical purposes only.   Order [id number] for confirmation or individual confirmation tests to [employer].       Propoxyphene (Norpropoxyphene)   Date Value Ref Range Status   03/21/2019 Not Detected NDET^Not Detected ng/mL Final     Comment:     Cutoff for a negative propoxyphene is 300 ng/ml or less     Buprenorphine (Buprenorphine)   Date Value Ref Range Status   03/21/2019 Not Detected NDET^Not Detected ng/mL Final     Comment:     Cutoff for a negative buprenorphine is 10 ng/ml or less        [place] reviewed:  reviewed- no concerns  [place] profile:   [email or web address]

## 2020-03-03 ENCOUNTER — OFFICE VISIT (OUTPATIENT)
Dept: URGENT CARE | Facility: URGENT CARE | Age: 85
End: 2020-03-03
Payer: COMMERCIAL

## 2020-03-03 VITALS
BODY MASS INDEX: 25.33 KG/M2 | RESPIRATION RATE: 16 BRPM | SYSTOLIC BLOOD PRESSURE: 158 MMHG | DIASTOLIC BLOOD PRESSURE: 78 MMHG | TEMPERATURE: 98.7 F | WEIGHT: 143 LBS | HEART RATE: 90 BPM | OXYGEN SATURATION: 98 %

## 2020-03-03 DIAGNOSIS — L03.031 CELLULITIS OF TOE OF RIGHT FOOT: Primary | ICD-10-CM

## 2020-03-03 DIAGNOSIS — L60.8 TOENAIL DEFORMITY: ICD-10-CM

## 2020-03-03 PROCEDURE — 99213 OFFICE O/P EST LOW 20 MIN: CPT | Performed by: FAMILY MEDICINE

## 2020-03-03 RX ORDER — CEPHALEXIN 500 MG/1
500 CAPSULE ORAL 2 TIMES DAILY
Qty: 14 CAPSULE | Refills: 0 | Status: SHIPPED | OUTPATIENT
Start: 2020-03-03 | End: 2020-05-05

## 2020-03-03 NOTE — PROGRESS NOTES
"SUBJECTIVE: Celeste Chacko is a 88 year old female presenting with a chief complaint of rt great toenail redness and deformity.  Onset of symptoms was week(s) ago.  Course of illness is worsening.    Severity moderate  Current and Associated symptoms: none  Treatment measures tried include None tried.  Predisposing factors include None.    Past Medical History:   Diagnosis Date     Basal cell carcinoma      Chronic airway obstruction, not elsewhere classified      Complete rupture of rotator cuff      Disorder of bone and cartilage, unspecified      History of blood transfusion      Mixed hyperlipidemia      Osteoarthritis      Personal history of other malignant neoplasm of skin      Spinal stenosis      Type II or unspecified type diabetes mellitus without mention of complication, not stated as uncontrolled      Allergies   Allergen Reactions     Sulfamethoxazole Anaphylaxis and Hives     Social History     Tobacco Use     Smoking status: Former Smoker     Packs/day: 0.50     Years: 54.00     Pack years: 27.00     Types: Cigarettes     Last attempt to quit: 2000     Years since quittin.8     Smokeless tobacco: Never Used     Tobacco comment: using nicotine gum   Substance Use Topics     Alcohol use: Yes     Alcohol/week: 0.8 standard drinks     Types: 1 Glasses of wine per week     Comment: \"A glass of wine once a week.\"       ROS:  SKIN: no rash  GI: no vomiting    OBJECTIVE:  BP (!) 158/78   Pulse 90   Temp 98.7  F (37.1  C) (Oral)   Resp 16   Wt 64.9 kg (143 lb)   SpO2 98%   BMI 25.33 kg/m  GENERAL APPEARANCE: healthy, alert and no distress  NEURO: Normal strength and tone, sensory exam grossly normal,  normal speech and mentation  SKIN: redness and toenail deformity      ICD-10-CM    1. Cellulitis of toe of right foot L03.031 cephALEXin (KEFLEX) 500 MG capsule     PODIATRY/FOOT & ANKLE SURGERY REFERRAL   2. Toenail deformity L60.8 PODIATRY/FOOT & ANKLE SURGERY REFERRAL       Fluids/Rest, " f/u if worse/not any better

## 2020-03-21 DIAGNOSIS — J44.9 CHRONIC OBSTRUCTIVE PULMONARY DISEASE, UNSPECIFIED COPD TYPE (H): ICD-10-CM

## 2020-03-22 RX ORDER — ALBUTEROL SULFATE 90 UG/1
AEROSOL, METERED RESPIRATORY (INHALATION)
Qty: 8.5 INHALER | Refills: 3 | Status: SHIPPED | OUTPATIENT
Start: 2020-03-22 | End: 2021-11-19

## 2020-03-30 DIAGNOSIS — M48.062 SPINAL STENOSIS OF LUMBAR REGION WITH NEUROGENIC CLAUDICATION: ICD-10-CM

## 2020-03-30 RX ORDER — OXYCODONE HYDROCHLORIDE 5 MG/1
TABLET ORAL
Qty: 150 TABLET | Refills: 0 | Status: SHIPPED | OUTPATIENT
Start: 2020-03-30 | End: 2020-05-05

## 2020-03-30 NOTE — TELEPHONE ENCOUNTER
Oxycodone      Last Written Prescription Date:  02/27/20  Last Fill Quantity: 150,   # refills: 0  Last Office Visit: 09/30/19  Future Office visit:    Next 5 appointments (look out 90 days)    Apr 14, 2020  2:20 PM CDT  [facility] with [facility] MD  [facility] [address]  [facility] [address]  [phone]           Routing refill request to provider for review/approval because:  Drug not on the [facility], [facility] or [facility] refill protocol or controlled substance

## 2020-03-30 NOTE — TELEPHONE ENCOUNTER
Requested Prescriptions   Pending Prescriptions Disp Refills     oxyCODONE (ROXICODONE) 5 MG tablet 150 tablet 0     Sig: May take 5 tabs per day       There is no refill protocol information for this order        Routing refill request to provider for review/approval because:  Drug not on the [facility] refill protocol

## 2020-04-05 DIAGNOSIS — I10 BENIGN ESSENTIAL HYPERTENSION: ICD-10-CM

## 2020-04-06 NOTE — TELEPHONE ENCOUNTER
"Requested Prescriptions   Pending Prescriptions Disp Refills     amLODIPine (NORVASC) 2.5 MG tablet [Pharmacy Med Name: AMLODIPINE BESYLATE 2.5 MG TAB] 90 tablet 1     Sig: TAKE 1 TABLET BY MOUTH EVERY DAY   Last Written Prescription Date:  09/30/2019  Last Fill Quantity: 90,  # refills: 01   Last office visit: 9/30/2019 with prescribing provider:     Future Office Visit:   Next 5 appointments (look out 90 days)    Apr 14, 2020  2:20 PM CDT  SHORT with Emily Marie MD  [facility] [address]  [facility] [address]  [phone]           Calcium Channel Blockers Protocol  Failed - 4/5/2020  9:40 AM        Failed - Blood pressure under 140/90 in past 12 months     BP Readings from Last 3 Encounters:   03/03/20 (!) 158/78   09/30/19 (!) 142/70   06/05/19 178/86                 Passed - Recent (12 mo) or future (30 days) visit within the authorizing provider's specialty     Patient has had an office visit with the authorizing provider or a provider within the authorizing providers department within the previous 12 mos or has a future within next 30 days. See \"Patient Info\" tab in inbasket, or \"Choose Columns\" in Meds & Orders section of the refill encounter.              Passed - Medication is active on med list        Passed - Patient is age 18 or older        Passed - No active pregnancy on record        Passed - Normal serum creatinine on file in past 12 months     Recent Labs   Lab Test 09/23/19  0808   CR 0.54       Ok to refill medication if creatinine is low          Passed - No positive pregnancy test in past 12 months           "

## 2020-04-07 NOTE — TELEPHONE ENCOUNTER
Routing refill request to provider for review/approval because:  BP elevated above protocol for RN refill

## 2020-04-09 DIAGNOSIS — E78.5 HYPERLIPIDEMIA LDL GOAL <100: ICD-10-CM

## 2020-04-09 DIAGNOSIS — G89.4 CHRONIC PAIN SYNDROME: ICD-10-CM

## 2020-04-09 DIAGNOSIS — E11.8 TYPE 2 DIABETES MELLITUS WITH COMPLICATION, WITHOUT LONG-TERM CURRENT USE OF INSULIN (H): ICD-10-CM

## 2020-04-09 LAB
AMPHETAMINES UR QL: NOT DETECTED NG/ML
BARBITURATES UR QL SCN: NOT DETECTED NG/ML
BENZODIAZ UR QL SCN: NOT DETECTED NG/ML
BUPRENORPHINE UR QL: NOT DETECTED NG/ML
CANNABINOIDS UR QL: NOT DETECTED NG/ML
COCAINE UR QL SCN: NOT DETECTED NG/ML
D-METHAMPHET UR QL: NOT DETECTED NG/ML
HBA1C MFR BLD: 7.1 % (ref 0–5.6)
METHADONE UR QL SCN: NOT DETECTED NG/ML
OPIATES UR QL SCN: NOT DETECTED NG/ML
OXYCODONE UR QL SCN: ABNORMAL NG/ML
PCP UR QL SCN: NOT DETECTED NG/ML
PROPOXYPH UR QL: NOT DETECTED NG/ML
TRICYCLICS UR QL SCN: NOT DETECTED NG/ML

## 2020-04-09 PROCEDURE — 80306 DRUG TEST PRSMV INSTRMNT: CPT | Performed by: INTERNAL MEDICINE

## 2020-04-09 PROCEDURE — 80048 BASIC METABOLIC PNL TOTAL CA: CPT | Performed by: INTERNAL MEDICINE

## 2020-04-09 PROCEDURE — 36415 COLL VENOUS BLD VENIPUNCTURE: CPT | Performed by: INTERNAL MEDICINE

## 2020-04-09 PROCEDURE — 80061 LIPID PANEL: CPT | Performed by: INTERNAL MEDICINE

## 2020-04-09 PROCEDURE — 83036 HEMOGLOBIN GLYCOSYLATED A1C: CPT | Performed by: INTERNAL MEDICINE

## 2020-04-10 LAB
ANION GAP SERPL CALCULATED.3IONS-SCNC: 6 MMOL/L (ref 3–14)
BUN SERPL-MCNC: 12 MG/DL (ref 7–30)
CALCIUM SERPL-MCNC: 9.2 MG/DL (ref 8.5–10.1)
CHLORIDE SERPL-SCNC: 105 MMOL/L (ref 94–109)
CHOLEST SERPL-MCNC: 176 MG/DL
CO2 SERPL-SCNC: 31 MMOL/L (ref 20–32)
CREAT SERPL-MCNC: 0.54 MG/DL (ref 0.52–1.04)
GFR SERPL CREATININE-BSD FRML MDRD: 84 ML/MIN/{1.73_M2}
GLUCOSE SERPL-MCNC: 112 MG/DL (ref 70–99)
HDLC SERPL-MCNC: 55 MG/DL
LDLC SERPL CALC-MCNC: 78 MG/DL
NONHDLC SERPL-MCNC: 121 MG/DL
POTASSIUM SERPL-SCNC: 3.7 MMOL/L (ref 3.4–5.3)
SODIUM SERPL-SCNC: 142 MMOL/L (ref 133–144)
TRIGL SERPL-MCNC: 213 MG/DL

## 2020-04-10 NOTE — TELEPHONE ENCOUNTER
Last seen by PCP 09/19.  Please advise phone visit with PCP next week,. We can fill her meds today if she is completely out of med , pl inform pt

## 2020-04-13 RX ORDER — AMLODIPINE BESYLATE 2.5 MG/1
TABLET ORAL
Qty: 90 TABLET | Refills: 1 | Status: SHIPPED | OUTPATIENT
Start: 2020-04-13 | End: 2020-10-09

## 2020-04-14 ENCOUNTER — VIRTUAL VISIT (OUTPATIENT)
Dept: INTERNAL MEDICINE | Facility: CLINIC | Age: 85
End: 2020-04-14
Payer: COMMERCIAL

## 2020-04-14 DIAGNOSIS — J44.9 COPD, SEVERE (H): ICD-10-CM

## 2020-04-14 DIAGNOSIS — E11.8 TYPE 2 DIABETES MELLITUS WITH COMPLICATION, WITHOUT LONG-TERM CURRENT USE OF INSULIN (H): Primary | ICD-10-CM

## 2020-04-14 DIAGNOSIS — E78.5 HYPERLIPIDEMIA LDL GOAL <100: ICD-10-CM

## 2020-04-14 DIAGNOSIS — F11.20 NARCOTIC DEPENDENCE (H): ICD-10-CM

## 2020-04-14 DIAGNOSIS — I10 BENIGN ESSENTIAL HYPERTENSION: ICD-10-CM

## 2020-04-14 DIAGNOSIS — G89.4 CHRONIC PAIN SYNDROME: ICD-10-CM

## 2020-04-14 PROCEDURE — 99214 OFFICE O/P EST MOD 30 MIN: CPT | Mod: 95 | Performed by: INTERNAL MEDICINE

## 2020-04-14 RX ORDER — ROSUVASTATIN CALCIUM 5 MG/1
5 TABLET, COATED ORAL DAILY
Qty: 30 TABLET | Refills: 6 | Status: SHIPPED | OUTPATIENT
Start: 2020-04-14 | End: 2020-12-16

## 2020-04-14 RX ORDER — LISINOPRIL 40 MG/1
40 TABLET ORAL DAILY
Qty: 90 TABLET | Refills: 3 | Status: SHIPPED | OUTPATIENT
Start: 2020-04-14 | End: 2021-02-11

## 2020-04-14 NOTE — PROGRESS NOTES
"Celeste Chacko is a 88 year old female who is being evaluated via a billable telephone visit.      The patient has been notified of following:     \"This telephone visit will be conducted via a call between you and your physician/provider. We have found that certain health care needs can be provided without the need for a physical exam.  This service lets us provide the care you need with a short phone conversation.  If a prescription is necessary we can send it directly to your pharmacy.  If lab work is needed we can place an order for that and you can then stop by our lab to have the test done at a later time.    Telephone visits are billed at different rates depending on your insurance coverage. During this emergency period, for some insurers they may be billed the same as an in-person visit.  Please reach out to your insurance provider with any questions.    If during the course of the call the physician/provider feels a telephone visit is not appropriate, you will not be charged for this service.\"    Patient has given verbal consent for Telephone visit?  Yes    How would you like to obtain your AVS? Mail a copy    Subjective     Celeste Chacko is a 88 year old female who presents to clinic today for the following health issues:    Diabetes Follow-up      How often are you checking your blood sugar? Not at all    What concerns do you have today about your diabetes? None     Do you have any of these symptoms? (Select all that apply)  No numbness or tingling in feet.  No redness, sores or blisters on feet.  No complaints of excessive thirst.  No reports of blurry vision.  No significant changes to weight.      Hyperlipidemia Follow-Up      Are you regularly taking any medication or supplement to lower your cholesterol?   Yes- rosuvastatin    Are you having muscle aches or other side effects that you think could be caused by your cholesterol lowering medication?  Yes- cramp at night    Hypertension Follow-up  She " reports her BP was 163/77 today. She had not checked it for a while, her insurance did an in home visit several months ago and it was <130. When she went in with a toe infection 3/3 it was elevated. She got new batteries for home machine yesterday so checked it today. No symptoms with this.     Do you check your blood pressure regularly outside of the clinic? Yes     Are you following a low salt diet? Yes    Are your blood pressures ever more than 140 on the top number (systolic) OR more   than 90 on the bottom number (diastolic), for example 140/90? Yes    BP Readings from Last 2 Encounters:   03/03/20 (!) 158/78   09/30/19 (!) 142/70     Hemoglobin A1C (%)   Date Value   04/09/2020 7.1 (H)   09/23/2019 6.9 (H)     LDL Cholesterol Calculated (mg/dL)   Date Value   04/09/2020 78   03/21/2019 115 (H)         How many servings of fruits and vegetables do you eat daily?  4 or more    On average, how many sweetened beverages do you drink each day (Examples: soda, juice, sweet tea, etc.  Do NOT count diet or artificially sweetened beverages)?   0    How many days per week do you exercise enough to make your heart beat faster? 5    How many minutes a day do you exercise enough to make your heart beat faster? 10 - 19    How many days per week do you miss taking your medication? 0      Other problems:   COPD: stable   Chronic back pain, narcotic dependence: pain stable, sometimes able to take fewer oxyodone      Current concerns: none    Patient Active Problem List   Diagnosis     Disorder of bone and cartilage     COPD, severe (H)     Spinal stenosis of lumbar region with neurogenic claudication     Type 2 diabetes mellitus with complication, without long-term current use of insulin (H)     HYPERLIPIDEMIA LDL GOAL <100     History of basal cell carcinoma     Controlled substance agreement signed     Chronic pain syndrome     Narcotic dependence (H)     Benign essential hypertension     Lumbar radiculopathy     CKD (chronic  kidney disease) stage 1, GFR 90 ml/min or greater       Current Outpatient Medications   Medication Sig Dispense Refill     Acetaminophen (TYLENOL PO) Take 325 mg by mouth 6 times daily Patient takes TID with Oxycodone.        ADVAIR DISKUS 500-50 MCG/DOSE inhaler INHALE 1 PUFF INTO THE LUNGS 2 TIMES DAILY 3 Inhaler 3     alendronate (FOSAMAX) 70 MG tablet TAKE 1 TABLET (70 MG) BY MOUTH EVERY 7 DAYS 12 tablet 3     amLODIPine (NORVASC) 2.5 MG tablet TAKE 1 TABLET BY MOUTH EVERY DAY 90 tablet 1     ASPIRIN EC PO Take 81 mg by mouth daily       Calcium Polycarbophil (FIBERCON PO) Take 1 tablet by mouth once , one in the morning and one in the evening       calcium-vitamin D (CALTRATE) 600-400 MG-UNIT per tablet Take 1 tablet by mouth daily 1200mg   1000 mg of vitamin d       glipiZIDE (GLUCOTROL) 5 MG tablet TAKE 1 TABLET (5 MG) BY MOUTH EVERY MORNING (BEFORE BREAKFAST) 90 tablet 3     Glucosamine-Chondroitin (GLUCOSAMINE CHONDROITIN COMPLX) 500-250 MG CAPS Take 1 tablet by mouth 2 times daily       lisinopril (PRINIVIL/ZESTRIL) 40 MG tablet TAKE 1 TABLET BY MOUTH EVERY DAY 90 tablet 3     magnesium oxide 400 MG CAPS        Multiple Vitamins-Minerals (MULTIVITAMIN ADULT PO) Take 1 tablet by mouth daily       NONFORMULARY Natra sleeping med takes 1 at night.       oxyCODONE (ROXICODONE) 5 MG tablet May take 5 tabs per day 150 tablet 0     PROAIR  (90 Base) MCG/ACT inhaler INHALE 2 PUFFS BY MOUTH EVERY 6 HOURS AS NEEDED FOR WHEEZE OR FOR SHORTNESS OF BREATH 8.5 Inhaler 3     rosuvastatin (CRESTOR) 5 MG tablet Take 1 tablet (5 mg) by mouth daily 30 tablet 6     senna-docusate (SENOKOT-S;PERICOLACE) 8.6-50 MG per tablet Take 1 tablet by mouth 2 times daily Reported on 4/12/2017       tiotropium (SPIRIVA HANDIHALER) 18 MCG inhaled capsule Inhale 1 capsule (18 mcg) into the lungs daily 90 capsule 3       Social History     Tobacco Use     Smoking status: Former Smoker     Packs/day: 0.50     Years: 54.00     Pack  "years: 27.00     Types: Cigarettes     Last attempt to quit: 2000     Years since quittin.9     Smokeless tobacco: Never Used     Tobacco comment: using nicotine gum   Substance Use Topics     Alcohol use: Yes     Alcohol/week: 0.8 standard drinks     Types: 1 Glasses of wine per week     Comment: \"A glass of wine once a week.\"     Drug use: No        ROS:  General: no fever, chills  Weight: stable  Vision:negative. Last eye exam .  ENT: stable  Respiratory stable dyspnea on exertion .  Cardiac: no chest pain or pressure  Abdominal: no nausea, vomiting, abdominal pain, bowel changes  Vascular no complaints of claudication      Objective:        Lab Results   Component Value Date    A1C 7.1 2020    A1C 6.9 2019    A1C 6.9 2019    A1C 6.7 2018    A1C 6.8 2018       Lab Results   Component Value Date    CHOL 176 2020    HDL 55 2020    LDL 78 2020    TRIG 213 2020    CHOLHDLRATIO 3.4 2015                 Assessment/Plan:  1. Type 2 diabetes mellitus with complication, without long-term current use of insulin (H)  Well controlled, continue diet, recheck 6 months.   - Basic metabolic panel  (Ca, Cl, CO2, Creat, Gluc, K, Na, BUN); Future  - Hemoglobin A1c; Future    2. Narcotic dependence (H)  Stable pain management, urine was negative for any other substances. Continue pain med    3. COPD, severe (H)  Stable, continue meds  - fluticasone-salmeterol (ADVAIR DISKUS) 500-50 MCG/DOSE inhaler; Inhale 1 puff into the lungs 2 times daily  Dispense: 3 Inhaler; Refill: 3    4. Benign essential hypertension  Elevated today but has not had enough documentation for a while. Recommend she check about 3 times a week and if remains >150 over 3 weeks, call and would try increase amlodipine  - lisinopril (ZESTRIL) 40 MG tablet; Take 1 tablet (40 mg) by mouth daily  Dispense: 90 tablet; Refill: 3    5. Hyperlipidemia LDL goal <100  Well controlled, continue med  - " rosuvastatin (CRESTOR) 5 MG tablet; Take 1 tablet (5 mg) by mouth daily  Dispense: 30 tablet; Refill: 6    6. Chronic pain syndrome  Stable as above      Return in about 6 months (around 10/14/2020) for Lab Work, Diabetes, see me a week after lab.      Phone call duration:  14 minutes    Emily Marie MD

## 2020-05-01 DIAGNOSIS — M48.062 SPINAL STENOSIS OF LUMBAR REGION WITH NEUROGENIC CLAUDICATION: ICD-10-CM

## 2020-05-01 NOTE — TELEPHONE ENCOUNTER
Requested Prescriptions   Pending Prescriptions Disp Refills     oxyCODONE (ROXICODONE) 5 MG tablet Last Written Prescription Date:  3/30/2020  Last Fill Quantity: 150 tablet,  # refills: 0   Last office visit: 4/14/2020 with prescribing provider:  4/14/2020  Future Office Visit:   150 tablet 0     Sig: May take 5 tabs per day       There is no refill protocol information for this order

## 2020-05-04 NOTE — TELEPHONE ENCOUNTER
Controlled Substance Refill Request for Oxycodone  Problem List Complete:    No     PROVIDER TO CONSIDER COMPLETION OF PROBLEM LIST AND OVERVIEW/CONTROLLED SUBSTANCE AGREEMENT    Last Written Prescription Date:  3/30/20  Last Fill Quantity: 150,   # refills: 0    THE MOST RECENT OFFICE VISIT MUST BE WITHIN THE PAST 3 MONTHS. AT LEAST ONE FACE TO FACE VISIT MUST OCCUR EVERY 6 MONTHS. ADDITIONAL VISITS CAN BE VIRTUAL.  (THIS STATEMENT SHOULD BE DELETED.)    Last Office Visit with [facility] primary care provider: 9/30/19    Future Office visit:     Controlled substance agreement:   Encounter-Level CSA - 04/21/2016:    Controlled Substance Agreement - Scan on 5/3/2016  1:00 PM: [place] Controlled Substance Agreement, 4/25/16     Patient-Level CSA:    There are no patient-level csa.         Last Urine Drug Screen: No results found for: CDAUT, No results found for: COMDAT,   Cannabinoids (27-wip-6-carboxy-9-THC)   Date Value Ref Range Status   04/09/2020 Not Detected NDET^Not Detected ng/mL Final     Comment:     Cutoff for a negative cannabinoid is 50 ng/mL or less.     Phencyclidine (Phencyclidine)   Date Value Ref Range Status   04/09/2020 Not Detected NDET^Not Detected ng/mL Final     Comment:     Cutoff for a negative PCP is 25 ng/mL or less.     Cocaine (Benzoylecgonine)   Date Value Ref Range Status   04/09/2020 Not Detected NDET^Not Detected ng/mL Final     Comment:     Cutoff for a negative cocaine is 150 ng/ml or less.     Methamphetamine (d-Methamphetamine)   Date Value Ref Range Status   04/09/2020 Not Detected NDET^Not Detected ng/mL Final     Comment:     Cutoff for a negative methamphetamine is 500 ng/ml or less.     Opiates (Morphine)   Date Value Ref Range Status   04/09/2020 Not Detected NDET^Not Detected ng/mL Final     Comment:     Cutoff for a negative opiate is 100 ng/ml or less.     Amphetamine (d-Amphetamine)   Date Value Ref Range Status   04/09/2020 Not Detected NDET^Not Detected ng/mL Final      Comment:     Cutoff for a negative amphetamine is 500 ng/mL or less.     Benzodiazepines (Nordiazepam)   Date Value Ref Range Status   04/09/2020 Not Detected NDET^Not Detected ng/mL Final     Comment:     Cutoff for a negative benzodiazepine is 150 ng/ml or less.     Tricyclic Antidepressants (Desipramine)   Date Value Ref Range Status   04/09/2020 Not Detected NDET^Not Detected ng/mL Final     Comment:     Cutoff for a negative tricyclic antidepressant is 300 ng/ml or less.     Methadone (Methadone)   Date Value Ref Range Status   04/09/2020 Not Detected NDET^Not Detected ng/mL Final     Comment:     Cutoff for a negative methadone is 200 ng/ml or less.     Barbiturates (Butalbital)   Date Value Ref Range Status   04/09/2020 Not Detected NDET^Not Detected ng/mL Final     Comment:     Cutoff for a negative barbituate is 200 ng/ml or less.     Oxycodone (Oxycodone)   Date Value Ref Range Status   04/09/2020 Detected, Abnormal Result (A) NDET^Not Detected ng/mL Final     Comment:     Cutoff for a positive oxycodone is greater than 100 ng/ml.  This is an unconfirmed screening result to be used for medical purposes only.   Order [id number] for confirmation or individual confirmation tests to [employer].       Propoxyphene (Norpropoxyphene)   Date Value Ref Range Status   04/09/2020 Not Detected NDET^Not Detected ng/mL Final     Comment:     Cutoff for a negative propoxyphene is 300 ng/ml or less     Buprenorphine (Buprenorphine)   Date Value Ref Range Status   04/09/2020 Not Detected NDET^Not Detected ng/mL Final     Comment:     Cutoff for a negative buprenorphine is 10 ng/ml or less        [email or web address]       checked in past 3 months?  Yes 2/27/20

## 2020-05-04 NOTE — TELEPHONE ENCOUNTER
She needs to schedule a virtual visit.  Please set up a phone visit and then I can send the refill.

## 2020-05-05 ENCOUNTER — VIRTUAL VISIT (OUTPATIENT)
Dept: INTERNAL MEDICINE | Facility: CLINIC | Age: 85
End: 2020-05-05
Payer: COMMERCIAL

## 2020-05-05 VITALS — SYSTOLIC BLOOD PRESSURE: 141 MMHG | DIASTOLIC BLOOD PRESSURE: 67 MMHG

## 2020-05-05 DIAGNOSIS — M85.80 OSTEOPENIA, UNSPECIFIED LOCATION: ICD-10-CM

## 2020-05-05 DIAGNOSIS — Z53.9 ERRONEOUS ENCOUNTER--DISREGARD: Primary | ICD-10-CM

## 2020-05-05 DIAGNOSIS — E78.5 HYPERLIPIDEMIA LDL GOAL <100: ICD-10-CM

## 2020-05-05 DIAGNOSIS — I10 BENIGN ESSENTIAL HYPERTENSION: ICD-10-CM

## 2020-05-05 DIAGNOSIS — J44.9 CHRONIC OBSTRUCTIVE PULMONARY DISEASE, UNSPECIFIED COPD TYPE (H): ICD-10-CM

## 2020-05-05 DIAGNOSIS — E11.9 TYPE 2 DIABETES MELLITUS WITHOUT COMPLICATION, WITHOUT LONG-TERM CURRENT USE OF INSULIN (H): ICD-10-CM

## 2020-05-05 RX ORDER — GLIPIZIDE 5 MG/1
5 TABLET ORAL
Qty: 90 TABLET | Refills: 3 | Status: CANCELLED | OUTPATIENT
Start: 2020-05-05

## 2020-05-05 RX ORDER — ALENDRONATE SODIUM 70 MG/1
TABLET ORAL
Qty: 12 TABLET | Refills: 3 | Status: CANCELLED | OUTPATIENT
Start: 2020-05-05

## 2020-05-05 RX ORDER — LISINOPRIL 40 MG/1
40 TABLET ORAL DAILY
Qty: 90 TABLET | Refills: 3 | Status: CANCELLED | OUTPATIENT
Start: 2020-05-05

## 2020-05-05 RX ORDER — AMLODIPINE BESYLATE 2.5 MG/1
2.5 TABLET ORAL DAILY
Qty: 90 TABLET | Refills: 1 | Status: CANCELLED | OUTPATIENT
Start: 2020-05-05

## 2020-05-05 RX ORDER — TIOTROPIUM BROMIDE 18 UG/1
18 CAPSULE ORAL; RESPIRATORY (INHALATION) DAILY
Qty: 90 CAPSULE | Refills: 3 | Status: CANCELLED | OUTPATIENT
Start: 2020-05-05

## 2020-05-05 RX ORDER — OXYCODONE HYDROCHLORIDE 5 MG/1
TABLET ORAL
Qty: 150 TABLET | Refills: 0 | Status: SHIPPED | OUTPATIENT
Start: 2020-05-05 | End: 2020-06-12

## 2020-05-05 RX ORDER — PRAVASTATIN SODIUM 40 MG
40 TABLET ORAL DAILY
Refills: 3 | COMMUNITY
Start: 2019-07-17 | End: 2020-05-05

## 2020-05-05 RX ORDER — ROSUVASTATIN CALCIUM 5 MG/1
5 TABLET, COATED ORAL DAILY
Qty: 30 TABLET | Refills: 6 | Status: CANCELLED | OUTPATIENT
Start: 2020-05-05

## 2020-05-05 NOTE — TELEPHONE ENCOUNTER
Call to patient assisted in scheduling phone visit. Please address during office visit.     Next 5 appointments (look out 90 days)    May 05, 2020  3:20 PM CDT  [facility] with [facility] MD  [facility] [address]  [facility] [id number] [phone]

## 2020-05-05 NOTE — TELEPHONE ENCOUNTER
She had actually had a virtual visit last month, this was not documented by the RN who cued up the medication.  I canceled her appointment and sent refill.

## 2020-06-05 DIAGNOSIS — M48.062 SPINAL STENOSIS OF LUMBAR REGION WITH NEUROGENIC CLAUDICATION: ICD-10-CM

## 2020-06-10 ENCOUNTER — VIRTUAL VISIT (OUTPATIENT)
Dept: INTERNAL MEDICINE | Facility: CLINIC | Age: 85
End: 2020-06-10
Payer: COMMERCIAL

## 2020-06-10 DIAGNOSIS — M25.562 LEFT KNEE PAIN, UNSPECIFIED CHRONICITY: ICD-10-CM

## 2020-06-10 DIAGNOSIS — G56.02 CARPAL TUNNEL SYNDROME OF LEFT WRIST: Primary | ICD-10-CM

## 2020-06-10 PROCEDURE — 99214 OFFICE O/P EST MOD 30 MIN: CPT | Mod: 95 | Performed by: INTERNAL MEDICINE

## 2020-06-10 RX ORDER — PREDNISONE 10 MG/1
TABLET ORAL
Qty: 21 TABLET | Refills: 0 | Status: SHIPPED | OUTPATIENT
Start: 2020-06-10 | End: 2020-06-26

## 2020-06-10 NOTE — PROGRESS NOTES
"Celeste Chacko is a 89 year old female who is being evaluated via a billable telephone visit.      The patient has been notified of following:     \"This telephone visit will be conducted via a call between you and your physician/provider. We have found that certain health care needs can be provided without the need for a physical exam.  This service lets us provide the care you need with a short phone conversation.  If a prescription is necessary we can send it directly to your pharmacy.  If lab work is needed we can place an order for that and you can then stop by our lab to have the test done at a later time.    Telephone visits are billed at different rates depending on your insurance coverage. During this emergency period, for some insurers they may be billed the same as an in-person visit.  Please reach out to your insurance provider with any questions.    If during the course of the call the physician/provider feels a telephone visit is not appropriate, you will not be charged for this service.\"    Patient has given verbal consent for Telephone visit?  Yes    What phone number would you like to be contacted at? [phone]    How would you like to obtain your AVS? Mail a copy    Subjective     Celeste Chacko is a 89 year old female who presents via phone visit today for the following health issues:    HPI     1.  Complaints of more persistent tingling of the left hand: She has had issues with carpal tunnel symptoms of both hands for approximately 5 years.  She wears braces at night which had been controlling the symptoms until about 10 days ago she started to have some tingling symptoms in her left hand that persist during the day.  This is been involving fingertips on the palm side of the hand only, not into the palms, not the back of the hand.  She has been wearing the brace as much as she can during the day for this full 10 days without any significant improvement.    2.  Left knee soreness: She reports " for the last couple weeks her left knee has started to have some swelling of the outer part of the knee, it is been more sore, stiff.  This is led to some soreness and mild swelling in the lower leg on the left side.  She is not recall any injury.        Patient Active Problem List   Diagnosis     Disorder of bone and cartilage     COPD, severe (H)     Spinal stenosis of lumbar region with neurogenic claudication     Type 2 diabetes mellitus with complication, without long-term current use of insulin (H)     HYPERLIPIDEMIA LDL GOAL <100     History of basal cell carcinoma     Controlled substance agreement signed     Chronic pain syndrome     Narcotic dependence (H)     Benign essential hypertension     Lumbar radiculopathy     CKD (chronic kidney disease) stage 1, GFR 90 ml/min or greater     Current Outpatient Medications   Medication Sig Dispense Refill     Acetaminophen (TYLENOL PO) Take 325 mg by mouth 6 times daily Patient takes TID with Oxycodone.        alendronate (FOSAMAX) 70 MG tablet TAKE 1 TABLET (70 MG) BY MOUTH EVERY 7 DAYS 12 tablet 3     amLODIPine (NORVASC) 2.5 MG tablet TAKE 1 TABLET BY MOUTH EVERY DAY 90 tablet 1     ASPIRIN EC PO Take 81 mg by mouth daily       Calcium Polycarbophil (FIBERCON PO) Take 1 tablet by mouth once , one in the morning and one in the evening       calcium-vitamin D (CALTRATE) 600-400 MG-UNIT per tablet Take 1 tablet by mouth daily 1200mg   1000 mg of vitamin d       fluticasone-salmeterol (ADVAIR DISKUS) 500-50 MCG/DOSE inhaler Inhale 1 puff into the lungs 2 times daily 3 Inhaler 3     glipiZIDE (GLUCOTROL) 5 MG tablet TAKE 1 TABLET (5 MG) BY MOUTH EVERY MORNING (BEFORE BREAKFAST) 90 tablet 3     Glucosamine-Chondroitin (GLUCOSAMINE CHONDROITIN COMPLX) 500-250 MG CAPS Take 1 tablet by mouth 2 times daily       lisinopril (ZESTRIL) 40 MG tablet Take 1 tablet (40 mg) by mouth daily 90 tablet 3     magnesium oxide 400 MG CAPS        Multiple Vitamins-Minerals (MULTIVITAMIN  "ADULT PO) Take 1 tablet by mouth daily       NONFORMULARY Natra sleeping med takes 1 at night.       oxyCODONE (ROXICODONE) 5 MG tablet May take 5 tabs per day 150 tablet 0     predniSONE (DELTASONE) 10 MG tablet 2 po daily x7 days then 1 po daily x7 days 21 tablet 0     PROAIR  (90 Base) MCG/ACT inhaler INHALE 2 PUFFS BY MOUTH EVERY 6 HOURS AS NEEDED FOR WHEEZE OR FOR SHORTNESS OF BREATH 8.5 Inhaler 3     rosuvastatin (CRESTOR) 5 MG tablet Take 1 tablet (5 mg) by mouth daily 30 tablet 6     senna-docusate (SENOKOT-S;PERICOLACE) 8.6-50 MG per tablet Take 1 tablet by mouth 2 times daily Reported on 2017       tiotropium (SPIRIVA HANDIHALER) 18 MCG inhaled capsule Inhale 1 capsule (18 mcg) into the lungs daily 90 capsule 3      Social History     Tobacco Use     Smoking status: Former Smoker     Packs/day: 0.50     Years: 54.00     Pack years: 27.00     Types: Cigarettes     Last attempt to quit: 2000     Years since quittin.1     Smokeless tobacco: Never Used     Tobacco comment: using nicotine gum   Substance Use Topics     Alcohol use: Yes     Alcohol/week: 0.8 standard drinks     Types: 1 Glasses of wine per week     Comment: \"A glass of wine once a week.\"     Drug use: No        Reviewed and updated as needed this visit by Provider         Review of Systems   No fever, chills, weakness       Objective   Reported vitals:  There were no vitals taken for this visit.     PSYCH: Alert and oriented times 3; coherent speech, normal   rate and volume, able to articulate logical thoughts, able   to abstract reason, no tangential thoughts, no hallucinations   or delusions  Her affect is normal  RESP: No cough, no audible wheezing, able to talk in full sentences  I had her rub her left hand fingertips and she does report that the fifth finger is not associated with any dysesthesia but the other 3 fingers do have some.  Remainder of exam unable to be completed due to telephone visits          "     Assessment/Plan:  1. Carpal tunnel syndrome of left wrist  Advised her symptoms are still consistent with carpal tunnel syndrome rather than an another process.  She has been wearing the splint during the day and not having much relief so suggested a short course of oral prednisone to see if we can reduce some of the inflammation and improve her symptoms.  If that does not help, the next step might be to consider a steroid injection and suggest she follow-up with orthopedics if not improving.  Continue wearing the splint as much as she can during the day.  - predniSONE (DELTASONE) 10 MG tablet; 2 po daily x7 days then 1 po daily x7 days  Dispense: 21 tablet; Refill: 0    2. Left knee pain, unspecified chronicity  She seems to have some acute inflammation, this is not likely to be an infection without fever or more severe pain.  Advised to prednisone might help this as well, otherwise she should follow-up  with orthopedics      Return in about 4 months (around 10/15/2020) for Diabetes, see me a week after lab.      Phone call duration:  12 minutes    Emily Marie MD

## 2020-06-12 RX ORDER — OXYCODONE HYDROCHLORIDE 5 MG/1
TABLET ORAL
Qty: 150 TABLET | Refills: 0 | Status: SHIPPED | OUTPATIENT
Start: 2020-06-12 | End: 2020-07-11

## 2020-06-12 NOTE — TELEPHONE ENCOUNTER
Patient requested medication on 6/5/2020, please send ASAP, patient very upset it is not ready yet.

## 2020-06-17 ENCOUNTER — APPOINTMENT (RX ONLY)
Dept: URBAN - METROPOLITAN AREA CLINIC 52 | Facility: CLINIC | Age: 85
Setting detail: DERMATOLOGY
End: 2020-06-17

## 2020-06-17 ENCOUNTER — APPOINTMENT (RX ONLY)
Dept: URBAN - METROPOLITAN AREA CLINIC 51 | Facility: CLINIC | Age: 85
Setting detail: DERMATOLOGY
End: 2020-06-17

## 2020-06-17 DIAGNOSIS — L29.8 OTHER PRURITUS: ICD-10-CM

## 2020-06-17 DIAGNOSIS — L82.1 OTHER SEBORRHEIC KERATOSIS: ICD-10-CM

## 2020-06-17 DIAGNOSIS — L29.89 OTHER PRURITUS: ICD-10-CM

## 2020-06-17 DIAGNOSIS — L23.89 ALLERGIC CONTACT DERMATITIS DUE TO OTHER AGENTS: ICD-10-CM

## 2020-06-17 PROBLEM — I10 ESSENTIAL (PRIMARY) HYPERTENSION: Status: ACTIVE | Noted: 2020-06-17

## 2020-06-17 PROBLEM — I48.91 UNSPECIFIED ATRIAL FIBRILLATION: Status: ACTIVE | Noted: 2020-06-17

## 2020-06-17 PROBLEM — H91.90 UNSPECIFIED HEARING LOSS, UNSPECIFIED EAR: Status: ACTIVE | Noted: 2020-06-17

## 2020-06-17 PROBLEM — I25.10 ATHEROSCLEROTIC HEART DISEASE OF NATIVE CORONARY ARTERY WITHOUT ANGINA PECTORIS: Status: ACTIVE | Noted: 2020-06-17

## 2020-06-17 PROCEDURE — ? PRESCRIPTION MEDICATION MANAGEMENT

## 2020-06-17 PROCEDURE — 99213 OFFICE O/P EST LOW 20 MIN: CPT

## 2020-06-17 PROCEDURE — ? PRESCRIPTION

## 2020-06-17 PROCEDURE — ? COUNSELING

## 2020-06-17 RX ORDER — TRIAMCINOLONE ACETONIDE 1 MG/G
0.1% CREAM TOPICAL BID
Qty: 80 | Refills: 0 | Status: ERX | COMMUNITY
Start: 2020-06-17

## 2020-06-17 RX ADMIN — TRIAMCINOLONE ACETONIDE 0.1%: 1 CREAM TOPICAL at 00:00

## 2020-06-17 ASSESSMENT — LOCATION DETAILED DESCRIPTION DERM
LOCATION DETAILED: LEFT LOWER CUTANEOUS LIP
LOCATION DETAILED: LEFT INFERIOR LATERAL MALAR CHEEK
LOCATION DETAILED: LEFT INFERIOR MEDIAL BUCCAL CHEEK
LOCATION DETAILED: LEFT PROXIMAL DORSAL FOREARM
LOCATION DETAILED: LEFT PROXIMAL DORSAL FOREARM
LOCATION DETAILED: RIGHT PROXIMAL DORSAL FOREARM
LOCATION DETAILED: LEFT DISTAL PRETIBIAL REGION
LOCATION DETAILED: LEFT LOWER CUTANEOUS LIP
LOCATION DETAILED: RIGHT PROXIMAL PRETIBIAL REGION
LOCATION DETAILED: SUBMENTAL CHIN
LOCATION DETAILED: LEFT INFERIOR MEDIAL BUCCAL CHEEK
LOCATION DETAILED: SUBMENTAL CHIN
LOCATION DETAILED: RIGHT PROXIMAL DORSAL FOREARM
LOCATION DETAILED: LEFT CHIN
LOCATION DETAILED: LEFT CHIN
LOCATION DETAILED: LEFT PROXIMAL PRETIBIAL REGION
LOCATION DETAILED: LEFT INFERIOR LATERAL MALAR CHEEK
LOCATION DETAILED: RIGHT PROXIMAL PRETIBIAL REGION
LOCATION DETAILED: LEFT PROXIMAL PRETIBIAL REGION
LOCATION DETAILED: LEFT DISTAL PRETIBIAL REGION

## 2020-06-17 ASSESSMENT — LOCATION ZONE DERM
LOCATION ZONE: LEG
LOCATION ZONE: LIP
LOCATION ZONE: ARM
LOCATION ZONE: FACE
LOCATION ZONE: ARM
LOCATION ZONE: LIP
LOCATION ZONE: FACE
LOCATION ZONE: LEG

## 2020-06-17 ASSESSMENT — LOCATION SIMPLE DESCRIPTION DERM
LOCATION SIMPLE: LEFT PRETIBIAL REGION
LOCATION SIMPLE: LEFT FOREARM
LOCATION SIMPLE: RIGHT FOREARM
LOCATION SIMPLE: LEFT PRETIBIAL REGION
LOCATION SIMPLE: LEFT CHEEK
LOCATION SIMPLE: RIGHT PRETIBIAL REGION
LOCATION SIMPLE: LEFT LIP
LOCATION SIMPLE: SUBMENTAL CHIN
LOCATION SIMPLE: CHIN
LOCATION SIMPLE: RIGHT FOREARM
LOCATION SIMPLE: LEFT CHEEK
LOCATION SIMPLE: CHIN
LOCATION SIMPLE: LEFT FOREARM
LOCATION SIMPLE: LEFT LIP
LOCATION SIMPLE: SUBMENTAL CHIN
LOCATION SIMPLE: RIGHT PRETIBIAL REGION

## 2020-06-17 NOTE — PROCEDURE: PRESCRIPTION MEDICATION MANAGEMENT
Samples Given: Dove soap
Render In Strict Bullet Format?: No
Detail Level: Zone
Initiate Treatment: Triamcinolone (for worse areas bid itching)
Continue Regimen: Glycerin compound (refilled today)

## 2020-06-17 NOTE — PROCEDURE: PRESCRIPTION MEDICATION MANAGEMENT
Initiate Treatment: Triamcinolone (for worse areas bid itching)
Continue Regimen: Glycerin compound (refilled today)
Samples Given: Dove soap
Render In Strict Bullet Format?: No
Detail Level: Zone

## 2020-06-24 ENCOUNTER — APPOINTMENT (RX ONLY)
Dept: URBAN - METROPOLITAN AREA CLINIC 52 | Facility: CLINIC | Age: 85
Setting detail: DERMATOLOGY
End: 2020-06-24

## 2020-06-24 ENCOUNTER — APPOINTMENT (RX ONLY)
Dept: URBAN - METROPOLITAN AREA CLINIC 51 | Facility: CLINIC | Age: 85
Setting detail: DERMATOLOGY
End: 2020-06-24

## 2020-06-24 DIAGNOSIS — L23.89 ALLERGIC CONTACT DERMATITIS DUE TO OTHER AGENTS: ICD-10-CM | Status: IMPROVED

## 2020-06-24 DIAGNOSIS — L29.8 OTHER PRURITUS: ICD-10-CM | Status: IMPROVED

## 2020-06-24 DIAGNOSIS — L29.89 OTHER PRURITUS: ICD-10-CM | Status: IMPROVED

## 2020-06-24 PROBLEM — L29.9 PRURITUS, UNSPECIFIED: Status: ACTIVE | Noted: 2020-06-24

## 2020-06-24 PROBLEM — I25.10 ATHEROSCLEROTIC HEART DISEASE OF NATIVE CORONARY ARTERY WITHOUT ANGINA PECTORIS: Status: ACTIVE | Noted: 2020-06-24

## 2020-06-24 PROBLEM — M12.9 ARTHROPATHY, UNSPECIFIED: Status: ACTIVE | Noted: 2020-06-24

## 2020-06-24 PROBLEM — I10 ESSENTIAL (PRIMARY) HYPERTENSION: Status: ACTIVE | Noted: 2020-06-24

## 2020-06-24 PROCEDURE — ? COUNSELING

## 2020-06-24 PROCEDURE — 99213 OFFICE O/P EST LOW 20 MIN: CPT

## 2020-06-24 PROCEDURE — ? PRESCRIPTION MEDICATION MANAGEMENT

## 2020-06-24 ASSESSMENT — LOCATION DETAILED DESCRIPTION DERM
LOCATION DETAILED: RIGHT PROXIMAL PRETIBIAL REGION
LOCATION DETAILED: LEFT PROXIMAL PRETIBIAL REGION
LOCATION DETAILED: LEFT PROXIMAL PRETIBIAL REGION
LOCATION DETAILED: SUBMENTAL CHIN
LOCATION DETAILED: LEFT DISTAL PRETIBIAL REGION
LOCATION DETAILED: RIGHT PROXIMAL DORSAL FOREARM
LOCATION DETAILED: LEFT PROXIMAL DORSAL FOREARM
LOCATION DETAILED: LEFT DISTAL PRETIBIAL REGION
LOCATION DETAILED: RIGHT PROXIMAL DORSAL FOREARM
LOCATION DETAILED: LEFT PROXIMAL DORSAL FOREARM
LOCATION DETAILED: LEFT CHIN
LOCATION DETAILED: RIGHT PROXIMAL PRETIBIAL REGION
LOCATION DETAILED: SUBMENTAL CHIN
LOCATION DETAILED: LEFT CHIN

## 2020-06-24 ASSESSMENT — LOCATION SIMPLE DESCRIPTION DERM
LOCATION SIMPLE: LEFT FOREARM
LOCATION SIMPLE: LEFT FOREARM
LOCATION SIMPLE: LEFT PRETIBIAL REGION
LOCATION SIMPLE: RIGHT FOREARM
LOCATION SIMPLE: SUBMENTAL CHIN
LOCATION SIMPLE: CHIN
LOCATION SIMPLE: SUBMENTAL CHIN
LOCATION SIMPLE: RIGHT PRETIBIAL REGION
LOCATION SIMPLE: LEFT PRETIBIAL REGION
LOCATION SIMPLE: CHIN
LOCATION SIMPLE: RIGHT PRETIBIAL REGION
LOCATION SIMPLE: RIGHT FOREARM

## 2020-06-24 ASSESSMENT — LOCATION ZONE DERM
LOCATION ZONE: LEG
LOCATION ZONE: FACE
LOCATION ZONE: FACE
LOCATION ZONE: LEG
LOCATION ZONE: ARM
LOCATION ZONE: ARM

## 2020-06-24 NOTE — PROCEDURE: PRESCRIPTION MEDICATION MANAGEMENT
Samples Given: Dove soap
Continue Regimen: Glycerin compound, triamcinolone, atarax prn
Detail Level: Zone
Render In Strict Bullet Format?: No

## 2020-06-24 NOTE — PROCEDURE: PRESCRIPTION MEDICATION MANAGEMENT
Detail Level: Zone
Continue Regimen: Glycerin compound, triamcinolone, atarax prn
Render In Strict Bullet Format?: No
Samples Given: Dove soap

## 2020-06-26 ENCOUNTER — OFFICE VISIT (OUTPATIENT)
Dept: INTERNAL MEDICINE | Facility: CLINIC | Age: 85
End: 2020-06-26
Payer: COMMERCIAL

## 2020-06-26 VITALS
OXYGEN SATURATION: 98 % | HEART RATE: 82 BPM | BODY MASS INDEX: 25.15 KG/M2 | WEIGHT: 142 LBS | SYSTOLIC BLOOD PRESSURE: 168 MMHG | RESPIRATION RATE: 16 BRPM | DIASTOLIC BLOOD PRESSURE: 83 MMHG

## 2020-06-26 DIAGNOSIS — I10 HTN, GOAL BELOW 140/90: Primary | ICD-10-CM

## 2020-06-26 DIAGNOSIS — R60.0 BILATERAL LEG EDEMA: ICD-10-CM

## 2020-06-26 PROCEDURE — 99214 OFFICE O/P EST MOD 30 MIN: CPT | Performed by: INTERNAL MEDICINE

## 2020-06-26 RX ORDER — TRIAMTERENE AND HYDROCHLOROTHIAZIDE 37.5; 25 MG/1; MG/1
1 CAPSULE ORAL DAILY
Qty: 30 CAPSULE | Refills: 1 | Status: SHIPPED | OUTPATIENT
Start: 2020-06-26 | End: 2020-08-24

## 2020-06-26 NOTE — PATIENT INSTRUCTIONS
Plan:  1. triamterene-hydrochlorthiazide 1 capsule daily  2. Low salt diet  3. Keep legs elevated  4. Follow up with dr Marie in 2 weeks

## 2020-06-26 NOTE — PROGRESS NOTES
Patient's instructions / PLAN:                                                        Plan:  1. triamterene-hydrochlorthiazide 1 capsule daily  2. Low salt diet  3. Keep legs elevated  4. Follow up with dr Marie in 2 weeks        ASSESSMENT & PLAN:                                                      (I10) HTN, goal below 140/90  (primary encounter diagnosis)  Comment: Not controlled   Plan: triamterene-HCTZ (DYAZIDE) 37.5-25 MG capsule            (R60.0) Bilateral leg edema  Comment: We discussed about the new meds, advantages and potential side effects. The patient will read also the info from the pharmacy and call back if questions.   Plan: triamterene-HCTZ (DYAZIDE) 37.5-25 MG capsule               Chief Complaint:                                                        Leg edema    SUBJECTIVE:                                                    History of present illness     Leg edema  -- both ankles, L > R  -- L knee replacement   -- x 1 week  -- no salt diet, but she doesn't think she adds salt.  --  She eats frozen dinners twice a week  -- no worsening of the chr SOB ( sec COPD)  -- normal creat April 2020    ROS:                                                      ROS: negative for fever, chills, cough, wheezes, chest pain,  vomiting, abdominal pain, Pos for leg swelling and chr SOB    A 10-point review of systems was obtained.  Those pertinent are above and in the in the Subjective section.  The rest of the systems are negative.        OBJECTIVE:                                                    Physical Exam :    Blood pressure (!) 168/83, pulse 82, resp. rate 16, weight 64.4 kg (142 lb), SpO2 98 %, not currently breastfeeding.   NAD, appears comfortable  Skin: no rashes     Neck: supple, no JVD, No thyroidmegaly. Lymph nodes nonpalpable cervical and supraclavicular.  Chest: clear to auscultation bilaterally, good respiratory effort  Heart: S1 S2, RRR, no mgr appreciated  Abdomen: soft, not tender, no  hepatosplenomegaly or masses appreciated, no abdominal bruit, present bowel sounds  Extremities: + 2 edema,   Neurologic: A, Ox3, no focal signs appreciated    PMHx: reviewed  Past Medical History:   Diagnosis Date     Basal cell carcinoma      Chronic airway obstruction, not elsewhere classified      Complete rupture of rotator cuff      Disorder of bone and cartilage, unspecified      History of blood transfusion      Mixed hyperlipidemia 4/00     Osteoarthritis      Personal history of other malignant neoplasm of skin      Spinal stenosis      Type II or unspecified type diabetes mellitus without mention of complication, not stated as uncontrolled       PSHx: reviewed  Past Surgical History:   Procedure Laterality Date     ARTHROPLASTY KNEE Left 9/15/2014    Procedure: ARTHROPLASTY KNEE;  Surgeon: Carlos Alberto Kaminski MD;  Location: [facility] OR     C NONSPECIFIC PROCEDURE      Breast biopsies      C NONSPECIFIC PROCEDURE      Pilonidal sinus repair      C NONSPECIFIC PROCEDURE      T & A      C NONSPECIFIC PROCEDURE  5/02    Shoulder arthropasty     C NONSPECIFIC PROCEDURE  5/07    Right shoulder replacement, RCT repair     HEAD & NECK SURGERY  05/14/15    forehead-skin cancer removed     INJECT EPIDURAL LUMBAR / SACRAL SINGLE Left 7/14/2016    Procedure: INJECT EPIDURAL LUMBAR / SACRAL SINGLE;  Surgeon: Luisito New MD;  Location: [facility]     INJECT SACROILIAC JOINT N/A 12/1/2015    Procedure: INJECT SACROILIAC JOINT;  Surgeon: Luisito New MD;  Location: [facility]     INJECT SACROILIAC JOINT Bilateral 5/19/2016    Procedure: INJECT SACROILIAC JOINT;  Surgeon: Luisito New MD;  Location: [facility]     INJECT SACROILIAC JOINT Bilateral 11/25/2016    Procedure: INJECT SACROILIAC JOINT;  Surgeon: Elmira Bennett MD;  Location: [facility]     INJECT SACROILIAC JOINT Bilateral 4/25/2017    Procedure: INJECT SACROILIAC JOINT;  Bilateral Sacroiliac Joint Injection   Injection of local anesthetic and steroid into area around spine  for pain relief;  Surgeon: Alvaro Holland MD;  Location: [facility]     INJECT SACROILIAC JOINT Bilateral 7/28/2017    Procedure: INJECT SACROILIAC JOINT;  Bilateral Sacroiliac Joint Injection;  Surgeon: Elmira Bennett MD;  Location: [facility]     INJECT SACROILIAC JOINT Bilateral 11/10/2017    Procedure: INJECT SACROILIAC JOINT;  Bilateral Sacroiliac Joint Injection;  Surgeon: Elmira Bennett MD;  Location: [facility]        Meds: reviewed  Current Outpatient Medications   Medication Sig Dispense Refill     Acetaminophen (TYLENOL PO) Take 325 mg by mouth 6 times daily Patient takes TID with Oxycodone.        alendronate (FOSAMAX) 70 MG tablet TAKE 1 TABLET (70 MG) BY MOUTH EVERY 7 DAYS 12 tablet 3     amLODIPine (NORVASC) 2.5 MG tablet TAKE 1 TABLET BY MOUTH EVERY DAY 90 tablet 1     ASPIRIN EC PO Take 81 mg by mouth daily       Calcium Polycarbophil (FIBERCON PO) Take 1 tablet by mouth once , one in the morning and one in the evening       calcium-vitamin D (CALTRATE) 600-400 MG-UNIT per tablet Take 1 tablet by mouth daily 1200mg   1000 mg of vitamin d       fluticasone-salmeterol (ADVAIR DISKUS) 500-50 MCG/DOSE inhaler Inhale 1 puff into the lungs 2 times daily 3 Inhaler 3     glipiZIDE (GLUCOTROL) 5 MG tablet TAKE 1 TABLET (5 MG) BY MOUTH EVERY MORNING (BEFORE BREAKFAST) 90 tablet 3     Glucosamine-Chondroitin (GLUCOSAMINE CHONDROITIN COMPLX) 500-250 MG CAPS Take 1 tablet by mouth 2 times daily       lisinopril (ZESTRIL) 40 MG tablet Take 1 tablet (40 mg) by mouth daily 90 tablet 3     magnesium oxide 400 MG CAPS        Multiple Vitamins-Minerals (MULTIVITAMIN ADULT PO) Take 1 tablet by mouth daily       NONFORMULARY Natra sleeping med takes 1 at night.       oxyCODONE (ROXICODONE) 5 MG tablet May take 5 tabs per day 150 tablet 0     PROAIR  (90 Base) MCG/ACT inhaler INHALE 2 PUFFS BY MOUTH EVERY 6 HOURS AS NEEDED FOR WHEEZE OR FOR SHORTNESS OF BREATH 8.5 Inhaler 3     rosuvastatin (CRESTOR) 5 MG tablet Take 1  tablet (5 mg) by mouth daily 30 tablet 6     senna-docusate (SENOKOT-S;PERICOLACE) 8.6-50 MG per tablet Take 1 tablet by mouth 2 times daily Reported on 4/12/2017       tiotropium (SPIRIVA HANDIHALER) 18 MCG inhaled capsule Inhale 1 capsule (18 mcg) into the lungs daily 90 capsule 3       Soc Hx: reviewed  Fam Hx: reviewed          Elizabeth Valentin MD  Internal Medicine

## 2020-07-10 ENCOUNTER — TELEPHONE (OUTPATIENT)
Dept: INTERNAL MEDICINE | Facility: CLINIC | Age: 85
End: 2020-07-10

## 2020-07-10 ENCOUNTER — OFFICE VISIT (OUTPATIENT)
Dept: INTERNAL MEDICINE | Facility: CLINIC | Age: 85
End: 2020-07-10
Payer: COMMERCIAL

## 2020-07-10 DIAGNOSIS — I83.92 VARICOSE VEINS OF LEFT LOWER EXTREMITY, UNSPECIFIED WHETHER COMPLICATED: ICD-10-CM

## 2020-07-10 DIAGNOSIS — N89.8 VAGINAL ITCHING: ICD-10-CM

## 2020-07-10 DIAGNOSIS — K59.03 DRUG-INDUCED CONSTIPATION: ICD-10-CM

## 2020-07-10 DIAGNOSIS — R60.9 EDEMA, UNSPECIFIED TYPE: ICD-10-CM

## 2020-07-10 DIAGNOSIS — M48.062 SPINAL STENOSIS OF LUMBAR REGION WITH NEUROGENIC CLAUDICATION: ICD-10-CM

## 2020-07-10 DIAGNOSIS — I10 BENIGN ESSENTIAL HYPERTENSION: Primary | ICD-10-CM

## 2020-07-10 DIAGNOSIS — J44.9 COPD, SEVERE (H): ICD-10-CM

## 2020-07-10 DIAGNOSIS — N95.2 ATROPHIC VAGINITIS: Primary | ICD-10-CM

## 2020-07-10 PROCEDURE — 99214 OFFICE O/P EST MOD 30 MIN: CPT | Performed by: INTERNAL MEDICINE

## 2020-07-10 ASSESSMENT — MIFFLIN-ST. JEOR: SCORE: 1016.12

## 2020-07-10 NOTE — NURSING NOTE
"/68   Pulse 101   Temp 99.3  F (37.4  C) (Oral)   Resp 12   Ht 1.594 m (5' 2.75\")   Wt 62.6 kg (138 lb)   SpO2 97%   Breastfeeding No   BMI 24.64 kg/m      "

## 2020-07-10 NOTE — PATIENT INSTRUCTIONS
Stool softener 2 twice a day.   Miralax 1/2 capful at bedtime.     Call some 4-5 BP readings in 2-3 weeks.      Call back with the name of the cream for itching.

## 2020-07-10 NOTE — TELEPHONE ENCOUNTER
Patient calling and she was told to call back with a medication that Dr Marie was going order for her Estradiol 0.6 % cream

## 2020-07-10 NOTE — PROGRESS NOTES
Subjective     Celeste Chacko is a 89 year old female who presents to clinic today for the following health issues:    HPI     She had seen 1 of my colleagues due to edema and also had persistent elevated blood pressures about 2 weeks ago.  She was started on triamterene hydrochlorothiazide and scheduled to see me for a follow-up today.  She reports she is not having any side effects from the diuretic.  It has helped her edema.    Hypertension Follow-up  She reports her home blood pressure readings have been variable, the last couple days she has had readings of 154/70, 137/71, 166/80.  These are variable times a day.    Do you check your blood pressure regularly outside of the clinic? Yes     Are you following a low salt diet? Yes    Are your blood pressures ever more than 140 on the top number (systolic) OR more   than 90 on the bottom number (diastolic), for example 140/90? Yes      How many servings of fruits and vegetables do you eat daily?  4 or more    On average, how many sweetened beverages do you drink each day (Examples: soda, juice, sweet tea, etc.  Do NOT count diet or artificially sweetened beverages)?   0    How many days per week do you exercise enough to make your heart beat faster? 3 or less    How many minutes a day do you exercise enough to make your heart beat faster? 10 - 19    How many days per week do you miss taking your medication? 0    Other concerns:  1.  She has had some soreness in the left lateral lower leg for a while and did see orthopedics.  They checked out her knee and felt it was okay and thought it was due to varicose veins.  They recommended she wear compression stockings.  She tried putting on ARSH stockings but was not really able to get it off so she is wondering if there is some other type of stocking she could wear.  The vein is feeling better, particularly since starting the diuretic.    2.  She has had some vaginal itching.  This is been a long time and she saw OB about a  year ago and was given a cream.  She is not sure what it is but does have it at home.  She is tried using Vaseline and that has not helped.    3.  She has some issues with her bowels with hard stool in the morning.  She is having about 2-3 bowel movements a day and after the first 1 it seems to go okay.  She is doing 3 soft stool softeners 3 times a day.  She feels this is been since she has been on her narcotic.    Other problems:  1.  Chronic pain/narcotic dependence: She is stable with her medication use, there have been no concerns, her pain is stable.  2.  COPD: She reports her breathing is stable.    Patient Active Problem List   Diagnosis     Disorder of bone and cartilage     COPD, severe (H)     Spinal stenosis of lumbar region with neurogenic claudication     Type 2 diabetes mellitus with complication, without long-term current use of insulin (H)     HYPERLIPIDEMIA LDL GOAL <100     History of basal cell carcinoma     Controlled substance agreement signed     Chronic pain syndrome     Narcotic dependence (H)     Benign essential hypertension     Lumbar radiculopathy     CKD (chronic kidney disease) stage 1, GFR 90 ml/min or greater     Current Outpatient Medications   Medication Sig Dispense Refill     Acetaminophen (TYLENOL PO) Take 325 mg by mouth 6 times daily Patient takes TID with Oxycodone.        alendronate (FOSAMAX) 70 MG tablet TAKE 1 TABLET (70 MG) BY MOUTH EVERY 7 DAYS 12 tablet 3     amLODIPine (NORVASC) 2.5 MG tablet TAKE 1 TABLET BY MOUTH EVERY DAY 90 tablet 1     ASPIRIN EC PO Take 81 mg by mouth daily       Calcium Polycarbophil (FIBERCON PO) Take 1 tablet by mouth once , one in the morning and one in the evening       calcium-vitamin D (CALTRATE) 600-400 MG-UNIT per tablet Take 1 tablet by mouth daily 1200mg   1000 mg of vitamin d       fluticasone-salmeterol (ADVAIR DISKUS) 500-50 MCG/DOSE inhaler Inhale 1 puff into the lungs 2 times daily 3 Inhaler 3     glipiZIDE (GLUCOTROL) 5 MG  "tablet TAKE 1 TABLET (5 MG) BY MOUTH EVERY MORNING (BEFORE BREAKFAST) 90 tablet 3     Glucosamine-Chondroitin (GLUCOSAMINE CHONDROITIN COMPLX) 500-250 MG CAPS Take 1 tablet by mouth 2 times daily       lisinopril (ZESTRIL) 40 MG tablet Take 1 tablet (40 mg) by mouth daily 90 tablet 3     Multiple Vitamins-Minerals (MULTIVITAMIN ADULT PO) Take 1 tablet by mouth daily       NONFORMULARY Natra sleeping med takes 1 at night.       oxyCODONE (ROXICODONE) 5 MG tablet May take 5 tabs per day 150 tablet 0     PROAIR  (90 Base) MCG/ACT inhaler INHALE 2 PUFFS BY MOUTH EVERY 6 HOURS AS NEEDED FOR WHEEZE OR FOR SHORTNESS OF BREATH 8.5 Inhaler 3     rosuvastatin (CRESTOR) 5 MG tablet Take 1 tablet (5 mg) by mouth daily 30 tablet 6     senna-docusate (SENOKOT-S;PERICOLACE) 8.6-50 MG per tablet Take 1 tablet by mouth 2 times daily Reported on 2017       tiotropium (SPIRIVA HANDIHALER) 18 MCG inhaled capsule Inhale 1 capsule (18 mcg) into the lungs daily 90 capsule 3     triamterene-HCTZ (DYAZIDE) 37.5-25 MG capsule Take 1 capsule by mouth daily 30 capsule 1      Social History     Tobacco Use     Smoking status: Former Smoker     Packs/day: 0.50     Years: 54.00     Pack years: 27.00     Types: Cigarettes     Last attempt to quit: 2000     Years since quittin.2     Smokeless tobacco: Never Used     Tobacco comment: using nicotine gum   Substance Use Topics     Alcohol use: Yes     Alcohol/week: 0.8 standard drinks     Types: 1 Glasses of wine per week     Comment: \"A glass of wine once a week.\"     Drug use: No            Reviewed and updated as needed this visit by Provider         Review of Systems   No fever, chills, stable dyspnea on exertion, no chest pain, palpitations, PND, orthopnea, improvededema, no syncope, headache, abdominal pain       Objective    /58   Pulse 101   Temp 99.3  F (37.4  C) (Oral)   Resp 12   Ht 1.594 m (5' 2.75\")   Wt 62.6 kg (138 lb)   SpO2 97%   Breastfeeding No   " BMI 24.64 kg/m    Body mass index is 24.64 kg/m .  Physical Exam     Left lower leg: There is a slightly prominent vein laterally under the surface it is very mildly tender.  Trace edema in the ankles.          Assessment & Plan     1. Benign essential hypertension  Significant improvement in blood pressure on our readings today.  She has had improvement in the past few days was 1 reading under goal, the other close to goal.  Her goal is to keep blood pressure less than 150/90 and so encouraged her to continue to monitor occasionally but I would not change anything at this time.  I will have her call back with some more blood pressure readings and several weeks and probably set up a lab appointment at that time.    2. COPD, severe (H)  Stable, continue inhalers    3. Edema, unspecified type  Improved, continue diuretic which seems to be helping her blood pressure also    4. Spinal stenosis of lumbar region with neurogenic claudication  Stable, continue pain medication  - oxyCODONE (ROXICODONE) 5 MG tablet; May take 5 tabs per day  Dispense: 150 tablet; Refill: 0    5. Drug-induced constipation  Related to opiates, she is going fairly frequently but has some hard painful stool in the morning so suggest she try MiraLAX at night, suggest using just a half capful and then adjust accordingly.  She should decrease his stool softener back to 2 capsules twice a day.  We can continue working with this over the phone    6. Varicose veins of left lower extremity, unspecified whether complicated  Advised she can get some lighter compression stockings over-the-counter but also suggest elevation.  Probably this will feel better with controlling edema    7. Vaginal itching  She will check on what cream she had and call back with that information.         No follow-ups on file.    Emily Marie MD  [facility]

## 2020-07-11 VITALS
DIASTOLIC BLOOD PRESSURE: 58 MMHG | TEMPERATURE: 99.3 F | HEART RATE: 101 BPM | RESPIRATION RATE: 12 BRPM | HEIGHT: 63 IN | WEIGHT: 138 LBS | SYSTOLIC BLOOD PRESSURE: 128 MMHG | BODY MASS INDEX: 24.45 KG/M2 | OXYGEN SATURATION: 97 %

## 2020-07-11 PROBLEM — R60.9 EDEMA, UNSPECIFIED TYPE: Status: ACTIVE | Noted: 2020-07-11

## 2020-07-11 PROBLEM — I83.92 VARICOSE VEINS OF LEFT LOWER EXTREMITY, UNSPECIFIED WHETHER COMPLICATED: Status: ACTIVE | Noted: 2020-07-11

## 2020-07-11 RX ORDER — OXYCODONE HYDROCHLORIDE 5 MG/1
TABLET ORAL
Qty: 150 TABLET | Refills: 0 | Status: SHIPPED | OUTPATIENT
Start: 2020-07-11 | End: 2020-08-13

## 2020-07-11 NOTE — TELEPHONE ENCOUNTER
Since this is estrogen cream, please clarify with the patient if she was given instructions just use externally or if she was using the applicator and inserting it into the vagina, or both.

## 2020-07-19 DIAGNOSIS — R60.0 BILATERAL LEG EDEMA: ICD-10-CM

## 2020-07-19 DIAGNOSIS — I10 HTN, GOAL BELOW 140/90: ICD-10-CM

## 2020-07-21 RX ORDER — TRIAMTERENE AND HYDROCHLOROTHIAZIDE 37.5; 25 MG/1; MG/1
CAPSULE ORAL
Qty: 30 CAPSULE | Refills: 1 | OUTPATIENT
Start: 2020-07-21

## 2020-08-05 ENCOUNTER — RX ONLY (OUTPATIENT)
Age: 85
Setting detail: RX ONLY
End: 2020-08-05

## 2020-08-05 RX ORDER — HYDROXYZINE HYDROCHLORIDE 25 MG/1
25MG TABLET, FILM COATED ORAL QHS
Qty: 30 | Refills: 1 | Status: ERX

## 2020-08-13 DIAGNOSIS — M48.062 SPINAL STENOSIS OF LUMBAR REGION WITH NEUROGENIC CLAUDICATION: ICD-10-CM

## 2020-08-13 RX ORDER — OXYCODONE HYDROCHLORIDE 5 MG/1
TABLET ORAL
Qty: 150 TABLET | Refills: 0 | Status: SHIPPED | OUTPATIENT
Start: 2020-08-13 | End: 2020-09-22

## 2020-08-13 NOTE — TELEPHONE ENCOUNTER
Controlled Substance Refill Request for Oxycodone  Problem List Complete:    Yes    Last Written Prescription Date:  7/11/20  Last Fill Quantity: 150,   # refills: 0    THE MOST RECENT OFFICE VISIT MUST BE WITHIN THE PAST 3 MONTHS. AT LEAST ONE FACE TO FACE VISIT MUST OCCUR EVERY 6 MONTHS. ADDITIONAL VISITS CAN BE VIRTUAL.  (THIS STATEMENT SHOULD BE DELETED.)    Last Office Visit with [facility] primary care provider: 7/10/20    Future Office visit:   Next 5 appointments (look out 90 days)    Oct 20, 2020  2:20 PM CDT  SHORT with Emily Marie MD  [facility] ([facility]) [address]  [facility] [address]  [phone]          Controlled substance agreement:   Encounter-Level CSA - 04/21/2016:    Controlled Substance Agreement - Scan on 5/3/2016  1:00 PM: [place] Controlled Substance Agreement, 4/25/16     Patient-Level CSA:    There are no patient-level csa.         Last Urine Drug Screen: No results found for: CDAUT, No results found for: COMDAT,   Cannabinoids (74-hkt-6-carboxy-9-THC)   Date Value Ref Range Status   04/09/2020 Not Detected NDET^Not Detected ng/mL Final     Comment:     Cutoff for a negative cannabinoid is 50 ng/mL or less.     Phencyclidine (Phencyclidine)   Date Value Ref Range Status   04/09/2020 Not Detected NDET^Not Detected ng/mL Final     Comment:     Cutoff for a negative PCP is 25 ng/mL or less.     Cocaine (Benzoylecgonine)   Date Value Ref Range Status   04/09/2020 Not Detected NDET^Not Detected ng/mL Final     Comment:     Cutoff for a negative cocaine is 150 ng/ml or less.     Methamphetamine (d-Methamphetamine)   Date Value Ref Range Status   04/09/2020 Not Detected NDET^Not Detected ng/mL Final     Comment:     Cutoff for a negative methamphetamine is 500 ng/ml or less.     Opiates (Morphine)   Date Value Ref Range Status   04/09/2020 Not Detected NDET^Not Detected ng/mL Final     Comment:     Cutoff for a negative opiate is 100 ng/ml or  less.     Amphetamine (d-Amphetamine)   Date Value Ref Range Status   04/09/2020 Not Detected NDET^Not Detected ng/mL Final     Comment:     Cutoff for a negative amphetamine is 500 ng/mL or less.     Benzodiazepines (Nordiazepam)   Date Value Ref Range Status   04/09/2020 Not Detected NDET^Not Detected ng/mL Final     Comment:     Cutoff for a negative benzodiazepine is 150 ng/ml or less.     Tricyclic Antidepressants (Desipramine)   Date Value Ref Range Status   04/09/2020 Not Detected NDET^Not Detected ng/mL Final     Comment:     Cutoff for a negative tricyclic antidepressant is 300 ng/ml or less.     Methadone (Methadone)   Date Value Ref Range Status   04/09/2020 Not Detected NDET^Not Detected ng/mL Final     Comment:     Cutoff for a negative methadone is 200 ng/ml or less.     Barbiturates (Butalbital)   Date Value Ref Range Status   04/09/2020 Not Detected NDET^Not Detected ng/mL Final     Comment:     Cutoff for a negative barbituate is 200 ng/ml or less.     Oxycodone (Oxycodone)   Date Value Ref Range Status   04/09/2020 Detected, Abnormal Result (A) NDET^Not Detected ng/mL Final     Comment:     Cutoff for a positive oxycodone is greater than 100 ng/ml.  This is an unconfirmed screening result to be used for medical purposes only.   Order [id number] for confirmation or individual confirmation tests to [facility].       Propoxyphene (Norpropoxyphene)   Date Value Ref Range Status   04/09/2020 Not Detected NDET^Not Detected ng/mL Final     Comment:     Cutoff for a negative propoxyphene is 300 ng/ml or less     Buprenorphine (Buprenorphine)   Date Value Ref Range Status   04/09/2020 Not Detected NDET^Not Detected ng/mL Final     Comment:     Cutoff for a negative buprenorphine is 10 ng/ml or less        Processing:  Rx to be electronically transmitted to pharmacy by provider     [email or web address]   checked in past 3 months?  Yes checked today.  No concerns.  Oxycodone last filled on  7/11/20, #241

## 2020-08-21 DIAGNOSIS — I10 HTN, GOAL BELOW 140/90: ICD-10-CM

## 2020-08-21 DIAGNOSIS — R60.0 BILATERAL LEG EDEMA: ICD-10-CM

## 2020-08-21 NOTE — TELEPHONE ENCOUNTER
Pending Prescriptions:                       Disp   Refills    triamterene-HCTZ (DYAZIDE) 37.5-25 MG caps*30 cap*1        Sig: TAKE 1 CAPSULE BY MOUTH EVERY DAY

## 2020-08-24 RX ORDER — TRIAMTERENE AND HYDROCHLOROTHIAZIDE 37.5; 25 MG/1; MG/1
CAPSULE ORAL
Qty: 30 CAPSULE | Refills: 1 | Status: SHIPPED | OUTPATIENT
Start: 2020-08-24 | End: 2020-09-23

## 2020-08-27 ENCOUNTER — OFFICE VISIT (OUTPATIENT)
Dept: URGENT CARE | Facility: URGENT CARE | Age: 85
End: 2020-08-27
Payer: COMMERCIAL

## 2020-08-27 VITALS
BODY MASS INDEX: 25.16 KG/M2 | HEIGHT: 63 IN | HEART RATE: 85 BPM | OXYGEN SATURATION: 93 % | WEIGHT: 142 LBS | DIASTOLIC BLOOD PRESSURE: 60 MMHG | RESPIRATION RATE: 14 BRPM | TEMPERATURE: 98.3 F | SYSTOLIC BLOOD PRESSURE: 140 MMHG

## 2020-08-27 DIAGNOSIS — B35.1 ONYCHOMYCOSIS DUE TO DERMATOPHYTE: ICD-10-CM

## 2020-08-27 DIAGNOSIS — E11.8 TYPE 2 DIABETES MELLITUS WITH COMPLICATION, WITHOUT LONG-TERM CURRENT USE OF INSULIN (H): Primary | ICD-10-CM

## 2020-08-27 DIAGNOSIS — L08.9 TOE INFECTION: ICD-10-CM

## 2020-08-27 PROCEDURE — 99214 OFFICE O/P EST MOD 30 MIN: CPT | Performed by: PHYSICIAN ASSISTANT

## 2020-08-27 RX ORDER — CEPHALEXIN 500 MG/1
500 CAPSULE ORAL 3 TIMES DAILY
Qty: 30 CAPSULE | Refills: 0 | Status: SHIPPED | OUTPATIENT
Start: 2020-08-27 | End: 2020-11-03

## 2020-08-27 ASSESSMENT — MIFFLIN-ST. JEOR: SCORE: 1034.27

## 2020-08-27 NOTE — PROGRESS NOTES
"SUBJECTIVE:  Celeste Chacko is a 89 year old female who presents to the clinic today for left great toe fungal infection  Rash is painful around toe.  Location of the rash: around cuticle area.  Quality/symptoms of rash: erythematous   Symptoms are moderate and rash seems to be worsening.  Previous history of a similar rash? No  Recent exposure history: none known  Recent new medications: None  Associated symptoms include: toenail fungus.    Past Medical History:   Diagnosis Date     Basal cell carcinoma      Chronic airway obstruction, not elsewhere classified      Complete rupture of rotator cuff      Disorder of bone and cartilage, unspecified      History of blood transfusion      Mixed hyperlipidemia      Osteoarthritis      Personal history of other malignant neoplasm of skin      Spinal stenosis      Type II or unspecified type diabetes mellitus without mention of complication, not stated as uncontrolled         Allergies   Allergen Reactions     Sulfamethoxazole Anaphylaxis and Hives     Social History     Tobacco Use     Smoking status: Former Smoker     Packs/day: 0.50     Years: 54.00     Pack years: 27.00     Types: Cigarettes     Last attempt to quit: 2000     Years since quittin.3     Smokeless tobacco: Never Used     Tobacco comment: using nicotine gum   Substance Use Topics     Alcohol use: Yes     Alcohol/week: 0.8 standard drinks     Types: 1 Glasses of wine per week     Comment: \"A glass of wine once a week.\"     Family History   Problem Relation Age of Onset     Arthritis Father      Diabetes Brother      Cancer Brother         breast     Cerebrovascular Disease Brother        ROS:  CONSTITUTIONAL:NEGATIVE for fever, chills, change in weight  INTEGUMENTARY/SKIN: POSITIVE for left great toe fungus and erythema around toe  ENT/MOUTH: NEGATIVE for ear, mouth and throat problems  RESP:NEGATIVE for significant cough or SOB  CV: NEGATIVE for chest pain, palpitations or peripheral " "edema  GI: NEGATIVE for nausea, abdominal pain, heartburn, or change in bowel habits  MUSCULOSKELETAL: POSITIVE for left great toe pain  VASC: NEGATIVE for cool toe  NEURO: NEGATIVE for weakness, dizziness or paresthesias    EXAM:   BP (!) 140/60   Pulse 85   Temp 98.3  F (36.8  C) (Tympanic)   Resp 14   Ht 1.594 m (5' 2.75\")   Wt 64.4 kg (142 lb)   SpO2 93%   Breastfeeding No   BMI 25.36 kg/m    GENERAL: alert, no acute distress.  SKIN: Rash description:    Distribution: localized  Location: left great toe    Color: red,  Lesion type: macular, isolated with tenderness, swelling and induration  NECK: supple, non-tender to palpation, no adenopathy noted  RESP: lungs clear to auscultation - no rales, rhonchi or wheezes  CV: regular rates and rhythm, normal S1 S2, no murmur noted  ABDOMEN:  soft, nontender, no HSM or masses and bowel sounds normal  Extremities: no peripheral edema or tenderness, peripheral pulses normal  NEURO: Normal strength and tone, sensory exam grossly normal,  normal speech and mentation    ASSESSMENT/PLAN      ICD-10-CM    1. Type 2 diabetes mellitus with complication, without long-term current use of insulin (H)  E11.8    2. Toe infection  L08.9 cephALEXin (KEFLEX) 500 MG capsule     benzocaine (AMERICAINE) 20 % rectal ointment   3. Onychomycosis due to dermatophyte  B35.1 Orthopedic & Spine  Referral       Orders Placed This Encounter     Orthopedic & Spine  Referral     cephALEXin (KEFLEX) 500 MG capsule     benzocaine (AMERICAINE) 20 % rectal ointment       1) See today's orders.  2) Follow-up with primary clinic if not improving    "

## 2020-09-11 ENCOUNTER — OFFICE VISIT (OUTPATIENT)
Dept: PODIATRY | Facility: CLINIC | Age: 85
End: 2020-09-11
Payer: COMMERCIAL

## 2020-09-11 VITALS
DIASTOLIC BLOOD PRESSURE: 62 MMHG | HEIGHT: 63 IN | SYSTOLIC BLOOD PRESSURE: 130 MMHG | WEIGHT: 142 LBS | BODY MASS INDEX: 25.16 KG/M2

## 2020-09-11 DIAGNOSIS — B35.1 ONYCHOMYCOSIS DUE TO DERMATOPHYTE: ICD-10-CM

## 2020-09-11 PROCEDURE — 99203 OFFICE O/P NEW LOW 30 MIN: CPT | Performed by: PODIATRIST

## 2020-09-11 ASSESSMENT — MIFFLIN-ST. JEOR: SCORE: 1034.27

## 2020-09-11 NOTE — PROGRESS NOTES
"Foot & Ankle Surgery  September 11, 2020    CC: \"problem bit goe nail - gets infected\"    I was asked to see Celeste Chacko regarding the chief complaint by:  Dr. DULCE Laureano     HPI:  Pt is a 89 year old female who presents with above complaint.  Right hallux nail issue x 6 months.  \"It's been infected for 6 months\", 2 separate infections per patient.  Looking for \"relief\".  \"Different layers of nail growing\".  \"Might be nail fungus\".  Pain 6/10 \"was constant\", but has taken antibiotics and a \"pain salve\", which has helped.      ROS:   Pos for CC.  The patient denies current nausea, vomiting, chills, fevers, belly pain, calf pain, chest pain or SOB.  Complete remainder of ROS is otherwise neg.    VITALS:    Vitals:    09/11/20 1514   Weight: 64.4 kg (142 lb)   Height: 1.594 m (5' 2.75\")       PMH:    Past Medical History:   Diagnosis Date     Basal cell carcinoma      Chronic airway obstruction, not elsewhere classified      Complete rupture of rotator cuff      Disorder of bone and cartilage, unspecified      History of blood transfusion      Mixed hyperlipidemia 4/00     Osteoarthritis      Personal history of other malignant neoplasm of skin      Spinal stenosis      Type II or unspecified type diabetes mellitus without mention of complication, not stated as uncontrolled        SXHX:    Past Surgical History:   Procedure Laterality Date     ARTHROPLASTY KNEE Left 9/15/2014    Procedure: ARTHROPLASTY KNEE;  Surgeon: Carlos Alberto Kaminski MD;  Location: [facility] OR     C NONSPECIFIC PROCEDURE      Breast biopsies      C NONSPECIFIC PROCEDURE      Pilonidal sinus repair      C NONSPECIFIC PROCEDURE      T & A      C NONSPECIFIC PROCEDURE  5/02    Shoulder arthropasty     C NONSPECIFIC PROCEDURE  5/07    Right shoulder replacement, RCT repair     HEAD & NECK SURGERY  05/14/15    forehead-skin cancer removed     INJECT EPIDURAL LUMBAR / SACRAL SINGLE Left 7/14/2016    Procedure: INJECT EPIDURAL LUMBAR / SACRAL SINGLE;  Surgeon: " Luisito New MD;  Location: [facility]     INJECT SACROILIAC JOINT N/A 12/1/2015    Procedure: INJECT SACROILIAC JOINT;  Surgeon: Luisito New MD;  Location: [facility]     INJECT SACROILIAC JOINT Bilateral 5/19/2016    Procedure: INJECT SACROILIAC JOINT;  Surgeon: Luisito New MD;  [facility]: [facility]     INJECT SACROILIAC JOINT Bilateral 11/25/2016    Procedure: INJECT SACROILIAC JOINT;  Surgeon: Elmira Bennett MD;  Location: [facility]     INJECT SACROILIAC JOINT Bilateral 4/25/2017    Procedure: INJECT SACROILIAC JOINT;  Bilateral Sacroiliac Joint Injection   Injection of local anesthetic and steroid into area around spine for pain relief;  Surgeon: Alvaro Holland MD;  Location: [facility]     INJECT SACROILIAC JOINT Bilateral 7/28/2017    Procedure: INJECT SACROILIAC JOINT;  Bilateral Sacroiliac Joint Injection;  Surgeon: Elmira Bennett MD;  Location: [facility]     INJECT SACROILIAC JOINT Bilateral 11/10/2017    Procedure: INJECT SACROILIAC JOINT;  Bilateral Sacroiliac Joint Injection;  Surgeon: Elmira Bennett MD;  Location: [facility]        MEDS:    Current Outpatient Medications   Medication     Acetaminophen (TYLENOL PO)     alendronate (FOSAMAX) 70 MG tablet     amLODIPine (NORVASC) 2.5 MG tablet     ASPIRIN EC PO     benzocaine (AMERICAINE) 20 % rectal ointment     Calcium Polycarbophil (FIBERCON PO)     calcium-vitamin D (CALTRATE) 600-400 MG-UNIT per tablet     cephALEXin (KEFLEX) 500 MG capsule     conjugated estrogens (PREMARIN) 0.625 MG/GM vaginal cream     fluticasone-salmeterol (ADVAIR DISKUS) 500-50 MCG/DOSE inhaler     glipiZIDE (GLUCOTROL) 5 MG tablet     Glucosamine-Chondroitin (GLUCOSAMINE CHONDROITIN COMPLX) 500-250 MG CAPS     lisinopril (ZESTRIL) 40 MG tablet     Multiple Vitamins-Minerals (MULTIVITAMIN ADULT PO)     NONFORMULARY     oxyCODONE (ROXICODONE) 5 MG tablet     PROAIR  (90 Base) MCG/ACT inhaler     rosuvastatin (CRESTOR) 5 MG tablet     senna-docusate (SENOKOT-S;PERICOLACE) 8.6-50 MG  "per tablet     tiotropium (SPIRIVA HANDIHALER) 18 MCG inhaled capsule     triamterene-HCTZ (DYAZIDE) 37.5-25 MG capsule     No current facility-administered medications for this visit.        ALL:     Allergies   Allergen Reactions     Sulfamethoxazole Anaphylaxis and Hives       FMH:    Family History   Problem Relation Age of Onset     Arthritis Father      Diabetes Brother      Cancer Brother         breast     Cerebrovascular Disease Brother        SocHx:    Social History     Socioeconomic History     Marital status: Single     Spouse name: Not on file     Number of children: Not on file     Years of education: Not on file     Highest education level: Not on file   Occupational History     Not on file   Social Needs     Financial resource strain: Not on file     Food insecurity     Worry: Not on file     Inability: Not on file     Transportation needs     Medical: Not on file     Non-medical: Not on file   Tobacco Use     Smoking status: Former Smoker     Packs/day: 0.50     Years: 54.00     Pack years: 27.00     Types: Cigarettes     Last attempt to quit: 2000     Years since quittin.3     Smokeless tobacco: Never Used     Tobacco comment: using nicotine gum   Substance and Sexual Activity     Alcohol use: Yes     Alcohol/week: 0.8 standard drinks     Types: 1 Glasses of wine per week     Comment: \"A glass of wine once a week.\"     Drug use: No     Sexual activity: Not Currently   Lifestyle     Physical activity     Days per week: Not on file     Minutes per session: Not on file     Stress: Not on file   Relationships     Social connections     Talks on phone: Not on file     Gets together: Not on file     Attends [employer] service: Not on file     Active member of club or organization: Not on file     Attends meetings of clubs or organizations: Not on file     Relationship status: Not on file     Intimate partner violence     Fear of current or ex partner: Not on file     Emotionally abused: Not on " file     Physically abused: Not on file     Forced sexual activity: Not on file   Other Topics Concern     Parent/sibling w/ CABG, MI or angioplasty before 65F 55M? Not Asked   Social History Narrative     Not on file           EXAMINATION:  Gen:   No apparent distress  Neuro:   A&Ox3, no deficits  Psych:    Answering questions appropriately for age and situation with normal affect  Head:    NCAT  Eye:    Visual scanning without deficit  Ear:    Response to auditory stimuli wnl  Lung:    Non-labored breathing on RA noted  Abd:    NTND per patient report  Lymph:    Neg for pitting/non-pitting edema BLE  Vasc:    Pulses palpable, CFT minimally delayed  Neuro:    Light touch sensation intact to all sensory nerve distributions without paresthesias  Derm:    R hallux nail thickened, dystrophic, mycotic without paronychia/infection  MSK:    ROM, strength wnl without limitation, no pain on palpation noted.  Calf:    Neg for redness, swelling or tenderness    Assessment:  89 year old female with recurrent paronychia/infection R hallux nail, in setting of dystrophic/mycotic nail      Plan:  Discussed etiologies, anatomy and options  1.  Recurrent paronychia/infection R hallux nail, in setting of dystrophic/mycotic nail  -currently doing better  -discussed option of avulsion.  She declined today, as the nail is doing better  -advised follow up in 4-6 weeks for 30m appt. Will proceed with avulsion if recurrent infection develops.  If previous treatments have eliminated the pain, and on recurrence is noted, ok to cancel.    Follow up:  4-6 weeks or sooner with acute issues      Patient's medical history was reviewed today    Body mass index is 25.36 kg/m .  Weight management plan: Patient was referred to their PCP to discuss a diet and exercise plan.        Tristan Michael DPM FACFAS FACFAOM  Podiatric Foot & Ankle Surgeon  [facility]  [phone]

## 2020-09-16 ENCOUNTER — TRANSFERRED RECORDS (OUTPATIENT)
Dept: HEALTH INFORMATION MANAGEMENT | Facility: CLINIC | Age: 85
End: 2020-09-16

## 2020-09-16 LAB — RETINOPATHY: NEGATIVE

## 2020-09-17 ENCOUNTER — TELEPHONE (OUTPATIENT)
Dept: INTERNAL MEDICINE | Facility: CLINIC | Age: 85
End: 2020-09-17

## 2020-09-17 NOTE — TELEPHONE ENCOUNTER
Patient is calling to report blood pressure readings.    9/16/20 184/85   Today 178/79     Patient will not be available by phone today until after 3pm.

## 2020-09-17 NOTE — TELEPHONE ENCOUNTER
Clinicals sent to [facility] at [phone]. Fax confirmed. Patient called in some blood pressure readings as requested at the last office visit on 7/10/20.

## 2020-09-17 NOTE — TELEPHONE ENCOUNTER
These are a lot higher than what we saw in clinic in July. Please find out if she has been checking regularly or just recently, was it lower before and just recently increased?   Her home machine may not be accurate, has she had It checked here? Is it a wrist or arm machine?

## 2020-09-18 DIAGNOSIS — I10 HTN, GOAL BELOW 140/90: ICD-10-CM

## 2020-09-18 DIAGNOSIS — R60.0 BILATERAL LEG EDEMA: ICD-10-CM

## 2020-09-18 NOTE — TELEPHONE ENCOUNTER
Spoke to pt, she has not been checking regularly at home, just past few days.  Has not checked yet today and her's is an arm machine.  She will check at home again and will go to [facility] and have then check.  Will call her back this afternoon to get readings.

## 2020-09-18 NOTE — TELEPHONE ENCOUNTER
Pharmacist took her blood pressure at noon and it was 167/89, she took it again at home and was 164/94.  She does state has been very stressed lately and wonders if this is contributing.  Please advise.

## 2020-09-19 ENCOUNTER — OFFICE VISIT (OUTPATIENT)
Dept: URGENT CARE | Facility: URGENT CARE | Age: 85
End: 2020-09-19
Payer: COMMERCIAL

## 2020-09-19 VITALS
DIASTOLIC BLOOD PRESSURE: 76 MMHG | OXYGEN SATURATION: 91 % | WEIGHT: 140 LBS | RESPIRATION RATE: 18 BRPM | HEART RATE: 85 BPM | TEMPERATURE: 98.2 F | SYSTOLIC BLOOD PRESSURE: 153 MMHG | HEIGHT: 63 IN | BODY MASS INDEX: 24.8 KG/M2

## 2020-09-19 DIAGNOSIS — L03.031 PARONYCHIA OF TOE, RIGHT: Primary | ICD-10-CM

## 2020-09-19 DIAGNOSIS — N18.1 CKD (CHRONIC KIDNEY DISEASE) STAGE 1, GFR 90 ML/MIN OR GREATER: ICD-10-CM

## 2020-09-19 PROCEDURE — 99214 OFFICE O/P EST MOD 30 MIN: CPT | Performed by: PHYSICIAN ASSISTANT

## 2020-09-19 RX ORDER — CEPHALEXIN 500 MG/1
500 CAPSULE ORAL 3 TIMES DAILY
Qty: 30 CAPSULE | Refills: 0 | Status: SHIPPED | OUTPATIENT
Start: 2020-09-19 | End: 2020-09-29

## 2020-09-19 RX ORDER — TRIAMTERENE AND HYDROCHLOROTHIAZIDE 37.5; 25 MG/1; MG/1
CAPSULE ORAL
Qty: 30 CAPSULE | Refills: 1 | OUTPATIENT
Start: 2020-09-19

## 2020-09-19 ASSESSMENT — MIFFLIN-ST. JEOR: SCORE: 1021.23

## 2020-09-19 NOTE — PATIENT INSTRUCTIONS
Patient Education     Paronychia of the Finger or Toe  Paronychia is an infection near a fingernail or toenail. It usually occurs when an opening in the cuticle or an ingrown toenail lets bacteria under the skin.  The infection will need to be drained if pus is present. If the infection has been caught early, you may need only antibiotic treatment. Healing will take about 1 to 2 weeks.  Home care  Follow these guidelines when caring for yourself at home:    Clean and soak the toe or finger. Do this 2 times a day for the first 3 days. To do so:  ? Soak your foot or hand in a tub of warm water for 5 minutes. Or hold your toe or finger under a faucet of warm running water for 5 minutes.  ? Clean any crust away with soap and water using a cotton swab.  ? Put antibiotic ointment on the infected area.    Change the dressing daily or any time it gets dirty.    If you were given antibiotics, take them as directed until they are all gone.    If your infection is on a toe, wear comfortable shoes with a lot of toe room. You can also wear open-toed sandals while your toe heals.    You may use over-the-counter medicine (acetaminophen or ibuprofen to help with pain, unless another medicine was prescribed. If you have chronic liver or kidney disease, talk with your healthcare provider before using these medicines. Also talk with your provider if you've had a stomach ulcer or GI (gastrointestinal) bleeding.  Prevention  The following can prevent paronychia:    Avoid cutting or playing with your cuticles at home.    Don't bite your nails.    Don't suck on your thumbs or fingers.  Follow-up care  Follow up with your healthcare provider, or as advised.  When to seek medical advice  Call your healthcare provider right away if any of these occur:    Redness, pain, or swelling of the finger or toe gets worse    Red streaks in the skin leading away from the wound    Pus or fluid draining from the nail area    Fever of 100.4 F (38 C) or  higher, or as directed by your provider  Date Last Reviewed: 8/1/2016 2000-2019 [employer]. [address] [place] [address], [place] [address]. All rights reserved. This information is not intended as a substitute for professional medical care. Always follow your healthcare professional's instructions.

## 2020-09-19 NOTE — PROGRESS NOTES
"Patient presents with:  Urgent Care  Ingrown Toenail: infected ingrown toe nail right       SUBJECTIVE:  Celeste Chacko is a 89 year old female who presents to the clinic today for redness, swelling and pain at the medial aspect of her right great toenail since last night.  Denies any trauma.      Seeing podiatry in October    Past Medical History:   Diagnosis Date     Basal cell carcinoma      Chronic airway obstruction, not elsewhere classified      Complete rupture of rotator cuff      Disorder of bone and cartilage, unspecified      History of blood transfusion      Mixed hyperlipidemia      Osteoarthritis      Personal history of other malignant neoplasm of skin      Spinal stenosis      Type II or unspecified type diabetes mellitus without mention of complication, not stated as uncontrolled         Allergies   Allergen Reactions     Sulfamethoxazole Anaphylaxis and Hives     Social History     Tobacco Use     Smoking status: Former Smoker     Packs/day: 0.50     Years: 54.00     Pack years: 27.00     Types: Cigarettes     Last attempt to quit: 2000     Years since quittin.4     Smokeless tobacco: Never Used     Tobacco comment: using nicotine gum   Substance Use Topics     Alcohol use: Yes     Alcohol/week: 0.8 standard drinks     Types: 1 Glasses of wine per week     Comment: \"A glass of wine once a week.\"       ROS:  CONSTITUTIONAL:NEGATIVE for fever, chills, change in weight  INTEGUMENTARY/SKIN: as per HPI  ENT/MOUTH: NEGATIVE for ear, mouth and throat problems  RESP:NEGATIVE for significant cough or SOB  CV: NEGATIVE for chest pain, palpitations or peripheral edema  MUSCULOSKELETAL: NEGATIVE for significant arthralgias or myalgia  NEURO: NEGATIVE for weakness, dizziness or paresthesias  Review of systems negative except as stated above.    EXAM:   BP (!) 153/76 (BP Location: Left arm, Patient Position: Sitting, Cuff Size: Adult Regular)   Pulse 85   Temp 98.2  F (36.8  C)   Resp 18   Ht " "1.588 m (5' 2.5\")   Wt 63.5 kg (140 lb)   SpO2 91%   BMI 25.20 kg/m    GENERAL: alert, no acute distress.  SKIN: erythematous and edematous skin at medial aspect of right greath toenail.   GENERAL APPEARANCE: healthy, alert and no distress  Extremities: right great toe: tender to palpation.    (L03.031) Paronychia of toe, right  (primary encounter diagnosis)  Comment:   Plan: cephALEXin (KEFLEX) 500 MG capsule        Warm soaks and push cuticle back.  Bacitracin to area and cover.  Keep follow up with podiatry.    (N18.1) CKD (chronic kidney disease) stage 1, GFR 90 ml/min or greater        "

## 2020-09-21 DIAGNOSIS — M48.062 SPINAL STENOSIS OF LUMBAR REGION WITH NEUROGENIC CLAUDICATION: ICD-10-CM

## 2020-09-21 NOTE — TELEPHONE ENCOUNTER
Left a voicemail asking patient to call the clinic back.    Please assist in scheduling a nurse-only blood pressure check.

## 2020-09-22 ENCOUNTER — ALLIED HEALTH/NURSE VISIT (OUTPATIENT)
Dept: NURSING | Facility: CLINIC | Age: 85
End: 2020-09-22
Payer: COMMERCIAL

## 2020-09-22 VITALS — SYSTOLIC BLOOD PRESSURE: 134 MMHG | DIASTOLIC BLOOD PRESSURE: 67 MMHG | HEART RATE: 82 BPM

## 2020-09-22 DIAGNOSIS — I10 HTN, GOAL BELOW 140/90: ICD-10-CM

## 2020-09-22 DIAGNOSIS — I10 BENIGN ESSENTIAL HYPERTENSION: Primary | ICD-10-CM

## 2020-09-22 DIAGNOSIS — R60.0 BILATERAL LEG EDEMA: ICD-10-CM

## 2020-09-22 RX ORDER — OXYCODONE HYDROCHLORIDE 5 MG/1
TABLET ORAL
Qty: 150 TABLET | Refills: 0 | Status: SHIPPED | OUTPATIENT
Start: 2020-09-22 | End: 2020-10-29

## 2020-09-22 NOTE — TELEPHONE ENCOUNTER
Controlled Substance Refill Request for Oxycodone   Problem List Complete:    No     PROVIDER TO CONSIDER COMPLETION OF PROBLEM LIST AND OVERVIEW/CONTROLLED SUBSTANCE AGREEMENT    Last Written Prescription Date:  8/13/20  Last Fill Quantity: 150,   # refills: 0    Last Office Visit with [facility] primary care provider: 7/10/20    Future Office visit:   Next 5 appointments (look out 90 days)    Oct 20, 2020  2:20 PM CDT  SHORT with Emily Marie MD  [facility] [address]  [facility] [id number]  [phone]          Controlled substance agreement:   Encounter-Level CSA - 04/21/2016:    Controlled Substance Agreement - Scan on 5/3/2016  1:00 PM: [place] Controlled Substance Agreement, 4/25/16     Patient-Level CSA:    There are no patient-level csa.         Last Urine Drug Screen: No results found for: CDAUT, No results found for: COMDAT,   Cannabinoids (21-mwz-1-carboxy-9-THC)   Date Value Ref Range Status   04/09/2020 Not Detected NDET^Not Detected ng/mL Final     Comment:     Cutoff for a negative cannabinoid is 50 ng/mL or less.     Phencyclidine (Phencyclidine)   Date Value Ref Range Status   04/09/2020 Not Detected NDET^Not Detected ng/mL Final     Comment:     Cutoff for a negative PCP is 25 ng/mL or less.     Cocaine (Benzoylecgonine)   Date Value Ref Range Status   04/09/2020 Not Detected NDET^Not Detected ng/mL Final     Comment:     Cutoff for a negative cocaine is 150 ng/ml or less.     Methamphetamine (d-Methamphetamine)   Date Value Ref Range Status   04/09/2020 Not Detected NDET^Not Detected ng/mL Final     Comment:     Cutoff for a negative methamphetamine is 500 ng/ml or less.     Opiates (Morphine)   Date Value Ref Range Status   04/09/2020 Not Detected NDET^Not Detected ng/mL Final     Comment:     Cutoff for a negative opiate is 100 ng/ml or less.     Amphetamine (d-Amphetamine)   Date Value Ref Range Status   04/09/2020 Not Detected NDET^Not  Detected ng/mL Final     Comment:     Cutoff for a negative amphetamine is 500 ng/mL or less.     Benzodiazepines (Nordiazepam)   Date Value Ref Range Status   04/09/2020 Not Detected NDET^Not Detected ng/mL Final     Comment:     Cutoff for a negative benzodiazepine is 150 ng/ml or less.     Tricyclic Antidepressants (Desipramine)   Date Value Ref Range Status   04/09/2020 Not Detected NDET^Not Detected ng/mL Final     Comment:     Cutoff for a negative tricyclic antidepressant is 300 ng/ml or less.     Methadone (Methadone)   Date Value Ref Range Status   04/09/2020 Not Detected NDET^Not Detected ng/mL Final     Comment:     Cutoff for a negative methadone is 200 ng/ml or less.     Barbiturates (Butalbital)   Date Value Ref Range Status   04/09/2020 Not Detected NDET^Not Detected ng/mL Final     Comment:     Cutoff for a negative barbituate is 200 ng/ml or less.     Oxycodone (Oxycodone)   Date Value Ref Range Status   04/09/2020 Detected, Abnormal Result (A) NDET^Not Detected ng/mL Final     Comment:     Cutoff for a positive oxycodone is greater than 100 ng/ml.  This is an unconfirmed screening result to be used for medical purposes only.   Order [id number] for confirmation or individual confirmation tests to [employer].       Propoxyphene (Norpropoxyphene)   Date Value Ref Range Status   04/09/2020 Not Detected NDET^Not Detected ng/mL Final     Comment:     Cutoff for a negative propoxyphene is 300 ng/ml or less     Buprenorphine (Buprenorphine)   Date Value Ref Range Status   04/09/2020 Not Detected NDET^Not Detected ng/mL Final     Comment:     Cutoff for a negative buprenorphine is 10 ng/ml or less       [email or web address]       checked in past 3 months?  Yes 8/13/20

## 2020-09-23 RX ORDER — TRIAMTERENE AND HYDROCHLOROTHIAZIDE 37.5; 25 MG/1; MG/1
CAPSULE ORAL
Qty: 30 CAPSULE | Refills: 1 | Status: SHIPPED | OUTPATIENT
Start: 2020-09-23 | End: 2021-11-19

## 2020-09-23 NOTE — TELEPHONE ENCOUNTER
"Prescription approved per [facility] Refill Protocol.    Next 5 appointments (look out 90 days)    Oct 20, 2020  2:20 PM CDT  [facility]  [facility] [address]  [facility] [id number] [phone]              Requested Prescriptions   Pending Prescriptions Disp Refills     triamterene-HCTZ (DYAZIDE) 37.5-25 MG capsule [Pharmacy Med Name: TRIAMTERENE-HCTZ 37.5-25 MG CP] 30 capsule 1     Sig: TAKE 1 CAPSULE BY MOUTH EVERY DAY       Diuretics (Including Combos) Protocol Passed - 9/22/2020  3:30 PM        Passed - Blood pressure under 140/90 in past 12 months     BP Readings from Last 3 Encounters:   09/22/20 134/67   09/19/20 (!) 153/76   09/11/20 130/62                 Passed - Recent (12 mo) or future (30 days) visit within the authorizing provider's specialty     Patient has had an office visit with the authorizing provider or a provider within the authorizing providers department within the previous 12 mos or has a future within next 30 days. See \"Patient Info\" tab in inbasket, or \"Choose Columns\" in Meds & Orders section of the refill encounter.              Passed - Medication is active on med list        Passed - Patient is age 18 or older        Passed - No active pregancy on record        Passed - Normal serum creatinine on file in past 12 months     Recent Labs   Lab Test 04/09/20  0915   CR 0.54              Passed - Normal serum potassium on file in past 12 months     Recent Labs   Lab Test 04/09/20  0915   POTASSIUM 3.7                    Passed - Normal serum sodium on file in past 12 months     Recent Labs   Lab Test 04/09/20  0915                 Passed - No positive pregnancy test in past 12 months             "

## 2020-10-13 DIAGNOSIS — E11.8 TYPE 2 DIABETES MELLITUS WITH COMPLICATION, WITHOUT LONG-TERM CURRENT USE OF INSULIN (H): ICD-10-CM

## 2020-10-13 LAB
ANION GAP SERPL CALCULATED.3IONS-SCNC: 4 MMOL/L (ref 3–14)
BUN SERPL-MCNC: 15 MG/DL (ref 7–30)
CALCIUM SERPL-MCNC: 9.4 MG/DL (ref 8.5–10.1)
CHLORIDE SERPL-SCNC: 108 MMOL/L (ref 94–109)
CO2 SERPL-SCNC: 28 MMOL/L (ref 20–32)
CREAT SERPL-MCNC: 0.57 MG/DL (ref 0.52–1.04)
GFR SERPL CREATININE-BSD FRML MDRD: 82 ML/MIN/{1.73_M2}
GLUCOSE SERPL-MCNC: 99 MG/DL (ref 70–99)
HBA1C MFR BLD: 6.7 % (ref 0–5.6)
POTASSIUM SERPL-SCNC: 3.6 MMOL/L (ref 3.4–5.3)
SODIUM SERPL-SCNC: 140 MMOL/L (ref 133–144)

## 2020-10-13 PROCEDURE — 80048 BASIC METABOLIC PNL TOTAL CA: CPT | Performed by: INTERNAL MEDICINE

## 2020-10-13 PROCEDURE — 83036 HEMOGLOBIN GLYCOSYLATED A1C: CPT | Performed by: INTERNAL MEDICINE

## 2020-10-20 ENCOUNTER — TELEPHONE (OUTPATIENT)
Dept: INTERNAL MEDICINE | Facility: CLINIC | Age: 85
End: 2020-10-20

## 2020-10-20 DIAGNOSIS — I83.813 VARICOSE VEINS OF BOTH LOWER EXTREMITIES WITH PAIN: Primary | ICD-10-CM

## 2020-10-20 NOTE — TELEPHONE ENCOUNTER
Patient saw orthopedic doctor (Dr Elias at [facility]) for pain in her left leg and they stated it was varicose veins.  She states her left leg is weak and is painful.  She would like a referral to a vein specialist. She doesn't want to wait until she see Dr Marie on 11/3/20. Call patient at [phone] (home)

## 2020-10-20 NOTE — TELEPHONE ENCOUNTER
Please advise on message below.  Thanks    Writer unsure what kind of referral to pend.  Please advise, thanks.

## 2020-10-22 ENCOUNTER — TELEPHONE (OUTPATIENT)
Dept: URGENT CARE | Facility: URGENT CARE | Age: 85
End: 2020-10-22

## 2020-10-22 ENCOUNTER — OFFICE VISIT (OUTPATIENT)
Dept: URGENT CARE | Facility: URGENT CARE | Age: 85
End: 2020-10-22
Payer: COMMERCIAL

## 2020-10-22 ENCOUNTER — ANCILLARY PROCEDURE (OUTPATIENT)
Dept: GENERAL RADIOLOGY | Facility: CLINIC | Age: 85
End: 2020-10-22
Attending: NURSE PRACTITIONER
Payer: COMMERCIAL

## 2020-10-22 VITALS
OXYGEN SATURATION: 93 % | BODY MASS INDEX: 25.41 KG/M2 | TEMPERATURE: 98.4 F | HEART RATE: 89 BPM | SYSTOLIC BLOOD PRESSURE: 126 MMHG | DIASTOLIC BLOOD PRESSURE: 68 MMHG | RESPIRATION RATE: 18 BRPM | WEIGHT: 141.2 LBS

## 2020-10-22 DIAGNOSIS — M25.562 ACUTE PAIN OF LEFT KNEE: Primary | ICD-10-CM

## 2020-10-22 PROCEDURE — 73560 X-RAY EXAM OF KNEE 1 OR 2: CPT | Mod: LT | Performed by: RADIOLOGY

## 2020-10-22 PROCEDURE — 99214 OFFICE O/P EST MOD 30 MIN: CPT | Performed by: NURSE PRACTITIONER

## 2020-10-22 NOTE — PATIENT INSTRUCTIONS
Use ice for 20 minutes every 2 hours while awake for the next couple of days. See orthopedics as scheduled.    Wear knee sleeve to give extra support.

## 2020-10-22 NOTE — TELEPHONE ENCOUNTER
Reason for call:  Results xray from 10.22.20  Name of test or procedure: NA  Date of test or procedure: 10.22.20   Location of test or procedure: [facility]    Additional comments: WOULD LIKE TO  X RAYS 10.23 OR SATURDAY    Phone number to reach patient:  Home number on file [phone] (home)    Best Time:  ANY    Can we leave a detailed message on this number?  YES    Travel screening: Not Applicable

## 2020-10-22 NOTE — PROGRESS NOTES
SUBJECTIVE:   Celeste Chacko is a 89 year old female presenting with a chief complaint of pain in the left knee for the last 2 weeks and has been getting progressively worse.  She has a history of left knee replacement in 2014 and her knee has been doing well up until a couple of weeks ago.  She denies any injuries, falls, or accidents and describes the pain as a deep ache which is worse with ambulation    She had a total knee replacement about 8 years ago.  Onset of symptoms was 2 week(s) ago.  Course of illness is worsening.    Severity severe  Previous Treatment : None    She has an appointment set up for 10/27/2020 with her orthopedist.  She is already taking oxycodone and Tylenol multiple times a day for chronic pain.      Past Medical History:   Diagnosis Date     Basal cell carcinoma      Chronic airway obstruction, not elsewhere classified      Complete rupture of rotator cuff      Disorder of bone and cartilage, unspecified      History of blood transfusion      Mixed hyperlipidemia 4/00     Osteoarthritis      Personal history of other malignant neoplasm of skin      Spinal stenosis      Type II or unspecified type diabetes mellitus without mention of complication, not stated as uncontrolled      Current Outpatient Medications   Medication Sig Dispense Refill     Acetaminophen (TYLENOL PO) Take 325 mg by mouth 6 times daily Patient takes TID with Oxycodone.        alendronate (FOSAMAX) 70 MG tablet TAKE 1 TABLET (70 MG) BY MOUTH EVERY 7 DAYS 12 tablet 3     amLODIPine (NORVASC) 2.5 MG tablet TAKE 1 TABLET BY MOUTH EVERY DAY 90 tablet 2     ASPIRIN EC PO Take 81 mg by mouth daily       benzocaine (AMERICAINE) 20 % rectal ointment Apply q 3 hrs to toe for pain control 28 g 0     Calcium Polycarbophil (FIBERCON PO) Take 1 tablet by mouth once , one in the morning and one in the evening       calcium-vitamin D (CALTRATE) 600-400 MG-UNIT per tablet Take 1 tablet by mouth daily 1200mg   1000 mg of vitamin d    "    conjugated estrogens (PREMARIN) 0.625 MG/GM vaginal cream Use twice weekly 0.5 gr applied to the external genital area. 30 g 11     fluticasone-salmeterol (ADVAIR DISKUS) 500-50 MCG/DOSE inhaler Inhale 1 puff into the lungs 2 times daily 3 Inhaler 3     glipiZIDE (GLUCOTROL) 5 MG tablet TAKE 1 TABLET (5 MG) BY MOUTH EVERY MORNING (BEFORE BREAKFAST) 90 tablet 1     Glucosamine-Chondroitin (GLUCOSAMINE CHONDROITIN COMPLX) 500-250 MG CAPS Take 1 tablet by mouth 2 times daily       lisinopril (ZESTRIL) 40 MG tablet Take 1 tablet (40 mg) by mouth daily 90 tablet 3     Multiple Vitamins-Minerals (MULTIVITAMIN ADULT PO) Take 1 tablet by mouth daily       NONFORMULARY Natra sleeping med takes 1 at night.       oxyCODONE (ROXICODONE) 5 MG tablet May take 5 tabs per day 150 tablet 0     PROAIR  (90 Base) MCG/ACT inhaler INHALE 2 PUFFS BY MOUTH EVERY 6 HOURS AS NEEDED FOR WHEEZE OR FOR SHORTNESS OF BREATH 8.5 Inhaler 3     rosuvastatin (CRESTOR) 5 MG tablet Take 1 tablet (5 mg) by mouth daily 30 tablet 6     senna-docusate (SENOKOT-S;PERICOLACE) 8.6-50 MG per tablet Take 1 tablet by mouth 2 times daily Reported on 2017       tiotropium (SPIRIVA HANDIHALER) 18 MCG inhaled capsule Inhale 1 capsule (18 mcg) into the lungs daily 90 capsule 3     triamterene-HCTZ (DYAZIDE) 37.5-25 MG capsule TAKE 1 CAPSULE BY MOUTH EVERY DAY 30 capsule 1     cephALEXin (KEFLEX) 500 MG capsule Take 1 capsule (500 mg) by mouth 3 times daily (Patient not taking: Reported on 10/22/2020) 30 capsule 0     Social History     Tobacco Use     Smoking status: Former Smoker     Packs/day: 0.50     Years: 54.00     Pack years: 27.00     Types: Cigarettes     Quit date: 2000     Years since quittin.5     Smokeless tobacco: Never Used     Tobacco comment: using nicotine gum   Substance Use Topics     Alcohol use: Yes     Alcohol/week: 0.8 standard drinks     Types: 1 Glasses of wine per week     Comment: \"A glass of wine once a week.\" "     Family History   Problem Relation Age of Onset     Arthritis Father      Diabetes Brother      Cancer Brother         breast     Cerebrovascular Disease Brother         ROS: 10 point ROS neg other than the symptoms noted above in the HPI.     OBJECTIVE  /68   Pulse 89   Temp 98.4  F (36.9  C) (Temporal)   Resp 18   Wt 64 kg (141 lb 3.2 oz)   SpO2 93%   BMI 25.41 kg/m        GENERAL APPEARANCE: healthy appearing, alert     MS: extremities normal- no gross deformities noted; normal muscle tone, except for slight tenderness both medially and laterally as well as posterior to the knee on the left.     SKIN: no suspicious lesions or rashes     NEURO: Normal strength and tone, mentation intact and speech normal    Right knee xray shows previous left knee arthroplasty but no acute fractures.  Degenerative disease is present as read by this provider.    Labs:  Results for orders placed or performed in visit on 10/22/20 (from the past 24 hour(s))   XR Knee Left 1/2 Views    Narrative    LEFT KNEE 1/2 VIEW(S)   10/22/2020 2:12 PM     HISTORY: Acute pain of left knee.    COMPARISON: None.      Impression    IMPRESSION: Left total knee arthroplasty in place. No evidence of  loosening or fracture. No complication. Vascular calcifications.         ASSESSMENT/PLAN:      ICD-10-CM    1. Acute pain of left knee  M25.562 XR Knee Left 1/2 Views     EXTENSOR KNEE SLEEVE M        Follow Up:      Patient Instructions   Use ice for 20 minutes every 2 hours while awake for the next couple of days. See orthopedics as scheduled.    Wear knee sleeve to give extra support.        Jil Ortez APRN, CNP  [facility] Provider

## 2020-10-22 NOTE — TELEPHONE ENCOUNTER
I would not recommend any procedures on her veins at her age and health. The vein specialist would just recommend she wear compression stockings and should elevate her legs for 30 minutes in late morning and afternoon to decrease the pressure.   I printed rx for stockings.

## 2020-10-26 DIAGNOSIS — M48.062 SPINAL STENOSIS OF LUMBAR REGION WITH NEUROGENIC CLAUDICATION: ICD-10-CM

## 2020-10-26 NOTE — TELEPHONE ENCOUNTER
Controlled Substance Refill Request for oxycodone  Problem List Complete:    No     PROVIDER TO CONSIDER COMPLETION OF PROBLEM LIST AND OVERVIEW/CONTROLLED SUBSTANCE AGREEMENT    Last Written Prescription Date:  9/22/20  Last Fill Quantity: 150,   # refills: 0    THE MOST RECENT OFFICE VISIT MUST BE WITHIN THE PAST 3 MONTHS. AT LEAST ONE FACE TO FACE VISIT MUST OCCUR EVERY 6 MONTHS. ADDITIONAL VISITS CAN BE VIRTUAL.  (THIS STATEMENT SHOULD BE DELETED.)    [facility] Office Visit with [facility] care provider: 7/10/20 Cynthia    Future Office visit:   Next 5 appointments (look out 90 days)    Nov 03, 2020  1:40 PM  SHORT with Emily Marie MD  [facility] ([facility]) [address]  [facility] [id number] [phone]          Controlled substance agreement:   Encounter-Level CSA - 04/21/2016:    Controlled Substance Agreement - Scan on 5/3/2016  1:00 PM: [place] Controlled Substance Agreement, 4/25/16     Patient-Level CSA:    There are no patient-level csa.         Last Urine Drug Screen: No results found for: CDAUT, No results found for: COMDAT,   Cannabinoids (77-zcn-9-carboxy-9-THC)   Date Value Ref Range Status   04/09/2020 Not Detected NDET^Not Detected ng/mL Final     Comment:     Cutoff for a negative cannabinoid is 50 ng/mL or less.     Phencyclidine (Phencyclidine)   Date Value Ref Range Status   04/09/2020 Not Detected NDET^Not Detected ng/mL Final     Comment:     Cutoff for a negative PCP is 25 ng/mL or less.     Cocaine (Benzoylecgonine)   Date Value Ref Range Status   04/09/2020 Not Detected NDET^Not Detected ng/mL Final     Comment:     Cutoff for a negative cocaine is 150 ng/ml or less.     Methamphetamine (d-Methamphetamine)   Date Value Ref Range Status   04/09/2020 Not Detected NDET^Not Detected ng/mL Final     Comment:     Cutoff for a negative methamphetamine is 500 ng/ml or less.     Opiates (Morphine)   Date Value Ref Range Status   04/09/2020 Not  Detected NDET^Not Detected ng/mL Final     Comment:     Cutoff for a negative opiate is 100 ng/ml or less.     Amphetamine (d-Amphetamine)   Date Value Ref Range Status   04/09/2020 Not Detected NDET^Not Detected ng/mL Final     Comment:     Cutoff for a negative amphetamine is 500 ng/mL or less.     Benzodiazepines (Nordiazepam)   Date Value Ref Range Status   04/09/2020 Not Detected NDET^Not Detected ng/mL Final     Comment:     Cutoff for a negative benzodiazepine is 150 ng/ml or less.     Tricyclic Antidepressants (Desipramine)   Date Value Ref Range Status   04/09/2020 Not Detected NDET^Not Detected ng/mL Final     Comment:     Cutoff for a negative tricyclic antidepressant is 300 ng/ml or less.     Methadone (Methadone)   Date Value Ref Range Status   04/09/2020 Not Detected NDET^Not Detected ng/mL Final     Comment:     Cutoff for a negative methadone is 200 ng/ml or less.     Barbiturates (Butalbital)   Date Value Ref Range Status   04/09/2020 Not Detected NDET^Not Detected ng/mL Final     Comment:     Cutoff for a negative barbituate is 200 ng/ml or less.     Oxycodone (Oxycodone)   Date Value Ref Range Status   04/09/2020 Detected, Abnormal Result (A) NDET^Not Detected ng/mL Final     Comment:     Cutoff for a positive oxycodone is greater than 100 ng/ml.  This is an unconfirmed screening result to be used for medical purposes only.   Order [id number] for confirmation or individual confirmation tests to [employer].       Propoxyphene (Norpropoxyphene)   Date Value Ref Range Status   04/09/2020 Not Detected NDET^Not Detected ng/mL Final     Comment:     Cutoff for a negative propoxyphene is 300 ng/ml or less     Buprenorphine (Buprenorphine)   Date Value Ref Range Status   04/09/2020 Not Detected NDET^Not Detected ng/mL Final     Comment:     Cutoff for a negative buprenorphine is 10 ng/ml or less        Processing:  Rx to be electronically transmitted to pharmacy by provider       [email or web address]       checked in past 3 months?  No, route to RN

## 2020-10-29 RX ORDER — OXYCODONE HYDROCHLORIDE 5 MG/1
TABLET ORAL
Qty: 150 TABLET | Refills: 0 | Status: SHIPPED | OUTPATIENT
Start: 2020-10-29 | End: 2020-12-04

## 2020-11-03 ENCOUNTER — OFFICE VISIT (OUTPATIENT)
Dept: INTERNAL MEDICINE | Facility: CLINIC | Age: 85
End: 2020-11-03
Payer: COMMERCIAL

## 2020-11-03 DIAGNOSIS — G89.4 CHRONIC PAIN SYNDROME: ICD-10-CM

## 2020-11-03 DIAGNOSIS — E78.5 HYPERLIPIDEMIA LDL GOAL <100: ICD-10-CM

## 2020-11-03 DIAGNOSIS — I83.92 VARICOSE VEINS OF LEFT LOWER EXTREMITY, UNSPECIFIED WHETHER COMPLICATED: ICD-10-CM

## 2020-11-03 DIAGNOSIS — E11.8 TYPE 2 DIABETES MELLITUS WITH COMPLICATION, WITHOUT LONG-TERM CURRENT USE OF INSULIN (H): Primary | ICD-10-CM

## 2020-11-03 DIAGNOSIS — J44.9 COPD, SEVERE (H): ICD-10-CM

## 2020-11-03 DIAGNOSIS — M48.062 SPINAL STENOSIS OF LUMBAR REGION WITH NEUROGENIC CLAUDICATION: ICD-10-CM

## 2020-11-03 DIAGNOSIS — N18.1 CKD (CHRONIC KIDNEY DISEASE) STAGE 1, GFR 90 ML/MIN OR GREATER: ICD-10-CM

## 2020-11-03 DIAGNOSIS — I10 BENIGN ESSENTIAL HYPERTENSION: ICD-10-CM

## 2020-11-03 PROCEDURE — 99207 PR FOOT EXAM NO CHARGE: CPT | Performed by: INTERNAL MEDICINE

## 2020-11-03 PROCEDURE — G0008 ADMIN INFLUENZA VIRUS VAC: HCPCS | Performed by: INTERNAL MEDICINE

## 2020-11-03 PROCEDURE — 90662 IIV NO PRSV INCREASED AG IM: CPT | Performed by: INTERNAL MEDICINE

## 2020-11-03 PROCEDURE — 99214 OFFICE O/P EST MOD 30 MIN: CPT | Mod: 25 | Performed by: INTERNAL MEDICINE

## 2020-11-03 RX ORDER — POLYETHYLENE GLYCOL 3350 17 G/17G
0.5 POWDER, FOR SOLUTION ORAL EVERY EVENING
COMMUNITY

## 2020-11-03 RX ORDER — SODIUM PHOSPHATE,MONO-DIBASIC 19G-7G/118
1 ENEMA (ML) RECTAL 2 TIMES DAILY
COMMUNITY
End: 2024-02-06

## 2020-11-03 NOTE — Clinical Note
Please abstract the following data from this visit with this patient into the appropriate field in Epic:    Tests that can be patient reported without a hard copy:    Eye exam with ophthalmology on this date: 9/15/20

## 2020-11-03 NOTE — PATIENT INSTRUCTIONS
Lift the leg up and hold about 2 seconds. Start with 2 sets of 5 on each leg and try to gradually increase to 3 sets of 10 on each leg to help with strength.

## 2020-11-03 NOTE — PROGRESS NOTES
"Subjective     Celeste Chacko is a 89 year old female who presents to clinic today for the following health issues:    HPI         Diabetes Follow-up      How often are you checking your blood sugar? Not at all    What concerns do you have today about your diabetes? None     Do you have any of these symptoms? (Select all that apply)  No numbness or tingling in feet.  No redness, sores or blisters on feet.  No complaints of excessive thirst.  No reports of blurry vision.  No significant changes to weight.      Hyperlipidemia Follow-Up      Are you regularly taking any medication or supplement to lower your cholesterol?   Yes- rosuvastatin    Are you having muscle aches or other side effects that you think could be caused by your cholesterol lowering medication?  Yes- Occationally    Hypertension Follow-up      Do you check your blood pressure regularly outside of the clinic? Yes     Are you following a low salt diet? No    Are your blood pressures ever more than 140 on the top number (systolic) OR more   than 90 on the bottom number (diastolic), for example 140/90? No        How many servings of fruits and vegetables do you eat daily?  4 or more    On average, how many sweetened beverages do you drink each day (Examples: soda, juice, sweet tea, etc.  Do NOT count diet or artificially sweetened beverages)?   0    How many days per week do you exercise enough to make your heart beat faster? 3 or less    How many minutes a day do you exercise enough to make your heart beat faster? 9 or less    How many days per week do you miss taking your medication? 0      Other problems:   1. COPD: breathing is stable  2. Spinal stenosis: has leg \"weakness\" with walking short distance  3. Chronic pain: stable with oxycodone use, no concerns on     Current concerns:   She is concerned varicose veins are causing left leg pain. She saw ortho about her knee and they told her some of pain in leg is from veins. She is wearing a brace " for knee, has just started wearing compression stockings.         Patient Active Problem List   Diagnosis     Disorder of bone and cartilage     COPD, severe (H)     Spinal stenosis of lumbar region with neurogenic claudication     Type 2 diabetes mellitus with complication, without long-term current use of insulin (H)     HYPERLIPIDEMIA LDL GOAL <100     History of basal cell carcinoma     Controlled substance agreement signed     Chronic pain syndrome     Narcotic dependence (H)     Benign essential hypertension     Lumbar radiculopathy     CKD (chronic kidney disease) stage 1, GFR 90 ml/min or greater     Varicose veins of left lower extremity, unspecified whether complicated     Edema, unspecified type       Current Outpatient Medications   Medication Sig Dispense Refill     Acetaminophen (TYLENOL PO) Take 325 mg by mouth 6 times daily Patient takes TID with Oxycodone.        alendronate (FOSAMAX) 70 MG tablet TAKE 1 TABLET (70 MG) BY MOUTH EVERY 7 DAYS 12 tablet 3     amLODIPine (NORVASC) 2.5 MG tablet TAKE 1 TABLET BY MOUTH EVERY DAY 90 tablet 2     ASPIRIN EC PO Take 81 mg by mouth daily       Calcium Polycarbophil (FIBERCON PO) Take 1 tablet by mouth once , one in the morning and one in the evening       calcium-vitamin D (CALTRATE) 600-400 MG-UNIT per tablet Take 1 tablet by mouth daily 1200mg   1000 mg of vitamin d       conjugated estrogens (PREMARIN) 0.625 MG/GM vaginal cream Use twice weekly 0.5 gr applied to the external genital area. 30 g 11     fluticasone-salmeterol (ADVAIR DISKUS) 500-50 MCG/DOSE inhaler Inhale 1 puff into the lungs 2 times daily 3 Inhaler 3     glipiZIDE (GLUCOTROL) 5 MG tablet TAKE 1 TABLET (5 MG) BY MOUTH EVERY MORNING (BEFORE BREAKFAST) 90 tablet 1     glucosamine-chondroitin 500-400 MG CAPS per capsule Take 1 capsule by mouth daily       lisinopril (ZESTRIL) 40 MG tablet Take 1 tablet (40 mg) by mouth daily 90 tablet 3     Multiple Vitamins-Minerals (MULTIVITAMIN ADULT PO)  "Take 1 tablet by mouth daily       NONFORMULARY Natra sleeping med takes 1 at night.       oxyCODONE (ROXICODONE) 5 MG tablet May take 5 tabs per day 150 tablet 0     polyethylene glycol (MIRALAX) 17 g packet Take 1 packet by mouth daily       PROAIR  (90 Base) MCG/ACT inhaler INHALE 2 PUFFS BY MOUTH EVERY 6 HOURS AS NEEDED FOR WHEEZE OR FOR SHORTNESS OF BREATH 8.5 Inhaler 3     rosuvastatin (CRESTOR) 5 MG tablet Take 1 tablet (5 mg) by mouth daily 30 tablet 6     senna-docusate (SENOKOT-S;PERICOLACE) 8.6-50 MG per tablet Take 1 tablet by mouth 2 times daily Reported on 2017       SPIRIVA HANDIHALER 18 MCG inhaled capsule INHALE 1 CAPSULE INTO THE LUNG DAILY 90 capsule 1     triamterene-HCTZ (DYAZIDE) 37.5-25 MG capsule TAKE 1 CAPSULE BY MOUTH EVERY DAY 30 capsule 1       Social History     Tobacco Use     Smoking status: Former Smoker     Packs/day: 0.50     Years: 54.00     Pack years: 27.00     Types: Cigarettes     Quit date: 2000     Years since quittin.5     Smokeless tobacco: Never Used     Tobacco comment: using nicotine gum   Substance Use Topics     Alcohol use: Yes     Alcohol/week: 0.8 standard drinks     Types: 1 Glasses of wine per week     Comment: \"A glass of wine once a week.\"     Drug use: No        ROS:  General: no fever, chills  Weight: stable  Vision:negative. Last eye exam 9/15/2020, some increased pressure so on drops now.  ENT: negative  Respiratory stable2.  Cardiac: no chest pain or pressure  Abdominal: no nausea, vomiting, abdominal pain, bowel changes  Vascular no complaints of claudication  Neurologic:stable complaints of neuropathy  Feet no lesions, in grown nails, edema   : no polyuria, hematuria, dysuria    Objective:  Patient alert in NAD  /78 (BP Location: Right arm, Patient Position: Sitting, Cuff Size: Adult Regular)   Pulse 97   Temp 98.2  F (36.8  C) (Oral)   Resp 18   Wt 62.3 kg (137 lb 4.8 oz)   SpO2 91%   BMI 24.71 kg/m         Wt Readings " from Last 4 Encounters:   11/03/20 62.3 kg (137 lb 4.8 oz)   10/22/20 64 kg (141 lb 3.2 oz)   09/19/20 63.5 kg (140 lb)   09/11/20 64.4 kg (142 lb)       CV: CV: normal S1, S2 without murmur, S3 or S4.  Carotid pulses: full  LUNGS: clear'  2+ edema  Some mild tender veins of legs.   Feet: pulses full, normal capillary refill  No lesions, sores or skin changes  Nails normal  Sensation able to feel fine filament    Lab Results   Component Value Date    A1C 6.7 10/13/2020    A1C 7.1 04/09/2020    A1C 6.9 09/23/2019    A1C 6.9 03/21/2019    A1C 6.7 08/30/2018       Lab Results   Component Value Date    CHOL 176 04/09/2020    HDL 55 04/09/2020    LDL 78 04/09/2020    TRIG 213 04/09/2020    CHOLHDLRATIO 3.4 07/23/2015              Assessment & Plan     Type 2 diabetes mellitus with complication, without long-term current use of insulin (H)  Well controlled, continue med, follow up 6 months     COPD, severe (H)  Stable, continue meds    CKD (chronic kidney disease) stage 1, GFR 90 ml/min or greater  Stable, continue med    Benign essential hypertension  Stable continue med    Hyperlipidemia LDL goal <100  Well controlled, continue med    Chronic pain syndrome  Stable pain med use    Spinal stenosis of lumbar region with neurogenic claudication  Advised that this may be causing some of leg pain    Varicose veins of left lower extremity, unspecified whether complicated  May be part of pain, wear compression stocking, elevate              Return in about 6 months (around 5/3/2021) for Lab with blood and urine, Diabetes, see me a week after lab.    Emily Marie MD  [facility]

## 2020-11-03 NOTE — NURSING NOTE
BP (!) 140/78 (BP Location: Right arm, Patient Position: Sitting, Cuff Size: Adult Regular)   Pulse 97   Temp 98.2  F (36.8  C) (Oral)   Resp 18   Wt 62.3 kg (137 lb 4.8 oz)   SpO2 91%   BMI 24.71 kg/m

## 2020-11-08 VITALS
SYSTOLIC BLOOD PRESSURE: 139 MMHG | OXYGEN SATURATION: 91 % | BODY MASS INDEX: 24.71 KG/M2 | HEART RATE: 97 BPM | DIASTOLIC BLOOD PRESSURE: 78 MMHG | RESPIRATION RATE: 18 BRPM | TEMPERATURE: 98.2 F | WEIGHT: 137.3 LBS

## 2020-11-27 DIAGNOSIS — E11.9 TYPE 2 DIABETES MELLITUS WITHOUT COMPLICATION, WITHOUT LONG-TERM CURRENT USE OF INSULIN (H): ICD-10-CM

## 2020-11-30 RX ORDER — GLIPIZIDE 5 MG/1
TABLET ORAL
Qty: 90 TABLET | Refills: 1 | Status: SHIPPED | OUTPATIENT
Start: 2020-11-30 | End: 2021-08-16

## 2020-12-02 DIAGNOSIS — M48.062 SPINAL STENOSIS OF LUMBAR REGION WITH NEUROGENIC CLAUDICATION: ICD-10-CM

## 2020-12-02 DIAGNOSIS — Z79.899 CONTROLLED SUBSTANCE AGREEMENT SIGNED: ICD-10-CM

## 2020-12-04 DIAGNOSIS — J44.9 COPD, SEVERE (H): ICD-10-CM

## 2020-12-04 RX ORDER — OXYCODONE HYDROCHLORIDE 5 MG/1
TABLET ORAL
Qty: 150 TABLET | Refills: 0 | Status: SHIPPED | OUTPATIENT
Start: 2020-12-04 | End: 2021-01-06

## 2020-12-04 NOTE — TELEPHONE ENCOUNTER
Controlled Substance Refill Request for Oxycodone  Problem List Complete:    Yes    Last Written Prescription Date:  10/29/20  Last Fill Quantity: 150,   # refills: 0    Last Office Visit with [facility] primary care provider: 11/3/20    Future Office visit:     Controlled substance agreement:   Encounter-Level CSA - 04/21/2016:    Controlled Substance Agreement - Scan on 5/3/2016  1:00 PM: [place] Controlled Substance Agreement, 4/25/16     Patient-Level CSA:    There are no patient-level csa.         Last Urine Drug Screen: No results found for: CDAUT, No results found for: COMDAT,   Cannabinoids (34-pot-1-carboxy-9-THC)   Date Value Ref Range Status   04/09/2020 Not Detected NDET^Not Detected ng/mL Final     Comment:     Cutoff for a negative cannabinoid is 50 ng/mL or less.     Phencyclidine (Phencyclidine)   Date Value Ref Range Status   04/09/2020 Not Detected NDET^Not Detected ng/mL Final     Comment:     Cutoff for a negative PCP is 25 ng/mL or less.     Cocaine (Benzoylecgonine)   Date Value Ref Range Status   04/09/2020 Not Detected NDET^Not Detected ng/mL Final     Comment:     Cutoff for a negative cocaine is 150 ng/ml or less.     Methamphetamine (d-Methamphetamine)   Date Value Ref Range Status   04/09/2020 Not Detected NDET^Not Detected ng/mL Final     Comment:     Cutoff for a negative methamphetamine is 500 ng/ml or less.     Opiates (Morphine)   Date Value Ref Range Status   04/09/2020 Not Detected NDET^Not Detected ng/mL Final     Comment:     Cutoff for a negative opiate is 100 ng/ml or less.     Amphetamine (d-Amphetamine)   Date Value Ref Range Status   04/09/2020 Not Detected NDET^Not Detected ng/mL Final     Comment:     Cutoff for a negative amphetamine is 500 ng/mL or less.     Benzodiazepines (Nordiazepam)   Date Value Ref Range Status   04/09/2020 Not Detected NDET^Not Detected ng/mL Final     Comment:     Cutoff for a negative benzodiazepine is 150 ng/ml or less.     Tricyclic Antidepressants  (Desipramine)   Date Value Ref Range Status   04/09/2020 Not Detected NDET^Not Detected ng/mL Final     Comment:     Cutoff for a negative tricyclic antidepressant is 300 ng/ml or less.     Methadone (Methadone)   Date Value Ref Range Status   04/09/2020 Not Detected NDET^Not Detected ng/mL Final     Comment:     Cutoff for a negative methadone is 200 ng/ml or less.     Barbiturates (Butalbital)   Date Value Ref Range Status   04/09/2020 Not Detected NDET^Not Detected ng/mL Final     Comment:     Cutoff for a negative barbituate is 200 ng/ml or less.     Oxycodone (Oxycodone)   Date Value Ref Range Status   04/09/2020 Detected, Abnormal Result (A) NDET^Not Detected ng/mL Final     Comment:     Cutoff for a positive oxycodone is greater than 100 ng/ml.  This is an unconfirmed screening result to be used for medical purposes only.   Order [id number] for confirmation or individual confirmation tests to [facility].       Propoxyphene (Norpropoxyphene)   Date Value Ref Range Status   04/09/2020 Not Detected NDET^Not Detected ng/mL Final     Comment:     Cutoff for a negative propoxyphene is 300 ng/ml or less     Buprenorphine (Buprenorphine)   Date Value Ref Range Status   04/09/2020 Not Detected NDET^Not Detected ng/mL Final     Comment:     Cutoff for a negative buprenorphine is 10 ng/ml or less        Processing:  Rx to be electronically transmitted to pharmacy by provider     [email or web address]   checked in past 3 months?  Yes 12/4/20 - no concerns

## 2020-12-07 NOTE — TELEPHONE ENCOUNTER
Prescription approved per [facility], [facility] or [facility] refill protocol.  Bria HOFFMAN - Registered Nurse  [facility]  Acute and Diagnostic Services

## 2020-12-15 DIAGNOSIS — E78.5 HYPERLIPIDEMIA LDL GOAL <100: ICD-10-CM

## 2020-12-16 RX ORDER — ROSUVASTATIN CALCIUM 5 MG/1
5 TABLET, COATED ORAL DAILY
Qty: 90 TABLET | Refills: 1 | Status: SHIPPED | OUTPATIENT
Start: 2020-12-16 | End: 2021-06-23

## 2020-12-16 NOTE — TELEPHONE ENCOUNTER
Prescription approved per [facility] Refill Protocol.  Holden Philippe RN, BSN

## 2021-01-05 DIAGNOSIS — M85.80 OSTEOPENIA, UNSPECIFIED LOCATION: ICD-10-CM

## 2021-01-05 DIAGNOSIS — M48.062 SPINAL STENOSIS OF LUMBAR REGION WITH NEUROGENIC CLAUDICATION: ICD-10-CM

## 2021-01-06 ENCOUNTER — OFFICE VISIT (OUTPATIENT)
Dept: URGENT CARE | Facility: URGENT CARE | Age: 86
End: 2021-01-06
Payer: COMMERCIAL

## 2021-01-06 VITALS — SYSTOLIC BLOOD PRESSURE: 162 MMHG | DIASTOLIC BLOOD PRESSURE: 78 MMHG | TEMPERATURE: 99.1 F | HEART RATE: 94 BPM

## 2021-01-06 DIAGNOSIS — L03.031 PARONYCHIA OF TOE, RIGHT: Primary | ICD-10-CM

## 2021-01-06 PROCEDURE — 99213 OFFICE O/P EST LOW 20 MIN: CPT | Performed by: STUDENT IN AN ORGANIZED HEALTH CARE EDUCATION/TRAINING PROGRAM

## 2021-01-06 RX ORDER — OXYCODONE HYDROCHLORIDE 5 MG/1
TABLET ORAL
Qty: 150 TABLET | Refills: 0 | Status: SHIPPED | OUTPATIENT
Start: 2021-01-06 | End: 2021-02-11

## 2021-01-06 RX ORDER — ALENDRONATE SODIUM 70 MG/1
TABLET ORAL
Qty: 12 TABLET | Refills: 0 | Status: SHIPPED | OUTPATIENT
Start: 2021-01-06 | End: 2021-01-12

## 2021-01-06 RX ORDER — CEPHALEXIN 500 MG/1
500 CAPSULE ORAL 2 TIMES DAILY
Qty: 10 CAPSULE | Refills: 0 | Status: SHIPPED | OUTPATIENT
Start: 2021-01-06 | End: 2021-01-11

## 2021-01-06 RX ORDER — ALENDRONATE SODIUM 70 MG/1
TABLET ORAL
Qty: 12 TABLET | Refills: 3 | OUTPATIENT
Start: 2021-01-06

## 2021-01-06 NOTE — PATIENT INSTRUCTIONS
Soak the toe in warm soapy water for pain control.   Please take keflex twice a day for 5 day.   Make appointment with podiatry for nail removal.       Patient Education     Paronychia of the Finger or Toe  Paronychia is an infection near a fingernail or toenail. It usually occurs when an opening in the cuticle or an ingrown toenail lets bacteria under the skin.   The infection will need to be drained if pus is present. If the infection has been caught early, you may need only antibiotic treatment. Healing will take about 1 to 2 weeks.   Home care  Follow these guidelines when caring for yourself at home:     Clean and soak the toe or finger. Do this 2 times a day for the first 3 days. To do so:  ? Soak your foot or hand in a tub of warm water for 5 minutes. Or hold your toe or finger under a faucet of warm running water for 5 minutes.  ? Clean any crust away with soap and water using a cotton swab.  ? Put antibiotic ointment on the infected area.    Change the dressing daily or any time it gets dirty.    If you were given antibiotics, take them as directed until they are all gone.    If your infection is on a toe, wear comfortable shoes with a lot of toe room. You can also wear open-toed sandals while your toe heals.    You may use over-the-counter medicine (acetaminophen or ibuprofen) to help with pain, unless another medicine was prescribed. If you have chronic liver or kidney disease, talk with your healthcare provider before using these medicines. Also talk with your provider if you've had a stomach ulcer or gastrointestinal bleeding.  Prevention  The following can prevent paronychia:     Don't cut or play with your cuticles at home. A healthy cuticle maintains a seal between your skin and nail and keeps out infection.    Don't bite your nails.    Don't suck on your thumbs or fingers.  Follow-up care  Follow up with your healthcare provider, or as advised.   When to seek medical advice  Call your healthcare  provider right away if any of these occur:     Redness, pain, or swelling of the finger or toe gets worse    You have trouble moving or bending the finger or toe    Red streaks in the skin leading away from the wound    Pus or fluid draining from the nail area    Fever of 100.4 F (38 C) or higher, or as directed by your provider  Erick last reviewed this educational content on 7/1/2019 2000-2020 [employer]. [address] [place] [address], [place] [id number]. All rights reserved. This information is not intended as a substitute for professional medical care. Always follow your healthcare professional's instructions.

## 2021-01-06 NOTE — PROGRESS NOTES
Assessment & Plan     Paronychia of toe, right  Fungal infection of toe nail on right 1st MTP w/ mild lateral paronychia w/o purulent fluid drainage or fluid collection.   - warm soaks w/ soap BID  - cephALEXin (KEFLEX) 500 MG capsule; Take 1 capsule (500 mg) by mouth 2 times daily for 5 days  - F/up w/ podiatry for Onychomycosis and possible nail removal     #HTN   Hypertensive today in the ED, although previously well controlled at home (SBP <140). Will monitor at home and call PCP if >150 wihtin next week.       30 minutes spent on the date of the encounter doing chart review, patient visit and documentation     Follow up if no improvement or worsening symptoms after 5 days of antibiotics.     Josie Kerr MD  [facility]    Subjective       HPI     Celeste is an 87 yr old F w/ chronic lumbar back pain, DM2 (well controlled), HTN, here for 3 days of increased right 1st toe pain and redness surrounding a chronically infected nail w/ fungus.     She was meant to have the nail removed by podiatry however never scheduled a visit due to holidays. She reports increased redness without drainage of any purulent material over last three days. Increased pain and warmth over the toe. Hx of toe infections in same toe, last treatted she thinks a couple months ago. Her diabetes is well controlled w/ last A1C <7.     She denies fevers, cough or any chills.      Review of Systems    ROS: 10 point ROS neg other than the symptoms noted above in the HPI.        Objective    BP (!) 162/78   Pulse 94   Temp 99.1  F (37.3  C) (Temporal)   There is no height or weight on file to calculate BMI.  Physical Exam   Gen: no acute distress, well nourished and developed  MSK: 1st metatarsal on right w/ mild erythema on medial aspect of tow nail, nail with onychomycosis. No drainage, no fluctuance.

## 2021-01-11 ENCOUNTER — TELEPHONE (OUTPATIENT)
Dept: INTERNAL MEDICINE | Facility: CLINIC | Age: 86
End: 2021-01-11

## 2021-01-11 DIAGNOSIS — M85.80 OSTEOPENIA, UNSPECIFIED LOCATION: ICD-10-CM

## 2021-01-11 DIAGNOSIS — I10 BENIGN ESSENTIAL HYPERTENSION: ICD-10-CM

## 2021-01-12 RX ORDER — ALENDRONATE SODIUM 70 MG/1
TABLET ORAL
Qty: 12 TABLET | Refills: 1 | Status: SHIPPED | OUTPATIENT
Start: 2021-01-12 | End: 2021-06-30

## 2021-01-12 RX ORDER — AMLODIPINE BESYLATE 2.5 MG/1
2.5 TABLET ORAL DAILY
Qty: 90 TABLET | Refills: 1 | Status: SHIPPED | OUTPATIENT
Start: 2021-01-12 | End: 2021-07-19

## 2021-01-12 NOTE — TELEPHONE ENCOUNTER
Amlodipine and alendronate to new pharmacy.     Prescription approved per [facility] Refill Protocol.

## 2021-01-25 DIAGNOSIS — J44.9 CHRONIC OBSTRUCTIVE PULMONARY DISEASE, UNSPECIFIED COPD TYPE (H): ICD-10-CM

## 2021-01-26 RX ORDER — TIOTROPIUM BROMIDE 18 UG/1
CAPSULE ORAL; RESPIRATORY (INHALATION)
Qty: 90 CAPSULE | Refills: 1 | Status: SHIPPED | OUTPATIENT
Start: 2021-01-26 | End: 2021-07-19

## 2021-02-02 ENCOUNTER — TRANSFERRED RECORDS (OUTPATIENT)
Dept: HEALTH INFORMATION MANAGEMENT | Facility: CLINIC | Age: 86
End: 2021-02-02

## 2021-02-08 DIAGNOSIS — I10 BENIGN ESSENTIAL HYPERTENSION: ICD-10-CM

## 2021-02-08 DIAGNOSIS — M48.062 SPINAL STENOSIS OF LUMBAR REGION WITH NEUROGENIC CLAUDICATION: ICD-10-CM

## 2021-02-08 NOTE — TELEPHONE ENCOUNTER
"Requested Prescriptions   Pending Prescriptions Disp Refills     lisinopril (ZESTRIL) 40 MG tablet  Last Written Prescription Date:  04/14/20  Last Fill Quantity: 90,  # refills: 3   Last office visit: 11/3/2020 with prescribing provider:  11/03/20   Future Office Visit:           90 tablet 3     Sig: Take 1 tablet (40 mg) by mouth daily       ACE Inhibitors (Including Combos) Protocol Failed - 2/8/2021  2:06 PM        Failed - Blood pressure under 140/90 in past 12 months     BP Readings from Last 3 Encounters:   01/06/21 (!) 162/78   11/03/20 139/78   10/22/20 126/68                 Passed - Recent (12 mo) or future (30 days) visit within the authorizing provider's specialty     Patient has had an office visit with the authorizing provider or a provider within the authorizing providers department within the previous 12 mos or has a future within next 30 days. See \"Patient Info\" tab in inbasket, or \"Choose Columns\" in Meds & Orders section of the refill encounter.              Passed - Medication is active on med list        Passed - Patient is age 18 or older        Passed - No active pregnancy on record        Passed - Normal serum creatinine on file in past 12 months     Recent Labs   Lab Test 10/13/20  0805   CR 0.57       Ok to refill medication if creatinine is low          Passed - Normal serum potassium on file in past 12 months     Recent Labs   Lab Test 10/13/20  0805   POTASSIUM 3.6             Passed - No positive pregnancy test within past 12 months           Oxycodone      Last Written Prescription Date:  01/06/21  Last Fill Quantity: , 150  # refills: 0  Last Office Visit: 11/03/20  Future Office visit:       Routing refill request to provider for review/approval because:  Drug not on the G, P or [facility] refill protocol or controlled substance    "

## 2021-02-10 NOTE — TELEPHONE ENCOUNTER
Lisinopril  Routing refill request to provider for review/approval because:  Blood pressures out of range      BP Readings from Last 3 Encounters:   01/06/21 (!) 162/78   11/03/20 139/78   10/22/20 126/68     Controlled Substance Refill Request for Oxycodone  Problem List Complete:    No     PROVIDER TO CONSIDER COMPLETION OF PROBLEM LIST AND OVERVIEW/CONTROLLED SUBSTANCE AGREEMENT    Last Written Prescription Date:  1/6/21  Last Fill Quantity: 150,   # refills: 0    Last Office Visit with [facility] primary care provider: 11/3/20    Future Office visit:     Controlled substance agreement:   Encounter-Level CSA - 04/21/2016:    Controlled Substance Agreement - Scan on 5/3/2016  1:00 PM: [place] Controlled Substance Agreement, 4/25/16     Patient-Level CSA:    There are no patient-level csa.         Last Urine Drug Screen: No results found for: CDAUT, No results found for: COMDAT,   Cannabinoids (90-fqk-4-carboxy-9-THC)   Date Value Ref Range Status   04/09/2020 Not Detected NDET^Not Detected ng/mL Final     Comment:     Cutoff for a negative cannabinoid is 50 ng/mL or less.     Phencyclidine (Phencyclidine)   Date Value Ref Range Status   04/09/2020 Not Detected NDET^Not Detected ng/mL Final     Comment:     Cutoff for a negative PCP is 25 ng/mL or less.     Cocaine (Benzoylecgonine)   Date Value Ref Range Status   04/09/2020 Not Detected NDET^Not Detected ng/mL Final     Comment:     Cutoff for a negative cocaine is 150 ng/ml or less.     Methamphetamine (d-Methamphetamine)   Date Value Ref Range Status   04/09/2020 Not Detected NDET^Not Detected ng/mL Final     Comment:     Cutoff for a negative methamphetamine is 500 ng/ml or less.     Opiates (Morphine)   Date Value Ref Range Status   04/09/2020 Not Detected NDET^Not Detected ng/mL Final     Comment:     Cutoff for a negative opiate is 100 ng/ml or less.     Amphetamine (d-Amphetamine)   Date Value Ref Range Status   04/09/2020 Not Detected NDET^Not Detected ng/mL  Final     Comment:     Cutoff for a negative amphetamine is 500 ng/mL or less.     Benzodiazepines (Nordiazepam)   Date Value Ref Range Status   04/09/2020 Not Detected NDET^Not Detected ng/mL Final     Comment:     Cutoff for a negative benzodiazepine is 150 ng/ml or less.     Tricyclic Antidepressants (Desipramine)   Date Value Ref Range Status   04/09/2020 Not Detected NDET^Not Detected ng/mL Final     Comment:     Cutoff for a negative tricyclic antidepressant is 300 ng/ml or less.     Methadone (Methadone)   Date Value Ref Range Status   04/09/2020 Not Detected NDET^Not Detected ng/mL Final     Comment:     Cutoff for a negative methadone is 200 ng/ml or less.     Barbiturates (Butalbital)   Date Value Ref Range Status   04/09/2020 Not Detected NDET^Not Detected ng/mL Final     Comment:     Cutoff for a negative barbituate is 200 ng/ml or less.     Oxycodone (Oxycodone)   Date Value Ref Range Status   04/09/2020 Detected, Abnormal Result (A) NDET^Not Detected ng/mL Final     Comment:     Cutoff for a positive oxycodone is greater than 100 ng/ml.  This is an unconfirmed screening result to be used for medical purposes only.   Order [id number] for confirmation or individual confirmation tests to [facility].       Propoxyphene (Norpropoxyphene)   Date Value Ref Range Status   04/09/2020 Not Detected NDET^Not Detected ng/mL Final     Comment:     Cutoff for a negative propoxyphene is 300 ng/ml or less     Buprenorphine (Buprenorphine)   Date Value Ref Range Status   04/09/2020 Not Detected NDET^Not Detected ng/mL Final     Comment:     Cutoff for a negative buprenorphine is 10 ng/ml or less         [email or web address]       checked in past 3 months?  Yes 12/4/20

## 2021-02-11 RX ORDER — OXYCODONE HYDROCHLORIDE 5 MG/1
TABLET ORAL
Qty: 150 TABLET | Refills: 0 | Status: SHIPPED | OUTPATIENT
Start: 2021-02-11 | End: 2021-03-17

## 2021-02-11 RX ORDER — LISINOPRIL 40 MG/1
40 TABLET ORAL DAILY
Qty: 90 TABLET | Refills: 3 | Status: SHIPPED | OUTPATIENT
Start: 2021-02-11 | End: 2021-11-19

## 2021-02-18 ENCOUNTER — OFFICE VISIT (OUTPATIENT)
Dept: PODIATRY | Facility: CLINIC | Age: 86
End: 2021-02-18
Payer: COMMERCIAL

## 2021-02-18 VITALS
DIASTOLIC BLOOD PRESSURE: 60 MMHG | SYSTOLIC BLOOD PRESSURE: 128 MMHG | BODY MASS INDEX: 23.92 KG/M2 | HEIGHT: 63 IN | WEIGHT: 135 LBS

## 2021-02-18 DIAGNOSIS — L60.3 DYSTROPHIC NAIL: ICD-10-CM

## 2021-02-18 DIAGNOSIS — B35.1 ONYCHOMYCOSIS DUE TO DERMATOPHYTE: Primary | ICD-10-CM

## 2021-02-18 PROCEDURE — 99213 OFFICE O/P EST LOW 20 MIN: CPT | Mod: 25 | Performed by: PODIATRIST

## 2021-02-18 PROCEDURE — 11730 AVULSION NAIL PLATE SIMPLE 1: CPT | Mod: T5 | Performed by: PODIATRIST

## 2021-02-18 ASSESSMENT — MIFFLIN-ST. JEOR: SCORE: 1006.49

## 2021-02-18 NOTE — PATIENT INSTRUCTIONS
Thank you for choosing [facility] / Foot & Ankle Surgery!    DR. MENG'S CLINIC LOCATIONS:   [place] [id number] [facility] [address]   [place] [address]                        [place], [place] [id number] [phone]      SET UP SURGERY: [phone]   APPOINTMENTS: [phone]   BILLING QUESTIONS: [phone]   FAX NUMBER: [phone]     INGROWN TOENAIL REMOVAL AFTERCARE  1. After the procedure, go home and elevate the foot/feet for the remainder of the day/evening as able. This is to minimize swelling, control pain, and limit post-procedural complications. The pre-procedural injection may cause your toe to be numb anywhere from 1-2 hours.    2. You can take Tylenol, Ibuprofen, Advil, etc as needed for pain if tolerated. Follow label instructions.     3. If you have been given a prescription for antibiotics, take them as instructed and complete the entire prescription.    4. Keep dressing intact until the following morning. Then remove the bandage (you may need to soak it in warm soapy water as the bandage will likely adhere to your skin).    5. Start soaking in warm soapy water for 5-10 minutes twice a day. Wash the toe thoroughly, dry the toe thoroughly. Apply antibiotic wound ointment to base of wound and cover with gauze and Coban dressing (not too tightly) until it stops draining. This may take a few days to weeks, but at that point, you may continue with antibiotic ointment and a band-aid, or you may stop applying a dressing all together. Dressing changes should be done twice daily if you had the permanent/chemical procedure done.    6. You may do activities as tolerated the following day. Find a shoe that is comfortable and minimizes the amount of rubbing on your toe, as this may increase pain, swelling, etc.    7. Monitor for signs of infection. With this  procedure, it is common to have mild surrounding redness and drainage. If the redness involves the entire toe or if you notice red streaks on top of your foot, or if you experience any nausea, vomiting, chills, fevers > 101 degrees, call clinic for a quick appointment.

## 2021-02-18 NOTE — PROGRESS NOTES
"Foot & Ankle Surgery   February 18, 2021    S:  Pt is seen today for evaluation of R hallux nail.  We have seen her in the past for this.  She was last seen approx 5 months ago.  She presents today with issues, including pain with pressure.    Vitals:    02/18/21 1337   BP: 128/60   Weight: 61.2 kg (135 lb)   Height: 1.6 m (5' 3\")   '      ROS - Pos for CC.  Patient denies current nausea, vomiting, chills, fevers, belly pain, calf pain, chest pain or SOB.  Complete remainder of ROS it otherwise neg.      PE:  Gen:   No apparent distress  Eye:    Visual scanning without deficit  Ear:    Response to auditory stimuli wnl  Lung:    Non-labored breathing on RA noted  Abd:    NTND per patient report  Lymph:    Neg for pitting/non-pitting edema BLE  Vasc:    Pulses palpable, CFT minimally delayed  Neuro:    Light touch sensation intact to all sensory nerve distributions without paresthesias  Derm:    R hallux nail thickened, dystrophic, mycotic without paronychia/infection  MSK:    ROM, strength wnl without limitation, no pain on palpation noted.  Calf:    Neg for redness, swelling or tenderness    Assessment:  89 year old female with dystrophic/mycotic painful R hallux nail      Plan:  Discussed etiologies, anatomy and options  1.  Dystrophic/mycotic painful R hallux nail  -Regarding the nail, treatment options were discussed and reviewed agin.  After discussing pros/cons of each approach, they elected to proceed with a procedure, Total temporary avulsion.  See procedure note for details.  Risks that were discussed include but are not limited to infection, wound healing complications, nerve irritation, recurrence of the ingrown nail and the need for further procedures.  Antibiotic:  None needed  -she takes oxycodone 5 times daily, as well as 650mg Tylenol 4 times daily.  Discussed max daily dosing for tylenol is 4000mg, so she can slightly increase this without going over the maximum daily dose.      After discussing the " procedure, as well as risks, complications and post-procedure instructions, informed consent was obtained.    Anesthesia:  5 cc's of  1% lidocaine plain    Procedure:  After adequate prep, and with anesthesia achieved,  attention was directed to the R hallux where the nail plate was freed from surrounding soft tissue and then removed in total.  The base of the wound was explored and showed no necrotic tissue, purulence or debris.   A clean dressing was applied loosely to prevent vascular insult.  The patient tolerated the procedure well without complications.    Post-procedural instructions were dispensed and discussed with the patient.  All questions were answered.      Follow up:  prn or sooner with acute issues           Tristan Michael DPM FACFAS FACFAOM  Podiatric Foot & Ankle Surgeon  [facility]  [phone]

## 2021-02-24 ENCOUNTER — IMMUNIZATION (OUTPATIENT)
Dept: PEDIATRICS | Facility: CLINIC | Age: 86
End: 2021-02-24
Payer: COMMERCIAL

## 2021-02-24 PROCEDURE — 91300 PR COVID VAC PFIZER DIL RECON 30 MCG/0.3 ML IM: CPT

## 2021-02-24 PROCEDURE — 0001A PR COVID VAC PFIZER DIL RECON 30 MCG/0.3 ML IM: CPT

## 2021-03-15 DIAGNOSIS — M48.062 SPINAL STENOSIS OF LUMBAR REGION WITH NEUROGENIC CLAUDICATION: ICD-10-CM

## 2021-03-16 PROBLEM — F11.90 CHRONIC, CONTINUOUS USE OF OPIOIDS: Status: ACTIVE | Noted: 2021-03-16

## 2021-03-17 ENCOUNTER — IMMUNIZATION (OUTPATIENT)
Dept: PEDIATRICS | Facility: CLINIC | Age: 86
End: 2021-03-17
Attending: INTERNAL MEDICINE
Payer: COMMERCIAL

## 2021-03-17 PROCEDURE — 91300 PR COVID VAC PFIZER DIL RECON 30 MCG/0.3 ML IM: CPT

## 2021-03-17 PROCEDURE — 0002A PR COVID VAC PFIZER DIL RECON 30 MCG/0.3 ML IM: CPT

## 2021-03-17 RX ORDER — OXYCODONE HYDROCHLORIDE 5 MG/1
TABLET ORAL
Qty: 150 TABLET | Refills: 0 | Status: SHIPPED | OUTPATIENT
Start: 2021-03-17 | End: 2021-04-19

## 2021-03-17 NOTE — TELEPHONE ENCOUNTER
As covering provider, last CSA agreement signed was 2016.  Last Urine Drug screen 4/2020.  Checked  system.  Approved for now.  Will be needing review of contract and annual drug screen on future refills with PCP.  Notify patient.

## 2021-03-17 NOTE — TELEPHONE ENCOUNTER
Controlled Substance Refill Request for Oxycodone  Problem List Complete:    No     PROVIDER TO CONSIDER COMPLETION OF PROBLEM LIST AND OVERVIEW/CONTROLLED SUBSTANCE AGREEMENT    Last Written Prescription Date:  2/11/21  Last Fill Quantity: 150,   # refills: 0    Last Office Visit with [facility] primary care provider: 11/3/20    Future Office visit:     Controlled substance agreement:   Encounter-Level CSA - 04/21/2016:    Controlled Substance Agreement - Scan on 5/3/2016  1:00 PM: [place] Controlled Substance Agreement, 4/25/16     Patient-Level CSA:    There are no patient-level csa.         Last Urine Drug Screen: No results found for: CDAUT, No results found for: COMDAT,   Cannabinoids (81-bye-4-carboxy-9-THC)   Date Value Ref Range Status   04/09/2020 Not Detected NDET^Not Detected ng/mL Final     Comment:     Cutoff for a negative cannabinoid is 50 ng/mL or less.     Phencyclidine (Phencyclidine)   Date Value Ref Range Status   04/09/2020 Not Detected NDET^Not Detected ng/mL Final     Comment:     Cutoff for a negative PCP is 25 ng/mL or less.     Cocaine (Benzoylecgonine)   Date Value Ref Range Status   04/09/2020 Not Detected NDET^Not Detected ng/mL Final     Comment:     Cutoff for a negative cocaine is 150 ng/ml or less.     Methamphetamine (d-Methamphetamine)   Date Value Ref Range Status   04/09/2020 Not Detected NDET^Not Detected ng/mL Final     Comment:     Cutoff for a negative methamphetamine is 500 ng/ml or less.     Opiates (Morphine)   Date Value Ref Range Status   04/09/2020 Not Detected NDET^Not Detected ng/mL Final     Comment:     Cutoff for a negative opiate is 100 ng/ml or less.     Amphetamine (d-Amphetamine)   Date Value Ref Range Status   04/09/2020 Not Detected NDET^Not Detected ng/mL Final     Comment:     Cutoff for a negative amphetamine is 500 ng/mL or less.     Benzodiazepines (Nordiazepam)   Date Value Ref Range Status   04/09/2020 Not Detected NDET^Not Detected ng/mL Final     Comment:      Cutoff for a negative benzodiazepine is 150 ng/ml or less.     Tricyclic Antidepressants (Desipramine)   Date Value Ref Range Status   04/09/2020 Not Detected NDET^Not Detected ng/mL Final     Comment:     Cutoff for a negative tricyclic antidepressant is 300 ng/ml or less.     Methadone (Methadone)   Date Value Ref Range Status   04/09/2020 Not Detected NDET^Not Detected ng/mL Final     Comment:     Cutoff for a negative methadone is 200 ng/ml or less.     Barbiturates (Butalbital)   Date Value Ref Range Status   04/09/2020 Not Detected NDET^Not Detected ng/mL Final     Comment:     Cutoff for a negative barbituate is 200 ng/ml or less.     Oxycodone (Oxycodone)   Date Value Ref Range Status   04/09/2020 Detected, Abnormal Result (A) NDET^Not Detected ng/mL Final     Comment:     Cutoff for a positive oxycodone is greater than 100 ng/ml.  This is an unconfirmed screening result to be used for medical purposes only.   Order [id number] for confirmation or individual confirmation tests to [facility].       Propoxyphene (Norpropoxyphene)   Date Value Ref Range Status   04/09/2020 Not Detected NDET^Not Detected ng/mL Final     Comment:     Cutoff for a negative propoxyphene is 300 ng/ml or less     Buprenorphine (Buprenorphine)   Date Value Ref Range Status   04/09/2020 Not Detected NDET^Not Detected ng/mL Final     Comment:     Cutoff for a negative buprenorphine is 10 ng/ml or less        [place] reviewed:  reviewed- no concerns  [place]:  [email or web address]

## 2021-04-17 ENCOUNTER — OFFICE VISIT (OUTPATIENT)
Dept: URGENT CARE | Facility: URGENT CARE | Age: 86
End: 2021-04-17
Payer: COMMERCIAL

## 2021-04-17 VITALS
TEMPERATURE: 98.7 F | DIASTOLIC BLOOD PRESSURE: 76 MMHG | WEIGHT: 134 LBS | BODY MASS INDEX: 23.74 KG/M2 | OXYGEN SATURATION: 93 % | RESPIRATION RATE: 20 BRPM | SYSTOLIC BLOOD PRESSURE: 144 MMHG | HEART RATE: 79 BPM

## 2021-04-17 DIAGNOSIS — L03.031 PARONYCHIA OF TOE, RIGHT: Primary | ICD-10-CM

## 2021-04-17 PROCEDURE — 99213 OFFICE O/P EST LOW 20 MIN: CPT | Performed by: PHYSICIAN ASSISTANT

## 2021-04-17 RX ORDER — CEPHALEXIN 500 MG/1
500 CAPSULE ORAL 3 TIMES DAILY
Qty: 21 CAPSULE | Refills: 0 | Status: SHIPPED | OUTPATIENT
Start: 2021-04-17 | End: 2021-04-24

## 2021-04-17 NOTE — PATIENT INSTRUCTIONS
(L03.031) Paronychia of toe, right  (primary encounter diagnosis)  Comment:   Plan: cephALEXin (KEFLEX) 500 MG capsule              Patient Education     Paronychia of the Finger or Toe  Paronychia is an infection near a fingernail or toenail. It usually occurs when an opening in the cuticle or an ingrown toenail lets bacteria under the skin.   The infection will need to be drained if pus is present. If the infection has been caught early, you may need only antibiotic treatment. Healing will take about 1 to 2 weeks.   Home care  Follow these guidelines when caring for yourself at home:     Clean and soak the toe or finger. Do this 2 times a day for the first 3 days. To do so:  ? Soak your foot or hand in a tub of warm water for 5 minutes. Or hold your toe or finger under a faucet of warm running water for 5 minutes.  ? Clean any crust away with soap and water using a cotton swab.  ? Put antibiotic ointment on the infected area.    Change the dressing daily or any time it gets dirty.    If you were given antibiotics, take them as directed until they are all gone.    If your infection is on a toe, wear comfortable shoes with a lot of toe room. You can also wear open-toed sandals while your toe heals.    You may use over-the-counter medicine (acetaminophen or ibuprofen) to help with pain, unless another medicine was prescribed. If you have chronic liver or kidney disease, talk with your healthcare provider before using these medicines. Also talk with your provider if you've had a stomach ulcer or gastrointestinal bleeding.  Prevention  The following can prevent paronychia:     Don't cut or play with your cuticles at home. A healthy cuticle maintains a seal between your skin and nail and keeps out infection.    Don't bite your nails.    Don't suck on your thumbs or fingers.  Follow-up care  Follow up with your healthcare provider, or as advised.   When to seek medical advice  Call your healthcare provider right away if  any of these occur:     Redness, pain, or swelling of the finger or toe gets worse    You have trouble moving or bending the finger or toe    Red streaks in the skin leading away from the wound    Pus or fluid draining from the nail area    Fever of 100.4 F (38 C) or higher, or as directed by your provider  Erick last reviewed this educational content on 7/1/2019 2000-2021 [employer]. All rights reserved. This information is not intended as a substitute for professional medical care. Always follow your healthcare professional's instructions.

## 2021-04-17 NOTE — PROGRESS NOTES
(L03.031) Paronychia of toe, right  (primary encounter diagnosis)  Comment:   Plan: cephALEXin (KEFLEX) 500 MG capsule        See handout      SUBJECTIVE:   Celeste Chacko is a 89 year old female who presents today with right great toe pain.  Had nail removed about 6 weeks ago    used buffer to callous on medial aspect of right great toe, pain started shortly after that.        Past Medical History:   Diagnosis Date     Basal cell carcinoma      Chronic airway obstruction, not elsewhere classified      Complete rupture of rotator cuff      Disorder of bone and cartilage, unspecified      History of blood transfusion      Mixed hyperlipidemia 4/00     Osteoarthritis      Personal history of other malignant neoplasm of skin      Spinal stenosis      Type II or unspecified type diabetes mellitus without mention of complication, not stated as uncontrolled          Current Outpatient Medications   Medication Sig Dispense Refill     Multiple Vitamins-Iron (DAILY-SHAHRAM/IRON/BETA-CAROTENE) TABS TAKE 1 TABLET BY MOUTH DAILY. (Patient not taking: Reported on 10/19/2020) 30 tablet 7     Social History     Tobacco Use     Smoking status: Never Smoker     Smokeless tobacco: Never Used   Substance Use Topics     Alcohol use: Not on file     Family History   Problem Relation Age of Onset     Diabetes Mother      Diabetes Father          ROS:    10 point ROS of systems including Constitutional, Eyes, Respiratory, Cardiovascular, Gastroenterology, Genitourinary, Integumentary, Muscularskeletal, Psychiatric ,neurological were all negative except for pertinent positives noted in my HPI       OBJECTIVE:  BP (!) 144/76   Pulse 79   Temp 98.7  F (37.1  C)   Resp 20   Wt 60.8 kg (134 lb)   SpO2 93%   BMI 23.74 kg/m    Physical Exam:  GENERAL APPEARANCE: healthy, alert and no distress  Extremities: right great toe: erythematous skin at medial aspect.   Callous at medial aspect  SKIN: erythema at medial aspect of toe without purulent  drainage.

## 2021-04-19 DIAGNOSIS — M48.062 SPINAL STENOSIS OF LUMBAR REGION WITH NEUROGENIC CLAUDICATION: ICD-10-CM

## 2021-04-19 RX ORDER — OXYCODONE HYDROCHLORIDE 5 MG/1
TABLET ORAL
Qty: 150 TABLET | Refills: 0 | Status: SHIPPED | OUTPATIENT
Start: 2021-04-19 | End: 2021-05-10

## 2021-04-27 ENCOUNTER — OFFICE VISIT (OUTPATIENT)
Dept: PODIATRY | Facility: CLINIC | Age: 86
End: 2021-04-27
Payer: COMMERCIAL

## 2021-04-27 VITALS
BODY MASS INDEX: 24.45 KG/M2 | HEIGHT: 63 IN | WEIGHT: 138 LBS | DIASTOLIC BLOOD PRESSURE: 60 MMHG | SYSTOLIC BLOOD PRESSURE: 134 MMHG

## 2021-04-27 DIAGNOSIS — M79.674 TOE PAIN, RIGHT: Primary | ICD-10-CM

## 2021-04-27 PROCEDURE — 99213 OFFICE O/P EST LOW 20 MIN: CPT | Performed by: PODIATRIST

## 2021-04-27 ASSESSMENT — MIFFLIN-ST. JEOR: SCORE: 1020.09

## 2021-04-27 NOTE — PATIENT INSTRUCTIONS
Products available online if you do not have insurance coverage:    1.  Iodosorb topical wound care gel     2.  Woun'Dres topical wound care gel                      3.  Topical lidocaine gel

## 2021-04-27 NOTE — PROGRESS NOTES
"Foot & Ankle Surgery   April 27, 2021    S:  Pt is seen today for evaluation of right great toe pain approximately 9 weeks after total temporary removal of the nail by myself.  She states she has not been doing soaking as this is difficult for her to do.  She states the nail has been growing back.    Vitals:    04/27/21 1055   BP: 134/60   Weight: 62.6 kg (138 lb)   Height: 1.6 m (5' 3\")   '      ROS - Pos for CC.  Patient denies current nausea, vomiting, chills, fevers, belly pain, calf pain, chest pain or SOB.  Complete remainder of ROS it otherwise neg.      PE:  Gen:   No apparent distress  Eye:    Visual scanning without deficit  Ear:    Response to auditory stimuli wnl  Lung:    Non-labored breathing on RA noted  Abd:    NTND per patient report  Lymph:    Neg for pitting/non-pitting edema BLE  Vasc:    Pulses palpable, CFT minimally delayed  Neuro:    Light touch sensation intact to all sensory nerve distributions without paresthesias  Derm:    There is a thin sheet of dry skin over the right hallux nail bed extending from the proximal nail fold.  This was removed in total.  The underlying nailbed is healed.  There is no inflammation infection or drainage noted today, nor is there any necrosis  MSK:    ROM, strength wnl without limitation, no pain on palpation noted.  Calf:    Neg for redness, swelling or tenderness      Assessment:  89 year old female with well-healed right hallux nail bed approximately 9 weeks status post total temporary right great toenail removal      Plan:  Discussed etiologies, anatomy and options  1.  well-healed right hallux nail bed approximately 9 weeks status post total temporary right great toenail removal  -The sheath of skin was removed revealing a fully healed nailbed  -No indication for new or extended oral antibiotics  -She takes oxycodone 5 times a day and is on a mid level dose of Tylenol daily.  We discussed utilizing ibuprofen if she tolerates this  -We discussed soaking " in warm soapy water or Epsom salts as needed based on discomfort  -OTC med handout for topical lidocaine gel options  -While no bandages needed, we discussed utilizing a Band-Aid to minimize sock shoe gear and bed sheets irritation of the fragile nailbed skin    Follow up: As needed or sooner with acute issues           Tristan Michael DPM FACFAS FACFAOM  Podiatric Foot & Ankle Surgeon  [facility] [place]  [phone]

## 2021-05-05 DIAGNOSIS — E78.5 HYPERLIPIDEMIA LDL GOAL <100: ICD-10-CM

## 2021-05-05 DIAGNOSIS — E11.8 TYPE 2 DIABETES MELLITUS WITH COMPLICATION, WITHOUT LONG-TERM CURRENT USE OF INSULIN (H): ICD-10-CM

## 2021-05-05 DIAGNOSIS — N18.1 CKD (CHRONIC KIDNEY DISEASE) STAGE 1, GFR 90 ML/MIN OR GREATER: ICD-10-CM

## 2021-05-05 DIAGNOSIS — I10 BENIGN ESSENTIAL HYPERTENSION: ICD-10-CM

## 2021-05-05 LAB
ANION GAP SERPL CALCULATED.3IONS-SCNC: 4 MMOL/L (ref 3–14)
BUN SERPL-MCNC: 18 MG/DL (ref 7–30)
CALCIUM SERPL-MCNC: 9.6 MG/DL (ref 8.5–10.1)
CHLORIDE SERPL-SCNC: 107 MMOL/L (ref 94–109)
CHOLEST SERPL-MCNC: 169 MG/DL
CO2 SERPL-SCNC: 30 MMOL/L (ref 20–32)
CREAT SERPL-MCNC: 0.64 MG/DL (ref 0.52–1.04)
CREAT UR-MCNC: 23 MG/DL
GFR SERPL CREATININE-BSD FRML MDRD: 78 ML/MIN/{1.73_M2}
GLUCOSE SERPL-MCNC: 82 MG/DL (ref 70–99)
HBA1C MFR BLD: 7.2 % (ref 0–5.6)
HDLC SERPL-MCNC: 59 MG/DL
LDLC SERPL CALC-MCNC: 82 MG/DL
MICROALBUMIN UR-MCNC: 16 MG/L
MICROALBUMIN/CREAT UR: 70.61 MG/G CR (ref 0–25)
NONHDLC SERPL-MCNC: 110 MG/DL
POTASSIUM SERPL-SCNC: 3.6 MMOL/L (ref 3.4–5.3)
SODIUM SERPL-SCNC: 141 MMOL/L (ref 133–144)
TRIGL SERPL-MCNC: 141 MG/DL

## 2021-05-05 PROCEDURE — 80061 LIPID PANEL: CPT | Performed by: INTERNAL MEDICINE

## 2021-05-05 PROCEDURE — 83036 HEMOGLOBIN GLYCOSYLATED A1C: CPT | Performed by: INTERNAL MEDICINE

## 2021-05-05 PROCEDURE — 82043 UR ALBUMIN QUANTITATIVE: CPT | Performed by: INTERNAL MEDICINE

## 2021-05-05 PROCEDURE — 80048 BASIC METABOLIC PNL TOTAL CA: CPT | Performed by: INTERNAL MEDICINE

## 2021-05-05 PROCEDURE — 36415 COLL VENOUS BLD VENIPUNCTURE: CPT | Performed by: INTERNAL MEDICINE

## 2021-05-10 ENCOUNTER — OFFICE VISIT (OUTPATIENT)
Dept: INTERNAL MEDICINE | Facility: CLINIC | Age: 86
End: 2021-05-10
Payer: COMMERCIAL

## 2021-05-10 VITALS
BODY MASS INDEX: 23.97 KG/M2 | RESPIRATION RATE: 18 BRPM | TEMPERATURE: 97.8 F | HEIGHT: 63 IN | HEART RATE: 97 BPM | SYSTOLIC BLOOD PRESSURE: 164 MMHG | DIASTOLIC BLOOD PRESSURE: 84 MMHG | WEIGHT: 135.3 LBS | OXYGEN SATURATION: 94 %

## 2021-05-10 DIAGNOSIS — F11.90 CHRONIC, CONTINUOUS USE OF OPIOIDS: ICD-10-CM

## 2021-05-10 DIAGNOSIS — I10 BENIGN ESSENTIAL HYPERTENSION: ICD-10-CM

## 2021-05-10 DIAGNOSIS — E78.5 HYPERLIPIDEMIA LDL GOAL <100: ICD-10-CM

## 2021-05-10 DIAGNOSIS — M48.062 SPINAL STENOSIS OF LUMBAR REGION WITH NEUROGENIC CLAUDICATION: ICD-10-CM

## 2021-05-10 DIAGNOSIS — G89.4 CHRONIC PAIN SYNDROME: ICD-10-CM

## 2021-05-10 DIAGNOSIS — J44.9 COPD, SEVERE (H): ICD-10-CM

## 2021-05-10 DIAGNOSIS — Z51.81 ENCOUNTER FOR THERAPEUTIC DRUG LEVEL MONITORING: ICD-10-CM

## 2021-05-10 DIAGNOSIS — I83.92 VARICOSE VEINS OF LEFT LOWER EXTREMITY, UNSPECIFIED WHETHER COMPLICATED: ICD-10-CM

## 2021-05-10 DIAGNOSIS — E11.8 TYPE 2 DIABETES MELLITUS WITH COMPLICATION, WITHOUT LONG-TERM CURRENT USE OF INSULIN (H): Primary | ICD-10-CM

## 2021-05-10 DIAGNOSIS — F11.20 NARCOTIC DEPENDENCE (H): ICD-10-CM

## 2021-05-10 LAB

## 2021-05-10 PROCEDURE — 99214 OFFICE O/P EST MOD 30 MIN: CPT | Performed by: INTERNAL MEDICINE

## 2021-05-10 PROCEDURE — 80306 DRUG TEST PRSMV INSTRMNT: CPT | Performed by: INTERNAL MEDICINE

## 2021-05-10 RX ORDER — OXYCODONE HYDROCHLORIDE 5 MG/1
TABLET ORAL
Qty: 150 TABLET | Refills: 0 | Status: SHIPPED | OUTPATIENT
Start: 2021-06-17 | End: 2021-05-10

## 2021-05-10 RX ORDER — OXYCODONE HYDROCHLORIDE 5 MG/1
TABLET ORAL
Qty: 150 TABLET | Refills: 0 | Status: SHIPPED | OUTPATIENT
Start: 2021-05-18 | End: 2021-05-10

## 2021-05-10 RX ORDER — OXYCODONE HYDROCHLORIDE 5 MG/1
TABLET ORAL
Qty: 150 TABLET | Refills: 0 | Status: SHIPPED | OUTPATIENT
Start: 2021-07-17 | End: 2021-08-24

## 2021-05-10 ASSESSMENT — ANXIETY QUESTIONNAIRES
GAD7 TOTAL SCORE: 0
1. FEELING NERVOUS, ANXIOUS, OR ON EDGE: NOT AT ALL
3. WORRYING TOO MUCH ABOUT DIFFERENT THINGS: NOT AT ALL
7. FEELING AFRAID AS IF SOMETHING AWFUL MIGHT HAPPEN: NOT AT ALL
5. BEING SO RESTLESS THAT IT IS HARD TO SIT STILL: NOT AT ALL
2. NOT BEING ABLE TO STOP OR CONTROL WORRYING: NOT AT ALL
6. BECOMING EASILY ANNOYED OR IRRITABLE: NOT AT ALL

## 2021-05-10 ASSESSMENT — PATIENT HEALTH QUESTIONNAIRE - PHQ9
5. POOR APPETITE OR OVEREATING: NOT AT ALL
SUM OF ALL RESPONSES TO PHQ QUESTIONS 1-9: 0

## 2021-05-10 ASSESSMENT — MIFFLIN-ST. JEOR: SCORE: 1007.85

## 2021-05-10 NOTE — NURSING NOTE
"BP (!) 164/84 (BP Location: Left arm, Patient Position: Sitting, Cuff Size: Adult Regular)   Pulse 97   Temp 97.8  F (36.6  C) (Oral)   Resp 18   Ht 1.6 m (5' 3\")   Wt 61.4 kg (135 lb 4.8 oz)   SpO2 94%   BMI 23.97 kg/m      "

## 2021-05-10 NOTE — PROGRESS NOTES
Assessment & Plan     Type 2 diabetes mellitus with complication, without long-term current use of insulin (H)  Hemoglobin A1c has increased a little but is still in the acceptable range, continue watching diet    COPD, severe (H)  Overall stable, continue medications    Narcotic dependence (H)  Stable use of medication, controlled substance agreement redone, urine drug screen ordered,  checked with no concerns    Benign essential hypertension:   Blood pressure is a little bit higher today, recommend she monitor outside and call in some readings    Chronic pain syndrome  Stable    Hyperlipidemia LDL goal <100  Stable    Spinal stenosis of lumbar region with neurogenic claudication  Stable  - oxyCODONE (ROXICODONE) 5 MG tablet; May take 5 tabs per day    Varicose veins of left lower extremity, unspecified whether complicated  Advised there might be some noninvasive procedure but she can meet with the vascular specialist to discuss options  - VASCULAR SURGERY REFERRAL; Future      Return in about 6 months (around 11/10/2021) for Lab Work, Diabetes, see me a week after lab.    Emily Marie MD  [facility]onso is a 89 year old who presents for the following health issues     HPI     Diabetes Follow-up      How often are you checking your blood sugar? Not at all    What concerns do you have today about your diabetes? None     Do you have any of these symptoms? (Select all that apply)  No numbness or tingling in feet.  No redness, sores or blisters on feet.  No complaints of excessive thirst.  No reports of blurry vision.  No significant changes to weight.        Hyperlipidemia Follow-Up      Are you regularly taking any medication or supplement to lower your cholesterol?   Yes- rosuvastatin    Are you having muscle aches or other side effects that you think could be caused by your cholesterol lowering medication?  Yes- cramps in leg    Hypertension Follow-up      Do you  check your blood pressure regularly outside of the clinic? No     Are you following a low salt diet? Yes    Are your blood pressures ever more than 140 on the top number (systolic) OR more   than 90 on the bottom number (diastolic), for example 140/90? No        How many servings of fruits and vegetables do you eat daily?  4 or more    On average, how many sweetened beverages do you drink each day (Examples: soda, juice, sweet tea, etc.  Do NOT count diet or artificially sweetened beverages)?   0    How many days per week do you exercise enough to make your heart beat faster? 3 or less    How many minutes a day do you exercise enough to make your heart beat faster? 9 or less    How many days per week do you miss taking your medication? 0      Other problems:   1.  Chronic back pain/chronic opioid use/narcotic dependence: She is on oxycodone, stable with medication use  2. COPD : She reports her breathing is stable.       Current concerns:   1.  She is having a lot of discomfort now with her varicose veins.  She is wondering if there might be any possibility of doing any kind of procedure to help.  2.  She reports she gets a brief stabbing pain in the head that lasts about 30 seconds, this is occurring most days but only once a day.  It seems to be at the temporoparietal area, thinks it may be more left side.  She does have some neck soreness.  She is not having any double vision or black spots in her vision, numbness or tingling of her face, clumsiness, abnormal gait, slurred speech    Patient Active Problem List   Diagnosis     Disorder of bone and cartilage     COPD, severe (H)     Spinal stenosis of lumbar region with neurogenic claudication     Type 2 diabetes mellitus with complication, without long-term current use of insulin (H)     HYPERLIPIDEMIA LDL GOAL <100     History of basal cell carcinoma     Controlled substance agreement signed -  checked 12/4/20 - no concerns     Chronic pain syndrome      Narcotic dependence (H)     Benign essential hypertension     Lumbar radiculopathy     CKD (chronic kidney disease) stage 1, GFR 90 ml/min or greater     Varicose veins of left lower extremity, unspecified whether complicated     Edema, unspecified type     Chronic, continuous use of opioids       Current Outpatient Medications   Medication Sig Dispense Refill     Acetaminophen (TYLENOL PO) Take 325 mg by mouth 6 times daily Patient takes TID with Oxycodone.        alendronate (FOSAMAX) 70 MG tablet TAKE 1 TABLET BY MOUTH EVERY 7 DAYS 12 tablet 1     amLODIPine (NORVASC) 2.5 MG tablet Take 1 tablet (2.5 mg) by mouth daily 90 tablet 1     ASPIRIN EC PO Take 81 mg by mouth daily       Calcium Polycarbophil (FIBERCON PO) Take 1 tablet by mouth once , one in the morning and one in the evening       calcium-vitamin D (CALTRATE) 600-400 MG-UNIT per tablet Take 1 tablet by mouth daily 1200mg   1000 mg of vitamin d       conjugated estrogens (PREMARIN) 0.625 MG/GM vaginal cream Use twice weekly 0.5 gr applied to the external genital area. 30 g 11     fluticasone-salmeterol (ADVAIR DISKUS) 500-50 MCG/DOSE inhaler Inhale 1 puff into the lungs 2 times daily 3 Inhaler 1     glipiZIDE (GLUCOTROL) 5 MG tablet TAKE 1 TABLET BY MOUTH EVERY MORNING 90 tablet 1     glucosamine-chondroitin 500-400 MG CAPS per capsule Take 1 capsule by mouth daily       lisinopril (ZESTRIL) 40 MG tablet Take 1 tablet (40 mg) by mouth daily 90 tablet 3     Multiple Vitamins-Minerals (MULTIVITAMIN ADULT PO) Take 1 tablet by mouth daily       NONFORMULARY Natra sleeping med takes 1 at night.       oxyCODONE (ROXICODONE) 5 MG tablet May take 5 tabs per day 150 tablet 0     polyethylene glycol (MIRALAX) 17 g packet Take 1 packet by mouth daily       PROAIR  (90 Base) MCG/ACT inhaler INHALE 2 PUFFS BY MOUTH EVERY 6 HOURS AS NEEDED FOR WHEEZE OR FOR SHORTNESS OF BREATH 8.5 Inhaler 3     rosuvastatin (CRESTOR) 5 MG tablet Take 1 tablet (5 mg) by mouth  "daily 90 tablet 1     senna-docusate (SENOKOT-S;PERICOLACE) 8.6-50 MG per tablet Take 1 tablet by mouth 2 times daily Reported on 2017       tiotropium (SPIRIVA HANDIHALER) 18 MCG inhaled capsule INHALE 1 CAPSULE INTO THE LUNG DAILY 90 capsule 1     triamterene-HCTZ (DYAZIDE) 37.5-25 MG capsule TAKE 1 CAPSULE BY MOUTH EVERY DAY 30 capsule 1       Social History     Tobacco Use     Smoking status: Former Smoker     Packs/day: 0.50     Years: 54.00     Pack years: 27.00     Types: Cigarettes     Quit date: 2000     Years since quittin.0     Smokeless tobacco: Never Used     Tobacco comment: using nicotine gum   Substance Use Topics     Alcohol use: Yes     Alcohol/week: 0.8 standard drinks     Types: 1 Glasses of wine per week     Comment: \"A glass of wine once a week.\"     Drug use: No        ROS:  General: no fever, chills  Weight: stable  Vision:negative. Last eye exam .  ENT: negative  Respiratory stable.  Cardiac: no chest pain or pressure  Abdominal: no nausea, vomiting, abdominal pain, bowel changes  Vascular no complaints of claudication  Neurologic:no complaints of neuropathy  Feet no lesions, in grown nails, edema   : no polyuria, hematuria, dysuria    Objective:  Patient alert in NAD  BP (!) 164/84 (BP Location: Left arm, Patient Position: Sitting, Cuff Size: Adult Regular)   Pulse 97   Temp 97.8  F (36.6  C) (Oral)   Resp 18   Ht 1.6 m (5' 3\")   Wt 61.4 kg (135 lb 4.8 oz)   SpO2 94%   BMI 23.97 kg/m         Wt Readings from Last 4 Encounters:   05/10/21 61.4 kg (135 lb 4.8 oz)   21 62.6 kg (138 lb)   21 60.8 kg (134 lb)   21 61.2 kg (135 lb)       CV: CV: normal S1, S2 without murmur, S3 or S4.  Carotid pulses: full  LUNGS: clear      Lab Results   Component Value Date    A1C 7.2 2021    A1C 6.7 10/13/2020    A1C 7.1 2020    A1C 6.9 2019    A1C 6.9 2019       Lab Results   Component Value Date    CHOL 169 2021    HDL 59 2021 "    LDL 82 05/05/2021    TRIG 141 05/05/2021    CHOLHDLRATIO 3.4 07/23/2015

## 2021-05-10 NOTE — LETTER
Opioid / Opioid Plus Controlled Substance Agreement    This is an agreement between you and your provider about the safe and appropriate use of controlled substance/opioids prescribed by your care team. Controlled substances are medicines that can cause physical and mental dependence (abuse).    There are strict laws about having and using these medicines. We here at [facility] are committing to working with you in your efforts to get better. To support you in this work, we ll help you schedule regular office appointments for medicine refills. If we must cancel or change your appointment for any reason, we ll make sure you have enough medicine to last until your next appointment.     As a Provider, I will:    Listen carefully to your concerns and treat you with respect.     Recommend a treatment plan that I believe is in your best interest. This plan may involve therapies other than opioid pain medication.     Talk with you often about the possible benefits, and the risk of harm of any medicine that we prescribe for you.     Provide a plan on how to taper (discontinue or go off) using this medicine if the decision is made to stop its use.    As a Patient, I understand that opioid(s):     Are a controlled substance prescribed by my care team to help me function or work and manage my condition(s).     Are strong medicines and can cause serious side effects such as:    Drowsiness, which can seriously affect my driving ability    A lower breathing rate, enough to cause death    Harm to my thinking ability     Depression     Abuse of and addiction to this medicine    Need to be taken exactly as prescribed. Combining opioids with certain medicines or chemicals (such as illegal drugs, sedatives, sleeping pills, and benzodiazepines) can be dangerous or even fatal. If I stop opioids suddenly, I may have severe withdrawal symptoms.    Do not work for all types of pain nor for all patients. If they re not helpful, I may  be asked to stop them.        The risks, benefits and side effects of these medicine(s) were explained to me. I agree that:  1. I will take part in other treatments as advised by my care team. This may be psychiatry or counseling, physical therapy, behavioral therapy, group treatment or a referral to a specialist.     2. I will keep all my appointments. I understand that this is part of the monitoring of opioids. My care team may require an office visit for EVERY opioid/controlled substance refill. If I miss appointments or don t follow instructions, my care team may stop my medicine.    3. I will take my medicines as prescribed. I will not change the dose or schedule unless my care team tells me to. There will be no refills if I run out early.     4. I may be asked to come to the clinic and complete a urine drug test or complete a pill count at any time. If I don t give a urine sample or participate in a pill count, the care team may stop my medicine.    5. I will only receive prescriptions from this clinic for chronic pain. If I am treated by another provider for acute pain issues, I will tell them that I am taking opioid pain medication for chronic pain and that I have a treatment agreement with this provider. I will inform my [facility] care team within one business day if I am given a prescription for any pain medication by another healthcare provider. My [facility] care team can contact other providers and pharmacists about my use of any medicines.    6. It is up to me to make sure that I don t run out of my medicines on weekends or holidays. If my care team is willing to refill my opioid prescription without a visit, I must request refills only during office hours. Refills may take up to 3 business days to process. I will use one pharmacy to fill all my opioid and other controlled substance prescriptions. I will notify the clinic about any changes to my insurance or medication  availability.    7. I am responsible for my prescriptions. If the medicine/prescription is lost, stolen or destroyed, it will not be replaced. I also agree not to share controlled substance medicines with anyone.    8. I am aware I should not use any illegal or recreational drugs. I agree not to drink alcohol unless my care team says I can.       9. If I enroll in the [place] [facility] Cann[facility] program, I will tell my care team prior to my next refill.     10. I will tell my care team right away if I become pregnant, have a new medical problem treated outside of my regular clinic, or have a change in my medications.    11. I understand that this medicine can affect my thinking, judgment and reaction time. Alcohol and drugs affect the brain and body, which can affect the safety of my driving. Being under the influence of alcohol or drugs can affect my decision-making, behaviors, personal safety, and the safety of others. Driving while impaired (DWI) can occur if a person is driving, operating, or in physical control of a car, motorcycle, boat, snowmobile, ATV, motorbike, off-road vehicle, or any other motor vehicle ([place] Statute 169A.20). I understand the risk if I choose to drive or operate any vehicle or machinery.    I understand that if I do not follow any of the conditions above, my prescriptions or treatment may be stopped or changed.          Opioids  What You Need to Know    What are opioids?   Opioids are pain medicines that must be prescribed by a doctor. They are also known as narcotics.     Examples are:   1. morphine (MS Contin, Flavia)  2. oxycodone (Oxycontin)  3. oxycodone and acetaminophen (Percocet)  4. hydrocodone and acetaminophen (Vicodin, Norco)   5. fentanyl patch (Duragesic)   6. hydromorphone (Dilaudid)   7. methadone  8. codeine (Tylenol #3)     What do opioids do well?   Opioids are best for severe short-term pain such as after a surgery or injury. They may work well for cancer pain. They may  help some people with long-lasting (chronic) pain.     What do opioids NOT do well?   Opioids never get rid of pain entirely, and they don t work well for most patients with chronic pain. Opioids don t reduce swelling, one of the causes of pain.                                    Other ways to manage chronic pain and improve function include:       Treat the health problem that may be causing pain    Anti-inflammation medicines, which reduce swelling and tenderness, such as ibuprofen (Advil, Motrin) or naproxen (Aleve)    Acetaminophen (Tylenol)    Antidepressants and anti-seizure medicines, especially for nerve pain    Topical treatments such as patches or creams    Injections or nerve blocks    Chiropractic or osteopathic treatment    Acupuncture, massage, deep breathing, meditation, visual imagery, aromatherapy    Use heat or ice at the pain site    Physical therapy     Exercise    Stop smoking    Take part in therapy       Risks and side effects     Talk to your doctor before you start or decide to keep taking opioids. Possible side effects include:      Lowering your breathing rate enough to cause death    Overdose, including death, especially if taking higher than prescribed doses    Worse depression symptoms; less pleasure in things you usually enjoy    Feeling tired or sluggish    Slower thoughts or cloudy thinking    Being more sensitive to pain over time; pain is harder to control    Trouble sleeping or restless sleep    Changes in hormone levels (for example, less testosterone)    Changes in sex drive or ability to have sex    Constipation    Unsafe driving    Itching and sweating    Dizziness    Nausea, throwing up and dry mouth    What else should I know about opioids?    Opioids may lead to dependence, tolerance, or addiction.      Dependence means that if you stop or reduce the medicine too quickly, you will have withdrawal symptoms. These include loose poop (diarrhea), jitters, flu-like symptoms,  nervousness and tremors. Dependence is not the same as addiction.                       Tolerance means needing higher doses over time to get the same effect. This may increase the chance of serious side effects.      Addiction is when people improperly use a substance that harms their body, their mind or their relations with others. Use of opiates can cause a relapse of addiction if you have a history of drug or alcohol abuse.      People who have used opioids for a long time may have a lower quality of life, worse depression, higher levels of pain and more visits to doctors.    You can overdose on opioids. Take these steps to lower your risk of overdose:    1. Recognize the signs:  Signs of overdose include decrease or loss of consciousness (blackout), slowed breathing, trouble waking up and blue lips. If someone is worried about overdose, they should call 911.    2. Talk to your doctor about Narcan (naloxone).   If you are at risk for overdose, you may be given a prescription for Narcan. This medicine very quickly reverses the effects of opioids.   If you overdose, a friend or family member can give you Narcan while waiting for the ambulance. They need to know the signs of overdose and how to give Narcan.     3. Don't use alcohol or street drugs.   Taking them with opioids can cause death.    4. Do not take any of these medicines unless your doctor says it s OK. Taking these with opioids can cause death:    Benzodiazepines, such as lorazepam (Ativan), alprazolam (Xanax) or diazepam (Valium)    Muscle relaxers, such as cyclobenzaprine (Flexeril)    Sleeping pills like zolpidem (Ambien)     Other opioids      How to keep you and other people safe while taking opioids:    1. Never share your opioids with others.  Opioid medicines are regulated by the Drug Enforcement Agency ([employer]). Selling or sharing medications is a criminal act.    2. Be sure to store opioids in a secure place, locked up if possible. Young children  can easily swallow them and overdose.    3. When you are traveling with your medicines, keep them in the original bottles. If you use a pill box, be sure you also carry a copy of your medicine list from your clinic or pharmacy.    4. Safe disposal of opioids    Most pharmacies have places to get rid of medicine, called disposal kiosks. Medicine disposal options are also available in every [facility]. Search your county and  medication disposal  to find more options. You can find more details at:  [email or web address]-[email or web address]-medications     I agree that my provider, clinic care team, and pharmacy may work with any city, state or federal law enforcement agency that investigates the misuse, sale, or other diversion of my controlled medicine. I will allow my provider to discuss my care with, or share a copy of, this agreement with any other treating provider, pharmacy or emergency room where I receive care.    I have read this agreement and have asked questions about anything I did not understand.    _______________________________________________________  Patient Signature - Celeste Chacko _____________________                   Date     _______________________________________________________  Provider Signature - Emily Marie MD   _____________________                   Date     _______________________________________________________  Witness Signature (required if provider not present while patient signing)   _____________________                   Date

## 2021-05-11 ASSESSMENT — ANXIETY QUESTIONNAIRES: GAD7 TOTAL SCORE: 0

## 2021-05-26 ENCOUNTER — OFFICE VISIT (OUTPATIENT)
Dept: DERMATOLOGY | Facility: CLINIC | Age: 86
End: 2021-05-26
Payer: COMMERCIAL

## 2021-05-26 VITALS — HEART RATE: 94 BPM | SYSTOLIC BLOOD PRESSURE: 150 MMHG | DIASTOLIC BLOOD PRESSURE: 75 MMHG

## 2021-05-26 DIAGNOSIS — Z85.828 HISTORY OF BASAL CELL CARCINOMA (BCC): ICD-10-CM

## 2021-05-26 DIAGNOSIS — R58 ECCHYMOSIS: ICD-10-CM

## 2021-05-26 DIAGNOSIS — D18.01 CHERRY ANGIOMA: Primary | ICD-10-CM

## 2021-05-26 DIAGNOSIS — L82.1 SEBORRHEIC KERATOSES: ICD-10-CM

## 2021-05-26 DIAGNOSIS — D22.9 MULTIPLE BENIGN NEVI: ICD-10-CM

## 2021-05-26 DIAGNOSIS — L81.4 LENTIGINES: ICD-10-CM

## 2021-05-26 DIAGNOSIS — T14.8XXA WOUND OF SKIN: ICD-10-CM

## 2021-05-26 PROCEDURE — 99214 OFFICE O/P EST MOD 30 MIN: CPT | Performed by: PHYSICIAN ASSISTANT

## 2021-05-26 NOTE — LETTER
5/26/2021         RE: Celeste Chacko  [address]  [address]  [facility] [address]        Dear Colleague,    Thank you for referring your patient, Celeste Chacko, to the [facility]. Please see a copy of my visit note below.    HPI:  Celeste Chacko is a 89 year old female patient here today for FBE. Has some scaly spots on back .  Patient states this has been present for a while.  Patient reports the following symptoms: none .  Patient reports the following previous treatments: none.  Patient reports the following modifying factors: none.  Associated symptoms: none.  Patient has no other skin complaints today.  Remainder of the HPI, Meds, PMH, Allergies, FH, and SH was reviewed in chart.    Pertinent Hx:   BCC  Past Medical History:   Diagnosis Date     Basal cell carcinoma      Chronic airway obstruction, not elsewhere classified      Complete rupture of rotator cuff      Disorder of bone and cartilage, unspecified      History of blood transfusion      Mixed hyperlipidemia 4/00     Osteoarthritis      Personal history of other malignant neoplasm of skin      Spinal stenosis      Type II or unspecified type diabetes mellitus without mention of complication, not stated as uncontrolled        Past Surgical History:   Procedure Laterality Date     ARTHROPLASTY KNEE Left 9/15/2014    Procedure: ARTHROPLASTY KNEE;  Surgeon: Carlos Alberto Kaminski MD;  Location: [facility] OR     HEAD & NECK SURGERY  05/14/15    forehead-skin cancer removed     INJECT EPIDURAL LUMBAR / SACRAL SINGLE Left 7/14/2016    Procedure: INJECT EPIDURAL LUMBAR / SACRAL SINGLE;  Surgeon: Luisito New MD;  Location: [facility]     INJECT SACROILIAC JOINT N/A 12/1/2015    Procedure: INJECT SACROILIAC JOINT;  Surgeon: Luisito New MD;  Location: [facility] GI     INJECT SACROILIAC JOINT Bilateral 5/19/2016    Procedure: INJECT SACROILIAC JOINT;  Surgeon: Luisito New MD;  Location: [facility]     INJECT SACROILIAC JOINT  Bilateral 2016    Procedure: INJECT SACROILIAC JOINT;  Surgeon: Elmira Bennett MD;  Location: [facility] OR     INJECT SACROILIAC JOINT Bilateral 2017    Procedure: INJECT SACROILIAC JOINT;  Bilateral Sacroiliac Joint Injection   Injection of local anesthetic and steroid into area around spine for pain relief;  Surgeon: Alvaro Holland MD;  Location: [facility]     INJECT SACROILIAC JOINT Bilateral 2017    Procedure: INJECT SACROILIAC JOINT;  Bilateral Sacroiliac Joint Injection;  Surgeon: Elmira Bennett MD;  Location: [facility]     INJECT SACROILIAC JOINT Bilateral 11/10/2017    Procedure: INJECT SACROILIAC JOINT;  Bilateral Sacroiliac Joint Injection;  Surgeon: Elmira Bennett MD;  Location:  OR     [facility] NONSPECIFIC PROCEDURE      Breast biopsies      [facility] NONSPECIFIC PROCEDURE      Pilonidal sinus repair      [facility] NONSPECIFIC PROCEDURE      T & A      [facility] NONSPECIFIC PROCEDURE      Shoulder arthropasty     [facility] NONSPECIFIC PROCEDURE      Right shoulder replacement, RCT repair        Family History   Problem Relation Age of Onset     Arthritis Father      Diabetes Brother      Cancer Brother         breast     Cerebrovascular Disease Brother        Social History     Socioeconomic History     Marital status: Single     Spouse name: Not on file     Number of children: Not on file     Years of education: Not on file     Highest education level: Not on file   Occupational History     Not on file   Social Needs     Financial resource strain: Not on file     Food insecurity     Worry: Not on file     Inability: Not on file     Transportation needs     Medical: Not on file     Non-medical: Not on file   Tobacco Use     Smoking status: Former Smoker     Packs/day: 0.50     Years: 54.00     Pack years: 27.00     Types: Cigarettes     Quit date: 2000     Years since quittin.0     Smokeless tobacco: Never Used     Tobacco comment: using nicotine gum   Substance and Sexual Activity     Alcohol use: Yes     " Alcohol/week: 0.8 standard drinks     Types: 1 Glasses of wine per week     Comment: \"A glass of wine once a week.\"     Drug use: No     Sexual activity: Not Currently   Lifestyle     Physical activity     Days per week: Not on file     Minutes per session: Not on file     Stress: Not on file   Relationships     Social connections     Talks on phone: Not on file     Gets together: Not on file     Attends Ca[employer] service: Not on file     Active member of club or organization: Not on file     Attends meetings of clubs or organizations: Not on file     Relationship status: Not on file     Intimate partner violence     Fear of current or ex partner: Not on file     Emotionally abused: Not on file     Physically abused: Not on file     Forced sexual activity: Not on file   Other Topics Concern     Parent/sibling w/ CABG, MI or angioplasty before 65F 55M? Not Asked   Social History Narrative     Not on file       Outpatient Encounter Medications as of 5/26/2021   Medication Sig Dispense Refill     Acetaminophen (TYLENOL PO) Take 325 mg by mouth 6 times daily Patient takes TID with Oxycodone.        alendronate (FOSAMAX) 70 MG tablet TAKE 1 TABLET BY MOUTH EVERY 7 DAYS 12 tablet 1     amLODIPine (NORVASC) 2.5 MG tablet Take 1 tablet (2.5 mg) by mouth daily 90 tablet 1     ASPIRIN EC PO Take 81 mg by mouth daily       Calcium Polycarbophil (FIBERCON PO) Take 1 tablet by mouth once , one in the morning and one in the evening       calcium-vitamin D (CALTRATE) 600-400 MG-UNIT per tablet Take 1 tablet by mouth daily 1200mg   1000 mg of vitamin d       conjugated estrogens (PREMARIN) 0.625 MG/GM vaginal cream Use twice weekly 0.5 gr applied to the external genital area. 30 g 11     fluticasone-salmeterol (ADVAIR DISKUS) 500-50 MCG/DOSE inhaler Inhale 1 puff into the lungs 2 times daily 3 Inhaler 1     glipiZIDE (GLUCOTROL) 5 MG tablet TAKE 1 TABLET BY MOUTH EVERY MORNING 90 tablet 1     glucosamine-chondroitin 500-400 MG " CAPS per capsule Take 1 capsule by mouth daily       lisinopril (ZESTRIL) 40 MG tablet Take 1 tablet (40 mg) by mouth daily 90 tablet 3     Multiple Vitamins-Minerals (MULTIVITAMIN ADULT PO) Take 1 tablet by mouth daily       NONFORMULARY Natra sleeping med takes 1 at night.       [START ON 7/17/2021] oxyCODONE (ROXICODONE) 5 MG tablet May take 5 tabs per day 150 tablet 0     polyethylene glycol (MIRALAX) 17 g packet Take 1 packet by mouth daily       PROAIR  (90 Base) MCG/ACT inhaler INHALE 2 PUFFS BY MOUTH EVERY 6 HOURS AS NEEDED FOR WHEEZE OR FOR SHORTNESS OF BREATH 8.5 Inhaler 3     rosuvastatin (CRESTOR) 5 MG tablet Take 1 tablet (5 mg) by mouth daily 90 tablet 1     senna-docusate (SENOKOT-S;PERICOLACE) 8.6-50 MG per tablet Take 1 tablet by mouth 2 times daily Reported on 4/12/2017       tiotropium (SPIRIVA HANDIHALER) 18 MCG inhaled capsule INHALE 1 CAPSULE INTO THE LUNG DAILY 90 capsule 1     triamterene-HCTZ (DYAZIDE) 37.5-25 MG capsule TAKE 1 CAPSULE BY MOUTH EVERY DAY 30 capsule 1     No facility-administered encounter medications on file as of 5/26/2021.        Review Of Systems:  Skin: spots  Eyes: negative  Ears/Nose/Throat: negative  Respiratory: No shortness of breath, dyspnea on exertion, cough, or hemoptysis  Cardiovascular: negative  Gastrointestinal: negative  Genitourinary: negative  Musculoskeletal: negative  Neurologic: negative  Psychiatric: negative  Hematologic/Lymphatic/Immunologic: negative  Endocrine: negative      Objective:     BP (!) 150/75   Pulse 94   Eyes: Conjunctivae/lids: Normal   ENT: Lips:  Normal  MSK: Normal  Cardiovascular: Peripheral edema none  Pulm: Breathing Normal  Neuro/Psych: Orientation: A/O x 3. Normal; Mood/Affect: Normal, NAD, WDWN  Pt accompanied by: self  Following areas examined: Scalp, face, eyelids, lips, neck, chest, abdomen, back, and R&L upper and lower extremities,  buttock, hips. Defers exam of groin and genitals.   Lopez skin type:i    Findings:  Red smooth well-defined macules on trunk and extremities.  Brown, stuck-on scaly appearing papules on trunk and extremities.  Well circumscribed macules with symmetric color distribution on trunk and extremities.  Tan WD smooth macules on face, neck, trunk, and extremities.  Small pin point area of bleeding on left ankle  Purple/red/blue smooth patches on forearm and legs    Assessment and Plan:     1) Cherry angiomas, Seborrheic keratoses, Benign nevi, Lentigines, ecchymosis     I discussed the specifics of tumor, prognosis, and genetics of benign lesions.  I explained that treatment of these lesions would be purely cosmetic and not medically neccessary.  I discussed with patient different removal options including excision, cryotherapy, cautery and /or laser.  Lesion may recur and/or may not completely resolve. May need additional treatment.     2) wound   Pt dropped a knife on left ankle and had some bleeding.  Ointment and a pressure bandage applied  3) history of BCC  No evidence of recurrence.  Signs and Symptoms of non-melanoma skin cancer and ABCDEs of melanoma reviewed with patient. Patient encouraged to perform monthly self skin exams and educated on how to perform them. UV precautions reviewed with patient. Patient was asked about new or changing moles/lesions on body.   Wear a sunscreen with at least SPF 30 on your face, ears, neck and V of the chest daily. Wear sunscreen on other areas of the body if those areas are exposed to the sun throughout the day. Sunscreens can contain physical and/or chemical blockers. Physical blockers are less likely to clog pores, these include zinc oxide and titanium dioxide. Reapply every two hour and after swimming. Sunscreen examples include Neutrogena, CeraVe, Blue Lizard, Elta MD and many others.    Proper skin care from [facility]:    -Eliminate harsh soaps as they strip the natural oils from the skin, often resulting in dry itchy skin ( i.e.  Dial, Zest, Zeny Spring)  -Use mild soaps such as Cetaphil or Dove Sensitive Skin in the shower. You do not need to use soap on arms, legs, and trunk every time you shower unless visibly soiled.   -Avoid hot or cold showers.  -After showering, lightly dry off and apply moisturizing within 2-3 minutes. This will help trap moisture in the skin.   -Aggressive use of a moisturizer at least 1-2 times a day to the entire body (including -Vanicream, Cetaphil, Aquaphor or Cerave) and moisturize hands after every washing.  -We recommend using moisturizers that come in a tub that needs to be scooped out, not a pump. This has more of an oil base. It will hold moisture in your skin much better than a water base moisturizer. The above recommended are non-pore clogging.    Celeste to follow up with Primary Care provider regarding elevated blood pressure.         It was a pleasure speaking with Celeste Chacko today.       Follow up in yearly        Again, thank you for allowing me to participate in the care of your patient.        Sincerely,        Alicia Neal PA-C

## 2021-05-26 NOTE — PATIENT INSTRUCTIONS
Proper skin care from [facility]:    -Eliminate harsh soaps as they strip the natural oils from the skin, often resulting in dry itchy skin ( i.e. Dial, Zest, Ezny Spring)  -Use mild soaps such as Cetaphil or Dove Sensitive Skin in the shower. You do not need to use soap on arms, legs, and trunk every time you shower unless visibly soiled.   -Avoid hot or cold showers.  -After showering, lightly dry off and apply moisturizing within 2-3 minutes. This will help trap moisture in the skin.   -Aggressive use of a moisturizer at least 1-2 times a day to the entire body (including -Vanicream, Cetaphil, Aquaphor or Cerave) and moisturize hands after every washing.  -We recommend using moisturizers that come in a tub that needs to be scooped out, not a pump. This has more of an oil base. It will hold moisture in your skin much better than a water base moisturizer. The above recommended are non-pore clogging.      Wear a sunscreen with at least SPF 30 on your face, ears, neck and V of the chest daily. Wear sunscreen on other areas of the body if those areas are exposed to the sun throughout the day. Sunscreens can contain physical and/or chemical blockers. Physical blockers are less likely to clog pores, these include zinc oxide and titanium dioxide. Reapply every two hour and after swimming. Sunscreen examples include Neutrogena, CeraVe, Blue Lizard, Elta MD and many others.    UV radiation  UVA radiation remains constant throughout the day and throughout the year. It is a longer wavelength than UVB and therefore penetrates deeper into the skin leading to immediate and delayed tanning, photoaging, and skin cancer. 70-80% of UVA and UVB radiation occurs between the hours of 10am-2pm.  UVB radiation  UVB radiation causes the most harmful effects and is more significant during the summer months. However, snow and ice can reflect UVB radiation leading to skin damage during the winter months as well. UVB radiation is  responsible for tanning, burning, inflammation, delayed erythema (pinkness), pigmentation (brown spots), and skin cancer.     I recommend self monthly full body exams and yearly full body exams with a dermatology provider. If you develop a new or changing lesion please follow up for examination. Most skin cancers are pink and scaly or pink and pearly. However, we do see blue/brown/black skin cancers.  Consider the ABCDEs of melanoma when giving yourself your monthly full body exam ( don't forget the groin, buttocks, feet, toes, etc). A-asymmetry, B-borders, C-color, D-diameter, E-elevation or evolving. If you see any of these changes please follow up in clinic. If you cannot see your back I recommend purchasing a hand held mirror to use with a larger wall mirror.

## 2021-05-26 NOTE — PROGRESS NOTES
HPI:  Celeste Chacko is a 89 year old female patient here today for FBE. Has some scaly spots on back .  Patient states this has been present for a while.  Patient reports the following symptoms: none .  Patient reports the following previous treatments: none.  Patient reports the following modifying factors: none.  Associated symptoms: none.  Patient has no other skin complaints today.  Remainder of the HPI, Meds, PMH, Allergies, FH, and SH was reviewed in chart.    Pertinent Hx:   BCC  Past Medical History:   Diagnosis Date     Basal cell carcinoma      Chronic airway obstruction, not elsewhere classified      Complete rupture of rotator cuff      Disorder of bone and cartilage, unspecified      History of blood transfusion      Mixed hyperlipidemia 4/00     Osteoarthritis      Personal history of other malignant neoplasm of skin      Spinal stenosis      Type II or unspecified type diabetes mellitus without mention of complication, not stated as uncontrolled        Past Surgical History:   Procedure Laterality Date     ARTHROPLASTY KNEE Left 9/15/2014    Procedure: ARTHROPLASTY KNEE;  Surgeon: Carlos Alberto Kaminski MD;  Location:  OR     HEAD & NECK SURGERY  05/14/15    forehead-skin cancer removed     INJECT EPIDURAL LUMBAR / SACRAL SINGLE Left 7/14/2016    Procedure: INJECT EPIDURAL LUMBAR / SACRAL SINGLE;  Surgeon: Luisito New MD;  [facility]     INJECT SACROILIAC JOINT N/A 12/1/2015    Procedure: INJECT SACROILIAC JOINT;  Surgeon: Luisito New MD;  [facility]: [facility]     INJECT SACROILIAC JOINT Bilateral 5/19/2016    Procedure: INJECT SACROILIAC JOINT;  Surgeon: Luisito New MD;  [facility]     INJECT SACROILIAC JOINT Bilateral 11/25/2016    Procedure: INJECT SACROILIAC JOINT;  Surgeon: Elmira Bennett MD;  [facility]     INJECT SACROILIAC JOINT Bilateral 4/25/2017    Procedure: INJECT SACROILIAC JOINT;  Bilateral Sacroiliac Joint Injection   Injection of local anesthetic and steroid  "into area around spine for pain relief;  Surgeon: Alvaro Holland MD;  [facility]     INJECT SACROILIAC JOINT Bilateral 2017    Procedure: INJECT SACROILIAC JOINT;  Bilateral Sacroiliac Joint Injection;  Surgeon: Elmira Bennett MD;  [facility]     INJECT SACROILIAC JOINT Bilateral 11/10/2017    Procedure: INJECT SACROILIAC JOINT;  Bilateral Sacroiliac Joint Injection;  Surgeon: Elmira Bennett MD;  [facility]     ZZC NONSPECIFIC PROCEDURE      Breast biopsies      [facility] NONSPECIFIC PROCEDURE      Pilonidal sinus repair      Z NONSPECIFIC PROCEDURE      T & A      Z NONSPECIFIC PROCEDURE      Shoulder arthropasty     Z NONSPECIFIC PROCEDURE      Right shoulder replacement, RCT repair        Family History   Problem Relation Age of Onset     Arthritis Father      Diabetes Brother      Cancer Brother         breast     Cerebrovascular Disease Brother        Social History     Socioeconomic History     Marital status: Single     Spouse name: Not on file     Number of children: Not on file     Years of education: Not on file     Highest education level: Not on file   Occupational History     Not on file   Social Needs     Financial resource strain: Not on file     Food insecurity     Worry: Not on file     Inability: Not on file     Transportation needs     Medical: Not on file     Non-medical: Not on file   Tobacco Use     Smoking status: Former Smoker     Packs/day: 0.50     Years: 54.00     Pack years: 27.00     Types: Cigarettes     Quit date: 2000     Years since quittin.0     Smokeless tobacco: Never Used     Tobacco comment: using nicotine gum   Substance and Sexual Activity     Alcohol use: Yes     Alcohol/week: 0.8 standard drinks     Types: 1 Glasses of wine per week     Comment: \"A glass of wine once a week.\"     Drug use: No     Sexual activity: Not Currently   Lifestyle     Physical activity     Days per week: Not on file     Minutes per session: Not on file     " Stress: Not on file   Relationships     Social connections     Talks on phone: Not on file     Gets together: Not on file     Attends Mandaeism service: Not on file     Active member of club or organization: Not on file     Attends meetings of clubs or organizations: Not on file     Relationship status: Not on file     Intimate partner violence     Fear of current or ex partner: Not on file     Emotionally abused: Not on file     Physically abused: Not on file     Forced sexual activity: Not on file   Other Topics Concern     Parent/sibling w/ CABG, MI or angioplasty before 65F 55M? Not Asked   Social History Narrative     Not on file       Outpatient Encounter Medications as of 5/26/2021   Medication Sig Dispense Refill     Acetaminophen (TYLENOL PO) Take 325 mg by mouth 6 times daily Patient takes TID with Oxycodone.        alendronate (FOSAMAX) 70 MG tablet TAKE 1 TABLET BY MOUTH EVERY 7 DAYS 12 tablet 1     amLODIPine (NORVASC) 2.5 MG tablet Take 1 tablet (2.5 mg) by mouth daily 90 tablet 1     ASPIRIN EC PO Take 81 mg by mouth daily       Calcium Polycarbophil (FIBERCON PO) Take 1 tablet by mouth once , one in the morning and one in the evening       calcium-vitamin D (CALTRATE) 600-400 MG-UNIT per tablet Take 1 tablet by mouth daily 1200mg   1000 mg of vitamin d       conjugated estrogens (PREMARIN) 0.625 MG/GM vaginal cream Use twice weekly 0.5 gr applied to the external genital area. 30 g 11     fluticasone-salmeterol (ADVAIR DISKUS) 500-50 MCG/DOSE inhaler Inhale 1 puff into the lungs 2 times daily 3 Inhaler 1     glipiZIDE (GLUCOTROL) 5 MG tablet TAKE 1 TABLET BY MOUTH EVERY MORNING 90 tablet 1     glucosamine-chondroitin 500-400 MG CAPS per capsule Take 1 capsule by mouth daily       lisinopril (ZESTRIL) 40 MG tablet Take 1 tablet (40 mg) by mouth daily 90 tablet 3     Multiple Vitamins-Minerals (MULTIVITAMIN ADULT PO) Take 1 tablet by mouth daily       NONFORMULARY Natra sleeping med takes 1 at night.        [START ON 7/17/2021] oxyCODONE (ROXICODONE) 5 MG tablet May take 5 tabs per day 150 tablet 0     polyethylene glycol (MIRALAX) 17 g packet Take 1 packet by mouth daily       PROAIR  (90 Base) MCG/ACT inhaler INHALE 2 PUFFS BY MOUTH EVERY 6 HOURS AS NEEDED FOR WHEEZE OR FOR SHORTNESS OF BREATH 8.5 Inhaler 3     rosuvastatin (CRESTOR) 5 MG tablet Take 1 tablet (5 mg) by mouth daily 90 tablet 1     senna-docusate (SENOKOT-S;PERICOLACE) 8.6-50 MG per tablet Take 1 tablet by mouth 2 times daily Reported on 4/12/2017       tiotropium (SPIRIVA HANDIHALER) 18 MCG inhaled capsule INHALE 1 CAPSULE INTO THE LUNG DAILY 90 capsule 1     triamterene-HCTZ (DYAZIDE) 37.5-25 MG capsule TAKE 1 CAPSULE BY MOUTH EVERY DAY 30 capsule 1     No facility-administered encounter medications on file as of 5/26/2021.        Review Of Systems:  Skin: spots  Eyes: negative  Ears/Nose/Throat: negative  Respiratory: No shortness of breath, dyspnea on exertion, cough, or hemoptysis  Cardiovascular: negative  Gastrointestinal: negative  Genitourinary: negative  Musculoskeletal: negative  Neurologic: negative  Psychiatric: negative  Hematologic/Lymphatic/Immunologic: negative  Endocrine: negative      Objective:     BP (!) 150/75   Pulse 94   Eyes: Conjunctivae/lids: Normal   ENT: Lips:  Normal  MSK: Normal  Cardiovascular: Peripheral edema none  Pulm: Breathing Normal  Neuro/Psych: Orientation: A/O x 3. Normal; Mood/Affect: Normal, NAD, WDWN  Pt accompanied by: self  Following areas examined: Scalp, face, eyelids, lips, neck, chest, abdomen, back, and R&L upper and lower extremities,  buttock, hips. Defers exam of groin and genitals.   Lopez skin type:i   Findings:  Red smooth well-defined macules on trunk and extremities.  Brown, stuck-on scaly appearing papules on trunk and extremities.  Well circumscribed macules with symmetric color distribution on trunk and extremities.  Tan WD smooth macules on face, neck, trunk, and  extremities.  Small pin point area of bleeding on left ankle  Purple/red/blue smooth patches on forearm and legs    Assessment and Plan:     1) Cherry angiomas, Seborrheic keratoses, Benign nevi, Lentigines, ecchymosis     I discussed the specifics of tumor, prognosis, and genetics of benign lesions.  I explained that treatment of these lesions would be purely cosmetic and not medically neccessary.  I discussed with patient different removal options including excision, cryotherapy, cautery and /or laser.  Lesion may recur and/or may not completely resolve. May need additional treatment.     2) wound   Pt dropped a knife on left ankle and had some bleeding.  Ointment and a pressure bandage applied  3) history of BCC  No evidence of recurrence.  Signs and Symptoms of non-melanoma skin cancer and ABCDEs of melanoma reviewed with patient. Patient encouraged to perform monthly self skin exams and educated on how to perform them. UV precautions reviewed with patient. Patient was asked about new or changing moles/lesions on body.   Wear a sunscreen with at least SPF 30 on your face, ears, neck and V of the chest daily. Wear sunscreen on other areas of the body if those areas are exposed to the sun throughout the day. Sunscreens can contain physical and/or chemical blockers. Physical blockers are less likely to clog pores, these include zinc oxide and titanium dioxide. Reapply every two hour and after swimming. Sunscreen examples include Neutrogenrudi CeraVe, Blue Lizard, Elta MD and many others.    Proper skin care from [facility]:    -Eliminate harsh soaps as they strip the natural oils from the skin, often resulting in dry itchy skin ( i.e. Dial, Zest, [place])  -Use mild soaps such as Cetaphil or Dove Sensitive Skin in the shower. You do not need to use soap on arms, legs, and trunk every time you shower unless visibly soiled.   -Avoid hot or cold showers.  -After showering, lightly dry off and apply  moisturizing within 2-3 minutes. This will help trap moisture in the skin.   -Aggressive use of a moisturizer at least 1-2 times a day to the entire body (including -Vanicream, Cetaphil, Aquaphor or Cerave) and moisturize hands after every washing.  -We recommend using moisturizers that come in a tub that needs to be scooped out, not a pump. This has more of an oil base. It will hold moisture in your skin much better than a water base moisturizer. The above recommended are non-pore clogging.    Celeste to follow up with Primary Care provider regarding elevated blood pressure.         It was a pleasure speaking with Celeste Chacko today.       Follow up in yearly

## 2021-06-15 DIAGNOSIS — N95.2 ATROPHIC VAGINITIS: ICD-10-CM

## 2021-06-17 NOTE — TELEPHONE ENCOUNTER
Pending Prescriptions:                       Disp   Refills    conjugated estrogens (PREMARIN) 0.625 MG/G*30 g   11       Sig: Use twice weekly 0.5 gr applied to the external           genital area.    Routing refill request to provider for review/approval because:  BP Readings from Last 3 Encounters:   05/26/21 (!) 150/75   05/10/21 (!) 164/84   04/27/21 134/60

## 2021-06-23 ENCOUNTER — OFFICE VISIT (OUTPATIENT)
Dept: VASCULAR SURGERY | Facility: CLINIC | Age: 86
End: 2021-06-23
Payer: COMMERCIAL

## 2021-06-23 DIAGNOSIS — I83.92 VARICOSE VEINS OF LEFT LOWER EXTREMITY, UNSPECIFIED WHETHER COMPLICATED: Primary | ICD-10-CM

## 2021-06-23 DIAGNOSIS — I83.812 VARICOSE VEINS OF LEFT LOWER EXTREMITY WITH PAIN: ICD-10-CM

## 2021-06-23 PROCEDURE — 99204 OFFICE O/P NEW MOD 45 MIN: CPT | Performed by: SURGERY

## 2021-06-23 NOTE — LETTER
"    6/23/2021         RE: Celeste Chacko  [address]  [address]  [place] [id number]        Dear Colleague,    Thank you for referring your patient, Celeste Chacko, to the [facility] [place]. Please see a copy of my visit note below.        [facility] CONSULTATION    HPI:    Celeste Chacko is a 89 year old female who presents with complaints of left lower extremity varicose veins.  Varicose veins have been present and notable for about 6 months. She has been wearing compression stockings for about 6 months, and these do help. She has some limitation in how long she can be on her feet before needing to elevate her legs. She is able to walk around her apartment and to do some errands, but she has trouble even getting around a large grocery store. She feels like her \"legs might give out\" from fatigue or soreness if she walked longer distances. She does get dyspnea on exertion. She does not require home oxygen.     She feels soreness in her left leg along the lateral calf. She has not had any ulcers or wounds. . Her right leg is not symptomatic. She does have occasional right ankle swelling. She did have a history of a blood clot following left knee surgery; she was treated for this.     I have reviewed and updated the history.   PAST MEDICAL HISTORY:   Past Medical History:   Diagnosis Date     Basal cell carcinoma      Chronic pain      Complete rupture of rotator cuff      COPD (chronic obstructive pulmonary disease) (H)      History of blood transfusion      Mixed hyperlipidemia 04/2000     Osteoarthritis      Personal history of other malignant neoplasm of skin      Spinal stenosis of lumbar region with neurogenic claudication      T2DM (type 2 diabetes mellitus) (H)      T2DM  COPD, Severe  Chronic pain  HTN  Spinal stenosis, lumbar, neurogenic claudication  DL    Current Outpatient Medications   Medication Sig Dispense Refill     Acetaminophen (TYLENOL PO) Take 325 " mg by mouth 6 times daily Patient takes TID with Oxycodone.        alendronate (FOSAMAX) 70 MG tablet TAKE 1 TABLET BY MOUTH EVERY 7 DAYS 12 tablet 1     amLODIPine (NORVASC) 2.5 MG tablet Take 1 tablet (2.5 mg) by mouth daily 90 tablet 1     ASPIRIN EC PO Take 81 mg by mouth daily       Calcium Polycarbophil (FIBERCON PO) Take 1 tablet by mouth once , one in the morning and one in the evening       calcium-vitamin D (CALTRATE) 600-400 MG-UNIT per tablet Take 1 tablet by mouth daily 1200mg   1000 mg of vitamin d       conjugated estrogens (PREMARIN) 0.625 MG/GM vaginal cream Use twice weekly 0.5 gr applied to the external genital area. 30 g 11     glipiZIDE (GLUCOTROL) 5 MG tablet TAKE 1 TABLET BY MOUTH EVERY MORNING 90 tablet 1     glucosamine-chondroitin 500-400 MG CAPS per capsule Take 1 capsule by mouth daily       lisinopril (ZESTRIL) 40 MG tablet Take 1 tablet (40 mg) by mouth daily 90 tablet 3     Multiple Vitamins-Minerals (MULTIVITAMIN ADULT PO) Take 1 tablet by mouth daily       NONFORMULARY Natra sleeping med takes 1 at night.       [START ON 7/17/2021] oxyCODONE (ROXICODONE) 5 MG tablet May take 5 tabs per day 150 tablet 0     polyethylene glycol (MIRALAX) 17 g packet Take 1 packet by mouth daily       PROAIR  (90 Base) MCG/ACT inhaler INHALE 2 PUFFS BY MOUTH EVERY 6 HOURS AS NEEDED FOR WHEEZE OR FOR SHORTNESS OF BREATH 8.5 Inhaler 3     rosuvastatin (CRESTOR) 5 MG tablet TAKE 1 TABLET(5 MG) BY MOUTH DAILY 90 tablet 1     senna-docusate (SENOKOT-S;PERICOLACE) 8.6-50 MG per tablet Take 1 tablet by mouth 2 times daily Reported on 4/12/2017       tiotropium (SPIRIVA HANDIHALER) 18 MCG inhaled capsule INHALE 1 CAPSULE INTO THE LUNG DAILY 90 capsule 1     triamterene-HCTZ (DYAZIDE) 37.5-25 MG capsule TAKE 1 CAPSULE BY MOUTH EVERY DAY 30 capsule 1     WIXELA INHUB 500-50 MCG/DOSE inhaler INHALE 1 PUFF INTO THE LUNGS TWICE DAILY 180 each 1       PAST SURGICAL HISTORY:   Past Surgical History:   Procedure  Laterality Date     ARTHROPLASTY KNEE Left 9/15/2014    Procedure: ARTHROPLASTY KNEE;  Surgeon: Carlos Alberto Kaminski MD;  Location: [facility] OR     HEAD & NECK SURGERY  05/14/15    forehead-skin cancer removed     INJECT EPIDURAL LUMBAR / SACRAL SINGLE Left 7/14/2016    Procedure: INJECT EPIDURAL LUMBAR / SACRAL SINGLE;  Surgeon: Luisito New MD;  [facility]     INJECT SACROILIAC JOINT N/A 12/1/2015    Procedure: INJECT SACROILIAC JOINT;  Surgeon: Luisito New MD;  [facility]     INJECT SACROILIAC JOINT Bilateral 5/19/2016    Procedure: INJECT SACROILIAC JOINT;  Surgeon: Luisito New MD;  [facility]     INJECT SACROILIAC JOINT Bilateral 11/25/2016    Procedure: INJECT SACROILIAC JOINT;  Surgeon: Elmira Bennett MD;  [facility]     INJECT SACROILIAC JOINT Bilateral 4/25/2017    Procedure: INJECT SACROILIAC JOINT;  Bilateral Sacroiliac Joint Injection   Injection of local anesthetic and steroid into area around spine for pain relief;  Surgeon: Alvaro Holland MD;  [facility]     INJECT SACROILIAC JOINT Bilateral 7/28/2017    Procedure: INJECT SACROILIAC JOINT;  Bilateral Sacroiliac Joint Injection;  Surgeon: Emlira Bennett MD;  [facility]     INJECT SACROILIAC JOINT Bilateral 11/10/2017    Procedure: INJECT SACROILIAC JOINT;  Bilateral Sacroiliac Joint Injection;  Surgeon: lEmira Bennett MD;  [facility]: [facility]     ZZC NONSPECIFIC PROCEDURE      Breast biopsies      [facility] NONSPECIFIC PROCEDURE      Pilonidal sinus repair      Z NONSPECIFIC PROCEDURE      T & A      ZZC NONSPECIFIC PROCEDURE  5/02    Shoulder arthropasty     ZZC NONSPECIFIC PROCEDURE  5/07    Right shoulder replacement, RCT repair       ALLERGIES:    Allergies   Allergen Reactions     Sulfamethoxazole Anaphylaxis and Hives       FAMILY HISTORY:   Family History   Problem Relation Age of Onset     Arthritis Father      Diabetes Brother      Cancer Brother         breast     Cerebrovascular Disease Brother      Nobody  "has varicose veins in the family.    SOCIAL HISTORY:   Social History     Tobacco Use     Smoking status: Former Smoker     Packs/day: 0.50     Years: 54.00     Pack years: 27.00     Types: Cigarettes     Quit date: 2000     Years since quittin.1     Smokeless tobacco: Never Used     Tobacco comment: using nicotine gum   Substance Use Topics     Alcohol use: Yes     Alcohol/week: 0.8 standard drinks     Types: 1 Glasses of wine per week     Comment: \"A glass of wine once a week.\"     Former smoker. Drinks a glass of wine once a week. No illicit drug use. Worked in clerical jobs, at a bank. Currently lives in a senior living apartment and is independently functional, including light housework.     REVIEW OF SYSTEMS:  10-pt ROS negative except as noted in HPI.       Vital signs:  There were no vitals taken for this visit.    PHYSICAL EXAM:  General: Sitting comfortably in chair, NAD.   HEENT: EOMI and conjugate, MMM   Respiratory: Normal respiratory effort.   Cardiovascular: Pulse is regular.   Musculoskeletal: Uses a walker to help ambulate. Grossly normal and symmetric strength and ROM in BLE. Minimal bilateral ankle edema present.  Vascular: dorsalis pedis: biphasic on Doppler BL; posterior tibial: triphasic on Doppler BL.  Cap refill < 3 sec over feet/toes.  Veins: scattered bilateral anterior and lateral calf varicosities present. No significant telangiectasias present. Hemosiderin deposition over bilateral lower legs. Thin skin.  Neurologic: A&Ox4  Psychiatric: Pleasantly conversant       ASSESSMENT / PLAN:  Celeste Chacko is a 89 year old female with history of severe COPD, osteoarthritis, lumbar spinal stenosis with chronic pain, DL, and T2DM, who presents with complaints of left leg soreness with associated varicose veins.       Ms. Chacko has been wearing 20-30 mmHg knee-high compression stockings (with the aid of a bharathi device) and these have been helpful to her.     She is relatively medically " frail and I frankly discussed with her that most of the options we have for varicose veins have potentially more risk than benefit for her.      Will do a venous duplex ultrasound to evaluate for reflux and venous incompetence, along with deep venous patency. This is more specifically in her to try to confirm a venous etiology for her discomfort, and to identify any potential targets for ultrasound-guided sclerotherapy. I do not believe she is a good candidate for full GSV ablation or stab phlebectomies.     Will have her follow up in clinic to review the ultrasound results and any additional treatments possible, based on those results.    Selam Pink MD        Again, thank you for allowing me to participate in the care of your patient.        Sincerely,        Selam Pink MD

## 2021-06-23 NOTE — PROGRESS NOTES
"[facility] VEIN CLINIC CONSULTATION    HPI:    Celeste Chacko is a 89 year old female who presents with complaints of left lower extremity varicose veins.  Varicose veins have been present and notable for about 6 months. She has been wearing compression stockings for about 6 months, and these do help. She has some limitation in how long she can be on her feet before needing to elevate her legs. She is able to walk around her apartment and to do some errands, but she has trouble even getting around a large grocery store. She feels like her \"legs might give out\" from fatigue or soreness if she walked longer distances. She does get dyspnea on exertion. She does not require home oxygen.     She feels soreness in her left leg along the lateral calf. She has not had any ulcers or wounds. . Her right leg is not symptomatic. She does have occasional right ankle swelling. She did have a history of a blood clot following left knee surgery; she was treated for this.     I have reviewed and updated the history.   PAST MEDICAL HISTORY:   Past Medical History:   Diagnosis Date     Basal cell carcinoma      Chronic pain      Complete rupture of rotator cuff      COPD (chronic obstructive pulmonary disease) (H)      History of blood transfusion      Mixed hyperlipidemia 04/2000     Osteoarthritis      Personal history of other malignant neoplasm of skin      Spinal stenosis of lumbar region with neurogenic claudication      T2DM (type 2 diabetes mellitus) (H)      T2DM  COPD, Severe  Chronic pain  HTN  Spinal stenosis, lumbar, neurogenic claudication  DL    Current Outpatient Medications   Medication Sig Dispense Refill     Acetaminophen (TYLENOL PO) Take 325 mg by mouth 6 times daily Patient takes TID with Oxycodone.        alendronate (FOSAMAX) 70 MG tablet TAKE 1 TABLET BY MOUTH EVERY 7 DAYS 12 tablet 1     amLODIPine (NORVASC) 2.5 MG tablet Take 1 tablet (2.5 mg) by mouth daily 90 tablet 1     ASPIRIN EC PO Take 81 " mg by mouth daily       Calcium Polycarbophil (FIBERCON PO) Take 1 tablet by mouth once , one in the morning and one in the evening       calcium-vitamin D (CALTRATE) 600-400 MG-UNIT per tablet Take 1 tablet by mouth daily 1200mg   1000 mg of vitamin d       conjugated estrogens (PREMARIN) 0.625 MG/GM vaginal cream Use twice weekly 0.5 gr applied to the external genital area. 30 g 11     glipiZIDE (GLUCOTROL) 5 MG tablet TAKE 1 TABLET BY MOUTH EVERY MORNING 90 tablet 1     glucosamine-chondroitin 500-400 MG CAPS per capsule Take 1 capsule by mouth daily       lisinopril (ZESTRIL) 40 MG tablet Take 1 tablet (40 mg) by mouth daily 90 tablet 3     Multiple Vitamins-Minerals (MULTIVITAMIN ADULT PO) Take 1 tablet by mouth daily       NONFORMULARY Natra sleeping med takes 1 at night.       [START ON 7/17/2021] oxyCODONE (ROXICODONE) 5 MG tablet May take 5 tabs per day 150 tablet 0     polyethylene glycol (MIRALAX) 17 g packet Take 1 packet by mouth daily       PROAIR  (90 Base) MCG/ACT inhaler INHALE 2 PUFFS BY MOUTH EVERY 6 HOURS AS NEEDED FOR WHEEZE OR FOR SHORTNESS OF BREATH 8.5 Inhaler 3     rosuvastatin (CRESTOR) 5 MG tablet TAKE 1 TABLET(5 MG) BY MOUTH DAILY 90 tablet 1     senna-docusate (SENOKOT-S;PERICOLACE) 8.6-50 MG per tablet Take 1 tablet by mouth 2 times daily Reported on 4/12/2017       tiotropium (SPIRIVA HANDIHALER) 18 MCG inhaled capsule INHALE 1 CAPSULE INTO THE LUNG DAILY 90 capsule 1     triamterene-HCTZ (DYAZIDE) 37.5-25 MG capsule TAKE 1 CAPSULE BY MOUTH EVERY DAY 30 capsule 1     WIXELA INHUB 500-50 MCG/DOSE inhaler INHALE 1 PUFF INTO THE LUNGS TWICE DAILY 180 each 1       PAST SURGICAL HISTORY:   Past Surgical History:   Procedure Laterality Date     ARTHROPLASTY KNEE Left 9/15/2014    Procedure: ARTHROPLASTY KNEE;  Surgeon: Carlos Alberto Kaminski MD;  Location: [facility] OR     HEAD & NECK SURGERY  05/14/15    forehead-skin cancer removed     INJECT EPIDURAL LUMBAR / SACRAL SINGLE Left 7/14/2016     Procedure: INJECT EPIDURAL LUMBAR / SACRAL SINGLE;  Surgeon: Luisito New MD;  Location: [facility] OR     INJECT SACROILIAC JOINT N/A 2015    Procedure: INJECT SACROILIAC JOINT;  Surgeon: Luisito New MD;  Location: [facility] GI     INJECT SACROILIAC JOINT Bilateral 2016    Procedure: INJECT SACROILIAC JOINT;  Surgeon: Luisito New MD;  [facility]: [facility]     INJECT SACROILIAC JOINT Bilateral 2016    Procedure: INJECT SACROILIAC JOINT;  Surgeon: Elmira Bennett MD;  Location: [facility]     INJECT SACROILIAC JOINT Bilateral 2017    Procedure: INJECT SACROILIAC JOINT;  Bilateral Sacroiliac Joint Injection   Injection of local anesthetic and steroid into area around spine for pain relief;  Surgeon: Alvaro Holland MD;  Location: [facility]     INJECT SACROILIAC JOINT Bilateral 2017    Procedure: INJECT SACROILIAC JOINT;  Bilateral Sacroiliac Joint Injection;  Surgeon: Elmira Bennett MD;  [facility]: [facility]     INJECT SACROILIAC JOINT Bilateral 11/10/2017    Procedure: INJECT SACROILIAC JOINT;  Bilateral Sacroiliac Joint Injection;  Surgeon: Elmira Bennett MD;  Location: [facility] OR     [facility] NONSPECIFIC PROCEDURE      Breast biopsies      [facility] NONSPECIFIC PROCEDURE      Pilonidal sinus repair      [facility] NONSPECIFIC PROCEDURE      T & A      [facility] NONSPECIFIC PROCEDURE      Shoulder arthropasty     [facility] NONSPECIFIC PROCEDURE      Right shoulder replacement, RCT repair       ALLERGIES:    Allergies   Allergen Reactions     Sulfamethoxazole Anaphylaxis and Hives       FAMILY HISTORY:   Family History   Problem Relation Age of Onset     Arthritis Father      Diabetes Brother      Cancer Brother         breast     Cerebrovascular Disease Brother      Nobody has varicose veins in the family.    SOCIAL HISTORY:   Social History     Tobacco Use     Smoking status: Former Smoker     Packs/day: 0.50     Years: 54.00     Pack years: 27.00     Types: Cigarettes     Quit date: 2000     Years since quittin.1      "Smokeless tobacco: Never Used     Tobacco comment: using nicotine gum   Substance Use Topics     Alcohol use: Yes     Alcohol/week: 0.8 standard drinks     Types: 1 Glasses of wine per week     Comment: \"A glass of wine once a week.\"     Former smoker. Drinks a glass of wine once a week. No illicit drug use. Worked in clerical jobs, at a bank. Currently lives in a senior living apartment and is independently functional, including light housework.     REVIEW OF SYSTEMS:  10-pt ROS negative except as noted in HPI.       Vital signs:  There were no vitals taken for this visit.    PHYSICAL EXAM:  General: Sitting comfortably in chair, NAD.   HEENT: EOMI and conjugate, MMM   Respiratory: Normal respiratory effort.   Cardiovascular: Pulse is regular.   Musculoskeletal: Uses a walker to help ambulate. Grossly normal and symmetric strength and ROM in BLE. Minimal bilateral ankle edema present.  Vascular: dorsalis pedis: biphasic on Doppler BL; posterior tibial: triphasic on Doppler BL.  Cap refill < 3 sec over feet/toes.  Veins: scattered bilateral anterior and lateral calf varicosities present. No significant telangiectasias present. Hemosiderin deposition over bilateral lower legs. Thin skin.  Neurologic: A&Ox4  Psychiatric: Pleasantly conversant       ASSESSMENT / PLAN:  Celeste Chacko is a 89 year old female with history of severe COPD, osteoarthritis, lumbar spinal stenosis with chronic pain, DL, and T2DM, who presents with complaints of left leg soreness with associated varicose veins.       Ms. Chacko has been wearing 20-30 mmHg knee-high compression stockings (with the aid of a bharathi device) and these have been helpful to her.     She is relatively medically frail and I frankly discussed with her that most of the options we have for varicose veins have potentially more risk than benefit for her.      Will do a venous duplex ultrasound to evaluate for reflux and venous incompetence, along with deep venous patency. " This is more specifically in her to try to confirm a venous etiology for her discomfort, and to identify any potential targets for ultrasound-guided sclerotherapy. I do not believe she is a good candidate for full GSV ablation or stab phlebectomies.     Will have her follow up in clinic to review the ultrasound results and any additional treatments possible, based on those results.    Selam Pink MD

## 2021-06-28 DIAGNOSIS — M85.80 OSTEOPENIA, UNSPECIFIED LOCATION: ICD-10-CM

## 2021-06-30 RX ORDER — ALENDRONATE SODIUM 70 MG/1
TABLET ORAL
Qty: 12 TABLET | Refills: 1 | Status: SHIPPED | OUTPATIENT
Start: 2021-06-30 | End: 2021-11-19

## 2021-06-30 NOTE — TELEPHONE ENCOUNTER
Pending Prescriptions:                       Disp   Refills    alendronate (FOSAMAX) 70 MG tablet [Pharma*12 tab*1        Sig: TAKE 1 TABLET BY MOUTH EVERY 7 DAYS    Routing refill request to provider for review/approval because:  Failed protocol

## 2021-08-04 ENCOUNTER — ANCILLARY PROCEDURE (OUTPATIENT)
Dept: ULTRASOUND IMAGING | Facility: CLINIC | Age: 86
End: 2021-08-04
Attending: SURGERY
Payer: COMMERCIAL

## 2021-08-04 ENCOUNTER — OFFICE VISIT (OUTPATIENT)
Dept: URGENT CARE | Facility: URGENT CARE | Age: 86
End: 2021-08-04
Payer: COMMERCIAL

## 2021-08-04 ENCOUNTER — OFFICE VISIT (OUTPATIENT)
Dept: VASCULAR SURGERY | Facility: CLINIC | Age: 86
End: 2021-08-04
Attending: SURGERY
Payer: COMMERCIAL

## 2021-08-04 VITALS
HEART RATE: 86 BPM | OXYGEN SATURATION: 93 % | WEIGHT: 135 LBS | SYSTOLIC BLOOD PRESSURE: 159 MMHG | TEMPERATURE: 97.6 F | RESPIRATION RATE: 18 BRPM | DIASTOLIC BLOOD PRESSURE: 67 MMHG | BODY MASS INDEX: 23.91 KG/M2

## 2021-08-04 DIAGNOSIS — I83.812 VARICOSE VEINS OF LEFT LOWER EXTREMITY WITH PAIN: ICD-10-CM

## 2021-08-04 DIAGNOSIS — L03.031 PARONYCHIA OF TOE, RIGHT: Primary | ICD-10-CM

## 2021-08-04 DIAGNOSIS — I83.92 VARICOSE VEINS OF LEFT LOWER EXTREMITY, UNSPECIFIED WHETHER COMPLICATED: ICD-10-CM

## 2021-08-04 DIAGNOSIS — I83.812 VARICOSE VEINS OF LEFT LOWER EXTREMITY WITH PAIN: Primary | ICD-10-CM

## 2021-08-04 PROCEDURE — 99213 OFFICE O/P EST LOW 20 MIN: CPT | Performed by: PHYSICIAN ASSISTANT

## 2021-08-04 PROCEDURE — 99214 OFFICE O/P EST MOD 30 MIN: CPT | Performed by: SURGERY

## 2021-08-04 PROCEDURE — 93971 EXTREMITY STUDY: CPT | Mod: LT | Performed by: SURGERY

## 2021-08-04 RX ORDER — CEPHALEXIN 500 MG/1
500 CAPSULE ORAL 3 TIMES DAILY
Qty: 21 CAPSULE | Refills: 0 | Status: SHIPPED | OUTPATIENT
Start: 2021-08-04 | End: 2021-08-11

## 2021-08-04 NOTE — PATIENT INSTRUCTIONS
(L03.031) Paronychia of toe, right  (primary encounter diagnosis)  Comment:   Plan: cephALEXin (KEFLEX) 500 MG capsule, benzocaine         (AMERICAINE) 20 % rectal ointment          Keep follow up with podiatry for 9/7/21

## 2021-08-04 NOTE — PROGRESS NOTES
Patient presents with:  Urgent Care: recurrent big toe pain, s/p right great toe nail removal on 04/27/2021    (L03.031) Paronychia of toe, right  (primary encounter diagnosis)  Comment:   Plan: cephALEXin (KEFLEX) 500 MG capsule, benzocaine         (AMERICAINE) 20 % rectal ointment          Keep follow up with podiatry for 9/7/21        SUBJECTIVE:   Celeste Chacko is a 89 year old female who presents today with right great toe pain.  Some redness.  No drainage.  States that toe has intermittently bothered her since having toenail removed in April 2021.  No fevers.  No trauma.      More red and tender today.      Patient requests more benzocaine, as it has helped with pain in past.    Past Medical History:   Diagnosis Date     Basal cell carcinoma      Chronic pain      Complete rupture of rotator cuff      COPD (chronic obstructive pulmonary disease) (H)      History of blood transfusion      Mixed hyperlipidemia 04/2000     Osteoarthritis      Personal history of other malignant neoplasm of skin      Spinal stenosis of lumbar region with neurogenic claudication      T2DM (type 2 diabetes mellitus) (H)          Current Outpatient Medications   Medication Sig Dispense Refill     Multiple Vitamins-Iron (DAILY-SHAHRAM/IRON/BETA-CAROTENE) TABS TAKE 1 TABLET BY MOUTH DAILY. (Patient not taking: Reported on 10/19/2020) 30 tablet 7     Social History     Tobacco Use     Smoking status: Never Smoker     Smokeless tobacco: Never Used   Substance Use Topics     Alcohol use: Not on file     Family History   Problem Relation Age of Onset     Diabetes Mother      Diabetes Father          ROS:    10 point ROS of systems including Constitutional, Eyes, Respiratory, Cardiovascular, Gastroenterology, Genitourinary, Integumentary, Muscularskeletal, Psychiatric ,neurological were all negative except for pertinent positives noted in my HPI       OBJECTIVE:  BP (!) 159/67   Pulse 86   Temp 97.6  F (36.4  C)   Resp 18   Wt 61.2 kg  (135 lb)   SpO2 93%   BMI 23.91 kg/m    Physical Exam:  GENERAL APPEARANCE: healthy, alert and no distress  Extremities: right great toe: nail is growing in, currently /4 of normal size.  Tenderness at medial aspect.    SKIN: erythema at medial aspect of right great toe with subtle swelling no drainage

## 2021-08-04 NOTE — PROGRESS NOTES
"[facility]   VEIN CLINIC Progress Note     Date: August 4, 2021     Reason for Visit:  Follow-up of ultrasound results      Subjective:  Celeste Chacko reports no new symptoms or concerns from her prior visit.       Vital signs:  There were no vitals taken for this visit.    PHYSICAL EXAM:  General: Sitting comfortably in chair, NAD.   Musculoskeletal: Normal walking gait around exam room. Grossly normal and symmetric strength and ROM in BLE. No pitting edema present.  Veins: scattered bilateral anterior and lateral calf varicosities present.      Venous Duplex Ultrasound:   I have reviewed the following images.  Lower Extremity Duplex US (8/4/21):  \"LEFT:  The deep veins demonstrate phasic flow, compress, and respond to augmentations.  There is no reflux or DVT.    The GSV demonstrates phasic flow, compresses, and responds to augmentations from the saphenofemoral junction to the ankle with no evidence of thrombus. The greater saphenous vein measures 9.2 mm at the saphenofemoral junction, 5.4 mm in the proximal thigh, and 3.3 mm at the knee. The GSV is incompetent at the SFJ and again from the mid thigh to the proximal calf with the greatest reflux time of 2722 milliseconds.  The GSV gives rise to a varicose branch measuring 3.0 mm off the proximal calf that courses medially with a reflux time of 907 milliseconds.   The AASV is competent. The Giacomini vein is competent. The SSV is competent. Perforators: There is no evidence of incompetent  veins at any level.      RIGHT:  The CFV demonstrates phasic flow, compresses, and responds to augmentations.  There is no DVT.       FINAL SUMMARY:  1.         No evidence of left lower extremity deep vein thrombus.  2.         Right common femoral vein incompetence.  3.         Left greater saphenous vein incompetence.  4.         Left varicose vein incompetence.  5.         Superficial and perforating veins time of incompetence is >500 ms and >1000 ms for " "deep vein incompetence.  6.         No venous pathology underlying the area of tenderness identified in the posterolateral calf.\"             Assessment & Plan   Celeste Chacko is a 89 year old female with history of severe COPD, osteoarthritis, lumbar spinal stenosis with chronic pain, DL, and T2DM, who presents with complaints of left leg soreness with associated varicose veins.       Ms. Chacko has been wearing 20-30 mmHg knee-high compression stockings (with the aid of a bharathi device) and these have been helpful to her.     There is no clear venous pathology underlying the area of greatest tenderness on her posterolateral calf on the left. She does have superficial venous insufficiency in the GSV.     Explained that I do not believe she would have significant improvement in her symptoms or overall quality of life with GSV ablation or stab phlebectomies on the left leg; this is compounded by her medical frailty, which increases her risk with such interventions.        Ms. Chacko understands and will continue wearing compression therapy. I encouraged her to return if she has worsening symtoms or new concerns for re-evaluation.       Selam Pink      "

## 2021-08-04 NOTE — LETTER
"    8/4/2021         RE: Celeste Chacko  [address]  [address]  [place] [address]        Dear Colleague,    Thank you for referring your patient, Celeste Chacko, to the [facility] VEIN CLINIC [place]. Please see a copy of my visit note below.        [facility]   VEIN CLINIC Progress Note     Date: August 4, 2021     Reason for Visit:  Follow-up of ultrasound results      Subjective:  Celeste Chacko reports no new symptoms or concerns from her prior visit.       Vital signs:  There were no vitals taken for this visit.    PHYSICAL EXAM:  General: Sitting comfortably in chair, NAD.   Musculoskeletal: Normal walking gait around exam room. Grossly normal and symmetric strength and ROM in BLE. No pitting edema present.  Veins: scattered bilateral anterior and lateral calf varicosities present.      Venous Duplex Ultrasound:   I have reviewed the following images.  Lower Extremity Duplex US (8/4/21):  \"LEFT:  The deep veins demonstrate phasic flow, compress, and respond to augmentations.  There is no reflux or DVT.    The GSV demonstrates phasic flow, compresses, and responds to augmentations from the saphenofemoral junction to the ankle with no evidence of thrombus. The greater saphenous vein measures 9.2 mm at the saphenofemoral junction, 5.4 mm in the proximal thigh, and 3.3 mm at the knee. The GSV is incompetent at the SFJ and again from the mid thigh to the proximal calf with the greatest reflux time of 2722 milliseconds.  The GSV gives rise to a varicose branch measuring 3.0 mm off the proximal calf that courses medially with a reflux time of 907 milliseconds.   The AASV is competent. The Giacomini vein is competent. The SSV is competent. Perforators: There is no evidence of incompetent  veins at any level.      RIGHT:  The CFV demonstrates phasic flow, compresses, and responds to augmentations.  There is no DVT.       FINAL SUMMARY:  1.         No evidence of left lower extremity " "deep vein thrombus.  2.         Right common femoral vein incompetence.  3.         Left greater saphenous vein incompetence.  4.         Left varicose vein incompetence.  5.         Superficial and perforating veins time of incompetence is >500 ms and >1000 ms for deep vein incompetence.  6.         No venous pathology underlying the area of tenderness identified in the posterolateral calf.\"             Assessment & Plan   Celeste Chacko is a 89 year old female with history of severe COPD, osteoarthritis, lumbar spinal stenosis with chronic pain, DL, and T2DM, who presents with complaints of left leg soreness with associated varicose veins.       Ms. Chacko has been wearing 20-30 mmHg knee-high compression stockings (with the aid of a bharathi device) and these have been helpful to her.     There is no clear venous pathology underlying the area of greatest tenderness on her posterolateral calf on the left. She does have superficial venous insufficiency in the GSV.     Explained that I do not believe she would have significant improvement in her symptoms or overall quality of life with GSV ablation or stab phlebectomies on the left leg; this is compounded by her medical frailty, which increases her risk with such interventions.        Ms. Chacko understands and will continue wearing compression therapy. I encouraged her to return if she has worsening symtoms or new concerns for re-evaluation.       Selam Pink          Again, thank you for allowing me to participate in the care of your patient.        Sincerely,        Selam Pink MD    "

## 2021-08-06 NOTE — IP AVS SNAPSHOT
[facility]    [address] [place] [address]    Phone:  [phone]    Fax:  [phone]                                       After Visit Summary   7/28/2017    Celeste Chacko    MRN: [id number]           After Visit Summary Signature Page     I have received my discharge instructions, and my questions have been answered. I have discussed any challenges I see with this plan with the nurse or doctor.    ..........................................................................................................................................  Patient/Patient Representative Signature      ..........................................................................................................................................  Patient Representative Print Name and Relationship to Patient    ..................................................               ................................................  Date                                            Time    ..........................................................................................................................................  Reviewed by Signature/Title    ...................................................              ..............................................  Date                                                            Time           Name band;

## 2021-08-24 DIAGNOSIS — M48.062 SPINAL STENOSIS OF LUMBAR REGION WITH NEUROGENIC CLAUDICATION: ICD-10-CM

## 2021-08-24 RX ORDER — OXYCODONE HYDROCHLORIDE 5 MG/1
TABLET ORAL
Qty: 150 TABLET | Refills: 0 | Status: SHIPPED | OUTPATIENT
Start: 2021-08-24 | End: 2021-09-27

## 2021-08-24 NOTE — TELEPHONE ENCOUNTER
oxyCODONE (ROXICODONE) 5 MG tablet      Last Written Prescription Date:  07/17/21  Last Fill Quantity: 150,   # refills: 0  Last Office Visit: 05/10/21  Future Office visit:    Next 5 appointments (look out 90 days)    Sep 07, 2021  8:00 AM  Return Visit with Tristan Michael DPM  [facility] ([facility] [place] ) [address] [address] [address]  [phone]           Routing refill request to provider for review/approval because:

## 2021-09-07 ENCOUNTER — OFFICE VISIT (OUTPATIENT)
Dept: PODIATRY | Facility: CLINIC | Age: 86
End: 2021-09-07
Payer: COMMERCIAL

## 2021-09-07 VITALS
DIASTOLIC BLOOD PRESSURE: 74 MMHG | SYSTOLIC BLOOD PRESSURE: 154 MMHG | BODY MASS INDEX: 24.27 KG/M2 | WEIGHT: 137 LBS | HEIGHT: 63 IN

## 2021-09-07 DIAGNOSIS — M89.8X9 BONY PROMINENCE: ICD-10-CM

## 2021-09-07 DIAGNOSIS — L85.9 HYPERKERATOSIS: Primary | ICD-10-CM

## 2021-09-07 PROCEDURE — 99213 OFFICE O/P EST LOW 20 MIN: CPT | Performed by: PODIATRIST

## 2021-09-07 ASSESSMENT — MIFFLIN-ST. JEOR: SCORE: 1010.56

## 2021-09-07 NOTE — PROGRESS NOTES
"Foot & Ankle Surgery   September 7, 2021    S:  Pt is seen today for evaluation of painful lesions on both great toes.  The shoes she has on today \"are just for show\" but she normally wears walking shoes.  She promises the areas which does help but pain persists.  The right hallux nail that I removed in February of this year is growing and uneventfully.    There were no vitals filed for this visit.'      ROS - Pos for CC.  Patient denies current nausea, vomiting, chills, fevers, belly pain, calf pain, chest pain or SOB.  Complete remainder of ROS it otherwise neg.      PE:  Gen:   No apparent distress  Eye:    Visual scanning without deficit  Ear:    Response to auditory stimuli wnl  Lung:    Non-labored breathing on RA noted  Abd:    NTND per patient report  Lymph:    Neg for pitting/non-pitting edema BLE  Vasc:    Pulses palpable, CFT minimally delayed  Neuro:    Light touch sensation intact to all sensory nerve distributions without paresthesias  Derm:    Painful hyperkeratosis along the medial hallux bilateral at the level of the medial flare of the distal phalanx  MSK:    Atrophied fat pad and prominent medial flare at the base of the distal phalanx bilateral hallux  Calf:    Neg for redness, swelling or tenderness      Assessment:  89 year old female with painful hyperkeratosis medial hallux bilateral in setting of very prominent medial flare at the base of the distal phalanx      Plan:  Discussed etiologies, anatomy and options  1.  Painful hyperkeratosis medial hallux bilateral in setting of very prominent medial flare at the base the distal phalanx  -We discussed that many hyper-keratotic lesions are caused by pressure or shearing forces on the skin, and these can often be treated sufficiently with shoes, inserts and local tissue debridement.  Some lesions, however, can have a deep core, and while home treatments are helpful, occasional clinical debridement may be necessary.  In certain cases, surgical " excision may be required if no other treatments have proven sufficient.  -Our home instruction handout was dispensed, detailing home therapies to minimize regrowth of the hyperkeratotic tissue.    -we discussed a rough estimate of the cost of debridement in clinic, and how insurance may not cover the cost meaning the patient may be responsible.    -Debrided with 15 blade bilateral without incident.  No charge  -Toe/cover handout dispensed for additional padding to the area  -I advised comfortable accommodative shoes that minimize pressure  -If the above plan fails to provide sufficient relief, we discussed the option for surgical excision of the underlying bony prominence.    Follow up: As needed or sooner with acute issues           Tristan Michael DPM FACFAS FACFAOM  Podiatric Foot & Ankle Surgeon  [facility]  [phone]    Disclaimer: This note consists of symbols derived from keyboarding, dictation and/or voice recognition software. As a result, there may be errors in the script that have gone undetected. Please consider this when interpreting information found in this chart.

## 2021-09-07 NOTE — PATIENT INSTRUCTIONS
Thank you for choosing [facility] Podiatry / Foot & Ankle Surgery!    DR MENG'S CLINIC LOCATIONS  [facility]   [id number] [facility] [id number]   [place] [address] [place], [place] [id number] [phone]       SET UP SURGERY: [phone]    APPOINTMENTS: [phone]    BILLING QUESTIONS: [phone]    FAX NUMBER: [phone]        Follow up: as needed    CALLUS / CORN / IPK CARE  When there is excessive friction or pressure on the skin, the body responds by making the skin thicker to protect the deeper structures from becoming exposed. While this works well to protect the deeper structures, the thickened skin can cause increased pressure and pain.    Callus: flat, diffuse thickened areas are simple calluses and they are usually caused by friction. Often these are the result of rubbing on a shoe or from going barefoot.    Corns: calluses with a central core on or between the toes are called corns. These result from prominent joints on toes rubbing together. Theses are a symptom of bone malalignment or illfitting shoes and will always recur unless the underlying bones are addressed surgically.    IPK: calluses with a central core on the ball of the foot are usually IPKs (intractable plantar keratosis). These are caused by excessive pressure from the metatarsals, the bones that make up the ball of the foot. Often one of these bones is too long or too prominent. Again, these will always recur unless the underlying bone issue is addressed. There is no cure for these. They will either go away by themselves, recur, or more could develop.    TREATMENT RECOMENDATIONS  - File: Trim them down with a pumice stone or callus file a couple times a week to remove callus tissue that builds up. An electric callus removing device. Amope Pedi Perfect Electronic Pedicure Foot File and Callus Remover can be a good option.   - Moisturize: Lotion can be applied to soften the callus.  A lactic acid or urea based cream such as Carmol, Kersal or Vanicream or thicker cream with shea butter are good options.   - Foot Gear: Good supportive shoes and minimizing barefoot walking can slow down callus formation and can decrease pain levels. Gel inserts can also provide padding to the bottom of the foot to prevent pain and slow recurrence. Toe spacers, toe covers, can custom orthotic inserts can be beneficial as well.  - Surgery: If there is a surgical pathology noted, such as a prominent bone, often this needs to be addressed surgically to minimize recurrence. However, sometimes the lesion simply migrates to another spot after surgery, so it is not a guaranteed cure.    [email or web address] or call [email or web address]  TOE COVERS/CAPS

## 2021-09-15 ENCOUNTER — TRANSFERRED RECORDS (OUTPATIENT)
Dept: MULTI SPECIALTY CLINIC | Facility: CLINIC | Age: 86
End: 2021-09-15
Payer: COMMERCIAL

## 2021-09-15 LAB — RETINOPATHY: NORMAL

## 2021-09-27 DIAGNOSIS — M48.062 SPINAL STENOSIS OF LUMBAR REGION WITH NEUROGENIC CLAUDICATION: ICD-10-CM

## 2021-09-27 RX ORDER — OXYCODONE HYDROCHLORIDE 5 MG/1
TABLET ORAL
Qty: 150 TABLET | Refills: 0 | Status: SHIPPED | OUTPATIENT
Start: 2021-09-27 | End: 2021-10-29

## 2021-09-27 NOTE — TELEPHONE ENCOUNTER
Last refill date: 8/24/2021  Last visit: 5/10/2021  CSA done 5/10/2021    Refill done.  Please call and let her know, but also advise that due to our staffing, we generally do not call people back once the prescription is sent.  As long as she calls 3 business days ahead, it should be filled on time.  They can check with their pharmacy.

## 2021-10-29 DIAGNOSIS — M48.062 SPINAL STENOSIS OF LUMBAR REGION WITH NEUROGENIC CLAUDICATION: ICD-10-CM

## 2021-10-29 RX ORDER — OXYCODONE HYDROCHLORIDE 5 MG/1
TABLET ORAL
Qty: 150 TABLET | Refills: 0 | Status: SHIPPED | OUTPATIENT
Start: 2021-10-29 | End: 2021-11-28

## 2021-10-29 NOTE — TELEPHONE ENCOUNTER
Oxycodone      Last Written Prescription Date:  09/27/21  Last Fill Quantity: 150,   # refills: 1  Last Office Visit: 05/10/21  Future Office visit:       Routing refill request to provider for review/approval because:  Drug not on the [facility], P or [facility] refill protocol or controlled substance

## 2021-11-10 ENCOUNTER — LAB (OUTPATIENT)
Dept: LAB | Facility: CLINIC | Age: 86
End: 2021-11-10
Payer: COMMERCIAL

## 2021-11-10 DIAGNOSIS — E11.8 TYPE 2 DIABETES MELLITUS WITH COMPLICATION, WITHOUT LONG-TERM CURRENT USE OF INSULIN (H): ICD-10-CM

## 2021-11-10 LAB — HBA1C MFR BLD: 7.1 % (ref 0–5.6)

## 2021-11-10 PROCEDURE — 36415 COLL VENOUS BLD VENIPUNCTURE: CPT

## 2021-11-10 PROCEDURE — 83036 HEMOGLOBIN GLYCOSYLATED A1C: CPT

## 2021-11-10 PROCEDURE — 80048 BASIC METABOLIC PNL TOTAL CA: CPT

## 2021-11-11 LAB
ANION GAP SERPL CALCULATED.3IONS-SCNC: 10 MMOL/L (ref 3–14)
BUN SERPL-MCNC: 16 MG/DL (ref 7–30)
CALCIUM SERPL-MCNC: 9 MG/DL (ref 8.5–10.1)
CHLORIDE BLD-SCNC: 105 MMOL/L (ref 94–109)
CO2 SERPL-SCNC: 23 MMOL/L (ref 20–32)
CREAT SERPL-MCNC: 0.6 MG/DL (ref 0.52–1.04)
GFR SERPL CREATININE-BSD FRML MDRD: 81 ML/MIN/1.73M2
GLUCOSE BLD-MCNC: 126 MG/DL (ref 70–99)
POTASSIUM BLD-SCNC: 3.9 MMOL/L (ref 3.4–5.3)
SODIUM SERPL-SCNC: 138 MMOL/L (ref 133–144)

## 2021-11-19 ENCOUNTER — OFFICE VISIT (OUTPATIENT)
Dept: INTERNAL MEDICINE | Facility: CLINIC | Age: 86
End: 2021-11-19
Payer: COMMERCIAL

## 2021-11-19 DIAGNOSIS — J44.9 COPD, SEVERE (H): ICD-10-CM

## 2021-11-19 DIAGNOSIS — R60.0 BILATERAL LEG EDEMA: ICD-10-CM

## 2021-11-19 DIAGNOSIS — E11.8 TYPE 2 DIABETES MELLITUS WITH COMPLICATION, WITHOUT LONG-TERM CURRENT USE OF INSULIN (H): Primary | ICD-10-CM

## 2021-11-19 DIAGNOSIS — F11.90 CHRONIC, CONTINUOUS USE OF OPIOIDS: ICD-10-CM

## 2021-11-19 DIAGNOSIS — E78.5 HYPERLIPIDEMIA LDL GOAL <100: ICD-10-CM

## 2021-11-19 DIAGNOSIS — Z51.81 ENCOUNTER FOR THERAPEUTIC DRUG LEVEL MONITORING: ICD-10-CM

## 2021-11-19 DIAGNOSIS — M48.062 SPINAL STENOSIS OF LUMBAR REGION WITH NEUROGENIC CLAUDICATION: ICD-10-CM

## 2021-11-19 DIAGNOSIS — I10 BENIGN ESSENTIAL HYPERTENSION: ICD-10-CM

## 2021-11-19 PROCEDURE — 99207 PR FOOT EXAM NO CHARGE: CPT | Performed by: INTERNAL MEDICINE

## 2021-11-19 PROCEDURE — 99214 OFFICE O/P EST MOD 30 MIN: CPT | Performed by: INTERNAL MEDICINE

## 2021-11-19 RX ORDER — TRIAMTERENE AND HYDROCHLOROTHIAZIDE 37.5; 25 MG/1; MG/1
1 CAPSULE ORAL DAILY
Qty: 90 CAPSULE | Refills: 3 | Status: SHIPPED | OUTPATIENT
Start: 2021-11-19 | End: 2022-01-25

## 2021-11-19 RX ORDER — ROSUVASTATIN CALCIUM 5 MG/1
5 TABLET, COATED ORAL DAILY
Qty: 90 TABLET | Refills: 3 | Status: SHIPPED | OUTPATIENT
Start: 2021-11-19 | End: 2022-02-19

## 2021-11-19 RX ORDER — LISINOPRIL 40 MG/1
40 TABLET ORAL DAILY
Qty: 90 TABLET | Refills: 3 | Status: SHIPPED | OUTPATIENT
Start: 2021-11-19 | End: 2022-02-19

## 2021-11-19 RX ORDER — TIOTROPIUM BROMIDE 18 UG/1
CAPSULE ORAL; RESPIRATORY (INHALATION)
Qty: 90 CAPSULE | Refills: 3 | Status: SHIPPED | OUTPATIENT
Start: 2021-11-19 | End: 2022-03-23

## 2021-11-19 RX ORDER — GLIPIZIDE 5 MG/1
5 TABLET ORAL EVERY MORNING
Qty: 90 TABLET | Refills: 3 | Status: ON HOLD | OUTPATIENT
Start: 2021-11-19 | End: 2022-03-13

## 2021-11-19 RX ORDER — AMLODIPINE BESYLATE 2.5 MG/1
2.5 TABLET ORAL DAILY
Qty: 90 TABLET | Refills: 3 | Status: SHIPPED | OUTPATIENT
Start: 2021-11-19 | End: 2022-02-19

## 2021-11-19 RX ORDER — ALBUTEROL SULFATE 90 UG/1
2 AEROSOL, METERED RESPIRATORY (INHALATION) EVERY 6 HOURS PRN
Qty: 8.5 G | Refills: 11 | Status: SHIPPED | OUTPATIENT
Start: 2021-11-19 | End: 2021-11-19

## 2021-11-19 ASSESSMENT — MIFFLIN-ST. JEOR: SCORE: 1006.48

## 2021-11-19 NOTE — PROGRESS NOTES
Assessment & Plan     Type 2 diabetes mellitus with complication, without long-term current use of insulin (H)  Controlled, continue med   - glipiZIDE (GLUCOTROL) 5 MG tablet; Take 1 tablet (5 mg) by mouth every morning  - FOOT EXAM    COPD, severe (H)  Stable   - tiotropium (SPIRIVA HANDIHALER) 18 MCG inhaled capsule; INHALE THE CONTENTS OF 1 CAPSULE VIA INHALATION DEVICE DAILY  - fluticasone-salmeterol (WIXELA INHUB) 500-50 MCG/DOSE inhaler; Inhale 1 puff into the lungs 2 times daily ### DO NOT FILL NOW.  Please update patient's profile to reflect additional refills.  ####    Benign essential hypertension  Controlled, continue meds   - triamterene-HCTZ (DYAZIDE) 37.5-25 MG capsule; Take 1 capsule by mouth daily  - amLODIPine (NORVASC) 2.5 MG tablet; Take 1 tablet (2.5 mg) by mouth daily  - lisinopril (ZESTRIL) 40 MG tablet; Take 1 tablet (40 mg) by mouth daily    Bilateral leg edema  Mild now  - triamterene-HCTZ (DYAZIDE) 37.5-25 MG capsule; Take 1 capsule by mouth daily    Hyperlipidemia LDL goal <100  Stable   - rosuvastatin (CRESTOR) 5 MG tablet; Take 1 tablet (5 mg) by mouth daily    Chronic, continuous use of opioids  stable    Spinal stenosis of lumbar region with neurogenic claudication  Stable   - oxyCODONE (ROXICODONE) 5 MG tablet; Take 1 tablet (5 mg) by mouth every 4 hours as needed for severe pain May take 5 tabs per day          Return in about 6 months (around 5/19/2022) for Diabetes, see me a week after lab.    Emily Marie MD  [facility]    Se Alonso is a 89 year old who presents for the following health issues     HPI     Diabetes Follow-up      How often are you checking your blood sugar? Not at all    What concerns do you have today about your diabetes? None     Do you have any of these symptoms? (Select all that apply)  No numbness or tingling in feet.  No redness, sores or blisters on feet.  No complaints of excessive thirst.  No reports of blurry vision.   No significant changes to weight.    Have you had a diabetic eye exam in the last 12 months? No    Hyperlipidemia Follow-Up      Are you regularly taking any medication or supplement to lower your cholesterol?   Yes- rosuvastatin     Are you having muscle aches or other side effects that you think could be caused by your cholesterol lowering medication?  No    Hypertension Follow-up  No other readings done the past month    Do you check your blood pressure regularly outside of the clinic? No     Are you following a low salt diet? Yes    Are your blood pressures ever more than 140 on the top number (systolic) OR more   than 90 on the bottom number (diastolic), for example 140/90? No            How many servings of fruits and vegetables do you eat daily?  4 or more    On average, how many sweetened beverages do you drink each day (Examples: soda, juice, sweet tea, etc.  Do NOT count diet or artificially sweetened beverages)?   0    How many days per week do you exercise enough to make your heart beat faster? 3 or less    How many minutes a day do you exercise enough to make your heart beat faster? 9 or less    How many days per week do you miss taking your medication? 0      Other problems:   1. COPD: stable overall  2. Edema: had some increase in the summer, better now.   3. Chronic pain, narcotic dependence: stable use of mediation      Current concerns:   none    Patient Active Problem List   Diagnosis     Disorder of bone and cartilage     COPD, severe (H)     Spinal stenosis of lumbar region with neurogenic claudication     Type 2 diabetes mellitus with complication, without long-term current use of insulin (H)     HYPERLIPIDEMIA LDL GOAL <100     History of basal cell carcinoma     Controlled substance agreement signed -  checked 12/4/20 - no concerns     Chronic pain syndrome     Narcotic dependence (H)     Benign essential hypertension     Lumbar radiculopathy     CKD (chronic kidney disease) stage 1, GFR  90 ml/min or greater     Varicose veins of left lower extremity, unspecified whether complicated     Edema, unspecified type     Chronic, continuous use of opioids       Current Outpatient Medications   Medication Sig Dispense Refill     Acetaminophen (TYLENOL PO) Take 325 mg by mouth 6 times daily Patient takes TID with Oxycodone.        alendronate (FOSAMAX) 70 MG tablet TAKE 1 TABLET BY MOUTH EVERY 7 DAYS 12 tablet 1     amLODIPine (NORVASC) 2.5 MG tablet TAKE 1 TABLET(2.5 MG) BY MOUTH DAILY 90 tablet 1     ASPIRIN EC PO Take 81 mg by mouth daily       benzocaine (AMERICAINE) 20 % rectal ointment AAA Q3 hours prn pain 28 g 0     Calcium Polycarbophil (FIBERCON PO) Take 1 tablet by mouth once , one in the morning and one in the evening       calcium-vitamin D (CALTRATE) 600-400 MG-UNIT per tablet Take 1 tablet by mouth daily 1200mg   1000 mg of vitamin d       conjugated estrogens (PREMARIN) 0.625 MG/GM vaginal cream Use twice weekly 0.5 gr applied to the external genital area. 30 g 11     glipiZIDE (GLUCOTROL) 5 MG tablet TAKE 1 TABLET BY MOUTH EVERY MORNING 90 tablet 0     glucosamine-chondroitin 500-400 MG CAPS per capsule Take 1 capsule by mouth daily       lisinopril (ZESTRIL) 40 MG tablet Take 1 tablet (40 mg) by mouth daily 90 tablet 3     Multiple Vitamins-Minerals (MULTIVITAMIN ADULT PO) Take 1 tablet by mouth daily       NONFORMULARY Natra sleeping med takes 1 at night.       oxyCODONE (ROXICODONE) 5 MG tablet May take 5 tabs per day 150 tablet 0     polyethylene glycol (MIRALAX) 17 g packet Take 1 packet by mouth daily       PROAIR  (90 Base) MCG/ACT inhaler INHALE 2 PUFFS BY MOUTH EVERY 6 HOURS AS NEEDED FOR WHEEZE OR FOR SHORTNESS OF BREATH 8.5 Inhaler 3     rosuvastatin (CRESTOR) 5 MG tablet TAKE 1 TABLET(5 MG) BY MOUTH DAILY 90 tablet 1     senna-docusate (SENOKOT-S;PERICOLACE) 8.6-50 MG per tablet Take 1 tablet by mouth 2 times daily Reported on 4/12/2017       tiotropium (SPIRIVA  "HANDIHALER) 18 MCG inhaled capsule INHALE THE CONTENTS OF 1 CAPSULE VIA INHALATION DEVICE DAILY 90 capsule 1     triamterene-HCTZ (DYAZIDE) 37.5-25 MG capsule TAKE 1 CAPSULE BY MOUTH EVERY DAY 30 capsule 1     WIXELA INHUB 500-50 MCG/DOSE inhaler INHALE 1 PUFF INTO THE LUNGS TWICE DAILY 180 each 1       Social History     Tobacco Use     Smoking status: Former Smoker     Packs/day: 0.50     Years: 54.00     Pack years: 27.00     Types: Cigarettes     Quit date: 2000     Years since quittin.5     Smokeless tobacco: Never Used     Tobacco comment: using nicotine gum   Substance Use Topics     Alcohol use: Yes     Alcohol/week: 0.8 standard drinks     Types: 1 Glasses of wine per week     Comment: \"A glass of wine once a week.\"     Drug use: No        ROS:  General: no fever, chills  Weight: stable  Vision:negative but diagnosed with glaucoma. Last eye exam 9/15/21.  ENT: negative  Respiratory stable dyspnea.  Cardiac: no chest pain or pressure  Abdominal: no nausea, vomiting, abdominal pain, bowel changes  Vascular no complaints of claudication  Neurologic:no complaints of neuropathy  Feet no lesions, in grown nails, edema   : no polyuria, hematuria, dysuria    Objective:  Patient alert in NAD  BP (!) 161/61 (BP Location: Left arm, Patient Position: Sitting, Cuff Size: Adult Regular)   Pulse 106   Temp 98.1  F (36.7  C) (Oral)   Resp 18   Ht 1.6 m (5' 3\")   Wt 61.7 kg (136 lb 1.6 oz)   SpO2 92%   BMI 24.11 kg/m         Wt Readings from Last 4 Encounters:   21 61.7 kg (136 lb 1.6 oz)   21 62.1 kg (137 lb)   21 61.2 kg (135 lb)   05/10/21 61.4 kg (135 lb 4.8 oz)       CV: CV: normal S1, S2 without murmur, S3 or S4.  Carotid pulses: full  LUNGS: clear  Trace edema   Feet: pulses full, normal capillary refill  No lesions, sores or skin changes  Nails normal  Sensation able to feel fine filament    Lab Results   Component Value Date    A1C 7.1 11/10/2021    A1C 7.2 2021    A1C 6.7 " 10/13/2020    A1C 7.1 04/09/2020    A1C 6.9 09/23/2019    A1C 6.9 03/21/2019       Lab Results   Component Value Date    CHOL 169 05/05/2021    HDL 59 05/05/2021    LDL 82 05/05/2021    TRIG 141 05/05/2021    CHOLHDLRATIO 3.4 07/23/2015

## 2021-11-19 NOTE — Clinical Note
Please abstract the following data from this visit with this patient into the appropriate field in Epic:    Tests that can be patient reported without a hard copy:    Eye exam with ophthalmology on this date: 9/15/21

## 2021-11-19 NOTE — NURSING NOTE
"BP (!) 161/61 (BP Location: Left arm, Patient Position: Sitting, Cuff Size: Adult Regular)   Pulse 106   Temp 98.1  F (36.7  C) (Oral)   Resp 18   Ht 1.6 m (5' 3\")   Wt 61.7 kg (136 lb 1.6 oz)   SpO2 92%   BMI 24.11 kg/m     "

## 2021-11-28 VITALS
BODY MASS INDEX: 24.11 KG/M2 | HEIGHT: 63 IN | HEART RATE: 106 BPM | DIASTOLIC BLOOD PRESSURE: 60 MMHG | TEMPERATURE: 98.1 F | OXYGEN SATURATION: 92 % | RESPIRATION RATE: 18 BRPM | SYSTOLIC BLOOD PRESSURE: 138 MMHG | WEIGHT: 136.1 LBS

## 2021-11-28 RX ORDER — OXYCODONE HYDROCHLORIDE 5 MG/1
5 TABLET ORAL EVERY 4 HOURS PRN
Qty: 150 TABLET | Refills: 0 | Status: SHIPPED | OUTPATIENT
Start: 2021-11-28 | End: 2021-12-30

## 2021-12-29 DIAGNOSIS — M48.062 SPINAL STENOSIS OF LUMBAR REGION WITH NEUROGENIC CLAUDICATION: ICD-10-CM

## 2021-12-29 NOTE — TELEPHONE ENCOUNTER
Routing refill request to provider for review/approval because:  Drug not on the [facility] refill protocol     Pending Prescriptions:                       Disp   Refills    oxyCODONE (ROXICODONE) 5 MG tablet         150 ta*0        Sig: Take 1 tablet (5 mg) by mouth every 4 hours as needed           for severe pain May take 5 tabs per day

## 2021-12-30 RX ORDER — OXYCODONE HYDROCHLORIDE 5 MG/1
5 TABLET ORAL EVERY 4 HOURS PRN
Qty: 150 TABLET | Refills: 0 | Status: SHIPPED | OUTPATIENT
Start: 2021-12-30 | End: 2022-02-04

## 2022-01-10 DIAGNOSIS — J44.9 COPD, SEVERE (H): ICD-10-CM

## 2022-01-11 NOTE — TELEPHONE ENCOUNTER
Should have refills on file  E-Prescribing Status: Receipt confirmed by pharmacy (11/19/2021  1:47 PM CST)  Holden PRETTY RN, BSN

## 2022-01-16 ENCOUNTER — OFFICE VISIT (OUTPATIENT)
Dept: URGENT CARE | Facility: URGENT CARE | Age: 87
End: 2022-01-16
Payer: COMMERCIAL

## 2022-01-16 ENCOUNTER — ANCILLARY PROCEDURE (OUTPATIENT)
Dept: GENERAL RADIOLOGY | Facility: CLINIC | Age: 87
End: 2022-01-16
Attending: FAMILY MEDICINE
Payer: COMMERCIAL

## 2022-01-16 VITALS
TEMPERATURE: 97.3 F | RESPIRATION RATE: 14 BRPM | OXYGEN SATURATION: 93 % | WEIGHT: 136 LBS | SYSTOLIC BLOOD PRESSURE: 144 MMHG | BODY MASS INDEX: 24.09 KG/M2 | HEART RATE: 73 BPM | DIASTOLIC BLOOD PRESSURE: 76 MMHG

## 2022-01-16 DIAGNOSIS — R05.9 COUGH: Primary | ICD-10-CM

## 2022-01-16 DIAGNOSIS — R06.02 SOB (SHORTNESS OF BREATH): ICD-10-CM

## 2022-01-16 DIAGNOSIS — J44.1 COPD EXACERBATION (H): ICD-10-CM

## 2022-01-16 DIAGNOSIS — Z20.822 EXPOSURE TO 2019 NOVEL CORONAVIRUS: ICD-10-CM

## 2022-01-16 PROCEDURE — 99214 OFFICE O/P EST MOD 30 MIN: CPT | Performed by: FAMILY MEDICINE

## 2022-01-16 PROCEDURE — U0003 INFECTIOUS AGENT DETECTION BY NUCLEIC ACID (DNA OR RNA); SEVERE ACUTE RESPIRATORY SYNDROME CORONAVIRUS 2 (SARS-COV-2) (CORONAVIRUS DISEASE [COVID-19]), AMPLIFIED PROBE TECHNIQUE, MAKING USE OF HIGH THROUGHPUT TECHNOLOGIES AS DESCRIBED BY CMS-2020-01-R: HCPCS | Performed by: FAMILY MEDICINE

## 2022-01-16 PROCEDURE — 71046 X-RAY EXAM CHEST 2 VIEWS: CPT | Performed by: RADIOLOGY

## 2022-01-16 PROCEDURE — U0005 INFEC AGEN DETEC AMPLI PROBE: HCPCS | Performed by: FAMILY MEDICINE

## 2022-01-16 RX ORDER — CEFDINIR 300 MG/1
300 CAPSULE ORAL 2 TIMES DAILY
Qty: 14 CAPSULE | Refills: 0 | Status: SHIPPED | OUTPATIENT
Start: 2022-01-16 | End: 2022-01-23

## 2022-01-16 RX ORDER — DEXAMETHASONE 6 MG/1
6 TABLET ORAL ONCE
Qty: 1 TABLET | Refills: 0 | Status: ON HOLD | OUTPATIENT
Start: 2022-01-16 | End: 2022-01-26

## 2022-01-16 NOTE — PROGRESS NOTES
SUBJECTIVE:  Celeste Chacko, a 89 year old female scheduled an appointment to discuss the following issues:     Cough  SOB (shortness of breath)  Exposure to 2019 novel coronavirus  COPD exacerbation (H)    Medical, social, surgical, and family histories reviewed.     Urgent Care (shortness of breath, trouble breathing, cough X 2 dsys and has a hx of COPD. )    Hx of COPD on inhalers.  SOB for 2-3 days. O2 sat decreased to 93%.  Exposed to daughter who was positive last Saturday. Took her meds (Wixela, Spiriva & Albuterol) regularly but no better today.  Has increased shortness of breath on exertion, but no cold symptoms or cough, no sinus or chest congestion.      ROS:  See HPI.  No nausea/vomiting.  No fever/chills.  No chest pain.  No BM/urine problems.  No dizziness or syncope.      OBJECTIVE:  BP (!) 144/76 (BP Location: Left arm, Patient Position: Sitting, Cuff Size: Adult Regular)   Pulse 73   Temp 97.3  F (36.3  C) (Tympanic)   Resp 14   Wt 61.7 kg (136 lb)   SpO2 93%   BMI 24.09 kg/m    EXAM:  GENERAL APPEARANCE: alert and minimal distress; no cyanosis or accessory muscle use, afebrile, no meningeal signs, moist mucous membranes  EYES: Eyes grossly normal to inspection, PERRL and conjunctivae and sclerae normal  HENT: ear canals and TM's normal and nose and mouth without ulcers or lesions; no sinus tenderness, mildly erythematous throat but no exudate  NECK: no adenopathy, no asymmetry, masses, or scars and thyroid normal to palpation  RESP: lungs clear to auscultation - no rales, rhonchi or wheezes  CV: regular rates and rhythm, normal S1 S2, no S3 or S4 and no murmur, click or rub  LYMPHATICS: no cervical adenopathy  ABDOMEN: soft, nontender, without hepatosplenomegaly or masses and bowel sounds normal  MS: extremities normal- no gross deformities noted  SKIN: no suspicious lesions or rashes  NEURO: Normal strength and tone, mentation intact and speech normal      ASSESSMENT/PLAN:  (R05.9) Cough   (primary encounter diagnosis)  Comment: COVID-19 pending  Plan: Symptomatic; Unknown COVID-19 Virus         (Coronavirus) by PCR Nose      (R06.02) SOB (shortness of breath)  Comment: Chest x-ray showed: Large lung volumes are evidence of COPD. No acute infiltrates are identified. No pleural effusions. Normal heart size and pulmonary vascularity. Right shoulder replacement  Plan: XR Chest 2 Views      (Z20.822) Exposure to 2019 novel coronavirus  Comment: Presumptive COVID-19  Plan: dexamethasone (DECADRON) 6 MG tablet        (J44.1) COPD exacerbation (H)  Comment: Presumptive COVID-19 with URI exacerbating COPD  Plan: dexamethasone (DECADRON) 6 MG tablet, cefdinir         (OMNICEF) 300 MG capsule, Pulse Oximeter         Equipment    Care instructions given.  Pt to f/up PCP within 5 days if no improvement or worsening.  Warning signs and symptoms explained.

## 2022-01-16 NOTE — PATIENT INSTRUCTIONS
Patient Education     COPD Flare-Up    You have had a flare-up of your COPD.  COPD (chronic obstructive pulmonary disease) is a common lung disease. It causes your airways to get irritated and narrower. This makes it harder for you to breathe. Emphysema and chronic bronchitis are both types of COPD. This is a long-term (chronic) condition. This means you always have it. Sometimes it gets worse. When this happens, it's called a flare-up.   Symptoms of COPD  People with COPD may have symptoms most of the time. In a flare-up, your symptoms get worse. These symptoms may mean you are having a flare-up:     Shortness of breath, shallow or rapid breathing, or wheezing that gets worse    Lung infection    Cough that gets worse    More mucus (or sputum), thicker mucus, or mucus of a different color    Tiredness, less energy, or trouble doing your normal activities    Fever    Chest tightness    Your symptoms don t get better even when you use your normal medicines, inhalers, and nebulizer    Trouble talking    You feel confused  Causes of flare-ups  Unfortunately, a flare-up can happen even if you did everything right. And even if you followed your healthcare provider s instructions. Some causes of flare-ups are:     Cold weather    Smoking or secondhand smoke    Use of e-cigarettes or vaping products    Colds, the flu, or respiratory infections    Air pollution    Sudden change in the weather    Dust, vapors, gases, irritating chemicals, or strong fumes    Not taking your medicines as prescribed    Indoor pollution such as burning wood, smoke from home cooking, or heating fuels  Home care  Here are some things you can do at home to treat a flare-up:    Keep calm and try not to panic. This makes it harder to breathe, and keeps you from doing the right things.    Don t smoke or be around others who are smoking. If you smoke, quit. Smoking is the main cause of COPD. Quitting will help you be able to better manage your COPD.  Don't use e-cigarettes or vaping products either. Ask your healthcare provider about ways to help you quit smoking.    Try to drink more fluids than normal during a flare-up, unless your healthcare provider has told you not to because of heart and kidney problems. More fluids can help loosen the mucus.    Eat a healthy, balanced diet. This is important to staying as healthy as possible. So is trying to stay at your ideal weight. Being overweight or underweight can affect your health. Make sure you have a lot of fruits and vegetables every day. And also eat balanced portions of whole grains, lean meats and fish, and low-fat dairy products.    Use your inhalers and nebulizer, if you have one, as you have been told to. When using a metered dose inhaler or nebulizer, it's very important to use the proper techniques. If you have any questions about how to use your device, contact your healthcare provider or refer to the user manual.    If you were given antibiotics, take them until they are used up or your provider tells you to stop. It s important to finish the antibiotics, even though you feel better. This will make sure the infection has cleared.    If you were given a steroid, finish it even if you feel better.    Learn the names of your medicines, as well as how and when to use them. Talk with your provider about other conditions you have and their treatment and how it may affect your COPD.    Oxygen may be prescribed if tests show that your blood contains too little oxygen. Ask your provider about long-term oxygen therapy.    Coping tips for shortness of breath include:  ? Exercise. Try to be as active as possible. This will improve energy levels and strengthen your muscles, so you can do more.  ? Breathing methods. Ask your healthcare provider or nurse show you how to do pursed-lip breathing.  ? Balance rest and activity. Each day, try to balance rest periods with activity. For example, you might start the day  with getting dressed and eating breakfast. Then you can relax and read the paper. After that, take a brief walk. And then sit with your feet up for a while.  ? Pulmonary rehab (rehabilitation).  Community-based and home-based programs work as well as hospital-based programs as long as they are as often and as intense. Standard home-based pulmonary rehab programs help shortness of breath in people with COPD. Supervised, traditional pulmonary rehab remains the best option for people with COPD. These programs help with managing your disease, and also help with breathing methods, exercise, support, and counseling. To find one, ask your provider or call your local hospital. Also talk with your healthcare provider about which rehab or self-management program is best for you.  Preventing a flare-up  Flare-ups happen. But the best way to treat one is to prevent it before it starts. Here are some pointers:     Don t smoke or be around others who are smoking. Avoid using e-cigarettes due to their harmful side effects.    Take your medicines as discussed with your healthcare provider.    Talk with your provider about getting a flu shot every year. Also find out if you need a pneumonia shot.    If there is a weather advisory warning to stay indoors, try to stay inside when possible.    Try to eat healthy, exercise, and get plenty of sleep.    Try to stay away from things that normally set you off. These include dust, chemical fumes, hairsprays, or strong perfumes.  Follow-up care  Follow up with your healthcare provider, or as advised.   If a culture was done, you will be told if your treatment needs to be changed. You can call as directed for the results.   If X-rays were done, you will be told of any new findings that may affect your care.   During each appointment, talk with your healthcare provider about your ability to:     [place] in your normal environment    Correctly use inhaler (or your medicine delivery systems)    [place]  with other conditions you have and their treatments and how they may affect your COPD  Call 911  Call 911 if any of these occur:     Wheezing or shortness or breath does not get better with treatment    Chest pain or chest tightness    Feeling lightheaded or dizzy    You have trouble breathing    You feel confused or it s hard to wake you up    You faint or lose consciousness    You have a rapid heart rate    You have new pain in your chest, arm, shoulder, neck, or upper back  When to seek medical advice  Call your healthcare provider right away if any of these occur:    Fever of 100.4 F (38 C) or higher, or as directed by your healthcare provider    Coughing up lots of dark-colored or bloody mucus (sputum)    You don't start to get better within 24 hours    Swelling of your ankles gets worse    Weakness  [employer] last reviewed this educational content on 4/1/2019 2000-2021 [employer]. All rights reserved. This information is not intended as a substitute for professional medical care. Always follow your healthcare professional's instructions.           Patient Education   After Your COVID-19 (Coronavirus) Test  You have been tested for COVID-19 (coronavirus).   If you'll have surgery in the next few days, we'll let you know ahead of time if you have the virus. Please call your surgeon's office with any questions.  For all other patients: Results are usually available in [employer] within 2 to 3 days.   If you do not have a [employer] account, you'll get a letter in the mail in about 7 to 10 days.   [employer] is often the fastest way to get test results. Please sign up if you do not already have a [employer] account. See the handout Getting COVID-19 Test Results in [employer] for help.  What if my test result is positive?  If your test is positive and you have not viewed your result in [facility], you'll get a phone call with your result. (A positive test means that you have the virus.)     Follow the tips under  "\"How do I self-isolate?\" below for 10 days (20 days if you have a weak immune system).    You don't need to be retested for COVID-19 before going back to school or work. As long as you're fever-free and feeling better, you can go back to school, work and other activities after waiting the 10 or 20 days.  What if I have questions after I get my results?  If you have questions about your results, please visit our testing website at [email or web address].   After 7 to 10 days, if you have not gotten your results:     Call [phone] ([phone]) and ask to speak with our COVID-19 results team.    If you're being treated at an infusion center: Call your infusion center directly.  What are the symptoms of COVID-19?  Cough, fever and trouble breathing are the most common signs of COVID-19.  Other symptoms can include new headaches, new muscle or body aches, new and unexplained fatigue (feeling very tired), chills, sore throat, congestion (stuffy or runny nose), diarrhea (loose poop), loss of taste or smell, belly pain, and nausea or vomiting (feeling sick to your stomach or throwing up).  You may already have symptoms of COVID-19, or they may show up later.  What should I do if I have symptoms?  If you're having surgery: Call your surgeon's office.  For all other patients: Stay home and away from others (self-isolate) until ...    You've had no fever--and no medicine that reduces fever--for 1 full day (24 hours), AND    Other symptoms have gotten better. For example, your cough or breathing has improved, AND    At least 10 days have passed since your symptoms first started.  How do I self-isolate?    Stay in your own room, even for meals. Use your own bathroom if you can.    Stay away from others in your home. No hugging, kissing or shaking hands. No visitors.    Don't go to work, school or anywhere else.    Clean \"high touch\" surfaces often (doorknobs, counters, handles). Use " household cleaning spray or wipes. You'll find a full list of  on the EPA website: www.epa.gov/pesticide-registration/list-n-disinfectants-use-against-sars-cov-2.    Cover your mouth and nose with a mask or other face covering to avoid spreading germs.    Wash your hands and face often. Use soap and water.    Caregivers in these groups are at risk for severe illness due to COVID-19:  ? People 65 years and older  ? People who live in a nursing home or long-term care facility  ? People with chronic disease (lung, heart, cancer, diabetes, kidney, liver, immunologic)  ? People who have a weakened immune system, including those who:    Are in cancer treatment    Take medicine that weakens the immune system, such as corticosteroids    Had a bone marrow or organ transplant    Have an immune deficiency    Have poorly controlled HIV or AIDS    Are obese (body mass index of 40 or higher)    Smoke regularly    Caregivers should wear gloves while washing dishes, handling laundry and cleaning bedrooms and bathrooms.    Use caution when washing and drying laundry: Don't shake dirty laundry and use the warmest water setting that you can.    For more tips on managing your health at home, go to www.cdc.gov/coronavirus/2019-ncov/downloads/10Things.pdf.  How can I take care of myself at home?  1. Get lots of rest. Drink extra fluids (unless a doctor has told you not to).  2. Take Tylenol (acetaminophen) for fever or pain. If you have liver or kidney problems, ask your family doctor if it's OK to take Tylenol.   Adults can take either:  ? 650 mg (two 325 mg pills) every 4 to 6 hours, or   ? 1,000 mg (two 500 mg pills) every 8 hours as needed.  ? Note: Don't take more than 3,000 mg in one day. Acetaminophen is found in many medicines (both prescribed and over-the-counter medicines). Read all labels to be sure you don't take too much.   For children, check the Tylenol bottle for the right dose. The dose is based on the child's  age or weight.  3. If you have other health problems (like cancer, heart failure, an organ transplant or severe kidney disease): Call your specialty clinic if you don't feel better in the next 2 days.  4. Know when to call 911. Emergency warning signs include:  ? Trouble breathing or shortness of breath  ? Chest pain or pressure that doesn't go away  ? Feeling confused like you haven't felt before, or not being able to wake up  ? Bluish-colored lips or face  5. If your doctor prescribed a blood thinner medicine: Follow their instructions.  Where can I get more information?    [facility] - About COVID-19:   [email or web address]    CDC - If You're Sick: cdc.gov/coronavirus/2019-ncov/about/steps-when-sick.html    CDC - Ending Home Isolation: www.cdc.gov/coronavirus/2019-ncov/hcp/disposition-in-home-patients.html    CDC - Caring for Someone: www.cdc.gov/coronavirus/2019-ncov/if-you-are-sick/care-for-someone.html    [facility] - Interim Guidance for Hospital Discharge to Home: www.health.[facility].[place]./diseases/coronavirus/hcp/hospdischarge.pdf    [facility] clinical trials (COVID-19 research studies): [email or web address]-trials    Below are the COVID-19 hotlines at the [place] [facility] ([facility]). Interpreters are available.  ? For health questions: Call 914-735-7700 or [phone] (7 a.m. to 7 p.m.)  ? For questions about schools and childcare: Call 331-503-6069 or 1-954.481.2225 (7 a.m. to 7 p.m.)    For informational purposes only. Not to replace the advice of your health care provider. Clinically reviewed by Infection Prevention and the [facility] COVID-19 Clinical Team. Copyright   2020 [employer]. All rights reserved. [employer] 194665 - Rev 11/11/20.

## 2022-01-17 LAB — SARS-COV-2 RNA RESP QL NAA+PROBE: NEGATIVE

## 2022-01-18 ENCOUNTER — TELEPHONE (OUTPATIENT)
Dept: EMERGENCY MEDICINE | Facility: CLINIC | Age: 87
End: 2022-01-18
Payer: COMMERCIAL

## 2022-01-18 DIAGNOSIS — I10 BENIGN ESSENTIAL HYPERTENSION: ICD-10-CM

## 2022-01-18 NOTE — TELEPHONE ENCOUNTER

## 2022-01-19 ENCOUNTER — TELEPHONE (OUTPATIENT)
Dept: INTERNAL MEDICINE | Facility: CLINIC | Age: 87
End: 2022-01-19
Payer: COMMERCIAL

## 2022-01-19 DIAGNOSIS — J44.1 COPD EXACERBATION (H): Primary | ICD-10-CM

## 2022-01-19 RX ORDER — PREDNISONE 20 MG/1
TABLET ORAL
Qty: 15 TABLET | Refills: 0 | Status: ON HOLD | OUTPATIENT
Start: 2022-01-19 | End: 2022-01-26

## 2022-01-19 NOTE — TELEPHONE ENCOUNTER
Patient calls to report. Sunday 1-16 22 she went to  urgent care for breathing troubles. She stated she has COPD.  She was given a breathing treatment and an antibiotic and tested covid negative at that visit.   Monday she felt better and Tuesday worse. She has an oximeter and its reading as of 30 minutes ago at resting was 90-94.   She had difficulty sleeping 1-18-22 and called 911 to ask for advise. They sent a team via ambulance to her home and and she  decline the ER   They advised sleeping propped up with a few pillows. She stated that did not help.    She does not want to leave her home due to the cold but asked about pulmonary rehab.

## 2022-01-19 NOTE — TELEPHONE ENCOUNTER
Recommend to continue the steroid, antibiotic, inhalers.   If increased SOB, low O2 sat <90 should go to ER.   Follow up with PCP for further advise on rehab/ pulmonary.

## 2022-01-19 NOTE — TELEPHONE ENCOUNTER
"Call to patient. Patient informed of Dr. Pearce's response below. Patient reports she was only given 1 dose of the steroid. Patient reports she took the one dose on Sunday night, 1/16/2022 \"and that worked at first. The next day I felt better, but then the following day I started to have more severe problems with my breathing.\"    Patient reports her O2 is 91-93% and occasionally 94%.     Patient states: \"I can't wear a mask because of my breathing this way so I won't go to the emergency department. I'll catch COVID and the weather is dangerous.\"    Patient also only wants to see Dr. Marie for an office follow-up. Dr. Marie is booked out until 2/22. Patient wants Dr. Marie to work her into her schedule to be seen sooner.      Patient requesting another steroid dose.  Pharmacy verified.     Routing to provider, Dr. Marie to see if patient can be worked into the schedule sooner than 1st available appointment in end of February.     Routing to covering provider, Dr. Pearce, would another prescription of a steroid be beneficial?     Zulema PRETTY RN   [facility]      "

## 2022-01-19 NOTE — TELEPHONE ENCOUNTER
Call to patient. Patient informed of Dr. Pearce's below response. Patient verbalized understanding.     Zulema PRETTY RN   [facility] [place]

## 2022-01-19 NOTE — TELEPHONE ENCOUNTER
See message below. Should she have referral to Pulmonary?     Please advise.     Per  notes:  R06.02) SOB (shortness of breath)  Comment: Chest x-ray showed: Large lung volumes are evidence of COPD. No acute infiltrates are identified. No pleural effusions. Normal heart size and pulmonary vascularity. Right shoulder replacement  Plan: XR Chest 2 Views     (Z20.822) Exposure to 2019 novel coronavirus  Comment: Presumptive COVID-19  Plan: dexamethasone (DECADRON) 6 MG tablet       (J44.1) COPD exacerbation (H)  Comment: Presumptive COVID-19 with URI exacerbating COPD  Plan: dexamethasone (DECADRON) 6 MG tablet, cefdinir         (OMNICEF) 300 MG capsule, Pulse Oximeter         Equipment

## 2022-01-20 RX ORDER — AMLODIPINE BESYLATE 2.5 MG/1
TABLET ORAL
Qty: 90 TABLET | Refills: 3 | OUTPATIENT
Start: 2022-01-20

## 2022-01-20 NOTE — TELEPHONE ENCOUNTER
Patient should have refills on file. New order sent 11/19/21 with refills for one year. Request denied.     Kristen Novak RN  [facility]

## 2022-01-21 ENCOUNTER — NURSE TRIAGE (OUTPATIENT)
Dept: INTERNAL MEDICINE | Facility: CLINIC | Age: 87
End: 2022-01-21
Payer: COMMERCIAL

## 2022-01-21 DIAGNOSIS — J44.1 COPD EXACERBATION (H): ICD-10-CM

## 2022-01-21 RX ORDER — ALBUTEROL SULFATE 0.63 MG/3ML
1 SOLUTION RESPIRATORY (INHALATION) EVERY 6 HOURS PRN
Qty: 90 ML | Refills: 3 | Status: SHIPPED | OUTPATIENT
Start: 2022-01-21 | End: 2022-03-21

## 2022-01-21 NOTE — TELEPHONE ENCOUNTER
Medicare declined Nebulizer due to being medical equipment.     How would you like to proceed for patient to get nebulizer?    Patient requests a call back [phone]

## 2022-01-21 NOTE — TELEPHONE ENCOUNTER
Patient called and vin has 1 and they are holding it for her.     She asked for the rx to be sent.

## 2022-01-21 NOTE — TELEPHONE ENCOUNTER
Call to pt. She is using Albuterol every 6 hours. Taking her antibiotic and steroids. Using Spiriva and Wixela.     At rest her O2 sat is 90-95% but if has to get up and use the bathroom then it goes below 90%.     She doesn't have Nebulizer, would this help for her?     She is going to call [facility] to see if they deliver.     Pt didn't pause between breaths, not sounding sob over the phone.   She was adamant about not going to ED.   Not coughing up any phlegm.     Please advise.

## 2022-01-21 NOTE — TELEPHONE ENCOUNTER
Patient is calling back and said she is having hard time keeping her O2 above 90%. She doesn't think the prednisone is helping much.

## 2022-01-24 RX ORDER — PREDNISONE 20 MG/1
TABLET ORAL
Qty: 15 TABLET | Refills: 0 | OUTPATIENT
Start: 2022-01-24

## 2022-01-24 NOTE — TELEPHONE ENCOUNTER
"Called patient regarding Prednisone refill request and patient stated she is just not getting better, but doesn't need a refill as she is tapering down and has enough to taper and stop medication.     S-(situation):  SOB    B-(background): denied cough, wheezing, fevers, chest pain, diaphoresis.  Tested negative for Covid on 1/16/22. Patient stated \"I don't want to go the  or ER due to Covid. Stated I just try not to move around much.      A-(assessment): patient stated her oxygen saturations have been 90-93% and don't change with nebulizer treatments.  Patient doing okay when just sitting and did not have any trouble speaking to writer.  Stated she is pretty sob when going to bathroom.      R-(recommendations): patient requesting Pulmonology referral?  Please advise, thanks.  Pended  Please sign if appropriate.  Thanks     O2 sats 90-93.  Just not moving around much.  States sats the same prior to nebulizer.  It does relieve the tightness  No wheezing, cough or fevers.        Prednisone prescription denied.  Patient is tapering down and did not request a refill.     Answer Assessment - Initial Assessment Questions  1. RESPIRATORY STATUS: \"Describe your breathing?\" (e.g., wheezing, shortness of breath, unable to speak, severe coughing)       Shortness of breath.  2. ONSET: \"When did this breathing problem begin?\"       Couple weeks ago  3. PATTERN \"Does the difficult breathing come and go, or has it been constant since it started?\"       Pretty constant.  Nebs help for a bit  4. SEVERITY: \"How bad is your breathing?\" (e.g., mild, moderate, severe)     - MILD: No SOB at rest, mild SOB with walking, speaks normally in sentences, can lay down, no retractions, pulse < 100.     - MODERATE: SOB at rest, SOB with minimal exertion and prefers to sit, cannot lie down flat, speaks in phrases, mild retractions, audible wheezing, pulse 100-120.     - SEVERE: Very SOB at rest, speaks in single words, struggling to breathe, " "sitting hunched forward, retractions, pulse > 120       Mild to moderate  5. RECURRENT SYMPTOM: \"Have you had difficulty breathing before?\" If so, ask: \"When was the last time?\" and \"What happened that time?\"       Yes, has COPD  6. CARDIAC HISTORY: \"Do you have any history of heart disease?\" (e.g., heart attack, angina, bypass surgery, angioplasty)       HTN  7. LUNG HISTORY: \"Do you have any history of lung disease?\"  (e.g., pulmonary embolus, asthma, emphysema)      COPD  8. CAUSE: \"What do you think is causing the breathing problem?\"       Unsure-exacerbation?  9. OTHER SYMPTOMS: \"Do you have any other symptoms? (e.g., dizziness, runny nose, cough, chest pain, fever)      none  10. PREGNANCY: \"Is there any chance you are pregnant?\" \"When was your last menstrual period?\"        no  11. TRAVEL: \"Have you traveled out of the country in the last month?\" (e.g., travel history, exposures)        no    Protocols used: BREATHING DIFFICULTY-A-[place]      "

## 2022-01-25 ENCOUNTER — HOSPITAL ENCOUNTER (OUTPATIENT)
Facility: CLINIC | Age: 87
Setting detail: OBSERVATION
Discharge: HOME | End: 2022-01-26
Attending: EMERGENCY MEDICINE | Admitting: INTERNAL MEDICINE
Payer: COMMERCIAL

## 2022-01-25 ENCOUNTER — APPOINTMENT (OUTPATIENT)
Dept: GENERAL RADIOLOGY | Facility: CLINIC | Age: 87
End: 2022-01-25
Attending: EMERGENCY MEDICINE
Payer: COMMERCIAL

## 2022-01-25 DIAGNOSIS — J44.1 CHRONIC OBSTRUCTIVE PULMONARY DISEASE WITH ACUTE EXACERBATION (H): ICD-10-CM

## 2022-01-25 DIAGNOSIS — R09.02 HYPOXIA: ICD-10-CM

## 2022-01-25 LAB
ANION GAP SERPL CALCULATED.3IONS-SCNC: 6 MMOL/L (ref 3–14)
BASE EXCESS BLDV CALC-SCNC: 2.4 MMOL/L (ref -7.7–1.9)
BASOPHILS # BLD AUTO: 0 10E3/UL (ref 0–0.2)
BASOPHILS NFR BLD AUTO: 0 %
BUN SERPL-MCNC: 20 MG/DL (ref 7–30)
CALCIUM SERPL-MCNC: 9.6 MG/DL (ref 8.5–10.1)
CHLORIDE BLD-SCNC: 101 MMOL/L (ref 94–109)
CO2 SERPL-SCNC: 26 MMOL/L (ref 20–32)
CREAT SERPL-MCNC: 0.55 MG/DL (ref 0.52–1.04)
D DIMER PPP FEU-MCNC: 0.39 UG/ML FEU (ref 0–0.5)
EOSINOPHIL # BLD AUTO: 0 10E3/UL (ref 0–0.7)
EOSINOPHIL NFR BLD AUTO: 0 %
ERYTHROCYTE [DISTWIDTH] IN BLOOD BY AUTOMATED COUNT: 13.9 % (ref 10–15)
FLUAV RNA SPEC QL NAA+PROBE: NEGATIVE
FLUBV RNA RESP QL NAA+PROBE: NEGATIVE
GFR SERPL CREATININE-BSD FRML MDRD: 87 ML/MIN/1.73M2
GLUCOSE BLD-MCNC: 338 MG/DL (ref 70–99)
GLUCOSE BLDC GLUCOMTR-MCNC: 265 MG/DL (ref 70–99)
GLUCOSE BLDC GLUCOMTR-MCNC: 335 MG/DL (ref 70–99)
HBA1C MFR BLD: 7.1 % (ref 0–5.6)
HCO3 BLDV-SCNC: 28 MMOL/L (ref 21–28)
HCT VFR BLD AUTO: 44.3 % (ref 35–47)
HGB BLD-MCNC: 14 G/DL (ref 11.7–15.7)
IMM GRANULOCYTES # BLD: 0.1 10E3/UL
IMM GRANULOCYTES NFR BLD: 1 %
INR PPP: 1.17 (ref 0.85–1.15)
LYMPHOCYTES # BLD AUTO: 1.4 10E3/UL (ref 0.8–5.3)
LYMPHOCYTES NFR BLD AUTO: 16 %
MCH RBC QN AUTO: 26 PG (ref 26.5–33)
MCHC RBC AUTO-ENTMCNC: 31.6 G/DL (ref 31.5–36.5)
MCV RBC AUTO: 82 FL (ref 78–100)
MONOCYTES # BLD AUTO: 0.3 10E3/UL (ref 0–1.3)
MONOCYTES NFR BLD AUTO: 3 %
NEUTROPHILS # BLD AUTO: 6.8 10E3/UL (ref 1.6–8.3)
NEUTROPHILS NFR BLD AUTO: 80 %
NRBC # BLD AUTO: 0 10E3/UL
NRBC BLD AUTO-RTO: 0 /100
NT-PROBNP SERPL-MCNC: 205 PG/ML (ref 0–1800)
O2/TOTAL GAS SETTING VFR VENT: 0 %
PCO2 BLDV: 48 MM HG (ref 40–50)
PH BLDV: 7.38 [PH] (ref 7.32–7.43)
PLATELET # BLD AUTO: 217 10E3/UL (ref 150–450)
PO2 BLDV: 33 MM HG (ref 25–47)
POTASSIUM BLD-SCNC: 4.4 MMOL/L (ref 3.4–5.3)
RBC # BLD AUTO: 5.38 10E6/UL (ref 3.8–5.2)
SARS-COV-2 RNA RESP QL NAA+PROBE: NEGATIVE
SODIUM SERPL-SCNC: 133 MMOL/L (ref 133–144)
TROPONIN I SERPL HS-MCNC: 8 NG/L
WBC # BLD AUTO: 8.6 10E3/UL (ref 4–11)

## 2022-01-25 PROCEDURE — 82803 BLOOD GASES ANY COMBINATION: CPT | Performed by: EMERGENCY MEDICINE

## 2022-01-25 PROCEDURE — C9803 HOPD COVID-19 SPEC COLLECT: HCPCS

## 2022-01-25 PROCEDURE — 84484 ASSAY OF TROPONIN QUANT: CPT | Performed by: EMERGENCY MEDICINE

## 2022-01-25 PROCEDURE — 250N000011 HC RX IP 250 OP 636: Performed by: EMERGENCY MEDICINE

## 2022-01-25 PROCEDURE — 99285 EMERGENCY DEPT VISIT HI MDM: CPT | Mod: 25

## 2022-01-25 PROCEDURE — G0378 HOSPITAL OBSERVATION PER HR: HCPCS

## 2022-01-25 PROCEDURE — 82310 ASSAY OF CALCIUM: CPT | Performed by: EMERGENCY MEDICINE

## 2022-01-25 PROCEDURE — 85004 AUTOMATED DIFF WBC COUNT: CPT | Performed by: EMERGENCY MEDICINE

## 2022-01-25 PROCEDURE — 94640 AIRWAY INHALATION TREATMENT: CPT

## 2022-01-25 PROCEDURE — 85610 PROTHROMBIN TIME: CPT | Performed by: EMERGENCY MEDICINE

## 2022-01-25 PROCEDURE — 93005 ELECTROCARDIOGRAM TRACING: CPT

## 2022-01-25 PROCEDURE — 83880 ASSAY OF NATRIURETIC PEPTIDE: CPT | Performed by: EMERGENCY MEDICINE

## 2022-01-25 PROCEDURE — 82962 GLUCOSE BLOOD TEST: CPT

## 2022-01-25 PROCEDURE — 83036 HEMOGLOBIN GLYCOSYLATED A1C: CPT | Performed by: INTERNAL MEDICINE

## 2022-01-25 PROCEDURE — 96372 THER/PROPH/DIAG INJ SC/IM: CPT | Mod: XS

## 2022-01-25 PROCEDURE — 82962 GLUCOSE BLOOD TEST: CPT | Mod: 91

## 2022-01-25 PROCEDURE — 71046 X-RAY EXAM CHEST 2 VIEWS: CPT

## 2022-01-25 PROCEDURE — 96374 THER/PROPH/DIAG INJ IV PUSH: CPT

## 2022-01-25 PROCEDURE — 36415 COLL VENOUS BLD VENIPUNCTURE: CPT | Performed by: EMERGENCY MEDICINE

## 2022-01-25 PROCEDURE — 250N000013 HC RX MED GY IP 250 OP 250 PS 637: Performed by: INTERNAL MEDICINE

## 2022-01-25 PROCEDURE — 87636 SARSCOV2 & INF A&B AMP PRB: CPT | Performed by: EMERGENCY MEDICINE

## 2022-01-25 PROCEDURE — 85379 FIBRIN DEGRADATION QUANT: CPT | Performed by: EMERGENCY MEDICINE

## 2022-01-25 PROCEDURE — 99220 PR INITIAL OBSERVATION CARE,LEVEL III: CPT | Performed by: INTERNAL MEDICINE

## 2022-01-25 PROCEDURE — 250N000009 HC RX 250: Performed by: EMERGENCY MEDICINE

## 2022-01-25 RX ORDER — GLIPIZIDE 5 MG/1
5 TABLET ORAL EVERY MORNING
Status: DISCONTINUED | OUTPATIENT
Start: 2022-01-26 | End: 2022-01-26 | Stop reason: HOSPADM

## 2022-01-25 RX ORDER — IPRATROPIUM BROMIDE AND ALBUTEROL SULFATE 2.5; .5 MG/3ML; MG/3ML
3 SOLUTION RESPIRATORY (INHALATION) ONCE
Status: COMPLETED | OUTPATIENT
Start: 2022-01-25 | End: 2022-01-25

## 2022-01-25 RX ORDER — NALOXONE HYDROCHLORIDE 0.4 MG/ML
0.2 INJECTION, SOLUTION INTRAMUSCULAR; INTRAVENOUS; SUBCUTANEOUS
Status: DISCONTINUED | OUTPATIENT
Start: 2022-01-25 | End: 2022-01-26 | Stop reason: HOSPADM

## 2022-01-25 RX ORDER — ONDANSETRON 4 MG/1
4 TABLET, ORALLY DISINTEGRATING ORAL EVERY 6 HOURS PRN
Status: DISCONTINUED | OUTPATIENT
Start: 2022-01-25 | End: 2022-01-26 | Stop reason: HOSPADM

## 2022-01-25 RX ORDER — UBIDECARENONE 100 MG
100 CAPSULE ORAL DAILY
COMMUNITY
End: 2023-12-08

## 2022-01-25 RX ORDER — ONDANSETRON 2 MG/ML
4 INJECTION INTRAMUSCULAR; INTRAVENOUS EVERY 6 HOURS PRN
Status: DISCONTINUED | OUTPATIENT
Start: 2022-01-25 | End: 2022-01-26 | Stop reason: HOSPADM

## 2022-01-25 RX ORDER — POLYETHYLENE GLYCOL 3350 17 G/17G
8.5 POWDER, FOR SOLUTION ORAL EVERY EVENING
Status: DISCONTINUED | OUTPATIENT
Start: 2022-01-25 | End: 2022-01-26 | Stop reason: HOSPADM

## 2022-01-25 RX ORDER — LISINOPRIL 40 MG/1
40 TABLET ORAL EVERY EVENING
Status: DISCONTINUED | OUTPATIENT
Start: 2022-01-25 | End: 2022-01-26 | Stop reason: HOSPADM

## 2022-01-25 RX ORDER — PREDNISONE 20 MG/1
40 TABLET ORAL DAILY
Status: DISCONTINUED | OUTPATIENT
Start: 2022-01-26 | End: 2022-01-26 | Stop reason: HOSPADM

## 2022-01-25 RX ORDER — NALOXONE HYDROCHLORIDE 0.4 MG/ML
0.4 INJECTION, SOLUTION INTRAMUSCULAR; INTRAVENOUS; SUBCUTANEOUS
Status: DISCONTINUED | OUTPATIENT
Start: 2022-01-25 | End: 2022-01-26 | Stop reason: HOSPADM

## 2022-01-25 RX ORDER — OXYCODONE HYDROCHLORIDE 5 MG/1
5 TABLET ORAL EVERY 4 HOURS PRN
Status: DISCONTINUED | OUTPATIENT
Start: 2022-01-25 | End: 2022-01-26 | Stop reason: HOSPADM

## 2022-01-25 RX ORDER — ALBUTEROL SULFATE 90 UG/1
1-2 AEROSOL, METERED RESPIRATORY (INHALATION) EVERY 4 HOURS PRN
Status: DISCONTINUED | OUTPATIENT
Start: 2022-01-25 | End: 2022-01-26 | Stop reason: HOSPADM

## 2022-01-25 RX ORDER — METHYLPREDNISOLONE SODIUM SUCCINATE 125 MG/2ML
60 INJECTION, POWDER, LYOPHILIZED, FOR SOLUTION INTRAMUSCULAR; INTRAVENOUS ONCE
Status: COMPLETED | OUTPATIENT
Start: 2022-01-25 | End: 2022-01-25

## 2022-01-25 RX ORDER — NICOTINE POLACRILEX 4 MG
15-30 LOZENGE BUCCAL
Status: DISCONTINUED | OUTPATIENT
Start: 2022-01-25 | End: 2022-01-26 | Stop reason: HOSPADM

## 2022-01-25 RX ORDER — DEXTROSE MONOHYDRATE 25 G/50ML
25-50 INJECTION, SOLUTION INTRAVENOUS
Status: DISCONTINUED | OUTPATIENT
Start: 2022-01-25 | End: 2022-01-26 | Stop reason: HOSPADM

## 2022-01-25 RX ORDER — ROSUVASTATIN CALCIUM 5 MG/1
5 TABLET, COATED ORAL EVERY EVENING
Status: DISCONTINUED | OUTPATIENT
Start: 2022-01-25 | End: 2022-01-26 | Stop reason: HOSPADM

## 2022-01-25 RX ORDER — ACETAMINOPHEN 325 MG/1
325 TABLET ORAL EVERY 4 HOURS PRN
Status: DISCONTINUED | OUTPATIENT
Start: 2022-01-25 | End: 2022-01-26

## 2022-01-25 RX ORDER — AMLODIPINE BESYLATE 2.5 MG/1
2.5 TABLET ORAL EVERY EVENING
Status: DISCONTINUED | OUTPATIENT
Start: 2022-01-25 | End: 2022-01-26 | Stop reason: HOSPADM

## 2022-01-25 RX ORDER — ALBUTEROL SULFATE 0.83 MG/ML
3 SOLUTION RESPIRATORY (INHALATION) EVERY 6 HOURS PRN
Status: DISCONTINUED | OUTPATIENT
Start: 2022-01-25 | End: 2022-01-26 | Stop reason: HOSPADM

## 2022-01-25 RX ADMIN — POLYETHYLENE GLYCOL 3350 8.5 G: 17 POWDER, FOR SOLUTION ORAL at 22:36

## 2022-01-25 RX ADMIN — LISINOPRIL 40 MG: 40 TABLET ORAL at 22:37

## 2022-01-25 RX ADMIN — AMLODIPINE BESYLATE 2.5 MG: 2.5 TABLET ORAL at 22:37

## 2022-01-25 RX ADMIN — ROSUVASTATIN CALCIUM 5 MG: 5 TABLET, FILM COATED ORAL at 22:37

## 2022-01-25 RX ADMIN — METHYLPREDNISOLONE SODIUM SUCCINATE 62.5 MG: 125 INJECTION, POWDER, FOR SOLUTION INTRAMUSCULAR; INTRAVENOUS at 17:44

## 2022-01-25 RX ADMIN — OXYCODONE HYDROCHLORIDE 5 MG: 5 TABLET ORAL at 21:05

## 2022-01-25 RX ADMIN — Medication 1 MG: at 22:37

## 2022-01-25 RX ADMIN — IPRATROPIUM BROMIDE AND ALBUTEROL SULFATE 3 ML: .5; 3 SOLUTION RESPIRATORY (INHALATION) at 16:52

## 2022-01-25 ASSESSMENT — ENCOUNTER SYMPTOMS
SINUS PAIN: 0
WHEEZING: 0
PALPITATIONS: 0
SHORTNESS OF BREATH: 1
NAUSEA: 0
VOMITING: 0
ARTHRALGIAS: 0
FEVER: 0
SINUS PRESSURE: 0
CHILLS: 0
HEADACHES: 0
COUGH: 0
DIARRHEA: 0
RHINORRHEA: 0

## 2022-01-25 ASSESSMENT — MIFFLIN-ST. JEOR: SCORE: 1001.49

## 2022-01-25 NOTE — PHARMACY-ADMISSION MEDICATION HISTORY
Pharmacy Medication History  Admission medication history interview status for the 1/25/2022  admission is complete. See EPIC admission navigator for prior to admission medications     Location of Interview: Patient room  Medication history sources: Patient    Medication reconciliation completed by provider prior to medication history? No    Time spent in this activity: 15 minutes    Prior to Admission medications    Medication Sig Last Dose Taking? Auth Provider   Acetaminophen (TYLENOL PO) Take 325 mg by mouth 6 times daily Patient takes TID with Oxycodone.   Yes Reported, Patient   albuterol (ACCUNEB) 0.63 MG/3ML neb solution Take 3 mLs (0.63 mg) by nebulization every 6 hours as needed for shortness of breath / dyspnea or wheezing prn med Yes Guero Pearce MD   albuterol (PROAIR HFA/PROVENTIL HFA/VENTOLIN HFA) 108 (90 Base) MCG/ACT inhaler INHALE 2 PUFFS INTO THE LUNGS EVERY 6 HOURS AS NEEDED FOR SHORTNESS OF BREATH OR DIFFICULT BREATHING OR WHEEZING prn med Yes Emliy Marie MD   amLODIPine (NORVASC) 2.5 MG tablet Take 1 tablet (2.5 mg) by mouth daily  Patient taking differently: Take 2.5 mg by mouth every evening  1/24/2022 at Unknown time Yes Emily Marie MD   ASPIRIN EC PO Take 81 mg by mouth daily 1/25/2022 at Unknown time Yes Reported, Patient   Calcium Polycarbophil (FIBERCON PO) Take 1 tablet by mouth daily  1/25/2022 at Unknown time Yes Unknown, Entered By History   calcium-vitamin D (CALTRATE) 600-400 MG-UNIT per tablet Take 1 tablet by mouth 2 times daily 1200mg   1000 mg of vitamin d 1/25/2022 at am Yes Reported, Patient   fluticasone-salmeterol (WIXELA INHUB) 500-50 MCG/DOSE inhaler Inhale 1 puff into the lungs 2 times daily ### DO NOT FILL NOW.  Please update patient's profile to reflect additional refills.  #### 1/25/2022 at am Yes Emily Marie MD   glipiZIDE (GLUCOTROL) 5 MG tablet Take 1 tablet (5 mg) by mouth every morning 1/25/2022 at Unknown time Yes Emily Marie MD    glucosamine-chondroitin 500-400 MG CAPS per capsule Take 1 capsule by mouth 2 times daily  1/25/2022 at am Yes Reported, Patient   lisinopril (ZESTRIL) 40 MG tablet Take 1 tablet (40 mg) by mouth daily  Patient taking differently: Take 40 mg by mouth every evening  1/24/2022 at Unknown time Yes Emily Marie MD   Multiple Vitamins-Minerals (MULTIVITAMIN ADULT PO) Take 1 tablet by mouth every evening  1/24/2022 at Unknown time Yes Unknown, Entered By History   NONFORMULARY At Bedtime Natra sleeping med takes 1 at night.  1/24/2022 at Unknown time Yes Reported, Patient   oxyCODONE (ROXICODONE) 5 MG tablet Take 1 tablet (5 mg) by mouth every 4 hours as needed for severe pain May take 5 tabs per day prn med Yes Emily Marie MD   polyethylene glycol (MIRALAX) 17 g packet Take 0.5 packets by mouth every evening  1/24/2022 at Unknown time Yes Reported, Patient   predniSONE (DELTASONE) 20 MG tablet Take 2 tablets (40 mg) by mouth daily for 3 days, THEN 1.5 tablets (30 mg) daily for 3 days, THEN 1 tablet (20 mg) daily for 3 days, THEN 0.5 tablets (10 mg) daily for 3 days. 1/25/2022 at 20 mg Yes Guero Pearce MD   rosuvastatin (CRESTOR) 5 MG tablet Take 1 tablet (5 mg) by mouth daily  Patient taking differently: Take 5 mg by mouth every evening  1/24/2022 at Unknown time Yes Emily Marie MD   tiotropium (SPIRIVA HANDIHALER) 18 MCG inhaled capsule INHALE THE CONTENTS OF 1 CAPSULE VIA INHALATION DEVICE DAILY 1/25/2022 at Unknown time Yes Emily Marie MD   dexamethasone (DECADRON) 6 MG tablet Take 1 tablet (6 mg) by mouth once for 1 dose   Alfa Dumont MD       The information provided in this note is only as accurate as the sources available at the time of update(s)   Irene Heller PharmD

## 2022-01-25 NOTE — ED PROVIDER NOTES
History   Chief Complaint:  Shortness of Breath     The history is provided by the patient.      Celeste Chacko is a 89 year old female on long term oxycodone with history of spinal stenosis, type 2 diabetes, COPD, hypertension and hyperlipidemia who presents via EMS with shortness of breath. Patient reports that she started feeling short of breath with exertion on 01/16/22. States she went to  at the time and was negative for Covid. Endorses that she was told to buy an oximeter and also was prescribed prednisone, a neb and a 7 day antibiotic dose. She is compliant with all of these medications. She has used 2 days of her neb, 3 times a day, and is done with her antibiotic dose. These medications did not improve her shortness of breath and she is now noticing that she is dropping down to 86% on her oximeter, when she is exerting herself. Her most recent sat below 90% was last night, however she did not want to come to the ED at night so she waited until this morning. She adds that she was scared to sleep last night so she did not sleep very much. She is above 90% at rest and is not significantly short of breath at rest either. No chest pain, fever, chills, wheezing or headaches. No cough, rhinorrhea, sinus pain/pressure, congestion, nausea, vomiting, diarrhea, palpitations, new leg pain or new leg swelling. Vaccinated x3 to Covid.     Review of Systems   Constitutional: Negative for chills and fever.   HENT: Negative for congestion, rhinorrhea, sinus pressure and sinus pain.    Respiratory: Positive for shortness of breath. Negative for cough and wheezing.    Cardiovascular: Positive for leg swelling (baseline). Negative for chest pain and palpitations.   Gastrointestinal: Negative for diarrhea, nausea and vomiting.   Musculoskeletal: Negative for arthralgias.   Neurological: Negative for headaches.   All other systems reviewed and are negative.    Allergies:  Sulfamethoxazole    Medications:  Accuneb - as  "needed  Norvasc  Aspirin 81 mg   Decadron  Wixela inhub - 2x daily  Glucotrol  Lisinopril  Roxicodone  Miralax  Prednisone  Crestor  Senokot S  Spiriva handihaler - 1x daily  Dyazide    Past Medical History:     Basal cell carcinoma  COPD  Hyperlipidemia  Osteoarthritis  Spinal stenosis  Type 2 diabetes  Hypertension  Narcotic dependence  CKD  Edema    Past Surgical History:    Left knee arthroplasty  Breast biopsy  Shoulder arthroplasty    Family History:    Father - arthritis     Social History:  Patient presents alone  Presents via EMS    Physical Exam     Patient Vitals for the past 24 hrs:   BP Temp Temp src Pulse Resp SpO2 Height Weight   01/25/22 1636 -- -- -- -- -- (!) 87 % -- --   01/25/22 1630 -- -- -- -- -- 94 % -- --   01/25/22 1600 (!) 168/77 -- -- 93 -- 93 % -- --   01/25/22 1500 (!) 148/67 -- -- 93 -- 93 % -- --   01/25/22 1400 (!) 168/66 -- -- 93 -- 97 % -- --   01/25/22 1351 -- -- -- -- -- 98 % -- --   01/25/22 1346 (!) 166/68 -- -- 93 -- -- -- --   01/25/22 1235 -- -- -- -- -- -- 1.6 m (5' 3\") 61.2 kg (135 lb)   01/25/22 1224 -- 98  F (36.7  C) Temporal -- -- -- -- --   01/25/22 1142 (!) 142/73 -- -- 86 16 95 % -- --       Physical Exam  Constitutional: Well developed, nontox appearance  Head: Atraumatic.   Neck:  no stridor  Eyes: no scleral icterus  Cardiovascular: RRR, 2+ bilat radial pulses  Pulmonary/Chest: nml resp effort, Clear BS bilat  Abdominal: ND, soft, NT, no rebound or guarding   Ext: Warm, well perfused, no edema  Neurological: A&O, symmetric facies, moves ext x4  Skin: Skin is warm and dry.   Psychiatric: Behavior is normal. Thought content normal.   Nursing note and vitals reviewed.    Emergency Department Course   ECG  ECG obtained at 1630, ECG read at 1631  NSR  Inferior infarct, age undetermined   No significant change as compared to prior, dated 02/25/17.  Rate 85 bpm. MI interval 154 ms. QRS duration 88 ms. QT/QTc 358/426 ms. P-R-T axes 62 -28 39.     Imaging:  XR Chest 2 " Views   Final Result   IMPRESSION: Right shoulder arthroplasty. Otherwise negative chest. No   significant change from previous.      NEISHA WILLIS MD            SYSTEM ID:  [id number]        Report per radiology.    Laboratory:  Labs Ordered and Resulted from Time of ED Arrival to Time of ED Departure   BASIC METABOLIC PANEL - Abnormal       Result Value    Sodium 133      Potassium 4.4      Chloride 101      Carbon Dioxide (CO2) 26      Anion Gap 6      Urea Nitrogen 20      Creatinine 0.55      Calcium 9.6      Glucose 338 (*)     GFR Estimate 87     BLOOD GAS VENOUS - Abnormal    pH Venous 7.38      pCO2 Venous 48      pO2 Venous 33      Bicarbonate Venous 28      Base Excess/Deficit (+/-) 2.4 (*)     FIO2 0     INR - Abnormal    INR 1.17 (*)    CBC WITH PLATELETS AND DIFFERENTIAL - Abnormal    WBC Count 8.6      RBC Count 5.38 (*)     Hemoglobin 14.0      Hematocrit 44.3      MCV 82      MCH 26.0 (*)     MCHC 31.6      RDW 13.9      Platelet Count 217      % Neutrophils 80      % Lymphocytes 16      % Monocytes 3      % Eosinophils 0      % Basophils 0      % Immature Granulocytes 1      NRBCs per 100 WBC 0      Absolute Neutrophils 6.8      Absolute Lymphocytes 1.4      Absolute Monocytes 0.3      Absolute Eosinophils 0.0      Absolute Basophils 0.0      Absolute Immature Granulocytes 0.1      Absolute NRBCs 0.0     NT PROBNP INPATIENT - Normal    N terminal Pro BNP Inpatient 205     TROPONIN I - Normal    Troponin I High Sensitivity 8     INFLUENZA A/B & SARS-COV2 PCR MULTIPLEX - Normal    Influenza A PCR Negative      Influenza B PCR Negative      SARS CoV2 PCR Negative     D DIMER QUANTITATIVE - Normal    D-Dimer Quantitative 0.39       Procedures    Emergency Department Course:         Reviewed:  I reviewed nursing notes, vitals, past medical history, Care Everywhere and [facility]    Assessments/Consults:  ED Course as of 01/25/22 1650   Tue Jan 25, 2022   143 I obtained history and examined the patient.    1605 Rechecked and updated patient.   1648 I spoke to Dr. Mendiola, of the hospital service, regarding admission to observation.       Interventions:  Duoneb, 3 mL, Nebulization    Disposition:  The patient was admitted to the hospital under the care of Dr. Mendiola.     Impression & Plan     CMS Diagnoses: None    Medical Decision Makin year old female presenting w/ dyspnea     DDx includes viral syndrome NOS, influenza-like illness, influenza, COVID-19, CHF, ACS, PE, bacterial pneumonia.  EKG interpretation as noted above.  Labs significant for D-dimer within normal limits, COVID-19 and influenza testing negative, BNP within normal limits, troponin WNL.  Imaging sig for no acute cardiopulmonary disease.  Walking desaturation test performed in the emergency department with the patient becoming hypoxic to 87%.  She reports feeling weak and symptomatic at home as well.  I think it would be reasonable to admit the patient for observation to potentially arrange outpatient oxygen therapy.  Pt counseled on all results, disposition and diagnosis.  They are understanding and agreeable to plan. Patient admitted in stable condition.     Critical Care Time: None    Covid-19  Celeste Chacko was evaluated during a global COVID-19 pandemic, which necessitated consideration that the patient might be at risk for infection with the SARS-CoV-2 virus that causes COVID-19.   Applicable protocols for evaluation were followed during the patient's care.   COVID-19 was considered as part of the patient's evaluation. The plan for testing is:  a test was obtained during this visit.     Diagnosis:    ICD-10-CM    1. Hypoxia  R09.02        Scribe Disclosure:  I, Alfred Chacko, am serving as a scribe at 1:46 PM on 2022 to document services personally performed by Olvin Galvan MD based on my observations and the provider's statements to me.             Olvin Galvan MD  [phone]

## 2022-01-25 NOTE — TELEPHONE ENCOUNTER
"Patient calls back to check on status of Pulmonary ref. She was under the assumption that a ref will get her an appointment with that specialty today. She has been avoiding the ER as she is expressed concern that she will get covid and \"not make it\".     She said she was calling 911 to get to the ER as her O2 goes below 90 and she is worried.   "

## 2022-01-25 NOTE — ED TRIAGE NOTES
Pt here by EMS from home. She states she has been having SOB since a week ago Sunday. She reports that her oxygen drops below 90 when she is up walking. She has a hx of COPD. She has gone to her clinic and . Had a negative COVID test. Was started on abx, prednisone, and a neb. Pt is now worrying about the SOB and states she can't sleep at night because she's scared to sleep. Pt A&Ox4. Pt doesn't want an EKG at this time.

## 2022-01-25 NOTE — ED NOTES
Patient stated that she was hungry because she hadn't eaten since 0800. Pt provided courtesy meal and water.

## 2022-01-26 ENCOUNTER — APPOINTMENT (OUTPATIENT)
Dept: CT IMAGING | Facility: CLINIC | Age: 87
End: 2022-01-26
Attending: INTERNAL MEDICINE
Payer: COMMERCIAL

## 2022-01-26 VITALS
BODY MASS INDEX: 23.92 KG/M2 | DIASTOLIC BLOOD PRESSURE: 73 MMHG | HEIGHT: 63 IN | WEIGHT: 135 LBS | RESPIRATION RATE: 16 BRPM | TEMPERATURE: 97.9 F | SYSTOLIC BLOOD PRESSURE: 153 MMHG | OXYGEN SATURATION: 92 % | HEART RATE: 83 BPM

## 2022-01-26 LAB
ANION GAP SERPL CALCULATED.3IONS-SCNC: 4 MMOL/L (ref 3–14)
ATRIAL RATE - MUSE: 85 BPM
BUN SERPL-MCNC: 22 MG/DL (ref 7–30)
CALCIUM SERPL-MCNC: 8.7 MG/DL (ref 8.5–10.1)
CHLORIDE BLD-SCNC: 104 MMOL/L (ref 94–109)
CO2 SERPL-SCNC: 28 MMOL/L (ref 20–32)
CREAT SERPL-MCNC: 0.52 MG/DL (ref 0.52–1.04)
DIASTOLIC BLOOD PRESSURE - MUSE: NORMAL MMHG
ERYTHROCYTE [DISTWIDTH] IN BLOOD BY AUTOMATED COUNT: 13.5 % (ref 10–15)
GFR SERPL CREATININE-BSD FRML MDRD: 88 ML/MIN/1.73M2
GLUCOSE BLD-MCNC: 248 MG/DL (ref 70–99)
GLUCOSE BLDC GLUCOMTR-MCNC: 188 MG/DL (ref 70–99)
GLUCOSE BLDC GLUCOMTR-MCNC: 224 MG/DL (ref 70–99)
GLUCOSE BLDC GLUCOMTR-MCNC: 273 MG/DL (ref 70–99)
HCT VFR BLD AUTO: 40.2 % (ref 35–47)
HGB BLD-MCNC: 12.9 G/DL (ref 11.7–15.7)
INTERPRETATION ECG - MUSE: NORMAL
MCH RBC QN AUTO: 26.2 PG (ref 26.5–33)
MCHC RBC AUTO-ENTMCNC: 32.1 G/DL (ref 31.5–36.5)
MCV RBC AUTO: 82 FL (ref 78–100)
P AXIS - MUSE: 62 DEGREES
PLATELET # BLD AUTO: 195 10E3/UL (ref 150–450)
POTASSIUM BLD-SCNC: 4 MMOL/L (ref 3.4–5.3)
PR INTERVAL - MUSE: 154 MS
QRS DURATION - MUSE: 88 MS
QT - MUSE: 358 MS
QTC - MUSE: 426 MS
R AXIS - MUSE: -28 DEGREES
RBC # BLD AUTO: 4.93 10E6/UL (ref 3.8–5.2)
SODIUM SERPL-SCNC: 136 MMOL/L (ref 133–144)
SYSTOLIC BLOOD PRESSURE - MUSE: NORMAL MMHG
T AXIS - MUSE: 39 DEGREES
VENTRICULAR RATE- MUSE: 85 BPM
WBC # BLD AUTO: 11.5 10E3/UL (ref 4–11)

## 2022-01-26 PROCEDURE — 71250 CT THORAX DX C-: CPT

## 2022-01-26 PROCEDURE — 82962 GLUCOSE BLOOD TEST: CPT

## 2022-01-26 PROCEDURE — 99217 PR OBSERVATION CARE DISCHARGE: CPT | Performed by: PHYSICIAN ASSISTANT

## 2022-01-26 PROCEDURE — 96372 THER/PROPH/DIAG INJ SC/IM: CPT

## 2022-01-26 PROCEDURE — G0378 HOSPITAL OBSERVATION PER HR: HCPCS

## 2022-01-26 PROCEDURE — 250N000012 HC RX MED GY IP 250 OP 636 PS 637: Performed by: INTERNAL MEDICINE

## 2022-01-26 PROCEDURE — 85027 COMPLETE CBC AUTOMATED: CPT | Performed by: INTERNAL MEDICINE

## 2022-01-26 PROCEDURE — 36415 COLL VENOUS BLD VENIPUNCTURE: CPT | Performed by: INTERNAL MEDICINE

## 2022-01-26 PROCEDURE — 250N000013 HC RX MED GY IP 250 OP 250 PS 637: Performed by: INTERNAL MEDICINE

## 2022-01-26 PROCEDURE — 80048 BASIC METABOLIC PNL TOTAL CA: CPT | Performed by: INTERNAL MEDICINE

## 2022-01-26 PROCEDURE — 250N000013 HC RX MED GY IP 250 OP 250 PS 637: Performed by: HOSPITALIST

## 2022-01-26 RX ORDER — PREDNISONE 20 MG/1
TABLET ORAL
Qty: 19 TABLET | Refills: 0 | Status: SHIPPED | OUTPATIENT
Start: 2022-01-27 | End: 2022-02-10

## 2022-01-26 RX ORDER — ACETAMINOPHEN 325 MG/1
325-650 TABLET ORAL EVERY 4 HOURS PRN
Status: DISCONTINUED | OUTPATIENT
Start: 2022-01-26 | End: 2022-01-26 | Stop reason: HOSPADM

## 2022-01-26 RX ADMIN — UMECLIDINIUM 1 PUFF: 62.5 AEROSOL, POWDER ORAL at 08:47

## 2022-01-26 RX ADMIN — FLUTICASONE FUROATE AND VILANTEROL TRIFENATATE 1 PUFF: 200; 25 POWDER RESPIRATORY (INHALATION) at 08:39

## 2022-01-26 RX ADMIN — OXYCODONE HYDROCHLORIDE 5 MG: 5 TABLET ORAL at 11:06

## 2022-01-26 RX ADMIN — OXYCODONE HYDROCHLORIDE 5 MG: 5 TABLET ORAL at 04:07

## 2022-01-26 RX ADMIN — ACETAMINOPHEN 650 MG: 325 TABLET, FILM COATED ORAL at 11:06

## 2022-01-26 RX ADMIN — PREDNISONE 40 MG: 20 TABLET ORAL at 08:38

## 2022-01-26 RX ADMIN — GLIPIZIDE 5 MG: 5 TABLET ORAL at 08:38

## 2022-01-26 RX ADMIN — ACETAMINOPHEN 650 MG: 325 TABLET, FILM COATED ORAL at 05:06

## 2022-01-26 NOTE — DISCHARGE SUMMARY
[facility]  Hospitalist Discharge Summary      Date of Admission:  1/25/2022  Date of Discharge:  1/26/2022  Discharging Provider: Aurea Funez PA-C  Discharge Service: Hospitalist Service    Discharge Diagnoses   Non-oxygen dependent COPD with acute hypoxic respiratory failure, improved  Recent COPD exacerbation  COVID-19 and Influenza ruled out  Lung nodule, incidental finding  Steroid induced hyperglycemia  Steroid induced mild leukocytosis    Follow-ups Needed After Discharge   Follow-up Appointments     Follow Up      [facility], [phone], [place] location in 4-6 weeks for new   pulmonary consult.  (No availability in [place] for 8 weeks)  Appointment scheduled:  Tuesday Feb 22, 2022 at 1:00 pm  Dr Emmett RICHARDS location  [address]  [address]  [place], [place]    The Clinic will send you information/directions .         Follow-up and recommended labs and tests       Follow up with primary care provider, Emily Marie, within 7 days for   hospital follow- up.  The following labs/tests are recommended: routine   labs as needed.             Discharge Disposition   Discharged to home  Condition at discharge: Stable    Hospital Course   Celeste Chacko is a 89-year-old female with longstanding history of non oxygen dependent COPD who comes in with ongoing shortness of breath with recent treatment for chronic obstructive pulmonary disease exacerbation with 7 days of antibiotics as well as currently on a prednisone taper. She was registered to observation for ongoing shortness of breath and some borderline low oxygen saturations. For full HPI please see admission H&P from Dr. Camilo Mendiola dated 1/25/22.     Non-oxygen dependent COPD with acute hypoxic respiratory failure, improved  Recent COPD exacerbation  Presented with SOB and borderline low oxygen saturations. No wheezing on admission. She appears to be optimized with respect to her COPD medications,  including albuterol, Wixela inhaler, Incruse and Spiriva. No evidence of heart failure with a BNP of only 205, normal CBC and no fever. Given dose of IV Solu Medrol in the ED.  * CXR without infiltrates  * D-dimer WNL  The patient was registered to observation and started on prednisone 40mg/d. 6-minute walk test negative. Consulted Pulmonology who set her up with clinic follow up and discharged with 5 day course of prednisone then taper. No further antibiotics. Continued the patient on her albuterol, Wixela substitute, as well as her Spiriva. Day of discharge she felt improved, O2 stable, and improved SOB.     Steroid induced hyperglycemia  Non-Insulin dependent type 2 diabetes  PTA Regimen: glipizide 5mg/d  Last HbA1c 7.1  - Hypoglycemia protocol  - Preprandial and HS fingerstick checks or Q 4 hours while NPO  - Resumed PTA oral antiglycemics  - Sliding scale insulin Medium               Recent Labs   Lab 01/26/22  0828 01/26/22  0638 01/26/22  0207 01/25/22  2126 01/25/22  1722 01/25/22  1404   * 248* 273* 335* 265* 338*         Steroid induced mild leukocytosis  WBC 11.5, given extra prednisone which is most likely etiology. Afebrile.      Hypertension:  We will continue the patient on her Norvasc and lisinopril.  She is slightly hypertensive, but in acceptable range.     Hyperlipidemia: We will continue the patient on her Crestor.     Chronic back pain:  The patient will be continued on Tylenol and oxycodone that she normally takes.      Consultations This Hospital Stay   PULMONARY IP CONSULT    Code Status   Full Code    Time Spent on this Encounter   I, Aurea Funez PA-C, personally saw the patient today and spent greater than 30 minutes discharging this patient.       Aurea Funez PA-C  [facility]  [address] [place] [id number]  Phone:  [phone]  ______________________________________________________________________    Physical Exam   Vital Signs: Temp: 97.9  F (36.6  C) Temp src: Oral BP: (!) 153/73 Pulse: 83   Resp: 16 SpO2: 92 % O2 Device: None (Room air)    Weight: 135 lbs 0 oz    Physical Exam    General: Awake, alert, elderly woman who appears younger than stated age. Looks comfortable sitting up in bed. No acute distress.  HEENT: Normocephalic, atraumatic. Extraocular movements intact.    Respiratory: Clear to auscultation bilaterally, no rales, wheezing, or rhonchi. Breathing room air comfortably. Speaking in full sentences.  Cardiovascular: Regular rate and rhythm, +S1 and S2, no murmur auscultated. No peripheral edema.   Gastrointestinal: Soft, non-tender, non-distended. Bowel sounds present.  Skin: Warm, dry. No obvious rashes or lesions on exposed skin. Dorsalis pedis pulses palpable bilaterally.  Musculoskeletal: No joint swelling, erythema or tenderness. Moves all extremities equally.  Neurologic: AAO x3. Cranial nerves 2-12 grossly intact, normal strength and sensation.  Psychiatric: Appropriate mood and affect. No obvious anxiety or depression.         Primary Care Physician   Emily Marie    Discharge Orders      Follow Up    [facility], [phone], [place] location in 4-6 weeks for new pulmonary consult.  (No availability in [place] for 8 weeks)  Appointment scheduled:  Tuesday Feb 22, 2022 at 1:00 pm  Dr Emmett RICHARDS location  [address]  [address]  [place], [place]    The Clinic will send you information/directions .     Reason for your hospital stay    Admitted with your COPD and borderline low oxygen. You did not need oxygen to go home. You were not shown to have any infection. You saw the lung doctor who set your up with an appointment in clinic.     Follow-up and recommended labs and tests     Follow up with primary care provider, Emily Marie, within 7 days for hospital follow- up.  The  following labs/tests are recommended: routine labs as needed.     Activity    Your activity upon discharge: activity as tolerated     Diet    Follow this diet upon discharge: Regular diet       Significant Results and Procedures   Results for orders placed or performed during the hospital encounter of 01/25/22   XR Chest 2 Views    Narrative    XR CHEST 2 VW  1/25/2022 4:26 PM       INDICATION: dyspnea  COMPARISON: 1/16/2022       Impression    IMPRESSION: Right shoulder arthroplasty. Otherwise negative chest. No  significant change from previous.    NEISHA WILLIS MD         SYSTEM ID:  [id number]   CT Chest w/o Contrast    Narrative    CT CHEST WITHOUT CONTRAST 1/26/2022 10:56 AM     HISTORY: COPD exacerbation.    COMPARISON: None.    TECHNIQUE: Volumetric helical acquisition of CT images of the chest  from the clavicles to the kidneys were acquired without IV contrast.  Radiation dose for this scan was reduced using automated exposure  control, adjustment of the mA and/or kV according to patient size, or  iterative reconstruction technique.    FINDINGS:  4 mm nodule in the central left upper lobe image 95 series  4. Trace peripheral fibrosis and/or atelectasis. No consolidation.  Emphysema. Mild bronchiectasis and bronchial wall thickening. No  effusions. No acute findings in the visualized upper abdomen. No  frankly destructive bony lesions.      Impression    IMPRESSION:  1. No acute infiltrates.  2. 4 mm nodule in the left upper lobe.    Recommendations for one or multiple incidental lung nodules < 6mm :    Low risk patients: No routine follow-up.    High risk patients: Optional follow-up CT at 12 months; if  unchanged, no further follow-up.    *Low Risk: Minimal or absent history of smoking or other known risk  factors.  *Nonsolid (ground glass) or partly solid nodules may require longer  follow-up to exclude indolent adenocarcinoma.  *Recommendations based on Guidelines for the Management of  Incidental  Pulmonary Nodules Detected at CT: From the Fleischner Society 2017,  Radiology 2017.       Discharge Medications   Current Discharge Medication List      CONTINUE these medications which have CHANGED    Details   predniSONE (DELTASONE) 20 MG tablet Take 2 tablets (40 mg) by mouth daily for 5 days, THEN 1.5 tablets (30 mg) daily for 3 days, THEN 1 tablet (20 mg) daily for 3 days, THEN 0.5 tablets (10 mg) daily for 3 days.  Qty: 19 tablet, Refills: 0    Comments: Future refills by PCP Dr. Emily Marie with phone number [phone].  Associated Diagnoses: Chronic obstructive pulmonary disease with acute exacerbation (H)         CONTINUE these medications which have NOT CHANGED    Details   Acetaminophen (TYLENOL PO) Take 325 mg by mouth 6 times daily Patient takes TID with Oxycodone.       albuterol (ACCUNEB) 0.63 MG/3ML neb solution Take 3 mLs (0.63 mg) by nebulization every 6 hours as needed for shortness of breath / dyspnea or wheezing  Qty: 90 mL, Refills: 3    Associated Diagnoses: COPD exacerbation (H)      albuterol (PROAIR HFA/PROVENTIL HFA/VENTOLIN HFA) 108 (90 Base) MCG/ACT inhaler INHALE 2 PUFFS INTO THE LUNGS EVERY 6 HOURS AS NEEDED FOR SHORTNESS OF BREATH OR DIFFICULT BREATHING OR WHEEZING  Qty: 25.5 g, Refills: 3    Comments: **Patient requests 90 days supply**  Associated Diagnoses: COPD, severe (H)      amLODIPine (NORVASC) 2.5 MG tablet Take 1 tablet (2.5 mg) by mouth daily  Qty: 90 tablet, Refills: 3    Comments: ### DO NOT FILL NOW.  Please update patient's profile to reflect additional refills.  ####  Associated Diagnoses: Benign essential hypertension      ASPIRIN EC PO Take 81 mg by mouth daily      Calcium Polycarbophil (FIBERCON PO) Take 1 tablet by mouth daily       calcium-vitamin D (CALTRATE) 600-400 MG-UNIT per tablet Take 1 tablet by mouth 2 times daily 1200mg   1000 mg of vitamin d      co-enzyme Q-10 100 MG CAPS capsule Take 100 mg by mouth daily      fluticasone-salmeterol  (WIXELA INHUB) 500-50 MCG/DOSE inhaler Inhale 1 puff into the lungs 2 times daily ### DO NOT FILL NOW.  Please update patient's profile to reflect additional refills.  ####  Qty: 180 each, Refills: 3    Associated Diagnoses: COPD, severe (H)      glipiZIDE (GLUCOTROL) 5 MG tablet Take 1 tablet (5 mg) by mouth every morning  Qty: 90 tablet, Refills: 3    Comments: ### DO NOT FILL NOW.  Please update patient's profile to reflect additional refills.  ####  Associated Diagnoses: Type 2 diabetes mellitus with complication, without long-term current use of insulin (H)      glucosamine-chondroitin 500-400 MG CAPS per capsule Take 1 capsule by mouth 2 times daily       lisinopril (ZESTRIL) 40 MG tablet Take 1 tablet (40 mg) by mouth daily  Qty: 90 tablet, Refills: 3    Comments: ### DO NOT FILL NOW.  Please update patient's profile to reflect additional refills.  ####  Associated Diagnoses: Benign essential hypertension      Multiple Vitamins-Minerals (MULTIVITAMIN ADULT PO) Take 1 tablet by mouth every evening       NONFORMULARY At Bedtime Natra sleeping med takes 1 at night.       oxyCODONE (ROXICODONE) 5 MG tablet Take 1 tablet (5 mg) by mouth every 4 hours as needed for severe pain May take 5 tabs per day  Qty: 150 tablet, Refills: 0    Associated Diagnoses: Spinal stenosis of lumbar region with neurogenic claudication      polyethylene glycol (MIRALAX) 17 g packet Take 0.5 packets by mouth every evening       rosuvastatin (CRESTOR) 5 MG tablet Take 1 tablet (5 mg) by mouth daily  Qty: 90 tablet, Refills: 3    Comments: ### DO NOT FILL NOW.  Please update patient's profile to reflect additional refills.  ####  Associated Diagnoses: Hyperlipidemia LDL goal <100      tiotropium (SPIRIVA HANDIHALER) 18 MCG inhaled capsule INHALE THE CONTENTS OF 1 CAPSULE VIA INHALATION DEVICE DAILY  Qty: 90 capsule, Refills: 3    Comments: ### DO NOT FILL NOW.  Please update patient's profile to reflect additional refills.   ####  Associated Diagnoses: COPD, severe (H)         STOP taking these medications       dexamethasone (DECADRON) 6 MG tablet Comments:   Reason for Stopping:             Allergies   Allergies   Allergen Reactions     Sulfamethoxazole Anaphylaxis and Hives

## 2022-01-26 NOTE — PROGRESS NOTES
Patient completed 6+ (plus) minute walk test with nursing aide this morning with no drop to her sats.  Patient was able to maintain her sat levels > 93%.

## 2022-01-26 NOTE — PROGRESS NOTES
Observation goals  PRIOR TO DISCHARGE        Comments: 1. Walk test for home oxygen qualification: Met. See  note  2. Resolution of sob symptoms:  Partially met.  Continuing to assess.   Patient on RA and does not desat with activity.   3. Restart prednisone taper course: Met.        Pulmonary consult completed.  CT of chest completed and resulted.    Patient anticipating discharge to home later today.  Daughter to transport once medically stable.

## 2022-01-26 NOTE — PLAN OF CARE
RN:  Patient A/O x4.  VSS on RA.  Observation goals met.  Up with minimal assistance.  Tolerating diet.  Blood sugar stable.  Plans to have her daughter pick her up once paperwork is completed.  Patient had x1 dose of oxy and Tylenol for complaints of  lower spinal pain with relief.

## 2022-01-26 NOTE — H&P
89 year old fairly active independent living female with hx of copd and not on any oxygen admitted with increasing feeling of SOB with borderline hypoxemia.  Admission under observation for oxygen qualifications and pulmonary consult for medication optimization.    Dictation # [id number]      Camilo Mendiola MD

## 2022-01-26 NOTE — H&P
Admitted: 01/25/2022    CHIEF COMPLAINT:  Shortness of breath.    HISTORY OF PRESENT ILLNESS:  Celeste Chacko is a fairly independent 89-year-old  female with longstanding history of COPD not oxygen-dependent, who also has history of chronic back pain from spinal stenosis on long-term oxycodone.  She also has diabetes, hypertension and hyperlipidemia.  The patient has been feeling short of breath with exertion starting on 01/16/2022.  She went to urgent care initially.  Her COVID test was negative.  She had normal oxygen saturations.  She was discharged with a prednisone tapering course as well as 7 days of antibiotics, which she finished.  She has not felt significantly better with these interventions.  She has also noted more recently on her pulse oximeter her oxygen saturations were dropping down into the mid 80s, especially when she is exerting herself, and she is feeling more short of breath.  Last night, her oxygen saturations were below 90% when she checked.  This caused her to be concerned and had trouble sleeping.  She feels okay at rest with respect to her breathing, but when she came into the Emergency Department she felt a little bit short of breath even at rest.  She denies any chest pain, fevers, chills, or sweats or any wheezing, cough, sputum production.  She has been vaccinated x 3 with COVID.  She has some ankle edema.  The patient came to [facility] for further assessment.    In the Emergency Department, the patient was seen by Dr. Willian Galvan.  She was afebrile.  Oxygen saturations were dipping down into the 87% range.  A chest x-ray showed no infiltrates.  Influenza and COVID were negative.  A 12-lead ECG showed normal sinus rhythm with evidence of Q-waves in the inferior leads.  No ischemic changes.  D-dimer is below reference range at 0.39.  INR is 1.17.  BNP is only 205.  Blood glucose is 338.  CBC is normal.  BMP is normal.  Troponin is below reference range at 8.   The patient received DuoNeb and methylprednisolone and is being admitted under observation status for possible oxygen qualification.    PAST MEDICAL HISTORY:    1.  Basal cell cancer.  2.  COPD, not oxygen-dependent.  3.  Hyperlipidemia.  4. Osteoarthritis.  5.  Spinal stenosis.  6.  Type 2 diabetes.  Last A1c was 7.1  7.  Hypertension.  8.  History of chronic opioid use.  9.  Chronic kidney disease.  10.  History of lower extremity edema.    PAST SURGICAL HISTORY:  Left knee arthroplasty, breast biopsy, shoulder arthroplasty.    FAMILY HISTORY:  Father with arthritis.    SOCIAL HISTORY:  She has 1 glass of wine a week.  No tobacco; she quit 20 years ago.  She lives in a condo alone.  She has got a daughter who is next of kin.  She is full code.    ALLERGIES:  SULFAMETHOXAZOLE.    CURRENT MEDICATION LIST:    1.  Tylenol.  2.  Albuterol nebs and metered dose inhaler.  3.  Amlodipine.  4.  Wixela inhaler.  5.  Glipizide.  6.  Lisinopril.  7.  Oxycodone.  8.  MiraLax.  9.  Crestor.  10.  Incruse Ellipta.  11.  Aspirin.  12.  FiberCon.  13.  Calcium with vitamin D.  14.  Multivitamin.  15.  Prednisone tapering course.  16.  Coenzyme Q10.    17.  Glucosamine chondroitin.    REVIEW OF SYSTEMS:  Ten points reviewed and as dictated in History of Present Illness.    PHYSICAL EXAMINATION:    VITAL SIGNS:  Temperature 98.4, heart rate 94, respirations 18, blood pressure 157/77.  Saturations are 90% on room air.  GENERAL:  The patient is actually a fairly younger-appearing 89-year-old  female.  She is pleasant, cooperative, currently not needing any oxygen.  HEENT:  Pupils equal.  Sclerae are anicteric.  Mucous membranes are moist.  NECK:  JVP is not elevated.  PULMONARY:  The patient has clear lungs, no wheezing, no crackles.  CARDIOVASCULAR:  S1, S2, regular rhythm.  GASTROINTESTINAL:  Abdomen is soft, nontender.  No active bowel sounds.  MUSCULOSKELETAL:  Shows trace edema.  NEUROLOGIC:  She is grossly nonfocal.   Cranial nerves grossly intact.  SKIN:  Warm, dry, well-perfused.  PSYCHIATRIC:  Mood and affect normal.    LABORATORY AND IMAGING DATA:  As dictated in History of Present Illness.    ASSESSMENT:  Celeste Chacko is a 89-year-old female with longstanding history of chronic obstructive pulmonary disease not oxygen-dependent, who comes in with ongoing shortness of breath with recent treatment for chronic obstructive pulmonary disease exacerbation with 7 days of antibiotics as well as currently on a prednisone taper, being admitted for ongoing shortness of breath and some borderline low oxygen saturations.    PLAN:    1.  Chronic obstructive pulmonary disease with hypoxemia:  The patient does not have any wheezing on exam.  She appears to be optimized with respect to her COPD medications, including albuterol, Wixela inhaler, Incruse and Spiriva.  The patient's chest x-ray shows no infiltrates, no wheezing on exam.  No evidence of heart failure with a BNP of only 205, normal CBC and no fever.  The patient will be monitored overnight.  We will give her an extra dose of 40 of prednisone while she is hospitalized under observation status.  We will do a 6-minute walk test to see if she can qualify for home oxygen.  We will obtain a Pulmonary consultation (she has never seen a pulmonologist) to see if we can further optimize her medications and have further followup as outpatient.  She is COVID and influenza negative.  We will hold off on any antibiotics, as there is no indication for antibiotics.  We will continue the patient on her albuterol, Wixela substitute, as well as her Spiriva.  2.  Hypertension:  We will continue the patient on her Norvasc and lisinopril.  She is slightly hypertensive, but in acceptable range.  3.  Hyperlipidemia: We will continue the patient on her Crestor.  4.  Chronic back pain:  The patient will be continued on Tylenol and oxycodone that she normally takes.    DEEP VENOUS THROMBOSIS PROPHYLAXIS:   Anticipate a very brief stay.  We will place her on compression boots.    CODE STATUS:  FULL.    DISPOSITION:  Observation status.    Camilo Mendiola MD        D: 2022   T: 2022   MT: ARDEN    Name:     LEXII LIVINGSTON  MRN:      [id number]        Account:     [id number]   :      1931           Admitted:    2022       Document: [id number]

## 2022-01-26 NOTE — ED NOTES
[facility]  ED Nurse Handoff Report    ED Chief complaint: Shortness of Breath      ED Diagnosis:   Final diagnoses:   Hypoxia   Chronic obstructive pulmonary disease with acute exacerbation (H)       Code Status: not discussed by ED RN     Allergies:   Allergies   Allergen Reactions     Sulfamethoxazole Anaphylaxis and Hives       Patient Story: 89F worsening exertional SOB  Focused Assessment:  Pt states that she has been feeling SOB for a few weeks. She has done antibiotics, prednisone, and nebulizer's. Pt still having sats in the upper 80s on ambulation. Pt type 2 diabetic. Pt A&Ox4    Treatments and/or interventions provided: neb, solumedrol  Patient's response to treatments and/or interventions: no change    To be done/followed up on inpatient unit:      Does this patient have any cognitive concerns?: N/A    Activity level - Baseline/Home:  Walker  Activity Level - Current:   Walker    Patient's Preferred language: English   Needed?: No    Isolation: None  Infection: Not Applicable  Patient tested for COVID 19 prior to admission: YES  Bariatric?: No    Vital Signs:   Vitals:    01/25/22 1630 01/25/22 1636 01/25/22 1645 01/25/22 1700   BP:    (!) 165/79   Pulse:    85   Resp:       Temp:       TempSrc:       SpO2: 94% (!) 87% 96% 95%   Weight:       Height:           Cardiac Rhythm:     Was the PSS-3 completed:   Yes  What interventions are required if any?               Family Comments:   OBS brochure/video discussed/provided to patient/family: Yes              Name of person given brochure if not patient:               Relationship to patient:     For the majority of the shift this patient's behavior was Green.   Behavioral interventions performed were .    ED NURSE PHONE NUMBER: [phone]

## 2022-01-26 NOTE — PROGRESS NOTES
RECEIVING UNIT ED HANDOFF REVIEW    ED Nurse Handoff Report was reviewed by: Debra Dawson RN on January 25, 2022 at 6:50 PM

## 2022-01-26 NOTE — PROGRESS NOTES
Observation goals  PRIOR TO DISCHARGE        Comments: 1. Walk test for home oxygen qualification   2. Resolution of sob symptoms:  Partially met.  Continuing to assess.   Patient on RA.  Need to complete 6 min walk test.   3. Restart prednisone taper course:  Partially met.  In progress.  Pulmonary Consult placed.

## 2022-01-26 NOTE — PLAN OF CARE
Admitted to Obs for SOB  DATE & TIME: 1/245/2022 6131-7862  Cognitive Concerns/ Orientation: AA/Ox4; calm, pleasant and cooperative   ABNL VS/O2: VSS; RA SpO2>93% on RA resting   TELEMETRY RHYTHM: N/A  PAIN MANAGMENT: chronic back pain controlled by PRN PO Tylenol x 1 and PRN PO Oxycodone x 2 given. Acute soreness on the L upper arm; heat applied for relief   DIET: Const. Carb; BS check 4x/day  BOWEL/BLADDER: Continent of urine to BR   ABNL LAB/BG: pending AM labs   DRAIN/DEVICES: PIV 18g R AC; saline locked ; patent   SKIN: scattered ecchymosis BUE   MOBILITY: SBA with [facility]  D/C DAY/GOALS/PLACE: discharge back home   OTHER IMPORTANT INFO:   -  Pulm consult  -  6 minute yanique

## 2022-01-26 NOTE — PLAN OF CARE
Patient adequate for discharge. Discharge paperwork and medications explained to patient. Patient's daughter picked up patient.

## 2022-01-26 NOTE — CONSULTS
PULMONOLOGY CONSULTATION  Date of service: 1/26/2022    [facility]    _____________________________________________________    Celeste Chacko  89 year old female  [id number]  [address]    [facility] [address]    Primary Care Provider:  Emily Marie  Admission Date: 1/25/2022  Hospital Attending Physician:  Camilo Mendiola MD  ________________________________________    CHIEF COMPLAINT : I was asked to see this patient by Dr. Mendiola for evaluation of copd, acute hypoxic resp failure   Informant: ED Doctor and patient    HISTORY OF PRESENT ILLNESS     Celeste Chacko is a 89 year old female with past medical history significant for copd on triple bronchodilator therapy with wixela and spiriva and occasional rescue inhaler and nebs. Not on o2 at home, did note that her o2 sats were in the high 80s and low 90s and presented to urgent care with foster. On walk for o2 sat today, she reports she was in the low 90s. Compliant with inhalers at home, no recent exacerbations. Quit smoking 20 years ago. Weight stable, feeling much better. Minimal cough.    HOME MEDICATIONS     Medications Prior to Admission   Medication Sig Dispense Refill Last Dose     Acetaminophen (TYLENOL PO) Take 325 mg by mouth 6 times daily Patient takes TID with Oxycodone.         albuterol (ACCUNEB) 0.63 MG/3ML neb solution Take 3 mLs (0.63 mg) by nebulization every 6 hours as needed for shortness of breath / dyspnea or wheezing 90 mL 3 prn med     albuterol (PROAIR HFA/PROVENTIL HFA/VENTOLIN HFA) 108 (90 Base) MCG/ACT inhaler INHALE 2 PUFFS INTO THE LUNGS EVERY 6 HOURS AS NEEDED FOR SHORTNESS OF BREATH OR DIFFICULT BREATHING OR WHEEZING 25.5 g 3 prn med     amLODIPine (NORVASC) 2.5 MG tablet Take 1 tablet (2.5 mg) by mouth daily (Patient taking differently: Take 2.5 mg by mouth every evening ) 90 tablet 3 1/24/2022 at Unknown time     ASPIRIN EC PO Take 81 mg by mouth daily   1/25/2022 at Unknown time     Calcium  Polycarbophil (FIBERCON PO) Take 1 tablet by mouth daily    1/25/2022 at Unknown time     calcium-vitamin D (CALTRATE) 600-400 MG-UNIT per tablet Take 1 tablet by mouth 2 times daily 1200mg   1000 mg of vitamin d   1/25/2022 at am     co-enzyme Q-10 100 MG CAPS capsule Take 100 mg by mouth daily   1/25/2022 at Unknown time     fluticasone-salmeterol (WIXELA INHUB) 500-50 MCG/DOSE inhaler Inhale 1 puff into the lungs 2 times daily ### DO NOT FILL NOW.  Please update patient's profile to reflect additional refills.  #### 180 each 3 1/25/2022 at am     glipiZIDE (GLUCOTROL) 5 MG tablet Take 1 tablet (5 mg) by mouth every morning 90 tablet 3 1/25/2022 at Unknown time     glucosamine-chondroitin 500-400 MG CAPS per capsule Take 1 capsule by mouth 2 times daily    1/25/2022 at am     lisinopril (ZESTRIL) 40 MG tablet Take 1 tablet (40 mg) by mouth daily (Patient taking differently: Take 40 mg by mouth every evening ) 90 tablet 3 1/24/2022 at Unknown time     Multiple Vitamins-Minerals (MULTIVITAMIN ADULT PO) Take 1 tablet by mouth every evening    1/24/2022 at Unknown time     NONFORMULARY At Bedtime Natra sleeping med takes 1 at night.    1/24/2022 at Unknown time     oxyCODONE (ROXICODONE) 5 MG tablet Take 1 tablet (5 mg) by mouth every 4 hours as needed for severe pain May take 5 tabs per day 150 tablet 0 prn med     polyethylene glycol (MIRALAX) 17 g packet Take 0.5 packets by mouth every evening    1/24/2022 at Unknown time     predniSONE (DELTASONE) 20 MG tablet Take 2 tablets (40 mg) by mouth daily for 3 days, THEN 1.5 tablets (30 mg) daily for 3 days, THEN 1 tablet (20 mg) daily for 3 days, THEN 0.5 tablets (10 mg) daily for 3 days. 15 tablet 0 1/25/2022 at 20 mg     rosuvastatin (CRESTOR) 5 MG tablet Take 1 tablet (5 mg) by mouth daily (Patient taking differently: Take 5 mg by mouth every evening ) 90 tablet 3 1/24/2022 at Unknown time     tiotropium (SPIRIVA HANDIHALER) 18 MCG inhaled capsule INHALE THE CONTENTS  OF 1 CAPSULE VIA INHALATION DEVICE DAILY 90 capsule 3 1/25/2022 at Unknown time     dexamethasone (DECADRON) 6 MG tablet Take 1 tablet (6 mg) by mouth once for 1 dose 1 tablet 0        PAST MEDICAL HISTORY      Past Medical History:   Diagnosis Date     Basal cell carcinoma      Chronic pain      Complete rupture of rotator cuff      COPD (chronic obstructive pulmonary disease) (H)      History of blood transfusion      Mixed hyperlipidemia 04/2000     Osteoarthritis      Personal history of other malignant neoplasm of skin      Spinal stenosis of lumbar region with neurogenic claudication      T2DM (type 2 diabetes mellitus) (H)      Past Surgical History:   Procedure Laterality Date     ARTHROPLASTY KNEE Left 9/15/2014    Procedure: ARTHROPLASTY KNEE;  Surgeon: Carlos Alberto Kaminski MD;  Location: [facility] OR     HEAD & NECK SURGERY  05/14/15    forehead-skin cancer removed     INJECT EPIDURAL LUMBAR / SACRAL SINGLE Left 7/14/2016    Procedure: INJECT EPIDURAL LUMBAR / SACRAL SINGLE;  Surgeon: Luisito New MD;  Location: [facility]     INJECT SACROILIAC JOINT N/A 12/1/2015    Procedure: INJECT SACROILIAC JOINT;  Surgeon: Luisito New MD;  Location: [facility] GI     INJECT SACROILIAC JOINT Bilateral 5/19/2016    Procedure: INJECT SACROILIAC JOINT;  Surgeon: Luisito New MD;  [facility]: [facility]     INJECT SACROILIAC JOINT Bilateral 11/25/2016    Procedure: INJECT SACROILIAC JOINT;  Surgeon: Elmira Bennett MD;  Location: [facility] OR     INJECT SACROILIAC JOINT Bilateral 4/25/2017    Procedure: INJECT SACROILIAC JOINT;  Bilateral Sacroiliac Joint Injection   Injection of local anesthetic and steroid into area around spine for pain relief;  Surgeon: Alvaro Holland MD;  Location: [facility] OR     INJECT SACROILIAC JOINT Bilateral 7/28/2017    Procedure: INJECT SACROILIAC JOINT;  Bilateral Sacroiliac Joint Injection;  Surgeon: Elmira Bennett MD;  Location: [facility] OR     INJECT SACROILIAC JOINT Bilateral 11/10/2017    Procedure: INJECT SACROILIAC  "JOINT;  Bilateral Sacroiliac Joint Injection;  Surgeon: Elmira Bennett MD;  Location:  OR     [facility] NONSPECIFIC PROCEDURE      Breast biopsies      [facility] NONSPECIFIC PROCEDURE      Pilonidal sinus repair      [facility] NONSPECIFIC PROCEDURE      T & A      Z NONSPECIFIC PROCEDURE      Shoulder arthropasty     [facility] NONSPECIFIC PROCEDURE      Right shoulder replacement, RCT repair       ALLERGIES     Allergies   Allergen Reactions     Sulfamethoxazole Anaphylaxis and Hives       SOCIAL / SUBSTANCE HISTORY     Social History     Socioeconomic History     Marital status: Single     Spouse name: Not on file     Number of children: Not on file     Years of education: Not on file     Highest education level: Not on file   Occupational History     Not on file   Tobacco Use     Smoking status: Former Smoker     Packs/day: 0.50     Years: 54.00     Pack years: 27.00     Types: Cigarettes     Quit date: 2000     Years since quittin.7     Smokeless tobacco: Never Used     Tobacco comment: using nicotine gum   Substance and Sexual Activity     Alcohol use: Yes     Alcohol/week: 0.8 standard drinks     Types: 1 Glasses of wine per week     Comment: \"A glass of wine once a week.\"     Drug use: No     Sexual activity: Not Currently   Other Topics Concern     Parent/sibling w/ CABG, MI or angioplasty before 65F 55M? Not Asked   Social History Narrative     Not on file     Social Determinants of Health     Financial Resource Strain: Not on file   Food Insecurity: Not on file   Transportation Needs: Not on file   Physical Activity: Not on file   Stress: Not on file   Social Connections: Not on file   Intimate Partner Violence: Not on file   Housing Stability: Not on file       FAMILY HISTORY     Family History   Problem Relation Age of Onset     Arthritis Father      Diabetes Brother      Cancer Brother         breast     Cerebrovascular Disease Brother        REVIEW OF SYSTEMS   A comprehensive review of systems was " "negative except for items noted in HPI/Subjective.    PHYSICAL EXAMINATION   Temp (24hrs), Av.4  F (36.9  C), Min:98  F (36.7  C), Max:98.9  F (37.2  C)    Vital signs:  Temp: 98.9  F (37.2  C) Temp src: Oral BP: 133/63 Pulse: 77   Resp: 16 SpO2: 91 % (Range: 90-93 during walk) O2 Device: None (Room air)   Height: 160 cm (5' 3\") Weight: 61.2 kg (135 lb)  Estimated body mass index is 23.91 kg/m  as calculated from the following:    Height as of this encounter: 1.6 m (5' 3\").    Weight as of this encounter: 61.2 kg (135 lb).        No intake/output data recorded.    CONSTITUTIONAL/GENERAL APPEARANCE: Alert female. No Apparent Distress.  PSYCHIATRIC: Pleasant and appropriate mood and affect. Oriented x 3.  EARS, NOSE,THROAT,MOUTH: External ears and nose overall normal. nl oral mucosa.   NECK: Neck appearance normal. No neck masses and the thyroid not enlarged.   RESPIRATORY: Non-labored effort. No wheezing  CARDIOVASCULAR: S1, S2, regular  ABDOMEN: round, soft, non-tender  LYMPHATIC: no cervical lymphadenopathy.  SKIN: Skin color was normal.    LABORATORY ASSESSMENT    Arterial Blood Gas  Recent Labs   Lab 22  1404   O2PER 0     CBC  Recent Labs   Lab 22  0638 22  1404   WBC 11.5* 8.6   RBC 4.93 5.38*   HGB 12.9 14.0   HCT 40.2 44.3   MCV 82 82   MCH 26.2* 26.0*   MCHC 32.1 31.6   RDW 13.5 13.9    217     BMP  Recent Labs   Lab 22  0828 22  0638 22  0207 22  2126 22  1722 22  1404   NA  --  136  --   --   --  133   POTASSIUM  --  4.0  --   --   --  4.4   CHLORIDE  --  104  --   --   --  101   NARCISA  --  8.7  --   --   --  9.6   CO2  --  28  --   --   --  26   BUN  --  22  --   --   --  20   CR  --  0.52  --   --   --  0.55   * 248* 273* 335*   < > 338*    < > = values in this interval not displayed.     INR  Recent Labs   Lab 22  1404   INR 1.17*      BNPNo lab results found in last 7 days.  VENOUS BLOOD GASES  Recent Labs   Lab 22  1404 "   PHV 7.38   PCO2V 48   PO2V 33   HCO3V 28   SHANELLE 2.4*         Additional labs and/or comments:     IMAGING      CT chest 1/26:  1. No acute infiltrates.  2. 4 mm nodule in the left upper lobe.    PFT & OTHER TESTING     Spirometry 2013:   FEV1/FVC ratio 47%  FEV1 60%  Moderate obstruction     ASSESSMENT / PLAN      Pulmonary diagnoses:  Abnl CT/CXR R91.8  Abnl PFTs R94.2  COPD J44.9  Hypoxemia R09.02  Randell depend history Z87.891  Resp fail acute J96.00    ASSESSMENT : 89F, former smoker, mod COPD by spirometry in 2013 with dyspnea at home. Mild desat but now normalized and reportedly didn't desat on walk today. No wheezing on exam and on appropriate triple inhaler therapy. CT chest with statistically benign 4mm nodule and emphysema wo infiltrate. Likely near baseline.      PLAN:   Suggest short course of pred for 5d  Continue triple inhaler therapy  On room air. Needs walk for desat. As long as sats ~ 88% or higher, no need for supplemental o2  Prn albuterol and nebs at home  outpt pulm follow-up with [facility] in [place] location. Call [phone]. To be seen in 4 weeks  Likely home today  No further suggestions, will sign off. Please call if needed      Mikael Christensen  [facility] / [facility]  Office: [phone]  Pager: [phone]

## 2022-01-27 ENCOUNTER — PATIENT OUTREACH (OUTPATIENT)
Dept: CARE COORDINATION | Facility: CLINIC | Age: 87
End: 2022-01-27
Payer: COMMERCIAL

## 2022-01-27 ENCOUNTER — TELEPHONE (OUTPATIENT)
Dept: INTERNAL MEDICINE | Facility: CLINIC | Age: 87
End: 2022-01-27
Payer: COMMERCIAL

## 2022-01-27 DIAGNOSIS — Z71.89 OTHER SPECIFIED COUNSELING: ICD-10-CM

## 2022-01-27 NOTE — PROGRESS NOTES
Clinic Care Coordination Contact  Community Health Worker Initial Outreach            Patient accepts CC: Yes. Patient scheduled for assessment with SW on 01- at 8:30am. Patient noted desire to discuss needing help in her Home. .    Anabela Florez  [phone]  Care

## 2022-01-27 NOTE — PROGRESS NOTES
Clinic Care Coordination Contact  [facility]: Post-Discharge Note  SITUATION                                                      Admission:    Admission Date: 01/25/22   Reason for Admission: Hypoxia  Discharge:   Discharge Date: 01/26/22  Discharge Diagnosis: Hypoxia    BACKGROUND                                                      Celeste Chacko is a fairly independent 89-year-old  female with longstanding history of COPD not oxygen-dependent, who also has history of chronic back pain from spinal stenosis on long-term oxycodone.  She also has diabetes, hypertension and hyperlipidemia.  The patient has been feeling short of breath with exertion starting on 01/16/2022.  She went to urgent care initially.  Her COVID test was negative.  She had normal oxygen saturations.  She was discharged with a prednisone tapering course as well as 7 days of antibiotics, which she finished.  She has not felt significantly better with these interventions.  She has also noted more recently on her pulse oximeter her oxygen saturations were dropping down into the mid 80s, especially when she is exerting herself, and she is feeling more short of breath.  Last night, her oxygen saturations were below 90% when she checked.  This caused her to be concerned and had trouble sleeping.  She feels okay at rest with respect to her breathing, but when she came into the Emergency Department she felt a little bit short of breath even at rest.  She denies any chest pain, fevers, chills, or sweats or any wheezing, cough, sputum production.  She has been vaccinated x 3 with COVID.  She has some ankle edema.  The patient came to [facility] for further assessment.     In the Emergency Department, the patient was seen by Dr. Willian Galvan.  She was afebrile.  Oxygen saturations were dipping down into the 87% range.  A chest x-ray showed no infiltrates.  Influenza and COVID were negative.  A 12-lead ECG showed normal sinus  "rhythm with evidence of Q-waves in the inferior leads.  No ischemic changes.  D-dimer is below reference range at 0.39.  INR is 1.17.  BNP is only 205.  Blood glucose is 338.  CBC is normal.  BMP is normal.  Troponin is below reference range at 8.  The patient received DuoNeb and methylprednisolone and is being admitted under observation status for possible oxygen qualification.       ASSESSMENT      Enrollment  Primary Care Care Coordination Status: Declined    Discharge Assessment  How are you doing now that you are home?: \" Still a little weak from being sick \"  How are your symptoms? (Red Flag symptoms escalate to triage hotline per guidelines): Improved  Do you feel your condition is stable enough to be safe at home until your provider visit?: Yes (can I call you back about the CC I dont have time to talk now. CTA gave her my number if interested)  Does the patient have their discharge instructions? : Yes  Does the patient have questions regarding their discharge instructions? : No  Were you started on any new medications or were there changes to any of your previous medications? : Yes  Does the patient have all of their medications?: Yes  Do you have questions regarding any of your medications? : No  Do you have all of your needed medical supplies or equipment (DME)?  (i.e. oxygen tank, CPAP, cane, etc.): Yes  Discharge follow-up appointment scheduled within 14 calendar days? : No  Is patient agreeable to assistance with scheduling? : No (I willl call today I need to go get my hair done and dont have time to talk)    Post-op (CHW CTA Only)  If the patient had a surgery or procedure, do they have any questions for a nurse?: No             PLAN                                                      Outpatient Plan:   Follow up with primary care provider, Emily Marie, within 7 days for hospital follow- up.  The following labs/tests are recommended: routine labs as needed.          Activity     Your activity upon " discharge: activity as tolerated          Diet     Follow this diet upon discharge: Regular diet          For any urgent concerns, please contact our 24 hour nurse triage line: [phone] ([phone])         Anabela Florez MA

## 2022-01-27 NOTE — TELEPHONE ENCOUNTER
I have no openings on my schedule so I would not be able to see her until the next available hospital follow-up.  I do not have any room to add anybody on to my schedule.

## 2022-01-27 NOTE — TELEPHONE ENCOUNTER
"Patient is calling to see if she can get in with Dr Marie for a hospital follow up. She is currently scheduled with Dr Penn on 1/31 but she \"knows Dr Marie will want to see her\"  "

## 2022-01-27 NOTE — TELEPHONE ENCOUNTER
Call to patient. Patient informed of Dr. Marie's below response. Patient verbalized understanding and will keep her appointment with Dr. Penn.     Zulema RPETTY RN   [facility]        Appointments in Next Year    Jan 31, 2022  8:30 AM  Phone Visit with [facility] MARCELO  [facility] ([facility] ) [phone]   Jan 31, 2022  1:30 PM  (Arrive by 1:15 PM)  ED/Hospital Follow Up with Diego Penn MD  [facility] ([facility] ) [phone]

## 2022-01-31 ENCOUNTER — PATIENT OUTREACH (OUTPATIENT)
Dept: NURSING | Facility: CLINIC | Age: 87
End: 2022-01-31
Payer: COMMERCIAL

## 2022-01-31 ENCOUNTER — OFFICE VISIT (OUTPATIENT)
Dept: INTERNAL MEDICINE | Facility: CLINIC | Age: 87
End: 2022-01-31
Payer: COMMERCIAL

## 2022-01-31 VITALS
HEIGHT: 63 IN | HEART RATE: 93 BPM | OXYGEN SATURATION: 94 % | WEIGHT: 133 LBS | DIASTOLIC BLOOD PRESSURE: 71 MMHG | TEMPERATURE: 98.2 F | RESPIRATION RATE: 20 BRPM | BODY MASS INDEX: 23.57 KG/M2 | SYSTOLIC BLOOD PRESSURE: 134 MMHG

## 2022-01-31 DIAGNOSIS — J44.1 COPD EXACERBATION (H): Primary | ICD-10-CM

## 2022-01-31 DIAGNOSIS — R91.8 PULMONARY NODULES: ICD-10-CM

## 2022-01-31 PROCEDURE — 99495 TRANSJ CARE MGMT MOD F2F 14D: CPT | Performed by: INTERNAL MEDICINE

## 2022-01-31 ASSESSMENT — ENCOUNTER SYMPTOMS
BACK PAIN: 1
ARTHRALGIAS: 1
NEUROLOGICAL NEGATIVE: 1
CONSTITUTIONAL NEGATIVE: 1
RESPIRATORY NEGATIVE: 1
GASTROINTESTINAL NEGATIVE: 1
CARDIOVASCULAR NEGATIVE: 1

## 2022-01-31 ASSESSMENT — ACTIVITIES OF DAILY LIVING (ADL): DEPENDENT_IADLS:: INDEPENDENT

## 2022-01-31 ASSESSMENT — MIFFLIN-ST. JEOR: SCORE: 992.41

## 2022-01-31 NOTE — PROGRESS NOTES
Assessment & Plan     COPD exacerbation (H)  At this time, it would appear that patient has made an almost full recovery from her recent COPD exacerbation.  Her oxygen saturation is noted to be 94% on room air.  Lung examination is unremarkable.  Patient will complete the prednisone taper as prescribed by the hospital service.  She does have an upcoming appointment with her pulmonologist.  In the meantime, patient will continue her Wixela, Incruse, and Spiriva inhalers as currently prescribed.  Side effects of prednisone use were discussed.  Patient had no other questions or concerns.    Lung nodule  CT scan from the hospital did show a 4 mm lung nodule noted in her left upper lung lobe.  Recommend a repeat CT scan in 12 months for monitoring.        30 minutes spent on the date of the encounter doing chart review, history and exam, documentation and further activities per the note       See Patient Instructions    Return in about 5 months (around 6/30/2022) for Follow up diabetes.    Diego Penn MD  [facility] SUSIE Alonso is a 89 year old who presents for the following health issues: hospital follow up.    Patient is a 89-year-old  female who presents to the clinic for a hospital follow-up visit.  She was admitted to the [facility] on January 25, 2022 for an acute exacerbation of her COPD.  Patient has had longstanding issues with COPD, and she has not been dependent on oxygen therapy.  Patient was admitted for observation for shortness of breath and some low oxygen saturation levels.  She was continued on her as needed albuterol treatments, Wixela inhaler, Incruse, and Spiriva.  CBC, BMP, and chest x-ray were unremarkable.  Patient did receive a dose of IV Solu-Medrol while being evaluated the emergency department.  She was started on a prednisone taper.  She was not placed on antibiotics.  She was ultimately deemed stable for discharge after 24 hours.   "Patient has been arranged to have a follow-up visit with a pulmonologist within the next few weeks for ongoing management of her COPD.  Since being discharged, patient reports that she has been feeling quite well.  She has had no issues with cough, shortness of breath, fever, or chills.  Patient has no other major concerns or issues today.       Post Discharge Outreach 1/27/2022   Admission Date 1/25/2022   Reason for Admission Hypoxia   Discharge Date 1/26/2022   Discharge Diagnosis Hypoxia   How are you doing now that you are home? \" Still a little weak from being sick \"   How are your symptoms? (Red Flag symptoms escalate to triage hotline per guidelines) Improved   Do you feel your condition is stable enough to be safe at home until your provider visit? Yes   Does the patient have their discharge instructions?  Yes   Does the patient have questions regarding their discharge instructions?  No   Were you started on any new medications or were there changes to any of your previous medications?  Yes   Does the patient have all of their medications? Yes   Do you have questions regarding any of your medications?  No   Do you have all of your needed medical supplies or equipment (DME)?  (i.e. oxygen tank, CPAP, cane, etc.) Yes   Discharge follow-up appointment scheduled within 14 calendar days?  No     Hospital Follow-up Visit:    Hospital/Nursing Home/IP Rehab Facility: [facility]  Date of Admission: 1/25/22  Date of Discharge: 1/26/22  Reason(s) for Admission: COPD, hypoxia      Was your hospitalization related to COVID-19? No   Problems taking medications regularly:  None  Medication changes since discharge: prednisone  Problems adhering to non-medication therapy:  None    Summary of hospitalization:  [facility] discharge summary reviewed  Diagnostic Tests/Treatments reviewed.  Follow up needed: none  Other Healthcare Providers Involved in Patient s Care:         " "None  Update since discharge: improved.       Post Discharge Medication Reconciliation: discharge medications reconciled, continue medications without change.  Plan of care communicated with patient                Review of Systems   Constitutional: Negative.    HENT: Negative.    Respiratory: Negative.    Cardiovascular: Negative.    Gastrointestinal: Negative.    Genitourinary: Negative.    Musculoskeletal: Positive for arthralgias and back pain.   Skin: Negative.    Neurological: Negative.             Objective    Blood pressure 134/71, pulse 93, temperature 98.2  F (36.8  C), resp. rate 20, height 1.6 m (5' 3\"), weight 60.3 kg (133 lb), SpO2 94 %, not currently breastfeeding.      Physical Exam  Vitals and nursing note reviewed.   Constitutional:       Appearance: Normal appearance.   HENT:      Head: Normocephalic and atraumatic.      Right Ear: Tympanic membrane, ear canal and external ear normal.      Left Ear: Tympanic membrane, ear canal and external ear normal.      Mouth/Throat:      Mouth: Mucous membranes are moist.      Pharynx: Oropharynx is clear.   Eyes:      Extraocular Movements: Extraocular movements intact.      Conjunctiva/sclera: Conjunctivae normal.      Pupils: Pupils are equal, round, and reactive to light.   Cardiovascular:      Rate and Rhythm: Normal rate and regular rhythm.      Pulses: Normal pulses.      Heart sounds: Normal heart sounds.   Pulmonary:      Effort: Pulmonary effort is normal.      Breath sounds: Normal breath sounds.   Abdominal:      General: Bowel sounds are normal.      Palpations: Abdomen is soft.   Skin:     General: Skin is warm.      Capillary Refill: Capillary refill takes less than 2 seconds.   Neurological:      Mental Status: She is alert and oriented to person, place, and time. Mental status is at baseline.                "

## 2022-01-31 NOTE — PROGRESS NOTES
Clinic Care Coordination Contact    Clinic Care Coordination Contact  OUTREACH    Referral Information:  Referral Source: IP Report  Primary Diagnosis: COPD    Brief outreach completed to Patient. Patient declines additional resources/services at this time. Full assessment not completed at this time, and partially completed via chart review.     Chief Complaint   Patient presents with     Clinic Care Coordination - [facility]        [facility] Utilization: Reviewed on this date.   Difficulty keeping appointments: No  Compliance Concerns: No  No-Show Concerns: No  No PCP office visit in Past Year: No  Utilization    Hospital Admissions  1             ED Visits  1             No Show Count (past year)  0                Current as of: 1/30/2022  8:27 PM            Clinical Concerns:  Current Medical Concerns: Patient was hospitalized at [facility] from 1.25.2022 to 1.26.2022 and was discharged to home. Patient has follow-up scheduled today in-clinic. Pt endorses that she has regained some strength since being home.  Patient Active Problem List   Diagnosis     Disorder of bone and cartilage     COPD, severe (H)     Spinal stenosis of lumbar region with neurogenic claudication     Type 2 diabetes mellitus with complication, without long-term current use of insulin (H)     HYPERLIPIDEMIA LDL GOAL <100     History of basal cell carcinoma     Controlled substance agreement signed     Chronic pain syndrome     Narcotic dependence (H)     Benign essential hypertension     Lumbar radiculopathy     CKD (chronic kidney disease) stage 1, GFR 90 ml/min or greater     Varicose veins of left lower extremity, unspecified whether complicated     Edema, unspecified type     Chronic, continuous use of opioids     Hypoxia     Chronic obstructive pulmonary disease with acute exacerbation (H)       Current Behavioral Concerns: No noted concerns at this time.     Education Provided to patient: Role of MARCELO NEVAREZ and  reason for outreach.       Health Maintenance Reviewed: Due/Overdue   Clinical Pathway: None    Medication Management:  To be reviewed in clinic today with PCP.      Functional Status:  Dependent ADLs: Independent  Dependent IADLs: Independent  Bed or wheelchair confined: No    Living Situation:  Type of residence: Apartment    Lifestyle & Psychosocial Needs:    Social Determinants of Health     Tobacco Use: Medium Risk     Smoking Tobacco Use: Former Smoker     Smokeless Tobacco Use: Never Used   Alcohol Use: Not on file   Financial Resource Strain: Not on file   Food Insecurity: Not on file   Transportation Needs: Not on file   Physical Activity: Not on file   Stress: Not on file   Social Connections: Not on file   Intimate Partner Violence: Not on file   Depression: Not at risk     PHQ-2 Score: 0   Housing Stability: Not on file     Diet: Regular  Inadequate nutrition : No  Tube Feeding: No  Significant changes in sleep pattern : No     Chemical Dependency Status: No Current Concerns  Informal Support system: Children     Patient states that she has regained strength since being at home, and no longer requires resources for in-home support and services. Pt plans to follow-up in-clinic this afternoon for her appointment.      Resources and Interventions:  Current Resources:      Community Resources: None     Equipment Currently Used at Home: walker, standard,walker, rolling  Employment Status: retired     Advance Care Plan/Directive  Advanced Care Plans/Directives on file: Yes  Type Advanced Care Plans/Directives: Advanced Directive - On File  Advanced Care Plan/Directive Status: Not Applicable    Referrals Placed: None   2  Patient/Caregiver understanding: Pt reports understanding and denies any additional questions or concerns at this times. MARCELO CC engaged in AIDET communication during encounter.         Future Appointments              Today Diego Penn MD [place], [place]           Plan: Patient to follow-up with clinic provider this afternoon. No further outreaches will be made at this time unless a new referral is made or a change in the patient's status occurs. Patient was provided with this writer's contact information and encouraged to call with any questions or concerns.    THERESA Felipe  Clinic Care Coordinator  Ph. [phone]  [email or web address]

## 2022-02-04 DIAGNOSIS — M48.062 SPINAL STENOSIS OF LUMBAR REGION WITH NEUROGENIC CLAUDICATION: ICD-10-CM

## 2022-02-04 RX ORDER — OXYCODONE HYDROCHLORIDE 5 MG/1
5 TABLET ORAL EVERY 4 HOURS PRN
Qty: 150 TABLET | Refills: 0 | Status: SHIPPED | OUTPATIENT
Start: 2022-02-04 | End: 2022-04-05

## 2022-02-07 ENCOUNTER — NURSE TRIAGE (OUTPATIENT)
Dept: INTERNAL MEDICINE | Facility: CLINIC | Age: 87
End: 2022-02-07

## 2022-02-07 ENCOUNTER — OFFICE VISIT (OUTPATIENT)
Dept: URGENT CARE | Facility: URGENT CARE | Age: 87
End: 2022-02-07
Payer: COMMERCIAL

## 2022-02-07 ENCOUNTER — TELEPHONE (OUTPATIENT)
Dept: INTERNAL MEDICINE | Facility: CLINIC | Age: 87
End: 2022-02-07
Payer: COMMERCIAL

## 2022-02-07 VITALS
WEIGHT: 137.8 LBS | DIASTOLIC BLOOD PRESSURE: 69 MMHG | BODY MASS INDEX: 24.41 KG/M2 | SYSTOLIC BLOOD PRESSURE: 142 MMHG | OXYGEN SATURATION: 92 % | TEMPERATURE: 98.9 F | HEART RATE: 103 BPM

## 2022-02-07 DIAGNOSIS — R60.0 BILATERAL LEG EDEMA: Primary | ICD-10-CM

## 2022-02-07 DIAGNOSIS — R06.02 SOB (SHORTNESS OF BREATH): ICD-10-CM

## 2022-02-07 DIAGNOSIS — J44.1 COPD EXACERBATION (H): Primary | ICD-10-CM

## 2022-02-07 PROCEDURE — 99214 OFFICE O/P EST MOD 30 MIN: CPT | Performed by: FAMILY MEDICINE

## 2022-02-07 PROCEDURE — U0003 INFECTIOUS AGENT DETECTION BY NUCLEIC ACID (DNA OR RNA); SEVERE ACUTE RESPIRATORY SYNDROME CORONAVIRUS 2 (SARS-COV-2) (CORONAVIRUS DISEASE [COVID-19]), AMPLIFIED PROBE TECHNIQUE, MAKING USE OF HIGH THROUGHPUT TECHNOLOGIES AS DESCRIBED BY CMS-2020-01-R: HCPCS | Performed by: FAMILY MEDICINE

## 2022-02-07 RX ORDER — PREDNISONE 20 MG/1
TABLET ORAL
Qty: 20 TABLET | Refills: 0 | Status: SHIPPED | OUTPATIENT
Start: 2022-02-07 | End: 2022-02-21

## 2022-02-07 RX ORDER — FUROSEMIDE 20 MG
20 TABLET ORAL DAILY
Qty: 7 TABLET | Refills: 0 | Status: SHIPPED | OUTPATIENT
Start: 2022-02-07 | End: 2022-02-21

## 2022-02-07 NOTE — TELEPHONE ENCOUNTER
I agree with the recommendation that she should proceed to the emergency department for further evaluation.

## 2022-02-07 NOTE — TELEPHONE ENCOUNTER
Attempted to call, no answer. Message states call cannot be completed at this time. Please call back.     Kristen Novak RN  [facility]

## 2022-02-07 NOTE — TELEPHONE ENCOUNTER
"S-(situation): Shortness of breath, difficulty keeping O2 sat at 90% or higher.  (Also see today's encounter about bilateral lower extremity edema).    B-(background): COPD, was seen in ER 1/25/22 and given prednisone taper for COPD exacerbation.    A-(assessment): Took Prednisone 5 mg today and feels she is having more difficulty breathing today than yesterday.  She feels she needs another prednisone burst. She denies fever, cough, wheezing, chest pain, chest tightness or dizziness.  Just feels her breathing is not as good as it should be.  Denies recent URI, long distance travel by air or automobile.  O2 sat today was initially 86% but then went up to 90% after resting a few minutes.  States she feels slight shortness of breath even at rest. She sounds calm and in no distress during conversation with this RN.  Her usual O2 sat is 93%, she has been using inhalers as ordered.  She does not feel she needs to go to ER again as it isn't as bad this time.  Last time she called 911 on her own.  She would just like another prednisone burst.    R-(recommendations): Will route message to provider as high priority.      Reason for Disposition    MODERATE difficulty breathing (e.g., speaks in phrases, SOB even at rest, pulse 100-120) of new onset or worse than normal    Any history of prior \"blood clot\" in leg or lungs     Years ago after knee replacement    Additional Information    Negative: Breathing stopped and hasn't returned    Negative: Choking on something    Negative: SEVERE difficulty breathing (e.g., struggling for each breath, speaks in single words, pulse > 120)    Negative: Bluish (or gray) lips or face    Negative: Difficult to awaken or acting confused (e.g., disoriented, slurred speech)    Negative: Passed out (i.e., fainted, collapsed and was not responding)    Negative: Wheezing started suddenly after medicine, an allergic food, or bee sting    Negative: Stridor    Negative: Slow, shallow and weak " "breathing    Negative: Sounds like a life-threatening emergency to the triager    Negative: Chest pain    Negative: Wheezing (high pitched whistling sound) and previous asthma attacks or use of asthma medicines    Negative: Difficulty breathing and only present when coughing    Negative: Difficulty breathing and only from stuffy or runny nose    Negative: Difficulty breathing and within 14 days of COVID-19 Exposure    Negative: Wheezing can be heard across the room    Negative: Drooling or spitting out saliva (because can't swallow)    Negative: Illness requiring prolonged bedrest in past month (e.g., immobilization, long hospital stay)    Negative: Hip or leg fracture (broken bone) in past month (or had cast on leg or ankle in past month)    Negative: Major surgery in the past month    Negative: Long-distance travel in past month (e.g., car, bus, train, plane; with trip lasting 6 or more hours)    Negative: Extra heart beats OR irregular heart beating   (i.e., \"palpitations\")    Negative: Fever > 103 F (39.4 C)    Negative: Fever > 101 F (38.3 C) and over 60 years of age    Negative: Fever > 100.0 F (37.8 C) and bedridden (e.g., nursing home patient, stroke, chronic illness, recovering from surgery)    Negative: Fever > 100.0 F (37.8 C) and diabetes mellitus or weak immune system (e.g., HIV positive, cancer chemo, splenectomy, organ transplant, chronic steroids)    Negative: Periods where breathing stops and then resumes normally and bedridden (e.g., nursing home patient, CVA)    Negative: Pregnant or postpartum (from 0 to 6 weeks after delivery)    Negative: Patient sounds very sick or weak to the triager    Answer Assessment - Initial Assessment Questions  1. RESPIRATORY STATUS: \"Describe your breathing?\" (e.g., wheezing, shortness of breath, unable to speak, severe coughing)       Short of breath without wheezing, cough or chest tightness  2. ONSET: \"When did this breathing problem begin?\"       Approximately " "1/20/22  3. PATTERN \"Does the difficult breathing come and go, or has it been constant since it started?\"       Constant today  4. SEVERITY: \"How bad is your breathing?\" (e.g., mild, moderate, severe)     - MILD: No SOB at rest, mild SOB with walking, speaks normally in sentences, can lay down, no retractions, pulse < 100.     - MODERATE: SOB at rest, SOB with minimal exertion and prefers to sit, cannot lie down flat, speaks in phrases, mild retractions, audible wheezing, pulse 100-120.     - SEVERE: Very SOB at rest, speaks in single words, struggling to breathe, sitting hunched forward, retractions, pulse > 120       Moderate  5. RECURRENT SYMPTOM: \"Have you had difficulty breathing before?\" If so, ask: \"When was the last time?\" and \"What happened that time?\"       Yes, history of COPD  6. CARDIAC HISTORY: \"Do you have any history of heart disease?\" (e.g., heart attack, angina, bypass surgery, angioplasty)       None  7. LUNG HISTORY: \"Do you have any history of lung disease?\"  (e.g., pulmonary embolus, asthma, emphysema)      none  8. CAUSE: \"What do you think is causing the breathing problem?\"       COPD  9. OTHER SYMPTOMS: \"Do you have any other symptoms? (e.g., dizziness, runny nose, cough, chest pain, fever)      Denies cough, fever, chest pain or dizziness  10. PREGNANCY: \"Is there any chance you are pregnant?\" \"When was your last menstrual period?\"        NA  11. TRAVEL: \"Have you traveled out of the country in the last month?\" (e.g., travel history, exposures)        No    Protocols used: BREATHING DIFFICULTY-A-OH      "

## 2022-02-07 NOTE — TELEPHONE ENCOUNTER
"Patient is refusing ER, states she cannot go because she doesn't have any shoes that fit.  Explained that she could go in slippers or boots, no shoes are necessary.  Patient states \"I don't know why I am getting such poor care, Dr. Marie would never treat me this way\".  Advised patient that with the breathing difficulty and the edema  could be related and she really needs to be evaluated.  Patient again insists that she cannot go to ER (or UC) and that she wants a call back from any provider.  States she wants to speak with a provider before the end of the day.  NEERU Fong R.N.    "

## 2022-02-07 NOTE — TELEPHONE ENCOUNTER
She could proceed with a 7-day trial of furosemide 20 mg daily to see if this would help get her edema under control.

## 2022-02-07 NOTE — TELEPHONE ENCOUNTER
Patient calling with edema of both ankles to the point she can not put on her compression socks or shoes. Patient seeking advise. Ok to call and lm [phone]

## 2022-02-08 NOTE — PROGRESS NOTES
"SUBJECTIVE: Celeste Chacko is a 89 year old female presenting with a chief complaint of cough and SOB.  Onset of symptoms was 1 day(s) ago.  Course of illness is better after being admitted to Hospital and treated for COPD and now worsening again, feels the same after decreasing her Prednisone.    Current and Associated symptoms: some leg swelling but not wearing her Support stockings  Treatment measures tried include Prednisone.  Predisposing factors include HX of COPD.    Past Medical History:   Diagnosis Date     Basal cell carcinoma      Chronic pain      Complete rupture of rotator cuff      COPD (chronic obstructive pulmonary disease) (H)      History of blood transfusion      Mixed hyperlipidemia 2000     Osteoarthritis      Personal history of other malignant neoplasm of skin      Spinal stenosis of lumbar region with neurogenic claudication      T2DM (type 2 diabetes mellitus) (H)      Allergies   Allergen Reactions     Sulfamethoxazole Anaphylaxis and Hives     Social History     Tobacco Use     Smoking status: Former Smoker     Packs/day: 0.50     Years: 54.00     Pack years: 27.00     Types: Cigarettes     Quit date: 2000     Years since quittin.7     Smokeless tobacco: Never Used     Tobacco comment: using nicotine gum   Substance Use Topics     Alcohol use: Yes     Alcohol/week: 0.8 standard drinks     Types: 1 Glasses of wine per week     Comment: \"A glass of wine once a week.\"       ROS:  SKIN: no rash  GI: no vomiting    OBJECTIVE:  BP (!) 142/69   Pulse 103   Temp 98.9  F (37.2  C)   Wt 62.5 kg (137 lb 12.8 oz)   SpO2 92%   BMI 24.41 kg/m  GENERAL APPEARANCE: healthy, alert and no distress  EYES: EOMI,  PERRL, conjunctiva clear  HENT: ear canals and TM's normal.  Nose and mouth without ulcers, erythema or lesions  RESP: lungs clear to auscultation - no rales, rhonchi or wheezes  CV: regular rates and rhythm, normal S1 S2, no murmur noted  SKIN: no suspicious lesions or " rashes  Lower ext slight swelling kirsten      ICD-10-CM    1. COPD exacerbation (H)  J44.1 predniSONE (DELTASONE) 20 MG tablet   2. SOB (shortness of breath)  R06.02 Asymptomatic COVID-19 Virus (Coronavirus) by PCR Nasopharyngeal     predniSONE (DELTASONE) 20 MG tablet     Needs to start wearing the support stockings  Cont inhalers  Fluids/Rest, f/u if worse/not any better

## 2022-02-09 ENCOUNTER — OFFICE VISIT (OUTPATIENT)
Dept: INTERNAL MEDICINE | Facility: CLINIC | Age: 87
End: 2022-02-09
Payer: COMMERCIAL

## 2022-02-09 VITALS
HEART RATE: 105 BPM | TEMPERATURE: 98.2 F | RESPIRATION RATE: 16 BRPM | HEIGHT: 63 IN | SYSTOLIC BLOOD PRESSURE: 144 MMHG | DIASTOLIC BLOOD PRESSURE: 62 MMHG | WEIGHT: 136.7 LBS | OXYGEN SATURATION: 91 % | BODY MASS INDEX: 24.22 KG/M2

## 2022-02-09 DIAGNOSIS — R60.9 EDEMA, UNSPECIFIED TYPE: ICD-10-CM

## 2022-02-09 DIAGNOSIS — J44.9 COPD, SEVERE (H): Primary | ICD-10-CM

## 2022-02-09 LAB — SARS-COV-2 RNA RESP QL NAA+PROBE: NOT DETECTED

## 2022-02-09 PROCEDURE — 99213 OFFICE O/P EST LOW 20 MIN: CPT | Performed by: NURSE PRACTITIONER

## 2022-02-09 ASSESSMENT — MIFFLIN-ST. JEOR: SCORE: 1009.2

## 2022-02-09 NOTE — PROGRESS NOTES
"  Assessment & Plan     COPD, severe (H)      Edema, unspecified type        Complete current prednisone taper and f/u pulmonology as planned  Schedule PCP f/u JUSTIN Landry NP  [facility] SUSIE Alonso is a 89 year old who presents for the following health issues     HPI     Post Discharge Outreach 1/27/2022   Admission Date 1/25/2022   Reason for Admission Hypoxia   Discharge Date 1/26/2022   Discharge Diagnosis Hypoxia   How are you doing now that you are home? \" Still a little weak from being sick \"   How are your symptoms? (Red Flag symptoms escalate to triage hotline per guidelines) Improved   Do you feel your condition is stable enough to be safe at home until your provider visit? Yes   Does the patient have their discharge instructions?  Yes   Does the patient have questions regarding their discharge instructions?  No   Were you started on any new medications or were there changes to any of your previous medications?  Yes   Does the patient have all of their medications? Yes   Do you have questions regarding any of your medications?  No   Do you have all of your needed medical supplies or equipment (DME)?  (i.e. oxygen tank, CPAP, cane, etc.) Yes   Discharge follow-up appointment scheduled within 14 calendar days?  No     Hospital Follow-up Visit:    Hospital/Nursing Home/IP Rehab Facility: [facility]  Date of Admission: 1/25/22  Date of Discharge: 01/26/22,  2/7/22  Reason(s) for Admission: COPD      Was your hospitalization related to COVID-19? No   Problems taking medications regularly:  None  Medication changes since discharge: None  Problems adhering to non-medication therapy:  None    Summary of hospitalization:  [facility] discharge summary reviewed  Diagnostic Tests/Treatments reviewed.  Follow up needed: needs PCP f/u, has pulmonology end of month  Other Healthcare Providers Involved in " "Patient s Care:         None  Update since discharge: stable.       Post Discharge Medication Reconciliation: discharge medications reconciled, continue medications without change.  Plan of care communicated with patient                Review of Systems         Objective    BP (!) 144/62   Pulse 105   Temp 98.2  F (36.8  C) (Tympanic)   Resp 16   Ht 1.6 m (5' 3\")   Wt 62 kg (136 lb 11.2 oz)   SpO2 (!) 89%   BMI 24.22 kg/m    Body mass index is 24.22 kg/m .  Physical Exam   GENERAL: alert, no distress, frail and elderly  RESP: lungs clear to auscultation - no rales, rhonchi or wheezes  CV: regular rates and rhythm, no peripheral edema and 1+ bilateral lower extremity pitting edema to ankles                  "

## 2022-02-09 NOTE — NURSING NOTE
"[facility]gen U/C COPD- SOB  Vital signs:  Temp: 98.2  F (36.8  C) Temp src: Tympanic BP: (!) 144/62 Pulse: 105   Resp: 16 SpO2: (!) 89 %     Height: 160 cm (5' 3\") Weight: 62 kg (136 lb 11.2 oz)  Estimated body mass index is 24.22 kg/m  as calculated from the following:    Height as of this encounter: 1.6 m (5' 3\").    Weight as of this encounter: 62 kg (136 lb 11.2 oz).        "

## 2022-02-15 ENCOUNTER — OFFICE VISIT (OUTPATIENT)
Dept: URGENT CARE | Facility: URGENT CARE | Age: 87
End: 2022-02-15
Payer: COMMERCIAL

## 2022-02-15 VITALS
WEIGHT: 136 LBS | BODY MASS INDEX: 24.09 KG/M2 | RESPIRATION RATE: 16 BRPM | DIASTOLIC BLOOD PRESSURE: 65 MMHG | OXYGEN SATURATION: 99 % | TEMPERATURE: 97.9 F | HEART RATE: 92 BPM | SYSTOLIC BLOOD PRESSURE: 127 MMHG

## 2022-02-15 DIAGNOSIS — J44.1 COPD EXACERBATION (H): ICD-10-CM

## 2022-02-15 DIAGNOSIS — R06.02 SOB (SHORTNESS OF BREATH): Primary | ICD-10-CM

## 2022-02-15 PROCEDURE — 99214 OFFICE O/P EST MOD 30 MIN: CPT | Performed by: PHYSICIAN ASSISTANT

## 2022-02-15 RX ORDER — PREDNISONE 20 MG/1
TABLET ORAL
Qty: 11 TABLET | Refills: 0 | Status: ON HOLD | OUTPATIENT
Start: 2022-02-15 | End: 2022-03-03

## 2022-02-16 NOTE — PROGRESS NOTES
Assessment & Plan     SOB (shortness of breath)  COPD exacerbation  Follow up with pulmonology in 2 days  Prednisone, tapering dose given  Patient declines chest xray at this time  - predniSONE (DELTASONE) 20 MG tablet; 1 tab po bid for 3 days, then 1 tab po every day for 3 days, then 1/2 tab po every day for 4 days    COPD exacerbation (H)  Prednisone taper  No noted weight gain  Follow up with pulmonology in 2 days  - predniSONE (DELTASONE) 20 MG tablet; 1 tab po bid for 3 days, then 1 tab po every day for 3 days, then 1/2 tab po every day for 4 days      30 minutes spent on the date of the encounter doing chart review, history and exam, documentation and further activities per the note     Follow up pulmonology    No follow-ups on file.    Ritchie Laureano PA-C  [facility] is a 89 year old who presents for the following health issues     HPI     SOB  COPD exacerbation    Review of Systems   Constitutional, HEENT, cardiovascular, pulmonary, gi and gu systems are negative, except as otherwise noted.      Objective    /65   Pulse 92   Temp 97.9  F (36.6  C)   Resp 16   Wt 61.7 kg (136 lb)   SpO2 99%   BMI 24.09 kg/m    Body mass index is 24.09 kg/m .  Physical Exam   GENERAL: healthy, alert and no distress  NECK: no adenopathy, no asymmetry, masses, or scars and thyroid normal to palpation  RESP: lungs clear to auscultation - no rales, rhonchi or wheezes  CV: regular rate and rhythm, normal S1 S2, no S3 or S4, no murmur, click or rub, no peripheral edema and peripheral pulses strong  ABDOMEN: soft, nontender, no hepatosplenomegaly, no masses and bowel sounds normal  MS: no gross musculoskeletal defects noted, no edema  SKIN: no suspicious lesions or rashes    Chest xray Declined by patient .

## 2022-02-19 ENCOUNTER — ANCILLARY PROCEDURE (OUTPATIENT)
Dept: GENERAL RADIOLOGY | Facility: CLINIC | Age: 87
End: 2022-02-19
Attending: NURSE PRACTITIONER
Payer: COMMERCIAL

## 2022-02-19 ENCOUNTER — OFFICE VISIT (OUTPATIENT)
Dept: URGENT CARE | Facility: URGENT CARE | Age: 87
End: 2022-02-19
Payer: COMMERCIAL

## 2022-02-19 VITALS
SYSTOLIC BLOOD PRESSURE: 151 MMHG | RESPIRATION RATE: 15 BRPM | TEMPERATURE: 98.1 F | BODY MASS INDEX: 23.74 KG/M2 | WEIGHT: 134 LBS | OXYGEN SATURATION: 93 % | DIASTOLIC BLOOD PRESSURE: 78 MMHG | HEART RATE: 98 BPM

## 2022-02-19 DIAGNOSIS — I83.92 VARICOSE VEINS OF LEFT LOWER EXTREMITY, UNSPECIFIED WHETHER COMPLICATED: ICD-10-CM

## 2022-02-19 DIAGNOSIS — J44.1 CHRONIC OBSTRUCTIVE PULMONARY DISEASE WITH ACUTE EXACERBATION (H): Primary | ICD-10-CM

## 2022-02-19 DIAGNOSIS — E11.8 TYPE 2 DIABETES MELLITUS WITH COMPLICATION, WITHOUT LONG-TERM CURRENT USE OF INSULIN (H): ICD-10-CM

## 2022-02-19 DIAGNOSIS — R06.02 SHORTNESS OF BREATH: ICD-10-CM

## 2022-02-19 DIAGNOSIS — I10 BENIGN ESSENTIAL HYPERTENSION: ICD-10-CM

## 2022-02-19 DIAGNOSIS — R73.9 HYPERGLYCEMIA: ICD-10-CM

## 2022-02-19 LAB
ALBUMIN SERPL-MCNC: 3.3 G/DL (ref 3.4–5)
ALP SERPL-CCNC: 73 U/L (ref 40–150)
ALT SERPL W P-5'-P-CCNC: 39 U/L (ref 0–50)
ANION GAP SERPL CALCULATED.3IONS-SCNC: 7 MMOL/L (ref 3–14)
AST SERPL W P-5'-P-CCNC: 12 U/L (ref 0–45)
BILIRUB SERPL-MCNC: 0.4 MG/DL (ref 0.2–1.3)
BUN SERPL-MCNC: 26 MG/DL (ref 7–30)
CALCIUM SERPL-MCNC: 9.3 MG/DL (ref 8.5–10.1)
CHLORIDE BLD-SCNC: 101 MMOL/L (ref 94–109)
CO2 SERPL-SCNC: 28 MMOL/L (ref 20–32)
CREAT SERPL-MCNC: 0.62 MG/DL (ref 0.52–1.04)
D DIMER PPP FEU-MCNC: 0.39 UG/ML FEU (ref 0–0.5)
GFR SERPL CREATININE-BSD FRML MDRD: 84 ML/MIN/1.73M2
GLUCOSE BLD-MCNC: 396 MG/DL (ref 70–99)
POTASSIUM BLD-SCNC: 4.8 MMOL/L (ref 3.4–5.3)
PROT SERPL-MCNC: 6.8 G/DL (ref 6.8–8.8)
SODIUM SERPL-SCNC: 136 MMOL/L (ref 133–144)
TROPONIN I SERPL HS-MCNC: 11 NG/L

## 2022-02-19 PROCEDURE — 84484 ASSAY OF TROPONIN QUANT: CPT | Performed by: NURSE PRACTITIONER

## 2022-02-19 PROCEDURE — 94640 AIRWAY INHALATION TREATMENT: CPT | Performed by: NURSE PRACTITIONER

## 2022-02-19 PROCEDURE — 85379 FIBRIN DEGRADATION QUANT: CPT | Performed by: NURSE PRACTITIONER

## 2022-02-19 PROCEDURE — 85025 COMPLETE CBC W/AUTO DIFF WBC: CPT | Performed by: NURSE PRACTITIONER

## 2022-02-19 PROCEDURE — 36415 COLL VENOUS BLD VENIPUNCTURE: CPT | Performed by: NURSE PRACTITIONER

## 2022-02-19 PROCEDURE — 99215 OFFICE O/P EST HI 40 MIN: CPT | Mod: 25 | Performed by: NURSE PRACTITIONER

## 2022-02-19 PROCEDURE — 80053 COMPREHEN METABOLIC PANEL: CPT | Performed by: NURSE PRACTITIONER

## 2022-02-19 PROCEDURE — 71046 X-RAY EXAM CHEST 2 VIEWS: CPT | Performed by: RADIOLOGY

## 2022-02-19 RX ORDER — ALBUTEROL SULFATE 1.25 MG/3ML
2.5 SOLUTION RESPIRATORY (INHALATION) EVERY 4 HOURS PRN
Qty: 360 ML | Refills: 0 | Status: SHIPPED | OUTPATIENT
Start: 2022-02-19 | End: 2022-03-21

## 2022-02-19 RX ORDER — IPRATROPIUM BROMIDE AND ALBUTEROL SULFATE 2.5; .5 MG/3ML; MG/3ML
3 SOLUTION RESPIRATORY (INHALATION) ONCE
Status: COMPLETED | OUTPATIENT
Start: 2022-02-19 | End: 2022-02-19

## 2022-02-19 RX ORDER — IPRATROPIUM BROMIDE AND ALBUTEROL SULFATE 2.5; .5 MG/3ML; MG/3ML
1 SOLUTION RESPIRATORY (INHALATION) EVERY 4 HOURS PRN
Qty: 360 ML | Refills: 0 | Status: SHIPPED | OUTPATIENT
Start: 2022-02-19 | End: 2022-03-21

## 2022-02-19 RX ADMIN — IPRATROPIUM BROMIDE AND ALBUTEROL SULFATE 3 ML: 2.5; .5 SOLUTION RESPIRATORY (INHALATION) at 16:12

## 2022-02-19 NOTE — PROGRESS NOTES
Chief Complaint   Patient presents with     Breathing Problem     89yo F presents with the following complaint SOB  onset Jan 29th  prescribed prednisone needs refill         ICD-10-CM    1. Chronic obstructive pulmonary disease with acute exacerbation (H)  J44.1 ipratropium - albuterol 0.5 mg/2.5 mg/3 mL (DUONEB) 0.5-2.5 (3) MG/3ML neb solution     albuterol (ACCUNEB) 1.25 MG/3ML neb solution   2. Type 2 diabetes mellitus with complication, without long-term current use of insulin (H)  E11.8 Comprehensive metabolic panel (BMP + Alb, Alk Phos, ALT, AST, Total. Bili, TP)     Comprehensive metabolic panel (BMP + Alb, Alk Phos, ALT, AST, Total. Bili, TP)     Glucose   3. Benign essential hypertension  I10 Comprehensive metabolic panel (BMP + Alb, Alk Phos, ALT, AST, Total. Bili, TP)     Comprehensive metabolic panel (BMP + Alb, Alk Phos, ALT, AST, Total. Bili, TP)   4. Varicose veins of left lower extremity, unspecified whether complicated  I83.92    5. Shortness of breath  R06.02 XR Chest 2 Views     CBC with platelets and differential     D dimer, quantitative     Troponin I     ipratropium - albuterol 0.5 mg/2.5 mg/3 mL (DUONEB) neb solution 3 mL     CBC with platelets and differential     D dimer, quantitative     Troponin I   6. Hyperglycemia  R73.9 Glucose   Patient's shortness of breath was resolved and her lungs were clear after taking DuoNeb.  She will take duo nebs at home up to 4 times a day as needed for shortness of breath.  She will follow-up with pulmonology this coming Thursday as previously scheduled.     We discussed her elevated blood sugar and she reports not eating for at least 3 hours prior to the blood sugar being drawn.  She has been taking prednisone however and this could be elevating her blood sugar.  She is going to stop prednisone and recheck her glucose on Monday.  She does not have the supplies at home to check her blood sugars because her A1c's have been good and her provider has not  required her to do so.  Last A1c on 1/25/2022 was 7.1.  We discussed warning signs of dehydration and hyperglycemia and she expressed understanding.    56 minutes spent on the date of the encounter doing chart review, history and exam, documentation and further activities per the note    Medical Decision Making    Differential diagnosis includes but is not limited to: COPD exacerbation, bronchitis, asthma, pulmonary embolus, acute coronary syndrome, abdominal or thoracic aneurysm, aortic dissection, allergies, reactive airway disease, lung cancer, pericarditis, cardiac tamponade, lung contusion, rib fracture, foreign body aspiration, tracheitis, supraglottitis.    CXR - Reviewed and interpreted by me no acute abnormalities, no infiltrates or effusions.    Results for orders placed or performed in visit on 02/19/22 (from the past 24 hour(s))   CBC with platelets and differential    Narrative    The following orders were created for panel order CBC with platelets and differential.  Procedure                               Abnormality         Status                     ---------                               -----------         ------                     CBC with platelets and d...[[id number]]                      In process                   Please view results for these tests on the individual orders.   Comprehensive metabolic panel (BMP + Alb, Alk Phos, ALT, AST, Total. Bili, TP)   Result Value Ref Range    Sodium 136 133 - 144 mmol/L    Potassium 4.8 3.4 - 5.3 mmol/L    Chloride 101 94 - 109 mmol/L    Carbon Dioxide (CO2) 28 20 - 32 mmol/L    Anion Gap 7 3 - 14 mmol/L    Urea Nitrogen 26 7 - 30 mg/dL    Creatinine 0.62 0.52 - 1.04 mg/dL    Calcium 9.3 8.5 - 10.1 mg/dL    Glucose 396 (H) 70 - 99 mg/dL    Alkaline Phosphatase 73 40 - 150 U/L    AST 12 0 - 45 U/L    ALT 39 0 - 50 U/L    Protein Total 6.8 6.8 - 8.8 g/dL    Albumin 3.3 (L) 3.4 - 5.0 g/dL    Bilirubin Total 0.4 0.2 - 1.3 mg/dL    GFR Estimate 84 >60  mL/min/1.73m2   D dimer, quantitative   Result Value Ref Range    D-Dimer Quantitative 0.39 0.00 - 0.50 ug/mL FEU    Narrative    This D-dimer assay is intended for use in conjunction with a clinical pretest probability assessment model to exclude pulmonary embolism (PE) and deep venous thrombosis (DVT) in outpatients suspected of PE or DVT. The cut-off value is 0.50 ug/mL FEU.   Troponin I   Result Value Ref Range    Troponin I High Sensitivity 11 <54 ng/L     Her initial CBC showed no abnormalities but the differential had to be sent for a manual visualization due to seeing some potential abnormalities.    Subjective     Celeste Chacko is an 89 year old female who presents to clinic today for increasing shortness of breath.  She was hospitalized in the end of January for hypoxia and COPD with exacerbation.  She has had multiple appointments since then most recently at our urgent care 4 days ago and 8 days prior to that.  Both times she was diagnosed with COPD exacerbation.  She was given another round of steroids on February 7 and when she was seen on the 15th she told the provider she was seen pulmonology in 2 days.  Provider encouraged that to follow-up.  She presents today requesting more steroids because her breathing is worsening.      ROS: 10 point ROS neg other than the symptoms noted above in the HPI.       Objective    BP (!) 151/78   Pulse 98   Temp 98.1  F (36.7  C)   Resp 15   Wt 60.8 kg (134 lb)   SpO2 93%   BMI 23.74 kg/m    Nurses notes and VS have been reviewed.    Physical Exam         GENERAL APPEARANCE: alert, mild distress and pale     EYES: PERRL, EOMI, sclera non-icteric     HENT: oral exam benign, mucus membranes intact, without ulcers or lesions     NECK: no adenopathy or asymmetry, thyroid normal to palpation     RESP: Wheezes and decreased lung sounds throughout, after nebulizer treatment lungs were clear.     CV: regular rates and rhythm, no murmurs, rubs, or gallop     MS:  extremities normal- no gross deformities noted; normal muscle tone.     SKIN: no suspicious lesions or rashes     NEURO: Normal strength and tone, mentation intact and speech normal     PSYCH: normal thought process; no significant mood disturbance    Patient Instructions   Take the nebulizers up to 4 times a day as needed for your shortness of breath.    Discontinue steroids and recheck blood sugar on Monday.    Please call the [facility] scheduling line Monday morning and tell them you need to schedule a lab only appointment at which ever clinic is most convenient to you.    I will call you to follow-up on the results of that on Monday.            ALICIA Price, CNP  [facility] Provider    The use of Dragon/Splashup dictation services may have been used to construct the content in this note; any grammatical or spelling errors are non-intentional. Please contact the author of this note directly if you are in need of any clarification.

## 2022-02-20 LAB
BASOPHILS # BLD AUTO: 0 10E3/UL (ref 0–0.2)
BASOPHILS NFR BLD AUTO: 0 %
EOSINOPHIL # BLD AUTO: 0 10E3/UL (ref 0–0.7)
EOSINOPHIL NFR BLD AUTO: 0 %
ERYTHROCYTE [DISTWIDTH] IN BLOOD BY AUTOMATED COUNT: 14.4 % (ref 10–15)
HCT VFR BLD AUTO: 43.5 % (ref 35–47)
HGB BLD-MCNC: 13.5 G/DL (ref 11.7–15.7)
IMM GRANULOCYTES # BLD: 0.2 10E3/UL
IMM GRANULOCYTES NFR BLD: 2 %
LYMPHOCYTES # BLD AUTO: 0.9 10E3/UL (ref 0.8–5.3)
LYMPHOCYTES NFR BLD AUTO: 10 %
MCH RBC QN AUTO: 26 PG (ref 26.5–33)
MCHC RBC AUTO-ENTMCNC: 31 G/DL (ref 31.5–36.5)
MCV RBC AUTO: 84 FL (ref 78–100)
MONOCYTES # BLD AUTO: 0.4 10E3/UL (ref 0–1.3)
MONOCYTES NFR BLD AUTO: 4 %
NEUTROPHILS # BLD AUTO: 7.9 10E3/UL (ref 1.6–8.3)
NEUTROPHILS NFR BLD AUTO: 84 %
NRBC # BLD AUTO: 0 10E3/UL
NRBC BLD AUTO-RTO: 0 /100
PLATELET # BLD AUTO: 209 10E3/UL (ref 150–450)
RBC # BLD AUTO: 5.2 10E6/UL (ref 3.8–5.2)
WBC # BLD AUTO: 9.3 10E3/UL (ref 4–11)

## 2022-02-20 NOTE — PATIENT INSTRUCTIONS
Take the nebulizers up to 4 times a day as needed for your shortness of breath.    Discontinue steroids and recheck blood sugar on Monday.    Please call the [facility] scheduling line Monday morning and tell them you need to schedule a lab only appointment at which ever clinic is most convenient to you.    I will call you to follow-up on the results of that on Monday.

## 2022-02-21 ENCOUNTER — APPOINTMENT (OUTPATIENT)
Dept: ULTRASOUND IMAGING | Facility: CLINIC | Age: 87
DRG: 177 | End: 2022-02-21
Attending: HOSPITALIST
Payer: COMMERCIAL

## 2022-02-21 ENCOUNTER — APPOINTMENT (OUTPATIENT)
Dept: CT IMAGING | Facility: CLINIC | Age: 87
DRG: 177 | End: 2022-02-21
Attending: EMERGENCY MEDICINE
Payer: COMMERCIAL

## 2022-02-21 ENCOUNTER — HOSPITAL ENCOUNTER (INPATIENT)
Facility: CLINIC | Age: 87
LOS: 22 days | Discharge: HOME HEALTH | DRG: 177 | End: 2022-03-15
Attending: EMERGENCY MEDICINE | Admitting: HOSPITALIST
Payer: COMMERCIAL

## 2022-02-21 DIAGNOSIS — B59 PNEUMONIA DUE TO PNEUMOCYSTIS JIROVECII, UNSPECIFIED LATERALITY, UNSPECIFIED PART OF LUNG (H): ICD-10-CM

## 2022-02-21 DIAGNOSIS — R79.89 ELEVATED TROPONIN: ICD-10-CM

## 2022-02-21 DIAGNOSIS — J44.9 COPD, SEVERE (H): ICD-10-CM

## 2022-02-21 DIAGNOSIS — I10 BENIGN ESSENTIAL HYPERTENSION: ICD-10-CM

## 2022-02-21 DIAGNOSIS — J44.1 CHRONIC OBSTRUCTIVE PULMONARY DISEASE WITH ACUTE EXACERBATION (H): ICD-10-CM

## 2022-02-21 DIAGNOSIS — E11.8 TYPE 2 DIABETES MELLITUS WITH COMPLICATION, WITHOUT LONG-TERM CURRENT USE OF INSULIN (H): ICD-10-CM

## 2022-02-21 DIAGNOSIS — E11.65 TYPE 2 DIABETES MELLITUS WITH HYPERGLYCEMIA, WITHOUT LONG-TERM CURRENT USE OF INSULIN (H): ICD-10-CM

## 2022-02-21 DIAGNOSIS — R06.02 SHORTNESS OF BREATH: ICD-10-CM

## 2022-02-21 DIAGNOSIS — R09.02 HYPOXIA: Primary | ICD-10-CM

## 2022-02-21 LAB
ALBUMIN SERPL-MCNC: 3.1 G/DL (ref 3.4–5)
ALBUMIN UR-MCNC: 20 MG/DL
ALP SERPL-CCNC: 58 U/L (ref 40–150)
ALT SERPL W P-5'-P-CCNC: 32 U/L (ref 0–50)
ANION GAP SERPL CALCULATED.3IONS-SCNC: 8 MMOL/L (ref 3–14)
APPEARANCE UR: CLEAR
AST SERPL W P-5'-P-CCNC: 15 U/L (ref 0–45)
ATRIAL RATE - MUSE: 111 BPM
BASOPHILS # BLD AUTO: 0 10E3/UL (ref 0–0.2)
BASOPHILS NFR BLD AUTO: 0 %
BILIRUB SERPL-MCNC: 0.5 MG/DL (ref 0.2–1.3)
BILIRUB UR QL STRIP: NEGATIVE
BUN SERPL-MCNC: 26 MG/DL (ref 7–30)
CALCIUM SERPL-MCNC: 10 MG/DL (ref 8.5–10.1)
CHLORIDE BLD-SCNC: 101 MMOL/L (ref 94–109)
CO2 SERPL-SCNC: 26 MMOL/L (ref 20–32)
COLOR UR AUTO: ABNORMAL
CREAT SERPL-MCNC: 0.62 MG/DL (ref 0.52–1.04)
D DIMER PPP FEU-MCNC: 0.6 UG/ML FEU (ref 0–0.5)
DIASTOLIC BLOOD PRESSURE - MUSE: NORMAL MMHG
EOSINOPHIL # BLD AUTO: 0 10E3/UL (ref 0–0.7)
EOSINOPHIL NFR BLD AUTO: 0 %
ERYTHROCYTE [DISTWIDTH] IN BLOOD BY AUTOMATED COUNT: 14.4 % (ref 10–15)
GFR SERPL CREATININE-BSD FRML MDRD: 84 ML/MIN/1.73M2
GLUCOSE BLD-MCNC: 365 MG/DL (ref 70–99)
GLUCOSE BLDC GLUCOMTR-MCNC: 208 MG/DL (ref 70–99)
GLUCOSE BLDC GLUCOMTR-MCNC: 342 MG/DL (ref 70–99)
GLUCOSE UR STRIP-MCNC: >=1000 MG/DL
HBA1C MFR BLD: 9.9 % (ref 0–5.6)
HCT VFR BLD AUTO: 40.4 % (ref 35–47)
HGB BLD-MCNC: 12.6 G/DL (ref 11.7–15.7)
HGB UR QL STRIP: NEGATIVE
IMM GRANULOCYTES # BLD: 0.2 10E3/UL
IMM GRANULOCYTES NFR BLD: 2 %
INTERPRETATION ECG - MUSE: NORMAL
KETONES UR STRIP-MCNC: NEGATIVE MG/DL
LEUKOCYTE ESTERASE UR QL STRIP: NEGATIVE
LYMPHOCYTES # BLD AUTO: 1.1 10E3/UL (ref 0.8–5.3)
LYMPHOCYTES NFR BLD AUTO: 12 %
MCH RBC QN AUTO: 26 PG (ref 26.5–33)
MCHC RBC AUTO-ENTMCNC: 31.2 G/DL (ref 31.5–36.5)
MCV RBC AUTO: 84 FL (ref 78–100)
MONOCYTES # BLD AUTO: 0.9 10E3/UL (ref 0–1.3)
MONOCYTES NFR BLD AUTO: 10 %
MUCOUS THREADS #/AREA URNS LPF: PRESENT /LPF
NEUTROPHILS # BLD AUTO: 6.7 10E3/UL (ref 1.6–8.3)
NEUTROPHILS NFR BLD AUTO: 76 %
NITRATE UR QL: NEGATIVE
NRBC # BLD AUTO: 0 10E3/UL
NRBC BLD AUTO-RTO: 0 /100
NT-PROBNP SERPL-MCNC: 608 PG/ML (ref 0–1800)
P AXIS - MUSE: 71 DEGREES
PH UR STRIP: 6.5 [PH] (ref 5–7)
PLATELET # BLD AUTO: 174 10E3/UL (ref 150–450)
POTASSIUM BLD-SCNC: 4.2 MMOL/L (ref 3.4–5.3)
PR INTERVAL - MUSE: 144 MS
PROCALCITONIN SERPL-MCNC: <0.05 NG/ML
PROT SERPL-MCNC: 6.2 G/DL (ref 6.8–8.8)
QRS DURATION - MUSE: 76 MS
QT - MUSE: 328 MS
QTC - MUSE: 446 MS
R AXIS - MUSE: -63 DEGREES
RBC # BLD AUTO: 4.84 10E6/UL (ref 3.8–5.2)
RBC URINE: 0 /HPF
SARS-COV-2 RNA RESP QL NAA+PROBE: NEGATIVE
SODIUM SERPL-SCNC: 135 MMOL/L (ref 133–144)
SP GR UR STRIP: 1.01 (ref 1–1.03)
SQUAMOUS EPITHELIAL: 1 /HPF
SYSTOLIC BLOOD PRESSURE - MUSE: NORMAL MMHG
T AXIS - MUSE: 74 DEGREES
TROPONIN I SERPL HS-MCNC: 48 NG/L
TROPONIN I SERPL HS-MCNC: 75 NG/L
TSH SERPL DL<=0.005 MIU/L-ACNC: 0.54 MU/L (ref 0.4–4)
UROBILINOGEN UR STRIP-MCNC: NORMAL MG/DL
VENTRICULAR RATE- MUSE: 111 BPM
WBC # BLD AUTO: 8.9 10E3/UL (ref 4–11)
WBC URINE: 2 /HPF

## 2022-02-21 PROCEDURE — 99285 EMERGENCY DEPT VISIT HI MDM: CPT | Mod: 25

## 2022-02-21 PROCEDURE — 250N000013 HC RX MED GY IP 250 OP 250 PS 637: Performed by: HOSPITALIST

## 2022-02-21 PROCEDURE — 96365 THER/PROPH/DIAG IV INF INIT: CPT | Mod: 59

## 2022-02-21 PROCEDURE — 210N000002 HC R&B HEART CARE

## 2022-02-21 PROCEDURE — 87385 HISTOPLASMA CAPSUL AG IA: CPT | Performed by: INTERNAL MEDICINE

## 2022-02-21 PROCEDURE — G0378 HOSPITAL OBSERVATION PER HR: HCPCS

## 2022-02-21 PROCEDURE — 93005 ELECTROCARDIOGRAM TRACING: CPT

## 2022-02-21 PROCEDURE — 84484 ASSAY OF TROPONIN QUANT: CPT | Performed by: EMERGENCY MEDICINE

## 2022-02-21 PROCEDURE — 250N000011 HC RX IP 250 OP 636: Performed by: EMERGENCY MEDICINE

## 2022-02-21 PROCEDURE — 81001 URINALYSIS AUTO W/SCOPE: CPT | Performed by: HOSPITALIST

## 2022-02-21 PROCEDURE — 83036 HEMOGLOBIN GLYCOSYLATED A1C: CPT | Performed by: EMERGENCY MEDICINE

## 2022-02-21 PROCEDURE — 71275 CT ANGIOGRAPHY CHEST: CPT

## 2022-02-21 PROCEDURE — 85025 COMPLETE CBC W/AUTO DIFF WBC: CPT | Performed by: EMERGENCY MEDICINE

## 2022-02-21 PROCEDURE — 96366 THER/PROPH/DIAG IV INF ADDON: CPT

## 2022-02-21 PROCEDURE — 250N000011 HC RX IP 250 OP 636: Performed by: HOSPITALIST

## 2022-02-21 PROCEDURE — 76700 US EXAM ABDOM COMPLETE: CPT

## 2022-02-21 PROCEDURE — C9803 HOPD COVID-19 SPEC COLLECT: HCPCS

## 2022-02-21 PROCEDURE — 250N000013 HC RX MED GY IP 250 OP 250 PS 637: Performed by: EMERGENCY MEDICINE

## 2022-02-21 PROCEDURE — 85379 FIBRIN DEGRADATION QUANT: CPT | Performed by: EMERGENCY MEDICINE

## 2022-02-21 PROCEDURE — 86481 TB AG RESPONSE T-CELL SUSP: CPT | Performed by: HOSPITALIST

## 2022-02-21 PROCEDURE — 84484 ASSAY OF TROPONIN QUANT: CPT | Performed by: HOSPITALIST

## 2022-02-21 PROCEDURE — 87040 BLOOD CULTURE FOR BACTERIA: CPT | Performed by: HOSPITALIST

## 2022-02-21 PROCEDURE — 250N000009 HC RX 250: Performed by: EMERGENCY MEDICINE

## 2022-02-21 PROCEDURE — 84443 ASSAY THYROID STIM HORMONE: CPT | Performed by: EMERGENCY MEDICINE

## 2022-02-21 PROCEDURE — 80053 COMPREHEN METABOLIC PANEL: CPT | Performed by: EMERGENCY MEDICINE

## 2022-02-21 PROCEDURE — 84145 PROCALCITONIN (PCT): CPT | Performed by: HOSPITALIST

## 2022-02-21 PROCEDURE — 83880 ASSAY OF NATRIURETIC PEPTIDE: CPT | Performed by: EMERGENCY MEDICINE

## 2022-02-21 PROCEDURE — 99223 1ST HOSP IP/OBS HIGH 75: CPT | Mod: AI | Performed by: HOSPITALIST

## 2022-02-21 PROCEDURE — 36415 COLL VENOUS BLD VENIPUNCTURE: CPT | Performed by: HOSPITALIST

## 2022-02-21 PROCEDURE — 87635 SARS-COV-2 COVID-19 AMP PRB: CPT | Performed by: EMERGENCY MEDICINE

## 2022-02-21 PROCEDURE — 36415 COLL VENOUS BLD VENIPUNCTURE: CPT | Performed by: EMERGENCY MEDICINE

## 2022-02-21 RX ORDER — AMLODIPINE BESYLATE 2.5 MG/1
2.5 TABLET ORAL DAILY
Status: DISCONTINUED | OUTPATIENT
Start: 2022-02-22 | End: 2022-02-22

## 2022-02-21 RX ORDER — AMOXICILLIN 250 MG
1 CAPSULE ORAL 2 TIMES DAILY PRN
Status: DISCONTINUED | OUTPATIENT
Start: 2022-02-21 | End: 2022-03-15 | Stop reason: HOSPADM

## 2022-02-21 RX ORDER — FAMOTIDINE 20 MG/1
20 TABLET, FILM COATED ORAL 2 TIMES DAILY
Status: DISCONTINUED | OUTPATIENT
Start: 2022-02-21 | End: 2022-03-15 | Stop reason: HOSPADM

## 2022-02-21 RX ORDER — LATANOPROST 50 UG/ML
1 SOLUTION/ DROPS OPHTHALMIC EVERY EVENING
COMMUNITY

## 2022-02-21 RX ORDER — ONDANSETRON 4 MG/1
4 TABLET, ORALLY DISINTEGRATING ORAL EVERY 6 HOURS PRN
Status: DISCONTINUED | OUTPATIENT
Start: 2022-02-21 | End: 2022-03-15 | Stop reason: HOSPADM

## 2022-02-21 RX ORDER — OXYCODONE HYDROCHLORIDE 5 MG/1
5 TABLET ORAL EVERY 4 HOURS PRN
Status: DISCONTINUED | OUTPATIENT
Start: 2022-02-21 | End: 2022-03-15 | Stop reason: HOSPADM

## 2022-02-21 RX ORDER — ASPIRIN 81 MG/1
81 TABLET ORAL DAILY
Status: DISCONTINUED | OUTPATIENT
Start: 2022-02-22 | End: 2022-03-15 | Stop reason: HOSPADM

## 2022-02-21 RX ORDER — ROSUVASTATIN CALCIUM 5 MG/1
5 TABLET, COATED ORAL AT BEDTIME
COMMUNITY
End: 2022-07-05

## 2022-02-21 RX ORDER — AMOXICILLIN 250 MG
2 CAPSULE ORAL 2 TIMES DAILY PRN
Status: DISCONTINUED | OUTPATIENT
Start: 2022-02-21 | End: 2022-03-15 | Stop reason: HOSPADM

## 2022-02-21 RX ORDER — CALCIUM POLYCARBOPHIL 625 MG 625 MG/1
625 TABLET ORAL DAILY
Status: DISCONTINUED | OUTPATIENT
Start: 2022-02-22 | End: 2022-03-15 | Stop reason: HOSPADM

## 2022-02-21 RX ORDER — FUROSEMIDE 20 MG
20 TABLET ORAL DAILY
Status: DISCONTINUED | OUTPATIENT
Start: 2022-02-21 | End: 2022-03-02

## 2022-02-21 RX ORDER — GLIPIZIDE 5 MG/1
5 TABLET ORAL EVERY MORNING
Status: DISCONTINUED | OUTPATIENT
Start: 2022-02-22 | End: 2022-03-06

## 2022-02-21 RX ORDER — BUDESONIDE 0.5 MG/2ML
0.5 INHALANT ORAL 2 TIMES DAILY
Status: DISCONTINUED | OUTPATIENT
Start: 2022-02-21 | End: 2022-02-22

## 2022-02-21 RX ORDER — NALOXONE HYDROCHLORIDE 0.4 MG/ML
0.4 INJECTION, SOLUTION INTRAMUSCULAR; INTRAVENOUS; SUBCUTANEOUS
Status: DISCONTINUED | OUTPATIENT
Start: 2022-02-21 | End: 2022-03-15 | Stop reason: HOSPADM

## 2022-02-21 RX ORDER — ONDANSETRON 2 MG/ML
4 INJECTION INTRAMUSCULAR; INTRAVENOUS EVERY 6 HOURS PRN
Status: DISCONTINUED | OUTPATIENT
Start: 2022-02-21 | End: 2022-03-15 | Stop reason: HOSPADM

## 2022-02-21 RX ORDER — IOPAMIDOL 755 MG/ML
57 INJECTION, SOLUTION INTRAVASCULAR ONCE
Status: COMPLETED | OUTPATIENT
Start: 2022-02-21 | End: 2022-02-21

## 2022-02-21 RX ORDER — NALOXONE HYDROCHLORIDE 0.4 MG/ML
0.2 INJECTION, SOLUTION INTRAMUSCULAR; INTRAVENOUS; SUBCUTANEOUS
Status: DISCONTINUED | OUTPATIENT
Start: 2022-02-21 | End: 2022-03-15 | Stop reason: HOSPADM

## 2022-02-21 RX ORDER — AMLODIPINE BESYLATE 2.5 MG/1
2.5 TABLET ORAL DAILY
Status: ON HOLD | COMMUNITY
End: 2022-03-15

## 2022-02-21 RX ORDER — PIPERACILLIN SODIUM, TAZOBACTAM SODIUM 4; .5 G/20ML; G/20ML
4.5 INJECTION, POWDER, LYOPHILIZED, FOR SOLUTION INTRAVENOUS ONCE
Status: COMPLETED | OUTPATIENT
Start: 2022-02-21 | End: 2022-02-21

## 2022-02-21 RX ORDER — NICOTINE POLACRILEX 4 MG
15-30 LOZENGE BUCCAL
Status: DISCONTINUED | OUTPATIENT
Start: 2022-02-21 | End: 2022-03-15 | Stop reason: HOSPADM

## 2022-02-21 RX ORDER — ROSUVASTATIN CALCIUM 5 MG/1
5 TABLET, COATED ORAL AT BEDTIME
Status: DISCONTINUED | OUTPATIENT
Start: 2022-02-21 | End: 2022-03-15 | Stop reason: HOSPADM

## 2022-02-21 RX ORDER — PIPERACILLIN SODIUM, TAZOBACTAM SODIUM 3; .375 G/15ML; G/15ML
3.38 INJECTION, POWDER, LYOPHILIZED, FOR SOLUTION INTRAVENOUS EVERY 6 HOURS
Status: DISCONTINUED | OUTPATIENT
Start: 2022-02-21 | End: 2022-02-22

## 2022-02-21 RX ORDER — PIPERACILLIN SODIUM, TAZOBACTAM SODIUM 3; .375 G/15ML; G/15ML
3.38 INJECTION, POWDER, LYOPHILIZED, FOR SOLUTION INTRAVENOUS EVERY 6 HOURS
Status: DISCONTINUED | OUTPATIENT
Start: 2022-02-21 | End: 2022-02-21

## 2022-02-21 RX ORDER — IPRATROPIUM BROMIDE AND ALBUTEROL SULFATE 2.5; .5 MG/3ML; MG/3ML
3 SOLUTION RESPIRATORY (INHALATION)
Status: DISCONTINUED | OUTPATIENT
Start: 2022-02-21 | End: 2022-02-22

## 2022-02-21 RX ORDER — ASPIRIN 81 MG/1
81 TABLET ORAL DAILY
Status: CANCELLED | OUTPATIENT
Start: 2022-02-21

## 2022-02-21 RX ORDER — ALBUTEROL SULFATE 0.83 MG/ML
2.5 SOLUTION RESPIRATORY (INHALATION)
Status: DISCONTINUED | OUTPATIENT
Start: 2022-02-21 | End: 2022-03-15 | Stop reason: HOSPADM

## 2022-02-21 RX ORDER — DEXTROSE MONOHYDRATE 25 G/50ML
25-50 INJECTION, SOLUTION INTRAVENOUS
Status: DISCONTINUED | OUTPATIENT
Start: 2022-02-21 | End: 2022-03-15 | Stop reason: HOSPADM

## 2022-02-21 RX ORDER — LANOLIN ALCOHOL/MO/W.PET/CERES
3 CREAM (GRAM) TOPICAL
Status: DISCONTINUED | OUTPATIENT
Start: 2022-02-21 | End: 2022-03-15 | Stop reason: HOSPADM

## 2022-02-21 RX ORDER — MULTIVITAMIN,THERAPEUTIC
1 TABLET ORAL EVERY EVENING
Status: DISCONTINUED | OUTPATIENT
Start: 2022-02-21 | End: 2022-03-15 | Stop reason: HOSPADM

## 2022-02-21 RX ORDER — ASPIRIN 325 MG
325 TABLET ORAL ONCE
Status: COMPLETED | OUTPATIENT
Start: 2022-02-21 | End: 2022-02-21

## 2022-02-21 RX ORDER — LATANOPROST 50 UG/ML
1 SOLUTION/ DROPS OPHTHALMIC EVERY EVENING
Status: DISCONTINUED | OUTPATIENT
Start: 2022-02-21 | End: 2022-03-15 | Stop reason: HOSPADM

## 2022-02-21 RX ADMIN — INSULIN ASPART 3 UNITS: 100 INJECTION, SOLUTION INTRAVENOUS; SUBCUTANEOUS at 22:39

## 2022-02-21 RX ADMIN — LATANOPROST 1 DROP: 50 SOLUTION/ DROPS OPHTHALMIC at 22:11

## 2022-02-21 RX ADMIN — IOPAMIDOL 57 ML: 755 INJECTION, SOLUTION INTRAVENOUS at 13:29

## 2022-02-21 RX ADMIN — ASPIRIN 325 MG ORAL TABLET 325 MG: 325 PILL ORAL at 14:06

## 2022-02-21 RX ADMIN — FUROSEMIDE 20 MG: 20 TABLET ORAL at 20:46

## 2022-02-21 RX ADMIN — ROSUVASTATIN CALCIUM 5 MG: 5 TABLET, FILM COATED ORAL at 22:10

## 2022-02-21 RX ADMIN — CALCIUM CARBONATE 600 MG (1,500 MG)-VITAMIN D3 400 UNIT TABLET 1 TABLET: at 22:10

## 2022-02-21 RX ADMIN — FAMOTIDINE 20 MG: 20 TABLET ORAL at 20:46

## 2022-02-21 RX ADMIN — PIPERACILLIN SODIUM AND TAZOBACTAM SODIUM 3.38 G: 3; .375 INJECTION, POWDER, LYOPHILIZED, FOR SOLUTION INTRAVENOUS at 20:46

## 2022-02-21 RX ADMIN — THERA TABS 1 TABLET: TAB at 20:46

## 2022-02-21 RX ADMIN — PIPERACILLIN SODIUM AND TAZOBACTAM SODIUM 4.5 G: 4; .5 INJECTION, POWDER, LYOPHILIZED, FOR SOLUTION INTRAVENOUS at 15:35

## 2022-02-21 RX ADMIN — OXYCODONE HYDROCHLORIDE 5 MG: 5 TABLET ORAL at 20:45

## 2022-02-21 RX ADMIN — SODIUM CHLORIDE 84 ML: 900 INJECTION INTRAVENOUS at 13:29

## 2022-02-21 ASSESSMENT — ACTIVITIES OF DAILY LIVING (ADL)
ADLS_ACUITY_SCORE: 6
ADLS_ACUITY_SCORE: 12
ADLS_ACUITY_SCORE: 11
ADLS_ACUITY_SCORE: 12
ADLS_ACUITY_SCORE: 12
ADLS_ACUITY_SCORE: 6

## 2022-02-21 ASSESSMENT — ENCOUNTER SYMPTOMS
FEVER: 0
SHORTNESS OF BREATH: 1
DIARRHEA: 0
WEAKNESS: 1
VOMITING: 0
BACK PAIN: 0
HEADACHES: 0
COUGH: 0
ABDOMINAL PAIN: 0

## 2022-02-21 NOTE — ED PROVIDER NOTES
History   Chief Complaint:  Shortness of Breath    The history is provided by the patient.      Celeste Chacko is a 89 year old female who was admitted on 1/25 for acute hypoxic respiratory failure with history of COPD presents with shortness of breath. She was discharged from the hospital on 1/26 and states her condition has been getting worse. She is weaker now than she has ever been and is more short of breath. She has been frequently going to  for shortness of breath. Her prednisone was initially increased then with a following visit she was told to stop taking it and is now using a DuoNeb. While at , her blood sugars were noted to be high but states that following tests were needed to determine if insulin would be necessary. She is still taking the medications she was discharged with. She will be meeting with a pulmonologist this Thursday. She lives alone at an independent condo. Her daughter is concerned that this is no longer a good option due her mom's worsening condition. Patient denies and recent falls, does not have a headache, back pain, abdominal pain, fever, cough, diarrhea or vision changes.        Review of Systems   Constitutional: Negative for fever.   Eyes: Negative for visual disturbance.   Respiratory: Positive for shortness of breath. Negative for cough.    Gastrointestinal: Negative for abdominal pain, diarrhea and vomiting.   Musculoskeletal: Negative for back pain.   Neurological: Positive for weakness. Negative for headaches.   All other systems reviewed and are negative.    Allergies:  Sulfamethoxazole    Medications:  co-enzyme   furosemide   glipizide   glucosamine-chondroitin   ipratropium - albuterol   Prednisone  Tiotropium    Past Medical History:     COPD, sever  Mixed hyperlipidemia  Osteoarthritis  Type 2 diabetes mellitus without long term use of insulin  Personal history of other malignant neoplasm of skin  Spinal stenosis of lumbar region with neurogenic  "claudication  Disorder of bone and cartilage  Edema  Lumbar radiculopathy    Past Surgical History:    ARTHROPLASTY KNEE  HEAD & NECK SURGERY  SHOULDER ARTHROPLASTY     Family History:    Arthritis  Diabetes  Breast cancer  Cerebrovascular disease    Social History:  Presents with daughter  Lives alone in a condo  Recent history of driving, now told to stop by daughter  Former smoker    Physical Exam     Patient Vitals for the past 24 hrs:   BP Temp Temp src Pulse Resp SpO2 Height   02/21/22 1134 -- -- -- 85 -- 94 % --   02/21/22 1133 (!) 141/74 -- -- -- -- 95 % --   02/21/22 1011 130/62 98.3  F (36.8  C) Temporal 116 18 91 % 1.6 m (5' 3\")     Physical Exam  General: Alert, appears elderly and frail. Cooperative.     In mild distress  HEENT:  Head:  Atraumatic  Ears:  External ears are normal  Mouth/Throat:  Oropharynx is without erythema or exudate and mucous membranes are moist.   Eyes:   Conjunctivae normal and EOM are normal. No scleral icterus.  CV:  Normal rate, regular rhythm, normal heart sounds and radial pulses are 2+ and symmetric.  No murmur.  Resp:  Breath sounds are clear bilaterally    Non-labored, no retractions or accessory muscle use  GI:  Abdomen is soft, no distension, no tenderness. No rebound or guarding.  No CVA tenderness bilaterally  MS:  Normal range of motion. No edema.    Back atraumatic.    No midline cervical, thoracic, or lumbar tenderness  Skin:  Warm and dry.  No rash or lesions noted.  Neuro: Alert. Globally weak.  GCS: 15  Psych:  Normal mood and affect.    Emergency Department Course   ECG  ECG obtained at 1031, ECG read at 1240  Sinus tachycardia  Right atrial enlargement  Left anterior fascicular block  Inferior infarct, age undetermined  Abnormal ECG   No significant changes as compared to prior, dated 1/25/22.  Rate 111 bpm. CT interval 144 ms. QRS duration 76 ms. QT/QTc 328/446 ms. P-R-T axes 71 -63 74.     Imaging:  CT Chest Pulmonary Embolism w Contrast   Final Result "   IMPRESSION:   1.  There are two new cavitary nodules in the right upper lobe. This   is concerning for aggressive, atypical infection, possibly fungal or   tubercular.   2.  No evidence of pulmonary embolism.      NELSON TOLBERT MD            SYSTEM ID:  [id number]      Report per radiology    Laboratory:  Labs Ordered and Resulted from Time of ED Arrival to Time of ED Departure   D DIMER QUANTITATIVE - Abnormal       Result Value    D-Dimer Quantitative 0.60 (*)    COMPREHENSIVE METABOLIC PANEL - Abnormal    Sodium 135      Potassium 4.2      Chloride 101      Carbon Dioxide (CO2) 26      Anion Gap 8      Urea Nitrogen 26      Creatinine 0.62      Calcium 10.0      Glucose 365 (*)     Alkaline Phosphatase 58      AST 15      ALT 32      Protein Total 6.2 (*)     Albumin 3.1 (*)     Bilirubin Total 0.5      GFR Estimate 84     TROPONIN I - Abnormal    Troponin I High Sensitivity 75 (*)    CBC WITH PLATELETS AND DIFFERENTIAL - Abnormal    WBC Count 8.9      RBC Count 4.84      Hemoglobin 12.6      Hematocrit 40.4      MCV 84      MCH 26.0 (*)     MCHC 31.2 (*)     RDW 14.4      Platelet Count 174      % Neutrophils 76      % Lymphocytes 12      % Monocytes 10      % Eosinophils 0      % Basophils 0      % Immature Granulocytes 2      NRBCs per 100 WBC 0      Absolute Neutrophils 6.7      Absolute Lymphocytes 1.1      Absolute Monocytes 0.9      Absolute Eosinophils 0.0      Absolute Basophils 0.0      Absolute Immature Granulocytes 0.2      Absolute NRBCs 0.0     HEMOGLOBIN A1C - Abnormal    Hemoglobin A1C 9.9 (*)    TSH WITH FREE T4 REFLEX - Normal    TSH 0.54     NT PROBNP INPATIENT - Normal    N terminal Pro BNP Inpatient 608     COVID-19 VIRUS (CORONAVIRUS) BY PCR   UA MACROSCOPIC WITH REFLEX TO MICRO AND CULTURE      Emergency Department Course:  Reviewed:  I reviewed nursing notes, vitals and past medical history    Assessments:  1222 I obtained history and examined the patient as noted above.   1324 I  rechecked the patient and explained findings.     Consults:  1330 I spoke with Dr. Viveros of the hospitalist service who is in agreement to accept the patient for admission.     Interventions:  ASA 325mg PO    Disposition:  The patient was admitted to the hospital under the care of Dr. Viveros.     Impression & Plan     Medical Decision Making:  Patient is a 89-year-old female who presents with shortness of breath.  Symptoms have been ongoing since a recent admission for COPD in late January.  She does appear quite anxious at baseline, but I was concerned for other potential etiologies of her chronic shortness of breath such as ACS, pulmonary embolism, pneumothorax, infection, or heart failure.  Patient's work-up concerning for both a mildly elevated D-dimer and so CT scan was obtained of the chest thankfully showing no evidence of pulmonary embolism.  There were 2 new cavitary nodules in the right upper lobe that could be potential atypical infection versus potential fungal or tubercular infection.  We will defer treatment or eval of these findings to infectious disease consultation upon admission.  Patient's EKG did not show any ischemic changes although her troponin was detectable on her high-sensitivity troponin at 75.  We will plan for full dose aspirin at this time with plan for serial troponin studies and likely urgent echocardiogram in the next 24 hours.  Patient also found to be hyperglycemic in setting of type II diabetes.  A1c in process at time of admission and will defer further diabetic management to hospitalist service.  Patient will be admitted to observation under the care of Dr. Viveros    Diagnosis:    ICD-10-CM    1. Shortness of breath  R06.02    2. Elevated troponin  R77.8    3. Type 2 diabetes mellitus with hyperglycemia, without long-term current use of insulin (H)  E11.65      Discharge Medications:  New Prescriptions    No medications on file     Scribe Disclosure:  Stefanie MCCULLOUGH am  serving as a scribe at 12:22 PM on 2/21/2022 to document services personally performed by Bob Zhao MD based on my observations and the provider's statements to me.          Bob Zhao MD  02/21/22 0854

## 2022-02-21 NOTE — PHARMACY-ADMISSION MEDICATION HISTORY
Pharmacy Medication History  Admission medication history interview status for the 2/21/2022  admission is complete. See EPIC admission navigator for prior to admission medications     Location of Interview: Patient room  Medication history sources: Patient and Surescripts    Significant changes made to the medication list:      In the past week, patient estimated taking medication this percent of the time: greater than 90%    Additional medication history information:       Medication reconciliation completed by provider prior to medication history? No    Time spent in this activity: 30min     Prior to Admission medications    Medication Sig Last Dose Taking? Auth Provider   Acetaminophen (TYLENOL PO) Take 325 mg by mouth every 6 hours as needed Patient takes TID with Oxycodone.  2/21/2022 at Unknown time Yes Reported, Patient   albuterol (ACCUNEB) 0.63 MG/3ML neb solution Take 3 mLs (0.63 mg) by nebulization every 6 hours as needed for shortness of breath / dyspnea or wheezing  Yes Guero Pearce MD   albuterol (ACCUNEB) 1.25 MG/3ML neb solution Take 2 vials (2.5 mg) by nebulization every 4 hours as needed for shortness of breath / dyspnea or wheezing  Yes Jil Ortez CNP   albuterol (PROAIR HFA/PROVENTIL HFA/VENTOLIN HFA) 108 (90 Base) MCG/ACT inhaler INHALE 2 PUFFS INTO THE LUNGS EVERY 6 HOURS AS NEEDED FOR SHORTNESS OF BREATH OR DIFFICULT BREATHING OR WHEEZING  Yes Emily Marie MD   amLODIPine (NORVASC) 2.5 MG tablet Take 2.5 mg by mouth daily 2/21/2022 at   Yes Unknown, Entered By History   ASPIRIN EC PO Take 81 mg by mouth daily 2/21/2022 at Unknown time Yes Reported, Patient   Calcium Polycarbophil (FIBERCON PO) Take 1 tablet by mouth daily  2/21/2022 at Unknown time Yes Unknown, Entered By History   calcium-vitamin D (CALTRATE) 600-400 MG-UNIT per tablet Take 1 tablet by mouth 2 times daily 1200mg   1000 mg of vitamin d 2/21/2022 at Unknown time Yes Reported, Patient   co-enzyme Q-10 100 MG CAPS  capsule Take 100 mg by mouth daily 2/21/2022 at Unknown time Yes Unknown, Entered By History   fluticasone-salmeterol (WIXELA INHUB) 500-50 MCG/DOSE inhaler Inhale 1 puff into the lungs 2 times daily ### DO NOT FILL NOW.  Please update patient's profile to reflect additional refills.  #### 2/21/2022 at Unknown time Yes Emily Marie MD   glipiZIDE (GLUCOTROL) 5 MG tablet Take 1 tablet (5 mg) by mouth every morning 2/21/2022 at Unknown time Yes Emily Marie MD   glucosamine-chondroitin 500-400 MG CAPS per capsule Take 1 capsule by mouth 2 times daily  2/21/2022 at Unknown time Yes Reported, Patient   ipratropium - albuterol 0.5 mg/2.5 mg/3 mL (DUONEB) 0.5-2.5 (3) MG/3ML neb solution Take 1 vial (3 mLs) by nebulization every 4 hours as needed for shortness of breath / dyspnea or wheezing 2/21/2022 at Unknown time Yes Jil Ortez CNP   latanoprost (XALATAN) 0.005 % ophthalmic solution Place 1 drop Into the left eye every evening 2/20/2022 at Unknown time Yes Unknown, Entered By History   Multiple Vitamins-Minerals (MULTIVITAMIN ADULT PO) Take 1 tablet by mouth every evening  2/21/2022 at Unknown time Yes Unknown, Entered By History   NONFORMULARY At Bedtime Natra sleeping med takes 1 at night.  2/20/2022 at Unknown time Yes Reported, Patient   oxyCODONE (ROXICODONE) 5 MG tablet Take 1 tablet (5 mg) by mouth every 4 hours as needed for severe pain May take 5 tabs per day 2/21/2022 at Unknown time Yes Emily Marie MD   polyethylene glycol (MIRALAX) 17 g packet Take 0.5 packets by mouth every evening  2/20/2022 at Unknown time Yes Reported, Patient   rosuvastatin (CRESTOR) 5 MG tablet Take 5 mg by mouth At Bedtime 2/20/2022 at Unknown time Yes Unknown, Entered By History   tiotropium (SPIRIVA HANDIHALER) 18 MCG inhaled capsule INHALE THE CONTENTS OF 1 CAPSULE VIA INHALATION DEVICE DAILY 2/21/2022 at Unknown time Yes Emily Marie MD   predniSONE (DELTASONE) 20 MG tablet 1 tab po bid for 3 days, then 1 tab po every  day for 3 days, then 1/2 tab po every day for 4 days 2/19/2022  Ritchie Laureano, PA-C       The information provided in this note is only as accurate as the sources available at the time of update(s)   Richa TaylorD

## 2022-02-21 NOTE — PROGRESS NOTES
SW: Writer spoke with Dr. Zhao, who stated that he would like to discharge pt home with homecare.     Writer spoke with pt and dtr. They stated that pt lives alone in a condo. They stated that pt is independent at baseline, but has become more weak.     They stated they would be interested in homecare. Writer made a referral to [facility] (p:[phone] f:[phone]). They stated they have availability in [place] and a SOC within 48-72 hours. They stated they will review referral and call writer back.    Writer spoke pt and dtr about next level of care. Writer encouraged pt and dtr to at least look into prison's in case pts needs increase. They were agreeable and said that would be a good plan.     Provider then stated the pt would be admitted for elevated Triponin.     P:SW will continue to follow and will be available as needed until discharge.    LYNDA Ladd  [facility]     [phone]  [email or web address]

## 2022-02-21 NOTE — LETTER
Hello!    Pt would need RN/PT/OT and SW if you have SW on your team. Provider would like pt to discharge before 2p.    Thanks!  Kristy Castanon, LYNDA  [phone]

## 2022-02-21 NOTE — H&P
[facility]  History and Physical - Hospitalist Service       Date of Admission:  2/21/2022    Assessment & Plan      Celeste Chacko is a very pleasant 89 year old female with medical history significant for COPD, chronic back pain, DM2, HTN, HL was brought to the ER for evaluation of exertional dyspnea and fatigue and is being registered under observation on 2/21/2022 for further management.     Possible Lung abscess  Exertional dyspnea and Fatigue  Recent admission for COPD exacerbation treated with a steroid but ongoing dyspnea mostly with exertion but without fever, cough, weight loss or night sweats.  No wheezing.  No chest pain.  Interestingly, CT PE study was done which shoed No PE but There are two new cavitary nodules in the right upper lobe. This is concerning for aggressive, atypical infection, possibly fungal or tubercular.  Patient has been on steroid for almost a month although currently off of it.  proBNP negative.  WBC normal.  Pro-Edilberto pending  -Admit to inpatient  -ID consult, discussed with Dr. Macias, will start on Zosyn.  QuantiFERON-TB gold and pro-Edilberto as well ordered per ID recommendation. No isolation at this time. Defer any fungal studies/coverage to ID.  -2 sets of blood cultures ordered from ER, follow  -Continuous pulse ox  -Management of COPD as below  -PT OT eval, ambulatory pulse ox prior to discharge    Mildly abnormal troponin  Minimally abnormal troponin, follow trend.  Unlikely ACS.  -Continue aspirin  -2D echo  -If echo is normal, will plan stress test is outpatient or prior to discharge once clinically improves.  -Check fasting lipid panel.      Chronic medical conditions    DM2: HbA1c 9.9.  Takes glipizide PTA.  -Hold PTA glipizide and will manage with foraminal Premeal NovoLog and ISS.    HTN  HL  Resume PTA amlodipine when verified    COPD  Not on home O2.  PTA is on fluticasone/salmeterol, DuoNeb, Spiriva inhalers.  -I will place her on scheduled  DuoNeb, steroid neb.  Not on home O2, needs exercise oximetry prior to discharge  -Her current presentation does not seem consistent with COPD exacerbation, likely related to pneumonia/lung abscess as above, treatment is outlined.      Diet:  Moderate CHO  DVT Prophylaxis: Anticipate short stay, low risk, ambulate  Botello Catheter: Not present  Central Lines: None  Cardiac Monitoring: None  Code Status:  Full code, discussed with patient and her daughter at length.    Clinically Significant Risk Factors Present on Admission                    Disposition Plan   Expected Discharge:  24-48 h, pending above work-up  Anticipated discharge location:  Awaiting care coordination huddle  Delays:              The patient's care was discussed with the Patient and Her daughter present in the room.    Carson Viveros MD  Hospitalist Service  [facility]  Securely message with the Vocera Web Console (learn more here)  Text page via [employer] Paging/Directory       ______________________________________________________________________    Chief Complaint   Exertional dyspnea  Fatigue    History is obtained from the patient, chart review, discussion with ER physician    History of Present Illness   Celeste Chacko is a very pleasant 89 year old female with medical history significant for COPD, chronic back pain, DM2, HTN, HL was brought to the ER for evaluation of exertional dyspnea and fatigue.      Patient has underlying COPD, not on home O2. Was under observation overnight here on 1/25 for shortness of breath. Was discharged on p.o. prednisone. Patient has ongoing dyspnea especially with minimal exertion that has progressed. She has also been fatigued lately. Denies fever, cough, chest pain. Because the dyspnea continued after discharge, she has been to urgent care several times, almost weekly as per daughter. Prednisone was subsequently increased and has tapered and completed now.    Patient also reports  increased leg edema which was thought to be due to fluid retention from prednisone, worse than her chronic Left leg edema due to varicose veins.She was treated with 7 days of Lasix which she completed 3 days ago. She sttes leg edema improved but is not sure if it helped with any dyspnea. Denies any clear history of orthopnea or PND. No palpitation. She feels her weight remains unchanged.      Denies headache, nausea, vomiting, diarrhea, urinary symptoms. She feels her abdomen is bloated/distended. No hematochezia or melena.    In ER, patient was evaluated by Dr. Zhao. She was mildly tachycardic to 110s. CBC and CMP unremarkable. proBNP not elevated. D-dimer was 0.6. High-sensitivity troponin was mildly elevated at 75. Twelve-lead EKG showed sinus tachycardia without ischemic ST-T changes. Full dose aspirin was given and observation admission was requested for further evaluation.   CT PE study was done which just came back and shows no PE but There are two new cavitary nodules in the right upper lobe. This is concerning for aggressive, atypical infection, possibly fungal or tubercular. Discussed with Dr Macias, recommended checking procalcitonin and QuantiFERON-TB gold and antibiotic.  No isolation indicated at this time.  Blood cultures x2 and IV Zosyn was ordered and is being admitted for further management.    Review of Systems    The 10 point Review of Systems is negative other than noted in the HPI or here.      Past Medical History    I have reviewed this patient's medical history and updated it with pertinent information if needed.   Past Medical History:   Diagnosis Date     Basal cell carcinoma      Chronic pain      Complete rupture of rotator cuff      COPD (chronic obstructive pulmonary disease) (H)      History of blood transfusion      Mixed hyperlipidemia 04/2000     Osteoarthritis      Personal history of other malignant neoplasm of skin      Spinal stenosis of lumbar region with neurogenic  claudication      T2DM (type 2 diabetes mellitus) (H)        Past Surgical History   I have reviewed this patient's surgical history and updated it with pertinent information if needed.  Past Surgical History:   Procedure Laterality Date     ARTHROPLASTY KNEE Left 9/15/2014    Procedure: ARTHROPLASTY KNEE;  Surgeon: Carlos Alberto Kaminski MD;  Location: [facility] OR     HEAD & NECK SURGERY  05/14/15    forehead-skin cancer removed     INJECT EPIDURAL LUMBAR / SACRAL SINGLE Left 7/14/2016    Procedure: INJECT EPIDURAL LUMBAR / SACRAL SINGLE;  Surgeon: Luisito New MD;  Location: [facility] OR     INJECT SACROILIAC JOINT N/A 12/1/2015    Procedure: INJECT SACROILIAC JOINT;  Surgeon: Luisito New MD;  Location: [facility] GI     INJECT SACROILIAC JOINT Bilateral 5/19/2016    Procedure: INJECT SACROILIAC JOINT;  Surgeon: Luisito New MD;  Location: [facility]     INJECT SACROILIAC JOINT Bilateral 11/25/2016    Procedure: INJECT SACROILIAC JOINT;  Surgeon: Elmira Bennett MD;  Location: [facility] OR     INJECT SACROILIAC JOINT Bilateral 4/25/2017    Procedure: INJECT SACROILIAC JOINT;  Bilateral Sacroiliac Joint Injection   Injection of local anesthetic and steroid into area around spine for pain relief;  Surgeon: Alvaro Holland MD;  Location: [facility] OR     INJECT SACROILIAC JOINT Bilateral 7/28/2017    Procedure: INJECT SACROILIAC JOINT;  Bilateral Sacroiliac Joint Injection;  Surgeon: Elmira Bennett MD;  Location: [facility]     INJECT SACROILIAC JOINT Bilateral 11/10/2017    Procedure: INJECT SACROILIAC JOINT;  Bilateral Sacroiliac Joint Injection;  Surgeon: Elmira Bennett MD;  Location: [facility] OR     ZZC NONSPECIFIC PROCEDURE      Breast biopsies      ZZC NONSPECIFIC PROCEDURE      Pilonidal sinus repair      ZZC NONSPECIFIC PROCEDURE      T & A      ZZC NONSPECIFIC PROCEDURE  5/02    Shoulder arthropasty     ZZC NONSPECIFIC PROCEDURE  5/07    Right shoulder replacement, RCT repair       Social History   I have reviewed this patient's social history  "and updated it with pertinent information if needed.  Social History     Tobacco Use     Smoking status: Former Smoker     Packs/day: 0.50     Years: 54.00     Pack years: 27.00     Types: Cigarettes     Quit date: 2000     Years since quittin.8     Smokeless tobacco: Never Used     Tobacco comment: using nicotine gum   Vaping Use     Vaping Use: Never used   Substance Use Topics     Alcohol use: Yes     Alcohol/week: 0.8 standard drinks     Types: 1 Glasses of wine per week     Comment: \"A glass of wine once a week.\"     Drug use: No       Family History   I have reviewed this patient's family history and updated it with pertinent information if needed.  Family History   Problem Relation Age of Onset     Arthritis Father      Diabetes Brother      Cancer Brother         breast     Cerebrovascular Disease Brother        Prior to Admission Medications   Prior to Admission Medications   Prescriptions Last Dose Informant Patient Reported? Taking?   ASPIRIN EC PO  Self Yes No   Sig: Take 81 mg by mouth daily   Acetaminophen (TYLENOL PO)  Self Yes No   Sig: Take 325 mg by mouth 6 times daily Patient takes TID with Oxycodone.    Calcium Polycarbophil (FIBERCON PO)  Self Yes No   Sig: Take 1 tablet by mouth daily    Multiple Vitamins-Minerals (MULTIVITAMIN ADULT PO)  Self Yes No   Sig: Take 1 tablet by mouth every evening    NONFORMULARY   Yes No   Sig: At Bedtime Natra sleeping med takes 1 at night.    albuterol (ACCUNEB) 0.63 MG/3ML neb solution   No No   Sig: Take 3 mLs (0.63 mg) by nebulization every 6 hours as needed for shortness of breath / dyspnea or wheezing   albuterol (ACCUNEB) 1.25 MG/3ML neb solution   No No   Sig: Take 2 vials (2.5 mg) by nebulization every 4 hours as needed for shortness of breath / dyspnea or wheezing   albuterol (PROAIR HFA/PROVENTIL HFA/VENTOLIN HFA) 108 (90 Base) MCG/ACT inhaler   No No   Sig: INHALE 2 PUFFS INTO THE LUNGS EVERY 6 HOURS AS NEEDED FOR SHORTNESS OF BREATH OR " DIFFICULT BREATHING OR WHEEZING   calcium-vitamin D (CALTRATE) 600-400 MG-UNIT per tablet  Self Yes No   Sig: Take 1 tablet by mouth 2 times daily 1200mg   1000 mg of vitamin d   co-enzyme Q-10 100 MG CAPS capsule   Yes No   Sig: Take 100 mg by mouth daily   fluticasone-salmeterol (WIXELA INHUB) 500-50 MCG/DOSE inhaler   No No   Sig: Inhale 1 puff into the lungs 2 times daily ### DO NOT FILL NOW.  Please update patient's profile to reflect additional refills.  ####   furosemide (LASIX) 20 MG tablet   No No   Sig: Take 1 tablet (20 mg) by mouth daily   glipiZIDE (GLUCOTROL) 5 MG tablet   No No   Sig: Take 1 tablet (5 mg) by mouth every morning   glucosamine-chondroitin 500-400 MG CAPS per capsule   Yes No   Sig: Take 1 capsule by mouth 2 times daily    ipratropium - albuterol 0.5 mg/2.5 mg/3 mL (DUONEB) 0.5-2.5 (3) MG/3ML neb solution   No No   Sig: Take 1 vial (3 mLs) by nebulization every 4 hours as needed for shortness of breath / dyspnea or wheezing   oxyCODONE (ROXICODONE) 5 MG tablet   No No   Sig: Take 1 tablet (5 mg) by mouth every 4 hours as needed for severe pain May take 5 tabs per day   polyethylene glycol (MIRALAX) 17 g packet   Yes No   Sig: Take 0.5 packets by mouth every evening    predniSONE (DELTASONE) 20 MG tablet   No No   Sig: Take 3 tabs by mouth daily x 3 days, then 2 tabs daily x 3 days, then 1 tab daily x 3 days, then 1/2 tab daily x 3 days.   predniSONE (DELTASONE) 20 MG tablet   No No   Si tab po bid for 3 days, then 1 tab po every day for 3 days, then 1/2 tab po every day for 4 days   tiotropium (SPIRIVA HANDIHALER) 18 MCG inhaled capsule   No No   Sig: INHALE THE CONTENTS OF 1 CAPSULE VIA INHALATION DEVICE DAILY      Facility-Administered Medications: None     Allergies   Allergies   Allergen Reactions     Sulfamethoxazole Anaphylaxis and Hives       Physical Exam   Vital Signs: Temp: 98.3  F (36.8  C) Temp src: Temporal BP: (!) 141/74 Pulse: 85   Resp: 18 SpO2: 94 % O2 Device: None  (Room air)    Weight: 0 lbs 0 oz    General: AAOx3, very pleasant, appears comfortable.  HEENT: PERRLA EOMI. Mucosa moist.   Lungs: Bilateral equal air entry. Clear to auscultation anteriorly, coarse/crackles on the left posteriorly, normal work of breathing, on room air.  CVS: S1S2 regular, no tachycardia or murmur.   Abdomen: Soft, distended, nontender. BS heard.  MSK: 1+ edema on left trace on right.  Neuro: AAOX3. CN 2-12 normal. Strength symmetrical.  Skin: No rash. Some ecchymosis on the right leg present.      Data   Data reviewed today: I reviewed all medications, new labs and imaging results over the last 24 hours. I personally reviewed CT PE study which showed No PE but There are two new cavitary nodules in the right upper lobe. This is concerning for aggressive, atypical infection, possibly fungal or tubercular.    Recent Labs   Lab 02/21/22  1242 02/19/22  1645   WBC 8.9 9.3   HGB 12.6 13.5   MCV 84 84    209    136   POTASSIUM 4.2 4.8   CHLORIDE 101 101   CO2 26 28   BUN 26 26   CR 0.62 0.62   ANIONGAP 8 7   NARCISA 10.0 9.3   * 396*   ALBUMIN 3.1* 3.3*   PROTTOTAL 6.2* 6.8   BILITOTAL 0.5 0.4   ALKPHOS 58 73   ALT 32 39   AST 15 12     Recent Results (from the past 24 hour(s))   CT Chest Pulmonary Embolism w Contrast    Narrative    CT CHEST PULMONARY EMBOLISM WITH CONTRAST  2/21/2022 1:47 PM    CLINICAL HISTORY: PE suspected, low/intermediate probability, positive  D-dimer.    TECHNIQUE: CT angiogram chest during arterial phase injection IV  contrast. 2D and 3D MIP reconstructions were performed by the CT  technologist. Dose reduction techniques were used.     CONTRAST: 57 mL [id number]    COMPARISON: 1/26/2022    FINDINGS:  ANGIOGRAM CHEST: Pulmonary arteries are normal caliber and negative  for pulmonary emboli. Thoracic aorta is normal in caliber and negative  for dissection.     LUNGS AND PLEURA: The lungs are emphysematous. Cavitary nodule in the  right upper lobe measures 1.1 cm  (series 7, image 44), new compared to  the prior study. Other cavitary lesion in the right upper lobe  measures up to 2.6 cm (series 7, image 75), also new compared to the  prior study. Bullous changes at the lung bases are similar to prior.  No pleural effusions.    MEDIASTINUM/AXILLAE: No thoracic or axillary lymphadenopathy. The  esophagus and thyroid are unremarkable.    CORONARY ARTERY CALCIFICATION: Moderate.    UPPER ABDOMEN: Normal.    MUSCULOSKELETAL: Right shoulder replacement. No destructive bone  lesions.      Impression    IMPRESSION:  1.  There are two new cavitary nodules in the right upper lobe. This  is concerning for aggressive, atypical infection, possibly fungal or  tubercular.  2.  No evidence of pulmonary embolism.    NELSON TOLBERT MD         SYSTEM ID:  [id number]

## 2022-02-22 ENCOUNTER — APPOINTMENT (OUTPATIENT)
Dept: CARDIOLOGY | Facility: CLINIC | Age: 87
DRG: 177 | End: 2022-02-22
Attending: HOSPITALIST
Payer: COMMERCIAL

## 2022-02-22 LAB
ANION GAP SERPL CALCULATED.3IONS-SCNC: 6 MMOL/L (ref 3–14)
BASOPHILS # BLD AUTO: 0 10E3/UL (ref 0–0.2)
BASOPHILS NFR BLD AUTO: 0 %
BUN SERPL-MCNC: 17 MG/DL (ref 7–30)
CALCIUM SERPL-MCNC: 8.5 MG/DL (ref 8.5–10.1)
CHLORIDE BLD-SCNC: 104 MMOL/L (ref 94–109)
CHOLEST SERPL-MCNC: 139 MG/DL
CO2 SERPL-SCNC: 27 MMOL/L (ref 20–32)
CREAT SERPL-MCNC: 0.59 MG/DL (ref 0.52–1.04)
EOSINOPHIL # BLD AUTO: 0.1 10E3/UL (ref 0–0.7)
EOSINOPHIL NFR BLD AUTO: 1 %
ERYTHROCYTE [DISTWIDTH] IN BLOOD BY AUTOMATED COUNT: 14.5 % (ref 10–15)
GFR SERPL CREATININE-BSD FRML MDRD: 85 ML/MIN/1.73M2
GLUCOSE BLD-MCNC: 161 MG/DL (ref 70–99)
GLUCOSE BLDC GLUCOMTR-MCNC: 135 MG/DL (ref 70–99)
GLUCOSE BLDC GLUCOMTR-MCNC: 137 MG/DL (ref 70–99)
GLUCOSE BLDC GLUCOMTR-MCNC: 156 MG/DL (ref 70–99)
GLUCOSE BLDC GLUCOMTR-MCNC: 174 MG/DL (ref 70–99)
GLUCOSE BLDC GLUCOMTR-MCNC: 225 MG/DL (ref 70–99)
GLUCOSE BLDC GLUCOMTR-MCNC: 273 MG/DL (ref 70–99)
HCT VFR BLD AUTO: 39.7 % (ref 35–47)
HDLC SERPL-MCNC: 56 MG/DL
HGB BLD-MCNC: 12.2 G/DL (ref 11.7–15.7)
IMM GRANULOCYTES # BLD: 0.2 10E3/UL
IMM GRANULOCYTES NFR BLD: 2 %
LDLC SERPL CALC-MCNC: 41 MG/DL
LVEF ECHO: NORMAL
LYMPHOCYTES # BLD AUTO: 1.1 10E3/UL (ref 0.8–5.3)
LYMPHOCYTES NFR BLD AUTO: 14 %
MCH RBC QN AUTO: 25.6 PG (ref 26.5–33)
MCHC RBC AUTO-ENTMCNC: 30.7 G/DL (ref 31.5–36.5)
MCV RBC AUTO: 83 FL (ref 78–100)
MONOCYTES # BLD AUTO: 0.7 10E3/UL (ref 0–1.3)
MONOCYTES NFR BLD AUTO: 9 %
NEUTROPHILS # BLD AUTO: 5.4 10E3/UL (ref 1.6–8.3)
NEUTROPHILS NFR BLD AUTO: 74 %
NONHDLC SERPL-MCNC: 83 MG/DL
NRBC # BLD AUTO: 0 10E3/UL
NRBC BLD AUTO-RTO: 0 /100
PLATELET # BLD AUTO: 158 10E3/UL (ref 150–450)
POTASSIUM BLD-SCNC: 3.5 MMOL/L (ref 3.4–5.3)
RBC # BLD AUTO: 4.77 10E6/UL (ref 3.8–5.2)
SODIUM SERPL-SCNC: 137 MMOL/L (ref 133–144)
TRIGL SERPL-MCNC: 211 MG/DL
TROPONIN I SERPL HS-MCNC: 35 NG/L
WBC # BLD AUTO: 7.3 10E3/UL (ref 4–11)

## 2022-02-22 PROCEDURE — 80048 BASIC METABOLIC PNL TOTAL CA: CPT | Performed by: HOSPITALIST

## 2022-02-22 PROCEDURE — 99233 SBSQ HOSP IP/OBS HIGH 50: CPT | Performed by: HOSPITALIST

## 2022-02-22 PROCEDURE — 84484 ASSAY OF TROPONIN QUANT: CPT | Performed by: HOSPITALIST

## 2022-02-22 PROCEDURE — 87305 ASPERGILLUS AG IA: CPT | Performed by: INTERNAL MEDICINE

## 2022-02-22 PROCEDURE — 93306 TTE W/DOPPLER COMPLETE: CPT | Mod: 26 | Performed by: INTERNAL MEDICINE

## 2022-02-22 PROCEDURE — 93306 TTE W/DOPPLER COMPLETE: CPT

## 2022-02-22 PROCEDURE — 250N000012 HC RX MED GY IP 250 OP 636 PS 637: Performed by: HOSPITALIST

## 2022-02-22 PROCEDURE — 999N000157 HC STATISTIC RCP TIME EA 10 MIN

## 2022-02-22 PROCEDURE — 250N000011 HC RX IP 250 OP 636: Performed by: HOSPITALIST

## 2022-02-22 PROCEDURE — 80061 LIPID PANEL: CPT | Performed by: HOSPITALIST

## 2022-02-22 PROCEDURE — 87449 NOS EACH ORGANISM AG IA: CPT | Performed by: INTERNAL MEDICINE

## 2022-02-22 PROCEDURE — 36415 COLL VENOUS BLD VENIPUNCTURE: CPT | Performed by: HOSPITALIST

## 2022-02-22 PROCEDURE — 94640 AIRWAY INHALATION TREATMENT: CPT

## 2022-02-22 PROCEDURE — 86612 BLASTOMYCES ANTIBODY: CPT | Performed by: INTERNAL MEDICINE

## 2022-02-22 PROCEDURE — 250N000011 HC RX IP 250 OP 636: Performed by: INTERNAL MEDICINE

## 2022-02-22 PROCEDURE — 250N000009 HC RX 250: Performed by: HOSPITALIST

## 2022-02-22 PROCEDURE — 250N000013 HC RX MED GY IP 250 OP 250 PS 637: Performed by: HOSPITALIST

## 2022-02-22 PROCEDURE — 85004 AUTOMATED DIFF WBC COUNT: CPT | Performed by: HOSPITALIST

## 2022-02-22 PROCEDURE — 36415 COLL VENOUS BLD VENIPUNCTURE: CPT | Performed by: INTERNAL MEDICINE

## 2022-02-22 PROCEDURE — 120N000001 HC R&B MED SURG/OB

## 2022-02-22 PROCEDURE — G0463 HOSPITAL OUTPT CLINIC VISIT: HCPCS

## 2022-02-22 PROCEDURE — 94640 AIRWAY INHALATION TREATMENT: CPT | Mod: 76

## 2022-02-22 RX ORDER — AMLODIPINE BESYLATE 5 MG/1
5 TABLET ORAL DAILY
Status: DISCONTINUED | OUTPATIENT
Start: 2022-02-23 | End: 2022-03-15 | Stop reason: HOSPADM

## 2022-02-22 RX ORDER — POTASSIUM CHLORIDE 1500 MG/1
20 TABLET, EXTENDED RELEASE ORAL ONCE
Status: COMPLETED | OUTPATIENT
Start: 2022-02-22 | End: 2022-02-22

## 2022-02-22 RX ORDER — AMLODIPINE BESYLATE 2.5 MG/1
2.5 TABLET ORAL ONCE
Status: COMPLETED | OUTPATIENT
Start: 2022-02-22 | End: 2022-02-22

## 2022-02-22 RX ORDER — ALBUTEROL SULFATE 90 UG/1
2 AEROSOL, METERED RESPIRATORY (INHALATION)
Status: DISCONTINUED | OUTPATIENT
Start: 2022-02-22 | End: 2022-03-15 | Stop reason: HOSPADM

## 2022-02-22 RX ORDER — POLYETHYLENE GLYCOL 3350 17 G/17G
8.5 POWDER, FOR SOLUTION ORAL EVERY EVENING
Status: DISCONTINUED | OUTPATIENT
Start: 2022-02-22 | End: 2022-03-15 | Stop reason: HOSPADM

## 2022-02-22 RX ORDER — AMPICILLIN AND SULBACTAM 2; 1 G/1; G/1
3 INJECTION, POWDER, FOR SOLUTION INTRAMUSCULAR; INTRAVENOUS EVERY 6 HOURS
Status: DISCONTINUED | OUTPATIENT
Start: 2022-02-22 | End: 2022-03-08

## 2022-02-22 RX ADMIN — IPRATROPIUM BROMIDE AND ALBUTEROL SULFATE 3 ML: .5; 3 SOLUTION RESPIRATORY (INHALATION) at 11:14

## 2022-02-22 RX ADMIN — CALCIUM CARBONATE 600 MG (1,500 MG)-VITAMIN D3 400 UNIT TABLET 1 TABLET: at 20:34

## 2022-02-22 RX ADMIN — POLYETHYLENE GLYCOL 3350 8.5 G: 17 POWDER, FOR SOLUTION ORAL at 20:34

## 2022-02-22 RX ADMIN — THERA TABS 1 TABLET: TAB at 20:33

## 2022-02-22 RX ADMIN — CALCIUM CARBONATE 600 MG (1,500 MG)-VITAMIN D3 400 UNIT TABLET 1 TABLET: at 08:55

## 2022-02-22 RX ADMIN — CALCIUM POLYCARBOPHIL 625 MG: 625 TABLET, FILM COATED ORAL at 08:55

## 2022-02-22 RX ADMIN — ASPIRIN 81 MG: 81 TABLET, COATED ORAL at 08:55

## 2022-02-22 RX ADMIN — AMPICILLIN SODIUM AND SULBACTAM SODIUM 3 G: 2; 1 INJECTION, POWDER, FOR SOLUTION INTRAMUSCULAR; INTRAVENOUS at 22:44

## 2022-02-22 RX ADMIN — OXYCODONE HYDROCHLORIDE 5 MG: 5 TABLET ORAL at 16:19

## 2022-02-22 RX ADMIN — OXYCODONE HYDROCHLORIDE 5 MG: 5 TABLET ORAL at 20:33

## 2022-02-22 RX ADMIN — OXYCODONE HYDROCHLORIDE 5 MG: 5 TABLET ORAL at 09:02

## 2022-02-22 RX ADMIN — ROSUVASTATIN CALCIUM 5 MG: 5 TABLET, FILM COATED ORAL at 22:45

## 2022-02-22 RX ADMIN — PIPERACILLIN SODIUM AND TAZOBACTAM SODIUM 3.38 G: 3; .375 INJECTION, POWDER, LYOPHILIZED, FOR SOLUTION INTRAVENOUS at 09:10

## 2022-02-22 RX ADMIN — GLIPIZIDE 5 MG: 5 TABLET ORAL at 08:55

## 2022-02-22 RX ADMIN — FAMOTIDINE 20 MG: 20 TABLET ORAL at 08:55

## 2022-02-22 RX ADMIN — FLUTICASONE FUROATE AND VILANTEROL TRIFENATATE 1 PUFF: 200; 25 POWDER RESPIRATORY (INHALATION) at 20:34

## 2022-02-22 RX ADMIN — BUDESONIDE 0.5 MG: 0.5 INHALANT RESPIRATORY (INHALATION) at 08:37

## 2022-02-22 RX ADMIN — OXYCODONE HYDROCHLORIDE 5 MG: 5 TABLET ORAL at 03:32

## 2022-02-22 RX ADMIN — FAMOTIDINE 20 MG: 20 TABLET ORAL at 20:34

## 2022-02-22 RX ADMIN — LATANOPROST 1 DROP: 50 SOLUTION/ DROPS OPHTHALMIC at 20:35

## 2022-02-22 RX ADMIN — IPRATROPIUM BROMIDE AND ALBUTEROL SULFATE 3 ML: .5; 3 SOLUTION RESPIRATORY (INHALATION) at 08:35

## 2022-02-22 RX ADMIN — AMLODIPINE BESYLATE 2.5 MG: 2.5 TABLET ORAL at 08:55

## 2022-02-22 RX ADMIN — FUROSEMIDE 20 MG: 20 TABLET ORAL at 08:57

## 2022-02-22 RX ADMIN — POTASSIUM CHLORIDE 20 MEQ: 1500 TABLET, EXTENDED RELEASE ORAL at 10:23

## 2022-02-22 RX ADMIN — AMPICILLIN SODIUM AND SULBACTAM SODIUM 3 G: 2; 1 INJECTION, POWDER, FOR SOLUTION INTRAMUSCULAR; INTRAVENOUS at 16:00

## 2022-02-22 RX ADMIN — AMLODIPINE BESYLATE 2.5 MG: 2.5 TABLET ORAL at 10:23

## 2022-02-22 RX ADMIN — PIPERACILLIN SODIUM AND TAZOBACTAM SODIUM 3.38 G: 3; .375 INJECTION, POWDER, LYOPHILIZED, FOR SOLUTION INTRAVENOUS at 03:32

## 2022-02-22 RX ADMIN — INSULIN ASPART 2 UNITS: 100 INJECTION, SOLUTION INTRAVENOUS; SUBCUTANEOUS at 20:29

## 2022-02-22 ASSESSMENT — ACTIVITIES OF DAILY LIVING (ADL)
ADLS_ACUITY_SCORE: 12
ADLS_ACUITY_SCORE: 12
ADLS_ACUITY_SCORE: 10
ADLS_ACUITY_SCORE: 10
ADLS_ACUITY_SCORE: 12
ADLS_ACUITY_SCORE: 10
ADLS_ACUITY_SCORE: 11
ADLS_ACUITY_SCORE: 10
ADLS_ACUITY_SCORE: 12
ADLS_ACUITY_SCORE: 10
ADLS_ACUITY_SCORE: 11
ADLS_ACUITY_SCORE: 10
ADLS_ACUITY_SCORE: 12
ADLS_ACUITY_SCORE: 12
ADLS_ACUITY_SCORE: 10
ADLS_ACUITY_SCORE: 12
ADLS_ACUITY_SCORE: 12
ADLS_ACUITY_SCORE: 10
ADLS_ACUITY_SCORE: 10
ADLS_ACUITY_SCORE: 12
ADLS_ACUITY_SCORE: 10
ADLS_ACUITY_SCORE: 12

## 2022-02-22 NOTE — UTILIZATION REVIEW
Admission Status; Secondary Review Determination    Under the authority of the Utilization Management Committee, the utilization review process indicated a secondary review on the above patient. The review outcome is based on review of the medical records, discussions with staff, and applying clinical experience noted on the date of the review.    (x) Inpatient Status Appropriate - This patient's medical care is consistent with medical management for inpatient care and reasonable inpatient medical practice.    RATIONALE FOR DETERMINATION:89-year-old female with significant history of COPD, chronic back pain, type 2 diabetes who presented to the hospital with progressive worsening fatigue and exertional dyspnea.  CT imaging revealed 2 new cavitary nodules in the right upper lobe concerning for aggressive atypical infection or possible fungal or tuberculosis.  The differential includes bacterial cavitary infection requiring broad-spectrum IV antibiotics as well as ruling out fungal or TB infection.  With patient's underlying COPD and age and concern for significant infection, inpatient management appropriate.    At the time of admission with the information available to the attending physician more than 2 nights Hospital complex care was anticipated, based on patient risk of adverse outcome if treated as outpatient and complex care required. Inpatient admission is appropriate based on the Medicare guidelines.    This document was produced using voice recognition software    The information on this document is developed by the utilization review team in order for the business office to ensure compliance. This only denotes the appropriateness of proper admission status and does not reflect the quality of care rendered.    The definitions of Inpatient Status and Observation Status used in making the determination above are those provided in the CMS Coverage Manual, Chapter 1 and Chapter 6, section  70.4.    Sincerely,    Terell Pedraza MD  Utilization Review  Physician Advisor  [facility].

## 2022-02-22 NOTE — PROGRESS NOTES
Glasses and upper dentures, clothes,cell phone, purse, 2 white pills, per pt states one oxycodone, 1 tylenol. To lock up with pharmacy.    Ring (1), 1 necklace on pt    Noted on admission L coccynx/buttocks pin point open area. Mepilex applied and encouraged to turn and repo

## 2022-02-22 NOTE — PROGRESS NOTES
[facility]    Medicine Progress Note - Hospitalist Service    Date of Admission:  2/21/2022    Assessment & Plan            Celeste Chacko is a very pleasant 89 year old female with medical history significant for COPD, chronic back pain, DM2, HTN, HL was brought to the ER for evaluation of exertional dyspnea and fatigue and is being registered under observation on 2/21/2022 for further management.     Possible Lung abscess  Exertional dyspnea and Fatigue  Recent admission for COPD exacerbation treated with a steroid but ongoing dyspnea mostly with exertion but without fever, cough, weight loss or night sweats.  No wheezing.  No chest pain. Interestingly, CT PE study was done which shoed No PE but 2 new cavitary nodules in the right upper lobe. This is concerning for aggressive, atypical infection, possibly fungal or tubercular.  Patient has been on steroid for almost a month although currently off of it.  proBNP negative.  WBC normal.  Pro-Edilberto negative  -Started on Zosyn on admission, ID consulted, antibiotic changed to Unasyn.  -QuantiFERON-TB gold pending.  Sputum for AFB x3 per ID, placed on airborne isolation.  -Fungal serologies and antigen, PCP for ID, pending.  Appreciate ID assistance.  -Blood cultures negative so far, follow.  Patient is afebrile  -Continuous pulse ox  -Management of COPD as below  -PT OT eval, ambulatory pulse ox prior to discharge    Mildly abnormal troponin  Minimally abnormal troponin, follow trend.  Unlikely ACS.  -Continue aspirin  -2D echo showed normal LVEF and no wall motion abnormalities.  -No further work-up planned     Chronic medical conditions    DM2: HbA1c 9.9.  Takes glipizide PTA.  - PTA glipizide resumed   -Continue with Premeal NovoLog and ISS.  - BS in 100s    HTN  HL  Resume PTA amlodipine  - BP remains elevated, increase to 5 mg p.o. daily    COPD  Not on home O2.  PTA is on fluticasone/salmeterol, DuoNeb, Spiriva inhalers.  -Continue with  scheduled DuoNeb, steroid neb and PRN albuterol as well.    - Not on home O2, needs exercise oximetry prior to discharge  -Her current presentation does not seem consistent with COPD exacerbation, likely related to pneumonia/lung abscess as above, treatment as outlined.        Diet: Moderate Consistent Carb (60 g CHO per Meal) Diet    DVT Prophylaxis: Enoxaparin (Lovenox) SQ  Botello Catheter: Not present  Central Lines: None  Cardiac Monitoring: ACTIVE order. Indication: Chest pain/ ACS rule out (24 hours)  Code Status: Full Code      Disposition Plan   Expected Discharge: 02/24/2022    Anticipated discharge location:  Awaiting care coordination huddle  Delays:              The patient's care was discussed with the Bedside Nurse, Patient and ID Consultant.  Called her daughter but unable to reach, left VM.    Carson Viveros MD  Hospitalist Service  [facility]  Securely message with the Vocera Web Console (learn more here)  Text page via [employer] Print System Paging/Directory         Clinically Significant Risk Factors Present on Admission               # Platelet Defect: home medication list includes an antiplatelet medication   # Diabetes, type II: last A1C 9.9 % (Ref range: 0.0 - 5.6 %)      ______________________________________________________________________    Interval History   Patient was evaluated this morning, she feels about the same.  Denies chest pain or cough.  Remains afebrile.    Data reviewed today: I reviewed all medications, new labs and imaging results over the last 24 hours. I personally reviewed  2D echo result reviewed, normal LVEF, no wall motion abnormality    Physical Exam   Vital Signs: Temp: 98.6  F (37  C) Temp src: Oral BP: (!) 161/68 Pulse: 88   Resp: 16 SpO2: 92 % O2 Device: Nasal cannula Oxygen Delivery: 2 LPM  Weight: 132 lbs 0 oz    General: AAOx3, very pleasant, appears comfortable.  HEENT: PERRLA EOMI. Mucosa moist.   Lungs: Bilateral equal air entry. Clear to  auscultation anteriorly, coarse/crackles on the left posteriorly, normal work of breathing, on room air.  CVS: S1S2 regular, no tachycardia or murmur.   Abdomen: Soft, distended, nontender. BS heard.  MSK: 1+ edema on left trace on right.  Neuro: AAOX3. CN 2-12 normal. Strength symmetrical.  Skin: No rash. Some ecchymosis on the right leg present.    Data   Recent Labs   Lab 02/22/22  0754 02/22/22  0559 02/22/22  0210 02/21/22  1948 02/21/22  1242 02/19/22  1645   WBC  --  7.3  --   --  8.9 9.3   HGB  --  12.2  --   --  12.6 13.5   MCV  --  83  --   --  84 84   PLT  --  158  --   --  174 209   NA  --  137  --   --  135 136   POTASSIUM  --  3.5  --   --  4.2 4.8   CHLORIDE  --  104  --   --  101 101   CO2  --  27  --   --  26 28   BUN  --  17  --   --  26 26   CR  --  0.59  --   --  0.62 0.62   ANIONGAP  --  6  --   --  8 7   NARCISA  --  8.5  --   --  10.0 9.3   * 161* 135*   < > 365* 396*   ALBUMIN  --   --   --   --  3.1* 3.3*   PROTTOTAL  --   --   --   --  6.2* 6.8   BILITOTAL  --   --   --   --  0.5 0.4   ALKPHOS  --   --   --   --  58 73   ALT  --   --   --   --  32 39   AST  --   --   --   --  15 12    < > = values in this interval not displayed.     Recent Results (from the past 24 hour(s))   CT Chest Pulmonary Embolism w Contrast    Narrative    CT CHEST PULMONARY EMBOLISM WITH CONTRAST  2/21/2022 1:47 PM    CLINICAL HISTORY: PE suspected, low/intermediate probability, positive  D-dimer.    TECHNIQUE: CT angiogram chest during arterial phase injection IV  contrast. 2D and 3D MIP reconstructions were performed by the CT  technologist. Dose reduction techniques were used.     CONTRAST: 57 mL Isovue-370    COMPARISON: 1/26/2022    FINDINGS:  ANGIOGRAM CHEST: Pulmonary arteries are normal caliber and negative  for pulmonary emboli. Thoracic aorta is normal in caliber and negative  for dissection.     LUNGS AND PLEURA: The lungs are emphysematous. Cavitary nodule in the  right upper lobe measures 1.1 cm  (series 7, image 44), new compared to  the prior study. Other cavitary lesion in the right upper lobe  measures up to 2.6 cm (series 7, image 75), also new compared to the  prior study. Bullous changes at the lung bases are similar to prior.  No pleural effusions.    MEDIASTINUM/AXILLAE: No thoracic or axillary lymphadenopathy. The  esophagus and thyroid are unremarkable.    CORONARY ARTERY CALCIFICATION: Moderate.    UPPER ABDOMEN: Normal.    MUSCULOSKELETAL: Right shoulder replacement. No destructive bone  lesions.      Impression    IMPRESSION:  1.  There are two new cavitary nodules in the right upper lobe. This  is concerning for aggressive, atypical infection, possibly fungal or  tubercular.  2.  No evidence of pulmonary embolism.    NELSON TOLBERT MD         SYSTEM ID:  [id number]   US Abdomen Complete    Narrative    EXAM: US ABDOMEN COMPLETE  LOCATION: [facility]  DATE/TIME: 2022 8:17 PM    INDICATION: abd distension, dyspnea, to eval for ascites  COMPARISON: None.  TECHNIQUE: Complete abdominal ultrasound.    FINDINGS: Ultrasound of the 4 quadrants of the abdomen demonstrates no ascites.       Impression    IMPRESSION:  1.  No ascites.       Echocardiogram Complete   Result Value    LVEF  >70%    Narrative    [id number]  [id number]  [id number]  [id number]^CONNOR^WILMAR^R     [facility]  Echocardiography Laboratory  [address] [place] [address]     Name: LEXII LIVINGSTON  MRN: [id number]  : 1931  Study Date: 2022 08:12 AM  Age: 89 yrs  Gender: Female  Patient Location: [facility]  Reason For Study: Dyspnea  Ordering Physician: WILMAR RYAN  Performed By: Aurea Coronado     BSA: 1.6 m2  Height: 63 in  Weight: 134 lb  HR: 87  BP: 160/74 mmHg  ______________________________________________________________________________  Procedure  Complete Portable Echo  Adult.  ______________________________________________________________________________  Interpretation Summary     Hyperdynamic left ventricular function. The visual ejection fraction is >70%.  Right ventricular global function is normal.  No significant valvular abnormalities.  Pulmonary hypertension present. The right ventricular systolic pressure is  approximated at 39mmHg plus the right atrial pressure.  The inferior vena cava was normal in size with preserved respiratory  variability.     There are no prior studies available for comparison.  ______________________________________________________________________________  Left Ventricle  The left ventricle is normal in size. There is concentric remodeling present.  Hyperdynamic left ventricular function. The visual ejection fraction is >70%.  Left ventricular diastolic function is indeterminate. No regional wall motion  abnormalities noted.     Right Ventricle  The right ventricle is normal in structure, function and size.     Atria  Normal left atrial size. Right atrial size is normal.     Mitral Valve  The mitral valve is normal in structure and function.     Tricuspid Valve  There is trace to mild tricuspid regurgitation. The right ventricular systolic  pressure is approximated at 39mmHg plus the right atrial pressure. Pulmonary  hypertension.     Aortic Valve  The aortic valve is trileaflet with aortic valve sclerosis.     Pulmonic Valve  The pulmonic valve is not well seen, but is grossly normal.     Vessels  The aortic root is normal size. Normal size ascending aorta. The inferior vena  cava was normal in size with preserved respiratory variability.     Pericardium  There is no pericardial effusion.     ______________________________________________________________________________  MMode/2D Measurements & Calculations  IVSd: 1.1 cm  LVIDd: 3.2 cm  LVIDs: 1.6 cm  LVPWd: 1.0 cm  FS: 49.5 %     LV mass(C)d: 97.3 grams  LV mass(C)dI: 59.7 grams/m2  Ao root  diam: 2.8 cm  LA dimension: 2.7 cm  asc Aorta Diam: 3.1 cm  LA/Ao: 0.93  LA Volume (BP): 34.8 ml  LA Volume Index (BP): 21.3 ml/m2  RWT: 0.64     Doppler Measurements & Calculations  MV E max patti: 58.7 cm/sec  MV A max patti: 110.6 cm/sec  MV E/A: 0.53     MV dec slope: 307.9 cm/sec2  PA acc time: 0.14 sec  TR max patti: 310.6 cm/sec  TR max P.6 mmHg  E/E' av.6  Lateral E/e': 15.0  Medial E/e': 10.2     ______________________________________________________________________________  Report approved by: Donald Weinstein 2022 09:06 AM           Medications       [START ON 2022] amLODIPine  5 mg Oral Daily     ampicillin-sulbactam (UNASYN) IV  3 g Intravenous Q6H     aspirin  81 mg Oral Daily     budesonide  0.5 mg Nebulization BID     calcium carbonate 600 mg-vitamin D 400 units  1 tablet Oral BID     calcium polycarbophil  625 mg Oral Daily     famotidine  20 mg Oral BID     furosemide  20 mg Oral Daily     glipiZIDE  5 mg Oral QAM     insulin aspart  1-7 Units Subcutaneous TID AC     insulin aspart  1-5 Units Subcutaneous At Bedtime     insulin aspart   Subcutaneous TID AC     ipratropium - albuterol 0.5 mg/2.5 mg/3 mL  3 mL Nebulization 4x daily     latanoprost  1 drop Left Eye QPM     multivitamin, therapeutic  1 tablet Oral QPM     rosuvastatin  5 mg Oral At Bedtime

## 2022-02-22 NOTE — CONSULTS
[facility] Nurse Inpatient Wound Assessment   Reason for consultation: Evaluate and treat coccyx and heel stage 1 pressure injuries    Assessment  coccyx and heel stage 1 pressure injuries  Status: initial assessment    Treatment Plan  Coccyx and heel stage 1 pressure injuries wounds: Tuesday/Friday  And PRN  1. Cleanse wound with NS  2. Cover with Cover with silicone foam dressing  4x4 #[id number]      Pressure Injury prevention (please order supplies if not in room)  1. Turn every 2 hours, side to side avoid supine on pressure redistribution support surface   2.   Float heels off bed with use of pillows under legs, if ineffective, order offloading boots: Heel Lift boots (Prevalon #[id number])  3.   If incontinent Cleanse with incontinent cleanser (Pily spray # [id number]) followed by skin barrier protectant (Critic Aid paste #05077)  BID and after each incontinent episode  4.   Prevent sliding and shear by limiting HOB to 30 degrees or less unless contraindicated, use knee gatch first if not contraindicated  5.   If sitting, use pressure redistribution chair cushion (#810831) if unable to shift weight every hour, please limit sitting to an hour at a time  6.   Protective foam dressings PRN,Change at least q 4 days, peel back, peek and replace for daily skin inspection.  7.   Optimize nutrition     Orders Written  Recommended provider order: None, at this time  WO Nurse follow-up plan: signed off  Nursing to notify the Provider(s) and re-consult the [facility] Nurse if wound(s) deteriorates or new skin concern.    Patient History  According to provider note(s):  89 year old female with medical history significant for COPD, chronic back pain, DM2, HTN, HL was brought to the ER for evaluation of exertional dyspnea and fatigue and is being registered under observation on 2/21/2022 for further management.     Objective Data  Body mass index is 22.94 kg/m .      Allergies   Allergen Reactions     Sulfamethoxazole Anaphylaxis and Hives         Containment of urine/stool: Continent of bladder and Continent of bowel    Active Diet Order  Orders Placed This Encounter      Moderate Consistent Carb (60 g CHO per Meal) Diet      Output:   I/O last 3 completed shifts:  In: 460 [P.O.:360; I.V.:100]  Out: 650 [Urine:650]    Risk Assessment:   Sensory Perception: 3-->slightly limited  Moisture: 4-->rarely moist  Activity: 3-->walks occasionally  Mobility: 3-->slightly limited  Nutrition: 3-->adequate  Friction and Shear: 2-->potential problem  Lonnie Score: 18                          Labs:   Recent Labs   Lab 02/22/22  0559 02/21/22  1242   ALBUMIN  --  3.1*   HGB 12.2 12.6   WBC 7.3 8.9   A1C  --  9.9*       Physical Exam  Areas of skin assessed: focused coccyx, heels    Wound Location:89 year old female with medical history significant for COPD, chronic back pain, DM2, HTN, HL was brought to the ER for evaluation of exertional dyspnea and fatigue and is being registered under observation on 2/21/2022 for further management.   Date of last photo 2/22/2022  Wound History: POA      Coccyx 1x1 cm and heel 4x4 cm stage 1 (nonblanchable erythema) pressure injuries wounds  Periwound skin: blanchable erythema  Pain: denies      Interventions  Visual inspection and assessment completed   Wound Care Rationale Provide protection , reduce friction shear  Wound Care: ordered  Supplies: floor stock  Current off-loading measures:pillows for positioning, HOB 30 degrees or less, heels floating off bed with use of pillows, chair cushion (ordered)   Current support surface: Standard  Atmos Air mattress  Education provided to: importance of repositioning and Off-loading pressure  Discussed plan of care with Patient and Nurse      Erica Guerra MS RN CWOCN

## 2022-02-22 NOTE — PROGRESS NOTES
RECEIVING UNIT ED HANDOFF REVIEW    ED Nurse Handoff Report was reviewed by: Sanam Pérez RN on February 21, 2022 at 6:01 PM

## 2022-02-22 NOTE — PLAN OF CARE
A/O x4, VSS on 2LPM NC,Tele SR. Low back pain managed with oxycodine. SBA with walker to bathroom. Good UOP, 2D echo pending today, has been NPO since midnight

## 2022-02-22 NOTE — CONSULTS
"[facility]    Infectious Disease Consultation     Date of Admission:  2/21/2022  Date of Consult (When I saw the patient): 02/22/22    Assessment & Plan   Celeste Chacko is a 89 year old female who was admitted on 2/21/2022.     Impression:  1. 89 y.o with COPD  2. Diabetes   3. HTN, HLd  4. Admitted with shortness of breath.   5. CT scan \"  There are two new cavitary nodules in the right upper lobe. This  is concerning for aggressive, atypical infection, possibly fungal or  Tubercular.\"     Recommendations:   Airborne isolation will rule out for TB though low suspicion, patient has no past history of family or friend who had TB  Fungal antibodies and antigen   Unasyn for possible bacterial cavitary infection   Discussed with PAM Sainz MD    Reason for Consult   Reason for consult: I was asked to evaluate this patient for ruling out TB .    Primary Care Physician   Emily Marie    Chief Complaint   Shortness of breath     History is obtained from the patient and medical records    History of Present Illness   Celeste Chacko is a 89 year old female with medical history significant for COPD, chronic back pain, DM2, HTN, HL was brought to the ER for evaluation of exertional dyspnea and fatigue     Past Medical History   I have reviewed this patient's medical history and updated it with pertinent information if needed.   Past Medical History:   Diagnosis Date     Basal cell carcinoma      Chronic pain      Complete rupture of rotator cuff      COPD (chronic obstructive pulmonary disease) (H)      History of blood transfusion      Mixed hyperlipidemia 04/2000     Osteoarthritis      Personal history of other malignant neoplasm of skin      Spinal stenosis of lumbar region with neurogenic claudication      T2DM (type 2 diabetes mellitus) (H)        Past Surgical History   I have reviewed this patient's surgical history and updated it with pertinent information if needed.  Past " Surgical History:   Procedure Laterality Date     ARTHROPLASTY KNEE Left 9/15/2014    Procedure: ARTHROPLASTY KNEE;  Surgeon: Carlos Alberto Kaminski MD;  Location: [facility] OR     HEAD & NECK SURGERY  05/14/15    forehead-skin cancer removed     INJECT EPIDURAL LUMBAR / SACRAL SINGLE Left 7/14/2016    Procedure: INJECT EPIDURAL LUMBAR / SACRAL SINGLE;  Surgeon: Luisito New MD;  Location: [facility]     INJECT SACROILIAC JOINT N/A 12/1/2015    Procedure: INJECT SACROILIAC JOINT;  Surgeon: Luisito New MD;  Location: [facility]     INJECT SACROILIAC JOINT Bilateral 5/19/2016    Procedure: INJECT SACROILIAC JOINT;  Surgeon: Luisito New MD;  Location: [facility]     INJECT SACROILIAC JOINT Bilateral 11/25/2016    Procedure: INJECT SACROILIAC JOINT;  Surgeon: Elmira Bennett MD;  Location: [facility]     INJECT SACROILIAC JOINT Bilateral 4/25/2017    Procedure: INJECT SACROILIAC JOINT;  Bilateral Sacroiliac Joint Injection   Injection of local anesthetic and steroid into area around spine for pain relief;  Surgeon: Alvaro Holland MD;  Location: [facility]     INJECT SACROILIAC JOINT Bilateral 7/28/2017    Procedure: INJECT SACROILIAC JOINT;  Bilateral Sacroiliac Joint Injection;  Surgeon: Elmira Bennett MD;  Location: [facility]     INJECT SACROILIAC JOINT Bilateral 11/10/2017    Procedure: INJECT SACROILIAC JOINT;  Bilateral Sacroiliac Joint Injection;  Surgeon: Elmira Bennett MD;  Location: [facility] OR     [facility] NONSPECIFIC PROCEDURE      Breast biopsies      [facility] NONSPECIFIC PROCEDURE      Pilonidal sinus repair      [facility] NONSPECIFIC PROCEDURE      T & A      Z NONSPECIFIC PROCEDURE  5/02    Shoulder arthropasty     Z NONSPECIFIC PROCEDURE  5/07    Right shoulder replacement, RCT repair       Prior to Admission Medications   Prior to Admission Medications   Prescriptions Last Dose Informant Patient Reported? Taking?   ASPIRIN EC PO 2/21/2022 at Unknown time Self Yes Yes   Sig: Take 81 mg by mouth daily   Acetaminophen (TYLENOL PO) 2/21/2022 at  Unknown time Self Yes Yes   Sig: Take 325 mg by mouth every 6 hours as needed Patient takes TID with Oxycodone.    Calcium Polycarbophil (FIBERCON PO) 2/21/2022 at Unknown time Self Yes Yes   Sig: Take 1 tablet by mouth daily    Multiple Vitamins-Minerals (MULTIVITAMIN ADULT PO) 2/21/2022 at Unknown time Self Yes Yes   Sig: Take 1 tablet by mouth every evening    NONFORMULARY 2/20/2022 at Unknown time Self Yes Yes   Sig: At Bedtime Natra sleeping med takes 1 at night.    albuterol (ACCUNEB) 0.63 MG/3ML neb solution  Self No Yes   Sig: Take 3 mLs (0.63 mg) by nebulization every 6 hours as needed for shortness of breath / dyspnea or wheezing   albuterol (ACCUNEB) 1.25 MG/3ML neb solution  Self No Yes   Sig: Take 2 vials (2.5 mg) by nebulization every 4 hours as needed for shortness of breath / dyspnea or wheezing   albuterol (PROAIR HFA/PROVENTIL HFA/VENTOLIN HFA) 108 (90 Base) MCG/ACT inhaler  Self No Yes   Sig: INHALE 2 PUFFS INTO THE LUNGS EVERY 6 HOURS AS NEEDED FOR SHORTNESS OF BREATH OR DIFFICULT BREATHING OR WHEEZING   amLODIPine (NORVASC) 2.5 MG tablet 2/21/2022 at   Self Yes Yes   Sig: Take 2.5 mg by mouth daily   calcium-vitamin D (CALTRATE) 600-400 MG-UNIT per tablet 2/21/2022 at Unknown time Self Yes Yes   Sig: Take 1 tablet by mouth 2 times daily 1200mg   1000 mg of vitamin d   co-enzyme Q-10 100 MG CAPS capsule 2/21/2022 at Unknown time Self Yes Yes   Sig: Take 100 mg by mouth daily   fluticasone-salmeterol (WIXELA INHUB) 500-50 MCG/DOSE inhaler 2/21/2022 at Unknown time Self No Yes   Sig: Inhale 1 puff into the lungs 2 times daily ### DO NOT FILL NOW.  Please update patient's profile to reflect additional refills.  ####   glipiZIDE (GLUCOTROL) 5 MG tablet 2/21/2022 at Unknown time Self No Yes   Sig: Take 1 tablet (5 mg) by mouth every morning   glucosamine-chondroitin 500-400 MG CAPS per capsule 2/21/2022 at Unknown time Self Yes Yes   Sig: Take 1 capsule by mouth 2 times daily    ipratropium -  albuterol 0.5 mg/2.5 mg/3 mL (DUONEB) 0.5-2.5 (3) MG/3ML neb solution 2022 at Unknown time Self No Yes   Sig: Take 1 vial (3 mLs) by nebulization every 4 hours as needed for shortness of breath / dyspnea or wheezing   latanoprost (XALATAN) 0.005 % ophthalmic solution 2022 at Unknown time Self Yes Yes   Sig: Place 1 drop Into the left eye every evening   oxyCODONE (ROXICODONE) 5 MG tablet 2022 at Unknown time Self No Yes   Sig: Take 1 tablet (5 mg) by mouth every 4 hours as needed for severe pain May take 5 tabs per day   polyethylene glycol (MIRALAX) 17 g packet 2022 at Unknown time Self Yes Yes   Sig: Take 0.5 packets by mouth every evening    predniSONE (DELTASONE) 20 MG tablet 2022 Self No No   Si tab po bid for 3 days, then 1 tab po every day for 3 days, then 1/2 tab po every day for 4 days   rosuvastatin (CRESTOR) 5 MG tablet 2022 at Unknown time Self Yes Yes   Sig: Take 5 mg by mouth At Bedtime   tiotropium (SPIRIVA HANDIHALER) 18 MCG inhaled capsule 2022 at Unknown time Self No Yes   Sig: INHALE THE CONTENTS OF 1 CAPSULE VIA INHALATION DEVICE DAILY      Facility-Administered Medications: None     Allergies   Allergies   Allergen Reactions     Sulfamethoxazole Anaphylaxis and Hives       Immunization History   Immunization History   Administered Date(s) Administered     COVID-19,PF,Pfizer (12+ Yrs) 2021, 2021, 10/14/2021     HEPA 1999, 2001     Influenza (H1N1) 2010     Influenza (High Dose) 3 valent vaccine 10/01/2010, 10/20/2011, 2012, 10/04/2013, 2014, 10/02/2015, 10/17/2016, 2017, 09/10/2018, 2019     Influenza (IIV3) PF 2001, 2002, 2003, 10/25/2005, 10/21/2008, 2009     Influenza, Quad, High Dose, Pf, 65yr+ (Fluzone HD) 2020, 10/14/2021     Pneumo Conj 13-V (&after) 2015     Pneumococcal 23 valent 1996, 2007     TD (ADULT, 7+) 1999, 2009     TDAP  Vaccine (Adacel) 04/02/2013     Zoster vaccine recombinant adjuvanted (SHINGRIX) 06/15/2018, 08/25/2018     Zoster vaccine, live 06/01/2011       Social History   I have reviewed this patient's social history and updated it with pertinent information if needed. Celeste Chacko  reports that she quit smoking about 21 years ago. Her smoking use included cigarettes. She has a 27.00 pack-year smoking history. She has never used smokeless tobacco. She reports current alcohol use of about 0.8 standard drinks of alcohol per week. She reports that she does not use drugs.    Family History   I have reviewed this patient's family history and updated it with pertinent information if needed.   Family History   Problem Relation Age of Onset     Arthritis Father      Diabetes Brother      Cancer Brother         breast     Cerebrovascular Disease Brother        Review of Systems   The 10 point Review of Systems is negative other than noted in the HPI or here.     Physical Exam   Temp: 98.6  F (37  C) Temp src: Oral BP: (!) 161/68 Pulse: 88   Resp: 16 SpO2: 92 % O2 Device: Nasal cannula Oxygen Delivery: 2 LPM  Vital Signs with Ranges  Temp:  [97.9  F (36.6  C)-99.6  F (37.6  C)] 98.6  F (37  C)  Pulse:  [] 88  Resp:  [15-19] 16  BP: (119-193)/(61-93) 161/68  SpO2:  [87 %-96 %] 92 %  132 lbs 0 oz  Body mass index is 22.94 kg/m .    GENERAL APPEARANCE:  awake  EYES: Eyes grossly normal to inspection  NECK: no adenopathy  RESP: lungs clear   CV: regular rates and rhythm  LYMPHATICS: normal ant/post cervical and supraclavicular nodes  ABDOMEN: soft, nontender  MS: extremities normal  SKIN: no suspicious lesions or rashes  Psych: affect normal       Data   Lab Results   Component Value Date    WBC 7.3 02/22/2022    HGB 12.2 02/22/2022    HCT 39.7 02/22/2022     02/22/2022     02/22/2022    POTASSIUM 3.5 02/22/2022    CHLORIDE 104 02/22/2022    CO2 27 02/22/2022    BUN 17 02/22/2022    CR 0.59 02/22/2022     (H)  02/22/2022    DD 0.60 (H) 02/21/2022    NTBNPI 608 02/21/2022    AST 15 02/21/2022    ALT 32 02/21/2022    ALKPHOS 58 02/21/2022    BILITOTAL 0.5 02/21/2022    INR 1.17 (H) 01/25/2022     No results for input(s): CULT in the last 168 hours.  Recent Labs   Lab Test 12/04/15  1112   CULT >100,000 colonies/mL Escherichia coli*

## 2022-02-22 NOTE — PLAN OF CARE
Goal Outcome Evaluation:    Plan of Care Reviewed With: patient     Overall Patient Progress: improving    Patient here with SOB, fatigue. Alert, oriented times 4. VSS. On 2 L of O2 per nasal cannula. Lung sounds diminished. Up with SBA and walker, ambulates to bathroom. Shifts independently in bed, and sits at bedside and in chair. Continent of bowel and bladder. On airborne precautions for TB rule out. WOC RN following for reddened heels and pinpoint redness of coccys. Patient will transfer to station 66 once room there ready.

## 2022-02-23 ENCOUNTER — TELEPHONE (OUTPATIENT)
Dept: INTERNAL MEDICINE | Facility: CLINIC | Age: 87
End: 2022-02-23
Payer: COMMERCIAL

## 2022-02-23 ENCOUNTER — APPOINTMENT (OUTPATIENT)
Dept: PHYSICAL THERAPY | Facility: CLINIC | Age: 87
DRG: 177 | End: 2022-02-23
Attending: HOSPITALIST
Payer: COMMERCIAL

## 2022-02-23 LAB
GALACTOMANNAN AG SERPL QL IA: NEGATIVE
GALACTOMANNAN AG SPEC IA-ACNC: 0.07
GAMMA INTERFERON BACKGROUND BLD IA-ACNC: 0.75 IU/ML
GLUCOSE BLDC GLUCOMTR-MCNC: 106 MG/DL (ref 70–99)
GLUCOSE BLDC GLUCOMTR-MCNC: 136 MG/DL (ref 70–99)
GLUCOSE BLDC GLUCOMTR-MCNC: 174 MG/DL (ref 70–99)
GLUCOSE BLDC GLUCOMTR-MCNC: 196 MG/DL (ref 70–99)
GLUCOSE BLDC GLUCOMTR-MCNC: 244 MG/DL (ref 70–99)
M TB IFN-G BLD-IMP: NEGATIVE
M TB IFN-G CD4+ BCKGRND COR BLD-ACNC: 9.25 IU/ML
MITOGEN IGNF BCKGRD COR BLD-ACNC: -0.05 IU/ML
MITOGEN IGNF BCKGRD COR BLD-ACNC: -0.06 IU/ML
QUANTIFERON MITOGEN: 10 IU/ML
QUANTIFERON NIL TUBE: 0.75 IU/ML
QUANTIFERON TB1 TUBE: 0.69 IU/ML
QUANTIFERON TB2 TUBE: 0.7

## 2022-02-23 PROCEDURE — 250N000011 HC RX IP 250 OP 636: Performed by: INTERNAL MEDICINE

## 2022-02-23 PROCEDURE — 87015 SPECIMEN INFECT AGNT CONCNTJ: CPT | Performed by: INTERNAL MEDICINE

## 2022-02-23 PROCEDURE — 87116 MYCOBACTERIA CULTURE: CPT | Performed by: INTERNAL MEDICINE

## 2022-02-23 PROCEDURE — 97530 THERAPEUTIC ACTIVITIES: CPT | Mod: GP

## 2022-02-23 PROCEDURE — 97161 PT EVAL LOW COMPLEX 20 MIN: CPT | Mod: GP

## 2022-02-23 PROCEDURE — 120N000001 HC R&B MED SURG/OB

## 2022-02-23 PROCEDURE — 87798 DETECT AGENT NOS DNA AMP: CPT | Performed by: INTERNAL MEDICINE

## 2022-02-23 PROCEDURE — 99232 SBSQ HOSP IP/OBS MODERATE 35: CPT | Performed by: HOSPITALIST

## 2022-02-23 PROCEDURE — 97116 GAIT TRAINING THERAPY: CPT | Mod: GP

## 2022-02-23 PROCEDURE — 250N000013 HC RX MED GY IP 250 OP 250 PS 637: Performed by: HOSPITALIST

## 2022-02-23 PROCEDURE — 87206 SMEAR FLUORESCENT/ACID STAI: CPT | Performed by: INTERNAL MEDICINE

## 2022-02-23 PROCEDURE — 999N000128 HC STATISTIC PERIPHERAL IV START W/O US GUIDANCE

## 2022-02-23 RX ADMIN — CALCIUM CARBONATE 600 MG (1,500 MG)-VITAMIN D3 400 UNIT TABLET 1 TABLET: at 21:36

## 2022-02-23 RX ADMIN — FLUTICASONE FUROATE AND VILANTEROL TRIFENATATE 1 PUFF: 200; 25 POWDER RESPIRATORY (INHALATION) at 09:10

## 2022-02-23 RX ADMIN — INSULIN ASPART 1 UNITS: 100 INJECTION, SOLUTION INTRAVENOUS; SUBCUTANEOUS at 21:34

## 2022-02-23 RX ADMIN — INSULIN ASPART 4 UNITS: 100 INJECTION, SOLUTION INTRAVENOUS; SUBCUTANEOUS at 09:06

## 2022-02-23 RX ADMIN — OXYCODONE HYDROCHLORIDE 5 MG: 5 TABLET ORAL at 07:00

## 2022-02-23 RX ADMIN — CALCIUM POLYCARBOPHIL 625 MG: 625 TABLET, FILM COATED ORAL at 09:03

## 2022-02-23 RX ADMIN — FAMOTIDINE 20 MG: 20 TABLET ORAL at 21:36

## 2022-02-23 RX ADMIN — AMLODIPINE BESYLATE 5 MG: 5 TABLET ORAL at 09:03

## 2022-02-23 RX ADMIN — FUROSEMIDE 20 MG: 20 TABLET ORAL at 09:03

## 2022-02-23 RX ADMIN — NICOTINE POLACRILEX 2 MG: 2 GUM, CHEWING ORAL at 11:13

## 2022-02-23 RX ADMIN — OXYCODONE HYDROCHLORIDE 5 MG: 5 TABLET ORAL at 22:08

## 2022-02-23 RX ADMIN — THERA TABS 1 TABLET: TAB at 20:05

## 2022-02-23 RX ADMIN — UMECLIDINIUM 1 PUFF: 62.5 AEROSOL, POWDER ORAL at 09:10

## 2022-02-23 RX ADMIN — INSULIN ASPART: 100 INJECTION, SOLUTION INTRAVENOUS; SUBCUTANEOUS at 20:01

## 2022-02-23 RX ADMIN — GLIPIZIDE 5 MG: 5 TABLET ORAL at 09:03

## 2022-02-23 RX ADMIN — OXYCODONE HYDROCHLORIDE 5 MG: 5 TABLET ORAL at 12:29

## 2022-02-23 RX ADMIN — ROSUVASTATIN CALCIUM 5 MG: 5 TABLET, FILM COATED ORAL at 21:36

## 2022-02-23 RX ADMIN — FAMOTIDINE 20 MG: 20 TABLET ORAL at 09:03

## 2022-02-23 RX ADMIN — AMPICILLIN SODIUM AND SULBACTAM SODIUM 3 G: 2; 1 INJECTION, POWDER, FOR SOLUTION INTRAMUSCULAR; INTRAVENOUS at 21:36

## 2022-02-23 RX ADMIN — LATANOPROST 1 DROP: 50 SOLUTION/ DROPS OPHTHALMIC at 20:08

## 2022-02-23 RX ADMIN — OXYCODONE HYDROCHLORIDE 5 MG: 5 TABLET ORAL at 17:07

## 2022-02-23 RX ADMIN — AMPICILLIN SODIUM AND SULBACTAM SODIUM 3 G: 2; 1 INJECTION, POWDER, FOR SOLUTION INTRAMUSCULAR; INTRAVENOUS at 09:05

## 2022-02-23 RX ADMIN — AMPICILLIN SODIUM AND SULBACTAM SODIUM 3 G: 2; 1 INJECTION, POWDER, FOR SOLUTION INTRAMUSCULAR; INTRAVENOUS at 16:14

## 2022-02-23 RX ADMIN — ASPIRIN 81 MG: 81 TABLET, COATED ORAL at 09:03

## 2022-02-23 RX ADMIN — OXYCODONE HYDROCHLORIDE 5 MG: 5 TABLET ORAL at 00:42

## 2022-02-23 RX ADMIN — AMPICILLIN SODIUM AND SULBACTAM SODIUM 3 G: 2; 1 INJECTION, POWDER, FOR SOLUTION INTRAMUSCULAR; INTRAVENOUS at 03:31

## 2022-02-23 RX ADMIN — CALCIUM CARBONATE 600 MG (1,500 MG)-VITAMIN D3 400 UNIT TABLET 1 TABLET: at 09:03

## 2022-02-23 RX ADMIN — POLYETHYLENE GLYCOL 3350 8.5 G: 17 POWDER, FOR SOLUTION ORAL at 20:06

## 2022-02-23 ASSESSMENT — ACTIVITIES OF DAILY LIVING (ADL)
ADLS_ACUITY_SCORE: 8
ADLS_ACUITY_SCORE: 10
ADLS_ACUITY_SCORE: 8
ADLS_ACUITY_SCORE: 10
ADLS_ACUITY_SCORE: 8
ADLS_ACUITY_SCORE: 10
ADLS_ACUITY_SCORE: 8
ADLS_ACUITY_SCORE: 8
ADLS_ACUITY_SCORE: 10
ADLS_ACUITY_SCORE: 10
ADLS_ACUITY_SCORE: 8
ADLS_ACUITY_SCORE: 10
ADLS_ACUITY_SCORE: 8
ADLS_ACUITY_SCORE: 8
ADLS_ACUITY_SCORE: 10
ADLS_ACUITY_SCORE: 8

## 2022-02-23 NOTE — PROGRESS NOTES
"[facility]    Infectious Disease Progress Note    Date of Service (when I saw the patient): 02/23/2022     Assessment & Plan   Celeste Chacko is a 89 year old female who was admitted on 2/21/2022.     Impression:  1. 89 y.o with COPD  2. Diabetes   3. HTN, HLd  4. Admitted with shortness of breath.   5. CT scan \"  There are two new cavitary nodules in the right upper lobe. This  is concerning for aggressive, atypical infection, possibly fungal or  Tubercular.\"      Recommendations:   Airborne isolation will rule out for TB though low suspicion, patient has no past history of family or friend who had TB  AFB cultures and stain ( induce sputum if non productive)   Fungal antibodies and antigen   Unasyn for possible bacterial cavitary infection   Discussed with PAM Sainz MD    Interval History   Tolerating antibiotics ok   No new rashes or issues with antibiotics   Labs reviewed   Afebrile     Physical Exam   Temp: 98.2  F (36.8  C) Temp src: Oral BP: (!) 150/64 Pulse: 99   Resp: 20 SpO2: 91 % O2 Device: Nasal cannula Oxygen Delivery: 4 LPM  Vitals:    02/21/22 1902 02/22/22 0500   Weight: 61.1 kg (134 lb 11.2 oz) 59.9 kg (132 lb)     Vital Signs with Ranges  Temp:  [98.2  F (36.8  C)-99.7  F (37.6  C)] 98.2  F (36.8  C)  Pulse:  [89-99] 99  Resp:  [18-20] 20  BP: (125-150)/(57-70) 150/64  SpO2:  [85 %-94 %] 91 %    Constitutional: Awake, alert, cooperative, no apparent distress  Lungs: Clear to auscultation bilaterally, no crackles or wheezing  Cardiovascular: Regular rate and rhythm, normal S1 and S2, and no murmur noted  Abdomen: Normal bowel sounds, soft, non-distended, non-tender  Skin: No rashes, no cyanosis, no edema  Other:    Medications       amLODIPine  5 mg Oral Daily     ampicillin-sulbactam (UNASYN) IV  3 g Intravenous Q6H     aspirin  81 mg Oral Daily     calcium carbonate 600 mg-vitamin D 400 units  1 tablet Oral BID     calcium polycarbophil  625 mg Oral " Daily     famotidine  20 mg Oral BID     fluticasone-vilanterol  1 puff Inhalation Daily     furosemide  20 mg Oral Daily     glipiZIDE  5 mg Oral QAM     insulin aspart  1-7 Units Subcutaneous TID AC     insulin aspart  1-5 Units Subcutaneous At Bedtime     insulin aspart   Subcutaneous TID AC     latanoprost  1 drop Left Eye QPM     multivitamin, therapeutic  1 tablet Oral QPM     polyethylene glycol  8.5 g Oral QPM     rosuvastatin  5 mg Oral At Bedtime     umeclidinium  1 puff Inhalation Daily       Data   All microbiology laboratory data reviewed.  Recent Labs   Lab Test 02/22/22  0559 02/21/22  1242 02/19/22  1645   WBC 7.3 8.9 9.3   HGB 12.2 12.6 13.5   HCT 39.7 40.4 43.5   MCV 83 84 84    174 209     Recent Labs   Lab Test 02/22/22  0559 02/21/22  1242 02/19/22  1645   CR 0.59 0.62 0.62     No lab results found.  Recent Labs   Lab Test 12/04/15  1112   CULT >100,000 colonies/mL Escherichia coli*

## 2022-02-23 NOTE — PROGRESS NOTES
Transfer in/out    Transfer to 627 from CCU    Alert and oriented x4. VSS. C/o back pain (chronic) oxy given and effective. Up with one and walker. VSS on 2L via NC. Infrequent non productive cough. Receiving intermittent IV abx.       Code status: DNR / DNI  Skin: intact. bruising  Fall Risk: YES  Isolation: Airborn  Patient belongings: in flowsheet   If patient is a 72 hour hold/Commitment are belongings removed from room and locked up? NA  Medication drips upon transfer: NA  Sign and held orders released? NA

## 2022-02-23 NOTE — PROGRESS NOTES
[facility]    Medicine Progress Note - Hospitalist Service    Date of Admission:  2/21/2022    Assessment & Plan            Celeste Chacko is a very pleasant 89 year old female with medical history significant for COPD, chronic back pain, DM2, HTN, HL was brought to the ER for evaluation of exertional dyspnea and fatigue and is being registered under observation on 2/21/2022 for further management.     Possible Lung abscess  Exertional dyspnea and Fatigue  Recent admission for COPD exacerbation treated with a steroid but ongoing dyspnea mostly with exertion but without fever, cough, weight loss or night sweats.  No wheezing.  No chest pain. Interestingly, CT PE study was done which shoed No PE but 2 new cavitary nodules in the right upper lobe. This is concerning for aggressive, atypical infection, possibly fungal or tubercular.  Patient has been on steroid for almost a month although currently off of it.  proBNP negative.  WBC normal.  Pro-Edilberto negative  -Started on Zosyn on admission, ID consulted, antibiotic changed to Unasyn-continuing.   -QuantiFERON-TB gold negative. Sputum for AFB x3 per ID, placed on airborne isolation.  -Fungal serologies and antigen, PCP for ID. Appreciate ID assistance.  -Blood cultures negative so far, follow.  Patient is afebrile  -Continuous pulse ox  -Management of COPD as below  -Therapy eval noted, TCU at discharge, SW for discharge planning.   -Patient was due to see pulm 2/24, pending above work up, hold off on consult.     Mildly abnormal troponin, type II MI  Minimally abnormal troponin, follow trend.  Unlikely ACS.  -Continue aspirin  -2D echo showed normal LVEF and no wall motion abnormalities.  -No further work-up planned, recommend outpatient stress test once acute issues resolve.     Chronic medical conditions    DM2: HbA1c 9.9.  Takes glipizide PTA.  - PTA glipizide resumed   - Continue with Premeal NovoLog and ISS.  - BS in 100s    HTN  HL  Resume  PTA amlodipine  - BP remains elevated, increased to 5 mg p.o. daily, BP better.     COPD  Not on home O2.  PTA is on fluticasone/salmeterol, DuoNeb, Spiriva inhalers.  -Continue with scheduled DuoNeb, steroid neb and PRN albuterol as well.    - Not on home O2, needs exercise oximetry prior to discharge  -Her current presentation does not seem consistent with COPD exacerbation, likely related to pneumonia/lung abscess as above, treatment as outlined.        Diet: Moderate Consistent Carb (60 g CHO per Meal) Diet    DVT Prophylaxis: Enoxaparin (Lovenox) SQ  Botello Catheter: Not present  Central Lines: None  Cardiac Monitoring: None  Code Status: No CPR- Do NOT Intubate      Disposition Plan   Expected Discharge: 02/28/2022    Anticipated discharge location:  Awaiting care coordination huddle  Delays:              The patient's care was discussed with the Bedside Nurse, Patient and her daughter Irene 2/23    Carson Viveros MD  Hospitalist Service  [facility]  Securely message with the Vocera Web Console (learn more here)  Text page via [employer] Paging/Directory         Clinically Significant Risk Factors Present on Admission              # Diabetes, type II: last A1C 9.9 % (Ref range: 0.0 - 5.6 %)      ______________________________________________________________________    Interval History   No acute issues and breathing is about the same. Denies chest pain. Some cough now and is able to collect small amount of sputum.      Data reviewed today: I reviewed all medications, new labs and imaging results over the last 24 hours. I personally reviewed no images or EKGs today    Physical Exam   Vital Signs: Temp: 98.2  F (36.8  C) Temp src: Oral BP: (!) 150/64 Pulse: 99   Resp: 18 SpO2: 91 % O2 Device: Nasal cannula Oxygen Delivery: 4 LPM  Weight: 132 lbs 0 oz    General: AAOx3, very pleasant, appears comfortable.  HEENT: PERRLA EOMI. Mucosa moist.   Lungs: Bilateral equal air entry. Clear to  auscultation anteriorly, coarse/crackles on the left posteriorly, normal work of breathing, on 4LPM   CVS: S1S2 regular, no tachycardia or murmur.   Abdomen: Soft, distended, nontender. BS heard.  MSK: trace edema.  Neuro: AAOX3. CN 2-12 normal. Strength symmetrical.  Skin: No rash. Some ecchymosis on the right leg present.    Data   Recent Labs   Lab 02/23/22  1155 02/23/22  0747 02/23/22  0331 02/22/22  0754 02/22/22  0559 02/21/22  1948 02/21/22  1242 02/19/22  1645   WBC  --   --   --   --  7.3  --  8.9 9.3   HGB  --   --   --   --  12.2  --  12.6 13.5   MCV  --   --   --   --  83  --  84 84   PLT  --   --   --   --  158  --  174 209   NA  --   --   --   --  137  --  135 136   POTASSIUM  --   --   --   --  3.5  --  4.2 4.8   CHLORIDE  --   --   --   --  104  --  101 101   CO2  --   --   --   --  27  --  26 28   BUN  --   --   --   --  17  --  26 26   CR  --   --   --   --  0.59  --  0.62 0.62   ANIONGAP  --   --   --   --  6  --  8 7   NARCISA  --   --   --   --  8.5  --  10.0 9.3   * 174* 136*   < > 161*   < > 365* 396*   ALBUMIN  --   --   --   --   --   --  3.1* 3.3*   PROTTOTAL  --   --   --   --   --   --  6.2* 6.8   BILITOTAL  --   --   --   --   --   --  0.5 0.4   ALKPHOS  --   --   --   --   --   --  58 73   ALT  --   --   --   --   --   --  32 39   AST  --   --   --   --   --   --  15 12    < > = values in this interval not displayed.     No results found for this or any previous visit (from the past 24 hour(s)).  Medications       amLODIPine  5 mg Oral Daily     ampicillin-sulbactam (UNASYN) IV  3 g Intravenous Q6H     aspirin  81 mg Oral Daily     calcium carbonate 600 mg-vitamin D 400 units  1 tablet Oral BID     calcium polycarbophil  625 mg Oral Daily     famotidine  20 mg Oral BID     fluticasone-vilanterol  1 puff Inhalation Daily     furosemide  20 mg Oral Daily     glipiZIDE  5 mg Oral QAM     insulin aspart  1-7 Units Subcutaneous TID AC     insulin aspart  1-5 Units Subcutaneous At Bedtime      insulin aspart   Subcutaneous TID AC     latanoprost  1 drop Left Eye QPM     multivitamin, therapeutic  1 tablet Oral QPM     polyethylene glycol  8.5 g Oral QPM     rosuvastatin  5 mg Oral At Bedtime     umeclidinium  1 puff Inhalation Daily

## 2022-02-23 NOTE — TELEPHONE ENCOUNTER
[facility] calls to ask    Will   follow and sign PLAN OF CARE after discharge from [facility]    Phone [phone]  Fax [phone]

## 2022-02-23 NOTE — PLAN OF CARE
Goal Outcome Evaluation:    Plan of Care Reviewed With: patient     Overall Patient Progress: improving  DATE & TIME:  9923-3545  Cognitive Concerns/ Orientation : alert and oriented x4   BEHAVIOR & AGGRESSION TOOL COLOR: GREEN    ABNL VS/O2: VSS except for temp 99.7 on 2L via NC  MOBILITY: up SBA with walker  PAIN MANAGMENT: oxy available q4h for chronic backpain.   DIET: modcarb tolerating  BOWEL/BLADDER: continent b/b  ABNL LAB/B bg  DRAIN/DEVICES: PIV Larm, SL  SKIN: mepi on buttocks- followed by WOC  TESTS/PROCEDURES:  continue sputum induction for TB rule out  D/C DAY/GOALS/PLACE: pending infection work up  OTHER IMPORTANT INFO: BLE edema and ruddyness.

## 2022-02-23 NOTE — PLAN OF CARE
Goal Outcome Evaluation:    Plan of Care Reviewed With: patient     Overall Patient Progress: improving        DATE & TIME: 2/22/22 0903-1421  Cognitive Concerns/ Orientation : A&O x4, calm & cooperative   BEHAVIOR & AGGRESSION TOOL COLOR: GREEN  ABNL VS/O2: VSS on 2-4L NC (RA baseline)  MOBILITY: SBA with walker. Steady gait   PAIN MANAGMENT: Chronic back pain, max 8/10. PRN Oxy 5mg given x3 with relief   DIET: Mod CHO  BOWEL/BLADDER: Continent b/b  ABNL LAB/BG: , 137 & 136  DRAIN/DEVICES: PIV SL  SKIN: Coccyx mepilex CDI, gita rash, gen edema 1+, valeriy BLE  TESTS/PROCEDURES: Chest CT showed 2 nodules in RUL, fungal or tubular. Continue sputum induction for TB rule out  D/C DAY/GOALS/PLACE: Pending improvement   OTHER IMPORTANT INFO: Airborne precautions maintained

## 2022-02-23 NOTE — PROGRESS NOTES
02/23/22 0949   Quick Adds   Type of Visit Initial PT Evaluation   Living Environment   People in Home alone   Current Living Arrangements condominium   Home Accessibility no concerns   Transportation Anticipated family or friend will provide   Living Environment Comments elevator.    Self-Care   Usual Activity Tolerance good   Current Activity Tolerance fair   Equipment Currently Used at Home walker, rolling;grab bar, toilet;grab bar, tub/shower;shower chair   Fall history within last six months no   Activity/Exercise/Self-Care Comment daughter does grocery shopping. drives at baseline, ind with use of equipment for ADLs and IADLs   General Information   Onset of Illness/Injury or Date of Surgery 02/21/22   Referring Physician Carson Viveros MD   Patient/Family Therapy Goals Statement (PT) Would like to go to rehab if that is recommended.    Pertinent History of Current Problem (include personal factors and/or comorbidities that impact the POC) Pt is a 89 y.o female admitted on 2/21/22 due to exertional dyspnea and fatigue. PMHx significant for COPD, chronic back pain, DM2, HTN, HL.    Existing Precautions/Restrictions oxygen therapy device and L/min;fall  (4 LPM via NC)   Cognition   Affect/Mental Status (Cognition) WFL   Orientation Status (Cognition) oriented x 3   Pain Assessment   Patient Currently in Pain Yes, see Vital Sign flowsheet  (3/10 low back pain. )   Integumentary/Edema   Integumentary/Edema Comments WFL, per chart coccyz and heel wounds, not visually assessed.    Posture    Posture Kyphosis;Protracted shoulders;Forward head position   Range of Motion (ROM)   ROM Comment Grossly WFL BUE and LE   Strength (Manual Muscle Testing)   Strength (Manual Muscle Testing) Able to perform R SLR;Able to perform L SLR   Strength Comments Grossly antigravity strength with functional transfers. Global weakness.    Bed Mobility   Comment, (Bed Mobility) supine HOB flat>sitting EOB, Brian, increased time and  effort, bed rail.    Transfers   Comment, (Transfers) Sit<>stand to FWW, Brian.    Gait/Stairs (Locomotion)   Distance in Feet (Required for LE Total Joints) 5' eval + 100' treatment   Comment, (Gait/Stairs) Amb with FWW, CGA   Balance   Balance Comments Able to maintain seated balance BUE support. Able to maintain standing balance in FWW, SBA.    Sensory Examination   Sensory Perception Comments Some tingling in hands which is baseline per pt.    Clinical Impression   Criteria for Skilled Therapeutic Intervention Yes, treatment indicated   PT Diagnosis (PT) Impaired gait   Influenced by the following impairments pain, decreased functional strength, decreased activity tolernace.    Functional limitations due to impairments pain, fall risk, impaired functional independence, impaired ADLs and IADLs   Clinical Presentation (PT Evaluation Complexity) Stable/Uncomplicated   Clinical Presentation Rationale per MR and clinical judgement   Clinical Decision Making (Complexity) low complexity   Planned Therapy Interventions (PT) balance training;bed mobility training;gait training;home exercise program;ROM (range of motion);strengthening;patient/family education;neuromuscular re-education;stretching;transfer training   Risk & Benefits of therapy have been explained evaluation/treatment results reviewed;care plan/treatment goals reviewed;risks/benefits reviewed;current/potential barriers reviewed;participants voiced agreement with care plan;participants included;patient   PT Discharge Planning   PT Discharge Recommendation (DC Rec) Transitional Care Facility;home with home care physical therapy;home with assist;Leaving home requires significant assistance;Leaving home requires significant taxing effort   PT Rationale for DC Rec Pt is below baseline functional independence, strength, activity tolerance, lives alone, fall risk, requiring 4LPM of O2 at this time. Pt would benefit from continued skilled therapy to progress  independence towards baseline. If pt were to return home, pt would require 24H supervision, assist with transfers, supplemental oxygen, home PT.    PT Brief overview of current status Amb with FWW, CGA, goal of 4-6 times per day with nursing.    Total Evaluation Time   Total Evaluation Time (Minutes) 4   Physical Therapy Goals   PT Frequency 5x/week   PT Predicated Duration/Target Date for Goal Attainment 03/02/22   PT Goals Bed Mobility;Transfers;Gait;Aerobic Activity   PT: Bed Mobility Independent;Rolling;Bridging;Supine to/from sit   PT: Transfers Modified independent;Assistive device;Bed to/from chair;Sit to/from stand   PT: Gait Assistive device;Greater than 200 feet;Rolling walker;Modified independent   PT: Perform aerobic activity with stable cardiovascular response continuous activity;15 minutes  (without supplemental oxygen. )

## 2022-02-23 NOTE — PROGRESS NOTES
Care Management Follow Up    Length of Stay (days): 2    Expected Discharge Date: 02/28/2022     Concerns to be Addressed:       Patient plan of care discussed at interdisciplinary rounds: Yes    Anticipated Discharge Disposition:       Anticipated Discharge Services:    Anticipated Discharge DME:      Patient/family educated on Medicare website which has current facility and service quality ratings:    Education Provided on the Discharge Plan:    Patient/Family in Agreement with the Plan:      Referrals Placed by CM/SW:    Private pay costs discussed: Not applicable    Additional Information:  Per MARCELO Vargas [employer] called regarding referral sent for HC services.  Called [employer] [phone] and left message for Amanda regarding pt still inpt and discharge needs not determined.    Per chart review, PT recommending TCU and pt remains on O2@4L.  Discussed with Alicia GARCIA regarding PT recommendations.   Consult not completed as pt is in TB isolation rule out.    Care Coordination will continue to follow for discharge needs.    Ivette Garcia, RN  Ivette Garcia RN, BSN, OCN   Inpatient Care Coordination [address]  Office: [phone]

## 2022-02-24 ENCOUNTER — APPOINTMENT (OUTPATIENT)
Dept: PHYSICAL THERAPY | Facility: CLINIC | Age: 87
DRG: 177 | End: 2022-02-24
Payer: COMMERCIAL

## 2022-02-24 LAB
GLUCOSE BLDC GLUCOMTR-MCNC: 175 MG/DL (ref 70–99)
GLUCOSE BLDC GLUCOMTR-MCNC: 177 MG/DL (ref 70–99)
GLUCOSE BLDC GLUCOMTR-MCNC: 178 MG/DL (ref 70–99)
GLUCOSE BLDC GLUCOMTR-MCNC: 97 MG/DL (ref 70–99)
SCANNED LAB RESULT: NORMAL

## 2022-02-24 PROCEDURE — 87206 SMEAR FLUORESCENT/ACID STAI: CPT | Performed by: INTERNAL MEDICINE

## 2022-02-24 PROCEDURE — 97530 THERAPEUTIC ACTIVITIES: CPT | Mod: GP

## 2022-02-24 PROCEDURE — 97110 THERAPEUTIC EXERCISES: CPT | Mod: GP

## 2022-02-24 PROCEDURE — 250N000013 HC RX MED GY IP 250 OP 250 PS 637: Performed by: HOSPITALIST

## 2022-02-24 PROCEDURE — 87449 NOS EACH ORGANISM AG IA: CPT | Performed by: INTERNAL MEDICINE

## 2022-02-24 PROCEDURE — 120N000001 HC R&B MED SURG/OB

## 2022-02-24 PROCEDURE — 250N000011 HC RX IP 250 OP 636: Performed by: INTERNAL MEDICINE

## 2022-02-24 PROCEDURE — 99232 SBSQ HOSP IP/OBS MODERATE 35: CPT | Performed by: INTERNAL MEDICINE

## 2022-02-24 RX ADMIN — OXYCODONE HYDROCHLORIDE 5 MG: 5 TABLET ORAL at 07:02

## 2022-02-24 RX ADMIN — THERA TABS 1 TABLET: TAB at 21:05

## 2022-02-24 RX ADMIN — CALCIUM CARBONATE 600 MG (1,500 MG)-VITAMIN D3 400 UNIT TABLET 1 TABLET: at 21:06

## 2022-02-24 RX ADMIN — LATANOPROST 1 DROP: 50 SOLUTION/ DROPS OPHTHALMIC at 21:06

## 2022-02-24 RX ADMIN — ASPIRIN 81 MG: 81 TABLET, COATED ORAL at 08:59

## 2022-02-24 RX ADMIN — FAMOTIDINE 20 MG: 20 TABLET ORAL at 21:05

## 2022-02-24 RX ADMIN — CALCIUM POLYCARBOPHIL 625 MG: 625 TABLET, FILM COATED ORAL at 08:59

## 2022-02-24 RX ADMIN — FLUTICASONE FUROATE AND VILANTEROL TRIFENATATE 1 PUFF: 200; 25 POWDER RESPIRATORY (INHALATION) at 09:02

## 2022-02-24 RX ADMIN — GLIPIZIDE 5 MG: 5 TABLET ORAL at 08:59

## 2022-02-24 RX ADMIN — ROSUVASTATIN CALCIUM 5 MG: 5 TABLET, FILM COATED ORAL at 21:55

## 2022-02-24 RX ADMIN — CALCIUM CARBONATE 600 MG (1,500 MG)-VITAMIN D3 400 UNIT TABLET 1 TABLET: at 08:59

## 2022-02-24 RX ADMIN — AMPICILLIN SODIUM AND SULBACTAM SODIUM 3 G: 2; 1 INJECTION, POWDER, FOR SOLUTION INTRAMUSCULAR; INTRAVENOUS at 02:40

## 2022-02-24 RX ADMIN — INSULIN ASPART 2 UNITS: 100 INJECTION, SOLUTION INTRAVENOUS; SUBCUTANEOUS at 09:07

## 2022-02-24 RX ADMIN — INSULIN ASPART 5 UNITS: 100 INJECTION, SOLUTION INTRAVENOUS; SUBCUTANEOUS at 13:34

## 2022-02-24 RX ADMIN — AMPICILLIN SODIUM AND SULBACTAM SODIUM 3 G: 2; 1 INJECTION, POWDER, FOR SOLUTION INTRAMUSCULAR; INTRAVENOUS at 21:07

## 2022-02-24 RX ADMIN — FUROSEMIDE 20 MG: 20 TABLET ORAL at 08:59

## 2022-02-24 RX ADMIN — OXYCODONE HYDROCHLORIDE 5 MG: 5 TABLET ORAL at 13:17

## 2022-02-24 RX ADMIN — AMPICILLIN SODIUM AND SULBACTAM SODIUM 3 G: 2; 1 INJECTION, POWDER, FOR SOLUTION INTRAMUSCULAR; INTRAVENOUS at 16:20

## 2022-02-24 RX ADMIN — POLYETHYLENE GLYCOL 3350 8.5 G: 17 POWDER, FOR SOLUTION ORAL at 21:05

## 2022-02-24 RX ADMIN — UMECLIDINIUM 1 PUFF: 62.5 AEROSOL, POWDER ORAL at 09:03

## 2022-02-24 RX ADMIN — AMLODIPINE BESYLATE 5 MG: 5 TABLET ORAL at 08:59

## 2022-02-24 RX ADMIN — OXYCODONE HYDROCHLORIDE 5 MG: 5 TABLET ORAL at 18:00

## 2022-02-24 RX ADMIN — FAMOTIDINE 20 MG: 20 TABLET ORAL at 08:59

## 2022-02-24 RX ADMIN — AMPICILLIN SODIUM AND SULBACTAM SODIUM 3 G: 2; 1 INJECTION, POWDER, FOR SOLUTION INTRAMUSCULAR; INTRAVENOUS at 09:09

## 2022-02-24 RX ADMIN — OXYCODONE HYDROCHLORIDE 5 MG: 5 TABLET ORAL at 22:03

## 2022-02-24 RX ADMIN — INSULIN ASPART 6 UNITS: 100 INJECTION, SOLUTION INTRAVENOUS; SUBCUTANEOUS at 18:55

## 2022-02-24 ASSESSMENT — ACTIVITIES OF DAILY LIVING (ADL)
ADLS_ACUITY_SCORE: 8

## 2022-02-24 NOTE — PROGRESS NOTES
Pt is able to cough up sputum on her own. RT did not perform sputum induction as she coughed up a sample before I arrived.    RT will continue to follow.    Marylou Tomas, RRT  2/24/2022

## 2022-02-24 NOTE — PLAN OF CARE
Summary:     DATE & TIME: 2/24 1500-1900  Cognitive Concerns/ Orientation : A&O x4, calm & cooperative   BEHAVIOR & AGGRESSION TOOL COLOR: GREEN  ABNL VS/O2: VSS on 2L NC tmax 99.5  MOBILITY: SBA with walker. Steady gait   PAIN MANAGMENT: Chronic back pain, max 8/10. PRN Oxy 5mg given w/ good effect  DIET: Mod CHO, tolerating  BOWEL/BLADDER: Continent b/b  ABNL LAB/BG: / 214/ 97  DRAIN/DEVICES: PIV SL  SKIN: valeriy BLE  TESTS/PROCEDURES: Continue sputum induction for TB rule out  D/C DAY/GOALS/PLACE: Pending improvement, TB rule out  OTHER IMPORTANT INFO: airborne isolation continues

## 2022-02-24 NOTE — PROGRESS NOTES
DATE & TIME: 2/23 1900-0730  Cognitive Concerns/ Orientation : A&O x4, calm & cooperative   BEHAVIOR & AGGRESSION TOOL COLOR: GREEN  ABNL VS/O2: VSS on 2L NC (RA baseline)  MOBILITY: SBA with walker. Steady gait   PAIN MANAGMENT: Chronic back pain, max 8/10. PRN Oxy 5mg given  DIET: Mod CHO, tolerating  BOWEL/BLADDER: Continent b/b  ABNL LAB/BG: / 177  DRAIN/DEVICES: PIV SL  SKIN: Coccyx mepilex CDI, gita rash, BLE edema +1, +2 in ankles, valeriy BLE  TESTS/PROCEDURES: Urine specimen collected; result pending  D/C DAY/GOALS/PLACE: Pending improvement   OTHER IMPORTANT INFO:

## 2022-02-24 NOTE — TELEPHONE ENCOUNTER
Call to [facility] at [phone]. Spoke with Amanda. Amanda informed of Dr. Marie's below response. Amanda verbalized understanding.     Zulema PRETTY RN   [facility]

## 2022-02-24 NOTE — PROGRESS NOTES
[facility]    HOSPITALIST PROGRESS NOTE :   --------------------------------------------------    Date of Admission:  2/21/2022    Cumulative Summary: Celeste Chacko is a very pleasant 89 year old female with medical history significant for COPD, chronic back pain, DM2, HTN, HL was brought to the ER for evaluation of exertional dyspnea and fatigue and is being registered under observation on 2/21/2022 for further management.    Assessment & Plan     Possible Lung abscess  Exertional dyspnea and Fatigue  Recent admission for COPD exacerbation treated with a steroid but ongoing dyspnea mostly with exertion but without fever, cough, weight loss or night sweats.  No wheezing.  No chest pain. Interestingly, CT PE study was done which shoed No PE but 2 new cavitary nodules in the right upper lobe. This is concerning for aggressive, atypical infection, possibly fungal or tubercular.  Patient has been on steroid for almost a month although currently off of it. proBNP negative.  WBC normal.  Pro-Edilberto negative    -- Patient care was assumed this morning , seen and examined , feeling better , continues to require Oxygen , currently requiring 3 liters of Oxygen   -- Continue IV Unasyn per ID recommendations   -- QuantiFERON-TB gold negative. Sputum for AFB x3 per ID, placed on airborne isolation.  -- Fungal serologies and antigen, PCP for ID. Appreciate ID assistance.  -- Blood cultures negative so far, follow.  Patient is afebrile  --We will follow up on AFB culture and stain  -- ID has also ordered Blastomyces antigen  --So far Aspergillus antigen has came back negative although negative results do not exclude the diagnosis of invasive aspergillosis  --Fungal antibodies are also in process  -- Continuous pulse ox  -- Management of COPD as below  -- Therapy eval noted, TCU at discharge, SW for discharge planning.   --We will continue to evaluate if patient will benefit from seen by pulmonary as an  inpatient or thoracic surgery, will have further discussion with infectious disease.    COPD  Not on home O2.  PTA is on fluticasone/salmeterol, DuoNeb, Spiriva inhalers.  -- Continue with scheduled DuoNeb, steroid neb and PRN albuterol as well.    -- Not on home O2, needs exercise oximetry prior to discharge  -- Her current presentation does not seem consistent with COPD exacerbation, likely related to pneumonia/lung abscess as above, treatment as outlined.      Mildly abnormal troponin, type II MI  Minimally abnormal troponin, follow trend.  Unlikely ACS.  --Continue aspirin  --2D echo showed normal LVEF and no wall motion abnormalities.  --No further work-up planned, recommend outpatient stress test once acute issues resolve.      DM2: HbA1c 9.9.  Takes glipizide PTA.  -- PTA glipizide resumed   -- Continue with Premeal NovoLog and ISS.  -- BS in 100s     Essential hypertension  Hyperlipidemia  --Continue PTA Norvasc 5 mg p.o. daily we will monitor blood pressure if it needs to be adjusted further     Clinically Significant Risk Factors Present on Admission             # Diabetes, type II: last A1C 9.9 % (Ref range: 0.0 - 5.6 %)      Diet: Moderate Consistent Carb (60 g CHO per Meal) Diet    Botello Catheter: Not present  DVT Prophylaxis: Enoxaparin (Lovenox) SQ  Code Status: No CPR- Do NOT Intubate    The patient's care was discussed with the Bedside Nurse, Patient and Patient's Family.  Left the voice message for daughter Irene, will call family tomorrow    Disposition Plan   Expected Discharge: 02/28/2022     Anticipated discharge location:  Awaiting care coordination huddle   Patient has been evaluated by physical therapy, recommending patient to be discharged to TCU or home with home health care PT.    Entered: Eulalia Gore MD 02/24/2022, 8:27 AM     Eulalia Gore MD, FACP  Text Page (7am -  6pm)    ----------------------------------------------------------------------------------------------------------------------      Interval History   Patient care was assumed this morning, patient was seen and examined.  Patient is feeling well, has been is stable on 3 L of oxygen.  Discussed with patient that most of her fungal and other infections serologies are is still in process she is denying any new complaints.    -Data reviewed today: I reviewed all new labs and imaging results over the last 24 hours.    I personally reviewed no images or EKG's today.    Physical Exam   Temp: 98.8  F (37.1  C) Temp src: Oral BP: (!) 166/71 Pulse: 97   Resp: 18 SpO2: 91 % O2 Device: Nasal cannula Oxygen Delivery: 2 LPM  Vitals:    02/21/22 1902 02/22/22 0500   Weight: 61.1 kg (134 lb 11.2 oz) 59.9 kg (132 lb)     Vital Signs with Ranges  Temp:  [98.8  F (37.1  C)-99.1  F (37.3  C)] 98.8  F (37.1  C)  Pulse:  [92-97] 97  Resp:  [16-18] 18  BP: (122-166)/(52-71) 166/71  SpO2:  [91 %-93 %] 91 %  I/O last 3 completed shifts:  In: 480 [P.O.:480]  Out: 200 [Urine:200]    GENERAL: Alert , awake and oriented. NAD. Conversational, appropriate. Wearing nasal cannula   HEENT: Normocephalic. EOMI. No icterus or injection. Nares normal.   LUNGS: Clear to auscultation. No dyspnea at rest.   HEART: Regular rate. Extremities perfused.   ABDOMEN: Soft, nontender, and nondistended. Positive bowel sounds.   EXTREMITIES: No LE edema noted.   NEUROLOGIC: Moves extremities x4 on command. No acute focal neurologic abnormalities noted.     Medications       amLODIPine  5 mg Oral Daily     ampicillin-sulbactam (UNASYN) IV  3 g Intravenous Q6H     aspirin  81 mg Oral Daily     calcium carbonate 600 mg-vitamin D 400 units  1 tablet Oral BID     calcium polycarbophil  625 mg Oral Daily     famotidine  20 mg Oral BID     fluticasone-vilanterol  1 puff Inhalation Daily     furosemide  20 mg Oral Daily     glipiZIDE  5 mg Oral QAM     insulin aspart  1-7  Units Subcutaneous TID AC     insulin aspart  1-5 Units Subcutaneous At Bedtime     insulin aspart   Subcutaneous TID AC     latanoprost  1 drop Left Eye QPM     multivitamin, therapeutic  1 tablet Oral QPM     polyethylene glycol  8.5 g Oral QPM     rosuvastatin  5 mg Oral At Bedtime     umeclidinium  1 puff Inhalation Daily       Data   Recent Labs   Lab 02/24/22  0737 02/24/22  0553 02/23/22  2110 02/22/22  0754 02/22/22  0559 02/21/22  1948 02/21/22  1242 02/19/22  1645   WBC  --   --   --   --  7.3  --  8.9 9.3   HGB  --   --   --   --  12.2  --  12.6 13.5   MCV  --   --   --   --  83  --  84 84   PLT  --   --   --   --  158  --  174 209   NA  --   --   --   --  137  --  135 136   POTASSIUM  --   --   --   --  3.5  --  4.2 4.8   CHLORIDE  --   --   --   --  104  --  101 101   CO2  --   --   --   --  27  --  26 28   BUN  --   --   --   --  17  --  26 26   CR  --   --   --   --  0.59  --  0.62 0.62   ANIONGAP  --   --   --   --  6  --  8 7   NARCISA  --   --   --   --  8.5  --  10.0 9.3   * 177* 244*   < > 161*   < > 365* 396*   ALBUMIN  --   --   --   --   --   --  3.1* 3.3*   PROTTOTAL  --   --   --   --   --   --  6.2* 6.8   BILITOTAL  --   --   --   --   --   --  0.5 0.4   ALKPHOS  --   --   --   --   --   --  58 73   ALT  --   --   --   --   --   --  32 39   AST  --   --   --   --   --   --  15 12    < > = values in this interval not displayed.       Imaging:   No results found for this or any previous visit (from the past 24 hour(s)).

## 2022-02-24 NOTE — PLAN OF CARE
KRYSTIANumeduardo:     DATE & TIME: 2/23 8980-2343  Cognitive Concerns/ Orientation : A&O x4, calm & cooperative   BEHAVIOR & AGGRESSION TOOL COLOR: GREEN  ABNL VS/O2: VSS on 2L NC (RA baseline)  MOBILITY: SBA with walker. Steady gait   PAIN MANAGMENT: Chronic back pain, max 8/10. PRN Oxy 5mg given x2 with relief   DIET: Mod CHO, tolerating  BOWEL/BLADDER: Continent b/b  ABNL LAB/BG: /106  DRAIN/DEVICES: PIV SL  SKIN: Coccyx mepilex CDI, gita rash, BLE edema +1, +2 in ankles, valeriy BLE  TESTS/PROCEDURES: Chest CT showed 2 nodules in RUL, fungal or tubular. Continue sputum induction for TB rule out  D/C DAY/GOALS/PLACE: Pending improvement

## 2022-02-24 NOTE — PLAN OF CARE
Summary:     DATE & TIME: 2/24 4725-0635  Cognitive Concerns/ Orientation : A&O x4, calm & cooperative   BEHAVIOR & AGGRESSION TOOL COLOR: GREEN  ABNL VS/O2: VSS on 2L NC; slightly elevated BP in a.m.  MOBILITY: SBA with walker. Steady gait   PAIN MANAGMENT: Chronic back pain, max 8/10. PRN Oxy 5mg given w/ good effect  DIET: Mod CHO, tolerating  BOWEL/BLADDER: Continent b/b  ABNL LAB/BG:  & 214  DRAIN/DEVICES: PIV SL  SKIN: valeriy BLE  TESTS/PROCEDURES: Continue sputum induction for TB rule out  D/C DAY/GOALS/PLACE: Pending improvement   OTHER IMPORTANT INFO: airborne isolation continues

## 2022-02-25 ENCOUNTER — APPOINTMENT (OUTPATIENT)
Dept: PHYSICAL THERAPY | Facility: CLINIC | Age: 87
DRG: 177 | End: 2022-02-25
Payer: COMMERCIAL

## 2022-02-25 LAB
GLUCOSE BLDC GLUCOMTR-MCNC: 110 MG/DL (ref 70–99)
GLUCOSE BLDC GLUCOMTR-MCNC: 126 MG/DL (ref 70–99)
GLUCOSE BLDC GLUCOMTR-MCNC: 153 MG/DL (ref 70–99)
GLUCOSE BLDC GLUCOMTR-MCNC: 167 MG/DL (ref 70–99)
GLUCOSE BLDC GLUCOMTR-MCNC: 172 MG/DL (ref 70–99)
GLUCOSE BLDC GLUCOMTR-MCNC: 214 MG/DL (ref 70–99)
SCANNED LAB RESULT: NORMAL

## 2022-02-25 PROCEDURE — 250N000013 HC RX MED GY IP 250 OP 250 PS 637: Performed by: HOSPITALIST

## 2022-02-25 PROCEDURE — 87158 CULTURE TYPING ADDED METHOD: CPT | Performed by: INTERNAL MEDICINE

## 2022-02-25 PROCEDURE — 120N000001 HC R&B MED SURG/OB

## 2022-02-25 PROCEDURE — 97116 GAIT TRAINING THERAPY: CPT | Mod: GP | Performed by: PHYSICAL THERAPIST

## 2022-02-25 PROCEDURE — 99232 SBSQ HOSP IP/OBS MODERATE 35: CPT | Performed by: INTERNAL MEDICINE

## 2022-02-25 PROCEDURE — 87116 MYCOBACTERIA CULTURE: CPT | Performed by: INTERNAL MEDICINE

## 2022-02-25 PROCEDURE — 250N000011 HC RX IP 250 OP 636: Performed by: INTERNAL MEDICINE

## 2022-02-25 PROCEDURE — 97110 THERAPEUTIC EXERCISES: CPT | Mod: GP | Performed by: PHYSICAL THERAPIST

## 2022-02-25 PROCEDURE — 87556 M.TUBERCULO DNA AMP PROBE: CPT | Performed by: INTERNAL MEDICINE

## 2022-02-25 RX ADMIN — OXYCODONE HYDROCHLORIDE 5 MG: 5 TABLET ORAL at 10:16

## 2022-02-25 RX ADMIN — INSULIN ASPART 4 UNITS: 100 INJECTION, SOLUTION INTRAVENOUS; SUBCUTANEOUS at 10:17

## 2022-02-25 RX ADMIN — THERA TABS 1 TABLET: TAB at 20:11

## 2022-02-25 RX ADMIN — AMPICILLIN SODIUM AND SULBACTAM SODIUM 3 G: 2; 1 INJECTION, POWDER, FOR SOLUTION INTRAMUSCULAR; INTRAVENOUS at 03:00

## 2022-02-25 RX ADMIN — AMLODIPINE BESYLATE 5 MG: 5 TABLET ORAL at 10:17

## 2022-02-25 RX ADMIN — FUROSEMIDE 20 MG: 20 TABLET ORAL at 10:16

## 2022-02-25 RX ADMIN — CALCIUM CARBONATE 600 MG (1,500 MG)-VITAMIN D3 400 UNIT TABLET 1 TABLET: at 20:11

## 2022-02-25 RX ADMIN — FAMOTIDINE 20 MG: 20 TABLET ORAL at 20:11

## 2022-02-25 RX ADMIN — INSULIN ASPART 1 UNITS: 100 INJECTION, SOLUTION INTRAVENOUS; SUBCUTANEOUS at 13:28

## 2022-02-25 RX ADMIN — INSULIN ASPART 6 UNITS: 100 INJECTION, SOLUTION INTRAVENOUS; SUBCUTANEOUS at 17:39

## 2022-02-25 RX ADMIN — ROSUVASTATIN CALCIUM 5 MG: 5 TABLET, FILM COATED ORAL at 20:12

## 2022-02-25 RX ADMIN — OXYCODONE HYDROCHLORIDE 5 MG: 5 TABLET ORAL at 15:18

## 2022-02-25 RX ADMIN — GLIPIZIDE 5 MG: 5 TABLET ORAL at 10:16

## 2022-02-25 RX ADMIN — UMECLIDINIUM 1 PUFF: 62.5 AEROSOL, POWDER ORAL at 10:20

## 2022-02-25 RX ADMIN — FLUTICASONE FUROATE AND VILANTEROL TRIFENATATE 1 PUFF: 200; 25 POWDER RESPIRATORY (INHALATION) at 10:20

## 2022-02-25 RX ADMIN — FAMOTIDINE 20 MG: 20 TABLET ORAL at 10:16

## 2022-02-25 RX ADMIN — AMPICILLIN SODIUM AND SULBACTAM SODIUM 3 G: 2; 1 INJECTION, POWDER, FOR SOLUTION INTRAMUSCULAR; INTRAVENOUS at 15:11

## 2022-02-25 RX ADMIN — CALCIUM POLYCARBOPHIL 625 MG: 625 TABLET, FILM COATED ORAL at 10:16

## 2022-02-25 RX ADMIN — POLYETHYLENE GLYCOL 3350 8.5 G: 17 POWDER, FOR SOLUTION ORAL at 20:11

## 2022-02-25 RX ADMIN — AMPICILLIN SODIUM AND SULBACTAM SODIUM 3 G: 2; 1 INJECTION, POWDER, FOR SOLUTION INTRAMUSCULAR; INTRAVENOUS at 20:17

## 2022-02-25 RX ADMIN — AMPICILLIN SODIUM AND SULBACTAM SODIUM 3 G: 2; 1 INJECTION, POWDER, FOR SOLUTION INTRAMUSCULAR; INTRAVENOUS at 10:21

## 2022-02-25 RX ADMIN — CALCIUM CARBONATE 600 MG (1,500 MG)-VITAMIN D3 400 UNIT TABLET 1 TABLET: at 10:16

## 2022-02-25 RX ADMIN — OXYCODONE HYDROCHLORIDE 5 MG: 5 TABLET ORAL at 05:27

## 2022-02-25 RX ADMIN — ASPIRIN 81 MG: 81 TABLET, COATED ORAL at 10:16

## 2022-02-25 RX ADMIN — LATANOPROST 1 DROP: 50 SOLUTION/ DROPS OPHTHALMIC at 20:09

## 2022-02-25 ASSESSMENT — ACTIVITIES OF DAILY LIVING (ADL)
ADLS_ACUITY_SCORE: 8

## 2022-02-25 NOTE — PROGRESS NOTES
"[facility]    Infectious Disease Progress Note    Date of Service (when I saw the patient): 02/25/2022     Assessment & Plan   Celeste Chacko is a 89 year old female who was admitted on 2/21/2022.     Impression:  1. 89 y.o with COPD  2. Diabetes   3. HTN, HLd  4. Admitted with shortness of breath.   5. CT scan \"  There are two new cavitary nodules in the right upper lobe. This  is concerning for aggressive, atypical infection, possibly fungal or  Tubercular.\"      Recommendations:   Airborne isolation will rule out for TB though low suspicion, patient has no past history of family or friend who had TB  AFB cultures and stain    Fungal antigen negative for histo and cocci, blasto pending   Unasyn for possible bacterial cavitary infection   Discussed with IM      If 3 AFB stain negative remove isolation and if clinically improved ok to discharge on oral augmentin for 4 weeks with FOLLOW UP CT   Consider pulmonary FOLLOW UP         Discussed with PAM Sainz MD    Interval History   Tolerating antibiotics ok   No new rashes or issues with antibiotics   Labs reviewed   Afebrile     Physical Exam   Temp: 98  F (36.7  C) Temp src: Oral BP: (!) 140/68 Pulse: 83   Resp: 18 SpO2: 94 % O2 Device: Nasal cannula Oxygen Delivery: 2 LPM  Vitals:    02/21/22 1902 02/22/22 0500   Weight: 61.1 kg (134 lb 11.2 oz) 59.9 kg (132 lb)     Vital Signs with Ranges  Temp:  [98  F (36.7  C)-99.5  F (37.5  C)] 98  F (36.7  C)  Pulse:  [] 83  Resp:  [18] 18  BP: (120-143)/(61-68) 140/68  SpO2:  [90 %-94 %] 94 %    Constitutional: Awake, alert, cooperative, no apparent distress  Lungs: Clear to auscultation bilaterally, no crackles or wheezing  Cardiovascular: Regular rate and rhythm, normal S1 and S2, and no murmur noted  Abdomen: Normal bowel sounds, soft, non-distended, non-tender  Skin: No rashes, no cyanosis, no edema  Other:    Medications       amLODIPine  5 mg Oral Daily     " ampicillin-sulbactam (UNASYN) IV  3 g Intravenous Q6H     aspirin  81 mg Oral Daily     calcium carbonate 600 mg-vitamin D 400 units  1 tablet Oral BID     calcium polycarbophil  625 mg Oral Daily     famotidine  20 mg Oral BID     fluticasone-vilanterol  1 puff Inhalation Daily     furosemide  20 mg Oral Daily     glipiZIDE  5 mg Oral QAM     insulin aspart  1-7 Units Subcutaneous TID AC     insulin aspart  1-5 Units Subcutaneous At Bedtime     insulin aspart   Subcutaneous TID AC     latanoprost  1 drop Left Eye QPM     multivitamin, therapeutic  1 tablet Oral QPM     polyethylene glycol  8.5 g Oral QPM     rosuvastatin  5 mg Oral At Bedtime     umeclidinium  1 puff Inhalation Daily       Data   All microbiology laboratory data reviewed.  Recent Labs   Lab Test 02/22/22  0559 02/21/22  1242 02/19/22  1645   WBC 7.3 8.9 9.3   HGB 12.2 12.6 13.5   HCT 39.7 40.4 43.5   MCV 83 84 84    174 209     Recent Labs   Lab Test 02/22/22  0559 02/21/22  1242 02/19/22  1645   CR 0.59 0.62 0.62     No lab results found.  Recent Labs   Lab Test 12/04/15  1112   CULT >100,000 colonies/mL Escherichia coli*

## 2022-02-25 NOTE — PROGRESS NOTES
Summary:     DATE & TIME: 2/24 1900 - 0730  Cognitive Concerns/ Orientation : A&O x4, calm & cooperative   BEHAVIOR & AGGRESSION TOOL COLOR: GREEN  ABNL VS/O2: VSS on 2L NC  MOBILITY: SBA with walker. Steady gait . Walk multiple time in her room *1 assist  PAIN MANAGMENT: Chronic back pain, max PRN Oxy 5mg given *2  DIET: Mod CHO, tolerating  BOWEL/BLADDER: Continent b/b  ABNL LAB/BG:   DRAIN/DEVICES: PIV SL  SKIN: valeriy BLE  TESTS/PROCEDURES: Continue sputum induction for TB rule out  D/C DAY/GOALS/PLACE: Pending improvement   OTHER IMPORTANT INFO: airborne isolation continues

## 2022-02-25 NOTE — PROGRESS NOTES
[facility]    HOSPITALIST PROGRESS NOTE :   --------------------------------------------------    Date of Admission:  2/21/2022    Cumulative Summary: Celeste Chacko is a very pleasant 89 year old female with medical history significant for COPD, chronic back pain, DM2, HTN, HL was brought to the ER for evaluation of exertional dyspnea and fatigue and is being registered under observation on 2/21/2022 for further management.    Assessment & Plan     Possible Lung abscess  Exertional dyspnea and Fatigue  Recent admission for COPD exacerbation treated with a steroid but ongoing dyspnea mostly with exertion but without fever, cough, weight loss or night sweats.  No wheezing.  No chest pain. Interestingly, CT PE study was done which shoed No PE but 2 new cavitary nodules in the right upper lobe. This is concerning for aggressive, atypical infection, possibly fungal or tubercular.  Patient has been on steroid for almost a month although currently off of it. proBNP negative.  WBC normal.  Pro-Edilberto negative    --Patient was seen and examined, feeling about the same, continues to require 2 to 3 L of oxygen  --Continue IV Unasyn for possible bacterial cavitary infection as per infectious disease recommendation  --So far fungal antigen is negative for histo and cocci, positive is pending.  -- QuantiFERON-TB gold negative. Sputum for AFB x3 are pending, patient will stay in airborne isolation till those are back,  -- Blood cultures negative so far, follow.  Patient is afebrile  -- ID has also ordered Blastomyces antigen  -- So far Aspergillus antigen has came back negative although negative results do not exclude the diagnosis of invasive aspergillosis  -- Fungal antibodies are also in process  -- Continuous pulse ox  -- Management of COPD as below  -- Therapy eval noted, TCU at discharge, SW for discharge planning.   --Discussed with infectious disease, patient will probably stay here over the  weekend, if her tuberculosis work-up is negative then patient will probably require 4 weeks of oral Augmentin for cavitary bacterial lesion.    COPD  Not on home O2.  PTA is on fluticasone/salmeterol, DuoNeb, Spiriva inhalers.  -- Continue with scheduled DuoNeb, steroid neb and PRN albuterol as well.    -- Not on home O2, needs exercise oximetry prior to discharge  -- Her current presentation does not seem consistent with COPD exacerbation, likely related to pneumonia/lung abscess as above, treatment as outlined.      Mildly abnormal troponin, type II MI  Minimally abnormal troponin, follow trend.  Unlikely ACS.  --Continue aspirin  --2D echo showed normal LVEF and no wall motion abnormalities.  --No further work-up planned, recommend outpatient stress test once acute issues resolve.      DM2: HbA1c 9.9.  Takes glipizide PTA.  -- PTA glipizide resumed   -- Continue with Premeal NovoLog and ISS.  -- BS in 100s     Essential hypertension  Hyperlipidemia  --Continue PTA Norvasc 5 mg p.o. daily we will monitor blood pressure if it needs to be adjusted further     Clinically Significant Risk Factors Present on Admission             # Diabetes, type II: last A1C 9.9 % (Ref range: 0.0 - 5.6 %)      Diet: Moderate Consistent Carb (60 g CHO per Meal) Diet    Botello Catheter: Not present  DVT Prophylaxis: Enoxaparin (Lovenox) SQ  Code Status: No CPR- Do NOT Intubate    The patient's care was discussed with the Bedside Nurse, Patient and Dr. Sainz from infectious disease    Disposition Plan   Expected Discharge: 02/28/2022     Anticipated discharge location:  Awaiting care coordination huddle   Patient has been evaluated by physical therapy, recommending patient to be discharged to TCU or home with home health care PT.  Left a message for daughter yesterday, no changes in the medications today, still waiting for the lab results will update family again tomorrow    Entered: Eulalia Gore MD 02/25/2022, 8:19 AM     Eulalia Gore,  MAIKOL BAXTER  Text Page (7am - 6pm)    ----------------------------------------------------------------------------------------------------------------------      Interval History    Patient was seen and examined, feeling well, stable on 3 L of oxygen, still waiting on tuberculosis testing.  Patient is otherwise denying any worsening of shortness of breath, denying any chest pain or palpitations    -Data reviewed today: I reviewed all new labs and imaging results over the last 24 hours.    I personally reviewed no images or EKG's today.    Physical Exam   Temp: 98  F (36.7  C) Temp src: Oral BP: (!) 140/68 Pulse: 83   Resp: 18 SpO2: 94 % O2 Device: Nasal cannula Oxygen Delivery: 2 LPM  Vitals:    02/21/22 1902 02/22/22 0500   Weight: 61.1 kg (134 lb 11.2 oz) 59.9 kg (132 lb)     Vital Signs with Ranges  Temp:  [98  F (36.7  C)-99.5  F (37.5  C)] 98  F (36.7  C)  Pulse:  [] 83  Resp:  [18] 18  BP: (120-143)/(61-68) 140/68  SpO2:  [90 %-94 %] 94 %  No intake/output data recorded.    GENERAL: Alert , awake and oriented. NAD. Conversational, appropriate. Wearing nasal cannula   HEENT: Normocephalic. EOMI. No icterus or injection. Nares normal.   LUNGS: Clear to auscultation. No dyspnea at rest.   HEART: Regular rate. Extremities perfused.   ABDOMEN: Soft, nontender, and nondistended. Positive bowel sounds.   EXTREMITIES: No LE edema noted.   NEUROLOGIC: Moves extremities x4 on command. No acute focal neurologic abnormalities noted.     Medications       amLODIPine  5 mg Oral Daily     ampicillin-sulbactam (UNASYN) IV  3 g Intravenous Q6H     aspirin  81 mg Oral Daily     calcium carbonate 600 mg-vitamin D 400 units  1 tablet Oral BID     calcium polycarbophil  625 mg Oral Daily     famotidine  20 mg Oral BID     fluticasone-vilanterol  1 puff Inhalation Daily     furosemide  20 mg Oral Daily     glipiZIDE  5 mg Oral QAM     insulin aspart  1-7 Units Subcutaneous TID AC     insulin aspart  1-5 Units Subcutaneous At  Bedtime     insulin aspart   Subcutaneous TID AC     latanoprost  1 drop Left Eye QPM     multivitamin, therapeutic  1 tablet Oral QPM     polyethylene glycol  8.5 g Oral QPM     rosuvastatin  5 mg Oral At Bedtime     umeclidinium  1 puff Inhalation Daily       Data   Recent Labs   Lab 02/25/22  0755 02/25/22  0340 02/24/22  2154 02/22/22  0754 02/22/22  0559 02/21/22  1948 02/21/22  1242 02/19/22  1645   WBC  --   --   --   --  7.3  --  8.9 9.3   HGB  --   --   --   --  12.2  --  12.6 13.5   MCV  --   --   --   --  83  --  84 84   PLT  --   --   --   --  158  --  174 209   NA  --   --   --   --  137  --  135 136   POTASSIUM  --   --   --   --  3.5  --  4.2 4.8   CHLORIDE  --   --   --   --  104  --  101 101   CO2  --   --   --   --  27  --  26 28   BUN  --   --   --   --  17  --  26 26   CR  --   --   --   --  0.59  --  0.62 0.62   ANIONGAP  --   --   --   --  6  --  8 7   NARCISA  --   --   --   --  8.5  --  10.0 9.3   * 167* 175*   < > 161*   < > 365* 396*   ALBUMIN  --   --   --   --   --   --  3.1* 3.3*   PROTTOTAL  --   --   --   --   --   --  6.2* 6.8   BILITOTAL  --   --   --   --   --   --  0.5 0.4   ALKPHOS  --   --   --   --   --   --  58 73   ALT  --   --   --   --   --   --  32 39   AST  --   --   --   --   --   --  15 12    < > = values in this interval not displayed.       Imaging:   No results found for this or any previous visit (from the past 24 hour(s)).

## 2022-02-25 NOTE — CONSULTS
Care Management Initial Consult    General Information  Assessment completed with: Patient,    Type of CM/SW Visit: Initial Assessment    Primary Care Provider verified and updated as needed:     Readmission within the last 30 days:        Reason for Consult: discharge planning  Advance Care Planning:            Communication Assessment  Patient's communication style: spoken language (English or Bilingual)    Hearing Difficulty or Deaf: no   Wear Glasses or Blind: yes    Cognitive  Cognitive/Neuro/Behavioral: WDL        Orientation: oriented x 4             Living Environment:   People in home: alone     Current living Arrangements: house      Able to return to prior arrangements:         Family/Social Support:  Care provided by: self  Provides care for: no one     Children          Description of Support System: Supportive, Involved    Support Assessment: Adequate family and caregiver support    Current Resources:   Patient receiving home care services:       Community Resources:    Equipment currently used at home: walker, rolling, grab bar, toilet, grab bar, tub/shower, shower chair  Supplies currently used at home:      Employment/Financial:  Employment Status:          Financial Concerns:             Lifestyle & Psychosocial Needs:  Social Determinants of Health     Tobacco Use: Medium Risk     Smoking Tobacco Use: Former Smoker     Smokeless Tobacco Use: Never Used   Alcohol Use: Not on file   Financial Resource Strain: Not on file   Food Insecurity: Not on file   Transportation Needs: Not on file   Physical Activity: Not on file   Stress: Not on file   Social Connections: Not on file   Intimate Partner Violence: Not on file   Depression: Not at risk     PHQ-2 Score: 0   Housing Stability: Not on file       Additional Information:  SW was consulted to assist with discharge planning needs. Celeste is being ruled out for TB. Celeste prior to hospitalization was living at home alone. She feels she was managing well and  received some assistance from her daughter for grocery shopping. She is hopeful to go home but understands that she may need to go to TCU prior to returning home. Celeste is ok with SW sending referrals for TCU.   Celeste is aware that she will need a Prior Auth with [facility]. SW provided her with TCU choices that are in network with her insurance. Celeste would like referrals sent to [facility]. SW will send referrals and update once assessments are complete.     JAYLYN Briggs

## 2022-02-25 NOTE — PLAN OF CARE
Goal Outcome Evaluation:    Plan of Care Reviewed With: patient     Overall Patient Progress: no change     Summary: SOB/weakness    DATE & TIME: 2/25/22 ([id number])  Cognitive Concerns/ Orientation : A&O x4, calm & cooperative   BEHAVIOR & AGGRESSION TOOL COLOR: GREEN  ABNL VS/O2: VSS on 3L NC, oxygen saturations dropped in low 80's with PT today  MOBILITY: SBA with walker. Steady gait . Walked in her room with 1 assist/walker  PAIN MANAGMENT: Chronic back pain, PRN Oxy 5mg given x2  DIET: Mod CHO, tolerating  BOWEL/BLADDER: Continent b/b  ABNL LAB/BG: , 172, 110  DRAIN/DEVICES: PIV SL  SKIN: valeriy BLE  TESTS/PROCEDURES: Sputum sent this shift rule out TB  D/C DAY/GOALS/PLACE: Pending improvement to TCU  OTHER IMPORTANT INFO: airborne isolation continues, ID following, IV Unasyn

## 2022-02-26 LAB
ASPERGILLUS AB TITR SER CF: NORMAL {TITER}
B DERMAT AB SER-ACNC: 0.2 IV
BACTERIA BLD CULT: NO GROWTH
BACTERIA BLD CULT: NO GROWTH
COCCIDIOIDES AB TITR SER CF: NORMAL {TITER}
GLUCOSE BLDC GLUCOMTR-MCNC: 106 MG/DL (ref 70–99)
GLUCOSE BLDC GLUCOMTR-MCNC: 119 MG/DL (ref 70–99)
GLUCOSE BLDC GLUCOMTR-MCNC: 122 MG/DL (ref 70–99)
GLUCOSE BLDC GLUCOMTR-MCNC: 145 MG/DL (ref 70–99)
GLUCOSE BLDC GLUCOMTR-MCNC: 163 MG/DL (ref 70–99)
H CAPSUL MYC AB TITR SER CF: NORMAL {TITER}
H CAPSUL YST AB TITR SER CF: NORMAL {TITER}

## 2022-02-26 PROCEDURE — 250N000013 HC RX MED GY IP 250 OP 250 PS 637: Performed by: HOSPITALIST

## 2022-02-26 PROCEDURE — 250N000011 HC RX IP 250 OP 636: Performed by: INTERNAL MEDICINE

## 2022-02-26 PROCEDURE — 120N000001 HC R&B MED SURG/OB

## 2022-02-26 PROCEDURE — 250N000009 HC RX 250: Performed by: INTERNAL MEDICINE

## 2022-02-26 PROCEDURE — 99232 SBSQ HOSP IP/OBS MODERATE 35: CPT | Performed by: INTERNAL MEDICINE

## 2022-02-26 RX ORDER — OFLOXACIN 3 MG/ML
1 SOLUTION/ DROPS OPHTHALMIC 4 TIMES DAILY
Status: DISPENSED | OUTPATIENT
Start: 2022-02-26 | End: 2022-02-27

## 2022-02-26 RX ADMIN — UMECLIDINIUM 1 PUFF: 62.5 AEROSOL, POWDER ORAL at 08:42

## 2022-02-26 RX ADMIN — AMPICILLIN SODIUM AND SULBACTAM SODIUM 3 G: 2; 1 INJECTION, POWDER, FOR SOLUTION INTRAMUSCULAR; INTRAVENOUS at 15:01

## 2022-02-26 RX ADMIN — OFLOXACIN 1 DROP: 3 SOLUTION/ DROPS OPHTHALMIC at 18:38

## 2022-02-26 RX ADMIN — CALCIUM CARBONATE 600 MG (1,500 MG)-VITAMIN D3 400 UNIT TABLET 1 TABLET: at 21:11

## 2022-02-26 RX ADMIN — AMPICILLIN SODIUM AND SULBACTAM SODIUM 3 G: 2; 1 INJECTION, POWDER, FOR SOLUTION INTRAMUSCULAR; INTRAVENOUS at 08:42

## 2022-02-26 RX ADMIN — FAMOTIDINE 20 MG: 20 TABLET ORAL at 21:11

## 2022-02-26 RX ADMIN — INSULIN ASPART 5 UNITS: 100 INJECTION, SOLUTION INTRAVENOUS; SUBCUTANEOUS at 18:16

## 2022-02-26 RX ADMIN — CALCIUM POLYCARBOPHIL 625 MG: 625 TABLET, FILM COATED ORAL at 08:41

## 2022-02-26 RX ADMIN — OFLOXACIN 1 DROP: 3 SOLUTION/ DROPS OPHTHALMIC at 12:59

## 2022-02-26 RX ADMIN — OXYCODONE HYDROCHLORIDE 5 MG: 5 TABLET ORAL at 08:41

## 2022-02-26 RX ADMIN — AMPICILLIN SODIUM AND SULBACTAM SODIUM 3 G: 2; 1 INJECTION, POWDER, FOR SOLUTION INTRAMUSCULAR; INTRAVENOUS at 21:11

## 2022-02-26 RX ADMIN — FUROSEMIDE 20 MG: 20 TABLET ORAL at 08:42

## 2022-02-26 RX ADMIN — FAMOTIDINE 20 MG: 20 TABLET ORAL at 08:41

## 2022-02-26 RX ADMIN — FLUTICASONE FUROATE AND VILANTEROL TRIFENATATE 1 PUFF: 200; 25 POWDER RESPIRATORY (INHALATION) at 08:42

## 2022-02-26 RX ADMIN — AMPICILLIN SODIUM AND SULBACTAM SODIUM 3 G: 2; 1 INJECTION, POWDER, FOR SOLUTION INTRAMUSCULAR; INTRAVENOUS at 02:30

## 2022-02-26 RX ADMIN — OXYCODONE HYDROCHLORIDE 5 MG: 5 TABLET ORAL at 15:01

## 2022-02-26 RX ADMIN — ASPIRIN 81 MG: 81 TABLET, COATED ORAL at 08:41

## 2022-02-26 RX ADMIN — LATANOPROST 1 DROP: 50 SOLUTION/ DROPS OPHTHALMIC at 21:11

## 2022-02-26 RX ADMIN — OFLOXACIN 1 DROP: 3 SOLUTION/ DROPS OPHTHALMIC at 21:19

## 2022-02-26 RX ADMIN — OXYCODONE HYDROCHLORIDE 5 MG: 5 TABLET ORAL at 23:33

## 2022-02-26 RX ADMIN — ROSUVASTATIN CALCIUM 5 MG: 5 TABLET, FILM COATED ORAL at 21:11

## 2022-02-26 RX ADMIN — CALCIUM CARBONATE 600 MG (1,500 MG)-VITAMIN D3 400 UNIT TABLET 1 TABLET: at 08:42

## 2022-02-26 RX ADMIN — AMLODIPINE BESYLATE 5 MG: 5 TABLET ORAL at 08:41

## 2022-02-26 RX ADMIN — POLYETHYLENE GLYCOL 3350 8.5 G: 17 POWDER, FOR SOLUTION ORAL at 21:15

## 2022-02-26 RX ADMIN — INSULIN ASPART 5 UNITS: 100 INJECTION, SOLUTION INTRAVENOUS; SUBCUTANEOUS at 15:01

## 2022-02-26 RX ADMIN — GLIPIZIDE 5 MG: 5 TABLET ORAL at 08:42

## 2022-02-26 RX ADMIN — THERA TABS 1 TABLET: TAB at 21:11

## 2022-02-26 ASSESSMENT — ACTIVITIES OF DAILY LIVING (ADL)
ADLS_ACUITY_SCORE: 8

## 2022-02-26 NOTE — PLAN OF CARE
Goal Outcome Evaluation:    Plan of Care Reviewed With: patient     Overall Patient Progress: improving    Summary: SOB/weakness    DATE & TIME: 2/25/22 ([id number])  Cognitive Concerns/ Orientation : A&O x4, calm & cooperative   BEHAVIOR & AGGRESSION TOOL COLOR: GREEN  ABNL VS/O2: VSS on 3L NC, oxygen saturations remain in low to mid 90s  MOBILITY: SBA with walker. Steady gait  PAIN MANAGMENT: Chronic back pain, PRN Oxy 5mg available  DIET: Mod CHO, tolerating  BOWEL/BLADDER: Continent B/B  ABNL LAB/BG: , 106  DRAIN/DEVICES: L arm PIV SL  SKIN: blanchable redness on sacrum/coccyx - sacral mepilex C/D/I  TESTS/PROCEDURES: Sputum sent during day shift  D/C DAY/GOALS/PLACE: Pending improvement to TCU  OTHER IMPORTANT INFO: airborne isolation maintained, ID following, IV Unasyn

## 2022-02-26 NOTE — PROGRESS NOTES
[facility]    HOSPITALIST PROGRESS NOTE :   --------------------------------------------------    Date of Admission:  2/21/2022    Cumulative Summary: Celeste Chacko is a very pleasant 89 year old female with medical history significant for COPD, chronic back pain, DM2, HTN, HL was brought to the ER for evaluation of exertional dyspnea and fatigue and is being registered under observation on 2/21/2022 for further management.    Assessment & Plan     Possible Lung abscess  Exertional dyspnea and Fatigue  Recent admission for COPD exacerbation treated with a steroid but ongoing dyspnea mostly with exertion but without fever, cough, weight loss or night sweats.  No wheezing.  No chest pain. Interestingly, CT PE study was done which shoed No PE but 2 new cavitary nodules in the right upper lobe. This is concerning for aggressive, atypical infection, possibly fungal or tubercular.  Patient has been on steroid for almost a month although currently off of it. proBNP negative.  WBC normal.  Pro-Edilberto negative    -- Patient was seen and examined , feeling clinically better , continues to require 2 to 3 liters of oxygen   -- Continue IV Unasyn for possible bacterial cavitary infection as per ID recommendation   --So far fungal antigen is negative for histo and cocci  -- QuantiFERON-TB gold negative. Sputum for AFB x3 are pending, patient will stay in airborne isolation till those are back,  -- Blood cultures negative so far, follow.  Patient is afebrile  -- ID has also ordered Blastomyces antigen  -- So far Aspergillus antigen has came back negative although negative results do not exclude the diagnosis of invasive aspergillosis  -- Management of COPD as below  -- Therapy eval noted, TCU at discharge, SW for discharge planning.   -- Discussed with infectious disease, patient will probably stay here over the weekend, if her tuberculosis work-up is negative then patient will probably require 4 weeks of  oral Augmentin for cavitary bacterial lesion.  -- Discussed the plan of care with daughter on the phone also along with patient and bedside nursing     Right eye conjunctivitis :  -- will order Ofloxacin 0.3% ophthalmic solution     COPD  Not on home O2.  PTA is on fluticasone/salmeterol, DuoNeb, Spiriva inhalers.  -- Continue with scheduled DuoNeb, steroid neb and PRN albuterol as well.    -- Not on home O2, needs exercise oximetry prior to discharge  -- Her current presentation does not seem consistent with COPD exacerbation, likely related to pneumonia/lung abscess as above, treatment as outlined.      Type II MI:   Minimally abnormal troponin, follow trend.  Unlikely ACS.  --Continue aspirin  --2D echo showed normal LVEF and no wall motion abnormalities.  --No further work-up planned, recommend outpatient stress test once acute issues resolve.      DM2: HbA1c 9.9.  Takes glipizide PTA.  -- PTA glipizide resumed   -- Continue with Premeal NovoLog and ISS.  -- BS in 100s     Essential hypertension  Hyperlipidemia  --Continue PTA Norvasc 5 mg p.o. daily we will monitor blood pressure if it needs to be adjusted further     Clinically Significant Risk Factors Present on Admission                   Diet: Moderate Consistent Carb (60 g CHO per Meal) Diet    Botello Catheter: Not present  DVT Prophylaxis: Enoxaparin (Lovenox) SQ  Code Status: No CPR- Do NOT Intubate    The patient's care was discussed with the Bedside Nurse, Patient and patient,s daughter Irene    Disposition Plan   Expected Discharge: 02/28/2022     Anticipated discharge location:  Awaiting care coordination huddle   Patient has been evaluated by physical therapy, recommending patient to be discharged to TCU or home with home health care PT.  Left a message for daughter yesterday, no changes in the medications today, still waiting for the lab results will update family again tomorrow     Eulalia Gore MD, FACP  Text Page (7am -  6pm)    ----------------------------------------------------------------------------------------------------------------------    Interval History    Patient was seen and examined , feeling clinically better , stable on 3 liters of oxygen   Awaiting AFB , pending   Called daughter from patient room who was updated and all the questions were answered   Patient is denying any chest pain, SOB or palpitations     -Data reviewed today: I reviewed all new labs and imaging results over the last 24 hours.    I personally reviewed no images or EKG's today.    Physical Exam   Temp: 99  F (37.2  C) Temp src: Oral BP: 126/54 Pulse: 90   Resp: 16 SpO2: 93 % O2 Device: Nasal cannula Oxygen Delivery: 3 LPM  Vitals:    02/21/22 1902 02/22/22 0500 02/26/22 0412   Weight: 61.1 kg (134 lb 11.2 oz) 59.9 kg (132 lb) 60.1 kg (132 lb 7.9 oz)     Vital Signs with Ranges  Temp:  [98.4  F (36.9  C)-100  F (37.8  C)] 99  F (37.2  C)  Pulse:  [87-99] 90  Resp:  [16-18] 16  BP: (126-153)/(54-71) 126/54  SpO2:  [91 %-96 %] 93 %  I/O last 3 completed shifts:  In: 270 [P.O.:270]  Out: -     GENERAL: Alert , awake and oriented. NAD. Conversational, appropriate. Wearing nasal cannula   HEENT: Normocephalic. EOMI. No icterus or injection. Nares normal.   LUNGS: Clear to auscultation. No dyspnea at rest.   HEART: Regular rate. Extremities perfused.   ABDOMEN: Soft, nontender, and nondistended. Positive bowel sounds.   EXTREMITIES: No LE edema noted.   NEUROLOGIC: Moves extremities x4 on command. No acute focal neurologic abnormalities noted.     Medications       amLODIPine  5 mg Oral Daily     ampicillin-sulbactam (UNASYN) IV  3 g Intravenous Q6H     aspirin  81 mg Oral Daily     calcium carbonate 600 mg-vitamin D 400 units  1 tablet Oral BID     calcium polycarbophil  625 mg Oral Daily     famotidine  20 mg Oral BID     fluticasone-vilanterol  1 puff Inhalation Daily     furosemide  20 mg Oral Daily     glipiZIDE  5 mg Oral QAM     insulin aspart   1-7 Units Subcutaneous TID AC     insulin aspart  1-5 Units Subcutaneous At Bedtime     insulin aspart   Subcutaneous TID AC     latanoprost  1 drop Left Eye QPM     multivitamin, therapeutic  1 tablet Oral QPM     ofloxacin  1 drop Right Eye 4x Daily     polyethylene glycol  8.5 g Oral QPM     rosuvastatin  5 mg Oral At Bedtime     umeclidinium  1 puff Inhalation Daily       Data   Recent Labs   Lab 02/26/22  1301 02/26/22  0850 02/26/22  0209 02/22/22  0754 02/22/22  0559 02/21/22  1948 02/21/22  1242 02/19/22  1645   WBC  --   --   --   --  7.3  --  8.9 9.3   HGB  --   --   --   --  12.2  --  12.6 13.5   MCV  --   --   --   --  83  --  84 84   PLT  --   --   --   --  158  --  174 209   NA  --   --   --   --  137  --  135 136   POTASSIUM  --   --   --   --  3.5  --  4.2 4.8   CHLORIDE  --   --   --   --  104  --  101 101   CO2  --   --   --   --  27  --  26 28   BUN  --   --   --   --  17  --  26 26   CR  --   --   --   --  0.59  --  0.62 0.62   ANIONGAP  --   --   --   --  6  --  8 7   NARCISA  --   --   --   --  8.5  --  10.0 9.3   * 119* 106*   < > 161*   < > 365* 396*   ALBUMIN  --   --   --   --   --   --  3.1* 3.3*   PROTTOTAL  --   --   --   --   --   --  6.2* 6.8   BILITOTAL  --   --   --   --   --   --  0.5 0.4   ALKPHOS  --   --   --   --   --   --  58 73   ALT  --   --   --   --   --   --  32 39   AST  --   --   --   --   --   --  15 12    < > = values in this interval not displayed.       Imaging:   No results found for this or any previous visit (from the past 24 hour(s)).

## 2022-02-27 LAB
GLUCOSE BLDC GLUCOMTR-MCNC: 119 MG/DL (ref 70–99)
GLUCOSE BLDC GLUCOMTR-MCNC: 121 MG/DL (ref 70–99)
GLUCOSE BLDC GLUCOMTR-MCNC: 134 MG/DL (ref 70–99)
GLUCOSE BLDC GLUCOMTR-MCNC: 181 MG/DL (ref 70–99)
GLUCOSE BLDC GLUCOMTR-MCNC: 86 MG/DL (ref 70–99)

## 2022-02-27 PROCEDURE — 250N000011 HC RX IP 250 OP 636: Performed by: INTERNAL MEDICINE

## 2022-02-27 PROCEDURE — 250N000013 HC RX MED GY IP 250 OP 250 PS 637: Performed by: HOSPITALIST

## 2022-02-27 PROCEDURE — 99232 SBSQ HOSP IP/OBS MODERATE 35: CPT | Performed by: INTERNAL MEDICINE

## 2022-02-27 PROCEDURE — 120N000001 HC R&B MED SURG/OB

## 2022-02-27 RX ADMIN — INSULIN ASPART 6 UNITS: 100 INJECTION, SOLUTION INTRAVENOUS; SUBCUTANEOUS at 09:07

## 2022-02-27 RX ADMIN — FAMOTIDINE 20 MG: 20 TABLET ORAL at 21:22

## 2022-02-27 RX ADMIN — INSULIN ASPART 6 UNITS: 100 INJECTION, SOLUTION INTRAVENOUS; SUBCUTANEOUS at 18:41

## 2022-02-27 RX ADMIN — AMPICILLIN SODIUM AND SULBACTAM SODIUM 3 G: 2; 1 INJECTION, POWDER, FOR SOLUTION INTRAMUSCULAR; INTRAVENOUS at 09:00

## 2022-02-27 RX ADMIN — CALCIUM POLYCARBOPHIL 625 MG: 625 TABLET, FILM COATED ORAL at 09:00

## 2022-02-27 RX ADMIN — CALCIUM CARBONATE 600 MG (1,500 MG)-VITAMIN D3 400 UNIT TABLET 1 TABLET: at 09:00

## 2022-02-27 RX ADMIN — AMLODIPINE BESYLATE 5 MG: 5 TABLET ORAL at 09:00

## 2022-02-27 RX ADMIN — AMPICILLIN SODIUM AND SULBACTAM SODIUM 3 G: 2; 1 INJECTION, POWDER, FOR SOLUTION INTRAMUSCULAR; INTRAVENOUS at 03:32

## 2022-02-27 RX ADMIN — FUROSEMIDE 20 MG: 20 TABLET ORAL at 09:00

## 2022-02-27 RX ADMIN — OXYCODONE HYDROCHLORIDE 5 MG: 5 TABLET ORAL at 15:00

## 2022-02-27 RX ADMIN — OXYCODONE HYDROCHLORIDE 5 MG: 5 TABLET ORAL at 03:34

## 2022-02-27 RX ADMIN — ASPIRIN 81 MG: 81 TABLET, COATED ORAL at 09:00

## 2022-02-27 RX ADMIN — OXYCODONE HYDROCHLORIDE 5 MG: 5 TABLET ORAL at 19:43

## 2022-02-27 RX ADMIN — UMECLIDINIUM 1 PUFF: 62.5 AEROSOL, POWDER ORAL at 09:01

## 2022-02-27 RX ADMIN — FLUTICASONE FUROATE AND VILANTEROL TRIFENATATE 1 PUFF: 200; 25 POWDER RESPIRATORY (INHALATION) at 09:01

## 2022-02-27 RX ADMIN — CALCIUM CARBONATE 600 MG (1,500 MG)-VITAMIN D3 400 UNIT TABLET 1 TABLET: at 21:22

## 2022-02-27 RX ADMIN — GLIPIZIDE 5 MG: 5 TABLET ORAL at 09:00

## 2022-02-27 RX ADMIN — FAMOTIDINE 20 MG: 20 TABLET ORAL at 09:00

## 2022-02-27 RX ADMIN — INSULIN ASPART 2 UNITS: 100 INJECTION, SOLUTION INTRAVENOUS; SUBCUTANEOUS at 15:00

## 2022-02-27 RX ADMIN — POLYETHYLENE GLYCOL 3350 8.5 G: 17 POWDER, FOR SOLUTION ORAL at 19:44

## 2022-02-27 RX ADMIN — ROSUVASTATIN CALCIUM 5 MG: 5 TABLET, FILM COATED ORAL at 21:22

## 2022-02-27 RX ADMIN — AMPICILLIN SODIUM AND SULBACTAM SODIUM 3 G: 2; 1 INJECTION, POWDER, FOR SOLUTION INTRAMUSCULAR; INTRAVENOUS at 21:22

## 2022-02-27 RX ADMIN — THERA TABS 1 TABLET: TAB at 19:44

## 2022-02-27 RX ADMIN — AMPICILLIN SODIUM AND SULBACTAM SODIUM 3 G: 2; 1 INJECTION, POWDER, FOR SOLUTION INTRAMUSCULAR; INTRAVENOUS at 14:59

## 2022-02-27 RX ADMIN — LATANOPROST 1 DROP: 50 SOLUTION/ DROPS OPHTHALMIC at 21:21

## 2022-02-27 RX ADMIN — OFLOXACIN 1 DROP: 3 SOLUTION/ DROPS OPHTHALMIC at 09:01

## 2022-02-27 ASSESSMENT — ACTIVITIES OF DAILY LIVING (ADL)
ADLS_ACUITY_SCORE: 8
ADLS_ACUITY_SCORE: 9
ADLS_ACUITY_SCORE: 8
ADLS_ACUITY_SCORE: 9
ADLS_ACUITY_SCORE: 9
ADLS_ACUITY_SCORE: 8
ADLS_ACUITY_SCORE: 9
ADLS_ACUITY_SCORE: 8
ADLS_ACUITY_SCORE: 9
ADLS_ACUITY_SCORE: 8
ADLS_ACUITY_SCORE: 8
ADLS_ACUITY_SCORE: 9
ADLS_ACUITY_SCORE: 8
ADLS_ACUITY_SCORE: 9
ADLS_ACUITY_SCORE: 8
ADLS_ACUITY_SCORE: 8

## 2022-02-27 NOTE — PLAN OF CARE
2/26/2022:   Goal Outcome Evaluation:  A&O x4, VSS on 2L/NC sat in the low to mid 90s. Slight BAKER, denies SOB at rest. Low back pain managed with prn oxycodone. Up with SBA/Walker. Plans for discharge pending improvement.   Plan of Care Reviewed With: patient     Overall Patient Progress: improving

## 2022-02-27 NOTE — PROGRESS NOTES
[facility]    HOSPITALIST PROGRESS NOTE :   --------------------------------------------------    Date of Admission:  2/21/2022    Cumulative Summary: Celeste Chacko is a very pleasant 89 year old female with medical history significant for COPD, chronic back pain, DM2, HTN, HL was brought to the ER for evaluation of exertional dyspnea and fatigue and is being registered under observation on 2/21/2022 for further management.    Assessment & Plan     Possible Lung abscess  Exertional dyspnea and Fatigue  Recent admission for COPD exacerbation treated with a steroid but ongoing dyspnea mostly with exertion but without fever, cough, weight loss or night sweats.  No wheezing.  No chest pain. Interestingly, CT PE study was done which shoed No PE but 2 new cavitary nodules in the right upper lobe. This is concerning for aggressive, atypical infection, possibly fungal or tubercular.  Patient has been on steroid for almost a month although currently off of it. proBNP negative.  WBC normal.  Pro-Edilberto negative    --Seen and examined, feeling clinically well, this morning with exertion, patient dropped her pulse ox in 70s which was able to return back in 90s in couple of minutes with oxygen supplementation.  --Patient will be continued on IV Unasyn for possible bacterial cavitary infection as per ID recommendation.  --We will discuss with ID considering patient ongoing hypoxia if CT scan of the chest or x-ray needs to be repeated before patients get discharged.  -- So far fungal antigen is negative for histo , cocci and Aspergillus antigen has came back negative although negative results do not exclude the diagnosis of invasive aspergillosis  -- QuantiFERON-TB gold negative.  Sputum for AFB x3 are pending, patient will stay in airborne isolation till those are back,  -- ID has also ordered Blastomyces antigen  -- Blood cultures negative so far, follow.   -- Management of COPD as below  -- Therapy eval  noted, TCU at discharge,  for discharge planning.   -- Discussed with infectious disease, patient will probably stay here over the weekend, if her tuberculosis work-up is negative then patient will probably require 4 weeks of oral Augmentin for cavitary bacterial lesion.  -- Discussed the plan of care with daughter on the phone from patient room on 02/26, will update Irene again tomorrow Monday     Right eye conjunctivitis :  -- started Ofloxacin 0.3% ophthalmic solution     COPD  Not on home O2.  PTA is on fluticasone/salmeterol, DuoNeb, Spiriva inhalers.    -- Continue with scheduled DuoNeb, steroid neb and PRN albuterol as well.    -- Not on home O2, needs exercise oximetry prior to discharge  -- Her current presentation does not seem consistent with COPD exacerbation, likely related to pneumonia/lung abscess as above, treatment as outlined.   -- Patient might need Oxygen for some time considering acute infection on top of underlying COPD      Type II MI:   Minimally abnormal troponin, follow trend.  Unlikely ACS.  --Continue aspirin  --2D echo showed normal LVEF and no wall motion abnormalities.  --No further work-up planned, recommend outpatient stress test once acute issues resolve.      DM2: HbA1c 9.9.  Takes glipizide PTA.  -- PTA glipizide resumed   -- Continue with Premeal NovoLog and ISS.  -- BS in 100s     Essential hypertension  Hyperlipidemia  --Continue PTA Norvasc 5 mg p.o. daily we will monitor blood pressure if it needs to be adjusted further     Clinically Significant Risk Factors Present on Admission     Diet: Moderate Consistent Carb (60 g CHO per Meal) Diet    Botello Catheter: Not present  DVT Prophylaxis: Enoxaparin (Lovenox) SQ  Code Status: No CPR- Do NOT Intubate    The patient's care was discussed with the Bedside Nurse, Patient and patient,s daughter Irene    Disposition Plan   Expected Discharge: 02/28/2022     Anticipated discharge location:  Awaiting care coordination huddle   Patient  has been evaluated by physical therapy, recommending patient to be discharged to TCU or home with home health care PT.  Left a message for daughter yesterday, no changes in the medications today, still waiting for the lab results will update family again tomorrow     Eulalia Gore MD, FACP  Text Page (7am - 6pm)    ----------------------------------------------------------------------------------------------------------------------    Interval History    She was seen and examined, lying in bed, still requiring 3 L of oxygen, dropped her oxygenation with exertion this morning but was able to improve with increasing the oxygen.  She is otherwise denying any fever or chills.  Discussed with her that her AFB is still pending     -Data reviewed today: I reviewed all new labs and imaging results over the last 24 hours.    I personally reviewed no images or EKG's today.    Physical Exam   Temp: 99.1  F (37.3  C) Temp src: Oral BP: 119/63 Pulse: 89   Resp: 16 SpO2: 90 % O2 Device: Nasal cannula Oxygen Delivery: 3 LPM  Vitals:    02/21/22 1902 02/22/22 0500 02/26/22 0412   Weight: 61.1 kg (134 lb 11.2 oz) 59.9 kg (132 lb) 60.1 kg (132 lb 7.9 oz)     Vital Signs with Ranges  Temp:  [97.8  F (36.6  C)-99.7  F (37.6  C)] 99.1  F (37.3  C)  Pulse:  [89-95] 89  Resp:  [16] 16  BP: (119-143)/(54-63) 119/63  SpO2:  [90 %-93 %] 90 %  I/O last 3 completed shifts:  In: 640 [P.O.:540; I.V.:100]  Out: -     GENERAL: Alert , awake and oriented. NAD. Conversational, appropriate. Wearing nasal cannula   HEENT: Normocephalic. EOMI. No icterus or injection. Nares normal.   LUNGS: Clear to auscultation. No dyspnea at rest.   HEART: Regular rate. Extremities perfused.   ABDOMEN: Soft, nontender, and nondistended. Positive bowel sounds.   EXTREMITIES: No LE edema noted.   NEUROLOGIC: Moves extremities x4 on command. No acute focal neurologic abnormalities noted.     Medications       amLODIPine  5 mg Oral Daily     ampicillin-sulbactam (UNASYN)  IV  3 g Intravenous Q6H     aspirin  81 mg Oral Daily     calcium carbonate 600 mg-vitamin D 400 units  1 tablet Oral BID     calcium polycarbophil  625 mg Oral Daily     famotidine  20 mg Oral BID     fluticasone-vilanterol  1 puff Inhalation Daily     furosemide  20 mg Oral Daily     glipiZIDE  5 mg Oral QAM     insulin aspart  1-7 Units Subcutaneous TID AC     insulin aspart  1-5 Units Subcutaneous At Bedtime     insulin aspart   Subcutaneous TID AC     latanoprost  1 drop Left Eye QPM     multivitamin, therapeutic  1 tablet Oral QPM     ofloxacin  1 drop Right Eye 4x Daily     polyethylene glycol  8.5 g Oral QPM     rosuvastatin  5 mg Oral At Bedtime     umeclidinium  1 puff Inhalation Daily       Data   Recent Labs   Lab 02/27/22  0157 02/26/22  2113 02/26/22  1730 02/22/22  0754 02/22/22  0559 02/21/22  1948 02/21/22  1242   WBC  --   --   --   --  7.3  --  8.9   HGB  --   --   --   --  12.2  --  12.6   MCV  --   --   --   --  83  --  84   PLT  --   --   --   --  158  --  174   NA  --   --   --   --  137  --  135   POTASSIUM  --   --   --   --  3.5  --  4.2   CHLORIDE  --   --   --   --  104  --  101   CO2  --   --   --   --  27  --  26   BUN  --   --   --   --  17  --  26   CR  --   --   --   --  0.59  --  0.62   ANIONGAP  --   --   --   --  6  --  8   NARCISA  --   --   --   --  8.5  --  10.0   * 145* 163*   < > 161*   < > 365*   ALBUMIN  --   --   --   --   --   --  3.1*   PROTTOTAL  --   --   --   --   --   --  6.2*   BILITOTAL  --   --   --   --   --   --  0.5   ALKPHOS  --   --   --   --   --   --  58   ALT  --   --   --   --   --   --  32   AST  --   --   --   --   --   --  15    < > = values in this interval not displayed.       Imaging:   No results found for this or any previous visit (from the past 24 hour(s)).

## 2022-02-27 NOTE — PLAN OF CARE
Goal Outcome Evaluation:    Plan of Care Reviewed With: patient     Overall Patient Progress: improving    Summary: SOB/weakness      DATE & TIME: 2/26/22 5527-3421    Cognitive Concerns/ Orientation : A&O x4, calm, cooperative and pleasant  BEHAVIOR & AGGRESSION TOOL COLOR: GREEN  ABNL VS/O2: VSS on 3L NC, oxygen saturations remain in low to mid 90s  MOBILITY: SBA with walker. Steady gait  PAIN MANAGMENT: Patient has chronic back pain - Oxycodone given x2 this shift with relief  DIET: Mod CHO - appetite is good  BOWEL/BLADDER: Continent; BMx2 today  ABNL LAB/BG: ,122,163  DRAIN/DEVICES: RFA PIV  SKIN: Blanchable redness on sacrum/coccyx - sacral mepilex C/D/I  TESTS/PROCEDURES: None  D/C DAY/GOALS/PLACE: To TCU or home with home health care PT - SW following  OTHER IMPORTANT INFO: Airborne isolation maintained. ID following. Patient c/o right eye swelling and drainage - Ofloxacin drop started.

## 2022-02-27 NOTE — PLAN OF CARE
Goal Outcome Evaluation:    Plan of Care Reviewed With: patient     Overall Patient Progress: improving     Cognitive Concerns/ Orientation : A&O x4, calm, cooperative and pleasant  BEHAVIOR & AGGRESSION TOOL COLOR: GREEN  ABNL VS/O2: VSS on 3L NC, oxygen saturations remain in low to mid 90s  MOBILITY: SBA with walker. Steady gait  PAIN MANAGMENT: Patient has chronic back pain - Oxycodone given x1 this shift with relief  DIET: Mod CHO   BOWEL/BLADDER: Continent; BMx2 Saturday  ABNL LAB/BG:   DRAIN/DEVICES: RFA PIV  SKIN: Blanchable redness on sacrum/coccyx - sacral mepilex C/D/I  TESTS/PROCEDURES: None  D/C DAY/GOALS/PLACE: To TCU or home with home health care PT - SW following  OTHER IMPORTANT INFO: Airborne isolation maintained. ID following. Patient c/o right eye swelling and drainage - Ofloxacin drop started.

## 2022-02-28 LAB
ANION GAP SERPL CALCULATED.3IONS-SCNC: 5 MMOL/L (ref 3–14)
BUN SERPL-MCNC: 16 MG/DL (ref 7–30)
CALCIUM SERPL-MCNC: 8.2 MG/DL (ref 8.5–10.1)
CHLORIDE BLD-SCNC: 104 MMOL/L (ref 94–109)
CO2 SERPL-SCNC: 27 MMOL/L (ref 20–32)
CREAT SERPL-MCNC: 0.62 MG/DL (ref 0.52–1.04)
CRP SERPL-MCNC: 70.3 MG/L (ref 0–8)
ERYTHROCYTE [DISTWIDTH] IN BLOOD BY AUTOMATED COUNT: 13.9 % (ref 10–15)
GFR SERPL CREATININE-BSD FRML MDRD: 84 ML/MIN/1.73M2
GLUCOSE BLD-MCNC: 213 MG/DL (ref 70–99)
GLUCOSE BLDC GLUCOMTR-MCNC: 102 MG/DL (ref 70–99)
GLUCOSE BLDC GLUCOMTR-MCNC: 176 MG/DL (ref 70–99)
GLUCOSE BLDC GLUCOMTR-MCNC: 179 MG/DL (ref 70–99)
HCT VFR BLD AUTO: 35 % (ref 35–47)
HGB BLD-MCNC: 10.8 G/DL (ref 11.7–15.7)
MCH RBC QN AUTO: 26 PG (ref 26.5–33)
MCHC RBC AUTO-ENTMCNC: 30.9 G/DL (ref 31.5–36.5)
MCV RBC AUTO: 84 FL (ref 78–100)
PLATELET # BLD AUTO: 166 10E3/UL (ref 150–450)
POTASSIUM BLD-SCNC: 3.7 MMOL/L (ref 3.4–5.3)
PROCALCITONIN SERPL-MCNC: <0.05 NG/ML
RBC # BLD AUTO: 4.16 10E6/UL (ref 3.8–5.2)
SCANNED LAB RESULT: NORMAL
SODIUM SERPL-SCNC: 136 MMOL/L (ref 133–144)
WBC # BLD AUTO: 6.3 10E3/UL (ref 4–11)

## 2022-02-28 PROCEDURE — 85027 COMPLETE CBC AUTOMATED: CPT | Performed by: INTERNAL MEDICINE

## 2022-02-28 PROCEDURE — 999N000128 HC STATISTIC PERIPHERAL IV START W/O US GUIDANCE

## 2022-02-28 PROCEDURE — 99233 SBSQ HOSP IP/OBS HIGH 50: CPT | Performed by: HOSPITALIST

## 2022-02-28 PROCEDURE — 250N000011 HC RX IP 250 OP 636: Performed by: INTERNAL MEDICINE

## 2022-02-28 PROCEDURE — 120N000001 HC R&B MED SURG/OB

## 2022-02-28 PROCEDURE — 86140 C-REACTIVE PROTEIN: CPT | Performed by: INTERNAL MEDICINE

## 2022-02-28 PROCEDURE — 36415 COLL VENOUS BLD VENIPUNCTURE: CPT | Performed by: INTERNAL MEDICINE

## 2022-02-28 PROCEDURE — 84145 PROCALCITONIN (PCT): CPT | Performed by: INTERNAL MEDICINE

## 2022-02-28 PROCEDURE — 250N000013 HC RX MED GY IP 250 OP 250 PS 637: Performed by: HOSPITALIST

## 2022-02-28 PROCEDURE — 80048 BASIC METABOLIC PNL TOTAL CA: CPT | Performed by: INTERNAL MEDICINE

## 2022-02-28 RX ORDER — LIDOCAINE 40 MG/G
CREAM TOPICAL
Status: DISCONTINUED | OUTPATIENT
Start: 2022-02-28 | End: 2022-03-15 | Stop reason: HOSPADM

## 2022-02-28 RX ADMIN — CALCIUM CARBONATE 600 MG (1,500 MG)-VITAMIN D3 400 UNIT TABLET 1 TABLET: at 21:19

## 2022-02-28 RX ADMIN — POLYETHYLENE GLYCOL 3350 8.5 G: 17 POWDER, FOR SOLUTION ORAL at 21:19

## 2022-02-28 RX ADMIN — FUROSEMIDE 20 MG: 20 TABLET ORAL at 09:46

## 2022-02-28 RX ADMIN — AMPICILLIN SODIUM AND SULBACTAM SODIUM 3 G: 2; 1 INJECTION, POWDER, FOR SOLUTION INTRAMUSCULAR; INTRAVENOUS at 21:25

## 2022-02-28 RX ADMIN — AMPICILLIN SODIUM AND SULBACTAM SODIUM 3 G: 2; 1 INJECTION, POWDER, FOR SOLUTION INTRAMUSCULAR; INTRAVENOUS at 03:24

## 2022-02-28 RX ADMIN — FAMOTIDINE 20 MG: 20 TABLET ORAL at 21:19

## 2022-02-28 RX ADMIN — OXYCODONE HYDROCHLORIDE 5 MG: 5 TABLET ORAL at 00:18

## 2022-02-28 RX ADMIN — FLUTICASONE FUROATE AND VILANTEROL TRIFENATATE 1 PUFF: 200; 25 POWDER RESPIRATORY (INHALATION) at 09:51

## 2022-02-28 RX ADMIN — ROSUVASTATIN CALCIUM 5 MG: 5 TABLET, FILM COATED ORAL at 21:18

## 2022-02-28 RX ADMIN — AMLODIPINE BESYLATE 5 MG: 5 TABLET ORAL at 09:46

## 2022-02-28 RX ADMIN — INSULIN ASPART: 100 INJECTION, SOLUTION INTRAVENOUS; SUBCUTANEOUS at 17:33

## 2022-02-28 RX ADMIN — GLIPIZIDE 5 MG: 5 TABLET ORAL at 09:46

## 2022-02-28 RX ADMIN — CALCIUM CARBONATE 600 MG (1,500 MG)-VITAMIN D3 400 UNIT TABLET 1 TABLET: at 09:46

## 2022-02-28 RX ADMIN — ASPIRIN 81 MG: 81 TABLET, COATED ORAL at 09:46

## 2022-02-28 RX ADMIN — LATANOPROST 1 DROP: 50 SOLUTION/ DROPS OPHTHALMIC at 21:20

## 2022-02-28 RX ADMIN — UMECLIDINIUM 1 PUFF: 62.5 AEROSOL, POWDER ORAL at 09:51

## 2022-02-28 RX ADMIN — OXYCODONE HYDROCHLORIDE 5 MG: 5 TABLET ORAL at 10:39

## 2022-02-28 RX ADMIN — OXYCODONE HYDROCHLORIDE 5 MG: 5 TABLET ORAL at 21:19

## 2022-02-28 RX ADMIN — THERA TABS 1 TABLET: TAB at 21:18

## 2022-02-28 RX ADMIN — FAMOTIDINE 20 MG: 20 TABLET ORAL at 09:46

## 2022-02-28 RX ADMIN — CALCIUM POLYCARBOPHIL 625 MG: 625 TABLET, FILM COATED ORAL at 09:47

## 2022-02-28 RX ADMIN — OXYCODONE HYDROCHLORIDE 5 MG: 5 TABLET ORAL at 16:21

## 2022-02-28 RX ADMIN — AMPICILLIN SODIUM AND SULBACTAM SODIUM 3 G: 2; 1 INJECTION, POWDER, FOR SOLUTION INTRAMUSCULAR; INTRAVENOUS at 09:45

## 2022-02-28 ASSESSMENT — ACTIVITIES OF DAILY LIVING (ADL)
ADLS_ACUITY_SCORE: 8

## 2022-02-28 NOTE — PROGRESS NOTES
Summary: SOB/weakness    DATE & TIME: 2/27/22 1900 - 1005  Cognitive Concerns/ Orientation : A&O x4, calm, cooperative and pleasant  BEHAVIOR & AGGRESSION TOOL COLOR: GREEN  ABNL VS/O2: VSS on 3l. Pt encourage to take a deep breath at rest.  MOBILITY: up *1 assist  PAIN MANAGMENT: Patient has chronic back pain - Oxycodone given x2 this shift with relief  DIET: Mod CHO   BOWEL/BLADDER: Continent;   ABNL LAB/BG: , not received insulin  DRAIN/DEVICES: RFA PIV SL  SKIN: Blanchable redness on sacrum/coccyx - sacral mepilex C/D/I  TESTS/PROCEDURES:  Possible CT scan of the chest or x-ray prior to discharge  D/C DAY/GOALS/PLACE: To TCU or home with home health care PT - SW following  OTHER IMPORTANT INFO: Airborne isolation maintained. ID following. Patient c/o right eye swelling and drainage - Ofloxacin drop started yesterday - improvement noted today.

## 2022-02-28 NOTE — PROGRESS NOTES
[facility]    Medicine Progress Note - Hospitalist Service    Date of Admission:  2/21/2022    Assessment & Plan          Cumulative Summary: Celeste Chacko is a very pleasant 89 year old female with medical history significant for COPD, chronic back pain, DM2, HTN, HL was brought to the ER for evaluation of exertional dyspnea and fatigue and is being registered under observation on 2/21/2022 for further management.        Assessment & Plan        Possible Lung abscess  Exertional dyspnea and Fatigue  Recent admission for COPD exacerbation treated with a steroid but ongoing dyspnea mostly with exertion but without fever, cough, weight loss or night sweats.  No wheezing.  No chest pain. Interestingly, CT PE study was done which shoed No PE but 2 new cavitary nodules in the right upper lobe. This is concerning for aggressive, atypical infection, possibly fungal or tubercular.  Patient has been on steroid for almost a month although currently off of it. proBNP negative.  WBC normal.  Pro-Edilberto negative     --Seen and examined, feeling clinically well, this morning with exertion, patient dropped her pulse ox in 70s which was able to return back in 90s in couple of minutes with oxygen supplementation.  --Patient will be continued on IV Unasyn for possible bacterial cavitary infection as per ID recommendation.  --We will discuss with ID considering patient ongoing hypoxia if CT scan of the chest or x-ray needs to be repeated before patients get discharged.  -- So far fungal antigen is negative for histo , cocci and Aspergillus antigen has came back negative although negative results do not exclude the diagnosis of invasive aspergillosis  -- QuantiFERON-TB gold negative.  Sputum for AFB x3 are pending, patient will stay in airborne isolation till those are back,  -- ID has also ordered Blastomyces antigen  -- Blood cultures negative so far, follow.   -- Management of COPD as below  -- Therapy eval  noted, TCU at discharge,  for discharge planning.   -- Discussed with infectious disease, patient will probably stay here over the weekend, if her tuberculosis work-up is negative then patient will probably require 4 weeks of oral Augmentin for cavitary bacterial lesion.  -- Discussed the plan of care with daughter on the phone from patient room on 02/26, will update Irene again tomorrow Monday      Right eye conjunctivitis :  -- started Ofloxacin 0.3% ophthalmic solution      COPD  Not on home O2.  PTA is on fluticasone/salmeterol, DuoNeb, Spiriva inhalers.     -- Continue with scheduled DuoNeb, steroid neb and PRN albuterol as well.    -- Not on home O2, needs exercise oximetry prior to discharge  -- Her current presentation does not seem consistent with COPD exacerbation, likely related to pneumonia/lung abscess as above, treatment as outlined.   -- Patient might need Oxygen for some time considering acute infection on top of underlying COPD      Type II MI:   Minimally abnormal troponin, follow trend.  Unlikely ACS.  --Continue aspirin  --2D echo showed normal LVEF and no wall motion abnormalities.  --No further work-up planned, recommend outpatient stress test once acute issues resolve.      DM2: HbA1c 9.9.  Takes glipizide PTA.  -- PTA glipizide resumed   -- Continue with Premeal NovoLog and ISS.  -- BS in 100s     Essential hypertension  Hyperlipidemia  --Continue PTA Norvasc 5 mg p.o. daily we will monitor blood pressure if it needs to be adjusted further         Diet: Moderate Consistent Carb (60 g CHO per Meal) Diet    DVT Prophylaxis: Pneumatic Compression Devices  Botello Catheter: Not present  Central Lines: None  Cardiac Monitoring: None  Code Status: No CPR- Do NOT Intubate      Disposition Plan   Expected Discharge: 03/01/2022     Anticipated discharge location:  Awaiting care coordination huddle  Delays:          The patient's care was discussed with the Patient.    Cailin Loera MD  Hospitalist  [facility]  Securely message with the [employer] Web Console (learn more here)  Text page via [employer] Paging/Directory         Clinically Significant Risk Factors Present on Admission                    ______________________________________________________________________    Interval History   Complains of SOB worse than when she was admitted  Denies any chest pain    Data reviewed today: I reviewed all medications, new labs and imaging results over the last 24 hours. I personally reviewed no images or EKG's today.    Physical Exam   Vital Signs: Temp: 98.6  F (37  C) Temp src: Oral BP: (!) 140/56 Pulse: 87   Resp: 16 SpO2: 90 % O2 Device: Nasal cannula Oxygen Delivery: 3 LPM  Weight: 132 lbs 7.94 oz  General Appearance: Well appearing for stated age.  Respiratory: CTAB, no rales or ronchi  Cardiovascular: S1, S2 normal, no murmurs  GI: non-tender on palpation, BS present      Data   Recent Labs   Lab 02/28/22  1650 02/28/22  1242 02/28/22  1202 02/22/22  0754 02/22/22  0559   WBC  --   --  6.3  --  7.3   HGB  --   --  10.8*  --  12.2   MCV  --   --  84  --  83   PLT  --   --  166  --  158   NA  --   --  136  --  137   POTASSIUM  --   --  3.7  --  3.5   CHLORIDE  --   --  104  --  104   CO2  --   --  27  --  27   BUN  --   --  16  --  17   CR  --   --  0.62  --  0.59   ANIONGAP  --   --  5  --  6   NARCISA  --   --  8.2*  --  8.5   * 176* 213*   < > 161*    < > = values in this interval not displayed.     No results found for this or any previous visit (from the past 24 hour(s)).

## 2022-02-28 NOTE — PLAN OF CARE
Goal Outcome Evaluation:      Cognitive Concerns/ Orientation : A&O x4, calm, cooperative and pleasant  BEHAVIOR & AGGRESSION TOOL COLOR: GREEN  ABNL VS/O2: VSS; desatted to 70's on 3L with activity and took over a minute on 5L to recover to 90's - currently on 3L at rest.  MOBILITY: SBA with walker. Steady gait  PAIN MANAGMENT: Patient has chronic back pain - Oxycodone given x1 this shift with relief  DIET: Mod CHO   BOWEL/BLADDER: Continent; M BM, normal reported pet pt- calls appropriately.  ABNL LAB/BG: ,86,134  DRAIN/DEVICES: R PIV-SL w/ int abx  SKIN: Blanchable redness on sacrum/coccyx - sacral mepilex C/D/I  TESTS/PROCEDURES: None  D/C DAY/GOALS/PLACE: To TCU or home with home health care PT - SW following  OTHER IMPORTANT INFO: Airborne isolation maintained.

## 2022-02-28 NOTE — PROGRESS NOTES
Care Management Follow Up    Length of Stay (days): 7    Expected Discharge Date: 03/01/2022     Concerns to be Addressed:       Patient plan of care discussed at interdisciplinary rounds: Yes    Anticipated Discharge Disposition:       Anticipated Discharge Services:    Anticipated Discharge DME:      Patient/family educated on Medicare website which has current facility and service quality ratings:    Education Provided on the Discharge Plan:    Patient/Family in Agreement with the Plan: yes    Referrals Placed by CM/SW:    Private pay costs discussed: Not applicable    Additional Information:  Left messages for [facility] asking if they would have an available tcu bed for patient. [facility] left a message stating that they could review patient once patient's TB is ruled out. [facility] called and asked when patient would be ready to be discharged since it looks like she's not doing well right now. Talked to charge RN and they said they are waiting for TB results and tentative discharge date will be 3/1. Left messages for [facility] letting them know that we are waiting for tb results and will get back to them.    Will continue to follow.    LYNDA Tang

## 2022-02-28 NOTE — PLAN OF CARE
Goal Outcome Evaluation:    Plan of Care Reviewed With: patient     Overall Patient Progress: Declining    Summary: SOB/weakness      DATE & TIME: 2/27/22 3802-1230    Cognitive Concerns/ Orientation : A&O x4, calm, cooperative and pleasant  BEHAVIOR & AGGRESSION TOOL COLOR: GREEN  ABNL VS/O2: VSS; desatted to 70's on 3L with activity and took over a minute on 5L to recover to 90's - currently 90's on 3L at rest  MOBILITY: SBA with walker. Steady gait  PAIN MANAGMENT: Patient has chronic back pain - Oxycodone given x1 this shift with relief  DIET: Mod CHO   BOWEL/BLADDER: Continent; BMx1  ABNL LAB/BG: ,86,134  DRAIN/DEVICES: RFA PIV  SKIN: Blanchable redness on sacrum/coccyx - sacral mepilex C/D/I  TESTS/PROCEDURES:  Possible CT scan of the chest or x-ray prior to discharge  D/C DAY/GOALS/PLACE: To TCU or home with home health care PT - SW following  OTHER IMPORTANT INFO: Airborne isolation maintained. ID following. Patient c/o right eye swelling and drainage - Ofloxacin drop started yesterday - improvement noted today.

## 2022-02-28 NOTE — PROGRESS NOTES
"[facility]    Infectious Disease Progress Note    Date of Service (when I saw the patient): 02/28/2022     Assessment & Plan   Celeste Chacko is a 89 year old female who was admitted on 2/21/2022.     Impression:  1. 89 y.o with COPD  2. Diabetes   3. HTN, HLd  4. Admitted with shortness of breath.   5. CT scan \"  There are two new cavitary nodules in the right upper lobe. This  is concerning for aggressive, atypical infection, possibly fungal or  Tubercular.\"      Recommendations:   Airborne isolation will rule out for TB though low suspicion, patient has no past history of family or friend who had TB  AFB cultures and stain    Fungal antigen negative for histo and cocci, blasto all negative.   Unasyn for possible bacterial cavitary infection       If 3 AFB stain negative remove isolation and if clinically improved ok to discharge on oral augmentin for 4 weeks with FOLLOW UP CT   Consider pulmonary FOLLOW UP             Gunner Sainz MD    Interval History   Tolerating antibiotics ok   No new rashes or issues with antibiotics   Labs reviewed   Afebrile   Tiered today, did not sleep well last  Night     Physical Exam   Temp: 98.6  F (37  C) Temp src: Oral BP: (!) 140/56 Pulse: 87   Resp: 16 SpO2: 90 % O2 Device: Nasal cannula Oxygen Delivery: 3 LPM  Vitals:    02/21/22 1902 02/22/22 0500 02/26/22 0412   Weight: 61.1 kg (134 lb 11.2 oz) 59.9 kg (132 lb) 60.1 kg (132 lb 7.9 oz)     Vital Signs with Ranges  Temp:  [97.3  F (36.3  C)-99.3  F (37.4  C)] 98.6  F (37  C)  Pulse:  [87-92] 87  Resp:  [16-20] 16  BP: (131-140)/(53-60) 140/56  SpO2:  [89 %-93 %] 90 %    Constitutional: Awake, alert, cooperative, no apparent distress  Lungs: Clear to auscultation bilaterally, no crackles or wheezing  Cardiovascular: Regular rate and rhythm, normal S1 and S2, and no murmur noted  Abdomen: Normal bowel sounds, soft, non-distended, non-tender  Skin: No rashes, no cyanosis, no edema  Other:    Medications "       amLODIPine  5 mg Oral Daily     ampicillin-sulbactam (UNASYN) IV  3 g Intravenous Q6H     aspirin  81 mg Oral Daily     calcium carbonate 600 mg-vitamin D 400 units  1 tablet Oral BID     calcium polycarbophil  625 mg Oral Daily     famotidine  20 mg Oral BID     fluticasone-vilanterol  1 puff Inhalation Daily     furosemide  20 mg Oral Daily     glipiZIDE  5 mg Oral QAM     insulin aspart  1-7 Units Subcutaneous TID AC     insulin aspart  1-5 Units Subcutaneous At Bedtime     insulin aspart   Subcutaneous TID AC     latanoprost  1 drop Left Eye QPM     multivitamin, therapeutic  1 tablet Oral QPM     polyethylene glycol  8.5 g Oral QPM     rosuvastatin  5 mg Oral At Bedtime     umeclidinium  1 puff Inhalation Daily       Data   All microbiology laboratory data reviewed.  Recent Labs   Lab Test 02/22/22  0559 02/21/22  1242 02/19/22  1645   WBC 7.3 8.9 9.3   HGB 12.2 12.6 13.5   HCT 39.7 40.4 43.5   MCV 83 84 84    174 209     Recent Labs   Lab Test 02/22/22  0559 02/21/22  1242 02/19/22  1645   CR 0.59 0.62 0.62     No lab results found.  Recent Labs   Lab Test 12/04/15  1112   CULT >100,000 colonies/mL Escherichia coli*

## 2022-03-01 ENCOUNTER — APPOINTMENT (OUTPATIENT)
Dept: PHYSICAL THERAPY | Facility: CLINIC | Age: 87
DRG: 177 | End: 2022-03-01
Payer: COMMERCIAL

## 2022-03-01 LAB
ANION GAP SERPL CALCULATED.3IONS-SCNC: 5 MMOL/L (ref 3–14)
BUN SERPL-MCNC: 13 MG/DL (ref 7–30)
CALCIUM SERPL-MCNC: 8.7 MG/DL (ref 8.5–10.1)
CHLORIDE BLD-SCNC: 103 MMOL/L (ref 94–109)
CO2 SERPL-SCNC: 29 MMOL/L (ref 20–32)
CREAT SERPL-MCNC: 0.54 MG/DL (ref 0.52–1.04)
CRP SERPL-MCNC: 75.2 MG/L (ref 0–8)
GFR SERPL CREATININE-BSD FRML MDRD: 87 ML/MIN/1.73M2
GLUCOSE BLD-MCNC: 180 MG/DL (ref 70–99)
GLUCOSE BLDC GLUCOMTR-MCNC: 112 MG/DL (ref 70–99)
GLUCOSE BLDC GLUCOMTR-MCNC: 153 MG/DL (ref 70–99)
GLUCOSE BLDC GLUCOMTR-MCNC: 154 MG/DL (ref 70–99)
GLUCOSE BLDC GLUCOMTR-MCNC: 228 MG/DL (ref 70–99)
POTASSIUM BLD-SCNC: 3.4 MMOL/L (ref 3.4–5.3)
SARS-COV-2 RNA RESP QL NAA+PROBE: NEGATIVE
SODIUM SERPL-SCNC: 137 MMOL/L (ref 133–144)

## 2022-03-01 PROCEDURE — 36415 COLL VENOUS BLD VENIPUNCTURE: CPT | Performed by: HOSPITALIST

## 2022-03-01 PROCEDURE — 97116 GAIT TRAINING THERAPY: CPT | Mod: GP | Performed by: PHYSICAL THERAPIST

## 2022-03-01 PROCEDURE — 250N000011 HC RX IP 250 OP 636: Performed by: INTERNAL MEDICINE

## 2022-03-01 PROCEDURE — 87635 SARS-COV-2 COVID-19 AMP PRB: CPT | Performed by: HOSPITALIST

## 2022-03-01 PROCEDURE — 120N000001 HC R&B MED SURG/OB

## 2022-03-01 PROCEDURE — 80048 BASIC METABOLIC PNL TOTAL CA: CPT | Performed by: HOSPITALIST

## 2022-03-01 PROCEDURE — 86140 C-REACTIVE PROTEIN: CPT | Performed by: HOSPITALIST

## 2022-03-01 PROCEDURE — 97110 THERAPEUTIC EXERCISES: CPT | Mod: GP | Performed by: PHYSICAL THERAPIST

## 2022-03-01 PROCEDURE — 250N000013 HC RX MED GY IP 250 OP 250 PS 637: Performed by: HOSPITALIST

## 2022-03-01 PROCEDURE — 999N000198 HC STATISTIC WOC PT EDUCATION, 16-30 MIN

## 2022-03-01 PROCEDURE — 99233 SBSQ HOSP IP/OBS HIGH 50: CPT | Performed by: HOSPITALIST

## 2022-03-01 RX ADMIN — FAMOTIDINE 20 MG: 20 TABLET ORAL at 21:36

## 2022-03-01 RX ADMIN — INSULIN ASPART 3 UNITS: 100 INJECTION, SOLUTION INTRAVENOUS; SUBCUTANEOUS at 19:21

## 2022-03-01 RX ADMIN — INSULIN ASPART 1 UNITS: 100 INJECTION, SOLUTION INTRAVENOUS; SUBCUTANEOUS at 21:38

## 2022-03-01 RX ADMIN — OXYCODONE HYDROCHLORIDE 5 MG: 5 TABLET ORAL at 18:28

## 2022-03-01 RX ADMIN — OXYCODONE HYDROCHLORIDE 5 MG: 5 TABLET ORAL at 22:28

## 2022-03-01 RX ADMIN — AMPICILLIN SODIUM AND SULBACTAM SODIUM 3 G: 2; 1 INJECTION, POWDER, FOR SOLUTION INTRAMUSCULAR; INTRAVENOUS at 02:57

## 2022-03-01 RX ADMIN — FAMOTIDINE 20 MG: 20 TABLET ORAL at 08:38

## 2022-03-01 RX ADMIN — ROSUVASTATIN CALCIUM 5 MG: 5 TABLET, FILM COATED ORAL at 21:35

## 2022-03-01 RX ADMIN — FUROSEMIDE 20 MG: 20 TABLET ORAL at 08:38

## 2022-03-01 RX ADMIN — AMPICILLIN SODIUM AND SULBACTAM SODIUM 3 G: 2; 1 INJECTION, POWDER, FOR SOLUTION INTRAMUSCULAR; INTRAVENOUS at 15:38

## 2022-03-01 RX ADMIN — AMLODIPINE BESYLATE 5 MG: 5 TABLET ORAL at 08:38

## 2022-03-01 RX ADMIN — CALCIUM POLYCARBOPHIL 625 MG: 625 TABLET, FILM COATED ORAL at 08:38

## 2022-03-01 RX ADMIN — UMECLIDINIUM 1 PUFF: 62.5 AEROSOL, POWDER ORAL at 08:47

## 2022-03-01 RX ADMIN — FLUTICASONE FUROATE AND VILANTEROL TRIFENATATE 1 PUFF: 200; 25 POWDER RESPIRATORY (INHALATION) at 08:47

## 2022-03-01 RX ADMIN — GLIPIZIDE 5 MG: 5 TABLET ORAL at 08:38

## 2022-03-01 RX ADMIN — INSULIN ASPART 2 UNITS: 100 INJECTION, SOLUTION INTRAVENOUS; SUBCUTANEOUS at 10:45

## 2022-03-01 RX ADMIN — LATANOPROST 1 DROP: 50 SOLUTION/ DROPS OPHTHALMIC at 21:38

## 2022-03-01 RX ADMIN — POLYETHYLENE GLYCOL 3350 8.5 G: 17 POWDER, FOR SOLUTION ORAL at 21:36

## 2022-03-01 RX ADMIN — ASPIRIN 81 MG: 81 TABLET, COATED ORAL at 08:38

## 2022-03-01 RX ADMIN — OXYCODONE HYDROCHLORIDE 5 MG: 5 TABLET ORAL at 00:58

## 2022-03-01 RX ADMIN — AMPICILLIN SODIUM AND SULBACTAM SODIUM 3 G: 2; 1 INJECTION, POWDER, FOR SOLUTION INTRAMUSCULAR; INTRAVENOUS at 08:43

## 2022-03-01 RX ADMIN — INSULIN ASPART 4 UNITS: 100 INJECTION, SOLUTION INTRAVENOUS; SUBCUTANEOUS at 14:04

## 2022-03-01 RX ADMIN — OXYCODONE HYDROCHLORIDE 5 MG: 5 TABLET ORAL at 11:35

## 2022-03-01 RX ADMIN — CALCIUM CARBONATE 600 MG (1,500 MG)-VITAMIN D3 400 UNIT TABLET 1 TABLET: at 08:38

## 2022-03-01 RX ADMIN — CALCIUM CARBONATE 600 MG (1,500 MG)-VITAMIN D3 400 UNIT TABLET 1 TABLET: at 21:35

## 2022-03-01 RX ADMIN — THERA TABS 1 TABLET: TAB at 21:35

## 2022-03-01 ASSESSMENT — ACTIVITIES OF DAILY LIVING (ADL)
ADLS_ACUITY_SCORE: 10
ADLS_ACUITY_SCORE: 9
ADLS_ACUITY_SCORE: 9
ADLS_ACUITY_SCORE: 10
ADLS_ACUITY_SCORE: 9
ADLS_ACUITY_SCORE: 8
ADLS_ACUITY_SCORE: 8
ADLS_ACUITY_SCORE: 10
ADLS_ACUITY_SCORE: 9
ADLS_ACUITY_SCORE: 10
ADLS_ACUITY_SCORE: 9
ADLS_ACUITY_SCORE: 10
ADLS_ACUITY_SCORE: 8
ADLS_ACUITY_SCORE: 9
ADLS_ACUITY_SCORE: 10
ADLS_ACUITY_SCORE: 9

## 2022-03-01 NOTE — PLAN OF CARE
"Summary: SOB/weakness, r/o TB or bacterial cavitary infection  DATE & TIME: 03/01/22 AM shift  Cognitive Concerns/ Orientation : A&O x4, calm, cooperative and pleasant  BEHAVIOR & AGGRESSION TOOL COLOR: GREEN  ABNL VS/O2: VSS 4L nasal Cannula oxymizer at rest, saturation 91%-92%. Denies SOB. BAKER noted, oxygen increased to  5L when ambulated to the bathroom, was able to keep sats above 90% on 5L with walking. LS diminished, bases with expiratory wheezes. Encouraged to use IS.   MOBILITY: SBA with walker and GB  PAIN MANAGMENT: PRN Oxycodone given x1 for chronic back pain  DIET: Mod CHO, fair appetite.   BOWEL/BLADDER: Continent; no BM this shift  ABNL LAB/BG: /153, CRP 75.2; Hg 10.8  DRAIN/DEVICES: PIV saline locked; IV abx  SKIN: Blanchable redness on sacrum/coccyx - sacral mepilex CDI, WOC to follow today.   TESTS/PROCEDURES: None  D/C DAY/GOALS/PLACE: [facility] able to take pt once medically stable and out of isolation, SW if following. PT is following. ID is following, per ID note \" pt had negative quant, negative sputum x 2 remove isolation and Augmentin for 4 weeks with FOLLOW UP CT, Consider pulmonary FOLLOW UP outpt \"  OTHER IMPORTANT INFO: Airborne isolation maintained. SW following                       "

## 2022-03-01 NOTE — PROGRESS NOTES
"[facility]    Infectious Disease Progress Note    Date of Service (when I saw the patient): 03/01/2022     Assessment & Plan   Celeste Chacko is a 89 year old female who was admitted on 2/21/2022.     Impression:  1. 89 y.o with COPD  2. Diabetes   3. HTN, HLd  4. Admitted with shortness of breath.   5. CT scan \"  There are two new cavitary nodules in the right upper lobe. This  is concerning for aggressive, atypical infection, possibly fungal or  Tubercular.\"      Recommendations:   Airborne isolation will rule out for TB though low suspicion, patient has no past history of family or friend who had TB  AFB cultures and stain    Fungal antigen negative for histo and cocci, blasto all negative.   Unasyn for possible bacterial cavitary infection       If 3 AFB stain negative remove isolation and if clinically improved ok to discharge on oral  2 already back, the 3rd is pending, talked to the labs now ( on 3/ 1) I was informed there is not enough sample in the sputum that was collected on 2/ 23    I think given negative quant, negative sputum x 2 remove isolation and :      Augmentin for 4 weeks with FOLLOW UP CT   Consider pulmonary FOLLOW UP outpt             Gunner Sainz MD    Interval History   Tolerating antibiotics ok   No new rashes or issues with antibiotics   Labs reviewed   Afebrile   Tiered today, did not sleep well last  Night     Physical Exam   Temp: 98.3  F (36.8  C) Temp src: Oral BP: (!) 143/63 Pulse: 82   Resp: 16 SpO2: 94 % O2 Device: Nasal cannula Oxygen Delivery: 4 LPM  Vitals:    02/21/22 1902 02/22/22 0500 02/26/22 0412   Weight: 61.1 kg (134 lb 11.2 oz) 59.9 kg (132 lb) 60.1 kg (132 lb 7.9 oz)     Vital Signs with Ranges  Temp:  [98.3  F (36.8  C)-98.8  F (37.1  C)] 98.3  F (36.8  C)  Pulse:  [] 82  Resp:  [16] 16  BP: (126-144)/(47-63) 143/63  SpO2:  [88 %-96 %] 94 %    Constitutional: Awake, alert, cooperative, no apparent distress  Lungs: Clear to auscultation " bilaterally, no crackles or wheezing  Cardiovascular: Regular rate and rhythm, normal S1 and S2, and no murmur noted  Abdomen: Normal bowel sounds, soft, non-distended, non-tender  Skin: No rashes, no cyanosis, no edema  Other:    Medications       amLODIPine  5 mg Oral Daily     ampicillin-sulbactam (UNASYN) IV  3 g Intravenous Q6H     aspirin  81 mg Oral Daily     calcium carbonate 600 mg-vitamin D 400 units  1 tablet Oral BID     calcium polycarbophil  625 mg Oral Daily     famotidine  20 mg Oral BID     fluticasone-vilanterol  1 puff Inhalation Daily     furosemide  20 mg Oral Daily     glipiZIDE  5 mg Oral QAM     insulin aspart  1-7 Units Subcutaneous TID AC     insulin aspart  1-5 Units Subcutaneous At Bedtime     insulin aspart   Subcutaneous TID AC     latanoprost  1 drop Left Eye QPM     multivitamin, therapeutic  1 tablet Oral QPM     polyethylene glycol  8.5 g Oral QPM     rosuvastatin  5 mg Oral At Bedtime     sodium chloride (PF)  3 mL Intracatheter Q8H     umeclidinium  1 puff Inhalation Daily       Data   All microbiology laboratory data reviewed.  Recent Labs   Lab Test 02/28/22  1202 02/22/22  0559 02/21/22  1242   WBC 6.3 7.3 8.9   HGB 10.8* 12.2 12.6   HCT 35.0 39.7 40.4   MCV 84 83 84    158 174     Recent Labs   Lab Test 03/01/22  1046 02/28/22  1202 02/22/22  0559   CR 0.54 0.62 0.59     No lab results found.  Recent Labs   Lab Test 12/04/15  1112   CULT >100,000 colonies/mL Escherichia coli*

## 2022-03-01 NOTE — PLAN OF CARE
Summary: SOB/weakness  -rule out TB  DATE & TIME: 03/01/22 8702-9939  Cognitive Concerns/ Orientation : A&O x4, calm, cooperative and pleasant  BEHAVIOR & AGGRESSION TOOL COLOR: GREEN  ABNL VS/O2: VSS 4L NC at rest, 5L when ambulating, desats with activity  MOBILITY: SBA with walker  PAIN MANAGMENT: PRN Oxycodone given x1 for chronic back pain  DIET: Mod CHO   BOWEL/BLADDER: Continent; no BM this shift  ABNL LAB/BG:   DRAIN/DEVICES: PIV saline locked; IV abx  SKIN: Blanchable redness on sacrum/coccyx - sacral mepilex CDI  TESTS/PROCEDURES: None  D/C DAY/GOALS/PLACE: Discharge to TCU or home with home health care PT pending  OTHER IMPORTANT INFO: Airborne isolation maintained. SW following

## 2022-03-01 NOTE — PLAN OF CARE
Goal Outcome Evaluation:    Summary: SOB/weakness  -rule out TB  DATE & TIME: 2/28/22 3-11pm shift  Cognitive Concerns/ Orientation : A&O x4, calm, cooperative and pleasant  BEHAVIOR & AGGRESSION TOOL COLOR: GREEN  ABNL VS/O2: VSS; desatted to 70's on 3L with activity and took over a minute on 5L to recover to 90's - currently on 3L at rest.  MOBILITY: SBA with walker. Steady gait  PAIN MANAGMENT: Patient has chronic back pain - Oxycodone given x1 this shift with relief  DIET: Mod CHO   BOWEL/BLADDER: Continent; M BM, normal reported pet pt- calls appropriately.  ABNL LAB/BG: ,103, crp 70.3  DRAIN/DEVICES: PIV changed to L forearm  SKIN: Blanchable redness on sacrum/coccyx - sacral mepilex C/D/I  TESTS/PROCEDURES: None  D/C DAY/GOALS/PLACE: To TCU or home with home health care PT - SW following oxygen needs remain high  OTHER IMPORTANT INFO: Airborne isolation maintained.

## 2022-03-01 NOTE — CONSULTS
[facility] Nurse Inpatient Wound Assessment Follow Up  Reason for consultation: Evaluate and treat coccyx and heel stage 1 pressure injuries    Assessment  coccyx and heel stage 1 pressure injuries resolved      Treatment Plan In AVS)  Pressure Injury prevention (please order supplies if not in room)  1. Turn every 2 hours, side to side avoid supine on pressure redistribution support surface   2.   Float heels off bed with use of pillows under legs, if ineffective, order offloading boots: Heel Lift boots (Prevalon #[id number])  3.   If incontinent Cleanse with incontinent cleanser (Pily spray # [id number]) followed by skin barrier protectant (Critic Aid paste #85787)  BID and after each incontinent episode  4.   Prevent sliding and shear by limiting HOB to 30 degrees or less unless contraindicated, use knee gatch first if not contraindicated  5.   If sitting, use pressure redistribution chair cushion (#077144) if unable to shift weight every hour, please limit sitting to an hour at a time  6.   Protective foam dressings PRN,Change at least q 4 days, peel back, peek and replace for daily skin inspection.  7.   Optimize nutrition     Orders Written  Recommended provider order: None, at this time  [facility] Nurse follow-up plan: signed off  Nursing to notify the Provider(s) and re-consult the [facility] Nurse if wound(s) deteriorates or new skin concern.    Patient History  According to provider note(s):  89 year old female with medical history significant for COPD, chronic back pain, DM2, HTN, HL was brought to the ER for evaluation of exertional dyspnea and fatigue and is being registered under observation on 2/21/2022 for further management.     Objective Data  Body mass index is 22.94 kg/m .      Allergies   Allergen Reactions     Sulfamethoxazole Anaphylaxis and Hives        Containment of urine/stool: Continent of bladder and Continent of bowel    Active Diet Order  Orders Placed This Encounter      Moderate Consistent Carb (60 g CHO per  Meal) Diet      Output:   I/O last 3 completed shifts:  In: 480 [P.O.:480]  Out: 200 [Urine:200]    Risk Assessment:   Sensory Perception: 4-->no impairment  Moisture: 4-->rarely moist  Activity: 4-->walks frequently  Mobility: 3-->slightly limited  Nutrition: 3-->adequate  Friction and Shear: 3-->no apparent problem  Lonnie Score: 21                          Labs:   Recent Labs   Lab 03/01/22  1046 02/28/22  1202   HGB  --  10.8*   WBC  --  6.3   CRP 75.2* 70.3*       Physical Exam  Areas of skin assessed: focused coccyx, heels    Wound Location:89 year old female with medical history significant for COPD, chronic back pain, DM2, HTN, HL was brought to the ER for evaluation of exertional dyspnea and fatigue and is being registered under observation on 2/21/2022 for further management.   Date of last photo 2/22/2022  Wound History: POA      Coccyx 1x1 cm and heel 4x4 cm stage 1 (nonblanchable erythema) pressure injuries wounds  Periwound skin: blanchable erythema  Pain: denies      Interventions  Visual inspection and assessment completed   Wound Care Rationale Provide protection , reduce friction shear  Wound Care: ordered  Supplies: floor stock  Current off-loading measures:pillows for positioning, HOB 30 degrees or less, heels floating off bed with use of pillows, chair cushion (ordered)   Current support surface: Standard  Atmos Air mattress  Education provided to: importance of repositioning and Off-loading pressure  Discussed plan of care with Patient and Nurse       Erica Guerra MS RN CWOCN

## 2022-03-01 NOTE — PROGRESS NOTES
[facility]    Medicine Progress Note - Hospitalist Service    Date of Admission:  2/21/2022    Assessment & Plan          Cumulative Summary: Celeste Chacko is a very pleasant 89 year old female with medical history significant for COPD, chronic back pain, DM2, HTN, HL was brought to the ER for evaluation of exertional dyspnea and fatigue and is being registered under observation on 2/21/2022 for further management.        Assessment & Plan     Possible Lung abscess  Exertional dyspnea and Fatigue  Recent admission for COPD exacerbation treated with a steroid but ongoing dyspnea mostly with exertion but without fever, cough, weight loss or night sweats.  No wheezing.  No chest pain.   -- CT PE study was done which showed No PE but 2 new cavitary nodules in the right upper lobe. This is concerning for aggressive, atypical infection, possibly fungal or tubercular.  Patient has been on steroid for almost a month although currently off of it. proBNP negative.  WBC normal.  Pro-Edilberto negative    -- ID consult appreciated  -- will need to rule out TB, although suspicion low  -- Quantiferon gold negative  -- AFB x2 negative. 1 more test on 2/23 pending. Per ID lab states they do not have enough specimen to complete that test  And hence Ok to take off isolation given all other negative factors.  -- fungal antigen for histo, cocci, blasto and Aspergillus all negative   -- Blood cultures negative to date  -- continue on Unasyn per ID for possible bacterial cavitary infection   -- Plan per ID  - if her tuberculosis work-up is negative then patient will probably require 4 weeks of oral Augmentin for cavitary bacterial lesion.  -- Will also require pulmonary follow up on discharge  -- will consider repeating CT scan if hypoxia not improving.   -- Plan is for discharge to TCU when medically ready.    Right eye conjunctivitis :  -- started Ofloxacin 0.3% ophthalmic solution      COPD  Not on home O2.  PTA  is on fluticasone/salmeterol, DuoNeb, Spiriva inhalers.     -- Continue with scheduled DuoNeb, steroid neb and PRN albuterol as well.    -- Not on home O2, needs exercise oximetry prior to discharge  -- Her current presentation does not seem consistent with COPD exacerbation, likely related to pneumonia/lung abscess as above, treatment as outlined.   -- Patient might need Oxygen for some time considering acute infection on top of underlying COPD      Type II MI:   Minimally abnormal troponin, follow trend.  Unlikely ACS.  --Continue aspirin  --2D echo showed normal LVEF and no wall motion abnormalities.  --No further work-up planned, recommend outpatient stress test once acute issues resolve.      DM2: HbA1c 9.9.  Takes glipizide PTA.  -- PTA glipizide resumed   -- Continue with Premeal NovoLog and ISS.  -- BS in 100s     Essential hypertension  Hyperlipidemia  --Continue PTA Norvasc 5 mg p.o. daily we will monitor blood pressure if it needs to be adjusted further         Diet: Moderate Consistent Carb (60 g CHO per Meal) Diet    DVT Prophylaxis: Pneumatic Compression Devices  Botello Catheter: Not present  Central Lines: None  Cardiac Monitoring: None  Code Status: No CPR- Do NOT Intubate      Disposition Plan   Expected Discharge: 03/02/2022     Anticipated discharge location:  Awaiting care coordination huddle  Delays:          The patient's care was discussed with the Patient.    Cailin Loera MD  Hospitalist Service  [facility]  Securely message with the Vocera Web Console (learn more here)  Text page via [employer] Paging/Directory         Clinically Significant Risk Factors Present on Admission                    ______________________________________________________________________    Interval History   Continues to complain about dyspnea most notably on exertion  Denies any chest pain    Data reviewed today: I reviewed all medications, new labs and imaging results over the last 24 hours. I  personally reviewed no images or EKG's today.    Physical Exam   Vital Signs: Temp: 98.3  F (36.8  C) Temp src: Oral BP: (!) 143/63 Pulse: 82   Resp: 16 SpO2: 94 % O2 Device: Nasal cannula Oxygen Delivery: 4 LPM  Weight: 132 lbs 7.94 oz  General Appearance: Well appearing for stated age.  Respiratory: CTAB, no rales or ronchi  Cardiovascular: S1, S2 normal, no murmurs  GI: non-tender on palpation, BS present      Data   Recent Labs   Lab 03/01/22  0840 03/01/22  0205 02/28/22  2117 02/28/22  1242 02/28/22  1202   WBC  --   --   --   --  6.3   HGB  --   --   --   --  10.8*   MCV  --   --   --   --  84   PLT  --   --   --   --  166   NA  --   --   --   --  136   POTASSIUM  --   --   --   --  3.7   CHLORIDE  --   --   --   --  104   CO2  --   --   --   --  27   BUN  --   --   --   --  16   CR  --   --   --   --  0.62   ANIONGAP  --   --   --   --  5   NARCISA  --   --   --   --  8.2*   * 154* 102*   < > 213*    < > = values in this interval not displayed.     No results found for this or any previous visit (from the past 24 hour(s)).

## 2022-03-01 NOTE — PROGRESS NOTES
Care Management Follow Up    Length of Stay (days): 8    Expected Discharge Date: 03/02/2022     Concerns to be Addressed:       Patient plan of care discussed at interdisciplinary rounds: Yes    Anticipated Discharge Disposition:       Anticipated Discharge Services:    Anticipated Discharge DME:      Patient/family educated on Medicare website which has current facility and service quality ratings:    Education Provided on the Discharge Plan:    Patient/Family in Agreement with the Plan: yes    Referrals Placed by CM/SW:    Private pay costs discussed: Not applicable    Additional Information:  SW received voicemail from [facility].  They can accept pt pending negative TB results and stable oxygen levels.  Per rounds discussion, TB results should be available later today or tomorrow.       Wendy Cody, LICSW

## 2022-03-02 ENCOUNTER — APPOINTMENT (OUTPATIENT)
Dept: CT IMAGING | Facility: CLINIC | Age: 87
DRG: 177 | End: 2022-03-02
Attending: HOSPITALIST
Payer: COMMERCIAL

## 2022-03-02 ENCOUNTER — APPOINTMENT (OUTPATIENT)
Dept: PHYSICAL THERAPY | Facility: CLINIC | Age: 87
DRG: 177 | End: 2022-03-02
Payer: COMMERCIAL

## 2022-03-02 LAB
ANION GAP SERPL CALCULATED.3IONS-SCNC: 6 MMOL/L (ref 3–14)
BUN SERPL-MCNC: 13 MG/DL (ref 7–30)
CALCIUM SERPL-MCNC: 8.6 MG/DL (ref 8.5–10.1)
CHLORIDE BLD-SCNC: 103 MMOL/L (ref 94–109)
CO2 SERPL-SCNC: 29 MMOL/L (ref 20–32)
CREAT SERPL-MCNC: 0.56 MG/DL (ref 0.52–1.04)
CRP SERPL-MCNC: 62.8 MG/L (ref 0–8)
GFR SERPL CREATININE-BSD FRML MDRD: 86 ML/MIN/1.73M2
GLUCOSE BLD-MCNC: 128 MG/DL (ref 70–99)
GLUCOSE BLDC GLUCOMTR-MCNC: 100 MG/DL (ref 70–99)
GLUCOSE BLDC GLUCOMTR-MCNC: 105 MG/DL (ref 70–99)
GLUCOSE BLDC GLUCOMTR-MCNC: 133 MG/DL (ref 70–99)
GLUCOSE BLDC GLUCOMTR-MCNC: 159 MG/DL (ref 70–99)
POTASSIUM BLD-SCNC: 3.6 MMOL/L (ref 3.4–5.3)
SODIUM SERPL-SCNC: 138 MMOL/L (ref 133–144)

## 2022-03-02 PROCEDURE — 250N000009 HC RX 250: Performed by: HOSPITALIST

## 2022-03-02 PROCEDURE — 250N000011 HC RX IP 250 OP 636: Performed by: INTERNAL MEDICINE

## 2022-03-02 PROCEDURE — 250N000011 HC RX IP 250 OP 636: Performed by: HOSPITALIST

## 2022-03-02 PROCEDURE — 97116 GAIT TRAINING THERAPY: CPT | Mod: GP | Performed by: PHYSICAL THERAPIST

## 2022-03-02 PROCEDURE — 71260 CT THORAX DX C+: CPT

## 2022-03-02 PROCEDURE — 97530 THERAPEUTIC ACTIVITIES: CPT | Mod: GP | Performed by: PHYSICAL THERAPIST

## 2022-03-02 PROCEDURE — 250N000013 HC RX MED GY IP 250 OP 250 PS 637: Performed by: HOSPITALIST

## 2022-03-02 PROCEDURE — 99233 SBSQ HOSP IP/OBS HIGH 50: CPT | Performed by: HOSPITALIST

## 2022-03-02 PROCEDURE — 120N000001 HC R&B MED SURG/OB

## 2022-03-02 PROCEDURE — 82310 ASSAY OF CALCIUM: CPT | Performed by: HOSPITALIST

## 2022-03-02 PROCEDURE — 36415 COLL VENOUS BLD VENIPUNCTURE: CPT | Performed by: HOSPITALIST

## 2022-03-02 PROCEDURE — 86140 C-REACTIVE PROTEIN: CPT | Performed by: HOSPITALIST

## 2022-03-02 RX ORDER — IOPAMIDOL 755 MG/ML
68 INJECTION, SOLUTION INTRAVASCULAR ONCE
Status: COMPLETED | OUTPATIENT
Start: 2022-03-02 | End: 2022-03-02

## 2022-03-02 RX ORDER — FUROSEMIDE 10 MG/ML
40 INJECTION INTRAMUSCULAR; INTRAVENOUS EVERY 8 HOURS
Status: DISCONTINUED | OUTPATIENT
Start: 2022-03-02 | End: 2022-03-06

## 2022-03-02 RX ADMIN — AMPICILLIN SODIUM AND SULBACTAM SODIUM 3 G: 2; 1 INJECTION, POWDER, FOR SOLUTION INTRAMUSCULAR; INTRAVENOUS at 18:15

## 2022-03-02 RX ADMIN — IOPAMIDOL 68 ML: 755 INJECTION, SOLUTION INTRAVENOUS at 12:03

## 2022-03-02 RX ADMIN — OXYCODONE HYDROCHLORIDE 5 MG: 5 TABLET ORAL at 15:39

## 2022-03-02 RX ADMIN — OXYCODONE HYDROCHLORIDE 5 MG: 5 TABLET ORAL at 22:38

## 2022-03-02 RX ADMIN — UMECLIDINIUM 1 PUFF: 62.5 AEROSOL, POWDER ORAL at 08:53

## 2022-03-02 RX ADMIN — AMPICILLIN SODIUM AND SULBACTAM SODIUM 3 G: 2; 1 INJECTION, POWDER, FOR SOLUTION INTRAMUSCULAR; INTRAVENOUS at 00:16

## 2022-03-02 RX ADMIN — SODIUM CHLORIDE 60 ML: 9 INJECTION, SOLUTION INTRAVENOUS at 12:04

## 2022-03-02 RX ADMIN — INSULIN ASPART 6 UNITS: 100 INJECTION, SOLUTION INTRAVENOUS; SUBCUTANEOUS at 14:19

## 2022-03-02 RX ADMIN — FAMOTIDINE 20 MG: 20 TABLET ORAL at 09:34

## 2022-03-02 RX ADMIN — THERA TABS 1 TABLET: TAB at 20:44

## 2022-03-02 RX ADMIN — CALCIUM CARBONATE 600 MG (1,500 MG)-VITAMIN D3 400 UNIT TABLET 1 TABLET: at 20:44

## 2022-03-02 RX ADMIN — CALCIUM POLYCARBOPHIL 625 MG: 625 TABLET, FILM COATED ORAL at 09:34

## 2022-03-02 RX ADMIN — CALCIUM CARBONATE 600 MG (1,500 MG)-VITAMIN D3 400 UNIT TABLET 1 TABLET: at 09:34

## 2022-03-02 RX ADMIN — AMPICILLIN SODIUM AND SULBACTAM SODIUM 3 G: 2; 1 INJECTION, POWDER, FOR SOLUTION INTRAMUSCULAR; INTRAVENOUS at 12:25

## 2022-03-02 RX ADMIN — LATANOPROST 1 DROP: 50 SOLUTION/ DROPS OPHTHALMIC at 20:54

## 2022-03-02 RX ADMIN — AMLODIPINE BESYLATE 5 MG: 5 TABLET ORAL at 09:34

## 2022-03-02 RX ADMIN — ASPIRIN 81 MG: 81 TABLET, COATED ORAL at 09:34

## 2022-03-02 RX ADMIN — INSULIN ASPART 3 UNITS: 100 INJECTION, SOLUTION INTRAVENOUS; SUBCUTANEOUS at 18:16

## 2022-03-02 RX ADMIN — INSULIN ASPART 4 UNITS: 100 INJECTION, SOLUTION INTRAVENOUS; SUBCUTANEOUS at 10:43

## 2022-03-02 RX ADMIN — FUROSEMIDE 20 MG: 20 TABLET ORAL at 09:34

## 2022-03-02 RX ADMIN — AMPICILLIN SODIUM AND SULBACTAM SODIUM 3 G: 2; 1 INJECTION, POWDER, FOR SOLUTION INTRAMUSCULAR; INTRAVENOUS at 06:24

## 2022-03-02 RX ADMIN — ROSUVASTATIN CALCIUM 5 MG: 5 TABLET, FILM COATED ORAL at 21:34

## 2022-03-02 RX ADMIN — GLIPIZIDE 5 MG: 5 TABLET ORAL at 09:34

## 2022-03-02 RX ADMIN — FUROSEMIDE 40 MG: 10 INJECTION, SOLUTION INTRAVENOUS at 21:34

## 2022-03-02 RX ADMIN — OXYCODONE HYDROCHLORIDE 5 MG: 5 TABLET ORAL at 07:02

## 2022-03-02 RX ADMIN — POLYETHYLENE GLYCOL 3350 8.5 G: 17 POWDER, FOR SOLUTION ORAL at 20:43

## 2022-03-02 RX ADMIN — FUROSEMIDE 40 MG: 10 INJECTION, SOLUTION INTRAVENOUS at 15:39

## 2022-03-02 RX ADMIN — FLUTICASONE FUROATE AND VILANTEROL TRIFENATATE 1 PUFF: 200; 25 POWDER RESPIRATORY (INHALATION) at 08:53

## 2022-03-02 RX ADMIN — FAMOTIDINE 20 MG: 20 TABLET ORAL at 20:44

## 2022-03-02 ASSESSMENT — ACTIVITIES OF DAILY LIVING (ADL)
ADLS_ACUITY_SCORE: 9

## 2022-03-02 NOTE — PLAN OF CARE
Summary: SOB/weakness, bacterial cavitary infection  DATE & TIME: 03/02/22 AM shift  Cognitive Concerns/ Orientation : A&O x4, calm, cooperative and pleasant. Forgetful.   BEHAVIOR & AGGRESSION TOOL COLOR: GREEN  ABNL VS/O2: VSS 3L- 4L nasal Cannula oxymizer at rest, saturation 91%-92%. Denies SOB. BAKER noted, oxygen increased to 5L for comfort with activity sating 88%-90%. LS diminished and coarse at bases, some expiratory wheezes. Encouraged to use IS.   MOBILITY: SBA with walker and GB  PAIN MANAGMENT: PRN Oxycodone available for chronic back pain, declined this shift.   DIET: Mod CHO, fair appetite.   BOWEL/BLADDER: Continent; One BM this shift.   ABNL LAB/BG: /100, CRP 62.8, trending down; CT of chest done (suspicion for pulmonary Edema, MD paged)  DRAIN/DEVICES: PIV saline locked; IV abx  SKIN: Blanchable redness on sacrum/coccyx - open to air, encouraged to T/R. TESTS/PROCEDURES: None  D/C DAY/GOALS/PLACE: Plan to discharge to [facility], SW is following. Pt will need Augmentin for 4 weeks with FOLLOW UP CT, Consider pulmonary FOLLOW UP outpt - per ID note.   OTHER IMPORTANT INFO: [facility] if following. PT is following.

## 2022-03-02 NOTE — PROGRESS NOTES
Care Management Follow Up    Length of Stay (days): 9    Expected Discharge Date: 03/02/2022     Concerns to be Addressed:       Patient plan of care discussed at interdisciplinary rounds: Yes    Anticipated Discharge Disposition:       Anticipated Discharge Services:    Anticipated Discharge DME:      Patient/family educated on Medicare website which has current facility and service quality ratings:    Education Provided on the Discharge Plan:    Patient/Family in Agreement with the Plan: yes    Referrals Placed by CM/SW:    Private pay costs discussed: Not applicable    Additional Information:  MARCELO left voicemail for [facility] to inform them that ID has cleared pt, and seeking TCU bed this afternoon.    MARCELO spoke to Jeanette at [facility], and transport aide will  pt today at 1530.  MARCELO text paged MD to get confirmation that pt is ready for discharge.   MARCELO spoke to MD who states that pt is not ready today, and will be ready for TCU after tomorrow after additional treatment for pulmonary edema.  MARCELO left voicemail for Jeanette at [facility] to set up a transport time for tomorrow.   MARCELO on 3/3/22 should reach out to [facility] to determine whether pt is ready for discharge and set up transport.   PAS-RR    D: Per DHS regulation, MARCELO completed and submitted PAS-RR to [place] [employer] Direct Connect via the Senior LinkAge Line.  PAS-RR confirmation # is : [id number]    I: [facility] spoke with pt and they are aware a PAS-RR has been submitted.  MARCELO reviewed with pt that they may be contacted for a follow up appointment within 10 days of hospital discharge if their SNF stay is < 30 days.  Contact information for [facility] LinkAge Line was also provided.    A: Pt verbalized understanding.    P: Further questions may be directed to [facility] LinkAge Line at #[phone], option #4 for PAS-RR staff.      Wendy Cody, Northern Light A.R. Gould HospitalSW

## 2022-03-02 NOTE — PLAN OF CARE
"Summary: SOB/weakness, r/o TB or bacterial cavitary infection  DATE & TIME: 03/01/22 0586-5535  Cognitive Concerns/ Orientation : A&O x4, calm, cooperative and pleasant  BEHAVIOR & AGGRESSION TOOL COLOR: GREEN  ABNL VS/O2: VSS 2L NC, saturation 95%. BAKER noted, oxygen increased to 4L when ambulated to the bathroom. LS bases  coarse. Expiratory wheezes in L. Encouraged to use IS.   MOBILITY: SBA with walker and GB  PAIN MANAGMENT: PRN Oxycodone for back. Denies pain this shift.  DIET: Mod CHO, fair appetite.   BOWEL/BLADDER: Continent  ABNL LAB/BG: /228/105, CRP 75.2;   DRAIN/DEVICES: PIV saline locked; IV abx  SKIN: Blanchable redness on sacrum/coccyx  TESTS/PROCEDURES: None  D/C DAY/GOALS/PLACE: [facility] able to take pt once medically stable and out of isolation. PT is following. ID is following,  per ID note \" pt had negative quant, negative sputum x 2 remove isolation and Augmentin for 4 weeks with FOLLOW UP CT, Consider pulmonary FOLLOW UP outpt \"  OTHER IMPORTANT INFO: Airborne isolation discontinued. SW/ID/PT following    "

## 2022-03-02 NOTE — PROGRESS NOTES
[facility]    Medicine Progress Note - Hospitalist Service    Date of Admission:  2/21/2022    Assessment & Plan          Cumulative Summary: Celeste Chacko is a very pleasant 89 year old female with medical history significant for COPD, chronic back pain, DM2, HTN, HL was brought to the ER for evaluation of exertional dyspnea and fatigue and is being registered under observation on 2/21/2022 for further management.        Assessment & Plan     Possible Lung abscess  Exertional dyspnea and Fatigue  Recent admission for COPD exacerbation treated with a steroid but ongoing dyspnea mostly with exertion but without fever, cough, weight loss or night sweats.  No wheezing.  No chest pain.   -- CT PE study was done which showed No PE but 2 new cavitary nodules in the right upper lobe. This is concerning for aggressive, atypical infection, possibly fungal or tubercular.  Patient has been on steroid for almost a month although currently off of it. proBNP negative.  WBC normal.  Pro-Edilberto negative    -- ID consult appreciated  -- will need to rule out TB, although suspicion low  -- Quantiferon gold negative  -- AFB x2 negative. 1 more test on 2/23 pending. Per ID lab states they do not have enough specimen to complete that test  And hence Ok to take off isolation given all other negative factors.  -- fungal antigen for histo, cocci, blasto and Aspergillus all negative   -- Blood cultures negative to date  -- continue on Unasyn per ID for possible bacterial cavitary infection   -- Plan per ID  - if her tuberculosis work-up is negative then patient will probably require 4 weeks of oral Augmentin for cavitary bacterial lesion.  -- Will also require pulmonary follow up on discharge  -- CT scan obtained today due to worsening hypoxia reveals pulmonary edema and improvement in cavitation. Will start diuresis.   -- Plan is for discharge to TCU when medically ready.    Right eye conjunctivitis :  -- started  Ofloxacin 0.3% ophthalmic solution      COPD  Not on home O2.  PTA is on fluticasone/salmeterol, DuoNeb, Spiriva inhalers.     -- Continue with scheduled DuoNeb, steroid neb and PRN albuterol as well.    -- Not on home O2, needs exercise oximetry prior to discharge  -- Her current presentation does not seem consistent with COPD exacerbation, likely related to pneumonia/lung abscess as above, treatment as outlined.   -- Patient might need Oxygen for some time considering acute infection on top of underlying COPD      Type II MI:   Minimally abnormal troponin, follow trend.  Unlikely ACS.  --Continue aspirin  --2D echo showed normal LVEF and no wall motion abnormalities.  --No further work-up planned, recommend outpatient stress test once acute issues resolve.      DM2: HbA1c 9.9.  Takes glipizide PTA.  -- PTA glipizide resumed   -- Continue with Premeal NovoLog and ISS.  -- BS in 100s     Essential hypertension  Hyperlipidemia  --Continue PTA Norvasc 5 mg p.o. daily we will monitor blood pressure if it needs to be adjusted further         Diet: Moderate Consistent Carb (60 g CHO per Meal) Diet    DVT Prophylaxis: Pneumatic Compression Devices  Botello Catheter: Not present  Central Lines: None  Cardiac Monitoring: None  Code Status: No CPR- Do NOT Intubate      Disposition Plan   Expected Discharge: 03/03/2022     Anticipated discharge location:  Awaiting care coordination huddle  Delays:          The patient's care was discussed with the Patient.    Cailin Loera MD  Hospitalist Service  [facility]  Securely message with the Vocera Web Console (learn more here)  Text page via [employer] Paging/Directory         Clinically Significant Risk Factors Present on Admission                    ______________________________________________________________________    Interval History   Continues to complain about dyspnea most notably on exertion  Denies any chest pain    Data reviewed today: I reviewed all  medications, new labs and imaging results over the last 24 hours. I personally reviewed no images or EKG's today.    Physical Exam   Vital Signs: Temp: 98.3  F (36.8  C) Temp src: Oral BP: (!) 143/58 Pulse: 92   Resp: 16 SpO2: 92 % O2 Device: Oxymizer cannula Oxygen Delivery: 3 LPM  Weight: 134 lbs 12.8 oz  General Appearance: Well appearing for stated age.  Respiratory: CTAB, no rales or ronchi  Cardiovascular: S1, S2 normal, no murmurs  GI: non-tender on palpation, BS present      Data   Recent Labs   Lab 03/02/22  1310 03/02/22  0725 03/02/22  0232 03/01/22  1311 03/01/22  1046 02/28/22  1242 02/28/22  1202   WBC  --   --   --   --   --   --  6.3   HGB  --   --   --   --   --   --  10.8*   MCV  --   --   --   --   --   --  84   PLT  --   --   --   --   --   --  166   NA  --  138  --   --  137  --  136   POTASSIUM  --  3.6  --   --  3.4  --  3.7   CHLORIDE  --  103  --   --  103  --  104   CO2  --  29  --   --  29  --  27   BUN  --  13  --   --  13  --  16   CR  --  0.56  --   --  0.54  --  0.62   ANIONGAP  --  6  --   --  5  --  5   NARCISA  --  8.6  --   --  8.7  --  8.2*   * 128* 105*   < > 180*   < > 213*    < > = values in this interval not displayed.     Recent Results (from the past 24 hour(s))   CT Chest w Contrast    Narrative    CT CHEST WITH CONTRAST 3/2/2022 12:13 PM    CLINICAL HISTORY: Pneumonia, unresolved; Dyspnea, chronic, unclear  etiology; Respiratory failure.    TECHNIQUE: CT chest with IV contrast. Multiplanar reformats were  obtained. Dose reduction techniques were used.    CONTRAST: 68 mL Isovue-370    COMPARISON: 2/21/2022, 1/26/2022    FINDINGS:   LUNGS AND PLEURA: 1.1 cm nodule in the right apex is no longer  cavitary, but unchanged in size. A 2.5 cm cavitary nodule in the right  upper lobe on image 72 has not changed in size although has a slightly  thicker wall when compared to previous (4 mm compared to 2 mm). A few  additional tiny pulmonary nodules are stable. No new  pulmonary  nodules. Stable bulla at the left lung base. New diffuse groundglass  opacity with smooth interlobular septal thickening throughout the  lungs, with a new trace bilateral pleural effusions. Dependent  atelectasis.    MEDIASTINUM/AXILLAE: Mildly enlarged peritracheal lymph nodes slightly  increased. The largest lymph node anterior to the shalom measures 1.1  cm. Mildly enlarged bilateral hilar lymph nodes. Moderate coronary  artery calcification.    UPPER ABDOMEN: No significant finding.    MUSCULOSKELETAL: Unremarkable.      Impression    IMPRESSION:   1.  Right upper lobe nodules have not changed in size, but with  decreased cavitation compared to 2/21/2022.  2.  New mild diffuse groundglass opacity with interstitial septal  thickening and trace pleural effusions, suspicious for pulmonary  edema. No new nodules.

## 2022-03-02 NOTE — PLAN OF CARE
"Summary: SOB/weakness, r/o TB or bacterial cavitary infection  DATE & TIME: 03/01/22 [id number]  Cognitive Concerns/ Orientation : A&O x4, calm, cooperative and pleasant  BEHAVIOR & AGGRESSION TOOL COLOR: GREEN  ABNL VS/O2: VSS 3L nasal Cannula oxymizer at rest, saturation 91%-92%. BAKER noted, oxygen increased to 4L when ambulated to the bathroom. LS bases with expiratory wheezes. Encouraged to use IS.   MOBILITY: SBA with walker and GB  PAIN MANAGMENT: PRN Oxycodone given x2 for chronic back pain  DIET: Mod CHO, fair appetite.   BOWEL/BLADDER: Continent; x1 formed BM this shift  ABNL LAB/BG: /228, CRP 75.2;   DRAIN/DEVICES: PIV saline locked; IV abx  SKIN: Blanchable redness on sacrum/coccyx  TESTS/PROCEDURES: None  D/C DAY/GOALS/PLACE: [facility] able to take pt once medically stable and out of isolation. PT is following. ID is following, per ID note \" pt had negative quant, negative sputum x 2 remove isolation and Augmentin for 4 weeks with FOLLOW UP CT, Consider pulmonary FOLLOW UP outpt \"  OTHER IMPORTANT INFO: Airborne isolation discontinued. SW/ID/PT following      "

## 2022-03-03 ENCOUNTER — APPOINTMENT (OUTPATIENT)
Dept: PHYSICAL THERAPY | Facility: CLINIC | Age: 87
DRG: 177 | End: 2022-03-03
Payer: COMMERCIAL

## 2022-03-03 ENCOUNTER — PATIENT OUTREACH (OUTPATIENT)
Dept: CARE COORDINATION | Facility: CLINIC | Age: 87
End: 2022-03-03
Payer: COMMERCIAL

## 2022-03-03 LAB
ANION GAP SERPL CALCULATED.3IONS-SCNC: 4 MMOL/L (ref 3–14)
BASOPHILS # BLD AUTO: 0 10E3/UL (ref 0–0.2)
BASOPHILS NFR BLD AUTO: 1 %
BUN SERPL-MCNC: 14 MG/DL (ref 7–30)
CALCIUM SERPL-MCNC: 8.8 MG/DL (ref 8.5–10.1)
CHLORIDE BLD-SCNC: 101 MMOL/L (ref 94–109)
CO2 SERPL-SCNC: 31 MMOL/L (ref 20–32)
CREAT SERPL-MCNC: 0.66 MG/DL (ref 0.52–1.04)
CRP SERPL-MCNC: 39.3 MG/L (ref 0–8)
EOSINOPHIL # BLD AUTO: 0.1 10E3/UL (ref 0–0.7)
EOSINOPHIL NFR BLD AUTO: 1 %
ERYTHROCYTE [DISTWIDTH] IN BLOOD BY AUTOMATED COUNT: 13.8 % (ref 10–15)
GFR SERPL CREATININE-BSD FRML MDRD: 83 ML/MIN/1.73M2
GLUCOSE BLD-MCNC: 260 MG/DL (ref 70–99)
GLUCOSE BLDC GLUCOMTR-MCNC: 122 MG/DL (ref 70–99)
GLUCOSE BLDC GLUCOMTR-MCNC: 123 MG/DL (ref 70–99)
GLUCOSE BLDC GLUCOMTR-MCNC: 142 MG/DL (ref 70–99)
GLUCOSE BLDC GLUCOMTR-MCNC: 146 MG/DL (ref 70–99)
GLUCOSE BLDC GLUCOMTR-MCNC: 195 MG/DL (ref 70–99)
GLUCOSE BLDC GLUCOMTR-MCNC: 207 MG/DL (ref 70–99)
HCT VFR BLD AUTO: 35.9 % (ref 35–47)
HGB BLD-MCNC: 10.8 G/DL (ref 11.7–15.7)
IMM GRANULOCYTES # BLD: 0.1 10E3/UL
IMM GRANULOCYTES NFR BLD: 1 %
LYMPHOCYTES # BLD AUTO: 1.4 10E3/UL (ref 0.8–5.3)
LYMPHOCYTES NFR BLD AUTO: 23 %
MAGNESIUM SERPL-MCNC: 2.2 MG/DL (ref 1.6–2.3)
MCH RBC QN AUTO: 25 PG (ref 26.5–33)
MCHC RBC AUTO-ENTMCNC: 30.1 G/DL (ref 31.5–36.5)
MCV RBC AUTO: 83 FL (ref 78–100)
MONOCYTES # BLD AUTO: 0.8 10E3/UL (ref 0–1.3)
MONOCYTES NFR BLD AUTO: 14 %
NEUTROPHILS # BLD AUTO: 3.5 10E3/UL (ref 1.6–8.3)
NEUTROPHILS NFR BLD AUTO: 60 %
NRBC # BLD AUTO: 0 10E3/UL
NRBC BLD AUTO-RTO: 0 /100
P JIROVECII DNA SPEC QL NAA+PROBE: DETECTED
PLATELET # BLD AUTO: 229 10E3/UL (ref 150–450)
POTASSIUM BLD-SCNC: 3.2 MMOL/L (ref 3.4–5.3)
RBC # BLD AUTO: 4.32 10E6/UL (ref 3.8–5.2)
SODIUM SERPL-SCNC: 136 MMOL/L (ref 133–144)
WBC # BLD AUTO: 5.8 10E3/UL (ref 4–11)

## 2022-03-03 PROCEDURE — 250N000011 HC RX IP 250 OP 636: Performed by: INTERNAL MEDICINE

## 2022-03-03 PROCEDURE — 99233 SBSQ HOSP IP/OBS HIGH 50: CPT | Performed by: HOSPITALIST

## 2022-03-03 PROCEDURE — 80048 BASIC METABOLIC PNL TOTAL CA: CPT | Performed by: HOSPITALIST

## 2022-03-03 PROCEDURE — 120N000001 HC R&B MED SURG/OB

## 2022-03-03 PROCEDURE — 86140 C-REACTIVE PROTEIN: CPT | Performed by: HOSPITALIST

## 2022-03-03 PROCEDURE — 250N000013 HC RX MED GY IP 250 OP 250 PS 637: Performed by: INTERNAL MEDICINE

## 2022-03-03 PROCEDURE — 250N000013 HC RX MED GY IP 250 OP 250 PS 637: Performed by: HOSPITALIST

## 2022-03-03 PROCEDURE — 250N000011 HC RX IP 250 OP 636: Performed by: HOSPITALIST

## 2022-03-03 PROCEDURE — 85025 COMPLETE CBC W/AUTO DIFF WBC: CPT | Performed by: HOSPITALIST

## 2022-03-03 PROCEDURE — 97116 GAIT TRAINING THERAPY: CPT | Mod: GP

## 2022-03-03 PROCEDURE — 36415 COLL VENOUS BLD VENIPUNCTURE: CPT | Performed by: HOSPITALIST

## 2022-03-03 PROCEDURE — 83735 ASSAY OF MAGNESIUM: CPT | Performed by: HOSPITALIST

## 2022-03-03 RX ORDER — FAMOTIDINE 20 MG/1
20 TABLET, FILM COATED ORAL 2 TIMES DAILY PRN
COMMUNITY
Start: 2022-03-03 | End: 2023-12-08

## 2022-03-03 RX ORDER — ATOVAQUONE 750 MG/5ML
750 SUSPENSION ORAL 2 TIMES DAILY WITH MEALS
Status: DISCONTINUED | OUTPATIENT
Start: 2022-03-03 | End: 2022-03-15 | Stop reason: HOSPADM

## 2022-03-03 RX ORDER — POTASSIUM CHLORIDE 750 MG/1
10 TABLET, EXTENDED RELEASE ORAL 2 TIMES DAILY
COMMUNITY
Start: 2022-03-03 | End: 2022-03-10

## 2022-03-03 RX ORDER — FUROSEMIDE 20 MG
40 TABLET ORAL DAILY
Qty: 14 TABLET | Refills: 0 | COMMUNITY
Start: 2022-03-03 | End: 2022-03-23

## 2022-03-03 RX ORDER — AMLODIPINE BESYLATE 5 MG/1
5 TABLET ORAL DAILY
COMMUNITY
Start: 2022-03-04 | End: 2022-03-06

## 2022-03-03 RX ORDER — POTASSIUM CHLORIDE 1500 MG/1
40 TABLET, EXTENDED RELEASE ORAL ONCE
Status: COMPLETED | OUTPATIENT
Start: 2022-03-03 | End: 2022-03-03

## 2022-03-03 RX ADMIN — INSULIN ASPART 1 UNITS: 100 INJECTION, SOLUTION INTRAVENOUS; SUBCUTANEOUS at 21:47

## 2022-03-03 RX ADMIN — POTASSIUM CHLORIDE 40 MEQ: 1500 TABLET, EXTENDED RELEASE ORAL at 13:20

## 2022-03-03 RX ADMIN — THERA TABS 1 TABLET: TAB at 21:46

## 2022-03-03 RX ADMIN — CALCIUM CARBONATE 600 MG (1,500 MG)-VITAMIN D3 400 UNIT TABLET 1 TABLET: at 08:34

## 2022-03-03 RX ADMIN — UMECLIDINIUM 1 PUFF: 62.5 AEROSOL, POWDER ORAL at 08:35

## 2022-03-03 RX ADMIN — FUROSEMIDE 40 MG: 10 INJECTION, SOLUTION INTRAVENOUS at 14:18

## 2022-03-03 RX ADMIN — INSULIN ASPART 5 UNITS: 100 INJECTION, SOLUTION INTRAVENOUS; SUBCUTANEOUS at 08:34

## 2022-03-03 RX ADMIN — ROSUVASTATIN CALCIUM 5 MG: 5 TABLET, FILM COATED ORAL at 21:46

## 2022-03-03 RX ADMIN — AMPICILLIN SODIUM AND SULBACTAM SODIUM 3 G: 2; 1 INJECTION, POWDER, FOR SOLUTION INTRAMUSCULAR; INTRAVENOUS at 12:48

## 2022-03-03 RX ADMIN — AMPICILLIN SODIUM AND SULBACTAM SODIUM 3 G: 2; 1 INJECTION, POWDER, FOR SOLUTION INTRAMUSCULAR; INTRAVENOUS at 06:10

## 2022-03-03 RX ADMIN — AMPICILLIN SODIUM AND SULBACTAM SODIUM 3 G: 2; 1 INJECTION, POWDER, FOR SOLUTION INTRAMUSCULAR; INTRAVENOUS at 00:20

## 2022-03-03 RX ADMIN — AMPICILLIN SODIUM AND SULBACTAM SODIUM 3 G: 2; 1 INJECTION, POWDER, FOR SOLUTION INTRAMUSCULAR; INTRAVENOUS at 17:44

## 2022-03-03 RX ADMIN — OXYCODONE HYDROCHLORIDE 5 MG: 5 TABLET ORAL at 12:49

## 2022-03-03 RX ADMIN — AMPICILLIN SODIUM AND SULBACTAM SODIUM 3 G: 2; 1 INJECTION, POWDER, FOR SOLUTION INTRAMUSCULAR; INTRAVENOUS at 23:51

## 2022-03-03 RX ADMIN — FUROSEMIDE 40 MG: 10 INJECTION, SOLUTION INTRAVENOUS at 06:11

## 2022-03-03 RX ADMIN — FLUTICASONE FUROATE AND VILANTEROL TRIFENATATE 1 PUFF: 200; 25 POWDER RESPIRATORY (INHALATION) at 12:52

## 2022-03-03 RX ADMIN — FAMOTIDINE 20 MG: 20 TABLET ORAL at 21:46

## 2022-03-03 RX ADMIN — CALCIUM CARBONATE 600 MG (1,500 MG)-VITAMIN D3 400 UNIT TABLET 1 TABLET: at 21:46

## 2022-03-03 RX ADMIN — GLIPIZIDE 5 MG: 5 TABLET ORAL at 08:34

## 2022-03-03 RX ADMIN — INSULIN ASPART 5 UNITS: 100 INJECTION, SOLUTION INTRAVENOUS; SUBCUTANEOUS at 13:21

## 2022-03-03 RX ADMIN — OXYCODONE HYDROCHLORIDE 5 MG: 5 TABLET ORAL at 17:44

## 2022-03-03 RX ADMIN — CALCIUM POLYCARBOPHIL 625 MG: 625 TABLET, FILM COATED ORAL at 08:34

## 2022-03-03 RX ADMIN — FAMOTIDINE 20 MG: 20 TABLET ORAL at 08:34

## 2022-03-03 RX ADMIN — AMLODIPINE BESYLATE 5 MG: 5 TABLET ORAL at 08:34

## 2022-03-03 RX ADMIN — OXYCODONE HYDROCHLORIDE 5 MG: 5 TABLET ORAL at 07:00

## 2022-03-03 RX ADMIN — ASPIRIN 81 MG: 81 TABLET, COATED ORAL at 08:34

## 2022-03-03 RX ADMIN — ATOVAQUONE 750 MG: 750 SUSPENSION ORAL at 21:48

## 2022-03-03 RX ADMIN — LATANOPROST 1 DROP: 50 SOLUTION/ DROPS OPHTHALMIC at 21:48

## 2022-03-03 RX ADMIN — OXYCODONE HYDROCHLORIDE 5 MG: 5 TABLET ORAL at 23:59

## 2022-03-03 RX ADMIN — INSULIN ASPART 6 UNITS: 100 INJECTION, SOLUTION INTRAVENOUS; SUBCUTANEOUS at 19:18

## 2022-03-03 RX ADMIN — POLYETHYLENE GLYCOL 3350 8.5 G: 17 POWDER, FOR SOLUTION ORAL at 21:46

## 2022-03-03 RX ADMIN — FUROSEMIDE 40 MG: 10 INJECTION, SOLUTION INTRAVENOUS at 21:47

## 2022-03-03 ASSESSMENT — ACTIVITIES OF DAILY LIVING (ADL)
ADLS_ACUITY_SCORE: 11
ADLS_ACUITY_SCORE: 11
ADLS_ACUITY_SCORE: 9
ADLS_ACUITY_SCORE: 11
ADLS_ACUITY_SCORE: 9
ADLS_ACUITY_SCORE: 11
ADLS_ACUITY_SCORE: 9
ADLS_ACUITY_SCORE: 11
ADLS_ACUITY_SCORE: 11
ADLS_ACUITY_SCORE: 9
ADLS_ACUITY_SCORE: 11
ADLS_ACUITY_SCORE: 11
ADLS_ACUITY_SCORE: 9

## 2022-03-03 NOTE — DISCHARGE INSTRUCTIONS
Pressure Injury prevention (please order supplies if not in room)  1. Turn every 2 hours, side to side avoid supine on pressure redistribution support surface   2.   Float heels off bed with use of pillows under legs, if ineffective, order offloading boots: Heel Lift boots (Prevalon #[id number])  3.   If incontinent Cleanse with incontinent cleanser (Pily spray # [id number]) followed by skin barrier protectant (Critic Aid paste #02950)  BID and after each incontinent episode  4.   Prevent sliding and shear by limiting HOB to 30 degrees or less unless contraindicated, use knee gatch first if not contraindicated  5.   If sitting, use pressure redistribution chair cushion (#[id number]) if unable to shift weight every hour, please limit sitting to an hour at a time  6.   Protective foam dressings PRN,Change at least q 4 days, peel back, peek and replace for daily skin inspection.  7.   Optimize nutrition     Appointmnet with  Dr Christine ( Pulmonary) 3/30 at 11 am  [facility]  [address] [facility] [address], [place] [address] [phone]

## 2022-03-03 NOTE — CONSULTS
"PULMONOLOGY CONSULTATION  Date of service: 3/3/2022    [facility]    _____________________________________________________    Celeste MARSHA Chacko  89 year old female  [id number]  [address]    [facility] [id number]    Primary Care Provider:  Emily Marie  Admission Date: 2/21/2022  Hospital Attending Physician:  Cailin Ortez MD  ________________________________________    CHIEF COMPLAINT : I was asked to see this patient by Dr. Ortez for evaluation of COPD, pulmonary nodules.    HISTORY OF PRESENT ILLNESS     Per IM note: \"89 year old female with medical history significant for COPD, chronic back pain, DM2, HTN, HL was brought to the ER for evaluation of exertional dyspnea and fatigue and is being registered under observation on 2/21/2022 for further management.    Recent admission for COPD exacerbation treated with a steroid but ongoing dyspnea mostly with exertion but without fever, cough, weight loss or night sweats.  No wheezing.  No chest pain.   -- CT PE study was done which showed No PE but 2 new cavitary nodules in the right upper lobe. This is concerning for aggressive, atypical infection, possibly fungal or tubercular.  Patient has been on steroid for almost a month although currently off of it. proBNP negative.  WBC normal.  Pro-Edilberto negative.\"    HOME MEDICATIONS     Medications Prior to Admission   Medication Sig Dispense Refill Last Dose     Acetaminophen (TYLENOL PO) Take 325 mg by mouth every 6 hours as needed Patient takes TID with Oxycodone.    2/21/2022 at Unknown time     albuterol (ACCUNEB) 0.63 MG/3ML neb solution Take 3 mLs (0.63 mg) by nebulization every 6 hours as needed for shortness of breath / dyspnea or wheezing 90 mL 3      albuterol (ACCUNEB) 1.25 MG/3ML neb solution Take 2 vials (2.5 mg) by nebulization every 4 hours as needed for shortness of breath / dyspnea or wheezing 360 mL 0      albuterol (PROAIR HFA/PROVENTIL HFA/VENTOLIN HFA) 108 " (90 Base) MCG/ACT inhaler INHALE 2 PUFFS INTO THE LUNGS EVERY 6 HOURS AS NEEDED FOR SHORTNESS OF BREATH OR DIFFICULT BREATHING OR WHEEZING 25.5 g 3      amLODIPine (NORVASC) 2.5 MG tablet Take 2.5 mg by mouth daily   2/21/2022 at       ASPIRIN EC PO Take 81 mg by mouth daily   2/21/2022 at Unknown time     Calcium Polycarbophil (FIBERCON PO) Take 1 tablet by mouth daily    2/21/2022 at Unknown time     calcium-vitamin D (CALTRATE) 600-400 MG-UNIT per tablet Take 1 tablet by mouth 2 times daily 1200mg   1000 mg of vitamin d   2/21/2022 at Unknown time     co-enzyme Q-10 100 MG CAPS capsule Take 100 mg by mouth daily   2/21/2022 at Unknown time     fluticasone-salmeterol (WIXELA INHUB) 500-50 MCG/DOSE inhaler Inhale 1 puff into the lungs 2 times daily ### DO NOT FILL NOW.  Please update patient's profile to reflect additional refills.  #### 180 each 3 2/21/2022 at Unknown time     glipiZIDE (GLUCOTROL) 5 MG tablet Take 1 tablet (5 mg) by mouth every morning 90 tablet 3 2/21/2022 at Unknown time     glucosamine-chondroitin 500-400 MG CAPS per capsule Take 1 capsule by mouth 2 times daily    2/21/2022 at Unknown time     ipratropium - albuterol 0.5 mg/2.5 mg/3 mL (DUONEB) 0.5-2.5 (3) MG/3ML neb solution Take 1 vial (3 mLs) by nebulization every 4 hours as needed for shortness of breath / dyspnea or wheezing 360 mL 0 2/21/2022 at Unknown time     latanoprost (XALATAN) 0.005 % ophthalmic solution Place 1 drop Into the left eye every evening   2/20/2022 at Unknown time     Multiple Vitamins-Minerals (MULTIVITAMIN ADULT PO) Take 1 tablet by mouth every evening    2/21/2022 at Unknown time     NONFORMULARY At Bedtime Natra sleeping med takes 1 at night.    2/20/2022 at Unknown time     oxyCODONE (ROXICODONE) 5 MG tablet Take 1 tablet (5 mg) by mouth every 4 hours as needed for severe pain May take 5 tabs per day 150 tablet 0 2/21/2022 at Unknown time     polyethylene glycol (MIRALAX) 17 g packet Take 0.5 packets by mouth  every evening    2/20/2022 at Unknown time     rosuvastatin (CRESTOR) 5 MG tablet Take 5 mg by mouth At Bedtime   2/20/2022 at Unknown time     tiotropium (SPIRIVA HANDIHALER) 18 MCG inhaled capsule INHALE THE CONTENTS OF 1 CAPSULE VIA INHALATION DEVICE DAILY 90 capsule 3 2/21/2022 at Unknown time     predniSONE (DELTASONE) 20 MG tablet 1 tab po bid for 3 days, then 1 tab po every day for 3 days, then 1/2 tab po every day for 4 days 11 tablet 0 2/19/2022       PAST MEDICAL HISTORY      Past Medical History:   Diagnosis Date     Basal cell carcinoma      Chronic pain      Complete rupture of rotator cuff      COPD (chronic obstructive pulmonary disease) (H)      History of blood transfusion      Mixed hyperlipidemia 04/2000     Osteoarthritis      Personal history of other malignant neoplasm of skin      Spinal stenosis of lumbar region with neurogenic claudication      T2DM (type 2 diabetes mellitus) (H)      Past Surgical History:   Procedure Laterality Date     ARTHROPLASTY KNEE Left 9/15/2014    Procedure: ARTHROPLASTY KNEE;  Surgeon: Carlos Alberto Kaminski MD;  Location: [facility] OR     HEAD & NECK SURGERY  05/14/15    forehead-skin cancer removed     INJECT EPIDURAL LUMBAR / SACRAL SINGLE Left 7/14/2016    Procedure: INJECT EPIDURAL LUMBAR / SACRAL SINGLE;  Surgeon: Luisito New MD;  Location: [facility] OR     INJECT SACROILIAC JOINT N/A 12/1/2015    Procedure: INJECT SACROILIAC JOINT;  Surgeon: Luisito New MD;  Location: [facility] GI     INJECT SACROILIAC JOINT Bilateral 5/19/2016    Procedure: INJECT SACROILIAC JOINT;  Surgeon: Luisito New MD;  [facility]: [facility]     INJECT SACROILIAC JOINT Bilateral 11/25/2016    Procedure: INJECT SACROILIAC JOINT;  Surgeon: Elmira Bennett MD;  Location: [facility]     INJECT SACROILIAC JOINT Bilateral 4/25/2017    Procedure: INJECT SACROILIAC JOINT;  Bilateral Sacroiliac Joint Injection   Injection of local anesthetic and steroid into area around spine for pain relief;  Surgeon: Amalia  "MD Alvaro;  Location: [facility] OR     INJECT SACROILIAC JOINT Bilateral 2017    Procedure: INJECT SACROILIAC JOINT;  Bilateral Sacroiliac Joint Injection;  Surgeon: Elmira Bennett MD;  Location: [facility] OR     INJECT SACROILIAC JOINT Bilateral 11/10/2017    Procedure: INJECT SACROILIAC JOINT;  Bilateral Sacroiliac Joint Injection;  Surgeon: Elmira Bennett MD;  Location: [facility] OR     ZZC NONSPECIFIC PROCEDURE      Breast biopsies      Z NONSPECIFIC PROCEDURE      Pilonidal sinus repair      Z NONSPECIFIC PROCEDURE      T & A      Z NONSPECIFIC PROCEDURE      Shoulder arthropasty     Z NONSPECIFIC PROCEDURE      Right shoulder replacement, RCT repair       ALLERGIES     Allergies   Allergen Reactions     Sulfamethoxazole Anaphylaxis and Hives       SOCIAL / SUBSTANCE HISTORY     Social History     Socioeconomic History     Marital status: Single     Spouse name: Not on file     Number of children: Not on file     Years of education: Not on file     Highest education level: Not on file   Occupational History     Not on file   Tobacco Use     Smoking status: Former Smoker     Packs/day: 0.50     Years: 54.00     Pack years: 27.00     Types: Cigarettes     Quit date: 2000     Years since quittin.8     Smokeless tobacco: Never Used     Tobacco comment: using nicotine gum   Vaping Use     Vaping Use: Never used   Substance and Sexual Activity     Alcohol use: Yes     Alcohol/week: 0.8 standard drinks     Types: 1 Glasses of wine per week     Comment: \"A glass of wine once a week.\"     Drug use: No     Sexual activity: Not Currently   Other Topics Concern     Parent/sibling w/ CABG, MI or angioplasty before 65F 55M? Not Asked   Social History Narrative     Not on file     Social Determinants of Health     Financial Resource Strain: Not on file   Food Insecurity: Not on file   Transportation Needs: Not on file   Physical Activity: Not on file   Stress: Not on file   Social Connections: Not on file " "  Intimate Partner Violence: Not on file   Housing Stability: Not on file       FAMILY HISTORY     Family History   Problem Relation Age of Onset     Arthritis Father      Diabetes Brother      Cancer Brother         breast     Cerebrovascular Disease Brother        REVIEW OF SYSTEMS   A comprehensive review of systems was negative except for items noted in HPI/Subjective.    PHYSICAL EXAMINATION   Temp (24hrs), Av.6  F (37  C), Min:98.3  F (36.8  C), Max:98.9  F (37.2  C)    Vital signs:  Temp: 98.3  F (36.8  C) Temp src: Oral BP: 109/48 Pulse: 97   Resp: 16 SpO2: 95 % O2 Device: Oxymizer cannula Oxygen Delivery: 3 LPM Height: 161.5 cm (5' 3.6\") Weight: 61.1 kg (134 lb 12.8 oz)  Estimated body mass index is 23.43 kg/m  as calculated from the following:    Height as of this encounter: 1.615 m (5' 3.6\").    Weight as of this encounter: 61.1 kg (134 lb 12.8 oz).        I/O last 3 completed shifts:  In: 720 [P.O.:720]  Out: -     CONSTITUTIONAL/GENERAL APPEARANCE: Alert female. No Apparent Distress.  PSYCHIATRIC: Pleasant and appropriate mood and affect. Oriented x 3.  EARS, NOSE,THROAT,MOUTH: External ears and nose overall normal.  NECK: Neck appearance normal. No neck masses and the thyroid not enlarged.   RESPIRATORY: Non-labored effort. Decreased BS anteriorly.  CARDIOVASCULAR: S1, S2, regular rate and rhythm.  ABDOMEN: round, soft, non-tender, non-distended, no palpable masses. No presence of hernia.  LYMPHATIC: no cervical or axillary lymphadenopathy.  SKIN: Skin color was normal. No clubbing or cyanosis. No palpable skin abnormalities.    LABORATORY ASSESSMENT    Arterial Blood GasNo lab results found in last 7 days.  CBC  Recent Labs   Lab 22  1002 22  1202   WBC 5.8 6.3   RBC 4.32 4.16   HGB 10.8* 10.8*   HCT 35.9 35.0   MCV 83 84   MCH 25.0* 26.0*   MCHC 30.1* 30.9*   RDW 13.8 13.9    166     BMP  Recent Labs   Lab 22  1002 22  0807 22  2102 22  1709 " 03/02/22  1310 03/02/22  0725 03/01/22  1311 03/01/22  1046 02/28/22  1242 02/28/22  1202     --   --   --   --  138  --  137  --  136   POTASSIUM 3.2*  --   --   --   --  3.6  --  3.4  --  3.7   CHLORIDE 101  --   --   --   --  103  --  103  --  104   NARCISA 8.8  --   --   --   --  8.6  --  8.7  --  8.2*   CO2 31  --   --   --   --  29  --  29  --  27   BUN 14  --   --   --   --  13  --  13  --  16   CR 0.66  --   --   --   --  0.56  --  0.54  --  0.62   * 195* 133* 159*   < > 128*   < > 180*   < > 213*    < > = values in this interval not displayed.     INRNo lab results found in last 7 days.   BNPNo lab results found in last 7 days.  VENOUS BLOOD GASESNo lab results found in last 7 days.      Additional labs and/or comments: Quantiferon gold negative.  Fungal antigen for histo, cocci, blasto and Aspergillus all negative     IMAGING      CT Chest 3/2 -  IMPRESSION:   1.  Right upper lobe nodules have not changed in size, but with  decreased cavitation compared to 2/21/2022.  2.  New mild diffuse groundglass opacity with interstitial septal  thickening and trace pleural effusions, suspicious for pulmonary  edema. No new nodules.    CT Chest 2/21 -  IMPRESSION:  1.  There are two new cavitary nodules in the right upper lobe. This  is concerning for aggressive, atypical infection, possibly fungal or  tubercular.  2.  No evidence of pulmonary embolism.    PFT & OTHER TESTING       ASSESSMENT / PLAN      Pulmonary diagnoses:  Abnl CT/CXR R91.8  COPD J44.9  Hypoxemia R09.02  SOB R06.02    Additional COVID-19 diagnoses:  Concern of possible exposure to COVID-19, Now RULED OUT Z03.818       ASSESSMENT: 89-year-old female former smoker with a history of COPD is admitted for shortness of breath and fatigue.  Patient was recently hospitalized at [facility] 1/25-1/26/2022 for a COPD exacerbation.  She was seen in pulmonary consultation by Dr. Christensen who recommended continuing her usual triple inhaler therapy  and a 5-day course of prednisone.  On readmission CT scan of the chest showed emphysema and 2 new cavitary nodules in the right upper lobe, the largest measuring 2.6 cm.  Patient was seen by ID, sputum for AFB, QuantiFERON gold and fungal serologies all negative.  Patient was started on Unasyn for possible cavitary bacterial infection.  Hospital course complicated by worsening hypoxemia and follow-up CT scan of the chest 3/2/2022 showed the nodules to be unchanged in size but with decreased cavitation, and there was new mild diffuse groundglass opacity with interstitial septal thickening and trace pleural effusions consistent with pulmonary edema.  Patient was started on Lasix with clinical improvement.  Agree with current bronchodilators and plans for Augmentin x4 weeks as an outpatient with a follow-up CT scan of the chest.  Respiratory status currently stable on low-flow oxygen.    PLAN:  Adjust oxygen, keep SaO2 > 90%  Bronchodilators - Breo 200/25, Incruse  Antibiotics - Unasyn followed by Augmentin x 4 weeks for possible cavitary bacterial infection  Diuresis - Lasix as ordered.  Agree with plans for follow-up CT Chest in 4 weeks per ID.  Recommend outpatient Pulmonary follow-up at [facility] for outpatient PFTs and adjustment of bronchodilator rx: [phone] to schedule appointment.    Thanks, will follow,      Toni Randolph M.D.    [facility]  [phone]   (pager)  [phone]   (office)

## 2022-03-03 NOTE — PROGRESS NOTES
Clinic Care Coordination Contact  Care Team Conversations    Situation: RN Clinical Product Navigator following patient through TCU care progression.     Background: Patient recently admitted for 2nd time in just over a month with respiratory related condition, this time etiology thought to be related to pulmonary infection.     Assessment: patient will discharge to [facility] with supplemental O2 (new for her); will need close outpatient follow up with pulmonology and infectious disease.     Plan/Recommendations: RN Clinical Product Navigator will send TCU Care Team Care Management hand in communication and request involvement in discharge planning and coordination of care.       Sanam Welsh RN  Clinical Product Navigator

## 2022-03-03 NOTE — PLAN OF CARE
Goal Outcome Evaluation:        Summary: SOB/weakness, bacterial cavitary infection  DATE & TIME: 03/03/22 0653  Cognitive Concerns/ Orientation : A&O x4, calm, cooperative and pleasant. Forgetful.   BEHAVIOR & AGGRESSION TOOL COLOR: GREEN  ABNL VS/O2: VSS 3L- 4L nasal Cannula oxymizer at rest, saturation 94%-97%. Denies SOB. BAKER noted, oxygen increased to 5L for comfort with activity due to 02 sats dropping to 88%-90%. LS diminished and coarse at bases, some expiratory wheezes. Encouraged to use IS.   MOBILITY: SBA with walker and GB  PAIN MANAGMENT: PRN Oxycodone available for chronic back pain,   DIET: Mod CHO, fair appetite.   BOWEL/BLADDER: Continent; One BM this shift.   ABNL LAB/BG: CRP 62.8, trending down; CT of chest done (suspicion for pulmonary Edema, MD aware)  DRAIN/DEVICES: PIV saline locked; IV abx  SKIN: Blanchable redness on sacrum/coccyx - open to air, encouraged to T/R. TESTS/PROCEDURES: None  D/C DAY/GOALS/PLACE: Plan to discharge to [facility], SW is following. Pt will need Augmentin for 4 weeks with FOLLOW UP CT, Consider pulmonary FOLLOW UP outpt - per ID note.   OTHER IMPORTANT INFO: SW if following. PT is following.

## 2022-03-03 NOTE — PLAN OF CARE
Summary: SOB/weakness, bacterial cavitary infection  DATE & TIME: 03/02/22 6002-5797  Cognitive Concerns/ Orientation : A&O x4, calm, cooperative and pleasant. Forgetful.   BEHAVIOR & AGGRESSION TOOL COLOR: GREEN  ABNL VS/O2: VSS 3L- 4L nasal Cannula oxymizer at rest, saturation 91%-92%. Denies SOB. BAKER noted, oxygen increased to 5L for comfort with activity sating 88%-90%. LS diminished and coarse at bases, some expiratory wheezes. Encouraged to use IS.   MOBILITY: SBA with walker and GB  PAIN MANAGMENT: PRN Oxycodone available for chronic back pain, declined this shift.   DIET: Mod CHO, fair appetite.   BOWEL/BLADDER: Continent; One BM this shift.   ABNL LAB/BG: CRP 62.8, trending down; CT of chest done (suspicion for pulmonary Edema, MD paged)  DRAIN/DEVICES: PIV saline locked; IV abx  SKIN: Blanchable redness on sacrum/coccyx - open to air, encouraged to T/R. TESTS/PROCEDURES: None  D/C DAY/GOALS/PLACE: Plan to discharge to [place], MARCELO is following. Pt will need Augmentin for 4 weeks with FOLLOW UP CT, Consider pulmonary FOLLOW UP outpt - per ID note.   OTHER IMPORTANT INFO: MARCELO if following. PT is following.

## 2022-03-03 NOTE — PLAN OF CARE
Summary: SOB/weakness, bacterial cavitary infection  DATE & TIME: 03/03/22 AM shift  Cognitive Concerns/ Orientation : A&O x4, calm, cooperative and pleasant. Forgetful.   BEHAVIOR & AGGRESSION TOOL COLOR: GREEN  ABNL VS/O2: VSS 2L-3L nasal Cannula oxymizer saturation 92%-95%. Denies SOB. BAKER noted. LS diminished and coarse at bases, some expiratory wheezes. Encouraged to use IS.   MOBILITY: SBA with walker and GB. Ambulated x 2 in the crooks. Up to the chair today.   PAIN MANAGMENT: PRN Oxycodone x 1 given for chronic back pain, reports relief.  DIET: Mod CHO, fair appetite.   BOWEL/BLADDER: Continent;  ABNL LAB/BG: /141, CRP 39.3, trending down; Hg 10.9. K 3.2, replaced with ONCE dose of 40 mEq ordered by MD.   DRAIN/DEVICES: PIV saline locked; IV abx.   SKIN: Blanchable redness on sacrum/coccyx - open to air, encouraged to T/R. Many scattered bruises. Encouraged to float heels on pillows.   TESTS/PROCEDURES: None  D/C DAY/GOALS/PLACE: Plan to discharge to [place] today at 2:30  pm; however ID has to see the pt first due to +bronch culture (+ for pneumocystis). SW is following. Pt will need Augmentin for 4 weeks. Pulmonology consult completed today,  and pt will need repeat of CT of chest in 4 weeks and follow up with pulmonology out pt.   OTHER IMPORTANT INFO: SW if following. PT is following.

## 2022-03-03 NOTE — PROGRESS NOTES
"[facility]    Infectious Disease Progress Note    Date of Service (when I saw the patient): 03/03/2022     Assessment & Plan   Celeste Chacko is a 89 year old female who was admitted on 2/21/2022.     Impression:  1. 89 y.o with COPD  2. Diabetes   3. HTN, HLd  4. Admitted with shortness of breath.   5. CT scan \"  There are two new cavitary nodules in the right upper lobe. This  is concerning for aggressive, atypical infection, possibly fungal or  Tubercular.\"      Recommendations:   AFB stain negative, has been out of isolation.   Fungal antibody negative.   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   Patient is sulfa allergic will do mepron for 2 weeks.   Continue Unasyn while admitted and another 3 weeks of Augmentin for discharge           Gunner Sainz MD    Interval History   Tolerating antibiotics ok   No new rashes or issues with antibiotics   Labs reviewed   Afebrile   Tiered today, did not sleep well last  Night     Physical Exam   Temp: 99.1  F (37.3  C) Temp src: Oral BP: 121/55 Pulse: 87   Resp: 16 SpO2: 96 % O2 Device: Oxymizer cannula Oxygen Delivery: 3 LPM  Vitals:    02/22/22 0500 02/26/22 0412 03/02/22 0524   Weight: 59.9 kg (132 lb) 60.1 kg (132 lb 7.9 oz) 61.1 kg (134 lb 12.8 oz)     Vital Signs with Ranges  Temp:  [98.3  F (36.8  C)-99.1  F (37.3  C)] 99.1  F (37.3  C)  Pulse:  [87-97] 87  Resp:  [16-18] 16  BP: (109-140)/(48-65) 121/55  SpO2:  [95 %-97 %] 96 %    Constitutional: Awake, alert, cooperative, no apparent distress  Lungs: Clear to auscultation bilaterally, no crackles or wheezing  Cardiovascular: Regular rate and rhythm, normal S1 and S2, and no murmur noted  Abdomen: Normal bowel sounds, soft, non-distended, non-tender  Skin: No rashes, no cyanosis, no edema  Other:    Medications       amLODIPine  5 mg Oral Daily     ampicillin-sulbactam (UNASYN) IV  3 g Intravenous Q6H     aspirin  81 mg Oral Daily     atovaquone  750 mg Oral Q12H KRISTAL     calcium " carbonate 600 mg-vitamin D 400 units  1 tablet Oral BID     calcium polycarbophil  625 mg Oral Daily     famotidine  20 mg Oral BID     fluticasone-vilanterol  1 puff Inhalation Daily     furosemide  40 mg Intravenous Q8H     glipiZIDE  5 mg Oral QAM     insulin aspart  1-7 Units Subcutaneous TID AC     insulin aspart  1-5 Units Subcutaneous At Bedtime     insulin aspart   Subcutaneous TID AC     latanoprost  1 drop Left Eye QPM     multivitamin, therapeutic  1 tablet Oral QPM     polyethylene glycol  8.5 g Oral QPM     rosuvastatin  5 mg Oral At Bedtime     sodium chloride (PF)  3 mL Intracatheter Q8H     umeclidinium  1 puff Inhalation Daily       Data   All microbiology laboratory data reviewed.  Recent Labs   Lab Test 03/03/22  1002 02/28/22  1202 02/22/22  0559   WBC 5.8 6.3 7.3   HGB 10.8* 10.8* 12.2   HCT 35.9 35.0 39.7   MCV 83 84 83    166 158     Recent Labs   Lab Test 03/03/22  1002 03/02/22  0725 03/01/22  1046   CR 0.66 0.56 0.54     No lab results found.  Recent Labs   Lab Test 12/04/15  1112   CULT >100,000 colonies/mL Escherichia coli*

## 2022-03-03 NOTE — PROGRESS NOTES
Care Management Follow Up    Length of Stay (days): 10    Expected Discharge Date: 03/03/2022     Concerns to be Addressed:       Patient plan of care discussed at interdisciplinary rounds: Yes    Anticipated Discharge Disposition:       Anticipated Discharge Services:    Anticipated Discharge DME:      Patient/family educated on Medicare website which has current facility and service quality ratings:    Education Provided on the Discharge Plan:    Patient/Family in Agreement with the Plan: yes    Referrals Placed by CM/SW:    Private pay costs discussed: Not applicable    Additional Information:  Pt not yet ready for discharge. Milagro updated. Lory Bonilla updated.       JAYLYN Zee, LGSW  [phone]  [facility]

## 2022-03-03 NOTE — PROGRESS NOTES
[facility]    Medicine Progress Note - Hospitalist Service    Date of Admission:  2/21/2022    Assessment & Plan          Cumulative Summary: Celeste Chacko is a very pleasant 89 year old female with medical history significant for COPD, chronic back pain, DM2, HTN, HL was brought to the ER for evaluation of exertional dyspnea and fatigue and is being registered under observation on 2/21/2022 for further management.        Assessment & Plan     Possible Lung abscess  Exertional dyspnea and Fatigue  Recent admission for COPD exacerbation treated with a steroid but ongoing dyspnea mostly with exertion but without fever, cough, weight loss or night sweats.  No wheezing.  No chest pain.   -- CT PE study was done which showed No PE but 2 new cavitary nodules in the right upper lobe. This is concerning for aggressive, atypical infection, possibly fungal or tubercular.  Patient has been on steroid for almost a month although currently off of it. proBNP negative.  WBC normal.  Pro-Edilberto negative    -- ID consult appreciated  -- will need to rule out TB, although suspicion low  -- Quantiferon gold negative  -- AFB x2 negative. 1 more test on 2/23 pending. Per ID lab states they do not have enough specimen to complete that test  And hence Ok to take off isolation given all other negative factors.  -- fungal antigen for histo, cocci, blasto and Aspergillus all negative   -- Blood cultures negative to date  -- continue on Unasyn per ID for possible bacterial cavitary infection   -- Plan per ID  - if her tuberculosis work-up is negative then patient will probably require 4 weeks of oral Augmentin for cavitary bacterial lesion.  -- Will also require pulmonary follow up on discharge  -- CT scan obtained today due to worsening hypoxia reveals pulmonary edema and improvement in cavitation. Will start diuresis.   -- Plan is for discharge to TCU when medically ready -we will hold discharge today due to lab  results revealing patient is positive for pneumocystis Jiroveci.  Await further input from infectious disease.    Right eye conjunctivitis :  -- started Ofloxacin 0.3% ophthalmic solution      COPD  Not on home O2.  PTA is on fluticasone/salmeterol, DuoNeb, Spiriva inhalers.     -- Continue with scheduled DuoNeb, steroid neb and PRN albuterol as well.    -- Not on home O2, needs exercise oximetry prior to discharge  -- Her current presentation does not seem consistent with COPD exacerbation, likely related to pneumonia/lung abscess as above, treatment as outlined.   -- Patient might need Oxygen for some time considering acute infection on top of underlying COPD      Type II MI:   Minimally abnormal troponin, follow trend.  Unlikely ACS.  --Continue aspirin  --2D echo showed normal LVEF and no wall motion abnormalities.  --No further work-up planned, recommend outpatient stress test once acute issues resolve.      DM2: HbA1c 9.9.  Takes glipizide PTA.  -- PTA glipizide resumed   -- Continue with Premeal NovoLog and ISS.  -- BS in 100s     Essential hypertension  Hyperlipidemia  --Continue PTA Norvasc 5 mg p.o. daily we will monitor blood pressure if it needs to be adjusted further         Diet: Moderate Consistent Carb (60 g CHO per Meal) Diet  Diet    DVT Prophylaxis: Pneumatic Compression Devices  Botello Catheter: Not present  Central Lines: None  Cardiac Monitoring: None  Code Status: No CPR- Do NOT Intubate      Disposition Plan   Expected Discharge: 03/03/2022     Anticipated discharge location:  Awaiting care coordination huddle  Delays:          The patient's care was discussed with the Patient.    Cailin Loera MD  Hospitalist Service  [facility]  Securely message with the [employer] Web Console (learn more here)  Text page via [employer] Paging/Directory         Clinically Significant Risk Factors Present on Admission                     ______________________________________________________________________    Interval History   Patient notes she is feeling better today.  Not complaining much about dyspnea on exertion either.  Pulmonology on consult.  Response to discharge to TCU but will hold due to new lab information that needs to be evaluated by ID.    Data reviewed today: I reviewed all medications, new labs and imaging results over the last 24 hours. I personally reviewed no images or EKG's today.    Physical Exam   Vital Signs: Temp: 98.3  F (36.8  C) Temp src: Oral BP: 109/48 Pulse: 97   Resp: 16 SpO2: 95 % O2 Device: Oxymizer cannula Oxygen Delivery: 3 LPM  Weight: 134 lbs 12.8 oz  General Appearance: Well appearing for stated age.  Respiratory: CTAB, no rales or ronchi  Cardiovascular: S1, S2 normal, no murmurs  GI: non-tender on palpation, BS present      Data   Recent Labs   Lab 03/03/22  1234 03/03/22  1002 03/03/22  0807 03/02/22  1310 03/02/22  0725 03/01/22  1311 03/01/22  1046 02/28/22  1242 02/28/22  1202   WBC  --  5.8  --   --   --   --   --   --  6.3   HGB  --  10.8*  --   --   --   --   --   --  10.8*   MCV  --  83  --   --   --   --   --   --  84   PLT  --  229  --   --   --   --   --   --  166   NA  --  136  --   --  138  --  137  --  136   POTASSIUM  --  3.2*  --   --  3.6  --  3.4  --  3.7   CHLORIDE  --  101  --   --  103  --  103  --  104   CO2  --  31  --   --  29  --  29  --  27   BUN  --  14  --   --  13  --  13  --  16   CR  --  0.66  --   --  0.56  --  0.54  --  0.62   ANIONGAP  --  4  --   --  6  --  5  --  5   NARCISA  --  8.8  --   --  8.6  --  8.7  --  8.2*   * 260* 195*   < > 128*   < > 180*   < > 213*    < > = values in this interval not displayed.     No results found for this or any previous visit (from the past 24 hour(s)).

## 2022-03-04 ENCOUNTER — APPOINTMENT (OUTPATIENT)
Dept: PHYSICAL THERAPY | Facility: CLINIC | Age: 87
DRG: 177 | End: 2022-03-04
Payer: COMMERCIAL

## 2022-03-04 LAB
ANION GAP SERPL CALCULATED.3IONS-SCNC: 5 MMOL/L (ref 3–14)
BASOPHILS # BLD AUTO: 0 10E3/UL (ref 0–0.2)
BASOPHILS NFR BLD AUTO: 1 %
BUN SERPL-MCNC: 17 MG/DL (ref 7–30)
CALCIUM SERPL-MCNC: 9 MG/DL (ref 8.5–10.1)
CHLORIDE BLD-SCNC: 104 MMOL/L (ref 94–109)
CO2 SERPL-SCNC: 31 MMOL/L (ref 20–32)
CREAT SERPL-MCNC: 0.71 MG/DL (ref 0.52–1.04)
CRP SERPL-MCNC: 22.2 MG/L (ref 0–8)
EOSINOPHIL # BLD AUTO: 0.1 10E3/UL (ref 0–0.7)
EOSINOPHIL NFR BLD AUTO: 2 %
ERYTHROCYTE [DISTWIDTH] IN BLOOD BY AUTOMATED COUNT: 14 % (ref 10–15)
GFR SERPL CREATININE-BSD FRML MDRD: 80 ML/MIN/1.73M2
GLUCOSE BLD-MCNC: 203 MG/DL (ref 70–99)
GLUCOSE BLDC GLUCOMTR-MCNC: 112 MG/DL (ref 70–99)
GLUCOSE BLDC GLUCOMTR-MCNC: 130 MG/DL (ref 70–99)
GLUCOSE BLDC GLUCOMTR-MCNC: 131 MG/DL (ref 70–99)
GLUCOSE BLDC GLUCOMTR-MCNC: 195 MG/DL (ref 70–99)
GLUCOSE BLDC GLUCOMTR-MCNC: 294 MG/DL (ref 70–99)
HCT VFR BLD AUTO: 36.4 % (ref 35–47)
HGB BLD-MCNC: 11.4 G/DL (ref 11.7–15.7)
IMM GRANULOCYTES # BLD: 0.1 10E3/UL
IMM GRANULOCYTES NFR BLD: 1 %
LYMPHOCYTES # BLD AUTO: 1.5 10E3/UL (ref 0.8–5.3)
LYMPHOCYTES NFR BLD AUTO: 27 %
MCH RBC QN AUTO: 25.4 PG (ref 26.5–33)
MCHC RBC AUTO-ENTMCNC: 31.3 G/DL (ref 31.5–36.5)
MCV RBC AUTO: 81 FL (ref 78–100)
MONOCYTES # BLD AUTO: 0.9 10E3/UL (ref 0–1.3)
MONOCYTES NFR BLD AUTO: 16 %
NEUTROPHILS # BLD AUTO: 3 10E3/UL (ref 1.6–8.3)
NEUTROPHILS NFR BLD AUTO: 53 %
NRBC # BLD AUTO: 0 10E3/UL
NRBC BLD AUTO-RTO: 0 /100
PLATELET # BLD AUTO: 244 10E3/UL (ref 150–450)
POTASSIUM BLD-SCNC: 3.4 MMOL/L (ref 3.4–5.3)
RBC # BLD AUTO: 4.48 10E6/UL (ref 3.8–5.2)
SODIUM SERPL-SCNC: 140 MMOL/L (ref 133–144)
WBC # BLD AUTO: 5.5 10E3/UL (ref 4–11)

## 2022-03-04 PROCEDURE — 97110 THERAPEUTIC EXERCISES: CPT | Mod: GP

## 2022-03-04 PROCEDURE — 250N000012 HC RX MED GY IP 250 OP 636 PS 637: Performed by: INTERNAL MEDICINE

## 2022-03-04 PROCEDURE — 250N000013 HC RX MED GY IP 250 OP 250 PS 637: Performed by: HOSPITALIST

## 2022-03-04 PROCEDURE — 250N000011 HC RX IP 250 OP 636: Performed by: INTERNAL MEDICINE

## 2022-03-04 PROCEDURE — 250N000011 HC RX IP 250 OP 636: Performed by: HOSPITALIST

## 2022-03-04 PROCEDURE — 36415 COLL VENOUS BLD VENIPUNCTURE: CPT | Performed by: HOSPITALIST

## 2022-03-04 PROCEDURE — 97116 GAIT TRAINING THERAPY: CPT | Mod: GP

## 2022-03-04 PROCEDURE — 82947 ASSAY GLUCOSE BLOOD QUANT: CPT | Performed by: HOSPITALIST

## 2022-03-04 PROCEDURE — 250N000013 HC RX MED GY IP 250 OP 250 PS 637: Performed by: INTERNAL MEDICINE

## 2022-03-04 PROCEDURE — 99233 SBSQ HOSP IP/OBS HIGH 50: CPT | Performed by: HOSPITALIST

## 2022-03-04 PROCEDURE — 120N000001 HC R&B MED SURG/OB

## 2022-03-04 PROCEDURE — 85025 COMPLETE CBC W/AUTO DIFF WBC: CPT | Performed by: HOSPITALIST

## 2022-03-04 PROCEDURE — 86140 C-REACTIVE PROTEIN: CPT | Performed by: HOSPITALIST

## 2022-03-04 RX ORDER — PREDNISONE 20 MG/1
40 TABLET ORAL 2 TIMES DAILY
Status: COMPLETED | OUTPATIENT
Start: 2022-03-04 | End: 2022-03-08

## 2022-03-04 RX ORDER — ATOVAQUONE 750 MG/5ML
750 SUSPENSION ORAL 2 TIMES DAILY WITH MEALS
Qty: 140 ML | Refills: 0 | COMMUNITY
Start: 2022-03-04 | End: 2022-03-04

## 2022-03-04 RX ORDER — PREDNISONE 20 MG/1
20 TABLET ORAL DAILY
Status: DISCONTINUED | OUTPATIENT
Start: 2022-03-14 | End: 2022-03-14

## 2022-03-04 RX ORDER — PREDNISONE 20 MG/1
40 TABLET ORAL DAILY
Status: COMPLETED | OUTPATIENT
Start: 2022-03-09 | End: 2022-03-13

## 2022-03-04 RX ADMIN — CALCIUM CARBONATE 600 MG (1,500 MG)-VITAMIN D3 400 UNIT TABLET 1 TABLET: at 21:44

## 2022-03-04 RX ADMIN — FAMOTIDINE 20 MG: 20 TABLET ORAL at 21:44

## 2022-03-04 RX ADMIN — AMPICILLIN SODIUM AND SULBACTAM SODIUM 3 G: 2; 1 INJECTION, POWDER, FOR SOLUTION INTRAMUSCULAR; INTRAVENOUS at 17:51

## 2022-03-04 RX ADMIN — AMPICILLIN SODIUM AND SULBACTAM SODIUM 3 G: 2; 1 INJECTION, POWDER, FOR SOLUTION INTRAMUSCULAR; INTRAVENOUS at 06:55

## 2022-03-04 RX ADMIN — INSULIN ASPART 2 UNITS: 100 INJECTION, SOLUTION INTRAVENOUS; SUBCUTANEOUS at 21:45

## 2022-03-04 RX ADMIN — ATOVAQUONE 750 MG: 750 SUSPENSION ORAL at 08:51

## 2022-03-04 RX ADMIN — OXYCODONE HYDROCHLORIDE 5 MG: 5 TABLET ORAL at 15:31

## 2022-03-04 RX ADMIN — POLYETHYLENE GLYCOL 3350 8.5 G: 17 POWDER, FOR SOLUTION ORAL at 21:43

## 2022-03-04 RX ADMIN — INSULIN ASPART 3 UNITS: 100 INJECTION, SOLUTION INTRAVENOUS; SUBCUTANEOUS at 19:30

## 2022-03-04 RX ADMIN — ROSUVASTATIN CALCIUM 5 MG: 5 TABLET, FILM COATED ORAL at 21:44

## 2022-03-04 RX ADMIN — OXYCODONE HYDROCHLORIDE 5 MG: 5 TABLET ORAL at 21:50

## 2022-03-04 RX ADMIN — PREDNISONE 40 MG: 20 TABLET ORAL at 13:07

## 2022-03-04 RX ADMIN — AMLODIPINE BESYLATE 5 MG: 5 TABLET ORAL at 08:45

## 2022-03-04 RX ADMIN — ASPIRIN 81 MG: 81 TABLET, COATED ORAL at 08:45

## 2022-03-04 RX ADMIN — OXYCODONE HYDROCHLORIDE 5 MG: 5 TABLET ORAL at 11:25

## 2022-03-04 RX ADMIN — ATOVAQUONE 750 MG: 750 SUSPENSION ORAL at 19:29

## 2022-03-04 RX ADMIN — FUROSEMIDE 40 MG: 10 INJECTION, SOLUTION INTRAVENOUS at 13:23

## 2022-03-04 RX ADMIN — CALCIUM POLYCARBOPHIL 625 MG: 625 TABLET, FILM COATED ORAL at 08:45

## 2022-03-04 RX ADMIN — PREDNISONE 40 MG: 20 TABLET ORAL at 21:44

## 2022-03-04 RX ADMIN — THERA TABS 1 TABLET: TAB at 21:44

## 2022-03-04 RX ADMIN — CALCIUM CARBONATE 600 MG (1,500 MG)-VITAMIN D3 400 UNIT TABLET 1 TABLET: at 08:45

## 2022-03-04 RX ADMIN — AMPICILLIN SODIUM AND SULBACTAM SODIUM 3 G: 2; 1 INJECTION, POWDER, FOR SOLUTION INTRAMUSCULAR; INTRAVENOUS at 13:11

## 2022-03-04 RX ADMIN — FLUTICASONE FUROATE AND VILANTEROL TRIFENATATE 1 PUFF: 200; 25 POWDER RESPIRATORY (INHALATION) at 08:52

## 2022-03-04 RX ADMIN — INSULIN ASPART 6 UNITS: 100 INJECTION, SOLUTION INTRAVENOUS; SUBCUTANEOUS at 13:08

## 2022-03-04 RX ADMIN — FUROSEMIDE 40 MG: 10 INJECTION, SOLUTION INTRAVENOUS at 06:58

## 2022-03-04 RX ADMIN — LATANOPROST 1 DROP: 50 SOLUTION/ DROPS OPHTHALMIC at 21:44

## 2022-03-04 RX ADMIN — INSULIN ASPART 4 UNITS: 100 INJECTION, SOLUTION INTRAVENOUS; SUBCUTANEOUS at 08:48

## 2022-03-04 RX ADMIN — FAMOTIDINE 20 MG: 20 TABLET ORAL at 08:45

## 2022-03-04 RX ADMIN — FUROSEMIDE 40 MG: 10 INJECTION, SOLUTION INTRAVENOUS at 21:43

## 2022-03-04 RX ADMIN — UMECLIDINIUM 1 PUFF: 62.5 AEROSOL, POWDER ORAL at 08:52

## 2022-03-04 RX ADMIN — GLIPIZIDE 5 MG: 5 TABLET ORAL at 08:45

## 2022-03-04 ASSESSMENT — ACTIVITIES OF DAILY LIVING (ADL)
ADLS_ACUITY_SCORE: 11
ADLS_ACUITY_SCORE: 10
ADLS_ACUITY_SCORE: 11
ADLS_ACUITY_SCORE: 11
ADLS_ACUITY_SCORE: 10
ADLS_ACUITY_SCORE: 10
ADLS_ACUITY_SCORE: 11
ADLS_ACUITY_SCORE: 10
ADLS_ACUITY_SCORE: 10
ADLS_ACUITY_SCORE: 11
ADLS_ACUITY_SCORE: 10
ADLS_ACUITY_SCORE: 10
ADLS_ACUITY_SCORE: 11
ADLS_ACUITY_SCORE: 10
ADLS_ACUITY_SCORE: 11
ADLS_ACUITY_SCORE: 11
ADLS_ACUITY_SCORE: 10
ADLS_ACUITY_SCORE: 10
ADLS_ACUITY_SCORE: 11
ADLS_ACUITY_SCORE: 11

## 2022-03-04 NOTE — PLAN OF CARE
Summary: SOB/weakness, bacterial cavitary infection  DATE & TIME: 03/03/22 1598-3382  Cognitive Concerns/ Orientation : A&O x4, calm, cooperative and pleasant. Forgetful.   BEHAVIOR & AGGRESSION TOOL COLOR: GREEN  ABNL VS/O2: VSS 2L-3L nasal Cannula oxymizer saturation 92%-95%. Denies SOB. BAKER noted. LS diminished and coarse at bases, some expiratory wheezes. Encouraged to use IS.   MOBILITY: SBA with walker and GB. Ambulated x 1in the crooks.   PAIN MANAGMENT: PRN Oxycodone x 1 given for chronic back pain, reports relief.  DIET: Mod CHO, fair appetite.   BOWEL/BLADDER: Continent;  ABNL LAB/BG: /207, CRP 39.3, trending down; Hg 10.9. K 3.2, replaced with ONCE dose of 40 mEq in AM shift. CRITICAL LAB VALUE: positive for acid fast bacilli, MD aware  DRAIN/DEVICES: PIV saline locked; IV abx.   SKIN: Blanchable redness on sacrum/coccyx - open to air, encouraged to T/R. Many scattered bruises. Encouraged to float heels on pillows.   TESTS/PROCEDURES: None  D/C DAY/GOALS/PLACE: Plan to discharge to [place] today at 2:30  pm; however ID has to see the pt first due to +bronch culture (+ for pneumocystis). SW is following. Pt will need Augmentin for 4 weeks. Pulmonology consult completed today,  and pt will need repeat of CT of chest in 4 weeks and follow up with pulmonology out pt.   OTHER IMPORTANT INFO: SW is following. PT is following.

## 2022-03-04 NOTE — PROGRESS NOTES
PULMONARY NOTE - Chart Check    Chart reviewed.  PJP PCR noted to be positive, patient started on Mepron per ID.  Continue present bronchodilators, antibiotics and diuresis.  Recommendations as per consult note yesterday.  Discharge planning noted.      Toni Randolph MD    [facility]  [phone] (pager)  [phone] (office)

## 2022-03-04 NOTE — PROGRESS NOTES
[facility]    Medicine Progress Note - Hospitalist Service    Date of Admission:  2/21/2022    Assessment & Plan          Cumulative Summary: Celeste Chacko is a very pleasant 89 year old female with medical history significant for COPD, chronic back pain, DM2, HTN, HL was brought to the ER for evaluation of exertional dyspnea and fatigue and is being registered under observation on 2/21/2022 for further management.        Assessment & Plan     Possible Lung abscess- cavitary nodules in RUL  PJP infection  Pulmonary edema  Exertional dyspnea and Fatigue  Recent admission for COPD exacerbation treated with a steroid but ongoing dyspnea mostly with exertion but without fever, cough, weight loss or night sweats.  No wheezing.  No chest pain.   -- CT PE study was done which showed No PE but 2 new cavitary nodules in the right upper lobe. This is concerning for aggressive, atypical infection, possibly fungal or tubercular.  Patient has been on steroid for almost a month although currently off of it. proBNP negative.  WBC normal.  Pro-Edilberto negative    -- ID consult appreciated  -- will need to rule out TB, although suspicion low  -- Quantiferon gold negative  -- AFB x2 negative. 1 more test on 2/23 pending. Per ID lab states they do not have enough specimen to complete that test  And hence Ok to take off isolation given all other negative factors. However AFB culture came back positive, hence back in isolation.   -- fungal antigen for histo, cocci, blasto and Aspergillus all negative   -- Blood cultures negative to date  -- continue on Unasyn per ID for possible bacterial cavitary infection   -- Plan per ID  - if her tuberculosis work-up is negative then patient will probably require 4 weeks of oral Augmentin for cavitary bacterial lesion.  -- Will also require pulmonary follow up on discharge  -- CT scan obtained 3/2/22 due to worsening hypoxia reveals pulmonary edema and improvement in  cavitation.   -- Diuereses with IV lasix. .   -- Plan is for discharge to TCU when medically ready -we will hold discharge today due to lab results revealing patient is positive for pneumocystis Jiroveci.  Await further input from infectious disease.  --Patient came back positive for PJP. Started on mepron for 2 weeks per ID. Patient is allergic to sulfa.  -- Also started on a steroid taper per pulm.     Right eye conjunctivitis :  -- started Ofloxacin 0.3% ophthalmic solution      COPD  Not on home O2.  PTA is on fluticasone/salmeterol, DuoNeb, Spiriva inhalers.     -- Continue with scheduled DuoNeb, steroid neb and PRN albuterol as well.    -- Not on home O2, needs exercise oximetry prior to discharge  -- Her current presentation does not seem consistent with COPD exacerbation, likely related to pneumonia/lung abscess as above, treatment as outlined.   -- Patient might need Oxygen for some time considering acute infection on top of underlying COPD      Type II MI:   Minimally abnormal troponin, follow trend.  Unlikely ACS.  --Continue aspirin  --2D echo showed normal LVEF and no wall motion abnormalities.  --No further work-up planned, recommend outpatient stress test once acute issues resolve.      DM2: HbA1c 9.9.  Takes glipizide PTA.  -- PTA glipizide resumed   -- Continue with Premeal NovoLog and ISS.  -- BS in 100s     Essential hypertension  Hyperlipidemia  --Continue PTA Norvasc 5 mg p.o. daily we will monitor blood pressure if it needs to be adjusted further         Diet: Moderate Consistent Carb (60 g CHO per Meal) Diet  Diet    DVT Prophylaxis: Pneumatic Compression Devices  Botello Catheter: Not present  Central Lines: None  Cardiac Monitoring: None  Code Status: No CPR- Do NOT Intubate      Disposition Plan   Expected Discharge: 03/07/2022     Anticipated discharge location:  Awaiting care coordination huddle  Delays: Oxygen Needs - Arrange Home O2          The patient's care was discussed with the  Patient.    Cailin Loera MD  Hospitalist Service  [facility]  Securely message with the [employer] Web Console (learn more here)  Text page via [employer] Paging/Directory         Clinically Significant Risk Factors Present on Admission                    ______________________________________________________________________    Interval History   Patient doing well today sitting in bed.  She notes that her breathing is much improved.  She still has a bit of her dyspnea on exertion but she notes that improved as well.  Denies any chest pain.    Data reviewed today: I reviewed all medications, new labs and imaging results over the last 24 hours. I personally reviewed no images or EKG's today.    Physical Exam   Vital Signs: Temp: 98  F (36.7  C) Temp src: Oral BP: 124/63 Pulse: 86   Resp: 16 SpO2: 94 % O2 Device: Oxymizer cannula Oxygen Delivery: 3 LPM  Weight: 134 lbs 12.8 oz  General Appearance: Well appearing for stated age.  Respiratory: CTAB, no rales or ronchi  Cardiovascular: S1, S2 normal, no murmurs  GI: non-tender on palpation, BS present      Data   Recent Labs   Lab 03/04/22  1226 03/04/22  0850 03/04/22  0803 03/03/22  1234 03/03/22  1002 03/02/22  1310 03/02/22  0725 02/28/22  1242 02/28/22  1202   WBC  --  5.5  --   --  5.8  --   --   --  6.3   HGB  --  11.4*  --   --  10.8*  --   --   --  10.8*   MCV  --  81  --   --  83  --   --   --  84   PLT  --  244  --   --  229  --   --   --  166   NA  --  140  --   --  136  --  138   < > 136   POTASSIUM  --  3.4  --   --  3.2*  --  3.6   < > 3.7   CHLORIDE  --  104  --   --  101  --  103   < > 104   CO2  --  31  --   --  31  --  29   < > 27   BUN  --  17  --   --  14  --  13   < > 16   CR  --  0.71  --   --  0.66  --  0.56   < > 0.62   ANIONGAP  --  5  --   --  4  --  6   < > 5   NARCISA  --  9.0  --   --  8.8  --  8.6   < > 8.2*   * 203* 131*   < > 260*   < > 128*   < > 213*    < > = values in this interval not displayed.     No results found  for this or any previous visit (from the past 24 hour(s)).

## 2022-03-04 NOTE — PROGRESS NOTES
"[facility]    Infectious Disease Progress Note    Date of Service (when I saw the patient): 03/04/2022     Assessment & Plan   Celeste Chacko is a 89 year old female who was admitted on 2/21/2022.     Impression:  1. 89 y.o with COPD  2. Diabetes   3. HTN, HLd  4. Admitted with shortness of breath.   5. CT scan \"  There are two new cavitary nodules in the right upper lobe. This  is concerning for aggressive, atypical infection, possibly fungal or  Tubercular.\"      Recommendations:   AFB stain negative, AFB culture positive, this early culture positive the chance of this being MTB is low none the less, back in isolation.      Fungal antibody negative.      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   Patient is sulfa allergic will do mepron for 2 weeks. Steroids discussed with pulm    40 mg orally twice daily for five days, followed by  40 mg orally once daily for five days, followed by  20 mg orally once daily for 11 days       Continue Unasyn while admitted    Plan:   Stays in iso till more information on the AFB organism  Call placed to lab   Add MTB PCR   Patient to stay in the hospital as more information is obtained      spoke with daughter Irene over the phone, explained plan      Gunner Sainz MD    Interval History   Tolerating antibiotics ok   No new rashes or issues with antibiotics   Labs reviewed   Afebrile   Tiered today, did not sleep well last  Night     Physical Exam   Temp: 98.6  F (37  C) Temp src: Oral BP: 126/62 Pulse: 84   Resp: 16 SpO2: 97 % O2 Device: Oxymizer cannula Oxygen Delivery: 5 LPM  Vitals:    02/22/22 0500 02/26/22 0412 03/02/22 0524   Weight: 59.9 kg (132 lb) 60.1 kg (132 lb 7.9 oz) 61.1 kg (134 lb 12.8 oz)     Vital Signs with Ranges  Temp:  [98.5  F (36.9  C)-99.1  F (37.3  C)] 98.6  F (37  C)  Pulse:  [84-87] 84  Resp:  [16] 16  BP: (121-131)/(55-62) 126/62  SpO2:  [96 %-97 %] 97 %    Constitutional: Awake, alert, cooperative, no apparent " distress  Lungs: Clear to auscultation bilaterally, no crackles or wheezing  Cardiovascular: Regular rate and rhythm, normal S1 and S2, and no murmur noted  Abdomen: Normal bowel sounds, soft, non-distended, non-tender  Skin: No rashes, no cyanosis, no edema  Other:    Medications       amLODIPine  5 mg Oral Daily     ampicillin-sulbactam (UNASYN) IV  3 g Intravenous Q6H     aspirin  81 mg Oral Daily     atovaquone  750 mg Oral BID w/meals     calcium carbonate 600 mg-vitamin D 400 units  1 tablet Oral BID     calcium polycarbophil  625 mg Oral Daily     famotidine  20 mg Oral BID     fluticasone-vilanterol  1 puff Inhalation Daily     furosemide  40 mg Intravenous Q8H     glipiZIDE  5 mg Oral QAM     insulin aspart  1-7 Units Subcutaneous TID AC     insulin aspart  1-5 Units Subcutaneous At Bedtime     insulin aspart   Subcutaneous TID AC     latanoprost  1 drop Left Eye QPM     multivitamin, therapeutic  1 tablet Oral QPM     polyethylene glycol  8.5 g Oral QPM     rosuvastatin  5 mg Oral At Bedtime     sodium chloride (PF)  3 mL Intracatheter Q8H     umeclidinium  1 puff Inhalation Daily       Data   All microbiology laboratory data reviewed.  Recent Labs   Lab Test 03/04/22  0850 03/03/22  1002 02/28/22  1202   WBC 5.5 5.8 6.3   HGB 11.4* 10.8* 10.8*   HCT 36.4 35.9 35.0   MCV 81 83 84    229 166     Recent Labs   Lab Test 03/04/22  0850 03/03/22  1002 03/02/22  0725   CR 0.71 0.66 0.56     No lab results found.  Recent Labs   Lab Test 12/04/15  1112   CULT >100,000 colonies/mL Escherichia coli*

## 2022-03-04 NOTE — PLAN OF CARE
Goal Outcome Evaluation:           Summary: SOB/weakness, bacterial cavitary infection  DATE & TIME: 3/4/2022 5549-8782  Cognitive Concerns/ Orientation : Alert and oriented x 4, forgetful   BEHAVIOR & AGGRESSION TOOL COLOR: Green   ABNL VS/O2: VSS on 3 lpm oxymizer with sats at 95%-96%   MOBILITY: SBA with walker and GB  PAIN MANAGEMENT: Complains of chronic lower back pain, oxycodone x 1   DIET: Mod CHO  BOWEL/BLADDER: Continent  ABNL LAB/BG: yesterday CRITICAL LAB VALUE: positive for acid fast bacilli, MD aware  DRAIN/DEVICES: PIV saline locked, intermittent antibiotics   SKIN: Blanchable redness on sacrum/coccyx - open to air, encouraged to T/R. Many scattered bruises. Encouraged to float heels on pillows.   TESTS/PROCEDURES: None  D/C DAY/GOALS/PLACE: Pending, Plan was to discharge to [place] Thursday at 2:30 pm; however ID has to see the pt first due to +bronch culture (+ for pneumocystis). SW is following. Pt will need Augmentin for 4 weeks. Pulmonology consult was completed Thursday, and pt will need repeat of CT of chest in 4 weeks and follow up with pulmonology out pt.   OTHER IMPORTANT INFO: AIRBORNE ISOLATION RE-INITIATED . SW is following. PT is following.

## 2022-03-04 NOTE — PROGRESS NOTES
Care Management Follow Up    Length of Stay (days): 11    Expected Discharge Date: 03/07/2022     Concerns to be Addressed:       Patient plan of care discussed at interdisciplinary rounds: Yes    Anticipated Discharge Disposition:       Anticipated Discharge Services:    Anticipated Discharge DME:      Patient/family educated on Medicare website which has current facility and service quality ratings:    Education Provided on the Discharge Plan:    Patient/Family in Agreement with the Plan: yes    Referrals Placed by CM/MARCELO:    Private pay costs discussed: Not applicable    Additional Information:  MARCELO updated [facility] as pt is not ready for discharge. Since [facility] has declined to authorize a stay in TCU due to no skilled need, [facility] can not accept pt at this time. [facility] can reassess, if pt's skilled needs change. MARCELO will continue to follow.       JAYLYN Zee, LG  [phone]  [facility]

## 2022-03-04 NOTE — CONSULTS
Care Management Follow Up    Length of Stay (days): 11    Expected Discharge Date: 03/04/2022     Concerns to be Addressed:  Discharge planning.  Patient plan of care discussed at interdisciplinary rounds: Yes    Anticipated Discharge Disposition:  TCU pending insurance prior auth to [facility]      Patient/family educated on Medicare website which has current facility and service quality ratings:    Education Provided on the Discharge Plan:    Patient/Family in Agreement with the Plan: yes    Referrals Placed by CM/SW: CM   Private pay costs discussed: Not applicable    Additional Information:  Writer received a message to call [employer] (Medical reviewer for [facility] Medicare Advantage) Mario Alberto phone [phone] jayson Llanes the Medical Director denied for [facility] due to not meeting medical necessity.  Peer to Peer offered, however was due to be called in by 10:00 a.m. 3/4/22.  Per Mario Alberto Provider can call [facility] directly at phone# [phone] Fax#[phone] to see if there are any other available options for subscriber or provider appeal.  Per MARCELO she will get the denial letter from [facility] for further direction on appeal and timeline to submit.   Patient is not medically stable for discharge at this time and ID is following further w/up of labs.   Will continue to work with discharging provider, SW and TCU regarding appeal.  We may have to work on this sooner if there is a timeline to appeal the decision.           Irene Contreras RN

## 2022-03-04 NOTE — PLAN OF CARE
Goal Outcome Evaluation: Progressing      Cognitive Concerns/ Orientation : Alert and oriented x 4, forgetful   BEHAVIOR & AGGRESSION TOOL COLOR: Green   ABNL VS/O2: VSS on 3 lpm oxymizer with sats at 95%-96%   MOBILITY: SBA with walker and GB  PAIN MANAGEMENT: Complains of lower back pain, oxycodone x 1   DIET: Mod CHO  BOWEL/BLADDER: Continent  ABNL LAB/BG: , CRP 39.3, Hg 10.8, K 3.2, replaced with one dose of 40 mEq Thursday morning. CRITICAL LAB VALUE: positive for acid fast bacilli, MD aware  DRAIN/DEVICES: PIV saline locked, intermittent antibiotics   SKIN: Blanchable redness on sacrum/coccyx - open to air, encouraged to T/R. Many scattered bruises. Encouraged to float heels on pillows.   TESTS/PROCEDURES: None  D/C DAY/GOALS/PLACE: Pending, Plan was to discharge to [place] Thursday at 2:30 pm; however ID has to see the pt first due to +bronch culture (+ for pneumocystis). SW is following. Pt will need Augmentin for 4 weeks. Pulmonology consult was completed Thursday, and pt will need repeat of CT of chest in 4 weeks and follow up with pulmonology out pt.   OTHER IMPORTANT INFO: SW is following. PT is following.

## 2022-03-05 LAB
ANNOTATION COMMENT IMP: NOT DETECTED
DEPRECATED M TB RPOB XXX QL NAA+PROBE: NORMAL
GLUCOSE BLDC GLUCOMTR-MCNC: 251 MG/DL (ref 70–99)
GLUCOSE BLDC GLUCOMTR-MCNC: 256 MG/DL (ref 70–99)
GLUCOSE BLDC GLUCOMTR-MCNC: 261 MG/DL (ref 70–99)
GLUCOSE BLDC GLUCOMTR-MCNC: 277 MG/DL (ref 70–99)
GLUCOSE BLDC GLUCOMTR-MCNC: 354 MG/DL (ref 70–99)
M TB DNA SPEC QL NAA+PROBE: NOT DETECTED

## 2022-03-05 PROCEDURE — 250N000013 HC RX MED GY IP 250 OP 250 PS 637: Performed by: INTERNAL MEDICINE

## 2022-03-05 PROCEDURE — 120N000001 HC R&B MED SURG/OB

## 2022-03-05 PROCEDURE — 99233 SBSQ HOSP IP/OBS HIGH 50: CPT | Performed by: HOSPITALIST

## 2022-03-05 PROCEDURE — 250N000013 HC RX MED GY IP 250 OP 250 PS 637: Performed by: HOSPITALIST

## 2022-03-05 PROCEDURE — 250N000011 HC RX IP 250 OP 636: Performed by: HOSPITALIST

## 2022-03-05 PROCEDURE — 250N000012 HC RX MED GY IP 250 OP 636 PS 637: Performed by: INTERNAL MEDICINE

## 2022-03-05 PROCEDURE — 250N000011 HC RX IP 250 OP 636: Performed by: INTERNAL MEDICINE

## 2022-03-05 RX ADMIN — ATOVAQUONE 750 MG: 750 SUSPENSION ORAL at 18:41

## 2022-03-05 RX ADMIN — OXYCODONE HYDROCHLORIDE 5 MG: 5 TABLET ORAL at 23:29

## 2022-03-05 RX ADMIN — PREDNISONE 40 MG: 20 TABLET ORAL at 20:19

## 2022-03-05 RX ADMIN — FAMOTIDINE 20 MG: 20 TABLET ORAL at 09:13

## 2022-03-05 RX ADMIN — PREDNISONE 40 MG: 20 TABLET ORAL at 09:14

## 2022-03-05 RX ADMIN — THERA TABS 1 TABLET: TAB at 20:20

## 2022-03-05 RX ADMIN — AMPICILLIN SODIUM AND SULBACTAM SODIUM 3 G: 2; 1 INJECTION, POWDER, FOR SOLUTION INTRAMUSCULAR; INTRAVENOUS at 00:12

## 2022-03-05 RX ADMIN — AMLODIPINE BESYLATE 5 MG: 5 TABLET ORAL at 09:13

## 2022-03-05 RX ADMIN — AMPICILLIN SODIUM AND SULBACTAM SODIUM 3 G: 2; 1 INJECTION, POWDER, FOR SOLUTION INTRAMUSCULAR; INTRAVENOUS at 18:40

## 2022-03-05 RX ADMIN — FUROSEMIDE 40 MG: 10 INJECTION, SOLUTION INTRAVENOUS at 14:22

## 2022-03-05 RX ADMIN — ROSUVASTATIN CALCIUM 5 MG: 5 TABLET, FILM COATED ORAL at 22:01

## 2022-03-05 RX ADMIN — LATANOPROST 1 DROP: 50 SOLUTION/ DROPS OPHTHALMIC at 20:22

## 2022-03-05 RX ADMIN — CALCIUM CARBONATE 600 MG (1,500 MG)-VITAMIN D3 400 UNIT TABLET 1 TABLET: at 09:12

## 2022-03-05 RX ADMIN — INSULIN ASPART 4 UNITS: 100 INJECTION, SOLUTION INTRAVENOUS; SUBCUTANEOUS at 22:00

## 2022-03-05 RX ADMIN — CALCIUM POLYCARBOPHIL 625 MG: 625 TABLET, FILM COATED ORAL at 09:13

## 2022-03-05 RX ADMIN — OXYCODONE HYDROCHLORIDE 5 MG: 5 TABLET ORAL at 09:14

## 2022-03-05 RX ADMIN — ATOVAQUONE 750 MG: 750 SUSPENSION ORAL at 09:19

## 2022-03-05 RX ADMIN — INSULIN ASPART 2 UNITS: 100 INJECTION, SOLUTION INTRAVENOUS; SUBCUTANEOUS at 13:40

## 2022-03-05 RX ADMIN — GLIPIZIDE 5 MG: 5 TABLET ORAL at 09:14

## 2022-03-05 RX ADMIN — AMPICILLIN SODIUM AND SULBACTAM SODIUM 3 G: 2; 1 INJECTION, POWDER, FOR SOLUTION INTRAMUSCULAR; INTRAVENOUS at 06:07

## 2022-03-05 RX ADMIN — AMPICILLIN SODIUM AND SULBACTAM SODIUM 3 G: 2; 1 INJECTION, POWDER, FOR SOLUTION INTRAMUSCULAR; INTRAVENOUS at 12:13

## 2022-03-05 RX ADMIN — OXYCODONE HYDROCHLORIDE 5 MG: 5 TABLET ORAL at 15:44

## 2022-03-05 RX ADMIN — POLYETHYLENE GLYCOL 3350 8.5 G: 17 POWDER, FOR SOLUTION ORAL at 20:19

## 2022-03-05 RX ADMIN — FUROSEMIDE 40 MG: 10 INJECTION, SOLUTION INTRAVENOUS at 06:07

## 2022-03-05 RX ADMIN — FLUTICASONE FUROATE AND VILANTEROL TRIFENATATE 1 PUFF: 200; 25 POWDER RESPIRATORY (INHALATION) at 09:19

## 2022-03-05 RX ADMIN — INSULIN ASPART 1 UNITS: 100 INJECTION, SOLUTION INTRAVENOUS; SUBCUTANEOUS at 09:14

## 2022-03-05 RX ADMIN — INSULIN ASPART 2 UNITS: 100 INJECTION, SOLUTION INTRAVENOUS; SUBCUTANEOUS at 18:36

## 2022-03-05 RX ADMIN — FAMOTIDINE 20 MG: 20 TABLET ORAL at 20:20

## 2022-03-05 RX ADMIN — FUROSEMIDE 40 MG: 10 INJECTION, SOLUTION INTRAVENOUS at 22:01

## 2022-03-05 RX ADMIN — UMECLIDINIUM 1 PUFF: 62.5 AEROSOL, POWDER ORAL at 09:19

## 2022-03-05 RX ADMIN — CALCIUM CARBONATE 600 MG (1,500 MG)-VITAMIN D3 400 UNIT TABLET 1 TABLET: at 20:20

## 2022-03-05 RX ADMIN — ASPIRIN 81 MG: 81 TABLET, COATED ORAL at 09:13

## 2022-03-05 ASSESSMENT — ACTIVITIES OF DAILY LIVING (ADL)
ADLS_ACUITY_SCORE: 10

## 2022-03-05 NOTE — PLAN OF CARE
Summary: SOB/weakness, bacterial cavitary infection  DATE & TIME: 3/4/2022 9275-8532  Cognitive Concerns/ Orientation : Alert and oriented x 4, forgetful   BEHAVIOR & AGGRESSION TOOL COLOR: Green   ABNL VS/O2: VSS on 3 lpm oxymizer with sats at 95%-96%   MOBILITY: SBA with walker and GB  PAIN MANAGEMENT: denied pain this evening   DIET: Mod CHO  BOWEL/BLADDER: Continent  ABNL LAB/BG: AFB culture came back positive remains in airborne isolation; PJP Pneumonia positive  DRAIN/DEVICES: PIV saline locked, intermittent antibiotics   SKIN: Blanchable redness on sacrum/coccyx - open to air, encouraged to T/R. Many scattered bruises. Encouraged to float heels on pillows.   TESTS/PROCEDURES: None  D/C DAY/GOALS/PLACE: Pending, Plan was to discharge to [place] Thursday at 2:30 pm; however ID has to see the pt first due to +bronch culture (+ for pneumocystis). SW is following. Pt will need Augmentin for 4 weeks. Pulmonology consult was completed Thursday, and pt will need repeat of CT of chest in 4 weeks and follow up with pulmonology out pt.   OTHER IMPORTANT INFO: AIRBORNE ISOLATION RE-INITIATED . SW is following. PT is following. started on mepron for 2 weeks per ID and on new steroid taper per pulmonology

## 2022-03-05 NOTE — PLAN OF CARE
DATE & TIME: 3/4/22, 3070 - 2626    Cognitive Concerns/ Orientation : A&O x 4   BEHAVIOR & AGGRESSION TOOL COLOR: Green  CIWA SCORE: NA   ABNL VS/O2: VSS on O2 3 LPM.  MOBILITY: SBA with GB and walker  PAIN MANAGMENT: Denied  DIET: Mod CHO  BOWEL/BLADDER: Continent  ABNL LAB/BG:   DRAIN/DEVICES: PIV SL  TELEMETRY RHYTHM: NA  SKIN: Scattered bruises. Blanchable coccyx  TESTS/PROCEDURES: NA  D/C DATE: Pending  OTHER IMPORTANT INFO: SW, ID and Pulmonology following. Airborne isolation maintained

## 2022-03-05 NOTE — PROGRESS NOTES
"PULMONOLOGY PROGRESS NOTE    Date of Admission: 2/21/2022    CC/Reason for Hospital visit: COPD, pulmonary nodules  SUBJECTIVE      Events reviewed since last seen. Sputum AFB culture positive, patient back in isolation. Stable night. No new respiratory problems. Breathing appears comfortable. D/C cancelled.    ROS: A Problem Pertinent review of systems was negative except for items noted in HPI.  Past Medical, Family, and Social/Substance History has been reviewed: No interval changes.    OBJECTIVE   Vital signs:  Temp: 98.2  F (36.8  C) Temp src: Oral BP: 129/63 Pulse: 99   Resp: 16 SpO2: 98 % O2 Device: Nasal cannula Oxygen Delivery: 5 LPM Height: 161.5 cm (5' 3.6\") Weight: 60.9 kg (134 lb 4.2 oz)  Estimated body mass index is 23.34 kg/m  as calculated from the following:    Height as of this encounter: 1.615 m (5' 3.6\").    Weight as of this encounter: 60.9 kg (134 lb 4.2 oz).        I/O last 3 completed shifts:  In: 100 [I.V.:100]  Out: -       CONSTITUTIONAL/GENERAL APPEARANCE: Alert female. No Apparent Distress.  PSYCHIATRIC: Pleasant and appropriate mood and affect. Oriented x 3.  EARS, NOSE,THROAT,MOUTH: External ears and nose overall normal. Normal oral mucosa.   NECK: Neck appearance normal. No neck masses and the thyroid is not enlarged.   RESPIRATORY: Non-labored effort. Decreased BS anteriorly.  CARDIOVASCULAR: S1, S2, regular rate and rhythm.      LABORATORY ASSESSMENT    Arterial Blood GasNo lab results found in last 7 days.  CBC  Recent Labs   Lab 03/04/22  0850 03/03/22  1002 02/28/22  1202   WBC 5.5 5.8 6.3   RBC 4.48 4.32 4.16   HGB 11.4* 10.8* 10.8*   HCT 36.4 35.9 35.0   MCV 81 83 84   MCH 25.4* 25.0* 26.0*   MCHC 31.3* 30.1* 30.9*   RDW 14.0 13.8 13.9    229 166     BMP  Recent Labs   Lab 03/05/22  0725 03/05/22  0208 03/04/22  2131 03/04/22  1724 03/04/22  1226 03/04/22  0850 03/03/22  1234 03/03/22  1002 03/02/22  1310 03/02/22  0725 03/01/22  1311 03/01/22  1046   NA  --   --   --   " --   --  140  --  136  --  138  --  137   POTASSIUM  --   --   --   --   --  3.4  --  3.2*  --  3.6  --  3.4   CHLORIDE  --   --   --   --   --  104  --  101  --  103  --  103   NARCISA  --   --   --   --   --  9.0  --  8.8  --  8.6  --  8.7   CO2  --   --   --   --   --  31  --  31  --  29  --  29   BUN  --   --   --   --   --  17  --  14  --  13  --  13   CR  --   --   --   --   --  0.71  --  0.66  --  0.56  --  0.54   * 261* 294* 195*   < > 203*   < > 260*   < > 128*   < > 180*    < > = values in this interval not displayed.     INRNo lab results found in last 7 days.   BNPNo lab results found in last 7 days.  VENOUS BLOOD GASESNo lab results found in last 7 days.       Additional labs and/or comments:  Quantiferon gold negative.  Fungal antigen for histo, cocci, blasto and Aspergillus all negative  PJP PCR positive  Sputum AFB stain negative but culture positive, ID pending.      IMAGING      CT Chest 3/2 -  IMPRESSION:   1.  Right upper lobe nodules have not changed in size, but with  decreased cavitation compared to 2/21/2022.  2.  New mild diffuse groundglass opacity with interstitial septal  thickening and trace pleural effusions, suspicious for pulmonary  edema. No new nodules.    CT Chest 2/21 -  IMPRESSION:  1.  There are two new cavitary nodules in the right upper lobe. This  is concerning for aggressive, atypical infection, possibly fungal or  tubercular.  2.  No evidence of pulmonary embolism.    PFT & OTHER TESTING       ASSESSMENT / PLAN      Pulmonary diagnoses:  Abnl CT/CXR R91.8  COPD J44.9  Hypoxemia R09.02  SOB R06.02    Additional COVID-19 diagnoses:  Concern of possible exposure to COVID-19, Now RULED OUT Z03.818       ASSESSMENT: 89-year-old female former smoker with a history of COPD is admitted for shortness of breath and fatigue.  Patient was recently hospitalized at [facility] 1/25-1/26/2022 for a COPD exacerbation.  She was seen in pulmonary consultation by Dr. Christensen who  recommended continuing her usual triple inhaler therapy and a 5-day course of prednisone.  On readmission CT scan of the chest showed emphysema and 2 new cavitary nodules in the right upper lobe, the largest measuring 2.6 cm.  Patient was seen by ID; QuantiFERON gold and fungal serologies all negative.  Patient was started on Unasyn for possible cavitary bacterial infection.  Hospital course complicated by worsening hypoxemia and follow-up CT scan of the chest 3/2/2022 showed the nodules to be unchanged in size but with decreased cavitation, and there was new mild diffuse groundglass opacity with interstitial septal thickening and trace pleural effusions consistent with pulmonary edema.  Patient was treated with Lasix with clinical improvement.  PJP PCR noted to be positive, patient started on Mepron and Prednisone per ID.  Sputum AFB stain negative but culture subsequently positive, PCR pending. Would continue present respiratory rx for now.  Respiratory status remains stable on low-flow oxygen.    PLAN:  Adjust oxygen, keep SaO2 > 90%  Bronchodilators - Breo 200/25, Incruse  Antibiotics - Unasyn followed by Augmentin x 4 weeks for possible cavitary bacterial infection  Mepron & Prednisone per ID.  Diuresis - Lasix as ordered.  Await AFB PCR.  Agree with plans for follow-up CT Chest in 4 weeks.  Recommend outpatient Pulmonary follow-up at [facility] for outpatient PFTs and adjustment of bronchodilator rx: [phone] to schedule appointment.    Case discussed with Dr. Sainz yesterday.    No further suggestions at this time. Dr. Wilhelm will see pt in follow-up next week. Please call if needed over the WE.      Toni Randolph M.D.    [facility]  [phone]   (pager)  [phone]   (office)

## 2022-03-05 NOTE — PROGRESS NOTES
"[facility]    Infectious Disease Progress Note    Date of Service (when I saw the patient): 03/05/2022     Assessment & Plan   Celeste Chacko is a 89 year old female who was admitted on 2/21/2022.     Impression:  1. 89 y.o with COPD  2. Diabetes   3. HTN, HLd  4. Admitted with shortness of breath.   5. CT scan \"  There are two new cavitary nodules in the right upper lobe. This  is concerning for aggressive, atypical infection, possibly fungal or  Tubercular.\"      Recommendations:   AFB stain negative, AFB culture positive, this early culture positive the chance of this being MTB is low none the less, back in isolation. Will get PCR MTB RO in next couple days, whether tx of non TB mycobacteria depends on organism     Fungal antibody negative.      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   Patient is sulfa allergic will do mepron for 3 weeks. Steroids discussed with pulm plan   40 mg orally twice daily for five days, followed by  40 mg orally once daily for five days, followed by  20 mg orally once daily for 11 days   Would get pharmacy/care coordinators to look at ins coverage for the very expensive mepron  Other issue with PJP is what underlying issue has caused her to get this and does she thus need long term proph ?      Continue Unasyn for now but likely discontinue soon    Plan:   Stays in iso till more information on the AFB organism    Add MTB PCR direct sample, will get from cx soon also  Patient to stay in the hospital as more information is obtained           Andrade Macias MD    Interval History   Tolerating antibiotics ok   No new rashes or issues with antibiotics   Labs reviewed   Afebrile   Tiered today, did not sleep well last  Night     Physical Exam   Temp: 98.2  F (36.8  C) Temp src: Oral BP: 129/63 Pulse: 99   Resp: 16 SpO2: 98 % O2 Device: Nasal cannula Oxygen Delivery: 5 LPM  Vitals:    03/02/22 0524 03/05/22 0431 03/05/22 0629   Weight: 61.1 kg (134 lb 12.8 " oz) 58.4 kg (128 lb 12 oz) 60.9 kg (134 lb 4.2 oz)     Vital Signs with Ranges  Temp:  [97.3  F (36.3  C)-98.4  F (36.9  C)] 98.2  F (36.8  C)  Pulse:  [86-99] 99  Resp:  [15-18] 16  BP: (124-142)/(54-68) 129/63  SpO2:  [93 %-98 %] 98 %    Constitutional: Awake, alert, cooperative, no apparent distress  Lungs: Clear to auscultation bilaterally, no crackles or wheezing  Cardiovascular: Regular rate and rhythm, normal S1 and S2, and no murmur noted  Abdomen: Normal bowel sounds, soft, non-distended, non-tender  Skin: No rashes, no cyanosis, no edema  Other:    Medications       amLODIPine  5 mg Oral Daily     ampicillin-sulbactam (UNASYN) IV  3 g Intravenous Q6H     aspirin  81 mg Oral Daily     atovaquone  750 mg Oral BID w/meals     calcium carbonate 600 mg-vitamin D 400 units  1 tablet Oral BID     calcium polycarbophil  625 mg Oral Daily     famotidine  20 mg Oral BID     fluticasone-vilanterol  1 puff Inhalation Daily     furosemide  40 mg Intravenous Q8H     glipiZIDE  5 mg Oral QAM     insulin aspart  1-7 Units Subcutaneous TID AC     insulin aspart  1-5 Units Subcutaneous At Bedtime     insulin aspart   Subcutaneous TID AC     latanoprost  1 drop Left Eye QPM     multivitamin, therapeutic  1 tablet Oral QPM     polyethylene glycol  8.5 g Oral QPM     predniSONE  40 mg Oral BID    Followed by     [START ON 3/9/2022] predniSONE  40 mg Oral Daily    Followed by     [START ON 3/14/2022] predniSONE  20 mg Oral Daily     rosuvastatin  5 mg Oral At Bedtime     sodium chloride (PF)  3 mL Intracatheter Q8H     umeclidinium  1 puff Inhalation Daily       Data   All microbiology laboratory data reviewed.  Recent Labs   Lab Test 03/04/22  0850 03/03/22  1002 02/28/22  1202   WBC 5.5 5.8 6.3   HGB 11.4* 10.8* 10.8*   HCT 36.4 35.9 35.0   MCV 81 83 84    229 166     Recent Labs   Lab Test 03/04/22  0850 03/03/22  1002 03/02/22  0725   CR 0.71 0.66 0.56     No lab results found.  Recent Labs   Lab Test 12/04/15  1114    CULT >100,000 colonies/mL Escherichia coli*

## 2022-03-05 NOTE — PROGRESS NOTES
[facility]    Medicine Progress Note - Hospitalist Service    Date of Admission:  2/21/2022    Assessment & Plan          Cumulative Summary: Celeste Chacko is a very pleasant 89 year old female with medical history significant for COPD, chronic back pain, DM2, HTN, HL was brought to the ER for evaluation of exertional dyspnea and fatigue and is being registered under observation on 2/21/2022 for further management.        Assessment & Plan     Possible Lung abscess- cavitary nodules in RUL  PJP infection  Pulmonary edema  Exertional dyspnea and Fatigue  Recent admission for COPD exacerbation treated with a steroid but ongoing dyspnea mostly with exertion but without fever, cough, weight loss or night sweats.  No wheezing.  No chest pain.   -- CT PE study was done which showed No PE but 2 new cavitary nodules in the right upper lobe. This is concerning for aggressive, atypical infection, possibly fungal or tubercular.  Patient has been on steroid for almost a month although currently off of it. proBNP negative.  WBC normal.  Pro-Edilberto negative  -- ID consult appreciated  -- TB rule out:   Quantiferon gold negative; AFB x2 negative. 1 more test on without accurate specimen. Patient initially taken off isolation given all other negative factors per ID,  however AFB culture came back positive on 3/4/22 and hence back in isolation for TB pending speciation.  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  -- fungal antigen for histo, cocci, blasto and Aspergillus all negative   -- Blood cultures negative to date  --Cultures positive for P CHRIS.  Patient is allergic to sulfa hence started on Mepron for 2 weeks per ID recommendations.    Plan  -- continue on Unasyn per ID for possible bacterial cavitary infection.   -- if her tuberculosis work-up is negative then patient will probably require 4 weeks of oral Augmentin for cavitary bacterial lesion.  -- Will also require pulmonary follow up on  discharge  -- CT scan obtained 3/2/22 due to worsening hypoxia reveals pulmonary edema and improvement in cavitation.   -- Diuereses with IV lasix initially with good response, now switched to p.o. Lasix.  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  Await further input from infectious disease.  --Continue Mepron for P CHRIS  -- Also started on a steroid taper per pulm.     Right eye conjunctivitis :  -- started Ofloxacin 0.3% ophthalmic solution      COPD  Not on home O2.  PTA is on fluticasone/salmeterol, DuoNeb, Spiriva inhalers.     -- Continue with scheduled DuoNeb, steroid neb and PRN albuterol as well.    -- Not on home O2, needs exercise oximetry prior to discharge  -- Her current presentation does not seem consistent with COPD exacerbation, likely related to pneumonia/lung abscess as above, treatment as outlined.   -- Patient might need Oxygen for some time considering acute infection on top of underlying COPD      Type II MI:   Minimally abnormal troponin, follow trend.  Unlikely ACS.  --Continue aspirin  --2D echo showed normal LVEF and no wall motion abnormalities.  --No further work-up planned, recommend outpatient stress test once acute issues resolve.      DM2: HbA1c 9.9.  Takes glipizide PTA.  -- PTA glipizide resumed   -- Continue with Premeal NovoLog and ISS.  -- BS in 100s     Essential hypertension  Hyperlipidemia  --Continue PTA Norvasc 5 mg p.o. daily we will monitor blood pressure if it needs to be adjusted further         Diet: Moderate Consistent Carb (60 g CHO per Meal) Diet  Diet    DVT Prophylaxis: Pneumatic Compression Devices  Botello Catheter: Not present  Central Lines: None  Cardiac Monitoring: None  Code Status: No CPR- Do NOT Intubate      Disposition Plan   Expected Discharge: 03/07/2022     Anticipated discharge location:  Awaiting care coordination huddle  Delays: Oxygen Needs - Arrange Home O2           The patient's care was discussed with the Patient.    Cailin Loera MD  Hospitalist Service  [facility]  Securely message with the [employer] Web Console (learn more here)  Text page via [employer] Paging/Directory         Clinically Significant Risk Factors Present on Admission                    ______________________________________________________________________    Interval History   Patient reports feeling really good today.  Denies any shortness of breath at rest or on exertion.  No other complaints.    Data reviewed today: I reviewed all medications, new labs and imaging results over the last 24 hours. I personally reviewed no images or EKG's today.    Physical Exam   Vital Signs: Temp: 98.2  F (36.8  C) Temp src: Oral BP: 129/63 Pulse: 99   Resp: 16 SpO2: 98 % O2 Device: Nasal cannula Oxygen Delivery: 5 LPM  Weight: 134 lbs 4.16 oz  General Appearance: Well appearing for stated age.  Respiratory: CTAB, no rales or ronchi  Cardiovascular: S1, S2 normal, no murmurs  GI: non-tender on palpation, BS present      Data   Recent Labs   Lab 03/05/22  1158 03/05/22  0725 03/05/22  0208 03/04/22  1226 03/04/22  0850 03/03/22  1234 03/03/22  1002 03/02/22  1310 03/02/22  0725 02/28/22  1242 02/28/22  1202   WBC  --   --   --   --  5.5  --  5.8  --   --   --  6.3   HGB  --   --   --   --  11.4*  --  10.8*  --   --   --  10.8*   MCV  --   --   --   --  81  --  83  --   --   --  84   PLT  --   --   --   --  244  --  229  --   --   --  166   NA  --   --   --   --  140  --  136  --  138   < > 136   POTASSIUM  --   --   --   --  3.4  --  3.2*  --  3.6   < > 3.7   CHLORIDE  --   --   --   --  104  --  101  --  103   < > 104   CO2  --   --   --   --  31  --  31  --  29   < > 27   BUN  --   --   --   --  17  --  14  --  13   < > 16   CR  --   --   --   --  0.71  --  0.66  --  0.56   < > 0.62   ANIONGAP  --   --   --   --  5  --  4  --  6   < > 5   NARCISA  --   --   --   --  9.0  --  8.8  --  8.6   < > 8.2*    * 256* 261*   < > 203*   < > 260*   < > 128*   < > 213*    < > = values in this interval not displayed.     No results found for this or any previous visit (from the past 24 hour(s)).

## 2022-03-06 LAB
ANION GAP SERPL CALCULATED.3IONS-SCNC: 9 MMOL/L (ref 3–14)
BUN SERPL-MCNC: 30 MG/DL (ref 7–30)
CALCIUM SERPL-MCNC: 8.7 MG/DL (ref 8.5–10.1)
CHLORIDE BLD-SCNC: 97 MMOL/L (ref 94–109)
CO2 SERPL-SCNC: 29 MMOL/L (ref 20–32)
CREAT SERPL-MCNC: 0.65 MG/DL (ref 0.52–1.04)
CRP SERPL-MCNC: 9.9 MG/L (ref 0–8)
GFR SERPL CREATININE-BSD FRML MDRD: 83 ML/MIN/1.73M2
GLUCOSE BLD-MCNC: 425 MG/DL (ref 70–99)
GLUCOSE BLDC GLUCOMTR-MCNC: 206 MG/DL (ref 70–99)
GLUCOSE BLDC GLUCOMTR-MCNC: 301 MG/DL (ref 70–99)
GLUCOSE BLDC GLUCOMTR-MCNC: 311 MG/DL (ref 70–99)
GLUCOSE BLDC GLUCOMTR-MCNC: 321 MG/DL (ref 70–99)
GLUCOSE BLDC GLUCOMTR-MCNC: 369 MG/DL (ref 70–99)
POTASSIUM BLD-SCNC: 3.1 MMOL/L (ref 3.4–5.3)
POTASSIUM BLD-SCNC: 3.6 MMOL/L (ref 3.4–5.3)
SODIUM SERPL-SCNC: 135 MMOL/L (ref 133–144)

## 2022-03-06 PROCEDURE — 120N000001 HC R&B MED SURG/OB

## 2022-03-06 PROCEDURE — 250N000012 HC RX MED GY IP 250 OP 636 PS 637: Performed by: INTERNAL MEDICINE

## 2022-03-06 PROCEDURE — 250N000012 HC RX MED GY IP 250 OP 636 PS 637: Performed by: HOSPITALIST

## 2022-03-06 PROCEDURE — 80048 BASIC METABOLIC PNL TOTAL CA: CPT | Performed by: HOSPITALIST

## 2022-03-06 PROCEDURE — 250N000013 HC RX MED GY IP 250 OP 250 PS 637: Performed by: INTERNAL MEDICINE

## 2022-03-06 PROCEDURE — 99233 SBSQ HOSP IP/OBS HIGH 50: CPT | Performed by: HOSPITALIST

## 2022-03-06 PROCEDURE — 250N000011 HC RX IP 250 OP 636: Performed by: INTERNAL MEDICINE

## 2022-03-06 PROCEDURE — 86140 C-REACTIVE PROTEIN: CPT | Performed by: HOSPITALIST

## 2022-03-06 PROCEDURE — 250N000011 HC RX IP 250 OP 636: Performed by: HOSPITALIST

## 2022-03-06 PROCEDURE — 84132 ASSAY OF SERUM POTASSIUM: CPT | Performed by: HOSPITALIST

## 2022-03-06 PROCEDURE — 250N000013 HC RX MED GY IP 250 OP 250 PS 637: Performed by: HOSPITALIST

## 2022-03-06 PROCEDURE — 36415 COLL VENOUS BLD VENIPUNCTURE: CPT | Performed by: HOSPITALIST

## 2022-03-06 RX ORDER — AMLODIPINE BESYLATE 5 MG/1
5 TABLET ORAL DAILY
Status: ON HOLD | COMMUNITY
End: 2022-03-09

## 2022-03-06 RX ORDER — FUROSEMIDE 40 MG
40 TABLET ORAL DAILY
Status: DISCONTINUED | OUTPATIENT
Start: 2022-03-07 | End: 2022-03-07

## 2022-03-06 RX ORDER — POTASSIUM CHLORIDE 1500 MG/1
20 TABLET, EXTENDED RELEASE ORAL ONCE
Status: COMPLETED | OUTPATIENT
Start: 2022-03-06 | End: 2022-03-06

## 2022-03-06 RX ADMIN — LATANOPROST 1 DROP: 50 SOLUTION/ DROPS OPHTHALMIC at 21:57

## 2022-03-06 RX ADMIN — ROSUVASTATIN CALCIUM 5 MG: 5 TABLET, FILM COATED ORAL at 21:42

## 2022-03-06 RX ADMIN — CALCIUM POLYCARBOPHIL 625 MG: 625 TABLET, FILM COATED ORAL at 09:25

## 2022-03-06 RX ADMIN — AMPICILLIN SODIUM AND SULBACTAM SODIUM 3 G: 2; 1 INJECTION, POWDER, FOR SOLUTION INTRAMUSCULAR; INTRAVENOUS at 06:03

## 2022-03-06 RX ADMIN — FAMOTIDINE 20 MG: 20 TABLET ORAL at 09:26

## 2022-03-06 RX ADMIN — OXYCODONE HYDROCHLORIDE 5 MG: 5 TABLET ORAL at 21:42

## 2022-03-06 RX ADMIN — AMPICILLIN SODIUM AND SULBACTAM SODIUM 3 G: 2; 1 INJECTION, POWDER, FOR SOLUTION INTRAMUSCULAR; INTRAVENOUS at 18:01

## 2022-03-06 RX ADMIN — FAMOTIDINE 20 MG: 20 TABLET ORAL at 21:42

## 2022-03-06 RX ADMIN — INSULIN ASPART 3 UNITS: 100 INJECTION, SOLUTION INTRAVENOUS; SUBCUTANEOUS at 21:48

## 2022-03-06 RX ADMIN — OXYCODONE HYDROCHLORIDE 5 MG: 5 TABLET ORAL at 11:48

## 2022-03-06 RX ADMIN — ASPIRIN 81 MG: 81 TABLET, COATED ORAL at 09:25

## 2022-03-06 RX ADMIN — UMECLIDINIUM 1 PUFF: 62.5 AEROSOL, POWDER ORAL at 09:28

## 2022-03-06 RX ADMIN — AMLODIPINE BESYLATE 5 MG: 5 TABLET ORAL at 09:25

## 2022-03-06 RX ADMIN — INSULIN ASPART 6 UNITS: 100 INJECTION, SOLUTION INTRAVENOUS; SUBCUTANEOUS at 14:06

## 2022-03-06 RX ADMIN — THERA TABS 1 TABLET: TAB at 21:42

## 2022-03-06 RX ADMIN — PREDNISONE 40 MG: 20 TABLET ORAL at 21:42

## 2022-03-06 RX ADMIN — AMPICILLIN SODIUM AND SULBACTAM SODIUM 3 G: 2; 1 INJECTION, POWDER, FOR SOLUTION INTRAMUSCULAR; INTRAVENOUS at 00:07

## 2022-03-06 RX ADMIN — FLUTICASONE FUROATE AND VILANTEROL TRIFENATATE 1 PUFF: 200; 25 POWDER RESPIRATORY (INHALATION) at 09:28

## 2022-03-06 RX ADMIN — ATOVAQUONE 750 MG: 750 SUSPENSION ORAL at 09:33

## 2022-03-06 RX ADMIN — POLYETHYLENE GLYCOL 3350 8.5 G: 17 POWDER, FOR SOLUTION ORAL at 21:40

## 2022-03-06 RX ADMIN — AMPICILLIN SODIUM AND SULBACTAM SODIUM 3 G: 2; 1 INJECTION, POWDER, FOR SOLUTION INTRAMUSCULAR; INTRAVENOUS at 11:48

## 2022-03-06 RX ADMIN — PREDNISONE 40 MG: 20 TABLET ORAL at 09:25

## 2022-03-06 RX ADMIN — INSULIN GLARGINE 10 UNITS: 100 INJECTION, SOLUTION SUBCUTANEOUS at 11:48

## 2022-03-06 RX ADMIN — INSULIN ASPART 5 UNITS: 100 INJECTION, SOLUTION INTRAVENOUS; SUBCUTANEOUS at 09:24

## 2022-03-06 RX ADMIN — POTASSIUM CHLORIDE 20 MEQ: 1500 TABLET, EXTENDED RELEASE ORAL at 15:10

## 2022-03-06 RX ADMIN — INSULIN ASPART 3 UNITS: 100 INJECTION, SOLUTION INTRAVENOUS; SUBCUTANEOUS at 18:00

## 2022-03-06 RX ADMIN — ATOVAQUONE 750 MG: 750 SUSPENSION ORAL at 18:13

## 2022-03-06 RX ADMIN — FUROSEMIDE 40 MG: 10 INJECTION, SOLUTION INTRAVENOUS at 06:02

## 2022-03-06 RX ADMIN — CALCIUM CARBONATE 600 MG (1,500 MG)-VITAMIN D3 400 UNIT TABLET 1 TABLET: at 21:42

## 2022-03-06 RX ADMIN — CALCIUM CARBONATE 600 MG (1,500 MG)-VITAMIN D3 400 UNIT TABLET 1 TABLET: at 09:25

## 2022-03-06 RX ADMIN — FUROSEMIDE 40 MG: 10 INJECTION, SOLUTION INTRAVENOUS at 14:05

## 2022-03-06 ASSESSMENT — ACTIVITIES OF DAILY LIVING (ADL)
ADLS_ACUITY_SCORE: 9
ADLS_ACUITY_SCORE: 10
ADLS_ACUITY_SCORE: 10
ADLS_ACUITY_SCORE: 8
ADLS_ACUITY_SCORE: 10
ADLS_ACUITY_SCORE: 9
ADLS_ACUITY_SCORE: 8
ADLS_ACUITY_SCORE: 10
ADLS_ACUITY_SCORE: 9
ADLS_ACUITY_SCORE: 10
ADLS_ACUITY_SCORE: 10
ADLS_ACUITY_SCORE: 9
ADLS_ACUITY_SCORE: 9
ADLS_ACUITY_SCORE: 10
ADLS_ACUITY_SCORE: 9
ADLS_ACUITY_SCORE: 9
ADLS_ACUITY_SCORE: 10
ADLS_ACUITY_SCORE: 9
ADLS_ACUITY_SCORE: 9
ADLS_ACUITY_SCORE: 10
ADLS_ACUITY_SCORE: 10
ADLS_ACUITY_SCORE: 9

## 2022-03-06 NOTE — PROGRESS NOTES
"[facility]    Infectious Disease Progress Note    Date of Service (when I saw the patient): 03/06/2022     Assessment & Plan   Celeste Chacko is a 89 year old female who was admitted on 2/21/2022.     Impression:  1. 89 y.o with COPD  2. Diabetes   3. HTN, HLd  4. Admitted with shortness of breath.   5. CT scan \"  There are two new cavitary nodules in the right upper lobe. This  is concerning for aggressive, atypical infection, possibly fungal or  Tubercular.\"      Recommendations:   AFB stain negative, AFB culture positive, but is CELINE, discontinue iso, this may be relevant long term but not an acute issues and no tx     Fungal antibody negative.      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   Patient is sulfa allergic will do mepron for 3 weeks. Day 4/21 of acute tx Steroids discussed with pulm plan   40 mg orally twice daily for five days, followed by  40 mg orally once daily for five days, followed by  20 mg orally once daily for 11 days   Would get pharmacy/care coordinators to look at ins coverage for the very expensive mepron she will now likely need as outpt , possibly even longer proph  Other issue with PJP is what underlying issue has caused her to get this and does she thus need long term proph ? TBD later      Continue Unasyn for now but likely discontinue soon    Plan:   discontinue iso              Andrade Macias MD    Interval History   Tolerating antibiotics ok   No new rashes or issues with antibiotics   Labs reviewed   Afebrile   AFB probe ID is CELINE    Physical Exam   Temp: 98.2  F (36.8  C) Temp src: Oral BP: 133/63 Pulse: 98   Resp: 18 SpO2: 95 % O2 Device: Oxymizer cannula Oxygen Delivery: 3 LPM  Vitals:    03/05/22 0431 03/05/22 0629 03/06/22 0615   Weight: 58.4 kg (128 lb 12 oz) 60.9 kg (134 lb 4.2 oz) 57.8 kg (127 lb 8 oz)     Vital Signs with Ranges  Temp:  [97.5  F (36.4  C)-98.5  F (36.9  C)] 98.2  F (36.8  C)  Pulse:  [] 98  Resp:  [18] 18  BP: " (118-139)/(54-63) 133/63  SpO2:  [94 %-99 %] 95 %    Constitutional: Awake, alert, cooperative, no apparent distress  Lungs: Clear to auscultation bilaterally, no crackles or wheezing  Cardiovascular: Regular rate and rhythm, normal S1 and S2, and no murmur noted  Abdomen: Normal bowel sounds, soft, non-distended, non-tender  Skin: No rashes, no cyanosis, no edema  Other:    Medications       amLODIPine  5 mg Oral Daily     ampicillin-sulbactam (UNASYN) IV  3 g Intravenous Q6H     aspirin  81 mg Oral Daily     atovaquone  750 mg Oral BID w/meals     calcium carbonate 600 mg-vitamin D 400 units  1 tablet Oral BID     calcium polycarbophil  625 mg Oral Daily     famotidine  20 mg Oral BID     fluticasone-vilanterol  1 puff Inhalation Daily     furosemide  40 mg Intravenous Q8H     glipiZIDE  5 mg Oral QAM     insulin aspart  1-7 Units Subcutaneous TID AC     insulin aspart  1-5 Units Subcutaneous At Bedtime     insulin aspart   Subcutaneous TID AC     latanoprost  1 drop Left Eye QPM     multivitamin, therapeutic  1 tablet Oral QPM     polyethylene glycol  8.5 g Oral QPM     predniSONE  40 mg Oral BID    Followed by     [START ON 3/9/2022] predniSONE  40 mg Oral Daily    Followed by     [START ON 3/14/2022] predniSONE  20 mg Oral Daily     rosuvastatin  5 mg Oral At Bedtime     sodium chloride (PF)  3 mL Intracatheter Q8H     umeclidinium  1 puff Inhalation Daily       Data   All microbiology laboratory data reviewed.  Recent Labs   Lab Test 03/04/22  0850 03/03/22  1002 02/28/22  1202   WBC 5.5 5.8 6.3   HGB 11.4* 10.8* 10.8*   HCT 36.4 35.9 35.0   MCV 81 83 84    229 166     Recent Labs   Lab Test 03/04/22  0850 03/03/22  1002 03/02/22  0725   CR 0.71 0.66 0.56     No lab results found.  Recent Labs   Lab Test 12/04/15  1112   CULT >100,000 colonies/mL Escherichia coli*

## 2022-03-06 NOTE — PLAN OF CARE
Summary: SOB/weakness  DATE & TIME: 3/5/ 22, 7 a to 7 p                       Cognitive Concerns/ Orientation : A&O x 4   BEHAVIOR & AGGRESSION TOOL COLOR: Green  CIWA SCORE: NA    ABNL VS/O2: VSS on O2 3 LPM.  MOBILITY: SBA with GB and walker  PAIN MANAGMENT: Oxycodone 5 mg x 2 for back pain  DIET: Mod CHO, tolerating  BOWEL/BLADDER: Continent  ABNL LAB/BG: ,251,277  DRAIN/DEVICES: PIV SL  TELEMETRY RHYTHM: NA  SKIN: Scattered bruises. Blanchable coccyx  TESTS/PROCEDURES: NA  D/C DATE: Pending  OTHER IMPORTANT INFO: SW, ID and Pulmonology following. Airborne isolation maintained

## 2022-03-06 NOTE — PROGRESS NOTES
Summary: SOB/weakness  DATE & TIME: 3/5/ 22, 1900 - 2300                     Cognitive Concerns/ Orientation : A&O x 4   BEHAVIOR & AGGRESSION TOOL COLOR: Green  CIWA SCORE: NA    ABNL VS/O2: VSS on O2 3 LPM.  MOBILITY: SBA with GB and walker  PAIN MANAGMENT: pending med  DIET: Mod CHO, tolerating  BOWEL/BLADDER: Continent  ABNL LAB/BG:  ( given 4 U)  DRAIN/DEVICES: L PIV SL  TELEMETRY RHYTHM: NA  SKIN: Scattered bruises. Blanchable coccyx  TESTS/PROCEDURES: NA  D/C DATE: Pending  OTHER IMPORTANT INFO: SW, ID and Pulmonology following. Airborne isolation maintained

## 2022-03-06 NOTE — PLAN OF CARE
DATE & TIME: 3/5/22, 3030 - 6818    Cognitive Concerns/ Orientation : A&O x 4   BEHAVIOR & AGGRESSION TOOL COLOR: Green  CIWA SCORE: NA   ABNL VS/O2: VSS on O2 LPm via Oximizer   MOBILITY: SBA with GB and walker  PAIN MANAGMENT: Back pain managed with prn Oxycodone  DIET: Mod CHO  BOWEL/BLADDER: Continent  ABNL LAB/BG:   DRAIN/DEVICES: PIV SL  TELEMETRY RHYTHM: NA  SKIN: Scattered bruises  TESTS/PROCEDURES: NA  D/C DATE: 3/7/22  OTHER IMPORTANT INFO: Home O2 to be arranged per MD notes. ID following

## 2022-03-06 NOTE — PROGRESS NOTES
[facility]    Medicine Progress Note - Hospitalist Service    Date of Admission:  2/21/2022    Assessment & Plan          Cumulative Summary: Celeste Chacko is a very pleasant 89 year old female with medical history significant for COPD, chronic back pain, DM2, HTN, HL was brought to the ER for evaluation of exertional dyspnea and fatigue and is being registered under observation on 2/21/2022 for further management.        Assessment & Plan     Possible Lung abscess- cavitary nodules in RUL  PJP infection  Pulmonary edema  Exertional dyspnea and Fatigue  Recent admission for COPD exacerbation treated with a steroid but ongoing dyspnea mostly with exertion but without fever, cough, weight loss or night sweats.  No wheezing.  No chest pain.   -- CT PE study was done which showed No PE but 2 new cavitary nodules in the right upper lobe. This is concerning for aggressive, atypical infection, possibly fungal or tubercular.  Patient has been on steroid for almost a month although currently off of it.  -- ID consult appreciated  -- TB rule out:   Quantiferon gold negative; AFB x2 negative. 1 more test not completed due to inaccurate specimen. Patient initially taken off isolation given all other negative factors per ID,  however AFB culture came back positive on 3/4/22 and hence back in isolation for TB pending speciation.  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  -- fungal antigen for histo, cocci, blasto and Aspergillus all negative   -- Blood cultures negative to date  --Cultures positive for P CHRIS.  Patient is allergic to sulfa hence started on Mepron for 2 weeks per ID recommendations. There is question regarding pricing of this medication for discharge. ID addressing this.       Plan  -- continue on Unasyn per ID for possible bacterial cavitary infection.   -- if her tuberculosis work-up is negative then patient will probably require 4 weeks of oral Augmentin for cavitary bacterial  lesion.  -- Will also require pulmonary follow up on discharge  -- CT scan obtained 3/2/22 due to worsening hypoxia reveals pulmonary edema and improvement in cavitation.   -- Diuereses with IV lasix initially with good response, now switched to p.o. Lasix.  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  Await further input from infectious disease.  --Continue Mepron for P CHRIS  -- Also started on a steroid taper per pulm.   -- For now, awaiting speciation of TB culture to determine if treatment is needed.     Right eye conjunctivitis :  -- started Ofloxacin 0.3% ophthalmic solution      COPD  Not on home O2.  PTA is on fluticasone/salmeterol, DuoNeb, Spiriva inhalers.     -- Continue with scheduled DuoNeb, steroid neb and PRN albuterol as well.    -- Not on home O2, needs exercise oximetry prior to discharge  -- Her current presentation does not seem consistent with COPD exacerbation, likely related to pneumonia/lung abscess as above, treatment as outlined.   -- Patient might need Oxygen for some time considering acute infection on top of underlying COPD      Type II MI:   Minimally abnormal troponin, follow trend.  Unlikely ACS.  --Continue aspirin  --2D echo showed normal LVEF and no wall motion abnormalities.  --No further work-up planned, recommend outpatient stress test once acute issues resolve.      DM2: HbA1c 9.9.  Takes glipizide PTA.  -- BG  Now uncontrolled due to steroid use  -- PTA glipizide held  -- will start on lantus 20U for better BG management. Adjust as needed.   -- Continue with sliding scale insulin coverage    Essential hypertension  Hyperlipidemia  --Continue PTA Norvasc 5 mg p.o. daily        Diet: Moderate Consistent Carb (60 g CHO per Meal) Diet  Diet    DVT Prophylaxis: Pneumatic Compression Devices  Botello Catheter: Not present  Central Lines: None  Cardiac Monitoring: None  Code Status: No CPR- Do NOT  Intubate      Disposition Plan   Expected Discharge: 03/07/2022     Anticipated discharge location:  Awaiting care coordination huddle  Delays: Oxygen Needs - Arrange Home O2          The patient's care was discussed with the Patient.    Cailin Loera MD  Hospitalist Service  [facility]  Securely message with the [employer] Web Console (learn more here)  Text page via [employer] Paging/Directory         Clinically Significant Risk Factors Present on Admission                    ______________________________________________________________________    Interval History   Patient laying in bed. She notes feeling a lot better breathing wise.   Denies any episodes of dyspnea on exertion compared with the last few days  No pain.     Data reviewed today: I reviewed all medications, new labs and imaging results over the last 24 hours. I personally reviewed no images or EKG's today.    Physical Exam   Vital Signs: Temp: 98.2  F (36.8  C) Temp src: Oral BP: 133/63 Pulse: 98   Resp: 18 SpO2: 95 % O2 Device: Oxymizer cannula Oxygen Delivery: 3 LPM  Weight: 127 lbs 8 oz  General Appearance: Well appearing for stated age.  Respiratory: CTAB, no rales or ronchi  Cardiovascular: S1, S2 normal, no murmurs  GI: non-tender on palpation, BS present      Data   Recent Labs   Lab 03/06/22  0756 03/06/22  0128 03/05/22  2127 03/04/22  1226 03/04/22  0850 03/03/22  1234 03/03/22  1002 03/02/22  1310 03/02/22  0725 02/28/22  1242 02/28/22  1202   WBC  --   --   --   --  5.5  --  5.8  --   --   --  6.3   HGB  --   --   --   --  11.4*  --  10.8*  --   --   --  10.8*   MCV  --   --   --   --  81  --  83  --   --   --  84   PLT  --   --   --   --  244  --  229  --   --   --  166   NA  --   --   --   --  140  --  136  --  138   < > 136   POTASSIUM  --   --   --   --  3.4  --  3.2*  --  3.6   < > 3.7   CHLORIDE  --   --   --   --  104  --  101  --  103   < > 104   CO2  --   --   --   --  31  --  31  --  29   < > 27   BUN  --   --    --   --  17  --  14  --  13   < > 16   CR  --   --   --   --  0.71  --  0.66  --  0.56   < > 0.62   ANIONGAP  --   --   --   --  5  --  4  --  6   < > 5   NARCISA  --   --   --   --  9.0  --  8.8  --  8.6   < > 8.2*   * 369* 354*   < > 203*   < > 260*   < > 128*   < > 213*    < > = values in this interval not displayed.     No results found for this or any previous visit (from the past 24 hour(s)).

## 2022-03-07 ENCOUNTER — APPOINTMENT (OUTPATIENT)
Dept: PHYSICAL THERAPY | Facility: CLINIC | Age: 87
DRG: 177 | End: 2022-03-07
Payer: COMMERCIAL

## 2022-03-07 LAB
GLUCOSE BLDC GLUCOMTR-MCNC: 196 MG/DL (ref 70–99)
GLUCOSE BLDC GLUCOMTR-MCNC: 198 MG/DL (ref 70–99)
GLUCOSE BLDC GLUCOMTR-MCNC: 207 MG/DL (ref 70–99)
GLUCOSE BLDC GLUCOMTR-MCNC: 232 MG/DL (ref 70–99)
GLUCOSE BLDC GLUCOMTR-MCNC: 257 MG/DL (ref 70–99)
GLUCOSE BLDC GLUCOMTR-MCNC: 290 MG/DL (ref 70–99)

## 2022-03-07 PROCEDURE — 97110 THERAPEUTIC EXERCISES: CPT | Mod: GP | Performed by: PHYSICAL THERAPY ASSISTANT

## 2022-03-07 PROCEDURE — 250N000011 HC RX IP 250 OP 636: Performed by: INTERNAL MEDICINE

## 2022-03-07 PROCEDURE — 250N000013 HC RX MED GY IP 250 OP 250 PS 637: Performed by: HOSPITALIST

## 2022-03-07 PROCEDURE — 97116 GAIT TRAINING THERAPY: CPT | Mod: GP | Performed by: PHYSICAL THERAPY ASSISTANT

## 2022-03-07 PROCEDURE — 99233 SBSQ HOSP IP/OBS HIGH 50: CPT | Performed by: INTERNAL MEDICINE

## 2022-03-07 PROCEDURE — 120N000001 HC R&B MED SURG/OB

## 2022-03-07 PROCEDURE — 250N000012 HC RX MED GY IP 250 OP 636 PS 637: Performed by: INTERNAL MEDICINE

## 2022-03-07 PROCEDURE — 250N000013 HC RX MED GY IP 250 OP 250 PS 637: Performed by: INTERNAL MEDICINE

## 2022-03-07 RX ORDER — FAMOTIDINE 20 MG/1
20 TABLET, FILM COATED ORAL 2 TIMES DAILY
Status: DISCONTINUED | OUTPATIENT
Start: 2022-03-07 | End: 2022-03-07

## 2022-03-07 RX ORDER — AMLODIPINE BESYLATE 5 MG/1
5 TABLET ORAL DAILY
Status: DISCONTINUED | OUTPATIENT
Start: 2022-03-07 | End: 2022-03-07

## 2022-03-07 RX ADMIN — ASPIRIN 81 MG: 81 TABLET, COATED ORAL at 09:20

## 2022-03-07 RX ADMIN — AMPICILLIN SODIUM AND SULBACTAM SODIUM 3 G: 2; 1 INJECTION, POWDER, FOR SOLUTION INTRAMUSCULAR; INTRAVENOUS at 06:10

## 2022-03-07 RX ADMIN — ROSUVASTATIN CALCIUM 5 MG: 5 TABLET, FILM COATED ORAL at 21:25

## 2022-03-07 RX ADMIN — ATOVAQUONE 750 MG: 750 SUSPENSION ORAL at 19:11

## 2022-03-07 RX ADMIN — CALCIUM POLYCARBOPHIL 625 MG: 625 TABLET, FILM COATED ORAL at 09:21

## 2022-03-07 RX ADMIN — THERA TABS 1 TABLET: TAB at 21:25

## 2022-03-07 RX ADMIN — FAMOTIDINE 20 MG: 20 TABLET ORAL at 09:21

## 2022-03-07 RX ADMIN — UMECLIDINIUM 1 PUFF: 62.5 AEROSOL, POWDER ORAL at 09:28

## 2022-03-07 RX ADMIN — LATANOPROST 1 DROP: 50 SOLUTION/ DROPS OPHTHALMIC at 21:33

## 2022-03-07 RX ADMIN — FAMOTIDINE 20 MG: 20 TABLET ORAL at 21:25

## 2022-03-07 RX ADMIN — AMPICILLIN SODIUM AND SULBACTAM SODIUM 3 G: 2; 1 INJECTION, POWDER, FOR SOLUTION INTRAMUSCULAR; INTRAVENOUS at 13:17

## 2022-03-07 RX ADMIN — INSULIN ASPART 6 UNITS: 100 INJECTION, SOLUTION INTRAVENOUS; SUBCUTANEOUS at 19:07

## 2022-03-07 RX ADMIN — OXYCODONE HYDROCHLORIDE 5 MG: 5 TABLET ORAL at 17:16

## 2022-03-07 RX ADMIN — INSULIN ASPART 8 UNITS: 100 INJECTION, SOLUTION INTRAVENOUS; SUBCUTANEOUS at 14:10

## 2022-03-07 RX ADMIN — PREDNISONE 40 MG: 20 TABLET ORAL at 09:20

## 2022-03-07 RX ADMIN — ATOVAQUONE 750 MG: 750 SUSPENSION ORAL at 09:20

## 2022-03-07 RX ADMIN — INSULIN ASPART 2 UNITS: 100 INJECTION, SOLUTION INTRAVENOUS; SUBCUTANEOUS at 22:13

## 2022-03-07 RX ADMIN — INSULIN ASPART 3 UNITS: 100 INJECTION, SOLUTION INTRAVENOUS; SUBCUTANEOUS at 09:22

## 2022-03-07 RX ADMIN — POLYETHYLENE GLYCOL 3350 8.5 G: 17 POWDER, FOR SOLUTION ORAL at 21:25

## 2022-03-07 RX ADMIN — AMPICILLIN SODIUM AND SULBACTAM SODIUM 3 G: 2; 1 INJECTION, POWDER, FOR SOLUTION INTRAMUSCULAR; INTRAVENOUS at 19:11

## 2022-03-07 RX ADMIN — CALCIUM CARBONATE 600 MG (1,500 MG)-VITAMIN D3 400 UNIT TABLET 1 TABLET: at 21:25

## 2022-03-07 RX ADMIN — PREDNISONE 40 MG: 20 TABLET ORAL at 21:24

## 2022-03-07 RX ADMIN — CALCIUM CARBONATE 600 MG (1,500 MG)-VITAMIN D3 400 UNIT TABLET 1 TABLET: at 09:21

## 2022-03-07 RX ADMIN — OXYCODONE HYDROCHLORIDE 5 MG: 5 TABLET ORAL at 23:06

## 2022-03-07 RX ADMIN — AMLODIPINE BESYLATE 5 MG: 5 TABLET ORAL at 09:21

## 2022-03-07 RX ADMIN — FLUTICASONE FUROATE AND VILANTEROL TRIFENATATE 1 PUFF: 200; 25 POWDER RESPIRATORY (INHALATION) at 09:26

## 2022-03-07 RX ADMIN — OXYCODONE HYDROCHLORIDE 5 MG: 5 TABLET ORAL at 09:36

## 2022-03-07 RX ADMIN — AMPICILLIN SODIUM AND SULBACTAM SODIUM 3 G: 2; 1 INJECTION, POWDER, FOR SOLUTION INTRAMUSCULAR; INTRAVENOUS at 00:17

## 2022-03-07 ASSESSMENT — ACTIVITIES OF DAILY LIVING (ADL)
ADLS_ACUITY_SCORE: 9

## 2022-03-07 NOTE — PHARMACY-RX INSURANCE COVERAGE
Patient has [employer] through an employer.    Atovaquone, 210ml bottle: $10.    Jacinta Tineo  Pharmacy Technician/Liaison, Discharge Pharmacy   [phone]  [email or web address]

## 2022-03-07 NOTE — PROGRESS NOTES
Patient has been assessed for Home Oxygen needs. Oxygen readings:    *Pulse oximetry (SpO2) = 88% on room air at rest while awake.    *SpO2 improved to 95% on 1.5liters/minute at rest.    *SpO2 = % on room air during activity/with exercise.    *SpO2 improved to 91% on 1.5liters/minute during activity/with exercise.

## 2022-03-07 NOTE — PROGRESS NOTES
"[facility]    Infectious Disease Progress Note    Date of Service (when I saw the patient): 03/07/2022     Assessment & Plan   Celeste Chacko is a 89 year old female who was admitted on 2/21/2022.     Impression:  1. 89 y.o with COPD  2. Diabetes   3. HTN, HLd  4. Admitted with shortness of breath.   5. CT scan \"  There are two new cavitary nodules in the right upper lobe. This  is concerning for aggressive, atypical infection, possibly fungal or  Tubercular.\"      Recommendations:   AFB stain negative, AFB culture positive, but is CELINE, discontinue iso, this may be relevant long term but not an acute issues and no tx     Fungal antibody negative.      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   Patient is sulfa allergic will do mepron for 3 weeks. Day 4/21 of acute tx Steroids discussed with pulm plan   40 mg orally twice daily for five days, followed by  40 mg orally once daily for five days, followed by  20 mg orally once daily for 11 days   Would get pharmacy/care coordinators to look at ins coverage for the very expensive mepron she will now likely need as outpt , possibly even longer proph  Other issue with PJP is what underlying issue has caused her to get this and does she thus need long term proph ? TBD later      Continue Unasyn for now but likely discontinue soon          Gunner Sainz MD    Interval History   Tolerating antibiotics ok   No new rashes or issues with antibiotics   Labs reviewed   Afebrile   AFB probe ID is CELINE out of iso     Physical Exam   Temp: 97.8  F (36.6  C) Temp src: Oral BP: 124/50 Pulse: 95   Resp: 16 SpO2: 95 % O2 Device: Nasal cannula Oxygen Delivery: 2 LPM  Vitals:    03/05/22 0629 03/06/22 0615 03/07/22 0705   Weight: 60.9 kg (134 lb 4.2 oz) 57.8 kg (127 lb 8 oz) 57.8 kg (127 lb 6.4 oz)     Vital Signs with Ranges  Temp:  [97.8  F (36.6  C)-98.5  F (36.9  C)] 97.8  F (36.6  C)  Pulse:  [86-96] 95  Resp:  [16-18] 16  BP: (118-159)/(50-77) " 124/50  SpO2:  [95 %-98 %] 95 %    Constitutional: Awake, alert, cooperative, no apparent distress  Lungs: Clear to auscultation bilaterally, no crackles or wheezing  Cardiovascular: Regular rate and rhythm, normal S1 and S2, and no murmur noted  Abdomen: Normal bowel sounds, soft, non-distended, non-tender  Skin: No rashes, no cyanosis, no edema  Other:    Medications       amLODIPine  5 mg Oral Daily     ampicillin-sulbactam (UNASYN) IV  3 g Intravenous Q6H     aspirin  81 mg Oral Daily     atovaquone  750 mg Oral BID w/meals     calcium carbonate 600 mg-vitamin D 400 units  1 tablet Oral BID     calcium polycarbophil  625 mg Oral Daily     famotidine  20 mg Oral BID     fluticasone-vilanterol  1 puff Inhalation Daily     insulin aspart  1-7 Units Subcutaneous TID AC     insulin aspart  1-5 Units Subcutaneous At Bedtime     insulin aspart   Subcutaneous TID AC     insulin glargine  20 Units Subcutaneous QAM AC     latanoprost  1 drop Left Eye QPM     multivitamin, therapeutic  1 tablet Oral QPM     polyethylene glycol  8.5 g Oral QPM     predniSONE  40 mg Oral BID    Followed by     [START ON 3/9/2022] predniSONE  40 mg Oral Daily    Followed by     [START ON 3/14/2022] predniSONE  20 mg Oral Daily     rosuvastatin  5 mg Oral At Bedtime     sodium chloride (PF)  3 mL Intracatheter Q8H     umeclidinium  1 puff Inhalation Daily       Data   All microbiology laboratory data reviewed.  Recent Labs   Lab Test 03/04/22  0850 03/03/22  1002 02/28/22  1202   WBC 5.5 5.8 6.3   HGB 11.4* 10.8* 10.8*   HCT 36.4 35.9 35.0   MCV 81 83 84    229 166     Recent Labs   Lab Test 03/06/22  1000 03/04/22  0850 03/03/22  1002   CR 0.65 0.71 0.66     No lab results found.  Recent Labs   Lab Test 12/04/15  1112   CULT >100,000 colonies/mL Escherichia coli*

## 2022-03-07 NOTE — PLAN OF CARE
Summary: SOB/weakness  DATE & TIME: 3/56 22, 7 a to 7 p                       Cognitive Concerns/ Orientation : A&O x 4   BEHAVIOR & AGGRESSION TOOL COLOR: Green  CIWA SCORE: NA    ABNL VS/O2: VSS on O2 3 L oxymizer  MOBILITY: SBA with GB and walker  PAIN MANAGMENT: Oxycodone 5 mg x 1 for back pain  DIET: Mod CHO, tolerating  BOWEL/BLADDER: Continent  ABNL LAB/BG: ,206,311  DRAIN/DEVICES: PIV SL LUE  TELEMETRY RHYTHM: NA  SKIN: Scattered bruises. Blanchable coccyx  TESTS/PROCEDURES: NA  D/C DATE: Pending  OTHER IMPORTANT INFO: SW, ID and Pulmonology following. Airborne isolation discontinued

## 2022-03-07 NOTE — PROGRESS NOTES
[facility]    Hospitalist Progress Note    Date of Service (when I saw the patient): 03/07/2022  Admit date: 2/21/2022    Assessment & Plan   Celeste Chacko is a very pleasant 89 year old woman with h/o COPD, chronic back pain, DM2, HTN, HLD who was brought to the ER on 2/21 for evaluation of exertional dyspnea and fatigue.       Possible Lung abscess- cavitary nodules in RUL  PJP infection  CELINE (chronic, acute treatment indicated)   Recent admission for COPD exacerbation treated with a steroid but ongoing dyspnea mostly with exertion but without fever, cough, weight loss or night sweats.  No wheezing.  No chest pain.   * CT Chest on 2/21: No PE but 2 new cavitary nodules in the right upper lobe. This is concerning for aggressive, atypical infection, possibly fungal or tubercular.  Patient has been on steroid for almost a month although currently off of it.    * CT Chest 3/2/22 due to worsening hypoxia: pulmonary edema and improvement in cavitation.   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 1 more test not completed due to inaccurate specimen. Patient initially taken off isolation given all other negative factors per ID,  however AFB culture came back positive on 3/4/22 and hence back in isolation for TB pending speciation.  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  * fungal antigen for histo, cocci, blasto and Aspergillus all negative   * BCx negative to date  * Cultures positive for PJP.    * Patient is allergic to sulfa    - Started on Mepron x 3 weeks (last dose 3/23) per ID recommendations.   - With steroids:   40 mg orally twice daily for five days, followed by  40 mg orally once daily for five days, followed by  20 mg orally once daily for 11 days   - On Unasyn for now for possible bacterial cavitary infection.     Acute pulmonary edema   Type II MI Minimally abnormal troponin (max 78 and down trending) Unlikely  ACS.  Essential hypertension  Hyperlipidemia  * CT on 3/2 showed pulmonary edema as above.   * Echo 2/21/22: EF > 70%, RV normal. No significant valve abnormalities. Pulmonary HTN. IVC normal size and preserved respiratory variability.     - Diueresed with IV lasix initially with good response, switched furosemide 40 mg daily.   - No evidence of fluid overload on 03/07/22. Stopped furosemide.    - Continue PTA Norvasc 5 mg p.o. daily.   - Plan is for discharge to TCU. Patient agrees. Needs 24 hour assist.   - Continues on ASA.      Acute hypoxic respiratory failure secondary to above   COPD Not on home O2. Her current presentation not c/w COPD exacerbation,     - Continue PTA is on fluticasone/salmeterol, DuoNeb, Spiriva inhalers.  - Assess O2 saturations on RA, at rest and with ambulation on 03/07/22 .     Coverage Issues   - Appreciate pharmacy/care coordinators to look at ins coverage for the very expensive mepron. She will need this post discharge with probable prolonged prophylaxis.      Right eye conjunctivitis :  - Started Ofloxacin 0.3% ophthalmic solution.     DM2:  Takes glipizide PTA.  Hyperglycemia related to infection and steroids.  Hemoglobin A1C POCT   Date Value Ref Range Status   05/05/2021 7.2 (H) 0 - 5.6 % Final     Comment:     Normal <5.7% Prediabetes 5.7-6.4%  Diabetes 6.5% or higher - adopted from ADA   consensus guidelines.       Hemoglobin A1C   Date Value Ref Range Status   02/21/2022 9.9 (H) 0.0 - 5.6 % Final     Comment:     Normal <5.7%   Prediabetes 5.7-6.4%    Diabetes 6.5% or higher     Note: Adopted from ADA consensus guidelines.     Recent Labs   Lab 03/07/22  0757 03/07/22  0201 03/06/22  2141 03/06/22  1722 03/06/22  1123 03/06/22  1000   * 207* 301* 311* 206* 425*       -- PTA glipizide held  -- Started glargine 20 units afternoon of 3/6/22, continue at 20 units this AM 03/07/22  -- Increased prandial novolog to 2 unit / 15 g carbs.    -- Continue with sliding scale  insulin coverage  -- Will need to adjust as prednisone dose decreases.     Diet: Orders Placed This Encounter      Moderate Consistent Carb (60 g CHO per Meal) Diet      Diet     IVF: None  Botello Catheter: Not present     DVT Prophylaxis: ASA, PCDs, ambulation.   Code Status: No CPR- Do NOT Intubate     Disposition: Expected discharge 3/8/22 once cleared by ID and have safe discharge plan. She requires TCU.  Communication: Discussed with Care Management, pulmonary, RN, patient on 03/07/22. Will call daughter later today.     Antonietta Lugo  Hospitalist Service  [facility]  Securely message with the Vocera Web Console (learn more here)  Text page via [employer] Paging/Directory    Interval History   Events over last 24 hours as outlined above.   Patient offers no acute concerns. She agrees with recommendation for TCU. She does not feel safe going home. Breathing stable on O2 both at rest and with ambulation. O2 saturations are good. No recent fevers. Hemodynamically stable. No edema.   Denies any SOB, CP, abdominal pain, N/V/D.     -Data reviewed today: I reviewed all new labs and imaging results over the last 24 hours. I personally reviewed no images or EKG's today.    Physical Exam   Temp: 98.7  F (37.1  C) Temp src: Oral BP: 124/50 Pulse: 95   Resp: 16 SpO2: 95 % O2 Device: Nasal cannula Oxygen Delivery: 2 LPM  Vitals:    03/05/22 0629 03/06/22 0615 03/07/22 0705   Weight: 60.9 kg (134 lb 4.2 oz) 57.8 kg (127 lb 8 oz) 57.8 kg (127 lb 6.4 oz)     Vital Signs with Ranges  Temp:  [97.8  F (36.6  C)-98.7  F (37.1  C)] 98.7  F (37.1  C)  Pulse:  [86-96] 95  Resp:  [16-18] 16  BP: (118-159)/(50-77) 124/50  SpO2:  [95 %-98 %] 95 %  I/O last 3 completed shifts:  In: 1140 [P.O.:1140]  Out: 750 [Urine:750]    Today's Exam  Constitutional:  NAD,  Frail, appears stated age  Neuropsyche:  alert and oriented, answers questions appropriately.   Respiratory: Breathing comfortably on RA, marked decreased air  exchange, no wheezes, no crackles.   Cardiovascular:  Regular rate and rhythm, no edema.  GI: soft, NT/ND, BS normal  Skin/Integumen: Scattered bruises No acute rash or sign of bleeding.     Medications   All medications reviewed on 03/07/22       amLODIPine  5 mg Oral Daily     ampicillin-sulbactam (UNASYN) IV  3 g Intravenous Q6H     aspirin  81 mg Oral Daily     atovaquone  750 mg Oral BID w/meals     calcium carbonate 600 mg-vitamin D 400 units  1 tablet Oral BID     calcium polycarbophil  625 mg Oral Daily     famotidine  20 mg Oral BID     fluticasone-vilanterol  1 puff Inhalation Daily     insulin aspart  1-7 Units Subcutaneous TID AC     insulin aspart  1-5 Units Subcutaneous At Bedtime     insulin aspart   Subcutaneous TID AC     insulin glargine  20 Units Subcutaneous QAM AC     latanoprost  1 drop Left Eye QPM     multivitamin, therapeutic  1 tablet Oral QPM     polyethylene glycol  8.5 g Oral QPM     predniSONE  40 mg Oral BID    Followed by     [START ON 3/9/2022] predniSONE  40 mg Oral Daily    Followed by     [START ON 3/14/2022] predniSONE  20 mg Oral Daily     rosuvastatin  5 mg Oral At Bedtime     sodium chloride (PF)  3 mL Intracatheter Q8H     umeclidinium  1 puff Inhalation Daily       Data   Recent Labs   Lab 03/07/22  0757 03/07/22  0201 03/06/22  2141 03/06/22  1722 03/06/22  1555 03/06/22  1123 03/06/22  1000 03/04/22  1226 03/04/22  0850 03/03/22  1234 03/03/22  1002 02/28/22  1242 02/28/22  1202   WBC  --   --   --   --   --   --   --   --  5.5  --  5.8  --  6.3   HGB  --   --   --   --   --   --   --   --  11.4*  --  10.8*  --  10.8*   MCV  --   --   --   --   --   --   --   --  81  --  83  --  84   PLT  --   --   --   --   --   --   --   --  244  --  229  --  166   NA  --   --   --   --   --   --  135  --  140  --  136   < > 136   POTASSIUM  --   --   --   --  3.6  --  3.1*  --  3.4  --  3.2*   < > 3.7   CHLORIDE  --   --   --   --   --   --  97  --  104  --  101   < > 104   CO2  --   --    --   --   --   --  29  --  31  --  31   < > 27   BUN  --   --   --   --   --   --  30  --  17  --  14   < > 16   CR  --   --   --   --   --   --  0.65  --  0.71  --  0.66   < > 0.62   ANIONGAP  --   --   --   --   --   --  9  --  5  --  4   < > 5   NARCISA  --   --   --   --   --   --  8.7  --  9.0  --  8.8   < > 8.2*   * 207* 301*   < >  --    < > 425*   < > 203*   < > 260*   < > 213*    < > = values in this interval not displayed.       No results found for this or any previous visit (from the past 24 hour(s)).

## 2022-03-07 NOTE — PLAN OF CARE
DATE & TIME: 3/6/22,6790 - 6403   Cognitive Concerns/ Orientation : A&O x 4, forgetful   BEHAVIOR & AGGRESSION TOOL COLOR: Green   ABNL VS/O2: BP elevated. Other VSS on O2 2 LPM via nasal canula  MOBILITY: SBA with GB and walker  PAIN MANAGMENT: Denied  DIET: Mod CHO  BOWEL/BLADDER: Continent  ABNL LAB/BG:   DRAIN/DEVICES: PIV SL  TELEMETRY RHYTHM: NA  SKIN: Scattered bruises  TESTS/PROCEDURES: NA  D/C DATE: Possibly discharge today  Discharge Barriers: Oxygen need  OTHER IMPORTANT INFO: ID following. Exercise oximetry prior to discharge. Patient will need home oxygen for some time per MD notes

## 2022-03-07 NOTE — PROGRESS NOTES
"PULMONOLOGY PROGRESS NOTE    Date of Admission: 2/21/2022    CC/Reason for Hospital visit: COPD, pulmonary nodules  SUBJECTIVE       89-year-old female former smoker with a history of COPD is admitted for shortness of breath and fatigue.  Patient was recently hospitalized at [facility] 1/25-1/26/2022 for a COPD exacerbation. AFB culture positive, but is CELINE,PJP PCr positive No new events overnight,  Afebrile. No worsening respiratory complaints at this time.    ROS: A Problem Pertinent review of systems was negative except for items noted in HPI.  Past Medical, Family, and Social/Substance History has been reviewed: No interval changes.    OBJECTIVE   Vital signs:  Temp: 98  F (36.7  C) Temp src: Oral BP: 122/56 Pulse: 91   Resp: 18 SpO2: 97 % O2 Device: Nasal cannula Oxygen Delivery: 2 LPM Height: 161.5 cm (5' 3.6\") Weight: 57.8 kg (127 lb 6.4 oz)  Estimated body mass index is 22.14 kg/m  as calculated from the following:    Height as of this encounter: 1.615 m (5' 3.6\").    Weight as of this encounter: 57.8 kg (127 lb 6.4 oz).        I/O last 3 completed shifts:  In: 640 [P.O.:640]  Out: 800 [Urine:800]      CONSTITUTIONAL/GENERAL APPEARANCE: Alert female. No Apparent Distress.  PSYCHIATRIC: Pleasant and appropriate mood and affect. Oriented x 3.  EARS, NOSE,THROAT,MOUTH: External ears and nose overall normal. Normal oral mucosa.   NECK: Neck appearance normal. No neck masses and the thyroid is not enlarged.   RESPIRATORY: Non-labored effort. Decreased BS anteriorly.  CARDIOVASCULAR: S1, S2, regular rate and rhythm.      LABORATORY ASSESSMENT    Arterial Blood GasNo lab results found in last 7 days.  CBC  Recent Labs   Lab 03/04/22  0850 03/03/22  1002   WBC 5.5 5.8   RBC 4.48 4.32   HGB 11.4* 10.8*   HCT 36.4 35.9   MCV 81 83   MCH 25.4* 25.0*   MCHC 31.3* 30.1*   RDW 14.0 13.8    229     BMP  Recent Labs   Lab 03/07/22  1314 03/07/22  1126 03/07/22  0757 03/07/22  0201 03/06/22  1722 " 03/06/22  1555 03/06/22  1123 03/06/22  1000 03/04/22  1226 03/04/22  0850 03/03/22  1234 03/03/22  1002 03/02/22  1310 03/02/22  0725   NA  --   --   --   --   --   --   --  135  --  140  --  136  --  138   POTASSIUM  --   --   --   --   --  3.6  --  3.1*  --  3.4  --  3.2*  --  3.6   CHLORIDE  --   --   --   --   --   --   --  97  --  104  --  101  --  103   NARCISA  --   --   --   --   --   --   --  8.7  --  9.0  --  8.8  --  8.6   CO2  --   --   --   --   --   --   --  29  --  31  --  31  --  29   BUN  --   --   --   --   --   --   --  30  --  17  --  14  --  13   CR  --   --   --   --   --   --   --  0.65  --  0.71  --  0.66  --  0.56   * 232* 290* 207*   < >  --    < > 425*   < > 203*   < > 260*   < > 128*    < > = values in this interval not displayed.     INRNo lab results found in last 7 days.   BNPNo lab results found in last 7 days.  VENOUS BLOOD GASESNo lab results found in last 7 days.       Additional labs and/or comments:  Quantiferon gold negative.  Fungal antigen for histo, cocci, blasto and Aspergillus all negative  PJP PCR positive  Sputum AFB stain negative but culture positive, ID pending.      IMAGING      CT Chest 3/2 -  IMPRESSION:   1.  Right upper lobe nodules have not changed in size, but with  decreased cavitation compared to 2/21/2022.  2.  New mild diffuse groundglass opacity with interstitial septal  thickening and trace pleural effusions, suspicious for pulmonary  edema. No new nodules.    CT Chest 2/21 -  IMPRESSION:  1.  There are two new cavitary nodules in the right upper lobe. This  is concerning for aggressive, atypical infection, possibly fungal or  tubercular.  2.  No evidence of pulmonary embolism.    PFT & OTHER TESTING       ASSESSMENT / PLAN      Pulmonary diagnoses:  Abnl CT/CXR R91.8  COPD J44.9  Hypoxemia R09.02  SOB R06.02    Additional COVID-19 diagnoses:  Concern of possible exposure to COVID-19, Now RULED OUT Z03.818       ASSESSMENT:  She was seen in pulmonary  consultation by Dr. Christensen who recommended continuing her usual triple inhaler therapy and a 5-day course of prednisone.  On readmission CT scan of the chest showed emphysema and 2 new cavitary nodules in the right upper lobe, the largest measuring 2.6 cm.  Patient was seen by ID; QuantiFERON gold and fungal serologies all negative.  Patient was started on Unasyn for possible cavitary bacterial infection.  Hospital course complicated by worsening hypoxemia and follow-up CT scan of the chest 3/2/2022 showed the nodules to be unchanged in size but with decreased cavitation, and there was new mild diffuse groundglass opacity with interstitial septal thickening and trace pleural effusions consistent with pulmonary edema.  Patient was treated with Lasix with clinical improvement.  PJP PCR noted to be positive, patient started on Mepron and Prednisone per ID.  Sputum AFB stain negative but culture subsequently positive, PCR pending. Would continue present respiratory rx for now.  Respiratory status remains stable on low-flow oxygen. CT Chest 3/2/22 due to worsening hypoxia: pulmonary edema and improvement in cavitation.     PLAN:  Adjust oxygen, keep SaO2 > 90%  Bronchodilators - Breo 200/25, Incruse  Antibiotics - Unasyn followed by Augmentin x 4 weeks for possible cavitary bacterial infection  Mepron & Prednisone per ID.  Diuresis - Lasix as ordered.  . AFB culture positive, but is CELINE,  Agree with plans for follow-up CT Chest in 4 weeks.  Recommend outpatient Pulmonary follow-up [phone] to schedule appointment.  Sulfa allergy will get mepron for 3 weeks. for acute tx Steroids taper plan 40 mg orally twice daily for five days, followed by 40 mg orally once daily for five days, followed by 20 mg orally once daily for 11 days   Time spent in the care of this patient was 40 minutes  discharge per IM please call if questions        Terrance Wilhelm M.D.  Pulmonary, Critical Care and Sleep Medicine  [facility]  Pager: [phone]  Office:[phone]

## 2022-03-07 NOTE — PROGRESS NOTES
Care Management Follow Up    Length of Stay (days): 14    Expected Discharge Date: 2022     Concerns to be Addressed:       Patient plan of care discussed at interdisciplinary rounds: Yes    Anticipated Discharge Disposition:  TCU vs Home with 24 hour assistance per PT recommendation     Anticipated Discharge Services:    Anticipated Discharge DME:      Patient/family educated on Medicare website which has current facility and service quality ratings:    Education Provided on the Discharge Plan:    Patient/Family in Agreement with the Plan: yes    Referrals Placed by CM/SW:    Private pay costs discussed:     Additional Information:  On 3/4, the hospital care coordinator was notified that the SNF request by [facility] had been denied due to no skilled need  and by the time the care coordinator was aware of this the time allowed for he  Peer to Peer Call had .  Writer has a call out to [facility] to ask if the denial letter gave appeal process options thru her insurance [facility].  Usually when there is a middle  for approval which is [employer], the enrollee has an appeal process directly with the insurance which is [facility].   If [facility] denies SNF benefit for TCU, patient's option is to pay privately at the TCU or arrange for 24 hour assistance at home per PT recommendation.  Will speak with patient.      KYRIE Barrientos

## 2022-03-07 NOTE — PLAN OF CARE
DATE & TIME: 3/6/22 2775-5943    Cognitive Concerns/ Orientation : Pt A/Ox4   BEHAVIOR & AGGRESSION TOOL COLOR: Green   ABNL VS/O2: VSS on 2L NC  MOBILITY: SBA with walker and gait belt, fall risk  PAIN MANAGMENT: Complaints of back pain, managed with PRN oxycodone  DIET: Mod CHO  BOWEL/BLADDER: Continent   ABNL LAB/BG:   DRAIN/DEVICES: IV SL  SKIN: Scattered bruising  D/C DATE: Discharge Monday or Tuesday pending improvement  OTHER IMPORTANT INFO: Home oxygen to be arranged.

## 2022-03-07 NOTE — PROGRESS NOTES
Xcoverage: paged by RN regarding Lasix dose; she is getting Lasix 40 mg iv q8h since 03/02; notes mentions that she was switched to po Lasix on 03/05 but was not ordered and she continued to get iv lasix 40 mg q8h; kidney function is fine, she is on O2 3 liters, seems euvolemic as per RN; will switch to lasix 40 mg po daily for now, please reassess fluid status in am and further management as per the rounding hospitalist.    Layla Davis MD

## 2022-03-08 ENCOUNTER — APPOINTMENT (OUTPATIENT)
Dept: PHYSICAL THERAPY | Facility: CLINIC | Age: 87
DRG: 177 | End: 2022-03-08
Payer: COMMERCIAL

## 2022-03-08 LAB
GLUCOSE BLDC GLUCOMTR-MCNC: 201 MG/DL (ref 70–99)
GLUCOSE BLDC GLUCOMTR-MCNC: 208 MG/DL (ref 70–99)
GLUCOSE BLDC GLUCOMTR-MCNC: 209 MG/DL (ref 70–99)
GLUCOSE BLDC GLUCOMTR-MCNC: 239 MG/DL (ref 70–99)
GLUCOSE BLDC GLUCOMTR-MCNC: 274 MG/DL (ref 70–99)
SARS-COV-2 RNA RESP QL NAA+PROBE: NEGATIVE

## 2022-03-08 PROCEDURE — 120N000001 HC R&B MED SURG/OB

## 2022-03-08 PROCEDURE — 250N000013 HC RX MED GY IP 250 OP 250 PS 637: Performed by: INTERNAL MEDICINE

## 2022-03-08 PROCEDURE — 250N000011 HC RX IP 250 OP 636: Performed by: INTERNAL MEDICINE

## 2022-03-08 PROCEDURE — 97116 GAIT TRAINING THERAPY: CPT | Mod: GP | Performed by: PHYSICAL THERAPY ASSISTANT

## 2022-03-08 PROCEDURE — 97110 THERAPEUTIC EXERCISES: CPT | Mod: GP | Performed by: PHYSICAL THERAPY ASSISTANT

## 2022-03-08 PROCEDURE — 250N000012 HC RX MED GY IP 250 OP 636 PS 637: Performed by: HOSPITALIST

## 2022-03-08 PROCEDURE — 250N000013 HC RX MED GY IP 250 OP 250 PS 637: Performed by: HOSPITALIST

## 2022-03-08 PROCEDURE — 87635 SARS-COV-2 COVID-19 AMP PRB: CPT | Performed by: INTERNAL MEDICINE

## 2022-03-08 PROCEDURE — 99232 SBSQ HOSP IP/OBS MODERATE 35: CPT | Performed by: INTERNAL MEDICINE

## 2022-03-08 PROCEDURE — 250N000012 HC RX MED GY IP 250 OP 636 PS 637: Performed by: INTERNAL MEDICINE

## 2022-03-08 PROCEDURE — 97530 THERAPEUTIC ACTIVITIES: CPT | Mod: GP | Performed by: PHYSICAL THERAPY ASSISTANT

## 2022-03-08 RX ADMIN — ATOVAQUONE 750 MG: 750 SUSPENSION ORAL at 08:34

## 2022-03-08 RX ADMIN — PREDNISONE 40 MG: 20 TABLET ORAL at 20:50

## 2022-03-08 RX ADMIN — OXYCODONE HYDROCHLORIDE 5 MG: 5 TABLET ORAL at 09:46

## 2022-03-08 RX ADMIN — ROSUVASTATIN CALCIUM 5 MG: 5 TABLET, FILM COATED ORAL at 20:51

## 2022-03-08 RX ADMIN — PREDNISONE 40 MG: 20 TABLET ORAL at 08:26

## 2022-03-08 RX ADMIN — OXYCODONE HYDROCHLORIDE 5 MG: 5 TABLET ORAL at 22:14

## 2022-03-08 RX ADMIN — THERA TABS 1 TABLET: TAB at 20:45

## 2022-03-08 RX ADMIN — INSULIN ASPART 4 UNITS: 100 INJECTION, SOLUTION INTRAVENOUS; SUBCUTANEOUS at 13:22

## 2022-03-08 RX ADMIN — FAMOTIDINE 20 MG: 20 TABLET ORAL at 20:46

## 2022-03-08 RX ADMIN — AMPICILLIN SODIUM AND SULBACTAM SODIUM 3 G: 2; 1 INJECTION, POWDER, FOR SOLUTION INTRAMUSCULAR; INTRAVENOUS at 01:16

## 2022-03-08 RX ADMIN — UMECLIDINIUM 1 PUFF: 62.5 AEROSOL, POWDER ORAL at 08:32

## 2022-03-08 RX ADMIN — INSULIN ASPART 1 UNITS: 100 INJECTION, SOLUTION INTRAVENOUS; SUBCUTANEOUS at 22:14

## 2022-03-08 RX ADMIN — AMPICILLIN SODIUM AND SULBACTAM SODIUM 3 G: 2; 1 INJECTION, POWDER, FOR SOLUTION INTRAMUSCULAR; INTRAVENOUS at 06:20

## 2022-03-08 RX ADMIN — CALCIUM CARBONATE 600 MG (1,500 MG)-VITAMIN D3 400 UNIT TABLET 1 TABLET: at 08:26

## 2022-03-08 RX ADMIN — ATOVAQUONE 750 MG: 750 SUSPENSION ORAL at 19:03

## 2022-03-08 RX ADMIN — POLYETHYLENE GLYCOL 3350 8.5 G: 17 POWDER, FOR SOLUTION ORAL at 20:44

## 2022-03-08 RX ADMIN — AMLODIPINE BESYLATE 5 MG: 5 TABLET ORAL at 08:26

## 2022-03-08 RX ADMIN — CALCIUM POLYCARBOPHIL 625 MG: 625 TABLET, FILM COATED ORAL at 08:26

## 2022-03-08 RX ADMIN — OXYCODONE HYDROCHLORIDE 5 MG: 5 TABLET ORAL at 18:11

## 2022-03-08 RX ADMIN — FLUTICASONE FUROATE AND VILANTEROL TRIFENATATE 1 PUFF: 200; 25 POWDER RESPIRATORY (INHALATION) at 08:32

## 2022-03-08 RX ADMIN — ASPIRIN 81 MG: 81 TABLET, COATED ORAL at 08:26

## 2022-03-08 RX ADMIN — FAMOTIDINE 20 MG: 20 TABLET ORAL at 08:27

## 2022-03-08 RX ADMIN — INSULIN ASPART 2 UNITS: 100 INJECTION, SOLUTION INTRAVENOUS; SUBCUTANEOUS at 19:00

## 2022-03-08 RX ADMIN — CALCIUM CARBONATE 600 MG (1,500 MG)-VITAMIN D3 400 UNIT TABLET 1 TABLET: at 20:47

## 2022-03-08 RX ADMIN — LATANOPROST 1 DROP: 50 SOLUTION/ DROPS OPHTHALMIC at 20:53

## 2022-03-08 ASSESSMENT — ACTIVITIES OF DAILY LIVING (ADL)
ADLS_ACUITY_SCORE: 9
ADLS_ACUITY_SCORE: 11
ADLS_ACUITY_SCORE: 9
ADLS_ACUITY_SCORE: 9
ADLS_ACUITY_SCORE: 11
ADLS_ACUITY_SCORE: 9
ADLS_ACUITY_SCORE: 11
ADLS_ACUITY_SCORE: 13
ADLS_ACUITY_SCORE: 9
ADLS_ACUITY_SCORE: 9
ADLS_ACUITY_SCORE: 13
ADLS_ACUITY_SCORE: 11
ADLS_ACUITY_SCORE: 9
ADLS_ACUITY_SCORE: 11
ADLS_ACUITY_SCORE: 9
ADLS_ACUITY_SCORE: 11
ADLS_ACUITY_SCORE: 11
ADLS_ACUITY_SCORE: 9
ADLS_ACUITY_SCORE: 11

## 2022-03-08 NOTE — PLAN OF CARE
Goal Outcome Evaluation:  DATE & TIME: 3/8/22 Day shift  Cognitive Concerns/ Orientation : A&O x4, forgetful   BEHAVIOR & AGGRESSION TOOL COLOR: Green     ABNL VS/O2: VSS on RA, able to wean off O2  MOBILITY: SBA with GB/W, calls for help.  PAIN MANAGMENT: Pain in lower back. PRN oxy given with relief  DIET: Mod CHO- great appetite  BOWEL/BLADDER: Continent- had BM today   ABNL LAB/BG: /274  DRAIN/DEVICES: PIV SL with q6h Unasyn   SKIN: Scattered bruises  TESTS/PROCEDURES: NA  D/C DATE: Probable discharge 3/8- waiting for placement  Discharge Barriers: potential TCU placement  OTHER IMPORTANT INFO: ID following. Mepron BID. Prednisone taper.

## 2022-03-08 NOTE — PROGRESS NOTES
"[facility]    Infectious Disease Progress Note    Date of Service (when I saw the patient): 03/08/2022     Assessment & Plan   Celeste Chacko is a 89 year old female who was admitted on 2/21/2022.     Impression:  1. 89 y.o with COPD  2. Diabetes   3. HTN, HLd  4. Admitted with shortness of breath.   5. CT scan \"  There are two new cavitary nodules in the right upper lobe. This  is concerning for aggressive, atypical infection, possibly fungal or  Tubercular.\"      Recommendations:   AFB stain negative, AFB culture positive, but is CELINE, discontinue iso, this may be relevant long term but not an acute issues and no tx     Fungal antibody negative.      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   Patient is sulfa allergic will do mepron for 3 weeks. Day 5/21 of acute tx Steroids discussed with pulm plan   40 mg orally twice daily for five days, followed by  40 mg orally once daily for five days, followed by  20 mg orally once daily for 11 days   Would get pharmacy/care coordinators to look at ins coverage for the very expensive mepron she will now likely need as outpt , possibly even longer proph    Plan for discharge steroids per taper listed above   Mepron 750 mg BID for 21 days followed by 750 mg daily for long term   FOLLOW UP with me in the clinic in 6 weeks       Gunner Sainz MD    Interval History   Tolerating antibiotics ok   No new rashes or issues with antibiotics   Labs reviewed   Afebrile   AFB probe ID is CELINE out of iso     Physical Exam   Temp: 97.3  F (36.3  C) Temp src: Oral BP: (!) 146/71 Pulse: 77   Resp: 18 SpO2: 95 % O2 Device: None (Room air) Oxygen Delivery: 1.5 LPM  Vitals:    03/05/22 0629 03/06/22 0615 03/07/22 0705   Weight: 60.9 kg (134 lb 4.2 oz) 57.8 kg (127 lb 8 oz) 57.8 kg (127 lb 6.4 oz)     Vital Signs with Ranges  Temp:  [97  F (36.1  C)-98  F (36.7  C)] 97.3  F (36.3  C)  Pulse:  [77-91] 77  Resp:  [18] 18  BP: (122-146)/(56-71) 146/71  SpO2:  [88 %-97 " %] 95 %    Constitutional: Awake, alert, cooperative, no apparent distress  Lungs: Clear to auscultation bilaterally, no crackles or wheezing  Cardiovascular: Regular rate and rhythm, normal S1 and S2, and no murmur noted  Abdomen: Normal bowel sounds, soft, non-distended, non-tender  Skin: No rashes, no cyanosis, no edema  Other:    Medications       amLODIPine  5 mg Oral Daily     aspirin  81 mg Oral Daily     atovaquone  750 mg Oral BID w/meals     calcium carbonate 600 mg-vitamin D 400 units  1 tablet Oral BID     calcium polycarbophil  625 mg Oral Daily     famotidine  20 mg Oral BID     fluticasone-vilanterol  1 puff Inhalation Daily     insulin aspart  1-7 Units Subcutaneous TID AC     insulin aspart  1-5 Units Subcutaneous At Bedtime     insulin aspart   Subcutaneous TID AC     [START ON 3/9/2022] insulin glargine  24 Units Subcutaneous QAM AC     latanoprost  1 drop Left Eye QPM     multivitamin, therapeutic  1 tablet Oral QPM     polyethylene glycol  8.5 g Oral QPM     predniSONE  40 mg Oral BID    Followed by     [START ON 3/9/2022] predniSONE  40 mg Oral Daily    Followed by     [START ON 3/14/2022] predniSONE  20 mg Oral Daily     rosuvastatin  5 mg Oral At Bedtime     sodium chloride (PF)  3 mL Intracatheter Q8H     umeclidinium  1 puff Inhalation Daily       Data   All microbiology laboratory data reviewed.  Recent Labs   Lab Test 03/04/22  0850 03/03/22  1002 02/28/22  1202   WBC 5.5 5.8 6.3   HGB 11.4* 10.8* 10.8*   HCT 36.4 35.9 35.0   MCV 81 83 84    229 166     Recent Labs   Lab Test 03/06/22  1000 03/04/22  0850 03/03/22  1002   CR 0.65 0.71 0.66     No lab results found.  Recent Labs   Lab Test 12/04/15  1112   CULT >100,000 colonies/mL Escherichia coli*

## 2022-03-08 NOTE — PROGRESS NOTES
Care Management Follow Up    Length of Stay (days): 15    Expected Discharge Date: 03/09/2022     Concerns to be Addressed:       Patient plan of care discussed at interdisciplinary rounds: Yes    Anticipated Discharge Disposition:       Anticipated Discharge Services:    Anticipated Discharge DME:      Patient/family educated on Medicare website which has current facility and service quality ratings:    Education Provided on the Discharge Plan:    Patient/Family in Agreement with the Plan: yes    Referrals Placed by CM/SW:    Private pay costs discussed: Not applicable    Additional Information:    Sw followed up with [facility] and pt is no longer clinically accepted, requested referral be resent.  [facility] sent referrals to [facility] and [facility].  Once accepting facility is found, TCU will attempt to re-initiate ins auth again.  It is unknown if [facility] will reconsider.  Appealing is not an option due to [id number] being missed during the initial ins auth process.  [facility] will continue to follow for discharge planning needs.    Update:  [facility] has accepted.  [facility] left [facility]  asking for [facility] to reach out to [facility] for auth.  [facility] called [facility] back to discuss; waiting on return call back.    Update:  [facility] provided ins number [phone] to discuss ins auth denial/appeal.  Per rep at this number [facility] needs to resubmit an ins auth; rep inputted notes into denied TCU in auth request of last week that reason for denial was that peer to peer was not done.  [facility] left vm with [facility] the above info and asked that they resubmit ins auth.  Sw to continue to follow for discharge planning needs.      Update:  [facility] is resubmitting for ins auth    ERICK ModiSW

## 2022-03-08 NOTE — PLAN OF CARE
Goal Outcome Evaluation:    DATE & TIME: 3/7/22 0852-6802   Cognitive Concerns/ Orientation : A&O x 4, forgetful   BEHAVIOR & AGGRESSION TOOL COLOR: Green     ABNL VS/O2: BP elevated. Other VSS on O2 1.5  LPM via nasal canula  MOBILITY: SBA with GB and walker  PAIN MANAGMENT: Oxycodone given x2 for low back pain  DIET: Mod CHO  BOWEL/BLADDER: Continent  ABNL LAB/BG:  and 198 and 196  DRAIN/DEVICES: PIV SL.  Antibiotics q6hr  TELEMETRY RHYTHM: NA  SKIN: Scattered bruises  TESTS/PROCEDURES: NA  D/C DATE: Probable discharge 3/8  Discharge Barriers: Oxygen need and TCU placement  OTHER IMPORTANT INFO: ID following. LS diminished, denies BAKER.  Ambulates in room with staff.  Attempted to wean off oxygen, sats dropped below 90.  Able to maintain >90% at rest and with ambulation on 1.5L.

## 2022-03-08 NOTE — PLAN OF CARE
Goal Outcome Evaluation:    DATE & TIME: 3/7/22 6186-4884  Cognitive Concerns/ Orientation : A&O x4, forgetful   BEHAVIOR & AGGRESSION TOOL COLOR: Green     ABNL VS/O2: VSS on 1.5L NC  MOBILITY: SBA with GB/W, calls for help.  PAIN MANAGMENT: Pain in lower back. PRN oxy given.  DIET: Mod CHO  BOWEL/BLADDER: Continent  ABNL LAB/BG: , 201  DRAIN/DEVICES: PIV SL with q6h Unasyn   SKIN: Scattered bruises  TESTS/PROCEDURES: NA  D/C DATE: Probable discharge 3/8  Discharge Barriers: Oxygen need and potential TCU placement  OTHER IMPORTANT INFO: ID following. Mepron BID. Prednisone taper.

## 2022-03-09 ENCOUNTER — APPOINTMENT (OUTPATIENT)
Dept: PHYSICAL THERAPY | Facility: CLINIC | Age: 87
DRG: 177 | End: 2022-03-09
Payer: COMMERCIAL

## 2022-03-09 LAB
ANION GAP SERPL CALCULATED.3IONS-SCNC: 9 MMOL/L (ref 3–14)
BUN SERPL-MCNC: 25 MG/DL (ref 7–30)
CALCIUM SERPL-MCNC: 9.1 MG/DL (ref 8.5–10.1)
CHLORIDE BLD-SCNC: 104 MMOL/L (ref 94–109)
CO2 SERPL-SCNC: 23 MMOL/L (ref 20–32)
CREAT SERPL-MCNC: 0.62 MG/DL (ref 0.52–1.04)
GFR SERPL CREATININE-BSD FRML MDRD: 84 ML/MIN/1.73M2
GLUCOSE BLD-MCNC: 287 MG/DL (ref 70–99)
GLUCOSE BLDC GLUCOMTR-MCNC: 136 MG/DL (ref 70–99)
GLUCOSE BLDC GLUCOMTR-MCNC: 137 MG/DL (ref 70–99)
GLUCOSE BLDC GLUCOMTR-MCNC: 162 MG/DL (ref 70–99)
GLUCOSE BLDC GLUCOMTR-MCNC: 191 MG/DL (ref 70–99)
POTASSIUM BLD-SCNC: 3.8 MMOL/L (ref 3.4–5.3)
SODIUM SERPL-SCNC: 136 MMOL/L (ref 133–144)

## 2022-03-09 PROCEDURE — 250N000013 HC RX MED GY IP 250 OP 250 PS 637: Performed by: HOSPITALIST

## 2022-03-09 PROCEDURE — 99232 SBSQ HOSP IP/OBS MODERATE 35: CPT | Performed by: INTERNAL MEDICINE

## 2022-03-09 PROCEDURE — 97116 GAIT TRAINING THERAPY: CPT | Mod: GP | Performed by: PHYSICAL THERAPY ASSISTANT

## 2022-03-09 PROCEDURE — 120N000001 HC R&B MED SURG/OB

## 2022-03-09 PROCEDURE — 97110 THERAPEUTIC EXERCISES: CPT | Mod: GP | Performed by: PHYSICAL THERAPY ASSISTANT

## 2022-03-09 PROCEDURE — 250N000013 HC RX MED GY IP 250 OP 250 PS 637: Performed by: INTERNAL MEDICINE

## 2022-03-09 PROCEDURE — 80048 BASIC METABOLIC PNL TOTAL CA: CPT | Performed by: INTERNAL MEDICINE

## 2022-03-09 PROCEDURE — 36415 COLL VENOUS BLD VENIPUNCTURE: CPT | Performed by: INTERNAL MEDICINE

## 2022-03-09 PROCEDURE — 250N000012 HC RX MED GY IP 250 OP 636 PS 637: Performed by: INTERNAL MEDICINE

## 2022-03-09 RX ORDER — ATOVAQUONE 750 MG/5ML
750 SUSPENSION ORAL 2 TIMES DAILY WITH MEALS
Qty: 140 ML | Refills: 0
Start: 2022-03-09 | End: 2022-03-13

## 2022-03-09 RX ORDER — ATOVAQUONE 750 MG/5ML
750 SUSPENSION ORAL DAILY
Refills: 0
Start: 2022-03-24 | End: 2022-03-23

## 2022-03-09 RX ADMIN — UMECLIDINIUM 1 PUFF: 62.5 AEROSOL, POWDER ORAL at 08:42

## 2022-03-09 RX ADMIN — INSULIN ASPART 8 UNITS: 100 INJECTION, SOLUTION INTRAVENOUS; SUBCUTANEOUS at 19:02

## 2022-03-09 RX ADMIN — OXYCODONE HYDROCHLORIDE 5 MG: 5 TABLET ORAL at 09:40

## 2022-03-09 RX ADMIN — ASPIRIN 81 MG: 81 TABLET, COATED ORAL at 08:42

## 2022-03-09 RX ADMIN — THERA TABS 1 TABLET: TAB at 20:30

## 2022-03-09 RX ADMIN — FAMOTIDINE 20 MG: 20 TABLET ORAL at 08:42

## 2022-03-09 RX ADMIN — CALCIUM CARBONATE 600 MG (1,500 MG)-VITAMIN D3 400 UNIT TABLET 1 TABLET: at 08:42

## 2022-03-09 RX ADMIN — LATANOPROST 1 DROP: 50 SOLUTION/ DROPS OPHTHALMIC at 20:36

## 2022-03-09 RX ADMIN — INSULIN ASPART 2 UNITS: 100 INJECTION, SOLUTION INTRAVENOUS; SUBCUTANEOUS at 13:21

## 2022-03-09 RX ADMIN — ATOVAQUONE 750 MG: 750 SUSPENSION ORAL at 19:02

## 2022-03-09 RX ADMIN — FLUTICASONE FUROATE AND VILANTEROL TRIFENATATE 1 PUFF: 200; 25 POWDER RESPIRATORY (INHALATION) at 08:42

## 2022-03-09 RX ADMIN — AMLODIPINE BESYLATE 5 MG: 5 TABLET ORAL at 08:42

## 2022-03-09 RX ADMIN — ATOVAQUONE 750 MG: 750 SUSPENSION ORAL at 08:42

## 2022-03-09 RX ADMIN — FAMOTIDINE 20 MG: 20 TABLET ORAL at 20:30

## 2022-03-09 RX ADMIN — ROSUVASTATIN CALCIUM 5 MG: 5 TABLET, FILM COATED ORAL at 20:34

## 2022-03-09 RX ADMIN — PREDNISONE 40 MG: 20 TABLET ORAL at 08:43

## 2022-03-09 RX ADMIN — CALCIUM POLYCARBOPHIL 625 MG: 625 TABLET, FILM COATED ORAL at 08:42

## 2022-03-09 RX ADMIN — OXYCODONE HYDROCHLORIDE 5 MG: 5 TABLET ORAL at 18:11

## 2022-03-09 RX ADMIN — POLYETHYLENE GLYCOL 3350 8.5 G: 17 POWDER, FOR SOLUTION ORAL at 20:30

## 2022-03-09 RX ADMIN — INSULIN ASPART 6 UNITS: 100 INJECTION, SOLUTION INTRAVENOUS; SUBCUTANEOUS at 08:45

## 2022-03-09 RX ADMIN — CALCIUM CARBONATE 600 MG (1,500 MG)-VITAMIN D3 400 UNIT TABLET 1 TABLET: at 20:29

## 2022-03-09 ASSESSMENT — ACTIVITIES OF DAILY LIVING (ADL)
ADLS_ACUITY_SCORE: 11
ADLS_ACUITY_SCORE: 10
ADLS_ACUITY_SCORE: 11
ADLS_ACUITY_SCORE: 11
ADLS_ACUITY_SCORE: 10
ADLS_ACUITY_SCORE: 10
ADLS_ACUITY_SCORE: 11
ADLS_ACUITY_SCORE: 10
ADLS_ACUITY_SCORE: 11
ADLS_ACUITY_SCORE: 11
ADLS_ACUITY_SCORE: 10
ADLS_ACUITY_SCORE: 11
ADLS_ACUITY_SCORE: 11
ADLS_ACUITY_SCORE: 10
ADLS_ACUITY_SCORE: 11
ADLS_ACUITY_SCORE: 11
ADLS_ACUITY_SCORE: 10
ADLS_ACUITY_SCORE: 10

## 2022-03-09 NOTE — PROGRESS NOTES
"PULMONOLOGY PROGRESS NOTE    Date of Admission: 2/21/2022    CC/Reason for Hospital visit: COPD, pulmonary nodules  SUBJECTIVE        No new events overnight,  Afebrile. No worsening respiratory complaints at this time.  89-year-old female former smoker with a history of COPD is admitted for shortness of breath and fatigue.  Patient was recently hospitalized at [facility] 1/25-1/26/2022 for a COPD exacerbation. AFB culture positive, but is CELINE,PJP PCr positive  ROS: A Problem Pertinent review of systems was negative except for items noted in HPI.  Past Medical, Family, and Social/Substance History has been reviewed: No interval changes.    OBJECTIVE   Vital signs:  Temp: 98.1  F (36.7  C) Temp src: Oral BP: (!) 146/68 Pulse: 82   Resp: 18 SpO2: 96 % O2 Device: Nasal cannula Oxygen Delivery: 1.5 LPM Height: 161.5 cm (5' 3.6\") Weight: 57.8 kg (127 lb 6.4 oz)  Estimated body mass index is 22.14 kg/m  as calculated from the following:    Height as of this encounter: 1.615 m (5' 3.6\").    Weight as of this encounter: 57.8 kg (127 lb 6.4 oz).        I/O last 3 completed shifts:  In: 240 [P.O.:240]  Out: 1550 [Urine:1550]      CONSTITUTIONAL/GENERAL APPEARANCE: Alert female. No Apparent Distress.  PSYCHIATRIC: Pleasant and appropriate mood and affect. Oriented x 3.  EARS, NOSE,THROAT,MOUTH: External ears and nose overall normal. Normal oral mucosa.   NECK: Neck appearance normal. No neck masses and the thyroid is not enlarged.   RESPIRATORY: Non-labored effort. Decreased BS anteriorly.  CARDIOVASCULAR: S1, S2, regular rate and rhythm.      LABORATORY ASSESSMENT    Arterial Blood GasNo lab results found in last 7 days.  CBC  Recent Labs   Lab 03/04/22  0850 03/03/22  1002   WBC 5.5 5.8   RBC 4.48 4.32   HGB 11.4* 10.8*   HCT 36.4 35.9   MCV 81 83   MCH 25.4* 25.0*   MCHC 31.3* 30.1*   RDW 14.0 13.8    229     BMP  Recent Labs   Lab 03/09/22  0722 03/08/22 2053 03/08/22  1738 03/08/22  1320 03/06/22  1722 " 03/06/22  1555 03/06/22  1123 03/06/22  1000 03/04/22  1226 03/04/22  0850 03/03/22  1234 03/03/22  1002   NA  --   --   --   --   --   --   --  135  --  140  --  136   POTASSIUM  --   --   --   --   --  3.6  --  3.1*  --  3.4  --  3.2*   CHLORIDE  --   --   --   --   --   --   --  97  --  104  --  101   NARCISA  --   --   --   --   --   --   --  8.7  --  9.0  --  8.8   CO2  --   --   --   --   --   --   --  29  --  31  --  31   BUN  --   --   --   --   --   --   --  30  --  17  --  14   CR  --   --   --   --   --   --   --  0.65  --  0.71  --  0.66   * 239* 209* 274*   < >  --    < > 425*   < > 203*   < > 260*    < > = values in this interval not displayed.     INRNo lab results found in last 7 days.   BNPNo lab results found in last 7 days.  VENOUS BLOOD GASESNo lab results found in last 7 days.       Additional labs and/or comments:  Quantiferon gold negative.  Fungal antigen for histo, cocci, blasto and Aspergillus all negative  PJP PCR positive  Sputum AFB stain negative but culture positive, ID pending.      IMAGING      CT Chest 3/2 -  IMPRESSION:   1.  Right upper lobe nodules have not changed in size, but with  decreased cavitation compared to 2/21/2022.  2.  New mild diffuse groundglass opacity with interstitial septal  thickening and trace pleural effusions, suspicious for pulmonary  edema. No new nodules.    CT Chest 2/21 -  IMPRESSION:  1.  There are two new cavitary nodules in the right upper lobe. This  is concerning for aggressive, atypical infection, possibly fungal or  tubercular.  2.  No evidence of pulmonary embolism.    PFT & OTHER TESTING       ASSESSMENT / PLAN      Pulmonary diagnoses:  Abnl CT/CXR R91.8  COPD J44.9  Hypoxemia R09.02  SOB R06.02    Additional COVID-19 diagnoses:  Concern of possible exposure to COVID-19, Now RULED OUT Z03.859       ASSESSMENT:  She was seen in pulmonary consultation by Dr. Christensen who recommended continuing her usual triple inhaler therapy and a 5-day course  of prednisone.  On readmission CT scan of the chest showed emphysema and 2 new cavitary nodules in the right upper lobe, the largest measuring 2.6 cm.  Patient was seen by ID; QuantiFERON gold and fungal serologies all negative.  Patient was started on Unasyn for possible cavitary bacterial infection.  Hospital course complicated by worsening hypoxemia and follow-up CT scan of the chest 3/2/2022 showed the nodules to be unchanged in size but with decreased cavitation, and there was new mild diffuse groundglass opacity with interstitial septal thickening and trace pleural effusions consistent with pulmonary edema.  Patient was treated with Lasix with clinical improvement.  PJP PCR noted to be positive, patient started on Mepron and Prednisone per ID.  Sputum AFB stain negative but culture subsequently positive, PCR pending. Would continue present respiratory rx for now.  Respiratory status remains stable on low-flow oxygen. CT Chest 3/2/22 due to worsening hypoxia: pulmonary edema and improvement in cavitation.     PLAN:  Adjust oxygen, keep SaO2 > 90%  Bronchodilators - Breo 200/25, Incruse  Antibiotics - Unasyn followed by Augmentin x 4 weeks for possible cavitary bacterial infection  Mepron & Prednisone per ID.  Diuresis - Lasix as ordered.  AFB culture positive, but is CELINE,  Agree with plans for follow-up CT Chest in 4 weeks.  Sulfa allergy mepron for 3 weeks. for acute tx Steroids taper plan 40 mg orally twice daily for five days, followed by 40 mg orally once daily for five days, followed by 20 mg orally once daily for 11 days   discharge per IM please call if questions  She will follow up with infectious diseases  Repeat CT in 8 weeks to assess resolution of cavitary lesions        Terrance Wilhelm M.D.  Pulmonary, Critical Care and Sleep Medicine  [facility]  Pager: [phone]  Office:[phone]

## 2022-03-09 NOTE — PLAN OF CARE
Goal Outcome Evaluation:  DATE & TIME: 3/9/22 Day shift  Cognitive Concerns/ Orientation : A&O x4, forgetful at times  BEHAVIOR & AGGRESSION TOOL COLOR: Green     ABNL VS/O2: VSS on RA, able to wean off O2. Pt requested O2 only while sleeping (1.5L).  MOBILITY: SBA with GB/W, calls for help.  PAIN MANAGMENT: Pain in lower back. PRN oxy given with relief  DIET: Mod CHO- great appetite  BOWEL/BLADDER: Continent- had BM this shift  ABNL LAB/BG: /137  DRAIN/DEVICES: PIV SL    SKIN: Scattered bruises  TESTS/PROCEDURES: NA  D/C DATE: Waiting for placement  Discharge Barriers: potential TCU placement  OTHER IMPORTANT INFO: ID following. Mepron BID. Prednisone taper.

## 2022-03-09 NOTE — PROGRESS NOTES
"[facility]    Hospitalist Progress Note    Date of Service (when I saw the patient): 03/09/2022  Admit date: 2/21/2022    Assessment & Plan   Celeste Chacko is a very pleasant 89 year old woman with h/o COPD, chronic back pain, DM2, HTN, HLD who was brought to the ER on 2/21 for evaluation of exertional dyspnea and fatigue.       Possible Lung abscess- cavitary nodules in RUL  PJP infection  CELINE (chronic, acute treatment indicated)   Recent admission for COPD exacerbation treated with a steroid but ongoing dyspnea mostly with exertion but without fever, cough, weight loss or night sweats.  No wheezing.  No chest pain.   * CT Chest on 2/21: No PE but 2 new cavitary nodules in the right upper lobe. This is concerning for aggressive, atypical infection, possibly fungal or tubercular.  Patient has been on steroid for almost a month although currently off of it.    * CT Chest 3/2/22 due to worsening hypoxia: pulmonary edema and improvement in cavitation.   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 1 more test not completed due to inaccurate specimen. Patient initially taken off isolation given all other negative factors per ID,  however AFB culture came back positive on 3/4/22 and hence back in isolation for TB pending speciation.  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 \"Discontinue iso, this may be relevant long term but not an acute issues and no tx\"  * fungal antigen for histo, cocci, blasto and Aspergillus all negative   * BCx negative to date  * PCR positive for PJP, which explains the hypoxia  * Patient is allergic to sulfa  - treat with Mepron 750 mg BID for 21 days (will be complete on 3/23) followed by 750 mg daily for long term.  Follow up with Dr. Sainz in clinic in 6 weeks.  I appreciate her followup.  - With steroids:   40 mg orally twice daily for five days, followed by  40 mg orally once daily for five " "days, followed by  20 mg orally once daily for 11 days     - Pharmacy liaison consult requested.  Dr. Sainz notes that Mepron is very expensive.  - On Unasyn for now for possible bacterial cavitary infection. Per Dr. Wilhelm, \"followed by Augmentin x 4 weeks for possible cavitary bacterial infection.\"      Acute pulmonary edema   Type II MI Minimally abnormal troponin (max 78 and down trending) Unlikely ACS.    Essential hypertension  Hyperlipidemia  * CT on 3/2 showed pulmonary edema as above.   * Echo 2/21/22: EF > 70%, RV normal. No significant valve abnormalities. Pulmonary HTN. IVC normal size and preserved respiratory variability.     - Diuresed with IV lasix initially with good response, switched furosemide 40 mg daily.   - No evidence of fluid overload on 03/07/22. Stopped furosemide.    - Continue PTA Norvasc 5 mg p.o. daily.   - Plan is for discharge to TCU. Patient agrees. Needs 24 hour assist.   - Continues on ASA.      Acute hypoxic respiratory failure secondary to above   COPD Not on home O2. Her current presentation not c/w COPD exacerbation,     - Continue PTA is on fluticasone/salmeterol, DuoNeb, Spiriva inhalers.  - oxygen saturation is 96% on RA, at rest, dipped to 88% with ambulation but recovered quickly, per my discussion with RN .     Coverage Issues   - Appreciate pharmacy/care coordinators to look at ins coverage for the very expensive mepron. She will need this post discharge with probable prolonged prophylaxis.      Right eye conjunctivitis :  - Started Ofloxacin 0.3% ophthalmic solution.     DM2:  Takes glipizide PTA.  Hyperglycemia related to infection and steroids.  Hemoglobin A1C POCT   Date Value Ref Range Status   05/05/2021 7.2 (H) 0 - 5.6 % Final     Comment:     Normal <5.7% Prediabetes 5.7-6.4%  Diabetes 6.5% or higher - adopted from ADA   consensus guidelines.       Hemoglobin A1C   Date Value Ref Range Status   02/21/2022 9.9 (H) 0.0 - 5.6 % Final     Comment:     Normal <5.7% " "  Prediabetes 5.7-6.4%    Diabetes 6.5% or higher     Note: Adopted from ADA consensus guidelines.     Recent Labs   Lab 03/09/22  0844 03/09/22  0722 03/08/22  2053 03/08/22  1738 03/08/22  1320 03/08/22  0742   * 191* 239* 209* 274* 208*       -- PTA glipizide held  -- Started glargine 20 units afternoon of 3/6/22, increase to 24 units, beginning 3/9  -- Increased prandial novolog to 2 unit / 15 g carbs.    -- Continue with sliding scale insulin coverage  -- Will need to adjust as prednisone dose decreases.     Diet: Orders Placed This Encounter      Moderate Consistent Carb (60 g CHO per Meal) Diet      Diet     IVF: None  Botello Catheter: Not present     DVT Prophylaxis: ASA, PCDs, ambulation.   Code Status: No CPR- Do NOT Intubate     Disposition:  Medically ready for discharge when TCU is available/authorization has been received    Communication: Discussed with patient and bedside RN    Emily Millan  [place] [facility]ist [facility]  Securely message with the Vocera Web Console (learn more here)  Text page via [employer] Paging/Directory    Interval History   Events over last 24 hours as outlined above.   \"I guess they are working on it.\"  Patient says she is waiting for discharge to TCU.  She feels tired, thinks this is because she has been a bit more active in recent days.  She has occasional cough but denies feeling short of breath at rest.  No GI complaints.    -Data reviewed today: I reviewed all new labs and imaging results over the last 24 hours. I personally reviewed no images or EKG's today.    Physical Exam   Temp: 98.1  F (36.7  C) Temp src: Oral BP: (!) 146/68 Pulse: 82   Resp: 18 SpO2: 95 % O2 Device: None (Room air) Oxygen Delivery: 1.5 LPM  Vitals:    03/05/22 0629 03/06/22 0615 03/07/22 0705   Weight: 60.9 kg (134 lb 4.2 oz) 57.8 kg (127 lb 8 oz) 57.8 kg (127 lb 6.4 oz)     Vital Signs with Ranges  Temp:  [97.4  F (36.3  C)-98.5  F (36.9  C)] 98.1  F (36.7 "  C)  Pulse:  [79-94] 82  Resp:  [18] 18  BP: (128-158)/(63-72) 146/68  SpO2:  [92 %-97 %] 95 %  I/O last 3 completed shifts:  In: 240 [P.O.:240]  Out: 1550 [Urine:1550]    Today's Exam  Constitutional:  NAD,  Frail, appears stated age  Neuropsyche:  alert and oriented, answers questions appropriately.   Respiratory: Breathing comfortably on RA, marked decreased air exchange, no wheezes, no crackles.   Cardiovascular:  Regular rate and rhythm, no edema.  GI: soft, NT/ND, BS normal  Skin/Integumen: Scattered bruises, including on right hand, right leg. No acute rash or sign of bleeding.     Medications   All medications reviewed on 03/07/22       amLODIPine  5 mg Oral Daily     aspirin  81 mg Oral Daily     atovaquone  750 mg Oral BID w/meals     calcium carbonate 600 mg-vitamin D 400 units  1 tablet Oral BID     calcium polycarbophil  625 mg Oral Daily     famotidine  20 mg Oral BID     fluticasone-vilanterol  1 puff Inhalation Daily     insulin aspart  1-7 Units Subcutaneous TID AC     insulin aspart  1-5 Units Subcutaneous At Bedtime     insulin aspart   Subcutaneous TID AC     insulin glargine  24 Units Subcutaneous QAM AC     latanoprost  1 drop Left Eye QPM     multivitamin, therapeutic  1 tablet Oral QPM     polyethylene glycol  8.5 g Oral QPM     predniSONE  40 mg Oral Daily    Followed by     [START ON 3/14/2022] predniSONE  20 mg Oral Daily     rosuvastatin  5 mg Oral At Bedtime     sodium chloride (PF)  3 mL Intracatheter Q8H     umeclidinium  1 puff Inhalation Daily       Data   Recent Labs   Lab 03/09/22  0844 03/09/22  0722 03/08/22  2053 03/06/22  1722 03/06/22  1555 03/06/22  1123 03/06/22  1000 03/04/22  1226 03/04/22  0850 03/03/22  1234 03/03/22  1002   WBC  --   --   --   --   --   --   --   --  5.5  --  5.8   HGB  --   --   --   --   --   --   --   --  11.4*  --  10.8*   MCV  --   --   --   --   --   --   --   --  81  --  83   PLT  --   --   --   --   --   --   --   --  244  --  229     --    --   --   --   --  135  --  140  --  136   POTASSIUM 3.8  --   --   --  3.6  --  3.1*  --  3.4  --  3.2*   CHLORIDE 104  --   --   --   --   --  97  --  104  --  101   CO2 23  --   --   --   --   --  29  --  31  --  31   BUN 25  --   --   --   --   --  30  --  17  --  14   CR 0.62  --   --   --   --   --  0.65  --  0.71  --  0.66   ANIONGAP 9  --   --   --   --   --  9  --  5  --  4   NARCISA 9.1  --   --   --   --   --  8.7  --  9.0  --  8.8   * 191* 239*   < >  --    < > 425*   < > 203*   < > 260*    < > = values in this interval not displayed.       No results found for this or any previous visit (from the past 24 hour(s)).

## 2022-03-09 NOTE — PLAN OF CARE
Goal Outcome Evaluation:      DATE & TIME: 3/8/22 [id number]  Cognitive Concerns/ Orientation : A&O x4, forgetful at times  BEHAVIOR & AGGRESSION TOOL COLOR: Green     ABNL VS/O2: VSS on RA, able to wean off O2. Pt requested O2 while sleeping (1.5L).  MOBILITY: SBA with GB/W, calls for help.  PAIN MANAGMENT: Pain in lower back. PRN oxy given with relief  DIET: Mod CHO- great appetite  BOWEL/BLADDER: Continent  ABNL LAB/BG:   DRAIN/DEVICES: PIV SL    SKIN: Scattered bruises  TESTS/PROCEDURES: NA  D/C DATE: Probable discharge 3/9- waiting for placement  Discharge Barriers: potential TCU placement  OTHER IMPORTANT INFO: ID following. Mepron BID. Prednisone taper.

## 2022-03-10 ENCOUNTER — APPOINTMENT (OUTPATIENT)
Dept: PHYSICAL THERAPY | Facility: CLINIC | Age: 87
DRG: 177 | End: 2022-03-10
Payer: COMMERCIAL

## 2022-03-10 LAB
GLUCOSE BLDC GLUCOMTR-MCNC: 125 MG/DL (ref 70–99)
GLUCOSE BLDC GLUCOMTR-MCNC: 159 MG/DL (ref 70–99)
GLUCOSE BLDC GLUCOMTR-MCNC: 173 MG/DL (ref 70–99)
GLUCOSE BLDC GLUCOMTR-MCNC: 180 MG/DL (ref 70–99)
GLUCOSE BLDC GLUCOMTR-MCNC: 263 MG/DL (ref 70–99)
GLUCOSE BLDC GLUCOMTR-MCNC: 62 MG/DL (ref 70–99)
GLUCOSE BLDC GLUCOMTR-MCNC: 97 MG/DL (ref 70–99)

## 2022-03-10 PROCEDURE — 250N000012 HC RX MED GY IP 250 OP 636 PS 637: Performed by: INTERNAL MEDICINE

## 2022-03-10 PROCEDURE — 250N000013 HC RX MED GY IP 250 OP 250 PS 637: Performed by: INTERNAL MEDICINE

## 2022-03-10 PROCEDURE — 120N000001 HC R&B MED SURG/OB

## 2022-03-10 PROCEDURE — 97116 GAIT TRAINING THERAPY: CPT | Mod: GP | Performed by: PHYSICAL THERAPIST

## 2022-03-10 PROCEDURE — 97110 THERAPEUTIC EXERCISES: CPT | Mod: GP | Performed by: PHYSICAL THERAPIST

## 2022-03-10 PROCEDURE — 250N000013 HC RX MED GY IP 250 OP 250 PS 637: Performed by: HOSPITALIST

## 2022-03-10 PROCEDURE — 99232 SBSQ HOSP IP/OBS MODERATE 35: CPT | Performed by: INTERNAL MEDICINE

## 2022-03-10 RX ADMIN — UMECLIDINIUM 1 PUFF: 62.5 AEROSOL, POWDER ORAL at 08:45

## 2022-03-10 RX ADMIN — LATANOPROST 1 DROP: 50 SOLUTION/ DROPS OPHTHALMIC at 21:31

## 2022-03-10 RX ADMIN — PREDNISONE 40 MG: 20 TABLET ORAL at 08:41

## 2022-03-10 RX ADMIN — INSULIN ASPART 6 UNITS: 100 INJECTION, SOLUTION INTRAVENOUS; SUBCUTANEOUS at 12:30

## 2022-03-10 RX ADMIN — FLUTICASONE FUROATE AND VILANTEROL TRIFENATATE 1 PUFF: 200; 25 POWDER RESPIRATORY (INHALATION) at 08:45

## 2022-03-10 RX ADMIN — CALCIUM POLYCARBOPHIL 625 MG: 625 TABLET, FILM COATED ORAL at 08:42

## 2022-03-10 RX ADMIN — FAMOTIDINE 20 MG: 20 TABLET ORAL at 08:41

## 2022-03-10 RX ADMIN — OXYCODONE HYDROCHLORIDE 5 MG: 5 TABLET ORAL at 18:03

## 2022-03-10 RX ADMIN — INSULIN ASPART 7 UNITS: 100 INJECTION, SOLUTION INTRAVENOUS; SUBCUTANEOUS at 08:54

## 2022-03-10 RX ADMIN — ASPIRIN 81 MG: 81 TABLET, COATED ORAL at 08:41

## 2022-03-10 RX ADMIN — OXYCODONE HYDROCHLORIDE 5 MG: 5 TABLET ORAL at 08:42

## 2022-03-10 RX ADMIN — POLYETHYLENE GLYCOL 3350 8.5 G: 17 POWDER, FOR SOLUTION ORAL at 21:25

## 2022-03-10 RX ADMIN — DEXTROSE 15 G: 15 GEL ORAL at 02:03

## 2022-03-10 RX ADMIN — ATOVAQUONE 750 MG: 750 SUSPENSION ORAL at 18:28

## 2022-03-10 RX ADMIN — FAMOTIDINE 20 MG: 20 TABLET ORAL at 21:26

## 2022-03-10 RX ADMIN — CALCIUM CARBONATE 600 MG (1,500 MG)-VITAMIN D3 400 UNIT TABLET 1 TABLET: at 21:26

## 2022-03-10 RX ADMIN — CALCIUM CARBONATE 600 MG (1,500 MG)-VITAMIN D3 400 UNIT TABLET 1 TABLET: at 08:41

## 2022-03-10 RX ADMIN — INSULIN ASPART 8 UNITS: 100 INJECTION, SOLUTION INTRAVENOUS; SUBCUTANEOUS at 18:29

## 2022-03-10 RX ADMIN — ROSUVASTATIN CALCIUM 5 MG: 5 TABLET, FILM COATED ORAL at 21:26

## 2022-03-10 RX ADMIN — THERA TABS 1 TABLET: TAB at 21:26

## 2022-03-10 RX ADMIN — AMLODIPINE BESYLATE 5 MG: 5 TABLET ORAL at 08:42

## 2022-03-10 RX ADMIN — OXYCODONE HYDROCHLORIDE 5 MG: 5 TABLET ORAL at 22:08

## 2022-03-10 RX ADMIN — INSULIN ASPART 2 UNITS: 100 INJECTION, SOLUTION INTRAVENOUS; SUBCUTANEOUS at 21:32

## 2022-03-10 RX ADMIN — ATOVAQUONE 750 MG: 750 SUSPENSION ORAL at 08:43

## 2022-03-10 ASSESSMENT — ACTIVITIES OF DAILY LIVING (ADL)
ADLS_ACUITY_SCORE: 8
ADLS_ACUITY_SCORE: 10
ADLS_ACUITY_SCORE: 8
ADLS_ACUITY_SCORE: 10
ADLS_ACUITY_SCORE: 10
ADLS_ACUITY_SCORE: 8
ADLS_ACUITY_SCORE: 10
ADLS_ACUITY_SCORE: 8
ADLS_ACUITY_SCORE: 8
ADLS_ACUITY_SCORE: 10
ADLS_ACUITY_SCORE: 10
ADLS_ACUITY_SCORE: 8
ADLS_ACUITY_SCORE: 10
ADLS_ACUITY_SCORE: 8
ADLS_ACUITY_SCORE: 10
ADLS_ACUITY_SCORE: 8
ADLS_ACUITY_SCORE: 10
ADLS_ACUITY_SCORE: 8

## 2022-03-10 NOTE — PROGRESS NOTES
Care Management Follow Up    Length of Stay (days): 17    Expected Discharge Date: 03/11/2022     Concerns to be Addressed:       Patient plan of care discussed at interdisciplinary rounds: Yes    Anticipated Discharge Disposition:       Anticipated Discharge Services:    Anticipated Discharge DME:      Patient/family educated on Medicare website which has current facility and service quality ratings:    Education Provided on the Discharge Plan:    Patient/Family in Agreement with the Plan: yes    Referrals Placed by CM/SW:    Private pay costs discussed: Not applicable    Additional Information:  SW received call form [facility] informing SW that they received denial from [facility] due to no skillable need. SW was given a phone number to call and appeal decision ([phone]). SW was given a fax number of [phone]. SW called appeal number and indicated an appeal. SW was informed she would receive a call within 72 hours. SW called betty Bonilla and discussed denial by [facility] and the appeal process. SW discussed options with daughter if [facility] denies. SW explained patient could return home with assist, or private pay TCU. Irene indicated she isnt sure what her mom would choose to do but she would like to think about the options. SW spoke with patient and informed of appeal process and options if [facility] denies.Patient informed SW she would not feel safe returning home at this time so she doesn't know what she will do if [facility] denies. SW let her know she will keep her updated on any changes. SW will continue to follow.    SW called betty Bonilla letting her know what home with assist would entail, and the cost of a tcu. SW will continue to follow    LYNDA Prajapati    [facility]

## 2022-03-10 NOTE — PROGRESS NOTES
"[facility]    Infectious Disease Progress Note    Date of Service (when I saw the patient): 03/10/2022     Assessment & Plan   Celeste Chacko is a 89 year old female who was admitted on 2/21/2022.     Impression:  1. 89 y.o with COPD  2. Diabetes   3. HTN, HLd  4. Admitted with shortness of breath.   5. CT scan \"  There are two new cavitary nodules in the right upper lobe. This  is concerning for aggressive, atypical infection, possibly fungal or  Tubercular.\"      Recommendations:   AFB stain negative, AFB culture positive, but is CELINE, discontinue iso, this may be relevant long term but not an acute issues and no tx     Fungal antibody negative.      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   Patient is sulfa allergic will do mepron for 3 weeks. Day 7/21 of acute tx Steroids discussed with pulm plan   40 mg orally twice daily for five days, followed by  40 mg orally once daily for five days, followed by  20 mg orally once daily for 11 days   Would get pharmacy/care coordinators to look at ins coverage for the very expensive mepron she will now likely need as outpt , possibly even longer proph    Plan for discharge steroids per taper listed above   Mepron 750 mg BID for 21 days followed by 750 mg daily for long term   FOLLOW UP with me in the clinic in 6 weeks           Gunner Sainz MD    Interval History   Tolerating antibiotics ok   No new rashes or issues with antibiotics   Labs reviewed   Afebrile   AFB probe ID is CELINE out of iso     Physical Exam   Temp: 98  F (36.7  C) Temp src: Oral BP: (!) 143/67 Pulse: 72   Resp: 16 SpO2: 97 % O2 Device: Nasal cannula Oxygen Delivery: 1.5 LPM  Vitals:    03/06/22 0615 03/07/22 0705 03/10/22 0547   Weight: 57.8 kg (127 lb 8 oz) 57.8 kg (127 lb 6.4 oz) 59 kg (130 lb 1.1 oz)     Vital Signs with Ranges  Temp:  [97.5  F (36.4  C)-98.2  F (36.8  C)] 98  F (36.7  C)  Pulse:  [72-88] 72  Resp:  [16-18] 16  BP: (129-164)/(61-82) 143/67  SpO2:  [93 " %-98 %] 97 %    Constitutional: Awake, alert, cooperative, no apparent distress  Lungs: Clear to auscultation bilaterally, no crackles or wheezing  Cardiovascular: Regular rate and rhythm, normal S1 and S2, and no murmur noted  Abdomen: Normal bowel sounds, soft, non-distended, non-tender  Skin: No rashes, no cyanosis, no edema  Other:    Medications       amLODIPine  5 mg Oral Daily     aspirin  81 mg Oral Daily     atovaquone  750 mg Oral BID w/meals     calcium carbonate 600 mg-vitamin D 400 units  1 tablet Oral BID     calcium polycarbophil  625 mg Oral Daily     famotidine  20 mg Oral BID     fluticasone-vilanterol  1 puff Inhalation Daily     insulin aspart  1-7 Units Subcutaneous TID AC     insulin aspart  1-5 Units Subcutaneous At Bedtime     insulin aspart   Subcutaneous TID AC     insulin glargine  24 Units Subcutaneous QAM AC     latanoprost  1 drop Left Eye QPM     multivitamin, therapeutic  1 tablet Oral QPM     polyethylene glycol  8.5 g Oral QPM     predniSONE  40 mg Oral Daily    Followed by     [START ON 3/14/2022] predniSONE  20 mg Oral Daily     rosuvastatin  5 mg Oral At Bedtime     sodium chloride (PF)  3 mL Intracatheter Q8H     umeclidinium  1 puff Inhalation Daily       Data   All microbiology laboratory data reviewed.  Recent Labs   Lab Test 03/04/22  0850 03/03/22  1002 02/28/22  1202   WBC 5.5 5.8 6.3   HGB 11.4* 10.8* 10.8*   HCT 36.4 35.9 35.0   MCV 81 83 84    229 166     Recent Labs   Lab Test 03/09/22  0844 03/06/22  1000 03/04/22  0850   CR 0.62 0.65 0.71     No lab results found.  Recent Labs   Lab Test 12/04/15  1112   CULT >100,000 colonies/mL Escherichia coli*

## 2022-03-10 NOTE — PLAN OF CARE
DATE & TIME: 3/10/22 7-3 pm  Cognitive Concerns/ Orientation : A & O x 4   BEHAVIOR & AGGRESSION TOOL COLOR: Green     ABNL VS/O2: VSS on RA during the day. Mild HTN.   MOBILITY: SBA, GB and walker. Steady on feet.   PAIN MANAGMENT: C/O of chronic low back/sacrum pain. Oxycodone x1.   DIET: Mod CHO  BOWEL/BLADDER: Continent. BM this am.   ABNL LAB/BG: BGM 97/159  DRAIN/DEVICES: PIV SL   TELEMETRY RHYTHM: NA  SKIN: Dusky/blotchy/valeriy skin. Scattered bruises, significantly in arms. Coccyx is red/purple intact.   TESTS/PROCEDURES: NA  D/C DATE: Pending TCU placement  Discharge Barriers: TCU placement  OTHER IMPORTANT INFO: C/O of not sleeping well last night. Napping between meals and cares. Ambulated to shower and writer showered pt. No oxygen all shift, mild BAKER with activity. ID signed off. Pt voiced frustration about being in hospital still and not being able to get to TCU, see SW note.

## 2022-03-10 NOTE — PLAN OF CARE
DATE & TIME: 3/9/22 8042-0292  Cognitive Concerns/ Orientation : A&OX4  BEHAVIOR & AGGRESSION TOOL COLOR: Green     ABNL VS/O2: VSS on R/A. Pt requested O2 only while sleeping (1.5L).  MOBILITY: SBA with GB/W, calls appropriately  PAIN MANAGMENT: Denies this shift, but has chronic back pain  DIET: Mod CHO - good appetite  BOWEL/BLADDER: Continent, adequate UOP  ABNL LAB/BG:   DRAIN/DEVICES: PIV SL    SKIN: Scattered bruises  TESTS/PROCEDURES: N/A  D/C DATE: Waiting for placement  Discharge Barriers: potential TCU placement  OTHER IMPORTANT INFO: ID following.  Prednisone taper.

## 2022-03-10 NOTE — PLAN OF CARE
DATE & TIME: 3/9/22, 2300 - 0730    Cognitive Concerns/ Orientation : A&O x 4   BEHAVIOR & AGGRESSION TOOL COLOR: Green   ABNL VS/O2: BP elevated, other VSS on O2 1.5 LPM via nasal canula  MOBILITY: SBA, GB and walker  PAIN MANAGMENT: Denied  DIET: Mod CHO  BOWEL/BLADDER: Continent  ABNL LAB/BG: BG 62/125   DRAIN/DEVICES: SL  TELEMETRY RHYTHM: NA  SKIN: Scattered bruises  TESTS/PROCEDURES: NA  D/C DATE: Pending TCU placement  Discharge Barriers: TCU placement  OTHER IMPORTANT INFO: Prn glucose gel given for low blood glucose with improvements.  ID following. On Prednisone taper

## 2022-03-10 NOTE — PROGRESS NOTES
"[facility]    Hospitalist Progress Note    Date of Service (when I saw the patient): 03/10/2022  Admit date: 2/21/2022    Assessment & Plan   Celeste Chacko is a very pleasant 89 year old woman with h/o COPD, chronic back pain, DM2, HTN, HLD who was brought to the ER on 2/21 for evaluation of exertional dyspnea and fatigue.       Possible Lung abscess- cavitary nodules in RUL  PJP infection  CELINE (chronic, acute treatment indicated)   Recent admission for COPD exacerbation treated with a steroid but ongoing dyspnea mostly with exertion but without fever, cough, weight loss or night sweats.  No wheezing.  No chest pain.   * CT Chest on 2/21: No PE but 2 new cavitary nodules in the right upper lobe. This is concerning for aggressive, atypical infection, possibly fungal or tubercular.  Patient has been on steroid for almost a month although currently off of it.    * CT Chest 3/2/22 due to worsening hypoxia: pulmonary edema and improvement in cavitation.   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 1 more test not completed due to inaccurate specimen. Patient initially taken off isolation given all other negative factors per ID,  however AFB culture came back positive on 3/4/22 and hence back in isolation for TB pending speciation.  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 \"Discontinue iso, this may be relevant long term but not an acute issues and no tx\"  * fungal antigen for histo, cocci, blasto and Aspergillus all negative   * BCx negative to date  * PCR positive for PJP, which explains the hypoxia  * Patient is allergic to sulfa    treat with Mepron 750 mg BID for 21 days (will be complete on 3/23) followed by 750 mg daily for long term.  Follow up with Dr. Sainz in clinic in 6 weeks.  I appreciate her followup.    Pharmacy liaison consult requested.  Dr. Sainz notes that Mepron is very expensive.    Prednisone, in " tapering doses, as follows: 40 mg orally twice daily for five days, then 40 mg orally once daily for five days, then 20 mg orally once daily for 11 days     Had been on Unasyn for now for possible bacterial cavitary infection, discontinued by Dr. Sainz on 3/8    Acute pulmonary edema   Type II MI     Minimally abnormal troponin (max 78 and down trending) Unlikely ACS.    Essential hypertension  Hyperlipidemia  * CT on 3/2 showed pulmonary edema as above.   * Echo 2/21/22: EF > 70%, RV normal. No significant valve abnormalities. Pulmonary HTN. IVC normal size and preserved respiratory variability.    Diuresed with IV lasix initially with good response, switched furosemide 40 mg daily.     No evidence of fluid overload on 03/07/22. Stopped furosemide.    Continue PTA Norvasc 5 mg p.o. daily.     Plan is for discharge to TCU. Patient agrees. Needs 24 hour assist.    Continues on ASA.      Acute hypoxic respiratory failure secondary to above   COPD Not on home O2. Her current presentation not c/w COPD exacerbation    Continue PTA is on fluticasone/salmeterol, DuoNeb, Spiriva inhalers.    oxygen saturation is 96% on RA, at rest, dipped to 88% with ambulation but recovered quickly, per my discussion with RN .     Right eye conjunctivitis :    Started Ofloxacin 0.3% ophthalmic solution.     DM2:  Takes glipizide PTA.  Hyperglycemia related to infection and steroids.  Hemoglobin A1C POCT   Date Value Ref Range Status   05/05/2021 7.2 (H) 0 - 5.6 % Final     Comment:     Normal <5.7% Prediabetes 5.7-6.4%  Diabetes 6.5% or higher - adopted from ADA   consensus guidelines.       Hemoglobin A1C   Date Value Ref Range Status   02/21/2022 9.9 (H) 0.0 - 5.6 % Final     Comment:     Normal <5.7%   Prediabetes 5.7-6.4%    Diabetes 6.5% or higher     Note: Adopted from ADA consensus guidelines.     Recent Labs   Lab 03/10/22  0759 03/10/22  0221 03/10/22  0155 03/09/22  2108 03/09/22  1808 03/09/22  1203   GLC 97 125* 62* 162* 136*  "137*         PTA glipizide held    Started glargine 20 units afternoon of 3/6/22, increase to 24 units, beginning 3/9    Increased prandial novolog to 2 unit / 15 g carbs.      Continue with sliding scale insulin coverage    Will need to adjust as prednisone dose decreases.     Diet: Orders Placed This Encounter      Moderate Consistent Carb (60 g CHO per Meal) Diet      Diet     IVF: None  Botello Catheter: Not present     DVT Prophylaxis: ASA, PCDs, ambulation.   Code Status: No CPR- Do NOT Intubate     Disposition:  Medically ready for discharge when TCU is available/authorization has been received    Communication: Discussed with patient and bedside RN    [facility]ist [facility]  [facility]  Securely message with the Vocera Web Console (learn more here)  Text page via [employer] Paging/Directory    Interval History   Events over last 24 hours as outlined above.   \"I need the help.  I could fall.\"  Patient just returned from working with PT.  She is eager to build her strength, needs assistant from skilled therapist to boost her strength, balance, endurance.  She is frustrated by long hospital stay.  She has no new respiratory or GI complaints.    -Data reviewed today: I reviewed all new labs and imaging results over the last 24 hours. I personally reviewed no images or EKG's today.    Physical Exam   Temp: 98  F (36.7  C) Temp src: Oral BP: (!) 143/67 Pulse: 72   Resp: 18 SpO2: 97 % O2 Device: Nasal cannula Oxygen Delivery: 1.5 LPM  Vitals:    03/06/22 0615 03/07/22 0705 03/10/22 0547   Weight: 57.8 kg (127 lb 8 oz) 57.8 kg (127 lb 6.4 oz) 59 kg (130 lb 1.1 oz)     Vital Signs with Ranges  Temp:  [97.5  F (36.4  C)-98.2  F (36.8  C)] 98  F (36.7  C)  Pulse:  [72-88] 72  Resp:  [18] 18  BP: (129-164)/(61-82) 143/67  SpO2:  [93 %-98 %] 97 %  I/O last 3 completed shifts:  In: 120 [P.O.:120]  Out: 2050 [Urine:2050]    Today's Exam  Constitutional:  NAD,  Frail, appears stated " age  Neuropsyche:  alert and oriented, answers questions appropriately.   Respiratory: Breathing comfortably on RA, marked decreased air exchange, no wheezes, no crackles.   Cardiovascular:  Regular rate and rhythm, no edema.  GI: soft, NT/ND, BS normal  Skin/Integumen: Scattered bruises, including on right hand, right leg. No acute rash or sign of bleeding.     Medications   All medications reviewed on 03/07/22       amLODIPine  5 mg Oral Daily     aspirin  81 mg Oral Daily     atovaquone  750 mg Oral BID w/meals     calcium carbonate 600 mg-vitamin D 400 units  1 tablet Oral BID     calcium polycarbophil  625 mg Oral Daily     famotidine  20 mg Oral BID     fluticasone-vilanterol  1 puff Inhalation Daily     insulin aspart  1-7 Units Subcutaneous TID AC     insulin aspart  1-5 Units Subcutaneous At Bedtime     insulin aspart   Subcutaneous TID AC     insulin glargine  24 Units Subcutaneous QAM AC     latanoprost  1 drop Left Eye QPM     multivitamin, therapeutic  1 tablet Oral QPM     polyethylene glycol  8.5 g Oral QPM     predniSONE  40 mg Oral Daily    Followed by     [START ON 3/14/2022] predniSONE  20 mg Oral Daily     rosuvastatin  5 mg Oral At Bedtime     sodium chloride (PF)  3 mL Intracatheter Q8H     umeclidinium  1 puff Inhalation Daily       Data   Recent Labs   Lab 03/10/22  0759 03/10/22  0221 03/10/22  0155 03/09/22  1203 03/09/22  0844 03/06/22  1722 03/06/22  1555 03/06/22  1123 03/06/22  1000 03/04/22  1226 03/04/22  0850 03/03/22  1234 03/03/22  1002   WBC  --   --   --   --   --   --   --   --   --   --  5.5  --  5.8   HGB  --   --   --   --   --   --   --   --   --   --  11.4*  --  10.8*   MCV  --   --   --   --   --   --   --   --   --   --  81  --  83   PLT  --   --   --   --   --   --   --   --   --   --  244  --  229   NA  --   --   --   --  136  --   --   --  135  --  140  --  136   POTASSIUM  --   --   --   --  3.8  --  3.6  --  3.1*  --  3.4  --  3.2*   CHLORIDE  --   --   --   --  104   --   --   --  97  --  104  --  101   CO2  --   --   --   --  23  --   --   --  29  --  31  --  31   BUN  --   --   --   --  25  --   --   --  30  --  17  --  14   CR  --   --   --   --  0.62  --   --   --  0.65  --  0.71  --  0.66   ANIONGAP  --   --   --   --  9  --   --   --  9  --  5  --  4   NARCISA  --   --   --   --  9.1  --   --   --  8.7  --  9.0  --  8.8   GLC 97 125* 62*   < > 287*   < >  --    < > 425*   < > 203*   < > 260*    < > = values in this interval not displayed.       No results found for this or any previous visit (from the past 24 hour(s)).

## 2022-03-11 ENCOUNTER — APPOINTMENT (OUTPATIENT)
Dept: PHYSICAL THERAPY | Facility: CLINIC | Age: 87
DRG: 177 | End: 2022-03-11
Payer: COMMERCIAL

## 2022-03-11 LAB
GLUCOSE BLDC GLUCOMTR-MCNC: 173 MG/DL (ref 70–99)
GLUCOSE BLDC GLUCOMTR-MCNC: 182 MG/DL (ref 70–99)
GLUCOSE BLDC GLUCOMTR-MCNC: 230 MG/DL (ref 70–99)
GLUCOSE BLDC GLUCOMTR-MCNC: 83 MG/DL (ref 70–99)
GLUCOSE BLDC GLUCOMTR-MCNC: 92 MG/DL (ref 70–99)

## 2022-03-11 PROCEDURE — 250N000012 HC RX MED GY IP 250 OP 636 PS 637: Performed by: INTERNAL MEDICINE

## 2022-03-11 PROCEDURE — 97530 THERAPEUTIC ACTIVITIES: CPT | Mod: GP | Performed by: PHYSICAL THERAPIST

## 2022-03-11 PROCEDURE — 250N000013 HC RX MED GY IP 250 OP 250 PS 637: Performed by: INTERNAL MEDICINE

## 2022-03-11 PROCEDURE — 250N000013 HC RX MED GY IP 250 OP 250 PS 637: Performed by: HOSPITALIST

## 2022-03-11 PROCEDURE — 120N000001 HC R&B MED SURG/OB

## 2022-03-11 PROCEDURE — 99232 SBSQ HOSP IP/OBS MODERATE 35: CPT | Performed by: INTERNAL MEDICINE

## 2022-03-11 RX ADMIN — ASPIRIN 81 MG: 81 TABLET, COATED ORAL at 08:30

## 2022-03-11 RX ADMIN — AMLODIPINE BESYLATE 5 MG: 5 TABLET ORAL at 08:30

## 2022-03-11 RX ADMIN — POLYETHYLENE GLYCOL 3350 8.5 G: 17 POWDER, FOR SOLUTION ORAL at 21:06

## 2022-03-11 RX ADMIN — THERA TABS 1 TABLET: TAB at 21:06

## 2022-03-11 RX ADMIN — UMECLIDINIUM 1 PUFF: 62.5 AEROSOL, POWDER ORAL at 08:31

## 2022-03-11 RX ADMIN — PREDNISONE 40 MG: 20 TABLET ORAL at 08:30

## 2022-03-11 RX ADMIN — INSULIN ASPART 8 UNITS: 100 INJECTION, SOLUTION INTRAVENOUS; SUBCUTANEOUS at 18:22

## 2022-03-11 RX ADMIN — ATOVAQUONE 750 MG: 750 SUSPENSION ORAL at 18:18

## 2022-03-11 RX ADMIN — OXYCODONE HYDROCHLORIDE 5 MG: 5 TABLET ORAL at 22:24

## 2022-03-11 RX ADMIN — FAMOTIDINE 20 MG: 20 TABLET ORAL at 08:30

## 2022-03-11 RX ADMIN — ROSUVASTATIN CALCIUM 5 MG: 5 TABLET, FILM COATED ORAL at 21:06

## 2022-03-11 RX ADMIN — CALCIUM POLYCARBOPHIL 625 MG: 625 TABLET, FILM COATED ORAL at 08:30

## 2022-03-11 RX ADMIN — INSULIN ASPART 6 UNITS: 100 INJECTION, SOLUTION INTRAVENOUS; SUBCUTANEOUS at 13:49

## 2022-03-11 RX ADMIN — OXYCODONE HYDROCHLORIDE 5 MG: 5 TABLET ORAL at 05:35

## 2022-03-11 RX ADMIN — FLUTICASONE FUROATE AND VILANTEROL TRIFENATATE 1 PUFF: 200; 25 POWDER RESPIRATORY (INHALATION) at 08:31

## 2022-03-11 RX ADMIN — FAMOTIDINE 20 MG: 20 TABLET ORAL at 21:06

## 2022-03-11 RX ADMIN — INSULIN ASPART 4 UNITS: 100 INJECTION, SOLUTION INTRAVENOUS; SUBCUTANEOUS at 08:28

## 2022-03-11 RX ADMIN — ATOVAQUONE 750 MG: 750 SUSPENSION ORAL at 08:31

## 2022-03-11 RX ADMIN — CALCIUM CARBONATE 600 MG (1,500 MG)-VITAMIN D3 400 UNIT TABLET 1 TABLET: at 21:06

## 2022-03-11 RX ADMIN — OXYCODONE HYDROCHLORIDE 5 MG: 5 TABLET ORAL at 18:18

## 2022-03-11 RX ADMIN — LATANOPROST 1 DROP: 50 SOLUTION/ DROPS OPHTHALMIC at 21:06

## 2022-03-11 RX ADMIN — CALCIUM CARBONATE 600 MG (1,500 MG)-VITAMIN D3 400 UNIT TABLET 1 TABLET: at 08:30

## 2022-03-11 RX ADMIN — OXYCODONE HYDROCHLORIDE 5 MG: 5 TABLET ORAL at 10:46

## 2022-03-11 ASSESSMENT — ACTIVITIES OF DAILY LIVING (ADL)
ADLS_ACUITY_SCORE: 10
ADLS_ACUITY_SCORE: 8
ADLS_ACUITY_SCORE: 10
ADLS_ACUITY_SCORE: 8
ADLS_ACUITY_SCORE: 8
ADLS_ACUITY_SCORE: 10
ADLS_ACUITY_SCORE: 8
ADLS_ACUITY_SCORE: 10
ADLS_ACUITY_SCORE: 8
ADLS_ACUITY_SCORE: 8
ADLS_ACUITY_SCORE: 10
ADLS_ACUITY_SCORE: 8
ADLS_ACUITY_SCORE: 10
ADLS_ACUITY_SCORE: 8

## 2022-03-11 NOTE — PROGRESS NOTES
"[facility]    Infectious Disease Progress Note    Date of Service (when I saw the patient): 03/11/2022     Assessment & Plan   Celeste Chacko is a 89 year old female who was admitted on 2/21/2022.     Impression:  1. 89 y.o with COPD  2. Diabetes   3. HTN, HLd  4. Admitted with shortness of breath.   5. CT scan \"  There are two new cavitary nodules in the right upper lobe. This  is concerning for aggressive, atypical infection, possibly fungal or  Tubercular.\"      Recommendations:   AFB stain negative, AFB culture positive, but is CELINE, discontinue iso, this may be relevant long term but not an acute issues and no tx     Fungal antibody negative.      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   Patient is sulfa allergic will do mepron for 3 weeks. Day 8/21 of acute tx Steroids discussed with pulm plan   40 mg orally twice daily for five days, followed by  40 mg orally once daily for five days, followed by  20 mg orally once daily for 11 days   Would get pharmacy/care coordinators to look at ins coverage for the very expensive mepron she will now likely need as outpt , possibly even longer proph    Plan for discharge steroids per taper listed above   Mepron 750 mg BID for 21 days followed by 750 mg daily for long term   FOLLOW UP with me in the clinic in 6 weeks         Gunner Sainz MD    Interval History   Tolerating antibiotics ok   No new rashes or issues with antibiotics   Labs reviewed   Afebrile   AFB probe ID is CELINE out of iso     Physical Exam   Temp: 98.4  F (36.9  C) Temp src: Oral BP: 139/68 Pulse: 54   Resp: 18 SpO2: 93 % O2 Device: None (Room air)    Vitals:    03/07/22 0705 03/10/22 0547 03/11/22 0645   Weight: 57.8 kg (127 lb 6.4 oz) 59 kg (130 lb 1.1 oz) 58.6 kg (129 lb 3 oz)     Vital Signs with Ranges  Temp:  [97.6  F (36.4  C)-98.5  F (36.9  C)] 98.4  F (36.9  C)  Pulse:  [] 54  Resp:  [16-18] 18  BP: (139-166)/(63-81) 139/68  SpO2:  [91 %-94 %] 93 " %    Constitutional: Awake, alert, cooperative, no apparent distress  Lungs: Clear to auscultation bilaterally, no crackles or wheezing  Cardiovascular: Regular rate and rhythm, normal S1 and S2, and no murmur noted  Abdomen: Normal bowel sounds, soft, non-distended, non-tender  Skin: No rashes, no cyanosis, no edema  Other:    Medications       amLODIPine  5 mg Oral Daily     aspirin  81 mg Oral Daily     atovaquone  750 mg Oral BID w/meals     calcium carbonate 600 mg-vitamin D 400 units  1 tablet Oral BID     calcium polycarbophil  625 mg Oral Daily     famotidine  20 mg Oral BID     fluticasone-vilanterol  1 puff Inhalation Daily     insulin aspart  1-7 Units Subcutaneous TID AC     insulin aspart  1-5 Units Subcutaneous At Bedtime     insulin aspart   Subcutaneous TID AC     insulin glargine  24 Units Subcutaneous QAM AC     latanoprost  1 drop Left Eye QPM     multivitamin, therapeutic  1 tablet Oral QPM     polyethylene glycol  8.5 g Oral QPM     predniSONE  40 mg Oral Daily    Followed by     [START ON 3/14/2022] predniSONE  20 mg Oral Daily     rosuvastatin  5 mg Oral At Bedtime     sodium chloride (PF)  3 mL Intracatheter Q8H     umeclidinium  1 puff Inhalation Daily       Data   All microbiology laboratory data reviewed.  Recent Labs   Lab Test 03/04/22  0850 03/03/22  1002 02/28/22  1202   WBC 5.5 5.8 6.3   HGB 11.4* 10.8* 10.8*   HCT 36.4 35.9 35.0   MCV 81 83 84    229 166     Recent Labs   Lab Test 03/09/22  0844 03/06/22  1000 03/04/22  0850   CR 0.62 0.65 0.71     No lab results found.  Recent Labs   Lab Test 12/04/15  1112   CULT >100,000 colonies/mL Escherichia coli*

## 2022-03-11 NOTE — PLAN OF CARE
DATE & TIME: 3/10/22, 8800 - 5786   Cognitive Concerns/ Orientation : A&O x 4   BEHAVIOR & AGGRESSION TOOL COLOR: Green   ABNL VS/O2: BP elevated. Other VSS on room air  MOBILITY: SBA with GB and walker  PAIN MANAGMENT: Prn Oxycodone given for lower back pain. Helpful  DIET: Mod CHO  BOWEL/BLADDER: Continent  ABNL LAB/BG: NA  DRAIN/DEVICES: PIV SL  TELEMETRY RHYTHM: NA  SKIN: Scattered bruise. Protective Mepilex to Coccyx and right heel  TESTS/PROCEDURES: NA  D/C DATE: Pending TCU placement  Discharge Barriers: TCU placement  OTHER IMPORTANT INFO: Mild BAKER.

## 2022-03-11 NOTE — PROGRESS NOTES
"[facility]    Hospitalist Progress Note    Date of Service (when I saw the patient): 03/11/2022  Admit date: 2/21/2022    Assessment & Plan   Celeste Chacko is a very pleasant 89 year old woman with h/o COPD, chronic back pain, DM2, HTN, HLD who was brought to the ER on 2/21 for evaluation of exertional dyspnea and fatigue.       Possible Lung abscess- cavitary nodules in RUL  PJP infection  CELINE (chronic, acute treatment indicated)   Recent admission for COPD exacerbation treated with a steroid but ongoing dyspnea mostly with exertion but without fever, cough, weight loss or night sweats.  No wheezing.  No chest pain.   * CT Chest on 2/21: No PE but 2 new cavitary nodules in the right upper lobe. This is concerning for aggressive, atypical infection, possibly fungal or tubercular.  Patient has been on steroid for almost a month although currently off of it.    * CT Chest 3/2/22 due to worsening hypoxia: pulmonary edema and improvement in cavitation.   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 1 more test not completed due to inaccurate specimen. Patient initially taken off isolation given all other negative factors per ID,  however AFB culture came back positive on 3/4/22 and hence back in isolation for TB pending speciation.  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 \"Discontinue iso, this may be relevant long term but not an acute issues and no tx\"  * fungal antigen for histo, cocci, blasto and Aspergillus all negative   * BCx negative to date  * PCR positive for PJP, which explains the hypoxia  * Patient is allergic to sulfa    treat with Mepron 750 mg BID for 21 days (will be complete on 3/23) followed by 750 mg daily for long term.  Follow up with Dr. Sainz in clinic in 6 weeks.  I appreciate her followup.    Pharmacy liaison consult requested.  Dr. Sainz notes that Mepron is very expensive.    Prednisone, in " tapering doses, as follows: 40 mg orally twice daily for five days, then 40 mg orally once daily for five days, then 20 mg orally once daily for 11 days     Had been on Unasyn for now for possible bacterial cavitary infection, discontinued by Dr. Sainz on 3/8    Acute pulmonary edema   Type II MI     Minimally abnormal troponin (max 78 and down trending) Unlikely ACS.    Essential hypertension  Hyperlipidemia  * CT on 3/2 showed pulmonary edema as above.   * Echo 2/21/22: EF > 70%, RV normal. No significant valve abnormalities. Pulmonary HTN. IVC normal size and preserved respiratory variability.    Diuresed with IV lasix initially with good response, switched furosemide 40 mg daily.     No evidence of fluid overload on 03/07/22. Stopped furosemide.    Continue PTA Norvasc 5 mg p.o. daily.     Plan is for discharge to TCU. Patient agrees. Needs 24 hour assist.    Continues on ASA.      Acute hypoxic respiratory failure secondary to above   COPD Not on home O2. Her current presentation not c/w COPD exacerbation    Continue PTA is on fluticasone/salmeterol, DuoNeb, Spiriva inhalers.    oxygen saturation is 96% on RA    Right eye conjunctivitis :    Started Ofloxacin 0.3% ophthalmic solution.     DM2:  Takes glipizide PTA.  Hyperglycemia related to infection and steroids.  Hemoglobin A1C POCT   Date Value Ref Range Status   05/05/2021 7.2 (H) 0 - 5.6 % Final     Comment:     Normal <5.7% Prediabetes 5.7-6.4%  Diabetes 6.5% or higher - adopted from ADA   consensus guidelines.       Hemoglobin A1C   Date Value Ref Range Status   02/21/2022 9.9 (H) 0.0 - 5.6 % Final     Comment:     Normal <5.7%   Prediabetes 5.7-6.4%    Diabetes 6.5% or higher     Note: Adopted from ADA consensus guidelines.     Recent Labs   Lab 03/11/22  0800 03/11/22  0155 03/10/22  2123 03/10/22  1806 03/10/22  1635 03/10/22  1146   GLC 83 92 263* 173* 180* 159*         PTA glipizide held    Started glargine 20 units afternoon of 3/6/22, increase to  "24 units, beginning 3/9    Increased prandial novolog to 2 unit / 15 g carbs.      Continue with sliding scale insulin coverage    Blood sugar readings are at goal    Will need to adjust as prednisone dose decreases (next Prednisone dose change will be on 3/14)     Diet: Orders Placed This Encounter      Moderate Consistent Carb (60 g CHO per Meal) Diet      Diet     Botello Catheter: Not present     DVT Prophylaxis: ASA, PCDs, ambulation.   Code Status: No CPR- Do NOT Intubate     Disposition:  Medically ready for discharge when TCU is available/authorization has been received    Communication: Discussed with patient and bedside RN    Emily Millan  [facility] Hospitalist Service  [facility]  Securely message with the Vocera Web Console (learn more here)  Text page via [employer] Paging/[employer]    Interval History    \"The lady from the insurance company kept me on the phone for so long that my hands went numb.\"  Celeste is very frustrated by difficulty getting approval from insurance for a TCU stay.  She says that she feels less short of breath than she did earlier this week, but did have to pause during her walk today due to dyspnea.  She has a very occasional cough, no GI complaints.    -Data reviewed today: I reviewed all new labs and imaging results over the last 24 hours. I personally reviewed no images or EKG's today.    Physical Exam   Temp: 98.4  F (36.9  C) Temp src: Oral BP: 139/68 Pulse: 54   Resp: 18 SpO2: 93 % O2 Device: None (Room air)    Vitals:    03/07/22 0705 03/10/22 0547 03/11/22 0645   Weight: 57.8 kg (127 lb 6.4 oz) 59 kg (130 lb 1.1 oz) 58.6 kg (129 lb 3 oz)     Vital Signs with Ranges  Temp:  [97.6  F (36.4  C)-98.5  F (36.9  C)] 98.4  F (36.9  C)  Pulse:  [] 54  Resp:  [16-18] 18  BP: (139-166)/(63-81) 139/68  SpO2:  [91 %-94 %] 93 %  I/O last 3 completed shifts:  In: 560 [P.O.:560]  Out: 800 [Urine:800]    Today's Exam  Constitutional:  NAD,  Frail, appears stated " age  Neuropsyche:  alert and oriented, answers questions appropriately.   Respiratory: Breathing comfortably on RA, marked decreased air exchange, no wheezes, no crackles.   Cardiovascular:  Regular rate and rhythm, no edema.  GI: soft, NT/ND, BS normal  Skin/Integumen: Scattered bruises, including on right hand, right leg. No acute rash or sign of bleeding.     Medications   All medications reviewed on 03/07/22       amLODIPine  5 mg Oral Daily     aspirin  81 mg Oral Daily     atovaquone  750 mg Oral BID w/meals     calcium carbonate 600 mg-vitamin D 400 units  1 tablet Oral BID     calcium polycarbophil  625 mg Oral Daily     famotidine  20 mg Oral BID     fluticasone-vilanterol  1 puff Inhalation Daily     insulin aspart  1-7 Units Subcutaneous TID AC     insulin aspart  1-5 Units Subcutaneous At Bedtime     insulin aspart   Subcutaneous TID AC     insulin glargine  24 Units Subcutaneous QAM AC     latanoprost  1 drop Left Eye QPM     multivitamin, therapeutic  1 tablet Oral QPM     polyethylene glycol  8.5 g Oral QPM     predniSONE  40 mg Oral Daily    Followed by     [START ON 3/14/2022] predniSONE  20 mg Oral Daily     rosuvastatin  5 mg Oral At Bedtime     sodium chloride (PF)  3 mL Intracatheter Q8H     umeclidinium  1 puff Inhalation Daily       Data   Recent Labs   Lab 03/11/22  0800 03/11/22  0155 03/10/22  2123 03/09/22  1203 03/09/22  0844 03/06/22  1722 03/06/22  1555 03/06/22  1123 03/06/22  1000   NA  --   --   --   --  136  --   --   --  135   POTASSIUM  --   --   --   --  3.8  --  3.6  --  3.1*   CHLORIDE  --   --   --   --  104  --   --   --  97   CO2  --   --   --   --  23  --   --   --  29   BUN  --   --   --   --  25  --   --   --  30   CR  --   --   --   --  0.62  --   --   --  0.65   ANIONGAP  --   --   --   --  9  --   --   --  9   NARCISA  --   --   --   --  9.1  --   --   --  8.7   GLC 83 92 263*   < > 287*   < >  --    < > 425*    < > = values in this interval not displayed.       No results  found for this or any previous visit (from the past 24 hour(s)).

## 2022-03-11 NOTE — PLAN OF CARE
Goal Outcome Evaluation:      DATE & TIME: 3/11/22 (5751-1312)           Cognitive Concerns/ Orientation : A&O x 4   BEHAVIOR & AGGRESSION TOOL COLOR: Green     ABNL VS/O2: VSS, RA   MOBILITY: SBA with GB and walker, up in chair, ambulated in hallways x2  PAIN MANAGMENT: Prn Oxycodone given for lower back pain. Helpful  DIET: Mod CHO, good appetite   BOWEL/BLADDER: Continent, no BM today  ABNL LAB/BG: BG 83, 173  DRAIN/DEVICES: PIV SL  TELEMETRY RHYTHM: NA  SKIN: Scattered bruise. Protective Mepilex to Coccyx  TESTS/PROCEDURES: NA  D/C DATE: Pending TCU placement  Discharge Barriers: TCU placement  OTHER IMPORTANT INFO: Mild BAKER with ambulation.

## 2022-03-12 ENCOUNTER — APPOINTMENT (OUTPATIENT)
Dept: PHYSICAL THERAPY | Facility: CLINIC | Age: 87
DRG: 177 | End: 2022-03-12
Payer: COMMERCIAL

## 2022-03-12 LAB
CREAT SERPL-MCNC: 0.66 MG/DL (ref 0.52–1.04)
GFR SERPL CREATININE-BSD FRML MDRD: 83 ML/MIN/1.73M2
GLUCOSE BLDC GLUCOMTR-MCNC: 109 MG/DL (ref 70–99)
GLUCOSE BLDC GLUCOMTR-MCNC: 116 MG/DL (ref 70–99)
GLUCOSE BLDC GLUCOMTR-MCNC: 325 MG/DL (ref 70–99)
GLUCOSE BLDC GLUCOMTR-MCNC: 325 MG/DL (ref 70–99)
GLUCOSE BLDC GLUCOMTR-MCNC: 67 MG/DL (ref 70–99)
GLUCOSE BLDC GLUCOMTR-MCNC: 88 MG/DL (ref 70–99)
POTASSIUM BLD-SCNC: 3.6 MMOL/L (ref 3.4–5.3)

## 2022-03-12 PROCEDURE — 84132 ASSAY OF SERUM POTASSIUM: CPT | Performed by: INTERNAL MEDICINE

## 2022-03-12 PROCEDURE — 250N000013 HC RX MED GY IP 250 OP 250 PS 637: Performed by: INTERNAL MEDICINE

## 2022-03-12 PROCEDURE — 99232 SBSQ HOSP IP/OBS MODERATE 35: CPT | Performed by: INTERNAL MEDICINE

## 2022-03-12 PROCEDURE — 97750 PHYSICAL PERFORMANCE TEST: CPT | Mod: GP | Performed by: PHYSICAL THERAPIST

## 2022-03-12 PROCEDURE — 250N000012 HC RX MED GY IP 250 OP 636 PS 637: Performed by: INTERNAL MEDICINE

## 2022-03-12 PROCEDURE — 97530 THERAPEUTIC ACTIVITIES: CPT | Mod: GP | Performed by: PHYSICAL THERAPIST

## 2022-03-12 PROCEDURE — 250N000013 HC RX MED GY IP 250 OP 250 PS 637: Performed by: HOSPITALIST

## 2022-03-12 PROCEDURE — 36415 COLL VENOUS BLD VENIPUNCTURE: CPT | Performed by: INTERNAL MEDICINE

## 2022-03-12 PROCEDURE — 120N000001 HC R&B MED SURG/OB

## 2022-03-12 PROCEDURE — 82565 ASSAY OF CREATININE: CPT | Performed by: INTERNAL MEDICINE

## 2022-03-12 RX ADMIN — INSULIN ASPART 8 UNITS: 100 INJECTION, SOLUTION INTRAVENOUS; SUBCUTANEOUS at 18:42

## 2022-03-12 RX ADMIN — OXYCODONE HYDROCHLORIDE 5 MG: 5 TABLET ORAL at 16:25

## 2022-03-12 RX ADMIN — FLUTICASONE FUROATE AND VILANTEROL TRIFENATATE 1 PUFF: 200; 25 POWDER RESPIRATORY (INHALATION) at 09:01

## 2022-03-12 RX ADMIN — OXYCODONE HYDROCHLORIDE 5 MG: 5 TABLET ORAL at 22:19

## 2022-03-12 RX ADMIN — ATOVAQUONE 750 MG: 750 SUSPENSION ORAL at 08:59

## 2022-03-12 RX ADMIN — CALCIUM CARBONATE 600 MG (1,500 MG)-VITAMIN D3 400 UNIT TABLET 1 TABLET: at 21:07

## 2022-03-12 RX ADMIN — THERA TABS 1 TABLET: TAB at 21:07

## 2022-03-12 RX ADMIN — ROSUVASTATIN CALCIUM 5 MG: 5 TABLET, FILM COATED ORAL at 21:07

## 2022-03-12 RX ADMIN — ATOVAQUONE 750 MG: 750 SUSPENSION ORAL at 18:21

## 2022-03-12 RX ADMIN — AMLODIPINE BESYLATE 5 MG: 5 TABLET ORAL at 08:57

## 2022-03-12 RX ADMIN — POLYETHYLENE GLYCOL 3350 8.5 G: 17 POWDER, FOR SOLUTION ORAL at 21:07

## 2022-03-12 RX ADMIN — FAMOTIDINE 20 MG: 20 TABLET ORAL at 08:57

## 2022-03-12 RX ADMIN — CALCIUM POLYCARBOPHIL 625 MG: 625 TABLET, FILM COATED ORAL at 08:58

## 2022-03-12 RX ADMIN — ASPIRIN 81 MG: 81 TABLET, COATED ORAL at 08:57

## 2022-03-12 RX ADMIN — OXYCODONE HYDROCHLORIDE 5 MG: 5 TABLET ORAL at 10:45

## 2022-03-12 RX ADMIN — FAMOTIDINE 20 MG: 20 TABLET ORAL at 21:06

## 2022-03-12 RX ADMIN — PREDNISONE 40 MG: 20 TABLET ORAL at 08:57

## 2022-03-12 RX ADMIN — MELATONIN TAB 3 MG 3 MG: 3 TAB at 03:04

## 2022-03-12 RX ADMIN — INSULIN ASPART 7 UNITS: 100 INJECTION, SOLUTION INTRAVENOUS; SUBCUTANEOUS at 13:45

## 2022-03-12 RX ADMIN — LATANOPROST 1 DROP: 50 SOLUTION/ DROPS OPHTHALMIC at 21:06

## 2022-03-12 RX ADMIN — INSULIN ASPART 3 UNITS: 100 INJECTION, SOLUTION INTRAVENOUS; SUBCUTANEOUS at 21:05

## 2022-03-12 RX ADMIN — INSULIN ASPART 7 UNITS: 100 INJECTION, SOLUTION INTRAVENOUS; SUBCUTANEOUS at 09:04

## 2022-03-12 RX ADMIN — CALCIUM CARBONATE 600 MG (1,500 MG)-VITAMIN D3 400 UNIT TABLET 1 TABLET: at 08:58

## 2022-03-12 ASSESSMENT — ACTIVITIES OF DAILY LIVING (ADL)
ADLS_ACUITY_SCORE: 10
ADLS_ACUITY_SCORE: 8
ADLS_ACUITY_SCORE: 10
ADLS_ACUITY_SCORE: 10
ADLS_ACUITY_SCORE: 8
ADLS_ACUITY_SCORE: 10
ADLS_ACUITY_SCORE: 8
ADLS_ACUITY_SCORE: 10
ADLS_ACUITY_SCORE: 8
ADLS_ACUITY_SCORE: 10

## 2022-03-12 NOTE — PROGRESS NOTES
03/12/22 1540   Signing Clinician's Name / Credentials   Signing clinician's name / credentials Marga Solis, PT, DPT   Dynamic Gait Index (Justin and Estrella Evangeline, 1995)   Gait Level Surface 2   Change in Gait Speed 2   Gait and Horizontal Head Turns 3   Gait with Vertical Head Turns 3   Gait and Pivot Turns 3   Step Over Obstacle 2   Step Around Obstacles 3   Steps 2  (Does not perform at baseline, scored baased on clinical judg)   Total Dynamic Gait Index Score  (A score of 19 or less has been correlated to an increased risk of falls in community dwelling older adults, patients with vestibular disorders, and patients with MS.)   Total Score (out of 24) 20     Dynamic Gait Index (DGI):The DGI is a measure of balance during gait that is reliable and valid for the elderly and individuals with Parkinson's disease, MS, vestibular disorders, or s/p stroke. Gait assistive device used: None    Scores ?19 /24 indicate an increased risk for falls according to Nohemy et al 2000  Minimal Detectable Change = 2.9 in community dwelling elderly according to Covarrubias et al 2011    Assessment (rationale for performing, application to patient s function & care plan): Pt presents with decreased falls risk with use of her 2WW.   (Minutes billed as physical performance test)

## 2022-03-12 NOTE — PROGRESS NOTES
"   03/12/22 1600   Signing Clinician's Name / Credentials   Signing clinician's name / credentials Marga Solis DPT   Quick Adds   Rehab Discipline PT   Therapeutic Activity   Minutes of Treatment 15 minutes   Symptoms Noted During/After Treatment None   Treatment Detail Pt on RA throughout, VSS O2 >92% throughout. Pt endorsing 5/10 perceived exertion following long ambulation. Pt completed sit<>stand from recliner and toilet with SBA. Amb ~10ft in room with no AD and ~350ft with 2WW in hallway with SBA. No mariano LOB. Incr time spent in direct shared decision making about progress made, impairments, d/c recommendations.  Completed bed mob with mod Indep using bed rail.    Physical Performance Test or Measurement   Minutes of Treatment 15   Symptoms Noted During/After Treatment none   Treatment Detail Pt scored 20/24 on DGI with use of her 2WW, indicative of lower falls risk. Results reviewed with pt, pt endorsing \"I'm amazed at how well I am doing.\"    PT Discharge Planning   PT Discharge Recommendation (DC Rec) home with assist;home with home care physical therapy;Transitional Care Facility   PT Rationale for DC Rec Pt making good progress, completing transfers and amb 300ft with 2WW and SBA, on room air, maintaining O2>92%, reporting mild-mod exertion with longer ambulation. Scored 20/24 on Dynamic Gait Index, indicative of reduced falls risk. Anticipate pt should be able to return home with HHPT and previous level of intermittent assistance from dtr with groceries, household errands. Recommend re-ordering OT consult for assessment of ADLs/cognition in older individual that lives alone, RN jenni and dayana BAXTER 3/12.    PT Brief overview of current status SBA transfers/amb with 2WW; decr anticipatory/reactive balance, decr cardiopulmonary endurance, 20/24 on DGI (reduced falls risk with 2WW)   Additional Documentation   Rehab Comments Monitor O2, RPE   PT Plan Progress dynamic balance, LE strengthening    Total " Session Time   Total Session Time (minutes) 30 minutes

## 2022-03-12 NOTE — PROGRESS NOTES
"[facility]    Hospitalist Progress Note    Date of Service (when I saw the patient): 03/12/2022  Admit date: 2/21/2022    Assessment & Plan   Celeste Chacko is a very pleasant 89 year old woman with h/o COPD, chronic back pain, DM2, HTN, HLD who was brought to the ER on 2/21 for evaluation of exertional dyspnea and fatigue.       Possible Lung abscess- cavitary nodules in RUL  PJP infection  CELINE (chronic, acute treatment indicated)   Recent admission for COPD exacerbation treated with a steroid but ongoing dyspnea mostly with exertion but without fever, cough, weight loss or night sweats.  No wheezing.  No chest pain.   * CT Chest on 2/21: No PE but 2 new cavitary nodules in the right upper lobe. This is concerning for aggressive, atypical infection, possibly fungal or tubercular.  Patient has been on steroid for almost a month although currently off of it.    * CT Chest 3/2/22 due to worsening hypoxia: pulmonary edema and improvement in cavitation.   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 1 more test not completed due to inaccurate specimen. Patient initially taken off isolation given all other negative factors per ID,  however AFB culture came back positive on 3/4/22 and hence back in isolation for TB pending speciation.  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 \"Discontinue iso, this may be relevant long term but not an acute issues and no tx\"  * fungal antigen for histo, cocci, blasto and Aspergillus all negative   * BCx negative to date  * PCR positive for PJP, which explains the hypoxia  * Patient is allergic to sulfa    treat with Mepron 750 mg BID for 21 days (will be complete on 3/23) followed by 750 mg daily for long term.  Follow up with Dr. Sainz in clinic in 6 weeks.  I appreciate her followup.    Pharmacy liaison consult requested.  Dr. Sainz notes that Mepron is very expensive.    Prednisone, in " "tapering doses, as follows: 40 mg orally twice daily for five days, then 40 mg orally once daily for five days, then 20 mg orally once daily for 11 days     Had been on Unasyn for now for possible bacterial cavitary infection, discontinued by Dr. Sainz on 3/8    Acute pulmonary edema   Type II MI     Minimally abnormal troponin (max 78 and down trending) Unlikely ACS.    Essential hypertension  Hyperlipidemia  * CT on 3/2 showed pulmonary edema as above.   * Echo 2/21/22: EF > 70%, RV normal. No significant valve abnormalities. Pulmonary HTN. IVC normal size and preserved respiratory variability.    Diuresed with IV lasix initially with good response, switched furosemide 40 mg daily.     No evidence of fluid overload on 03/07/22. Stopped furosemide.    Continue PTA Norvasc 5 mg p.o. daily.     Plan is for discharge to TCU. See PT note from 3/12, \"Anticipate pt should be able to return home with HHPT and previous level of intermittent assistance from dtr with groceries, household errands. Recommend re-ordering OT consult for assessment of ADLs/cognition in older individual that lives alone.\"    Continues on ASA.      Acute hypoxic respiratory failure secondary to above   COPD Not on home O2. Her current presentation not c/w COPD exacerbation    Continue PTA is on fluticasone/salmeterol, DuoNeb, Spiriva inhalers.    oxygen saturation is 96% on RA    Right eye conjunctivitis :    Started Ofloxacin 0.3% ophthalmic solution.     DM2:  Takes glipizide PTA.  Hyperglycemia related to infection and steroids.  Hemoglobin A1C POCT   Date Value Ref Range Status   05/05/2021 7.2 (H) 0 - 5.6 % Final     Comment:     Normal <5.7% Prediabetes 5.7-6.4%  Diabetes 6.5% or higher - adopted from ADA   consensus guidelines.       Hemoglobin A1C   Date Value Ref Range Status   02/21/2022 9.9 (H) 0.0 - 5.6 % Final     Comment:     Normal <5.7%   Prediabetes 5.7-6.4%    Diabetes 6.5% or higher     Note: Adopted from ADA consensus " "guidelines.     Recent Labs   Lab 03/12/22  0750 03/12/22  0703 03/12/22  0207 03/11/22  2145 03/11/22  1724 03/11/22  1232   * 67* 109* 182* 230* 173*         PTA glipizide held    Started glargine 20 units afternoon of 3/6/22, increase to 24 units, beginning 3/9    Increased prandial novolog to 2 unit / 15 g carbs.      Continue with sliding scale insulin coverage    Blood sugar readings are at goal    Will need to adjust as prednisone dose decreases (next Prednisone dose change will be on 3/14)     Diet: Orders Placed This Encounter      Moderate Consistent Carb (60 g CHO per Meal) Diet      Diet     Botello Catheter: Not present     DVT Prophylaxis: ASA, PCDs, ambulation.   Code Status: No CPR- Do NOT Intubate     Disposition:  Medically ready for discharge when TCU is available/authorization has been received    Communication: Discussed with patient and bedside RN    [facility]  Securely message with the Vocera Web Console (learn more here)  Text page via [employer] Paging/Directory    Interval History    \"I am working hard.\"  Patient says she plans to work on walking with therapist.  She has no new respiratory or GI complaints.      -Data reviewed today: I reviewed all new labs and imaging results over the last 24 hours. I personally reviewed no images or EKG's today.    Physical Exam   Temp: 98.3  F (36.8  C) Temp src: Oral BP: (!) 146/68 Pulse: 74   Resp: 16 SpO2: 92 % O2 Device: None (Room air)    Vitals:    03/10/22 0547 03/11/22 0645 03/12/22 0856   Weight: 59 kg (130 lb 1.1 oz) 58.6 kg (129 lb 3 oz) 57.7 kg (127 lb 1.6 oz)     Vital Signs with Ranges  Temp:  [97.8  F (36.6  C)-98.3  F (36.8  C)] 98.3  F (36.8  C)  Pulse:  [74-99] 74  Resp:  [16-20] 16  BP: (123-162)/(54-79) 146/68  SpO2:  [91 %-92 %] 92 %  I/O last 3 completed shifts:  In: 500 [P.O.:500]  Out: 400 [Urine:400]    Today's Exam  Constitutional:  NAD,  Frail, appears stated " age  Neuropsyche:  alert and oriented, answers questions appropriately.   Respiratory: Breathing comfortably on RA, marked decreased air exchange, no wheezes, no crackles.   Cardiovascular:  Regular rate and rhythm, no edema.  GI: soft, NT/ND, BS normal  Skin/Integumen: Scattered bruises, including on right hand, right leg. No acute rash or sign of bleeding.     Medications   All medications reviewed on 03/07/22       amLODIPine  5 mg Oral Daily     aspirin  81 mg Oral Daily     atovaquone  750 mg Oral BID w/meals     calcium carbonate 600 mg-vitamin D 400 units  1 tablet Oral BID     calcium polycarbophil  625 mg Oral Daily     famotidine  20 mg Oral BID     fluticasone-vilanterol  1 puff Inhalation Daily     insulin aspart  1-7 Units Subcutaneous TID AC     insulin aspart  1-5 Units Subcutaneous At Bedtime     insulin aspart   Subcutaneous TID AC     insulin glargine  24 Units Subcutaneous QAM AC     latanoprost  1 drop Left Eye QPM     multivitamin, therapeutic  1 tablet Oral QPM     polyethylene glycol  8.5 g Oral QPM     predniSONE  40 mg Oral Daily    Followed by     [START ON 3/14/2022] predniSONE  20 mg Oral Daily     rosuvastatin  5 mg Oral At Bedtime     sodium chloride (PF)  3 mL Intracatheter Q8H     umeclidinium  1 puff Inhalation Daily       Data   Recent Labs   Lab 03/12/22  0827 03/12/22  0750 03/12/22  0703 03/12/22  0207 03/09/22  1203 03/09/22  0844 03/06/22  1722 03/06/22  1555 03/06/22  1123 03/06/22  1000   NA  --   --   --   --   --  136  --   --   --  135   POTASSIUM  --   --   --   --   --  3.8  --  3.6  --  3.1*   CHLORIDE  --   --   --   --   --  104  --   --   --  97   CO2  --   --   --   --   --  23  --   --   --  29   BUN  --   --   --   --   --  25  --   --   --  30   CR 0.66  --   --   --   --  0.62  --   --   --  0.65   ANIONGAP  --   --   --   --   --  9  --   --   --  9   NARCISA  --   --   --   --   --  9.1  --   --   --  8.7   GLC  --  116* 67* 109*   < > 287*   < >  --    < > 425*     < > = values in this interval not displayed.       No results found for this or any previous visit (from the past 24 hour(s)).

## 2022-03-12 NOTE — PLAN OF CARE
DATE & TIME: 3/12/2022 7-3 pm         Cognitive Concerns/ Orientation : A & O x 4   BEHAVIOR & AGGRESSION TOOL COLOR: Green     ABNL VS/O2: VSS on RA except mild HTN   MOBILITY: SBA, walker, GB   PAIN MANAGMENT: C/O of back/heel pain x1. PRN Oxycodone x1.   DIET: Mod carb, tolerating   BOWEL/BLADDER: Continent, up to bathroom.   ABNL LAB/BG: BGM 67/116/88  DRAIN/DEVICES: PIV SL  TELEMETRY RHYTHM: N/A   SKIN: Dusky/blotchy/valeriy skin. Scattered bruises, significantly in arms. Blanchable heels and coccyx, protective Mepilex to R heel.   TESTS/PROCEDURES: None.  D/C DATE: Pending TCU placement  Discharge Barriers: TCU placement  OTHER IMPORTANT INFO: Mild BAKER with activity. Ambulating in the crooks x1. Did not sleep well last night so napped this am. Now up this afternoon. Will shower tomorrow.

## 2022-03-12 NOTE — PLAN OF CARE
DATE & TIME: 3/12/2022 4661-4550         Cognitive Concerns/ Orientation : A&O x 4   BEHAVIOR & AGGRESSION TOOL COLOR: Green     ABNL VS/O2: BP elevated (162/79, 159/75), otherwise VSS on room air. O2 sats low 90s on room air.   MOBILITY: SBA, walker, GB   PAIN MANAGMENT: Denies this shift; oxycodone given previous shift for back pain, helpful.   DIET: Mod carb, tolerating   BOWEL/BLADDER: Continent, up to bathroom.   ABNL LAB/BG:   DRAIN/DEVICES: PIV, saline locked.   TELEMETRY RHYTHM:  n/a  SKIN: Scattered bruises. Blanchable heels and coccyx, protective Mepilex to coccyx  TESTS/PROCEDURES: n/a  D/C DATE: Pending TCU placement  Discharge Barriers: TCU placement  OTHER IMPORTANT INFO: LS diminished, reports some BAKER when ambulating in the halls during the day. Melatonin given for sleep.        Goal Outcome Evaluation: goals met, medically ready for discharge pending placement

## 2022-03-12 NOTE — PROGRESS NOTES
Date/Time: 2022 2454-1714  Summary: DM2. COPD. HTN.  Precautions: Fall  Cognitive Concerns/Orientation: A&Ox4  Behavior and Aggression Color: Green  ABNL VS/O2: VSS ex intermittent tachycardia.   CMS: intact  Mobility: SBA GB/W. Pretty steady and walks frequently throughout halls. No longer gets SOB when ambulating.   Pain Management: Chronic low back pain. Managed with prn Oxy (x2 5mg given this shift)  Diet: mod carb  Bowel/Bladder: Continent B&B. 1 BM this shift.   ABNL Labs/B/182  Drain/Devices: PIV SL  Telemetry Rhythm: NA  Skin: Scattered bruising. Protective mepi on Coccyx. Heels cracked/peeling  Tests/Procedures: NA  Discharge Date: Pending TCU placement or seeing if pt fit to discharge back home.   Other Info: pt's eye drops and insulin pens are in locked cabinet in first doors of room.

## 2022-03-13 ENCOUNTER — APPOINTMENT (OUTPATIENT)
Dept: PHYSICAL THERAPY | Facility: CLINIC | Age: 87
DRG: 177 | End: 2022-03-13
Payer: COMMERCIAL

## 2022-03-13 LAB
GLUCOSE BLDC GLUCOMTR-MCNC: 123 MG/DL (ref 70–99)
GLUCOSE BLDC GLUCOMTR-MCNC: 165 MG/DL (ref 70–99)
GLUCOSE BLDC GLUCOMTR-MCNC: 214 MG/DL (ref 70–99)
GLUCOSE BLDC GLUCOMTR-MCNC: 216 MG/DL (ref 70–99)
GLUCOSE BLDC GLUCOMTR-MCNC: 373 MG/DL (ref 70–99)
GLUCOSE BLDC GLUCOMTR-MCNC: 394 MG/DL (ref 70–99)
GLUCOSE BLDC GLUCOMTR-MCNC: 67 MG/DL (ref 70–99)

## 2022-03-13 PROCEDURE — 99233 SBSQ HOSP IP/OBS HIGH 50: CPT | Performed by: INTERNAL MEDICINE

## 2022-03-13 PROCEDURE — 120N000001 HC R&B MED SURG/OB

## 2022-03-13 PROCEDURE — 97116 GAIT TRAINING THERAPY: CPT | Mod: GP

## 2022-03-13 PROCEDURE — 250N000012 HC RX MED GY IP 250 OP 636 PS 637: Performed by: INTERNAL MEDICINE

## 2022-03-13 PROCEDURE — 250N000013 HC RX MED GY IP 250 OP 250 PS 637: Performed by: INTERNAL MEDICINE

## 2022-03-13 PROCEDURE — 250N000013 HC RX MED GY IP 250 OP 250 PS 637: Performed by: HOSPITALIST

## 2022-03-13 RX ORDER — BLOOD PRESSURE TEST KIT
KIT MISCELLANEOUS
Qty: 100 EACH | Refills: 0 | Status: SHIPPED | OUTPATIENT
Start: 2022-03-13 | End: 2023-12-08

## 2022-03-13 RX ORDER — ATOVAQUONE 750 MG/5ML
750 SUSPENSION ORAL 2 TIMES DAILY WITH MEALS
Qty: 200 ML | Refills: 0 | Status: SHIPPED | OUTPATIENT
Start: 2022-03-14 | End: 2022-03-23

## 2022-03-13 RX ORDER — CONTAINER,EMPTY
EACH MISCELLANEOUS
Qty: 1 EACH | Refills: 0 | Status: SHIPPED | OUTPATIENT
Start: 2022-03-13 | End: 2023-12-08

## 2022-03-13 RX ORDER — NICOTINE POLACRILEX 4 MG
LOZENGE BUCCAL
Qty: 112.5 G | Refills: 0 | Status: SHIPPED | OUTPATIENT
Start: 2022-03-13 | End: 2024-02-06

## 2022-03-13 RX ORDER — GLIPIZIDE 5 MG/1
10 TABLET ORAL DAILY
Qty: 60 TABLET | Refills: 0 | Status: SHIPPED | OUTPATIENT
Start: 2022-03-13 | End: 2022-03-15

## 2022-03-13 RX ORDER — PREDNISONE 20 MG/1
TABLET ORAL
Qty: 21 TABLET | Refills: 0 | Status: SHIPPED | OUTPATIENT
Start: 2022-03-14 | End: 2022-03-15

## 2022-03-13 RX ADMIN — POLYETHYLENE GLYCOL 3350 8.5 G: 17 POWDER, FOR SOLUTION ORAL at 21:13

## 2022-03-13 RX ADMIN — ATOVAQUONE 750 MG: 750 SUSPENSION ORAL at 08:22

## 2022-03-13 RX ADMIN — FAMOTIDINE 20 MG: 20 TABLET ORAL at 21:13

## 2022-03-13 RX ADMIN — FLUTICASONE FUROATE AND VILANTEROL TRIFENATATE 1 PUFF: 200; 25 POWDER RESPIRATORY (INHALATION) at 08:24

## 2022-03-13 RX ADMIN — INSULIN ASPART 9 UNITS: 100 INJECTION, SOLUTION INTRAVENOUS; SUBCUTANEOUS at 08:28

## 2022-03-13 RX ADMIN — ROSUVASTATIN CALCIUM 5 MG: 5 TABLET, FILM COATED ORAL at 21:13

## 2022-03-13 RX ADMIN — CALCIUM CARBONATE 600 MG (1,500 MG)-VITAMIN D3 400 UNIT TABLET 1 TABLET: at 08:20

## 2022-03-13 RX ADMIN — AMLODIPINE BESYLATE 5 MG: 5 TABLET ORAL at 08:20

## 2022-03-13 RX ADMIN — OXYCODONE HYDROCHLORIDE 5 MG: 5 TABLET ORAL at 22:31

## 2022-03-13 RX ADMIN — INSULIN ASPART 4 UNITS: 100 INJECTION, SOLUTION INTRAVENOUS; SUBCUTANEOUS at 14:24

## 2022-03-13 RX ADMIN — LATANOPROST 1 DROP: 50 SOLUTION/ DROPS OPHTHALMIC at 21:14

## 2022-03-13 RX ADMIN — ASPIRIN 81 MG: 81 TABLET, COATED ORAL at 08:20

## 2022-03-13 RX ADMIN — OXYCODONE HYDROCHLORIDE 5 MG: 5 TABLET ORAL at 10:32

## 2022-03-13 RX ADMIN — ATOVAQUONE 750 MG: 750 SUSPENSION ORAL at 17:56

## 2022-03-13 RX ADMIN — INSULIN ASPART 4 UNITS: 100 INJECTION, SOLUTION INTRAVENOUS; SUBCUTANEOUS at 21:23

## 2022-03-13 RX ADMIN — CALCIUM POLYCARBOPHIL 625 MG: 625 TABLET, FILM COATED ORAL at 08:20

## 2022-03-13 RX ADMIN — OXYCODONE HYDROCHLORIDE 5 MG: 5 TABLET ORAL at 17:55

## 2022-03-13 RX ADMIN — CALCIUM CARBONATE 600 MG (1,500 MG)-VITAMIN D3 400 UNIT TABLET 1 TABLET: at 21:13

## 2022-03-13 RX ADMIN — PREDNISONE 40 MG: 20 TABLET ORAL at 08:20

## 2022-03-13 RX ADMIN — INSULIN ASPART 5 UNITS: 100 INJECTION, SOLUTION INTRAVENOUS; SUBCUTANEOUS at 18:32

## 2022-03-13 RX ADMIN — FAMOTIDINE 20 MG: 20 TABLET ORAL at 08:20

## 2022-03-13 RX ADMIN — THERA TABS 1 TABLET: TAB at 21:13

## 2022-03-13 RX ADMIN — UMECLIDINIUM 1 PUFF: 62.5 AEROSOL, POWDER ORAL at 08:24

## 2022-03-13 ASSESSMENT — ACTIVITIES OF DAILY LIVING (ADL)
ADLS_ACUITY_SCORE: 10
ADLS_ACUITY_SCORE: 15
ADLS_ACUITY_SCORE: 10
ADLS_ACUITY_SCORE: 15
ADLS_ACUITY_SCORE: 10
ADLS_ACUITY_SCORE: 15
ADLS_ACUITY_SCORE: 10
ADLS_ACUITY_SCORE: 15
ADLS_ACUITY_SCORE: 15
ADLS_ACUITY_SCORE: 10
ADLS_ACUITY_SCORE: 10
ADLS_ACUITY_SCORE: 15
ADLS_ACUITY_SCORE: 10
ADLS_ACUITY_SCORE: 10

## 2022-03-13 NOTE — PLAN OF CARE
Goal Outcome Evaluation:                    ATE & TIME: 3/12/2022 15-19        Cognitive Concerns/ Orientation : A & O x 4   BEHAVIOR & AGGRESSION TOOL COLOR: Green     ABNL VS/O2: VSS on RA except mild HTN   MOBILITY: SBA, walker, GB   PAIN MANAGMENT: C/O of back/heel pain x1. PRN Oxycodone x1.   DIET: Mod carb, tolerating   BOWEL/BLADDER: Continent, up to bathroom.   ABNL LAB/BG:   DRAIN/DEVICES: PIV SL  TELEMETRY RHYTHM: N/A   SKIN: Dusky/blotchy/valeriy skin. Scattered bruises, significantly in arms. Blanchable heels and coccyx, protective Mepilex to R heel.   TESTS/PROCEDURES: None.  D/C DATE: Pending TCU placement  Discharge Barriers: TCU placement  OTHER IMPORTANT INFO: Mild BAKER with activity. Ambulating in the crooks x1. Did not sleep well last night so napped this am. Now up this afternoon. Will shower tomorrow. PT now recommending home with PT.OT assessment in am.

## 2022-03-13 NOTE — PROGRESS NOTES
"[facility]    Hospitalist Progress Note    Date of Service (when I saw the patient): 03/13/2022  Admit date: 2/21/2022    Assessment & Plan   Celeste Chacko is a very pleasant 89 year old woman with h/o COPD, chronic back pain, DM2, HTN, HLD who was brought to the ER on 2/21 for evaluation of exertional dyspnea and fatigue.       Possible Lung abscess- cavitary nodules in RUL  PJP infection  CELINE (chronic, acute treatment indicated)   Recent admission for COPD exacerbation treated with a steroid but ongoing dyspnea mostly with exertion but without fever, cough, weight loss or night sweats.  No wheezing.  No chest pain.   * CT Chest on 2/21: No PE but 2 new cavitary nodules in the right upper lobe. This is concerning for aggressive, atypical infection, possibly fungal or tubercular.  Patient has been on steroid for almost a month although currently off of it.    * CT Chest 3/2/22 due to worsening hypoxia: pulmonary edema and improvement in cavitation.   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 1 more test not completed due to inaccurate specimen. Patient initially taken off isolation given all other negative factors per ID,  however AFB culture came back positive on 3/4/22 and hence back in isolation for TB pending speciation.  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 \"Discontinue iso, this may be relevant long term but not an acute issues and no tx\"  * fungal antigen for histo, cocci, blasto and Aspergillus all negative   * BCx negative to date  * PCR positive for PJP, which explains the hypoxia  * Patient is allergic to sulfa    treat with Mepron 750 mg BID for 21 days (will be complete on 3/23) followed by 750 mg daily for long term.  Follow up with Dr. Sainz in clinic in 6 weeks.  I appreciate her followup.    Pharmacy liaison consult requested.  Dr. Sainz notes that Mepron is very expensive.    Prednisone, in " "tapering doses, as follows: 40 mg orally twice daily for five days, then 40 mg orally once daily for five days, then 20 mg orally once daily for 11 days     Had been on Unasyn for now for possible bacterial cavitary infection, discontinued by Dr. Sainz on 3/8    Acute pulmonary edema   Type II MI     Minimally abnormal troponin (max 78 and down trending) Unlikely ACS.    Essential hypertension  Hyperlipidemia  * CT on 3/2 showed pulmonary edema as above.   * Echo 2/21/22: EF > 70%, RV normal. No significant valve abnormalities. Pulmonary HTN. IVC normal size and preserved respiratory variability.    Diuresed with IV lasix initially with good response, switched furosemide 40 mg daily.     No evidence of fluid overload on 03/07/22. Stopped furosemide.    Continue PTA Norvasc 5 mg p.o. daily.     Plan is for discharge to TCU. See PT note from 3/12, \"Anticipate pt should be able to return home with HHPT and previous level of intermittent assistance from dtr with groceries, household errands. Recommend re-ordering OT consult for assessment of ADLs/cognition in older individual that lives alone.\"    Continues on ASA.      Acute hypoxic respiratory failure secondary to above   COPD Not on home O2. Her current presentation not c/w COPD exacerbation    Continue PTA is on fluticasone/salmeterol, DuoNeb, Spiriva inhalers.    oxygen saturation is 96% on RA    Right eye conjunctivitis :    Started Ofloxacin 0.3% ophthalmic solution.     DM2:  Takes glipizide PTA.  Hyperglycemia related to infection and steroids.  Hemoglobin A1C POCT   Date Value Ref Range Status   05/05/2021 7.2 (H) 0 - 5.6 % Final     Comment:     Normal <5.7% Prediabetes 5.7-6.4%  Diabetes 6.5% or higher - adopted from ADA   consensus guidelines.       Hemoglobin A1C   Date Value Ref Range Status   02/21/2022 9.9 (H) 0.0 - 5.6 % Final     Comment:     Normal <5.7%   Prediabetes 5.7-6.4%    Diabetes 6.5% or higher     Note: Adopted from ADA consensus " "guidelines.     Recent Labs   Lab 03/13/22  0723 03/13/22  0328 03/12/22  2104 03/12/22  1718 03/12/22  1229 03/12/22  0750   * 165* 325* 325* 88 116*         PTA glipizide held; resume glipizide at discharge, increased dose.    Started glargine 20 units afternoon of 3/6/22, increase to 24 units, beginning 3/9    Increased prandial novolog to 2 unit / 15 g carbs.      Continue with sliding scale insulin coverage    Blood sugar readings are at goal    Will need to adjust as prednisone dose decreases next Prednisone dose change will be on 3/14.  She should not need insulin when she is done taking prednisone 16 days from now.  She will need close outpatient follow-up.    Diet: Orders Placed This Encounter      Moderate Consistent Carb (60 g CHO per Meal) Diet      Diet     Botello Catheter: Not present     DVT Prophylaxis: ASA, PCDs, ambulation.   Code Status: No CPR- Do NOT Intubate     Disposition:  Medically ready for discharge when TCU is available/authorization has been received or refused and home care arrangements have been made. Probably Monday 3/14    Total time spent:  > 35 minutes  Time spent counseling, coordination of care: 25 minutes including discussion with care team and social workElsie, regarding diabetes, diabetes supplies, home care, antibiotics, discharge planning, personal review and interpretation of labs and studies as noted above     Communication: Discussed with patient and bedside RN    [facility]  Securely message with the [employer]oc[employer] Web Console (learn more here)  Text page via [employer] Paging/Directory    Interval History    \"I am going to have to learn how to check my blood sugars.\"  Celeste is frustrated by insurance company's refusal to authorize a TCU stay.  She had not been checking her blood sugar at home and is not sure that she has a functioning glucose meter.  She has never given herself insulin.  She has no new " respiratory or GI complaints.    -Data reviewed today: I reviewed all new labs and imaging results over the last 24 hours. I personally reviewed no images or EKG's today.    Physical Exam   Temp: 98  F (36.7  C) Temp src: Oral BP: 133/68 Pulse: 80   Resp: 16 SpO2: 92 % O2 Device: None (Room air)    Vitals:    03/10/22 0547 03/11/22 0645 03/12/22 0856   Weight: 59 kg (130 lb 1.1 oz) 58.6 kg (129 lb 3 oz) 57.7 kg (127 lb 1.6 oz)     Vital Signs with Ranges  Temp:  [97.1  F (36.2  C)-98.3  F (36.8  C)] 98  F (36.7  C)  Pulse:  [73-92] 80  Resp:  [16-18] 16  BP: (124-147)/(65-73) 133/68  SpO2:  [92 %-94 %] 92 %  I/O last 3 completed shifts:  In: 970 [P.O.:970]  Out: 1300 [Urine:1300]    Today's Exam  Constitutional:  NAD,  Frail, appears stated age  Neuropsyche:  alert and oriented, answers questions appropriately.   Respiratory: Breathing comfortably on RA, marked decreased air exchange, no wheezes, no crackles.   Cardiovascular:  Regular rate and rhythm, no edema.  GI: soft, NT/ND, BS normal  Skin/Integumen: Scattered bruises, including on right hand, right leg. No acute rash or sign of bleeding.     Medications   All medications reviewed on 03/07/22       amLODIPine  5 mg Oral Daily     aspirin  81 mg Oral Daily     atovaquone  750 mg Oral BID w/meals     calcium carbonate 600 mg-vitamin D 400 units  1 tablet Oral BID     calcium polycarbophil  625 mg Oral Daily     famotidine  20 mg Oral BID     fluticasone-vilanterol  1 puff Inhalation Daily     insulin aspart  1-7 Units Subcutaneous TID AC     insulin aspart  1-5 Units Subcutaneous At Bedtime     insulin aspart   Subcutaneous TID AC     insulin glargine  24 Units Subcutaneous QAM AC     latanoprost  1 drop Left Eye QPM     multivitamin, therapeutic  1 tablet Oral QPM     polyethylene glycol  8.5 g Oral QPM     [START ON 3/14/2022] predniSONE  20 mg Oral Daily     rosuvastatin  5 mg Oral At Bedtime     sodium chloride (PF)  3 mL Intracatheter Q8H     umeclidinium   1 puff Inhalation Daily       Data   Recent Labs   Lab 03/13/22  0723 03/13/22  0328 03/12/22  2104 03/12/22  1229 03/12/22  0827 03/09/22  1203 03/09/22  0844 03/06/22  1722 03/06/22  1555 03/06/22  1123 03/06/22  1000   NA  --   --   --   --   --   --  136  --   --   --  135   POTASSIUM  --   --   --   --  3.6  --  3.8  --  3.6  --  3.1*   CHLORIDE  --   --   --   --   --   --  104  --   --   --  97   CO2  --   --   --   --   --   --  23  --   --   --  29   BUN  --   --   --   --   --   --  25  --   --   --  30   CR  --   --   --   --  0.66  --  0.62  --   --   --  0.65   ANIONGAP  --   --   --   --   --   --  9  --   --   --  9   NARCISA  --   --   --   --   --   --  9.1  --   --   --  8.7   * 165* 325*   < >  --    < > 287*   < >  --    < > 425*    < > = values in this interval not displayed.       No results found for this or any previous visit (from the past 24 hour(s)).

## 2022-03-13 NOTE — PROVIDER NOTIFICATION
MD Notification    Notified Person: MD    Notified Person Name:Yana    Notification Date/Time:3/13 [id number]    Notification Interaction:text    Purpose of Notification:  bgm of 394  Orders Received:pending notificatioon.    Comments:

## 2022-03-13 NOTE — PLAN OF CARE
Goal Outcome Evaluation:      DATE & TIME: 3/12/2022-3/13/22 4289-0223     Cognitive Concerns/ Orientation : A & O x 4   BEHAVIOR & AGGRESSION TOOL COLOR: Green     ABNL VS/O2: VSS on RA except mild HTN   MOBILITY: SBA, walker, GB   PAIN MANAGMENT: C/O of back/heel pain x1. PRN Oxycodone x1.   DIET: Mod carb, tolerating   BOWEL/BLADDER: Continent, up to bathroom.   ABNL LAB/BG:   DRAIN/DEVICES: PIV SL  TELEMETRY RHYTHM: N/A   SKIN: Dusky/blotchy/valeriy skin. Scattered bruises, significantly in arms. Blanchable heels and coccyx, protective Mepilex to R heel.   TESTS/PROCEDURES: None.  D/C DATE: Pending TCU placement  Discharge Barriers: TCU placement  OTHER IMPORTANT INFO: Mild BAKER with activity. Ambulating in the crooks x1. Will shower tomorrow. PT now recommending home with PT.OT assessment in am.

## 2022-03-13 NOTE — PLAN OF CARE
DATE & TIME: 3/13/22 7-3 pm  Cognitive Concerns/ Orientation : A & O x 4   BEHAVIOR & AGGRESSION TOOL COLOR: Green     ABNL VS/O2: VSS on RA  MOBILITY: SBA, walker, GB   PAIN MANAGMENT: C/O of back pain x1. PRN Oxycodone x1.   DIET: Mod carb, tolerating   BOWEL/BLADDER: Continent, up to bathroom.   ABNL LAB/BG: BGM variable 127/67/214/216  DRAIN/DEVICES: PIV SL went bad. MD ok with keeping one out.   TELEMETRY RHYTHM: N/A   SKIN: Dusky/blotchy/valeriy skin. Scattered bruises, significantly in arms. Blanchable heels and coccyx.   TESTS/PROCEDURES: None.  D/C DATE: Pending TCU placement vs home.   Discharge Barriers: TCU placement  OTHER IMPORTANT INFO: Mild BAKER with activity. Ambulating in the crooks x3. Showered. PT now recommending home with PT. OT assessment not completed yet. Doing well. Much time spent talking about different discharge possibilities.

## 2022-03-14 ENCOUNTER — APPOINTMENT (OUTPATIENT)
Dept: OCCUPATIONAL THERAPY | Facility: CLINIC | Age: 87
DRG: 177 | End: 2022-03-14
Attending: INTERNAL MEDICINE
Payer: COMMERCIAL

## 2022-03-14 ENCOUNTER — APPOINTMENT (OUTPATIENT)
Dept: PHYSICAL THERAPY | Facility: CLINIC | Age: 87
DRG: 177 | End: 2022-03-14
Payer: COMMERCIAL

## 2022-03-14 LAB
GLUCOSE BLDC GLUCOMTR-MCNC: 136 MG/DL (ref 70–99)
GLUCOSE BLDC GLUCOMTR-MCNC: 203 MG/DL (ref 70–99)
GLUCOSE BLDC GLUCOMTR-MCNC: 220 MG/DL (ref 70–99)
GLUCOSE BLDC GLUCOMTR-MCNC: 259 MG/DL (ref 70–99)
GLUCOSE BLDC GLUCOMTR-MCNC: 286 MG/DL (ref 70–99)

## 2022-03-14 PROCEDURE — 97535 SELF CARE MNGMENT TRAINING: CPT | Mod: GO

## 2022-03-14 PROCEDURE — 97116 GAIT TRAINING THERAPY: CPT | Mod: GP | Performed by: PHYSICAL THERAPY ASSISTANT

## 2022-03-14 PROCEDURE — 99233 SBSQ HOSP IP/OBS HIGH 50: CPT | Performed by: INTERNAL MEDICINE

## 2022-03-14 PROCEDURE — 250N000013 HC RX MED GY IP 250 OP 250 PS 637: Performed by: INTERNAL MEDICINE

## 2022-03-14 PROCEDURE — 120N000001 HC R&B MED SURG/OB

## 2022-03-14 PROCEDURE — 250N000013 HC RX MED GY IP 250 OP 250 PS 637: Performed by: HOSPITALIST

## 2022-03-14 PROCEDURE — 97530 THERAPEUTIC ACTIVITIES: CPT | Mod: GO

## 2022-03-14 PROCEDURE — 97110 THERAPEUTIC EXERCISES: CPT | Mod: GP | Performed by: PHYSICAL THERAPY ASSISTANT

## 2022-03-14 PROCEDURE — 250N000012 HC RX MED GY IP 250 OP 636 PS 637: Performed by: INTERNAL MEDICINE

## 2022-03-14 PROCEDURE — 97165 OT EVAL LOW COMPLEX 30 MIN: CPT | Mod: GO

## 2022-03-14 RX ORDER — PREDNISONE 20 MG/1
20 TABLET ORAL DAILY
Status: DISCONTINUED | OUTPATIENT
Start: 2022-03-15 | End: 2022-03-15 | Stop reason: HOSPADM

## 2022-03-14 RX ORDER — PREDNISONE 10 MG/1
10 TABLET ORAL DAILY
Status: DISCONTINUED | OUTPATIENT
Start: 2022-03-17 | End: 2022-03-15 | Stop reason: HOSPADM

## 2022-03-14 RX ORDER — GLIPIZIDE 5 MG/1
5 TABLET, FILM COATED, EXTENDED RELEASE ORAL
Status: DISCONTINUED | OUTPATIENT
Start: 2022-03-14 | End: 2022-03-15 | Stop reason: HOSPADM

## 2022-03-14 RX ADMIN — POLYETHYLENE GLYCOL 3350 8.5 G: 17 POWDER, FOR SOLUTION ORAL at 22:04

## 2022-03-14 RX ADMIN — GLIPIZIDE 5 MG: 5 TABLET, FILM COATED, EXTENDED RELEASE ORAL at 09:37

## 2022-03-14 RX ADMIN — PREDNISONE 20 MG: 20 TABLET ORAL at 09:37

## 2022-03-14 RX ADMIN — ROSUVASTATIN CALCIUM 5 MG: 5 TABLET, FILM COATED ORAL at 22:04

## 2022-03-14 RX ADMIN — ATOVAQUONE 750 MG: 750 SUSPENSION ORAL at 09:40

## 2022-03-14 RX ADMIN — CALCIUM CARBONATE 600 MG (1,500 MG)-VITAMIN D3 400 UNIT TABLET 1 TABLET: at 09:37

## 2022-03-14 RX ADMIN — FAMOTIDINE 20 MG: 20 TABLET ORAL at 09:37

## 2022-03-14 RX ADMIN — AMLODIPINE BESYLATE 5 MG: 5 TABLET ORAL at 09:37

## 2022-03-14 RX ADMIN — ASPIRIN 81 MG: 81 TABLET, COATED ORAL at 09:37

## 2022-03-14 RX ADMIN — CALCIUM POLYCARBOPHIL 625 MG: 625 TABLET, FILM COATED ORAL at 09:37

## 2022-03-14 RX ADMIN — THERA TABS 1 TABLET: TAB at 22:03

## 2022-03-14 RX ADMIN — OXYCODONE HYDROCHLORIDE 5 MG: 5 TABLET ORAL at 17:05

## 2022-03-14 RX ADMIN — CALCIUM CARBONATE 600 MG (1,500 MG)-VITAMIN D3 400 UNIT TABLET 1 TABLET: at 22:04

## 2022-03-14 RX ADMIN — METFORMIN HYDROCHLORIDE 500 MG: 500 TABLET, FILM COATED ORAL at 17:05

## 2022-03-14 RX ADMIN — INSULIN ASPART 2 UNITS: 100 INJECTION, SOLUTION INTRAVENOUS; SUBCUTANEOUS at 22:05

## 2022-03-14 RX ADMIN — ATOVAQUONE 750 MG: 750 SUSPENSION ORAL at 18:23

## 2022-03-14 RX ADMIN — UMECLIDINIUM 1 PUFF: 62.5 AEROSOL, POWDER ORAL at 09:54

## 2022-03-14 RX ADMIN — FLUTICASONE FUROATE AND VILANTEROL TRIFENATATE 1 PUFF: 200; 25 POWDER RESPIRATORY (INHALATION) at 09:54

## 2022-03-14 RX ADMIN — OXYCODONE HYDROCHLORIDE 5 MG: 5 TABLET ORAL at 13:55

## 2022-03-14 RX ADMIN — LATANOPROST 1 DROP: 50 SOLUTION/ DROPS OPHTHALMIC at 22:04

## 2022-03-14 RX ADMIN — METFORMIN HYDROCHLORIDE 500 MG: 500 TABLET, FILM COATED ORAL at 09:37

## 2022-03-14 RX ADMIN — FAMOTIDINE 20 MG: 20 TABLET ORAL at 22:04

## 2022-03-14 ASSESSMENT — ACTIVITIES OF DAILY LIVING (ADL)
ADLS_ACUITY_SCORE: 8
ADLS_ACUITY_SCORE: 13
ADLS_ACUITY_SCORE: 13
ADLS_ACUITY_SCORE: 8
ADLS_ACUITY_SCORE: 12
ADLS_ACUITY_SCORE: 8
ADLS_ACUITY_SCORE: 13
ADLS_ACUITY_SCORE: 12
ADLS_ACUITY_SCORE: 15
ADLS_ACUITY_SCORE: 12
ADLS_ACUITY_SCORE: 8
ADLS_ACUITY_SCORE: 12

## 2022-03-14 NOTE — PROVIDER NOTIFICATION
MD Notification    Notified Person: MD    Notified Person Name: Sofía     Notification Date/Time: 3/14/22, 1732    Notification Interaction: ROV[facility] messaging     Purpose of Notification: BG now 286, want me to cover with sliding scale insulin or hold off for now?    Orders Received: Hold off for now, cover bedtime BG if > 250.     Comments:

## 2022-03-14 NOTE — PROGRESS NOTES
"[facility]    Infectious Disease Progress Note    Date of Service (when I saw the patient): 03/14/2022     Assessment & Plan   Celeste Chacko is a 89 year old female who was admitted on 2/21/2022.     Impression:  1. 89 y.o with COPD  2. Diabetes   3. HTN, HLd  4. Admitted with shortness of breath.   5. CT scan \"  There are two new cavitary nodules in the right upper lobe. This  is concerning for aggressive, atypical infection, possibly fungal or  Tubercular.\"      Recommendations:   AFB stain negative, AFB culture positive, but is CELINE, discontinue iso, this may be relevant long term but not an acute issues and no tx     Fungal antibody negative.      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   Patient is sulfa allergic will do mepron for 3 weeks. Day 8/21 of acute tx Steroids discussed with pulm plan   40 mg orally twice daily for five days, followed by  40 mg orally once daily for five days, followed by  20 mg orally once daily for 11 days   Discussed with IM please free to adjust the taper as deem needed.     Would get pharmacy/care coordinators to look at ins coverage for the very expensive mepron she will now likely need as outpt , possibly even longer proph    Plan for discharge steroids per taper listed above   Mepron 750 mg BID for 21 days followed by 750 mg daily for long term   FOLLOW UP with me in the clinic in 6 weeks         Gunner Sainz MD    Interval History   Tolerating antibiotics ok   No new rashes or issues with antibiotics   Labs reviewed   Afebrile   AFB probe ID is CELINE out of iso     Physical Exam   Temp: 98.6  F (37  C) Temp src: Oral BP: (!) 156/72 Pulse: 80   Resp: 18 SpO2: 91 % O2 Device: None (Room air)    Vitals:    03/11/22 0645 03/12/22 0856 03/14/22 0827   Weight: 58.6 kg (129 lb 3 oz) 57.7 kg (127 lb 1.6 oz) 57.5 kg (126 lb 12.8 oz)     Vital Signs with Ranges  Temp:  [98.2  F (36.8  C)-98.6  F (37  C)] 98.6  F (37  C)  Pulse:  [80-95] 80  Resp:  " [16-18] 18  BP: (136-156)/(58-72) 156/72  SpO2:  [91 %-99 %] 91 %    Constitutional: Awake, alert, cooperative, no apparent distress  Lungs: Clear to auscultation bilaterally, no crackles or wheezing  Cardiovascular: Regular rate and rhythm, normal S1 and S2, and no murmur noted  Abdomen: Normal bowel sounds, soft, non-distended, non-tender  Skin: No rashes, no cyanosis, no edema  Other:    Medications       amLODIPine  5 mg Oral Daily     aspirin  81 mg Oral Daily     atovaquone  750 mg Oral BID w/meals     calcium carbonate 600 mg-vitamin D 400 units  1 tablet Oral BID     calcium polycarbophil  625 mg Oral Daily     famotidine  20 mg Oral BID     fluticasone-vilanterol  1 puff Inhalation Daily     glipiZIDE  5 mg Oral Daily with breakfast     insulin aspart  1-7 Units Subcutaneous TID AC     insulin aspart  1-5 Units Subcutaneous At Bedtime     latanoprost  1 drop Left Eye QPM     metFORMIN  500 mg Oral BID w/meals     multivitamin, therapeutic  1 tablet Oral QPM     polyethylene glycol  8.5 g Oral QPM     predniSONE  20 mg Oral Daily     rosuvastatin  5 mg Oral At Bedtime     sodium chloride (PF)  3 mL Intracatheter Q8H     umeclidinium  1 puff Inhalation Daily       Data   All microbiology laboratory data reviewed.  Recent Labs   Lab Test 03/04/22  0850 03/03/22  1002 02/28/22  1202   WBC 5.5 5.8 6.3   HGB 11.4* 10.8* 10.8*   HCT 36.4 35.9 35.0   MCV 81 83 84    229 166     Recent Labs   Lab Test 03/12/22  0827 03/09/22  0844 03/06/22  1000   CR 0.66 0.62 0.65     No lab results found.  Recent Labs   Lab Test 12/04/15  1112   CULT >100,000 colonies/mL Escherichia coli*

## 2022-03-14 NOTE — PROGRESS NOTES
"[facility]    HOSPITALIST PROGRESS NOTE :   --------------------------------------------------    Date of Admission:  2/21/2022    Cumulative Summary: Celeste Chacko is a 89 year old female with h/o COPD, chronic back pain, DM2, HTN, HLD who was brought to the ER on 2/21 for evaluation of exertional dyspnea and fatigue.      Assessment & Plan     Possible Lung abscess- cavitary nodules in RUL  PJP infection  CELINE (chronic, acute treatment indicated)   Recent admission for COPD exacerbation treated with a steroid but ongoing dyspnea mostly with exertion but without fever, cough, weight loss or night sweats.  No wheezing.  No chest pain.   * CT Chest on 2/21: No PE but 2 new cavitary nodules in the right upper lobe. This is concerning for aggressive, atypical infection, possibly fungal or tubercular.  Patient has been on steroid for almost a month although currently off of it.    * CT Chest 3/2/22 due to worsening hypoxia: pulmonary edema and improvement in cavitation.   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 1 more test not completed due to inaccurate specimen. Patient initially taken off isolation given all other negative factors per ID,  however AFB culture came back positive on 3/4/22 and hence back in isolation for TB pending speciation.  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 \"Discontinue iso, this may be relevant long term but not an acute issues and no tx\"  * fungal antigen for histo, cocci, blasto and Aspergillus all negative   * BCx negative to date  * PCR positive for PJP, which explains the hypoxia  * Patient is allergic to sulfa    -- Patient care was assumed this morning , seen and examined , bedside nursing also present   -- Plan to treat with Mepron 750 mg BID for 21 days (will be complete on 3/23) followed by 750 mg daily for long term.  Follow up with Dr. Sainz in clinic in 6 weeks.  I appreciate " her followup.  -- Pharmacy liaison consult requested.  Dr. Sainz notes that Mepron is very expensive.  -- Prednisone, in tapering doses, as follows: 40 mg orally twice daily for five days, then 40 mg orally once daily for five days, then 20 mg orally once daily for 11 days   -- Discussed with  will do slight faster taper due to high blood sugars and patient unable to administer Insulin due to tremors  -- Will give patient prednisone 20 mg daily for 3 doses and 10 mg po daily for 3 doses   -- Unfortunately her TCU stay is not covered by insurance , she will be going to home with [facility]  -- Unasyn has been stopped by  on 03/08/22    DM2:  Takes glipizide PTA.  Hyperglycemia related to infection and steroids.  Significant concern due to hyperglycemia, initially plan was for patient to go to TCU where she could have been placed on insulin for few weeks while patient was coming off the prednisone now patient is planned to go home she does have bilateral upper extremity tremors and is unable to administer insulin for herself.    --We discussed about tapering her off the prednisone slightly faster as compared to keeping her on 20 mg for the next 11 days.  --We will start patient back on her PTA glipizide 5 mg although will start her on extended release to decrease risk of hypoglycemia associated with sulfonylurea in elderly patients.  --We will also start patient on Metformin 500 mg p.o. twice daily.  --We will discontinue Lantus and meal coverage, will leave sliding scale insulin  --If needed then Metformin can be increased further  --Patient will need close monitoring with PCP, as she will need monitoring if she should continue on 2 hypoglycemic agents once she is off the prednisone or continue on the current medications.    Acute pulmonary edema   Type II MI   -- Minimally abnormal troponin (max 78 and down trending) Unlikely ACS.     Essential hypertension  Hyperlipidemia  * CT on 3/2 showed pulmonary  edema as above.   * Echo 2/21/22: EF > 70%, RV normal. No significant valve abnormalities. Pulmonary HTN. IVC normal size and preserved respiratory variability.  -- Diuresed with IV lasix initially with good response, switched furosemide 40 mg daily.   --No evidence of fluid overload on 03/07/22. Stopped furosemide.  --Continue PTA Norvasc 5 mg p.o. daily.   -- Continues on ASA.      Acute hypoxic respiratory failure secondary to above   COPD Not on home O2. Her current presentation not c/w COPD exacerbation  -- Continue PTA is on fluticasone/salmeterol, DuoNeb, Spiriva inhalers.  -- oxygen saturation is 96% on RA     Right eye conjunctivitis :  -- Started Ofloxacin 0.3% ophthalmic solution, finished the course     Clinically Significant Risk Factors Present on Admission   Diet: Moderate carb controlled diet  [place] Catheter: Not present  DVT Prophylaxis: Pneumatic Compression Devices  Code Status: No CPR- Do NOT Intubate    The patient's care was discussed with the Bedside Nurse, Patient and ID Team.    Disposition Plan   Expected Discharge: 03/15/2022     Anticipated discharge location:  Awaiting care coordination huddle, plan for discharge patient home with home health care and home PT OT tomorrow if her hyperglycemia is improved.  Expecting late discharge in the afternoon as her daughter will be picking her up after the job.  35 min were spent in direct patient care today including the floor time , more than 50% of the time was spent in patient care coordination and counseling.    Entered: Eulalia Gore MD 03/14/2022, 8:39 AM     Eulalia Gore MD, FACP  Text Page (7am - 6pm)    ----------------------------------------------------------------------------------------------------------------------    Interval History   Patient care was assumed this morning, patient was seen and examined.  Patient is known to me from current hospitalization when I was rounding previously.  She is feeling much better, denying any  bronchospasm, discussed prednisone tapering with infectious disease and with patient, plan to taper her slightly faster as compared to leaving her on 20 mg for the next 11 days.  We discussed about her significant hyperglycemia and her inability to administer herself insulin.  We discussed about stopping Lantus and meal coverage and starting her on to oral hypoglycemic agents.  Her insulin needed is 10 years old, I will also go ahead and will order patient for new meter which can be used tonight and tomorrow so patient can practice checking her blood glucose which probably need to be monitored closely after the discharge.      -Data reviewed today: I reviewed all new labs and imaging results over the last 24 hours.    I personally reviewed no images or EKG's today.    Physical Exam   Temp: 98.6  F (37  C) Temp src: Oral BP: (!) 156/72 Pulse: 80   Resp: 18 SpO2: 91 % O2 Device: None (Room air)    Vitals:    03/11/22 0645 03/12/22 0856 03/14/22 0827   Weight: 58.6 kg (129 lb 3 oz) 57.7 kg (127 lb 1.6 oz) 57.5 kg (126 lb 12.8 oz)     Vital Signs with Ranges  Temp:  [98.2  F (36.8  C)-98.6  F (37  C)] 98.6  F (37  C)  Pulse:  [80-95] 80  Resp:  [16-18] 18  BP: (136-156)/(58-72) 156/72  SpO2:  [91 %-99 %] 91 %  I/O last 3 completed shifts:  In: 450 [P.O.:450]  Out: -     GENERAL: Alert , awake and oriented. NAD. Conversational, appropriate.   HEENT: Normocephalic. EOMI. No icterus or injection. Nares normal.   LUNGS: Clear to auscultation. No dyspnea at rest.   HEART: Regular rate. Extremities perfused.   ABDOMEN: Soft, nontender, and nondistended. Positive bowel sounds.   EXTREMITIES: No LE edema noted.   NEUROLOGIC: Moves extremities x4 on command. No acute focal neurologic abnormalities noted.     Medications       amLODIPine  5 mg Oral Daily     aspirin  81 mg Oral Daily     atovaquone  750 mg Oral BID w/meals     calcium carbonate 600 mg-vitamin D 400 units  1 tablet Oral BID     calcium polycarbophil  625 mg Oral  Daily     famotidine  20 mg Oral BID     fluticasone-vilanterol  1 puff Inhalation Daily     glipiZIDE  5 mg Oral Daily with breakfast     insulin aspart  1-7 Units Subcutaneous TID AC     insulin aspart  1-5 Units Subcutaneous At Bedtime     latanoprost  1 drop Left Eye QPM     metFORMIN  500 mg Oral BID w/meals     multivitamin, therapeutic  1 tablet Oral QPM     polyethylene glycol  8.5 g Oral QPM     predniSONE  20 mg Oral Daily     rosuvastatin  5 mg Oral At Bedtime     sodium chloride (PF)  3 mL Intracatheter Q8H     umeclidinium  1 puff Inhalation Daily       Data   Recent Labs   Lab 03/14/22  0744 03/14/22  0223 03/13/22  2122 03/12/22  1229 03/12/22  0827 03/09/22  1203 03/09/22  0844   NA  --   --   --   --   --   --  136   POTASSIUM  --   --   --   --  3.6  --  3.8   CHLORIDE  --   --   --   --   --   --  104   CO2  --   --   --   --   --   --  23   BUN  --   --   --   --   --   --  25   CR  --   --   --   --  0.66  --  0.62   ANIONGAP  --   --   --   --   --   --  9   NARCISA  --   --   --   --   --   --  9.1   * 203* 373*   < >  --    < > 287*    < > = values in this interval not displayed.       Imaging:   No results found for this or any previous visit (from the past 24 hour(s)).

## 2022-03-14 NOTE — PLAN OF CARE
DATE & TIME: 03/14/22 3-7pm  Cognitive Concerns/ Orientation : alert and oriented x4  BEHAVIOR & AGGRESSION TOOL COLOR: Green     ABNL VS/O2: VSS on RA mild HTN  MOBILITY: SBA, walker, GB. Walked hallways this evening   PAIN MANAGMENT: C/o chronic back pain, PRN oxycodone given x1, effective  DIET: Mod carb, tolerating.  BOWEL/BLADDER: Continent, up to bathroom.   ABNL LAB/BG:  with meal check tonight   DRAIN/DEVICES: No PIV  TELEMETRY RHYTHM: N/A   SKIN: Dusky/blotchy/valeriy skin. Scattered bruises, significantly in arms and legs Blanchable red/pink heels and coccyx.   TESTS/PROCEDURES: None.  D/C DATE: Pending home  Discharge Barriers: BGM stabilization. Started oral glipizide and Metformin this am.   OTHER IMPORTANT INFO: Mild BAKER with activity. Ambulating in the crooks. Showered yesterday. Doing well overall, Glucose meter ordered to floor, education given on how to check BG. Will need reinforcement again with next glucose check, supplies in room. Held off on supper sliding scale insulin per hospitalist. If BG >250 at bedtime, administer sliding scale insulin.

## 2022-03-14 NOTE — PROGRESS NOTES
03/14/22 1345   Quick Adds   Type of Visit Initial Occupational Therapy Evaluation   Living Environment   People in Home alone   Current Living Arrangements condominium   Home Accessibility no concerns   Transportation Anticipated family or friend will provide   Living Environment Comments Has elevator.    Self-Care   Usual Activity Tolerance good   Current Activity Tolerance moderate   Equipment Currently Used at Home grab bar, toilet;grab bar, tub/shower;shower chair;raised toilet seat  (FWW, )   Fall history within last six months yes   Number of times patient has fallen within last six months 1   Activity/Exercise/Self-Care Comment (I) in ADLs   Instrumental Activities of Daily Living (IADL)   Previous Responsibilities driving;meal prep;laundry;medication management;finances   IADL Comments Has  once per month. Daughter A with grocery shopping, will likely check in each night.    General Information   Onset of Illness/Injury or Date of Surgery 02/21/22   Referring Physician Emily Millan MD   Patient/Family Therapy Goal Statement (OT) Home with home RN, OT, PT   Existing Precautions/Restrictions fall   Cognitive Status Examination   Orientation Status orientation to person, place and time   Pain Assessment   Patient Currently in Pain Yes, see Vital Sign flowsheet  (back pain 3/10)   Range of Motion Comprehensive   General Range of Motion bilateral upper extremity ROM WFL   Strength Comprehensive (MMT)   Comment, General Manual Muscle Testing (MMT) Assessment BUE strength WFL   Bed Mobility   Bed Mobility supine-sit;sit-supine   Supine-Sit [place] (Bed Mobility) independent   Sit-Supine [place] (Bed Mobility) independent   Transfers   Transfers bed-chair transfer;sit-stand transfer;toilet transfer   Transfer Skill: Bed to Chair/Chair to Bed   Bed-Chair [place] (Transfers) supervision   Assistive Device (Bed-Chair Transfers) other (see comments)  (FWW)   Sit-Stand Transfer    Sit-Stand [place] (Transfers) supervision   Assistive Device (Sit-Stand Transfers) walker, front-wheeled   Toilet Transfer   [place] Level (Toilet Transfer) supervision   Assistive Device (Toilet Transfer) walker, front-wheeled;grab bars/safety frame   Activities of Daily Living   BADL Assessment/Intervention toileting;lower body dressing;upper body dressing   Upper Body Dressing Assessment/Training   [place] Level (Upper Body Dressing) supervision   Lower Body Dressing Assessment/Training   [place] Level (Lower Body Dressing) supervision   Toileting   [place] Level (Toileting) supervision   Assistive Devices (Toileting) grab bar, toilet   Clinical Impression   Criteria for Skilled Therapeutic Interventions Met (OT) Yes, treatment indicated   OT Diagnosis Impaired (I) in I/ADLs   OT Problem List-Impairments impacting ADL problems related to;activity tolerance impaired;balance;strength   Assessment of Occupational Performance 1-3 Performance Deficits   Identified Performance Deficits Impaired (I) in functional mobility and I/ADLs   Planned Therapy Interventions (OT) ADL retraining;IADL retraining;home program guidelines;transfer training   Clinical Decision Making Complexity (OT) low complexity   Risk & Benefits of therapy have been explained evaluation/treatment results reviewed;care plan/treatment goals reviewed;risks/benefits reviewed;current/potential barriers reviewed;participants voiced agreement with care plan;participants included;patient   OT Discharge Planning   OT Discharge Recommendation (DC Rec) home with assist;home with home care occupational therapy   OT Rationale for DC Rec Pt functioning below baseline due to activity tolerance and weakness. Recommend home with A for any heavy IADLs (ie laundry, cleaning, large meal prep) as needed. Recommend home OT for safety, adaptive equipment and possibly driving. Pt is currently SBA progressing to mod (I) in all functional mobility  and ADLs.    OT Brief overview of current status SBA progressing to mod (I) in functional mobility, progressing to mod (I) in ADLs   Total Evaluation Time (Minutes)   Total Evaluation Time (Minutes) 10   OT Goals   Therapy Frequency (OT) Daily   OT Predicated Duration/Target Date for Goal Attainment 03/16/22   OT Goals Hygiene/Grooming;Upper Body Dressing;Lower Body Dressing;Toilet Transfer/Toileting   OT: Hygiene/Grooming modified independent   OT: Upper Body Dressing Modified independent;including set-up/clothing retrieval   OT: Lower Body Dressing Modified independent;including set-up/clothing retrieval   OT: Toilet Transfer/Toileting Modified independent;cleaning and garment management;toilet transfer

## 2022-03-14 NOTE — PLAN OF CARE
Goal Outcome Evaluation:             DATE & TIME: 3/13/22 15-19  Cognitive Concerns/ Orientation : A & O x 4   BEHAVIOR & AGGRESSION TOOL COLOR: Green     ABNL VS/O2: VSS on RA  MOBILITY: SBA, walker, GB   PAIN MANAGMENT: C/O of back pain x1. PRN Oxycodone x1.   DIET: Mod carb, tolerating   BOWEL/BLADDER: Continent, up to bathroom.   ABNL LAB/BG: BGM variable 127/67/214/216/394  DRAIN/DEVICES: PIV SL went bad. MD ok with keeping one out.   TELEMETRY RHYTHM: N/A   SKIN: Dusky/blotchy/valeriy skin. Scattered bruises, significantly in arms. Blanchable heels and coccyx. Trace pedal edema, legs elevated while in chair.  TESTS/PROCEDURES: None.  D/C DATE: Pending TCU placement vs home.   Discharge Barriers: TCU placement vs home with HC  OTHER IMPORTANT INFO: Mild BAKER with activity. Ambulating in the crooks x3. Showered. PT now recommending home with PT. OT assessment not completed yet.

## 2022-03-14 NOTE — PLAN OF CARE
DATE & TIME: 03/14/22 7-3 pm   Cognitive Concerns/ Orientation : A & O x 4   BEHAVIOR & AGGRESSION TOOL COLOR: Green     ABNL VS/O2: VSS on RA mild HTN.  MOBILITY: SBA, walker, GB.  PAIN MANAGMENT: C/O of back pain x1. PRN Oxycodone x1 for Low back pain.  DIET: Mod carb, tolerating.  BOWEL/BLADDER: Continent, up to bathroom.   ABNL LAB/BG: /220  DRAIN/DEVICES: No PIV  TELEMETRY RHYTHM: N/A   SKIN: Dusky/blotchy/valeriy skin. Scattered bruises, significantly in arms and legs Blanchable red/pink heels and coccyx.   TESTS/PROCEDURES: None.  D/C DATE: Pending home  Discharge Barriers: BGM stabilization. Started oral glipizide and Metformin this am.   OTHER IMPORTANT INFO: Mild BAKER with activity. Ambulating in the crooks. Showered yesterday. Doing well overall, Glucose meter ordered to floor. Will need teaching, called resource RN to help this evening with dinner check.

## 2022-03-15 ENCOUNTER — APPOINTMENT (OUTPATIENT)
Dept: PHYSICAL THERAPY | Facility: CLINIC | Age: 87
DRG: 177 | End: 2022-03-15
Payer: COMMERCIAL

## 2022-03-15 VITALS
HEIGHT: 64 IN | OXYGEN SATURATION: 93 % | WEIGHT: 126.8 LBS | TEMPERATURE: 98.6 F | DIASTOLIC BLOOD PRESSURE: 75 MMHG | HEART RATE: 81 BPM | BODY MASS INDEX: 21.65 KG/M2 | SYSTOLIC BLOOD PRESSURE: 160 MMHG | RESPIRATION RATE: 18 BRPM

## 2022-03-15 LAB
CREAT SERPL-MCNC: 0.61 MG/DL (ref 0.52–1.04)
GFR SERPL CREATININE-BSD FRML MDRD: 84 ML/MIN/1.73M2
GLUCOSE BLDC GLUCOMTR-MCNC: 100 MG/DL (ref 70–99)
GLUCOSE BLDC GLUCOMTR-MCNC: 220 MG/DL (ref 70–99)

## 2022-03-15 PROCEDURE — 250N000013 HC RX MED GY IP 250 OP 250 PS 637: Performed by: INTERNAL MEDICINE

## 2022-03-15 PROCEDURE — 99239 HOSP IP/OBS DSCHRG MGMT >30: CPT | Performed by: INTERNAL MEDICINE

## 2022-03-15 PROCEDURE — 250N000013 HC RX MED GY IP 250 OP 250 PS 637: Performed by: HOSPITALIST

## 2022-03-15 PROCEDURE — 97530 THERAPEUTIC ACTIVITIES: CPT | Mod: GP | Performed by: PHYSICAL THERAPY ASSISTANT

## 2022-03-15 PROCEDURE — 97116 GAIT TRAINING THERAPY: CPT | Mod: GP | Performed by: PHYSICAL THERAPY ASSISTANT

## 2022-03-15 PROCEDURE — 82565 ASSAY OF CREATININE: CPT | Performed by: INTERNAL MEDICINE

## 2022-03-15 PROCEDURE — 36415 COLL VENOUS BLD VENIPUNCTURE: CPT | Performed by: INTERNAL MEDICINE

## 2022-03-15 PROCEDURE — 250N000012 HC RX MED GY IP 250 OP 636 PS 637: Performed by: INTERNAL MEDICINE

## 2022-03-15 PROCEDURE — 97110 THERAPEUTIC EXERCISES: CPT | Mod: GP | Performed by: PHYSICAL THERAPY ASSISTANT

## 2022-03-15 RX ORDER — PREDNISONE 10 MG/1
TABLET ORAL
Qty: 7 TABLET | Refills: 0 | Status: SHIPPED | OUTPATIENT
Start: 2022-03-15 | End: 2022-03-21

## 2022-03-15 RX ORDER — AMLODIPINE BESYLATE 5 MG/1
5 TABLET ORAL DAILY
Qty: 30 TABLET | Refills: 0 | Status: SHIPPED | OUTPATIENT
Start: 2022-03-16 | End: 2022-07-05

## 2022-03-15 RX ORDER — GLIPIZIDE 5 MG/1
5 TABLET, FILM COATED, EXTENDED RELEASE ORAL
Qty: 30 TABLET | Refills: 0 | Status: SHIPPED | OUTPATIENT
Start: 2022-03-16 | End: 2022-07-05

## 2022-03-15 RX ADMIN — OXYCODONE HYDROCHLORIDE 5 MG: 5 TABLET ORAL at 14:53

## 2022-03-15 RX ADMIN — METFORMIN HYDROCHLORIDE 850 MG: 850 TABLET, FILM COATED ORAL at 11:50

## 2022-03-15 RX ADMIN — PREDNISONE 20 MG: 20 TABLET ORAL at 08:47

## 2022-03-15 RX ADMIN — UMECLIDINIUM 1 PUFF: 62.5 AEROSOL, POWDER ORAL at 08:47

## 2022-03-15 RX ADMIN — AMLODIPINE BESYLATE 5 MG: 5 TABLET ORAL at 08:48

## 2022-03-15 RX ADMIN — GLIPIZIDE 5 MG: 5 TABLET, FILM COATED, EXTENDED RELEASE ORAL at 08:47

## 2022-03-15 RX ADMIN — ASPIRIN 81 MG: 81 TABLET, COATED ORAL at 08:47

## 2022-03-15 RX ADMIN — FAMOTIDINE 20 MG: 20 TABLET ORAL at 08:48

## 2022-03-15 RX ADMIN — ATOVAQUONE 750 MG: 750 SUSPENSION ORAL at 08:48

## 2022-03-15 RX ADMIN — CALCIUM POLYCARBOPHIL 625 MG: 625 TABLET, FILM COATED ORAL at 08:47

## 2022-03-15 RX ADMIN — FLUTICASONE FUROATE AND VILANTEROL TRIFENATATE 1 PUFF: 200; 25 POWDER RESPIRATORY (INHALATION) at 08:47

## 2022-03-15 RX ADMIN — CALCIUM CARBONATE 600 MG (1,500 MG)-VITAMIN D3 400 UNIT TABLET 1 TABLET: at 08:47

## 2022-03-15 RX ADMIN — OXYCODONE HYDROCHLORIDE 5 MG: 5 TABLET ORAL at 05:39

## 2022-03-15 ASSESSMENT — ACTIVITIES OF DAILY LIVING (ADL)
ADLS_ACUITY_SCORE: 8

## 2022-03-15 NOTE — PLAN OF CARE
Discharge    Patient discharged to home via private transportation with daughter.    Care plan note: LS diminished; BAKER but O2sats are maintained in 90's on RA. VSS. Patient and daughter were able to demonstrate blood glucose check using a glucometer. Discharge instructions, prescriptions and blood sugar monitoring supplies were provided. Patient and daughter verbalized understanding of all information received. Patient denied pain/N/V or other discomfort at time of discharge.    Listed belongings gathered and given to patient (including from security/pharmacy). Yes  Care Plan and Patient education resolved: Yes  Prescriptions if needed, hard copies sent with patient  Yes  Medication Bin checked and emptied on discharge Yes  SW/care coordinator/charge RN aware of discharge: Yes

## 2022-03-15 NOTE — PLAN OF CARE
Goal Outcome Evaluation:        DATE & TIME: 03/14/22 7-11pm shift  Cognitive Concerns/ Orientation : alert and oriented x4  BEHAVIOR & AGGRESSION TOOL COLOR: Green     ABNL VS/O2: VSS on RA mild HTN  MOBILITY: SBA, walker, GB. Walked hallways again this yong.  PAIN MANAGMENT: C/o chronic back pain, PRN oxycodone available as her PtA regimen  DIET: Mod carb, tolerating.  BOWEL/BLADDER: Continent, up to bathroom.   ABNL LAB/BG:  then 259.  DRAIN/DEVICES: No PIV  TELEMETRY RHYTHM: N/A   SKIN: Dusky/blotchy/valeriy skin. Scattered bruises, significantly in arms and legs Blanchable red/pink heels and coccyx.   TESTS/PROCEDURES: None.  D/C DATE: home tomorrow afternoon due to ride availability pending ability to check her own blood sugars effectively.   Discharge Barriers: BGM stabilization. Started oral glipizide and Metformin this am.   OTHER IMPORTANT INFO: Mild BAKER with activity. Ambulating in the crooks x2 this evening. Showered yesterday. Doing well overall, Glucose meter ordered to floor, education given on how to check BG. Will need reinforcement again with next glucose check, supplies in room. Patient is still really struggling with the process. Held off on supper sliding scale insulin per hospitalist.

## 2022-03-15 NOTE — DISCHARGE SUMMARY
[facility]  Hospitalist Discharge Summary      Date of Admission:  2/21/2022  Date of Discharge:  3/15/2022  Discharging Provider: Eulalia Gore MD, FACP  Discharge Service: Hospitalist Service    Discharge Diagnoses   Lung abscess associated with cavitary nodules in right upper lobe.  PJP infection, improving.  CELINE chronic, treated.  Acute hypoxic respiratory failure, secondary to above, resolved.  COPD, not in exacerbation  Type 2 diabetes mellitus.  Hyperglycemia associated with prednisone.  Acute pulmonary edema.  Type II MI.  Essential hypertension.  Hyperlipidemia.  Right eye conjunctivitis.    Follow-ups Needed After Discharge   Follow-up Appointments     Follow Up and recommended labs and tests      Follow up with primary care provider in 7 days.  The following labs/tests   are recommended: CT scan; BMP.    Pulmonary follow-up at [facility] for outpatient PFTs and adjustment   of bronchodilator rx: [phone] to schedule appointment.    Needs CT scan in 4 weeks after antibiotics.         Follow-up and recommended labs and tests       Follow up with primary care provider, Emily Marie, within 7 days to   evaluate medication change, for hospital follow- up, and regarding new   diagnosis.  The following labs/tests are recommended: BMP.  Please review   blood sugar readings.             Unresulted Labs Ordered in the Past 30 Days of this Admission     Date and Time Order Name Status Description    2/25/2022 12:02 AM Acid-Fast Bacilli Culture and Stain In process     2/24/2022 12:02 AM Acid-Fast Bacilli Culture and Stain In process     2/23/2022 11:08 AM Acid-Fast Bacilli Culture and Stain In process     2/23/2022 12:02 AM Acid-Fast Bacilli Culture and Stain In process     2/22/2022 10:05 AM Pneumocystis jirovecii DFA In process       These results will be followed up by Infectious disease and PCP    Discharge Disposition   Discharged to home  Condition at discharge:  "Stable    Hospital Course   Cumulative Summary: Celeste Chacko is a 89 year old female with h/o COPD, chronic back pain, DM2, HTN, HLD who was brought to the ER on 2/21 for evaluation of exertional dyspnea and fatigue.    Here are Further details regarding the current hospitalization      Lung abscess with cavitary nodules in RUL from PJP infection  CELINE (chronic, acute treatment indicated)   Recent admission for COPD exacerbation treated with a steroid but ongoing dyspnea mostly with exertion.   CT Chest on 2/21: No PE but 2 new cavitary nodules in the right upper lobe. This was concerning for aggressive, atypical infection, possibly fungal or tubercular. Patient has been on steroid for almost a month although currently off of it.    CT Chest 3/2/22 due to worsening hypoxia: pulmonary edema and improvement in cavitation.   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 1 more test not completed due to inaccurate specimen. Patient initially taken off isolation given all other negative factors per ID,  however AFB culture came back positive on 3/4/22 and hence back in isolation for TB pending speciation.  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 \"  Isolation was discontinued , this may be relevant long term but not an acute issues and no tx  * fungal antigen for histo, cocci, blasto and Aspergillus all negative   * BCx negative to date  * PCR positive for PJP, which explains the hypoxia  * Patient is allergic to sulfa    -- Patient was seen and examined   -- Plan to treat with Mepron 750 mg BID for 21 days (will be complete on 3/23) followed by 750 mg daily for long term.  Follow up with Dr. Sainz in clinic in 6 weeks.  I appreciate her followup.scripts are ordered, insurance coverage was checked before discharge  -- Prednisone, in tapering doses, as follows: 40 mg orally twice daily for five days, then " 40 mg orally once daily for five days, then 20 mg orally for 5 days then 10 mg orally for 3 days .  -- At the time of discharge , she still has 2 more days of 20 mg of Prednisone and 3 days of 10 mg Prednisone   -- Unfortunately her TCU stay is not covered by insurance , she will be going to home with [facility], Home RN/PT and OT      DM2:  Takes glipizide PTA.  Hyperglycemia related to infection and steroids.  Significant concern due to hyperglycemia, initially plan was for patient to go to TCU where she could have been placed on insulin for few weeks while patient was coming off the prednisone now patient is planned to go home she does have bilateral upper extremity tremors and is unable to administer insulin for herself, it is even difficult for patient to do accu checks due to upper extremities tremor.      --We discussed about tapering her off the prednisone slightly faster as compared to keeping her on 20 mg for the next 11 days.  -- As above patient will take 20 mg for 2 more days and then 10 mg for 3 days   -- PTA glipizide 10 mg is a stopped and will start patient on glipizide extended release 5 mg p.o. daily to decrease risk of hypoglycemia associated with sulfonylureas especially in elderly patient.  --Patient is also started on Metformin, will increase the dose to 850 mg p.o. twice daily  --Currently patient blood sugars are quite decent controlled in low 200s considering she is completely off the insulin and is on 2 oral hypoglycemic agents.  --Patient will benefit from getting seen by primary care physician once patient is done with prednisone next week and getting her blood sugars evaluated and reassessment of patient should be continued on the current dose of her Metformin dose need to be decreased to 500 mg p.o. twice daily or 1000 mg extended release once a day.  --Home health care and home RN is ordered for close monitoring of blood sugars and Accu-Chek, if patient blood sugars seems to be running low  then probably Metformin can be switched to once a day.     Acute pulmonary edema   Type II MI   -- Minimally abnormal troponin (max 78 and down trending) Unlikely ACS.  Patient does not need any further cardiac work-up at this point.     Essential hypertension  Hyperlipidemia  * CT on 3/2 showed pulmonary edema as above.   * Echo 2/21/22: EF > 70%, RV normal. No significant valve abnormalities. Pulmonary HTN. IVC normal size and preserved respiratory variability.  *During the hospitalization patient did require Lasix for few days which has been is stopped abnormalities.    --Her Norvasc has been increased to 5 mg p.o. daily, new prescription is given, her PTA dose was 2.5 mg.  -- Patient will be continued on aspirin.     Acute hypoxic respiratory failure secondary to above   COPD Not on home O2. Her current presentation not c/w COPD exacerbation  -- Continue PTA is on fluticasone/salmeterol, DuoNeb, Spiriva inhalers.  -- oxygen saturation is 96% on RA  --Continue patient on PTA inhalers.     Right eye conjunctivitis :  -- Started Ofloxacin 0.3% ophthalmic solution, finished the course     Patient was seen and examined on the day of discharge ,she is feeling well, does not have any complaints , I did review the discharge medications and instructions with the patient and plan for her to follow up with the PCP after the hospitalization .patient was in agreement , she is discharged in stable condition to her home with [facility], Home PT and PT along with RN .    Consultations This Hospital Stay   PHYSICAL THERAPY ADULT IP CONSULT  SOCIAL WORK IP CONSULT  CARE MANAGEMENT / SOCIAL WORK IP CONSULT  INFECTIOUS DISEASES IP CONSULT  WOUND OSTOMY CONTINENCE NURSE  IP CONSULT  PULMONARY IP CONSULT  PHYSICAL THERAPY ADULT IP CONSULT  SPEECH LANGUAGE PATH ADULT IP CONSULT  OCCUPATIONAL THERAPY ADULT IP CONSULT  PULMONARY IP CONSULT  OCCUPATIONAL THERAPY ADULT IP CONSULT    Code Status   No CPR- Do NOT Intubate    Time Spent on this  Encounter   I, Eulalia Gore MD, personally saw the patient today and spent greater than 30 minutes discharging this patient.     Eulalia Gore MD, FACP  [place] [address] [facility]  [address] [address]  Phone: [phone]  ______________________________________________________________________    Physical Exam   Vital Signs: Temp: 98.6  F (37  C) Temp src: Oral BP: (!) 160/75 Pulse: 81   Resp: 18 SpO2: 93 % O2 Device: None (Room air)    Weight: 126 lbs 12.8 oz    Physical Exam  Vitals and nursing note reviewed.   Constitutional:       Appearance: She is well-developed.   HENT:      Head: Normocephalic and atraumatic.   Eyes:      Pupils: Pupils are equal, round, and reactive to light.   Neck:      Thyroid: No thyromegaly.   Cardiovascular:      Rate and Rhythm: Normal rate and regular rhythm.      Heart sounds: Normal heart sounds.   Pulmonary:      Effort: Pulmonary effort is normal. No respiratory distress.      Breath sounds: Normal breath sounds.   Abdominal:      General: Bowel sounds are normal. There is no distension.      Palpations: Abdomen is soft.   Musculoskeletal:         General: No tenderness. Normal range of motion.      Cervical back: Normal range of motion and neck supple.   Skin:     General: Skin is warm and dry.   Neurological:      Mental Status: She is alert and oriented to person, place, and time.   Psychiatric:         Behavior: Behavior normal.          Primary Care Physician   Emily Marie    Discharge Orders      Medication Therapy Management Referral      Home Care Referral      Home care nursing referral      Activity - Up ad tika     Follow Up and recommended labs and tests    Follow up with primary care provider in 7 days.  The following labs/tests are recommended: CT scan; BMP.    Pulmonary follow-up at [facility] for outpatient PFTs and adjustment of bronchodilator rx: [phone] to schedule appointment.    Needs CT scan in 4 weeks  after antibiotics.     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     Follow-up and recommended labs and tests     Follow up with primary care provider, Emily Marie, within 7 days to evaluate medication change, for hospital follow- up, and regarding new diagnosis.  The following labs/tests are recommended: BMP.  Please review blood sugar readings.     Activity    Your activity upon discharge: activity as tolerated     Discharge Instructions    Your blood pressure medication Norvasc has been increased to 5 mg p.o. daily.  You have been taking prednisone for the next 5 days which can cause increase in blood sugars.  Your home glipizide 10 mg has been is stopped now you are restarted on glipizide 5 mg extended release daily which decreases your risk of getting hypoglycemic.  You are also started on Metformin 850 mg p.o. twice daily.  If you cannot then please check your blood sugars twice a day and when the home nursing comes please asked them to check your blood sugars.  If your blood sugars are slightly high they will get better once you are done with prednisone but focus on that your blood sugars are not too low.  Please get follow-up appointment with primary care physician sooner in 5 to 7 days and discuss about your diabetes medications.  You will also be taking Mepron as recommended by infectious disease doctor and will have follow-up with primary care physician, pulmonary and infectious disease after the discharge.     Diet    Follow this diet upon discharge: Orders Placed This Encounter      Moderate Consistent Carb (60 g CHO per Meal) Diet     Diet    Follow this diet upon discharge: Orders Placed This Encounter      Moderate Consistent Carb (60 g CHO per Meal) Diet      Diet       Significant Results and Procedures   Results for orders placed or performed during the hospital encounter of 02/21/22   CT Chest Pulmonary Embolism w Contrast    Narrative    CT CHEST  PULMONARY EMBOLISM WITH CONTRAST  2/21/2022 1:47 PM    CLINICAL HISTORY: PE suspected, low/intermediate probability, positive  D-dimer.    TECHNIQUE: CT angiogram chest during arterial phase injection IV  contrast. 2D and 3D MIP reconstructions were performed by the CT  technologist. Dose reduction techniques were used.     CONTRAST: 57 mL Isovue-370    COMPARISON: 1/26/2022    FINDINGS:  ANGIOGRAM CHEST: Pulmonary arteries are normal caliber and negative  for pulmonary emboli. Thoracic aorta is normal in caliber and negative  for dissection.     LUNGS AND PLEURA: The lungs are emphysematous. Cavitary nodule in the  right upper lobe measures 1.1 cm (series 7, image 44), new compared to  the prior study. Other cavitary lesion in the right upper lobe  measures up to 2.6 cm (series 7, image 75), also new compared to the  prior study. Bullous changes at the lung bases are similar to prior.  No pleural effusions.    MEDIASTINUM/AXILLAE: No thoracic or axillary lymphadenopathy. The  esophagus and thyroid are unremarkable.    CORONARY ARTERY CALCIFICATION: Moderate.    UPPER ABDOMEN: Normal.    MUSCULOSKELETAL: Right shoulder replacement. No destructive bone  lesions.      Impression    IMPRESSION:  1.  There are two new cavitary nodules in the right upper lobe. This  is concerning for aggressive, atypical infection, possibly fungal or  tubercular.  2.  No evidence of pulmonary embolism.    NELSON TOLBERT MD         SYSTEM ID:  [id number]   US Abdomen Complete    Narrative    EXAM: US ABDOMEN COMPLETE  LOCATION: [facility]  DATE/TIME: 2/21/2022 8:17 PM    INDICATION: abd distension, dyspnea, to eval for ascites  COMPARISON: None.  TECHNIQUE: Complete abdominal ultrasound.    FINDINGS: Ultrasound of the 4 quadrants of the abdomen demonstrates no ascites.       Impression    IMPRESSION:  1.  No ascites.       CT Chest w Contrast    Narrative    CT CHEST WITH CONTRAST 3/2/2022 12:13 PM    CLINICAL  HISTORY: Pneumonia, unresolved; Dyspnea, chronic, unclear  etiology; Respiratory failure.    TECHNIQUE: CT chest with IV contrast. Multiplanar reformats were  obtained. Dose reduction techniques were used.    CONTRAST: 68 mL Isovue-370    COMPARISON: 2022, 2022    FINDINGS:   LUNGS AND PLEURA: 1.1 cm nodule in the right apex is no longer  cavitary, but unchanged in size. A 2.5 cm cavitary nodule in the right  upper lobe on image 72 has not changed in size although has a slightly  thicker wall when compared to previous (4 mm compared to 2 mm). A few  additional tiny pulmonary nodules are stable. No new pulmonary  nodules. Stable bulla at the left lung base. New diffuse groundglass  opacity with smooth interlobular septal thickening throughout the  lungs, with a new trace bilateral pleural effusions. Dependent  atelectasis.    MEDIASTINUM/AXILLAE: Mildly enlarged peritracheal lymph nodes slightly  increased. The largest lymph node anterior to the shalom measures 1.1  cm. Mildly enlarged bilateral hilar lymph nodes. Moderate coronary  artery calcification.    UPPER ABDOMEN: No significant finding.    MUSCULOSKELETAL: Unremarkable.      Impression    IMPRESSION:   1.  Right upper lobe nodules have not changed in size, but with  decreased cavitation compared to 2022.  2.  New mild diffuse groundglass opacity with interstitial septal  thickening and trace pleural effusions, suspicious for pulmonary  edema. No new nodules.    NEISHA WILLIS MD         SYSTEM ID:  [id number]   Echocardiogram Complete     Value    LVEF  >70%    Narrative    [id number]  [id number]  [id number]  [id number]^CONNOR^WILMAR^R     [facility]  Echocardiography Laboratory  [address] [place] [address]     Name: LEXII LIVINGSTON  MRN: [id number]  : 1931  Study Date: 2022 08:12 AM  Age: 89 yrs  Gender: Female  Patient Location: [facility]  Reason For Study: Dyspnea  Ordering Physician: WILMAR RYAN  R  Performed By: Aurea Coronado     BSA: 1.6 m2  Height: 63 in  Weight: 134 lb  HR: 87  BP: 160/74 mmHg  ______________________________________________________________________________  Procedure  Complete Portable Echo Adult.  ______________________________________________________________________________  Interpretation Summary     Hyperdynamic left ventricular function. The visual ejection fraction is >70%.  Right ventricular global function is normal.  No significant valvular abnormalities.  Pulmonary hypertension present. The right ventricular systolic pressure is  approximated at 39mmHg plus the right atrial pressure.  The inferior vena cava was normal in size with preserved respiratory  variability.     There are no prior studies available for comparison.  ______________________________________________________________________________  Left Ventricle  The left ventricle is normal in size. There is concentric remodeling present.  Hyperdynamic left ventricular function. The visual ejection fraction is >70%.  Left ventricular diastolic function is indeterminate. No regional wall motion  abnormalities noted.     Right Ventricle  The right ventricle is normal in structure, function and size.     Atria  Normal left atrial size. Right atrial size is normal.     Mitral Valve  The mitral valve is normal in structure and function.     Tricuspid Valve  There is trace to mild tricuspid regurgitation. The right ventricular systolic  pressure is approximated at 39mmHg plus the right atrial pressure. Pulmonary  hypertension.     Aortic Valve  The aortic valve is trileaflet with aortic valve sclerosis.     Pulmonic Valve  The pulmonic valve is not well seen, but is grossly normal.     Vessels  The aortic root is normal size. Normal size ascending aorta. The inferior vena  cava was normal in size with preserved respiratory variability.     Pericardium  There is no pericardial effusion.      ______________________________________________________________________________  MMode/2D Measurements & Calculations  IVSd: 1.1 cm  LVIDd: 3.2 cm  LVIDs: 1.6 cm  LVPWd: 1.0 cm  FS: 49.5 %     LV mass(C)d: 97.3 grams  LV mass(C)dI: 59.7 grams/m2  Ao root diam: 2.8 cm  LA dimension: 2.7 cm  asc Aorta Diam: 3.1 cm  LA/Ao: 0.93  LA Volume (BP): 34.8 ml  LA Volume Index (BP): 21.3 ml/m2  RWT: 0.64     Doppler Measurements & Calculations  MV E max patti: 58.7 cm/sec  MV A max patti: 110.6 cm/sec  MV E/A: 0.53     MV dec slope: 307.9 cm/sec2  PA acc time: 0.14 sec  TR max patti: 310.6 cm/sec  TR max P.6 mmHg  E/E' av.6  Lateral E/e': 15.0  Medial E/e': 10.2     ______________________________________________________________________________  Report approved by: Donald Weinstein 2022 09:06 AM               Discharge Medications   Current Discharge Medication List      START taking these medications    Details   Alcohol Swabs PADS Use to swab the area of the injection or kishan as directed Per insurance coverage  Qty: 100 each, Refills: 0    Associated Diagnoses: Type 2 diabetes mellitus with complication, without long-term current use of insulin (H)      !! atovaquone (MEPRON) 750 MG/5ML suspension Take 5 mLs (750 mg) by mouth 2 times daily (with meals) for 10 days Then 750 mg daily indefinitely  Qty: 200 mL, Refills: 0    Associated Diagnoses: Pneumonia due to Pneumocystis jirovecii, unspecified laterality, unspecified part of lung (H)      !! 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 To accompany glucose monitor brands per insurance coverage.  Qty: 100 each, Refills: 0    Associated Diagnoses: Type 2  diabetes mellitus with complication, without long-term current use of insulin (H)      blood glucose (NO BRAND SPECIFIED) test strip To use to test glucose level in the blood Use to test blood sugar  3 times daily as directed. To accompany glucose monitor brands per insurance coverage.  Qty: 100 strip, Refills: 0    Associated Diagnoses: Type 2 diabetes mellitus with complication, without long-term current use of insulin (H)      blood glucose calibration (NO BRAND SPECIFIED) solution Used to calibrate the blood glucose monitor as needed and as directed.  To accompany  blood glucose brands per insurance coverage  Qty: 1 each, Refills: 0    Associated Diagnoses: Type 2 diabetes mellitus with complication, without long-term current use of insulin (H)      blood glucose monitoring (NO BRAND SPECIFIED) meter device kit Use as directed Per insurance coverage  Qty: 1 kit, Refills: 0    Associated Diagnoses: Type 2 diabetes mellitus with complication, without long-term current use of insulin (H)      famotidine (PEPCID) 20 MG tablet Take 1 tablet (20 mg) by mouth 2 times daily      glipiZIDE (GLUCOTROL XL) 5 MG 24 hr tablet Take 1 tablet (5 mg) by mouth daily (with breakfast)  Qty: 30 tablet, Refills: 0    Comments: Future refills by PCP Dr. Emily Marie with phone number [phone].  Associated Diagnoses: Type 2 diabetes mellitus with complication, without long-term current use of insulin (H)      glucose 40 % (400 mg/mL) gel 15 g every 15 minutes by mouth as needed for low blood sugar.  Oral gel is preferable for conscious and able to swallow patient.  Qty: 112.5 g, Refills: 0    Associated Diagnoses: Type 2 diabetes mellitus with complication, without long-term current use of insulin (H)      insulin pen needle (32G X 4 MM) 32G X 4 MM miscellaneous Use as directed by provider Per insurance coverage  Qty: 100 each, Refills: 0    Associated Diagnoses: Type 2 diabetes mellitus with complication, without long-term current  use of insulin (H)      metFORMIN (GLUCOPHAGE) 850 MG tablet Take 1 tablet (850 mg) by mouth 2 times daily (with meals)  Qty: 60 tablet, Refills: 0    Associated Diagnoses: Type 2 diabetes mellitus with complication, without long-term current use of insulin (H)      Sharps Container MISC Use as directed to dispose of needles, lancets and other sharps Per Insurance coverage  Qty: 1 each, Refills: 0    Associated Diagnoses: Type 2 diabetes mellitus with complication, without long-term current use of insulin (H)       !! - Potential duplicate medications found. Please discuss with provider.      CONTINUE these medications which have CHANGED    Details   amLODIPine (NORVASC) 5 MG tablet Take 1 tablet (5 mg) by mouth daily  Qty: 30 tablet, Refills: 0    Comments: Future refills by PCP Dr. Emily Marie with phone number [phone].  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       albuterol (ACCUNEB) 0.63 MG/3ML neb solution Take 3 mLs (0.63 mg) by nebulization every 6 hours as needed for shortness of breath / dyspnea or wheezing  Qty: 90 mL, Refills: 3    Associated Diagnoses: COPD exacerbation (H)      albuterol (ACCUNEB) 1.25 MG/3ML neb solution Take 2 vials (2.5 mg) by nebulization every 4 hours as needed for shortness of breath / dyspnea or wheezing  Qty: 360 mL, Refills: 0    Associated Diagnoses: Chronic obstructive pulmonary disease with acute exacerbation (H)      albuterol (PROAIR HFA/PROVENTIL HFA/VENTOLIN HFA) 108 (90 Base) MCG/ACT inhaler INHALE 2 PUFFS INTO  THE LUNGS EVERY 6 HOURS AS NEEDED FOR SHORTNESS OF BREATH OR DIFFICULT BREATHING OR WHEEZING  Qty: 25.5 g, Refills: 3    Comments: **Patient requests 90 days supply**  Associated Diagnoses: COPD, severe (H)      ASPIRIN EC PO Take 81 mg by mouth daily      Calcium Polycarbophil (FIBERCON PO) Take 1 tablet by mouth daily       calcium-vitamin D (CALTRATE) 600-400 MG-UNIT per tablet Take 1 tablet by mouth 2 times daily 1200mg   1000 mg of vitamin d      co-enzyme Q-10 100 MG CAPS capsule Take 100 mg by mouth daily      fluticasone-salmeterol (WIXELA INHUB) 500-50 MCG/DOSE inhaler Inhale 1 puff into the lungs 2 times daily ### DO NOT FILL NOW.  Please update patient's profile to reflect additional refills.  ####  Qty: 180 each, Refills: 3    Associated Diagnoses: COPD, severe (H)      furosemide (LASIX) 20 MG tablet Take 2 tablets (40 mg) by mouth daily Take for 7 days  Qty: 14 tablet, Refills: 0      glucosamine-chondroitin 500-400 MG CAPS per capsule Take 1 capsule by mouth 2 times daily       ipratropium - albuterol 0.5 mg/2.5 mg/3 mL (DUONEB) 0.5-2.5 (3) MG/3ML neb solution Take 1 vial (3 mLs) by nebulization every 4 hours as needed for shortness of breath / dyspnea or wheezing  Qty: 360 mL, Refills: 0    Associated Diagnoses: Chronic obstructive pulmonary disease with acute exacerbation (H)      latanoprost (XALATAN) 0.005 % ophthalmic solution Place 1 drop Into the left eye every evening      Multiple Vitamins-Minerals (MULTIVITAMIN ADULT PO) Take 1 tablet by mouth every evening       NONFORMULARY At Bedtime Natra sleeping med takes 1 at night.       oxyCODONE (ROXICODONE) 5 MG tablet Take 1 tablet (5 mg) by mouth every 4 hours as needed for severe pain May take 5 tabs per day  Qty: 150 tablet, Refills: 0    Associated Diagnoses: Spinal stenosis of lumbar region with neurogenic claudication      polyethylene glycol (MIRALAX) 17 g packet Take 0.5 packets by mouth every evening       rosuvastatin (CRESTOR) 5 MG  tablet Take 5 mg by mouth At Bedtime      tiotropium (SPIRIVA HANDIHALER) 18 MCG inhaled capsule INHALE THE CONTENTS OF 1 CAPSULE VIA INHALATION DEVICE DAILY  Qty: 90 capsule, Refills: 3    Comments: ### DO NOT FILL NOW.  Please update patient's profile to reflect additional refills.  ####  Associated Diagnoses: COPD, severe (H)         STOP taking these medications       potassium chloride ER (KLOR-CON M) 10 MEQ CR tablet Comments:   Reason for Stopping:         amoxicillin-clavulanate (AUGMENTIN) 875-125 MG tablet Comments:   Reason for Stopping:         glipiZIDE (GLUCOTROL) 5 MG tablet Comments:   Reason for Stopping:             Allergies   Allergies   Allergen Reactions     Sulfamethoxazole Anaphylaxis and Hives

## 2022-03-15 NOTE — PROGRESS NOTES
Care Management Discharge Note    Discharge Date: 03/15/2022       Discharge Disposition:  Home    Discharge Services:  Home RN OT PT    Discharge DME: None      Discharge Transportation: family or friend will provide    Private pay costs discussed: Not applicable    PAS Confirmation Code: [id number]  Patient/family educated on Medicare website which has current facility and service quality ratings:      Education Provided on the Discharge Plan:  yes  Persons Notified of Discharge Plans:  daughter  Patient/Family in Agreement with the Plan: yes    Handoff Referral Completed: Yes    Additional Information:   Spoke with patient's daughter regarding discharge plans. Discussed that patient will need assistance with monitoring glucose. Daughter stating she will be able to  Assist patient in the evenings as daughter has to care for her grandchildren during the day time.   Discussed that [facility] will be starting service on the 21st. Patient will have Home RN PT and OT.services.  F/U appt made.  Hayde Vieira RN  Inpatient Care Coordinator  [facility]  # [phone]

## 2022-03-16 ENCOUNTER — PATIENT OUTREACH (OUTPATIENT)
Dept: NURSING | Facility: CLINIC | Age: 87
End: 2022-03-16
Attending: INTERNAL MEDICINE
Payer: COMMERCIAL

## 2022-03-16 DIAGNOSIS — E11.65 TYPE 2 DIABETES MELLITUS WITH HYPERGLYCEMIA, WITHOUT LONG-TERM CURRENT USE OF INSULIN (H): ICD-10-CM

## 2022-03-16 DIAGNOSIS — B59 PNEUMONIA DUE TO PNEUMOCYSTIS JIROVECII, UNSPECIFIED LATERALITY, UNSPECIFIED PART OF LUNG (H): ICD-10-CM

## 2022-03-16 DIAGNOSIS — J44.9 COPD, SEVERE (H): ICD-10-CM

## 2022-03-16 ASSESSMENT — ACTIVITIES OF DAILY LIVING (ADL): DEPENDENT_IADLS:: INDEPENDENT

## 2022-03-16 NOTE — PROGRESS NOTES
"Clinic Care Coordination Contact  [facility]: Post-Discharge Note  SITUATION                                                      Admission:    Admission Date: 02/21/22   Reason for Admission: Shortness of breath  Discharge:   Discharge Date: 03/15/22  Discharge Diagnosis: Lung abscess associated with cavitary nodules in right upper lobe.PJP infection, improving.CELINE chronic, treated.Acute hypoxic respiratory failure, secondary to above, resolved.COPD, not in exacerbationType 2 diabetes mellitus.Hyperglycemia associated with prednisone.    BACKGROUND                                                      Per hospital discharge summary and inpatient provider notes:    Cumulative Summary: Celeste Chacko is a 89 year old female with h/o COPD, chronic back pain, DM2, HTN, HLD who was brought to the ER on 2/21 for evaluation of exertional dyspnea and fatigue.    Here are Further details regarding the current hospitalization      Lung abscess with cavitary nodules in RUL from PJP infection  CELINE (chronic, acute treatment indicated)   Recent admission for COPD exacerbation treated with a steroid but ongoing dyspnea mostly with exertion.   CT Chest on 2/21: No PE but 2 new cavitary nodules in the right upper lobe. This was concerning for aggressive, atypical infection, possibly fungal or tubercular. Patient has been on steroid for almost a month although currently off of it.    CT Chest 3/2/22 due to worsening hypoxia: pulmonary edema and improvement in cavitation.   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 1 more test not completed due to inaccurate specimen. Patient initially taken off isolation given all other negative factors per ID,  however AFB culture came back positive on 3/4/22 and hence back in isolation for TB pending speciation.  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 \"  Isolation was " discontinued , this may be relevant long term but not an acute issues and no tx  * fungal antigen for histo, cocci, blasto and Aspergillus all negative   * BCx negative to date  * PCR positive for PJP, which explains the hypoxia  * Patient is allergic to sulfa     -- Patient was seen and examined   -- Plan to treat with Mepron 750 mg BID for 21 days (will be complete on 3/23) followed by 750 mg daily for long term.  Follow up with Dr. Sainz in clinic in 6 weeks.  I appreciate her followup.scripts are ordered, insurance coverage was checked before discharge  -- Prednisone, in tapering doses, as follows: 40 mg orally twice daily for five days, then 40 mg orally once daily for five days, then 20 mg orally for 5 days then 10 mg orally for 3 days .  -- At the time of discharge , she still has 2 more days of 20 mg of Prednisone and 3 days of 10 mg Prednisone   -- Unfortunately her TCU stay is not covered by insurance , she will be going to home with [facility], Home RN/PT and OT      DM2:  Takes glipizide PTA.  Hyperglycemia related to infection and steroids.  Significant concern due to hyperglycemia, initially plan was for patient to go to TCU where she could have been placed on insulin for few weeks while patient was coming off the prednisone now patient is planned to go home she does have bilateral upper extremity tremors and is unable to administer insulin for herself, it is even difficult for patient to do accu checks due to upper extremities tremor.      --We discussed about tapering her off the prednisone slightly faster as compared to keeping her on 20 mg for the next 11 days.  -- As above patient will take 20 mg for 2 more days and then 10 mg for 3 days   -- PTA glipizide 10 mg is a stopped and will start patient on glipizide extended release 5 mg p.o. daily to decrease risk of hypoglycemia associated with sulfonylureas especially in elderly patient.  --Patient is also started on Metformin, will increase the dose to  850 mg p.o. twice daily  --Currently patient blood sugars are quite decent controlled in low 200s considering she is completely off the insulin and is on 2 oral hypoglycemic agents.  --Patient will benefit from getting seen by primary care physician once patient is done with prednisone next week and getting her blood sugars evaluated and reassessment of patient should be continued on the current dose of her Metformin dose need to be decreased to 500 mg p.o. twice daily or 1000 mg extended release once a day.  --Home health care and home RN is ordered for close monitoring of blood sugars and Accu-Chek, if patient blood sugars seems to be running low then probably Metformin can be switched to once a day.     Acute pulmonary edema   Type II MI   -- Minimally abnormal troponin (max 78 and down trending) Unlikely ACS.  Patient does not need any further cardiac work-up at this point.     ASSESSMENT      Enrollment  Primary Care Care Coordination Status: Declined    Discharge Assessment  How are you doing now that you are home?: RN CC called patient's phone, rang for 2 minutes, then phone line disconnected. RN CC then called and spoke with patient's daughter Irene, HEVER on file. Irene states that her mom is doing well at home. Irene questions when home care will start and what to expect. RN CC reviewed that patient should be contacted by [facility] in the next 24-48 hours to schedule initial C RN assessment. RAZIA NEVAREZ provided Irene with [facility]'s phone number to contact directly if she desires. Reviewed the [facility] services ordered and that HHA and SW can also be ordered as needed. Reviewed what patient/family can expect from [facility] services. Irene states that they are considering long term care, if living on her own becomes too overwhelming for patient. Reviewed AVS, medication list, and upcoming appointments. MTM is scheduled to meet with patient on 3/21/22, and PCP follow up for 3/23/22. Patient is  "looking forward to attending her great granddaughter's 2 yr old birthday party on Saturday. 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 Dicussed available resources to patient/family. Irene states that they would like to see how the patient does at home with home care services currently and declines RN CC further follow up outreaches, or sending of resources.  How are your symptoms? (Red Flag symptoms escalate to triage hotline per guidelines): Improved  Do you feel your condition is stable enough to be safe at home until your provider visit?: Yes  Does the patient have their discharge instructions? : Yes  Does the patient have questions regarding their discharge instructions? : No  Were you started on any new medications or were there changes to any of your previous medications? : Yes  Does the patient have all of their medications?: Yes  Do you have questions regarding any of your medications? : No  Do you have all of your needed medical supplies or equipment (DME)?  (i.e. oxygen tank, CPAP, cane, etc.): Yes  Discharge follow-up appointment scheduled within 14 calendar days? : Yes  Discharge Follow Up Appointment Date: 03/23/22  Discharge Follow Up Appointment Scheduled with?: Primary Care Provider              Care Management       Care Mgmt General Assessment  Referral  Referral Source: Focused Project  Health Care Home/Utilization  Preferred Hospital: [facility]  [phone]  Preferred Urgent Care: [facility], [phone]  Living Situation  Type of residence:: Apartment  Resources  Patient receiving home care services:: No  Community Resources: None  Equipment Currently Used at Home: grab bar, toilet;grab bar, tub/shower;shower chair;raised toilet seat (FWW, )  Referrals Placed: None  Employment Status: retired  Psychosocial  Informal Support system:: Children  Functional " Status  Dependent ADLs:: Independent  Dependent IADLs:: Independent  Bed or wheelchair confined:: No  Advance Care Plan/Directive  Advanced Care Plans/Directives on file:: Yes  Type Advanced Care Plans/Directives: Advanced Directive - On File  Advanced Care Plan/Directive Status: Not Applicable    PLAN                                                      Outpatient Plan:  Patient was provided with RN CC's contact information and encouraged to call with questions, concerns, support needs. RN CC will remain available as needed. No further care coordination outreaches will be made at this time.       Future Appointments   Date Time Provider Department Center   3/21/2022  3:00 PM Olinda Glover, [facility]   3/23/2022  2:40 PM [facility]   5/20/2022  8:00 AM [facility]   5/27/2022  1:30 PM Emily Marie MD [facility]         For any urgent concerns, please contact our 24 hour nurse triage line: [phone] ([phone])         Dominga Rivera RN

## 2022-03-16 NOTE — PLAN OF CARE
Physical Therapy Discharge Summary    Reason for therapy discharge:    Discharged to home with home therapy.    Progress towards therapy goal(s). See goals on Care Plan in [facility] electronic health record for goal details.  Goals partially met.  Barriers to achieving goals:   discharge from facility.    Therapy recommendation(s):    Continued therapy is recommended.  Rationale/Recommendations:  to address impaired functional mobility.      Susan Zhao, PT

## 2022-03-16 NOTE — PLAN OF CARE
Occupational Therapy Discharge Summary    Reason for therapy discharge:    Discharged to home with home therapy.    Progress towards therapy goal(s). See goals on Care Plan in [facility] electronic health record for goal details.  Goals partially met.  Barriers to achieving goals:   discharge from facility.    Therapy recommendation(s):    Continued therapy is recommended.  Rationale/Recommendations:  to advance safety and indep with ADLs.

## 2022-03-17 ENCOUNTER — TELEPHONE (OUTPATIENT)
Dept: INTERNAL MEDICINE | Facility: CLINIC | Age: 87
End: 2022-03-17
Payer: COMMERCIAL

## 2022-03-17 LAB
ACID FAST STAIN (ARUP): ABNORMAL
ORGANISM (ARUP): ABNORMAL

## 2022-03-17 NOTE — TELEPHONE ENCOUNTER
Hamida from [facility] calling for delay start of care. 1st day of service 3/22/2022  Ok to call and lm [phone]

## 2022-03-17 NOTE — TELEPHONE ENCOUNTER
See message below. Pt was in the hospital for 22 days and discharged on 3/15/22.     OK for DELAY of start of care until next Tuesday? Pt has her daughter Irene to help her as well.     Pt has appt with Dr Dailey next Wednesday.

## 2022-03-17 NOTE — TELEPHONE ENCOUNTER
Call to Evi CEJA and advised. She agrees.   Verbal order given to approve visits until certification received for MD signature.

## 2022-03-18 ENCOUNTER — MEDICAL CORRESPONDENCE (OUTPATIENT)
Dept: HEALTH INFORMATION MANAGEMENT | Facility: CLINIC | Age: 87
End: 2022-03-18
Payer: COMMERCIAL

## 2022-03-18 ENCOUNTER — TELEPHONE (OUTPATIENT)
Dept: INTERNAL MEDICINE | Facility: CLINIC | Age: 87
End: 2022-03-18
Payer: COMMERCIAL

## 2022-03-21 ENCOUNTER — VIRTUAL VISIT (OUTPATIENT)
Dept: PHARMACY | Facility: CLINIC | Age: 87
End: 2022-03-21
Payer: COMMERCIAL

## 2022-03-21 DIAGNOSIS — G89.4 CHRONIC PAIN SYNDROME: ICD-10-CM

## 2022-03-21 DIAGNOSIS — Z78.9 TAKES DIETARY SUPPLEMENTS: ICD-10-CM

## 2022-03-21 DIAGNOSIS — J18.9 LUNG INFECTION: Primary | ICD-10-CM

## 2022-03-21 DIAGNOSIS — I10 BENIGN ESSENTIAL HYPERTENSION: ICD-10-CM

## 2022-03-21 DIAGNOSIS — E78.5 HYPERLIPIDEMIA LDL GOAL <100: ICD-10-CM

## 2022-03-21 DIAGNOSIS — J44.1 CHRONIC OBSTRUCTIVE PULMONARY DISEASE WITH ACUTE EXACERBATION (H): ICD-10-CM

## 2022-03-21 DIAGNOSIS — K21.00 GASTROESOPHAGEAL REFLUX DISEASE WITH ESOPHAGITIS, UNSPECIFIED WHETHER HEMORRHAGE: ICD-10-CM

## 2022-03-21 DIAGNOSIS — K59.00 CONSTIPATION, UNSPECIFIED CONSTIPATION TYPE: ICD-10-CM

## 2022-03-21 DIAGNOSIS — E11.65 TYPE 2 DIABETES MELLITUS WITH HYPERGLYCEMIA, WITHOUT LONG-TERM CURRENT USE OF INSULIN (H): ICD-10-CM

## 2022-03-21 PROCEDURE — 99605 MTMS BY PHARM NP 15 MIN: CPT | Performed by: PHARMACIST

## 2022-03-21 PROCEDURE — 99607 MTMS BY PHARM ADDL 15 MIN: CPT | Performed by: PHARMACIST

## 2022-03-21 NOTE — PATIENT INSTRUCTIONS
Recommendations from today's MTM visit:                                                    MTM (medication therapy management) is a service provided by a clinical pharmacist designed to help you get the most of out of your medicines.   Today we reviewed what your medicines are for, how to know if they are working, that your medicines are safe and how to make your medicine regimen as easy as possible.    1.  Breo once daily until gone and then restart Wixela.    2.  Incruse once daily until gone and then restart Spiriva    Follow-up: as needed    It was great to speak with you today.  I value your experience and would be very thankful for your time with providing feedback on our clinic survey. You may receive a survey via email or text message in the next few days.     To schedule another MTM appointment, please call the clinic directly or you may call the MTM scheduling line at [phone] or toll-free at [phone].     My Clinical Pharmacist's contact information:                                                      Please feel free to contact me with any questions or concerns you have.      Olinda Glover , Pharm D  [phone] (phone)  Medication Therapy Management Pharmacist

## 2022-03-21 NOTE — LETTER
March 21, 2022      Celeste Chacko  [address]    [place] [address]              Dear Celeste,        It was so nice to speak with you on 3/21/22.  I hope I was able to give you some useful information during our Medication Therapy Management (MTM) visit. The purpose of this visit with a clinical pharmacist was to review the medicines you received when discharged from the hospital. We want to make sure that you know which medicines to take and what they are for. We also want to make sure all your medicines are working, safe, and as easy to take as possible.    Enclosed is a summary of the suggestions we talked about and any other thoughts I had. There is also a list of your medicines included. This information has also been shared with your primary care provider.    Feel free to call if you have any questions or concerns. By working together with you and your doctor, I hope to help you feel confident managing your medicines and improving your quality of life.       Best wishes,         Olinda Glover , Pharm D  [phone] (phone)  Medication Therapy Management Pharmacist

## 2022-03-21 NOTE — PROGRESS NOTES
Medication Therapy Management (MTM) Encounter    ASSESSMENT:                            Medication Adherence/Access: No issues identified    Lung abscess with cavitary nodules in RUL from PJP infection:  Continue current regimen and follow-up with ID as planned.     Type 2 Diabetes:  A1c above goal. Home readings improving now that she is off prednisone.  Continue to monitor.     Essential hypertension:  Blood pressure readings were elevated while inpatient.  Has OV this week to recheck.    Hyperlipidemia: stable    COPD: reviewed inhalers and what to take - she wrote directions down while on the phone with me.  Will finish supply of Breo and Incruse and then change back to PTA Wixela and Spiriva.    Chronic Back Pain: stable    Constipation:  stable    GERD: stable    Supplements: stable    PLAN:                            1.  Breo once daily until gone and then restart Wixela.    2.  Incruse once daily until gone and then restart Spiriva    Follow-up: No follow-ups on file.    SUBJECTIVE/OBJECTIVE:                          Celeste Chacko is a 89 year old female called for a transitions of care visit. She was discharged from [facility] on 3/15/22 for dyspnea and fatique.      Reason for visit: medication review      Allergies/ADRs: Reviewed in chart  Tobacco: She reports that she quit smoking about 21 years ago. Her smoking use included cigarettes. She has a 27.00 pack-year smoking history. She has never used smokeless tobacco.  Alcohol: not currently using    Medication Adherence/Access: no issues reported  Patient takes medications directly from bottles follow list from hospital.  Daughter comes over at night to see her and help out.      Lung abscess with cavitary nodules in RUL from PJP infection:  On Mepron 750 mg twice daily for 21 day course and then will change to 750 mg daily.  Tapering prednisone.    Feeling better every day but still weak. Sleeping okay.  No new symptoms.    Has appointment with Dr Dailey  "this week for follow-up.      Recent admission for COPD exacerbation treated with a steroid but ongoing dyspnea mostly with exertion.   CT Chest on 2/21: No PE but 2 new cavitary nodules in the right upper lobe. This was concerning for aggressive, atypical infection, possibly fungal or tubercular. Patient has been on steroid for almost a month although currently off of it.    CT Chest 3/2/22 due to worsening hypoxia: pulmonary edema and improvement in cavitation.   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 1 more test not completed due to inaccurate specimen. Patient initially taken off isolation given all other negative factors per ID,  however AFB culture came back positive on 3/4/22 and hence back in isolation for TB pending speciation.  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 \"  Isolation was discontinued , this may be relevant long term but not an acute issues and no tx  * fungal antigen for histo, cocci, blasto and Aspergillus all negative   * BCx negative to date  * PCR positive for PJP, which explains the hypoxia  * Patient is allergic to sulfa     -- Patient was seen and examined   -- Plan to treat with Mepron 750 mg BID for 21 days (will be complete on 3/23) followed by 750 mg daily for long term.  Follow up with Dr. Sainz in clinic in 6 weeks.  I appreciate her followup.scripts are ordered, insurance coverage was checked before discharge  -- Prednisone, in tapering doses, as follows: 40 mg orally twice daily for five days, then 40 mg orally once daily for five days, then 20 mg orally for 5 days then 10 mg orally for 3 days .  -- At the time of discharge , she still has 2 more days of 20 mg of Prednisone and 3 days of 10 mg Prednisone   -- Unfortunately her TCU stay is not covered by insurance , she will be going to home with [facility], Home RN/PT and OT      Type 2 Diabetes:  Currently " taking glipizide 5 mg daily (dose decreased) and metformin 850 mg twice daily (new). Patient is not experiencing side effects.  Blood sugar monitorin time(s) daily. Ranges (patient reported): 200s - which she is happy with  Not eating any sugars.  Symptoms of low blood sugar? none  Symptoms of high blood sugar? none  Aspirin: Taking 81mg daily and denies side effects  Statin: Yes:    ACEi/ARB: No.   Urine Albumin:   Lab Results   Component Value Date    UMALCR 70.61 (H) 2021      Lab Results   Component Value Date    A1C 9.9 2022    A1C 7.1 2022    A1C 7.1 11/10/2021    A1C 7.2 2021    A1C 6.7 10/13/2020    A1C 7.1 2020    A1C 6.9 2019    A1C 6.9 2019            Essential hypertension:  Taking amlodipine 5 mg daily (dose increased).  Some swelling in left ankle/foot; this leg has varicose veins.  Keeps feet elevated when possible.  Has not been wearing compression stockings.      BP Readings from Last 3 Encounters:   03/15/22 (!) 160/75   22 (!) 151/78   02/15/22 127/65     Potassium   Date Value Ref Range Status   2022 3.6 3.4 - 5.3 mmol/L Final   2021 3.6 3.4 - 5.3 mmol/L Final       Hyperlipidemia: Current therapy includes rosuvastatin 5 mg daily.  Patient reports no significant myalgias or other side effects.    Recent Labs   Lab Test 22  0559 21  0809 16  0815 07/23/15  0804 02/12/15  0825   CHOL 139 169   < > 179 163   HDL 56 59   < > 53 58   LDL 41 82   < > 98 72   TRIG 211* 141   < > 142 166*   CHOLHDLRATIO  --   --   --  3.4 2.8    < > = values in this interval not displayed.     COPD: Current medications: ICS/LABA- Wixela 500/50   LAMA- Spiriva 1 puff(s) once daily and Duonebs as needed.  Breo Ellipta and Incruse Ellipta were sent home with her and she has been using these in addition to her PTA inhalers.  Off prednisone.  . Patient rinses their mouth after using steroid inhaler.    Patient is not experiencing side effects.    Patient reports the following symptoms: none.  Patient does have an COPD Action Plan on file.   Has spirometry been completed: Yes     Chronic Back Pain: Taking oxycodone 5 mg up to 5 tablets daily and Tylenol 325 mg three times daily.  This regimen has been effective.        Constipation:  Taking Miralax daily.  Effective for her. No concerns with side effects.    GERD: Current medications include: Pepcid (famotidine) 20 mg  twice daily. Pt reports no current symptoms.  Patient feels that current regimen is effective.    Supplements: Currently taking Caltrate, Co-Q 10 and Glucosamine. No reported issues at this time. .           Today's Vitals: There were no vitals taken for this visit.  ----------------  Post Discharge Medication Reconciliation Status: discharge medications reconciled, continue medications without change.    I spent 20 minutes with this patient today. All changes were made via collaborative practice agreement with Emily Marie MD. A copy of the visit note was provided to the patient's provider(s).    The patient was mailed a summary of these recommendations.     Olinda Glover , Pharm D  [phone] (phone)  Medication Therapy Management Pharmacist     Telemedicine Visit Details  Type of service:  Telephone visit  Start Time: 300pm  End Time: 320pm  Originating Location (patient location): Home  Distant Location (provider location):  [facility]     Medication Therapy Recommendations  COPD, severe (H)    Current Medication: fluticasone-salmeterol (WIXELA INHUB) 500-50 MCG/DOSE inhaler   Rationale: Does not understand instructions - Adherence - Adherence   Recommendation: Provide Education   Status: Patient Agreed - Adherence/Education

## 2022-03-23 ENCOUNTER — OFFICE VISIT (OUTPATIENT)
Dept: INTERNAL MEDICINE | Facility: CLINIC | Age: 87
End: 2022-03-23
Payer: COMMERCIAL

## 2022-03-23 VITALS
OXYGEN SATURATION: 95 % | WEIGHT: 126.2 LBS | BODY MASS INDEX: 21.54 KG/M2 | HEIGHT: 64 IN | HEART RATE: 97 BPM | DIASTOLIC BLOOD PRESSURE: 71 MMHG | TEMPERATURE: 97.6 F | SYSTOLIC BLOOD PRESSURE: 127 MMHG

## 2022-03-23 DIAGNOSIS — B59 PNEUMONIA DUE TO PNEUMOCYSTIS JIROVECII, UNSPECIFIED LATERALITY, UNSPECIFIED PART OF LUNG (H): Primary | ICD-10-CM

## 2022-03-23 DIAGNOSIS — E11.8 TYPE 2 DIABETES MELLITUS WITH COMPLICATION, WITHOUT LONG-TERM CURRENT USE OF INSULIN (H): ICD-10-CM

## 2022-03-23 DIAGNOSIS — J44.9 COPD, SEVERE (H): ICD-10-CM

## 2022-03-23 DIAGNOSIS — G89.4 CHRONIC PAIN SYNDROME: ICD-10-CM

## 2022-03-23 PROCEDURE — 99495 TRANSJ CARE MGMT MOD F2F 14D: CPT | Performed by: FAMILY MEDICINE

## 2022-03-23 RX ORDER — ATOVAQUONE 750 MG/5ML
750 SUSPENSION ORAL DAILY
Qty: 210 ML | Refills: 1 | Status: SHIPPED | OUTPATIENT
Start: 2022-03-23 | End: 2022-07-05

## 2022-03-23 RX ORDER — TIOTROPIUM BROMIDE 18 UG/1
CAPSULE ORAL; RESPIRATORY (INHALATION)
Qty: 90 CAPSULE | Refills: 3 | COMMUNITY
Start: 2022-03-23 | End: 2022-06-28

## 2022-03-23 NOTE — PROGRESS NOTES
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    Taking atovaquone for PCP pneumonia.  It was suggested to take this indefinitely 1 dose per day.  Respiratory status is currently doing well.  Plan: atovaquone (MEPRON) 750 MG/5ML suspension        She has follow-up with pulmonology and infectious disease and she will keep these appointments.  I did refill her medication.      (J44.9) COPD, severe (H)  Comment:   History noted.  She has an inhaler from the hospital (Incruse) which she will finish and then resume her Wixela and also now reviewed new her Spiriva.  Plan: fluticasone-salmeterol (WIXELA INHUB) 500-50         MCG/DOSE inhaler, tiotropium (SPIRIVA         HANDIHALER) 18 MCG inhaled capsule        We clarified her inhalers.  She will again follow-up with pulmonology.  Needs a follow-up CT scan in about 4 weeks after discharge.      (E11.8) Type 2 diabetes mellitus with complication, without long-term current use of insulin (H)  Comment: Treating with Metformin and glipizide.  Glucose running  150-200.  Plan:   Monitor at least every other day.  Advised if below 122 discontinue the glipizide.      (G89.4) Chronic pain syndrome  Comment: Noted.  Appears mentally clear.  Plan: Patient is on as needed oxycodone.          Subjective       Follow-up cavitary pneumonia.    HPI     Daughter Irene in room.    Post Discharge Outreach 3/16/2022   Admission Date 2/21/2022   Reason for Admission Shortness of breath   Discharge Date 3/15/2022   Discharge Diagnosis Lung abscess associated with cavitary nodules in right upper lobe.PJP infection, improving.CELINE chronic, treated.Acute hypoxic respiratory failure, secondary to above, resolved.COPD, not in exacerbationType 2 diabetes mellitus.Hyperglycemia associated with prednisone.   How are you doing now that you are home? RN CC called  "patient's phone, rang for 2 minutes, then phone line disconnected. RN CC then called and spoke with patient's daughter HEVER Bonilla on file. Irene states that her mom is doing well at home. Irene questions when home care will start and what to expect. RN CC reviewed that patient should be contacted by [facility] in the next 24-48 hours to schedule initial C RN assessment. RN CC provided Irene with [facility]'s phone number to contact directly if she desires. Reviewed the [facility] services ordered and that HHA and SW can also be ordered as needed. Reviewed what patient/family can expect from [facility] services. Irene states that they are considering long term care, if living on her own becomes too overwhelming for patient. Reviewed AVS, medication list, and upcoming appointments. MTM is scheduled to meet with patient on 3/21/22, and PCP follow up for 3/23/22. Patient is looking forward to attending her great granddaughter's 2 yr old birthday party on Saturday. 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 Dicussed available resources to patient/family. Irene states that they would like to see how the patient does at home with home care services currently and declines RN CC further follow up outreaches, or sending of resources.   How are your symptoms? (Red Flag symptoms escalate to triage hotline per guidelines) Improved   Do you feel your condition is stable enough to be safe at home until your provider visit? Yes   Does the patient have their discharge instructions?  Yes   Does the patient have questions regarding their discharge instructions?  No   Were you started on any new medications or were there changes to any of your previous medications?  Yes   Does the patient have all of their medications? Yes   Do you have questions regarding any of your medications?  No   Do you have all of your needed medical supplies or " "equipment (DME)?  (i.e. oxygen tank, CPAP, cane, etc.) Yes   Discharge follow-up appointment scheduled within 14 calendar days?  Yes   Discharge Follow Up Appointment Date 3/23/2022   Discharge Follow Up Appointment Scheduled with? Primary Care Provider     Hospital Follow-up Visit:    Hospital/Nursing Home/IP Rehab Facility: [facility]  Date of Admission: 2-21  Date of Discharge:  3-15-22  Reason(s) for Admission: Pneumonia.      Was your hospitalization related to COVID-19? No   Problems taking medications regularly:  None  Medication changes since discharge: None  Problems adhering to non-medication therapy:  None    Summary of hospitalization:  [facility] discharge summary reviewed  Diagnostic Tests/Treatments reviewed.  Follow up needed: Pulmonary, infectious disease, primary.  Other Healthcare Providers Involved in Patient s Care:         See above.  Update since discharge: improved.       Post Discharge Medication Reconciliation: discharge medications reconciled, continue medications without change.  Plan of care communicated with patient and daughter.                Review of Systems     No fever or chills.  No weight loss.  Somewhat short of breath.  No sputum production.  No chest pain.  No nausea, abdominal pain.  No vomiting.  No edema.  No focal weakness.          Objective    BP (!) 156/67 (BP Location: Left arm, Patient Position: Chair, Cuff Size: Adult Large)   Pulse 97   Temp 97.6  F (36.4  C) (Oral)   Ht 1.613 m (5' 3.5\")   Wt 57.2 kg (126 lb 3.2 oz)   SpO2 95%   Breastfeeding No   BMI 22.00 kg/m    Body mass index is 22 kg/m .  Physical Exam     Patient is a very sharp 89-year-old woman, NAD.  Afebrile.  HEENT unremarkable.  Speech clear.  Neck no thyromegaly or mass.  Lungs basically clear, moves air well.  Cardiac regular grade 1/6 YARELIS rate normal  Abdomen nontender  Extremities no significant edema.  Motor and gait normal for age.        "

## 2022-03-25 LAB
ACID FAST STAIN (ARUP): ABNORMAL
ACID FAST STAIN (ARUP): ABNORMAL
ORGANISM (ARUP): ABNORMAL

## 2022-03-28 ENCOUNTER — TELEPHONE (OUTPATIENT)
Dept: INTERNAL MEDICINE | Facility: CLINIC | Age: 87
End: 2022-03-28
Payer: COMMERCIAL

## 2022-03-28 NOTE — TELEPHONE ENCOUNTER
The Home Care/Assisted Living/Nursing Facility is calling regarding an established patient.  Has the patient seen Home Care in the past or is currently residing in Assisted Living or Nursing Facility? No.     Evi calling from [facility] requesting the following orders that are NOT within the Home Care, Assisted Living or Nursing Home Eval and Treatment standing order and must be ordered by a Licensed Practitioner.    Preferred Call Back Number: [phone]    Skilled Nursinx a week for 1 week, 1 time a week for 5 weeks.  PT: 2 times a week for 4 weeks, 1 time a week for 1 week  OT: 1 time a week for 1 week, 2 times a week for 3 weeks    Additionally, need confirmation that it is ok to take Melatonin 10 mg daily as it is not on medication list.      Routing to Licensed Practitioner (Provider) to review request and provide approval or recommendation.

## 2022-03-29 NOTE — TELEPHONE ENCOUNTER
Call to Hamida and left detailed message.     Verbal order given to approve visits until certification received for MD signature.

## 2022-03-30 ENCOUNTER — TRANSFERRED RECORDS (OUTPATIENT)
Dept: HEALTH INFORMATION MANAGEMENT | Facility: CLINIC | Age: 87
End: 2022-03-30
Payer: COMMERCIAL

## 2022-04-04 ENCOUNTER — TELEPHONE (OUTPATIENT)
Dept: INTERNAL MEDICINE | Facility: CLINIC | Age: 87
End: 2022-04-04
Payer: COMMERCIAL

## 2022-04-04 ENCOUNTER — TELEPHONE (OUTPATIENT)
Dept: INTERNAL MEDICINE | Facility: CLINIC | Age: 87
End: 2022-04-04

## 2022-04-04 DIAGNOSIS — I10 BENIGN ESSENTIAL HYPERTENSION: ICD-10-CM

## 2022-04-04 DIAGNOSIS — J85.1 ABSCESS OF UPPER LOBE OF RIGHT LUNG WITH PNEUMONIA (H): Primary | ICD-10-CM

## 2022-04-04 DIAGNOSIS — E11.9 TYPE 2 DIABETES MELLITUS WITHOUT COMPLICATION, WITHOUT LONG-TERM CURRENT USE OF INSULIN (H): ICD-10-CM

## 2022-04-04 NOTE — TELEPHONE ENCOUNTER
Patient calls confused as to if she should be having her PCP order another CT Scan.  Patient states Dr. Christine told her she needed one but did not explain why.  Discharge notes said she should have after finishing antibiotics for 4 weeks.  Please clarify and advise.

## 2022-04-04 NOTE — TELEPHONE ENCOUNTER
Med orders from 3/27 received via fax, form to your in box for signature      Jaspreet Silva , [facility]  Ac[facility] - [place]  04/04/22 1:24 PM

## 2022-04-04 NOTE — TELEPHONE ENCOUNTER
"Received a fax from [facility] requesting refill for Lisinopril 40 mg tablet.     This medication is not on patient's active medication list. Medication was discontinued on 1/25/2022 by a Dr. Mendiola when patient was in hospital.     Although, hospital note from 1/25/2022 states:   \"Hypertension:  We will continue the patient on her Norvasc and lisinopril.  She is slightly hypertensive, but in acceptable range.\"     Please advise. Thank you!      Zulema PRETTY RN   [facility]          "

## 2022-04-05 DIAGNOSIS — M48.062 SPINAL STENOSIS OF LUMBAR REGION WITH NEUROGENIC CLAUDICATION: ICD-10-CM

## 2022-04-05 PROBLEM — R06.02 SHORTNESS OF BREATH: Status: RESOLVED | Noted: 2022-02-21 | Resolved: 2022-04-05

## 2022-04-05 PROBLEM — E11.65 TYPE 2 DIABETES MELLITUS WITH HYPERGLYCEMIA, WITHOUT LONG-TERM CURRENT USE OF INSULIN (H): Status: RESOLVED | Noted: 2022-02-21 | Resolved: 2022-04-05

## 2022-04-05 PROBLEM — R79.89 ELEVATED TROPONIN: Status: RESOLVED | Noted: 2022-02-21 | Resolved: 2022-04-05

## 2022-04-05 PROBLEM — J44.1 CHRONIC OBSTRUCTIVE PULMONARY DISEASE WITH ACUTE EXACERBATION (H): Status: RESOLVED | Noted: 2022-01-25 | Resolved: 2022-04-05

## 2022-04-05 RX ORDER — OXYCODONE HYDROCHLORIDE 5 MG/1
5 TABLET ORAL EVERY 4 HOURS PRN
Qty: 150 TABLET | Refills: 0 | Status: ON HOLD | OUTPATIENT
Start: 2022-04-05 | End: 2022-04-15

## 2022-04-05 RX ORDER — LISINOPRIL 40 MG/1
40 TABLET ORAL DAILY
Qty: 90 TABLET | Refills: 3 | Status: SHIPPED | OUTPATIENT
Start: 2022-04-05 | End: 2022-07-05

## 2022-04-05 NOTE — TELEPHONE ENCOUNTER
Did she see the lung clinic after discharge?  If not, does she have an upcoming appointment?  They will often order the scan themselves.  If she still on her antibiotic, if not when did she finish it?

## 2022-04-05 NOTE — TELEPHONE ENCOUNTER
Patient calling  She is no longer on antibiotics. She finished her meds last Friday  Her lung provider told her she could not order the CT and she was instructed to call her primary  Please advise

## 2022-04-05 NOTE — TELEPHONE ENCOUNTER
Call to patient. Message left for return call to clinic.     Please also see telephone encounter from 4/5/2022 (orders-CT).    Zulema PRETTY RN   [facility]

## 2022-04-05 NOTE — TELEPHONE ENCOUNTER
The discharge summary suggested that the CT scan should be done 4 weeks after finishing the antibiotic.  So it should be done 4 weeks from last Friday.  I will put in the order and they will contact her to schedule.

## 2022-04-05 NOTE — TELEPHONE ENCOUNTER
Call to patient. Message left for return call to clinic.     Please also see telephone encounter from 4/4/2022.     Zulema PRETTY RN   [facility]

## 2022-04-06 RX ORDER — GLIPIZIDE 5 MG/1
TABLET ORAL
Qty: 90 TABLET | Refills: 0 | Status: SHIPPED | OUTPATIENT
Start: 2022-04-06 | End: 2022-04-12

## 2022-04-08 ENCOUNTER — TELEPHONE (OUTPATIENT)
Dept: INTERNAL MEDICINE | Facility: CLINIC | Age: 87
End: 2022-04-08
Payer: COMMERCIAL

## 2022-04-08 NOTE — TELEPHONE ENCOUNTER
Mariangel with [facility] calling.  With patient now.  Patient c/o fatigue x 3 days--not feeling well.  Noticed today that patient has 2+ pitting edema on lower extremities.    Per Mariangel, vital signs stable.   Denies any shortness of breath, nausea, vomiting, or fever.    Future appointment scheduled.    Future Appointments 4/8/2022 - 10/5/2022              Date Visit Type Length Department Provider     4/11/2022  9:30 AM OFFICE VISIT 30 min RI Supriya Mckenzie PA-C              4/27/2022  2:20 PM CT CHEST WO 20 min  CT SCAN [id number]    Location Instructions:     [facility] [address] [place] [address]  Parking Self parking is available for Imaging customers in the hospital s Skyway Parking Ramp, across [facility] from the hospital. Access the skyway from Level C of the parking ramp. There is no attendant on duty in the parking ramp. Pay stations accept Visa, MasterCard, Discover, American Express, and cash.  Self Parking rates 0-15 minutes $0.00 15-30 minutes $3.00 30 min.-1 hr. $4.00 1 -2 hours $5.00 2-3 hours $6.00 3-5 hours $7.00 5-7 hours $8.00 7-8 hours $9.00 8-24 hours $10.00 Each additional 24 hours - $5 (car must remain in the ramp or daily parking rates apply). Above prices include tax. Rates are subject to change.*Under 15 minutes: no charge.  Entrance and check-in location If you choose to self-park in the Skyway Ramp, the skyway will bring you into the building to the floor above the Skyway Lobby. Proceed down 1 floor (stairs or elevator access are available when you enter the hospital). If you choose to  park, you ll enter the Skyway Lobby from the  station at Door 2. Both self-park and  customers can check in at the Welcome Desk in the Skyway Lobby.              5/20/2022  8:00 AM LAB 15 min RI LABORATORY RI LAB    Location Instructions:     The clinic is located at [address], [address] in [place].&nbsp; We offer free  parking in our on-site lot.              5/27/2022  1:30 PM OFFICE VISIT 30 min Emily Montes MD

## 2022-04-11 ENCOUNTER — OFFICE VISIT (OUTPATIENT)
Dept: INTERNAL MEDICINE | Facility: CLINIC | Age: 87
End: 2022-04-11
Payer: COMMERCIAL

## 2022-04-11 ENCOUNTER — TELEPHONE (OUTPATIENT)
Dept: INTERNAL MEDICINE | Facility: CLINIC | Age: 87
End: 2022-04-11

## 2022-04-11 VITALS
TEMPERATURE: 98.6 F | RESPIRATION RATE: 16 BRPM | SYSTOLIC BLOOD PRESSURE: 144 MMHG | DIASTOLIC BLOOD PRESSURE: 66 MMHG | WEIGHT: 130 LBS | OXYGEN SATURATION: 94 % | BODY MASS INDEX: 22.2 KG/M2 | HEIGHT: 64 IN

## 2022-04-11 DIAGNOSIS — R53.83 OTHER FATIGUE: ICD-10-CM

## 2022-04-11 DIAGNOSIS — E11.8 TYPE 2 DIABETES MELLITUS WITH COMPLICATION, WITHOUT LONG-TERM CURRENT USE OF INSULIN (H): ICD-10-CM

## 2022-04-11 DIAGNOSIS — R60.0 PERIPHERAL EDEMA: Primary | ICD-10-CM

## 2022-04-11 PROCEDURE — 99214 OFFICE O/P EST MOD 30 MIN: CPT | Performed by: PHYSICIAN ASSISTANT

## 2022-04-11 RX ORDER — FUROSEMIDE 20 MG
20 TABLET ORAL DAILY
Qty: 90 TABLET | Refills: 0 | Status: SHIPPED | OUTPATIENT
Start: 2022-04-11 | End: 2023-12-08

## 2022-04-11 ASSESSMENT — ANXIETY QUESTIONNAIRES
2. NOT BEING ABLE TO STOP OR CONTROL WORRYING: NOT AT ALL
1. FEELING NERVOUS, ANXIOUS, OR ON EDGE: SEVERAL DAYS
7. FEELING AFRAID AS IF SOMETHING AWFUL MIGHT HAPPEN: SEVERAL DAYS
IF YOU CHECKED OFF ANY PROBLEMS ON THIS QUESTIONNAIRE, HOW DIFFICULT HAVE THESE PROBLEMS MADE IT FOR YOU TO DO YOUR WORK, TAKE CARE OF THINGS AT HOME, OR GET ALONG WITH OTHER PEOPLE: SOMEWHAT DIFFICULT
GAD7 TOTAL SCORE: 2
3. WORRYING TOO MUCH ABOUT DIFFERENT THINGS: NOT AT ALL
5. BEING SO RESTLESS THAT IT IS HARD TO SIT STILL: NOT AT ALL
6. BECOMING EASILY ANNOYED OR IRRITABLE: NOT AT ALL

## 2022-04-11 ASSESSMENT — ENCOUNTER SYMPTOMS: FATIGUE: 1

## 2022-04-11 ASSESSMENT — PATIENT HEALTH QUESTIONNAIRE - PHQ9
SUM OF ALL RESPONSES TO PHQ QUESTIONS 1-9: 5
5. POOR APPETITE OR OVEREATING: NOT AT ALL

## 2022-04-11 NOTE — PROGRESS NOTES
Assessment & Plan     Peripheral edema  She has used Lasix in the past for peripheral edema which has been effective.  We will resume this today.  Most recent renal function labs are reassuring.  Recommend continued use of compression stocking.  Says she is unable to get a compression stocking on her right foot due to mobility concerns.  I advised her to try elevating both legs higher than her heart for about 15 minutes before she goes to bed.  Advised taking the furosemide in the morning.  - furosemide (LASIX) 20 MG tablet; Take 1 tablet (20 mg) by mouth in the morning.    Other fatigue  Had a few nights of better sleep, with improvement in fatigue.  Overall she is still feeling a bit fatigued and weak after her prolonged hospitalization.  Discussed that this is not out of the ordinary and I anticipate that her energy level will gradually return.  Advised her to keep checking blood sugars.  If her readings are consistently in the 120s, she should call us because we may want to stop the glipizide.  These were Dr. Dailey's recommendations at last visit.  Continue on melatonin for sleep.  Would not advise any other medications due to concern for sedation and falls.    Also advised calling pulmonology to see if she should continue on the Incruse inhaler that was prescribed in the hospital    She is concerned about the cost of her PCP prophylaxis medication.  Advised contacting infectious disease to discuss this.  They had mentioned lifetime prophylaxis with atovaquone since she is allergic to sulfa    Has her follow-up CT scan scheduled      Prescription drug management  40 minutes spent on the date of the encounter doing chart review, history and exam, documentation and further activities per the note      No follow-ups on file.    Supriya Carpio PA-C  [facility] SUSIE Alonso is a 89 year old who presents for the following health issues     Fatigue  Associated symptoms include  "fatigue.      Patient called at the end of last week to schedule an appointment for fatigue and lower extremity edema.    She had a prolonged hospitalization for pneumonia earlier this year.  Was diagnosed with PCP pneumonia, for which she is still taking atovaquone.   When she called last week, she had had a couple of nights of poor sleep.  She then took a nap and her energy level has improved.  Feels like she is sleeping better overall now.  She does still feel a little more tired and weak compared to before her hospitalization.  Takes melatonin every night for sleep.  Wondering if there is anything else she can take.  Took Ambien long ago and does not want to take this    Is trying to drink plenty of fluids.  Feels like her medications make water taste funny.  Wondering if there is anything she can add to her water to make it taste better    She is off of prednisone now.  States that she is still taking Pepcid.  This was started in the hospital.    Blood sugars have improved since she stopped the prednisone.  Sugars have now been in the 130s up to about 145.    Chest CT scheduled for 4/27    Has known varicose veins.  Has been having bilateral peripheral edema, right greater than left.  She wears a compression stocking on the left leg every day.  Says that swelling is not bad in the morning, but it builds throughout the day    SOB with exertion; has been experiencing this for months    Saw Pulm. Said COPD was mild.  Spiriva  Wixela  Incruse--should she continue this?    Review of Systems   Constitutional: Positive for fatigue.      Constitutional, HEENT, cardiovascular, pulmonary, gi and gu systems are negative, except as otherwise noted.      Objective    BP (!) 144/66   Temp 98.6  F (37  C) (Oral)   Resp 16   Ht 1.613 m (5' 3.5\")   Wt 59 kg (130 lb)   SpO2 94%   BMI 22.67 kg/m    Body mass index is 22.67 kg/m .  Physical Exam   GENERAL: healthy, alert and no distress  RESP: lungs clear to auscultation - " no rales, rhonchi or wheezes  CV: regular rate and rhythm, normal S1 S2, no S3 or S4, no murmur, click or rub, no peripheral edema and peripheral pulses strong  ABDOMEN: soft, nontender, no hepatosplenomegaly, no masses and bowel sounds normal  MS: Left lower extremity shows no evidence of edema and compression stocking is in place.  Right foot and ankle with 1+ edema.    SKIN: Evidence of venous stasis changes on right lower extremity.  No wounds or ulcerations.  No warmth or significant redness  PSYCH: mentation appears normal, affect normal/bright    No results found for this or any previous visit (from the past 24 hour(s)).

## 2022-04-11 NOTE — PATIENT INSTRUCTIONS
Keep checking blood sugars  If readings are consistently in the 120s, call us because we may need to stop the glipizide    Call lung specialist to see if you should continue on the Incruse inhaler which was prescribed in the hospital    If atovaquone is too expensive, recommend discussing with infectious disease (is there an alternative medication that could be used? Is long-term prophylaxis required?)    CT scan results will be sent to Dr. Cynthia Marie is your primary care provider. She manages your medications and updates when necessary. Sometimes other providers cover for her if she is not in the office.    Continue on melatonin for sleep    Start the furosemide (diuretic) in the morning. Continue with the compression stocking. Try to elevate your legs higher than your heart for 15 minutes before going to sleep.

## 2022-04-12 ENCOUNTER — APPOINTMENT (OUTPATIENT)
Dept: CT IMAGING | Facility: CLINIC | Age: 87
End: 2022-04-12
Attending: INTERNAL MEDICINE
Payer: COMMERCIAL

## 2022-04-12 ENCOUNTER — MEDICAL CORRESPONDENCE (OUTPATIENT)
Dept: HEALTH INFORMATION MANAGEMENT | Facility: CLINIC | Age: 87
End: 2022-04-12

## 2022-04-12 ENCOUNTER — HOSPITAL ENCOUNTER (OUTPATIENT)
Facility: CLINIC | Age: 87
Setting detail: OBSERVATION
Discharge: NURSING, REHAB OR LONG-TERM CARE | End: 2022-04-15
Attending: EMERGENCY MEDICINE | Admitting: INTERNAL MEDICINE
Payer: COMMERCIAL

## 2022-04-12 ENCOUNTER — APPOINTMENT (OUTPATIENT)
Dept: GENERAL RADIOLOGY | Facility: CLINIC | Age: 87
End: 2022-04-12
Attending: EMERGENCY MEDICINE
Payer: COMMERCIAL

## 2022-04-12 DIAGNOSIS — R09.02 HYPOXIA: ICD-10-CM

## 2022-04-12 DIAGNOSIS — M48.062 SPINAL STENOSIS OF LUMBAR REGION WITH NEUROGENIC CLAUDICATION: ICD-10-CM

## 2022-04-12 DIAGNOSIS — R06.09 DYSPNEA ON EXERTION: Primary | ICD-10-CM

## 2022-04-12 DIAGNOSIS — J44.9 COPD, SEVERE (H): ICD-10-CM

## 2022-04-12 DIAGNOSIS — E87.70 HYPERVOLEMIA, UNSPECIFIED HYPERVOLEMIA TYPE: ICD-10-CM

## 2022-04-12 DIAGNOSIS — F41.9 ANXIETY: ICD-10-CM

## 2022-04-12 LAB
ALBUMIN UR-MCNC: NEGATIVE MG/DL
ANION GAP SERPL CALCULATED.3IONS-SCNC: 6 MMOL/L (ref 3–14)
APPEARANCE UR: CLEAR
ATRIAL RATE - MUSE: 81 BPM
BASE EXCESS BLDV CALC-SCNC: 3.6 MMOL/L (ref -7.7–1.9)
BASOPHILS # BLD AUTO: 0 10E3/UL (ref 0–0.2)
BASOPHILS # BLD AUTO: 0 10E3/UL (ref 0–0.2)
BASOPHILS NFR BLD AUTO: 1 %
BASOPHILS NFR BLD AUTO: 1 %
BILIRUB UR QL STRIP: NEGATIVE
BUN SERPL-MCNC: 15 MG/DL (ref 7–30)
CALCIUM SERPL-MCNC: 9.3 MG/DL (ref 8.5–10.1)
CHLORIDE BLD-SCNC: 105 MMOL/L (ref 94–109)
CO2 SERPL-SCNC: 28 MMOL/L (ref 20–32)
COLOR UR AUTO: NORMAL
CREAT SERPL-MCNC: 0.54 MG/DL (ref 0.52–1.04)
CREAT SERPL-MCNC: 0.59 MG/DL (ref 0.52–1.04)
DIASTOLIC BLOOD PRESSURE - MUSE: NORMAL MMHG
EOSINOPHIL # BLD AUTO: 0.1 10E3/UL (ref 0–0.7)
EOSINOPHIL # BLD AUTO: 0.1 10E3/UL (ref 0–0.7)
EOSINOPHIL NFR BLD AUTO: 1 %
EOSINOPHIL NFR BLD AUTO: 1 %
ERYTHROCYTE [DISTWIDTH] IN BLOOD BY AUTOMATED COUNT: 16 % (ref 10–15)
ERYTHROCYTE [DISTWIDTH] IN BLOOD BY AUTOMATED COUNT: 16.2 % (ref 10–15)
GFR SERPL CREATININE-BSD FRML MDRD: 85 ML/MIN/1.73M2
GFR SERPL CREATININE-BSD FRML MDRD: 87 ML/MIN/1.73M2
GLUCOSE BLD-MCNC: 175 MG/DL (ref 70–99)
GLUCOSE BLDC GLUCOMTR-MCNC: 139 MG/DL (ref 70–99)
GLUCOSE BLDC GLUCOMTR-MCNC: 244 MG/DL (ref 70–99)
GLUCOSE UR STRIP-MCNC: NEGATIVE MG/DL
HCO3 BLDV-SCNC: 29 MMOL/L (ref 21–28)
HCT VFR BLD AUTO: 39.7 % (ref 35–47)
HCT VFR BLD AUTO: 41.1 % (ref 35–47)
HGB BLD-MCNC: 12.3 G/DL (ref 11.7–15.7)
HGB BLD-MCNC: 12.6 G/DL (ref 11.7–15.7)
HGB UR QL STRIP: NEGATIVE
IMM GRANULOCYTES # BLD: 0 10E3/UL
IMM GRANULOCYTES # BLD: 0 10E3/UL
IMM GRANULOCYTES NFR BLD: 1 %
IMM GRANULOCYTES NFR BLD: 1 %
INTERPRETATION ECG - MUSE: NORMAL
KETONES UR STRIP-MCNC: NEGATIVE MG/DL
LEUKOCYTE ESTERASE UR QL STRIP: NEGATIVE
LYMPHOCYTES # BLD AUTO: 1.6 10E3/UL (ref 0.8–5.3)
LYMPHOCYTES # BLD AUTO: 1.7 10E3/UL (ref 0.8–5.3)
LYMPHOCYTES NFR BLD AUTO: 26 %
LYMPHOCYTES NFR BLD AUTO: 26 %
MCH RBC QN AUTO: 25.8 PG (ref 26.5–33)
MCH RBC QN AUTO: 25.9 PG (ref 26.5–33)
MCHC RBC AUTO-ENTMCNC: 30.7 G/DL (ref 31.5–36.5)
MCHC RBC AUTO-ENTMCNC: 31 G/DL (ref 31.5–36.5)
MCV RBC AUTO: 84 FL (ref 78–100)
MCV RBC AUTO: 84 FL (ref 78–100)
MONOCYTES # BLD AUTO: 0.7 10E3/UL (ref 0–1.3)
MONOCYTES # BLD AUTO: 0.8 10E3/UL (ref 0–1.3)
MONOCYTES NFR BLD AUTO: 10 %
MONOCYTES NFR BLD AUTO: 12 %
NEUTROPHILS # BLD AUTO: 3.6 10E3/UL (ref 1.6–8.3)
NEUTROPHILS # BLD AUTO: 4.1 10E3/UL (ref 1.6–8.3)
NEUTROPHILS NFR BLD AUTO: 59 %
NEUTROPHILS NFR BLD AUTO: 61 %
NITRATE UR QL: NEGATIVE
NRBC # BLD AUTO: 0 10E3/UL
NRBC # BLD AUTO: 0 10E3/UL
NRBC BLD AUTO-RTO: 0 /100
NRBC BLD AUTO-RTO: 0 /100
NT-PROBNP SERPL-MCNC: 285 PG/ML (ref 0–1800)
O2/TOTAL GAS SETTING VFR VENT: 0 %
OXYHGB MFR BLDV: 53 % (ref 70–75)
P AXIS - MUSE: 67 DEGREES
PCO2 BLDV: 46 MM HG (ref 40–50)
PH BLDV: 7.41 [PH] (ref 7.32–7.43)
PH UR STRIP: 6.5 [PH] (ref 5–7)
PLATELET # BLD AUTO: 298 10E3/UL (ref 150–450)
PLATELET # BLD AUTO: 315 10E3/UL (ref 150–450)
PO2 BLDV: 29 MM HG (ref 25–47)
POTASSIUM BLD-SCNC: 3.7 MMOL/L (ref 3.4–5.3)
PR INTERVAL - MUSE: 160 MS
QRS DURATION - MUSE: 82 MS
QT - MUSE: 362 MS
QTC - MUSE: 420 MS
R AXIS - MUSE: -37 DEGREES
RBC # BLD AUTO: 4.75 10E6/UL (ref 3.8–5.2)
RBC # BLD AUTO: 4.88 10E6/UL (ref 3.8–5.2)
RBC URINE: 0 /HPF
SARS-COV-2 RNA RESP QL NAA+PROBE: NEGATIVE
SODIUM SERPL-SCNC: 139 MMOL/L (ref 133–144)
SP GR UR STRIP: 1.01 (ref 1–1.03)
SYSTOLIC BLOOD PRESSURE - MUSE: NORMAL MMHG
T AXIS - MUSE: 15 DEGREES
TROPONIN I SERPL HS-MCNC: 6 NG/L
UROBILINOGEN UR STRIP-MCNC: NORMAL MG/DL
VENTRICULAR RATE- MUSE: 81 BPM
WBC # BLD AUTO: 6 10E3/UL (ref 4–11)
WBC # BLD AUTO: 6.6 10E3/UL (ref 4–11)
WBC URINE: 1 /HPF

## 2022-04-12 PROCEDURE — 84484 ASSAY OF TROPONIN QUANT: CPT | Performed by: EMERGENCY MEDICINE

## 2022-04-12 PROCEDURE — 93005 ELECTROCARDIOGRAM TRACING: CPT

## 2022-04-12 PROCEDURE — 71275 CT ANGIOGRAPHY CHEST: CPT

## 2022-04-12 PROCEDURE — 82565 ASSAY OF CREATININE: CPT | Mod: 91 | Performed by: INTERNAL MEDICINE

## 2022-04-12 PROCEDURE — 82805 BLOOD GASES W/O2 SATURATION: CPT | Performed by: EMERGENCY MEDICINE

## 2022-04-12 PROCEDURE — 36415 COLL VENOUS BLD VENIPUNCTURE: CPT | Performed by: EMERGENCY MEDICINE

## 2022-04-12 PROCEDURE — 85004 AUTOMATED DIFF WBC COUNT: CPT | Performed by: EMERGENCY MEDICINE

## 2022-04-12 PROCEDURE — 99285 EMERGENCY DEPT VISIT HI MDM: CPT | Mod: 25

## 2022-04-12 PROCEDURE — 99223 1ST HOSP IP/OBS HIGH 75: CPT | Mod: AI | Performed by: INTERNAL MEDICINE

## 2022-04-12 PROCEDURE — 96372 THER/PROPH/DIAG INJ SC/IM: CPT | Performed by: INTERNAL MEDICINE

## 2022-04-12 PROCEDURE — C9803 HOPD COVID-19 SPEC COLLECT: HCPCS

## 2022-04-12 PROCEDURE — 250N000009 HC RX 250: Performed by: EMERGENCY MEDICINE

## 2022-04-12 PROCEDURE — 250N000011 HC RX IP 250 OP 636: Performed by: INTERNAL MEDICINE

## 2022-04-12 PROCEDURE — 210N000002 HC R&B HEART CARE

## 2022-04-12 PROCEDURE — 96374 THER/PROPH/DIAG INJ IV PUSH: CPT | Mod: 59

## 2022-04-12 PROCEDURE — 83880 ASSAY OF NATRIURETIC PEPTIDE: CPT | Performed by: EMERGENCY MEDICINE

## 2022-04-12 PROCEDURE — 250N000011 HC RX IP 250 OP 636: Performed by: EMERGENCY MEDICINE

## 2022-04-12 PROCEDURE — U0003 INFECTIOUS AGENT DETECTION BY NUCLEIC ACID (DNA OR RNA); SEVERE ACUTE RESPIRATORY SYNDROME CORONAVIRUS 2 (SARS-COV-2) (CORONAVIRUS DISEASE [COVID-19]), AMPLIFIED PROBE TECHNIQUE, MAKING USE OF HIGH THROUGHPUT TECHNOLOGIES AS DESCRIBED BY CMS-2020-01-R: HCPCS | Performed by: EMERGENCY MEDICINE

## 2022-04-12 PROCEDURE — 36415 COLL VENOUS BLD VENIPUNCTURE: CPT | Performed by: INTERNAL MEDICINE

## 2022-04-12 PROCEDURE — 85004 AUTOMATED DIFF WBC COUNT: CPT | Performed by: INTERNAL MEDICINE

## 2022-04-12 PROCEDURE — 80048 BASIC METABOLIC PNL TOTAL CA: CPT | Performed by: EMERGENCY MEDICINE

## 2022-04-12 PROCEDURE — 71046 X-RAY EXAM CHEST 2 VIEWS: CPT

## 2022-04-12 PROCEDURE — 250N000013 HC RX MED GY IP 250 OP 250 PS 637: Performed by: INTERNAL MEDICINE

## 2022-04-12 PROCEDURE — 81001 URINALYSIS AUTO W/SCOPE: CPT | Performed by: EMERGENCY MEDICINE

## 2022-04-12 RX ORDER — AMLODIPINE BESYLATE 5 MG/1
5 TABLET ORAL DAILY
Status: DISCONTINUED | OUTPATIENT
Start: 2022-04-12 | End: 2022-04-15 | Stop reason: HOSPADM

## 2022-04-12 RX ORDER — NALOXONE HYDROCHLORIDE 0.4 MG/ML
0.2 INJECTION, SOLUTION INTRAMUSCULAR; INTRAVENOUS; SUBCUTANEOUS
Status: DISCONTINUED | OUTPATIENT
Start: 2022-04-12 | End: 2022-04-15 | Stop reason: HOSPADM

## 2022-04-12 RX ORDER — DEXTROSE MONOHYDRATE 25 G/50ML
25-50 INJECTION, SOLUTION INTRAVENOUS
Status: DISCONTINUED | OUTPATIENT
Start: 2022-04-12 | End: 2022-04-15 | Stop reason: HOSPADM

## 2022-04-12 RX ORDER — ONDANSETRON 2 MG/ML
4 INJECTION INTRAMUSCULAR; INTRAVENOUS EVERY 6 HOURS PRN
Status: DISCONTINUED | OUTPATIENT
Start: 2022-04-12 | End: 2022-04-15 | Stop reason: HOSPADM

## 2022-04-12 RX ORDER — LATANOPROST 50 UG/ML
1 SOLUTION/ DROPS OPHTHALMIC EVERY EVENING
Status: DISCONTINUED | OUTPATIENT
Start: 2022-04-12 | End: 2022-04-15 | Stop reason: HOSPADM

## 2022-04-12 RX ORDER — FUROSEMIDE 20 MG
20 TABLET ORAL DAILY
Status: DISCONTINUED | OUTPATIENT
Start: 2022-04-12 | End: 2022-04-15 | Stop reason: HOSPADM

## 2022-04-12 RX ORDER — NALOXONE HYDROCHLORIDE 0.4 MG/ML
0.4 INJECTION, SOLUTION INTRAMUSCULAR; INTRAVENOUS; SUBCUTANEOUS
Status: DISCONTINUED | OUTPATIENT
Start: 2022-04-12 | End: 2022-04-15 | Stop reason: HOSPADM

## 2022-04-12 RX ORDER — OXYCODONE HYDROCHLORIDE 5 MG/1
5 TABLET ORAL EVERY 4 HOURS PRN
Status: DISCONTINUED | OUTPATIENT
Start: 2022-04-12 | End: 2022-04-15 | Stop reason: HOSPADM

## 2022-04-12 RX ORDER — ALBUTEROL SULFATE 90 UG/1
2 AEROSOL, METERED RESPIRATORY (INHALATION) EVERY 4 HOURS PRN
Status: DISCONTINUED | OUTPATIENT
Start: 2022-04-12 | End: 2022-04-15 | Stop reason: HOSPADM

## 2022-04-12 RX ORDER — ACETAMINOPHEN 325 MG/1
650 TABLET ORAL EVERY 6 HOURS PRN
Status: DISCONTINUED | OUTPATIENT
Start: 2022-04-12 | End: 2022-04-15 | Stop reason: HOSPADM

## 2022-04-12 RX ORDER — ASPIRIN 81 MG/1
81 TABLET ORAL DAILY
Status: DISCONTINUED | OUTPATIENT
Start: 2022-04-12 | End: 2022-04-15 | Stop reason: HOSPADM

## 2022-04-12 RX ORDER — PROCHLORPERAZINE MALEATE 5 MG
5 TABLET ORAL EVERY 6 HOURS PRN
Status: DISCONTINUED | OUTPATIENT
Start: 2022-04-12 | End: 2022-04-15 | Stop reason: HOSPADM

## 2022-04-12 RX ORDER — POLYETHYLENE GLYCOL 3350 17 G/17G
17 POWDER, FOR SOLUTION ORAL DAILY PRN
Status: DISCONTINUED | OUTPATIENT
Start: 2022-04-12 | End: 2022-04-15 | Stop reason: HOSPADM

## 2022-04-12 RX ORDER — LISINOPRIL 40 MG/1
40 TABLET ORAL DAILY
Status: DISCONTINUED | OUTPATIENT
Start: 2022-04-12 | End: 2022-04-15 | Stop reason: HOSPADM

## 2022-04-12 RX ORDER — ONDANSETRON 4 MG/1
4 TABLET, ORALLY DISINTEGRATING ORAL EVERY 6 HOURS PRN
Status: DISCONTINUED | OUTPATIENT
Start: 2022-04-12 | End: 2022-04-15 | Stop reason: HOSPADM

## 2022-04-12 RX ORDER — FAMOTIDINE 20 MG/1
20 TABLET, FILM COATED ORAL 2 TIMES DAILY
Status: DISCONTINUED | OUTPATIENT
Start: 2022-04-12 | End: 2022-04-12

## 2022-04-12 RX ORDER — AMOXICILLIN 250 MG
1 CAPSULE ORAL 2 TIMES DAILY PRN
Status: DISCONTINUED | OUTPATIENT
Start: 2022-04-12 | End: 2022-04-15 | Stop reason: HOSPADM

## 2022-04-12 RX ORDER — ROSUVASTATIN CALCIUM 5 MG/1
5 TABLET, COATED ORAL AT BEDTIME
Status: DISCONTINUED | OUTPATIENT
Start: 2022-04-12 | End: 2022-04-15 | Stop reason: HOSPADM

## 2022-04-12 RX ORDER — PROCHLORPERAZINE 25 MG
12.5 SUPPOSITORY, RECTAL RECTAL EVERY 12 HOURS PRN
Status: DISCONTINUED | OUTPATIENT
Start: 2022-04-12 | End: 2022-04-15 | Stop reason: HOSPADM

## 2022-04-12 RX ORDER — ACETAMINOPHEN 650 MG/1
650 SUPPOSITORY RECTAL EVERY 6 HOURS PRN
Status: DISCONTINUED | OUTPATIENT
Start: 2022-04-12 | End: 2022-04-15 | Stop reason: HOSPADM

## 2022-04-12 RX ORDER — ATOVAQUONE 750 MG/5ML
750 SUSPENSION ORAL DAILY
Status: DISCONTINUED | OUTPATIENT
Start: 2022-04-13 | End: 2022-04-15 | Stop reason: HOSPADM

## 2022-04-12 RX ORDER — LIDOCAINE 40 MG/G
CREAM TOPICAL
Status: DISCONTINUED | OUTPATIENT
Start: 2022-04-12 | End: 2022-04-15 | Stop reason: HOSPADM

## 2022-04-12 RX ORDER — ACETAMINOPHEN 325 MG/1
325 TABLET ORAL EVERY 6 HOURS PRN
Status: DISCONTINUED | OUTPATIENT
Start: 2022-04-12 | End: 2022-04-12

## 2022-04-12 RX ORDER — AMOXICILLIN 250 MG
2 CAPSULE ORAL 2 TIMES DAILY PRN
Status: DISCONTINUED | OUTPATIENT
Start: 2022-04-12 | End: 2022-04-15 | Stop reason: HOSPADM

## 2022-04-12 RX ORDER — IOPAMIDOL 755 MG/ML
57 INJECTION, SOLUTION INTRAVASCULAR ONCE
Status: COMPLETED | OUTPATIENT
Start: 2022-04-12 | End: 2022-04-12

## 2022-04-12 RX ORDER — FAMOTIDINE 20 MG/1
20 TABLET, FILM COATED ORAL 2 TIMES DAILY
Status: DISCONTINUED | OUTPATIENT
Start: 2022-04-12 | End: 2022-04-15 | Stop reason: HOSPADM

## 2022-04-12 RX ORDER — POLYETHYLENE GLYCOL 3350 17 G/17G
8.5 POWDER, FOR SOLUTION ORAL EVERY EVENING
Status: DISCONTINUED | OUTPATIENT
Start: 2022-04-12 | End: 2022-04-15 | Stop reason: HOSPADM

## 2022-04-12 RX ORDER — FUROSEMIDE 10 MG/ML
20 INJECTION INTRAMUSCULAR; INTRAVENOUS ONCE
Status: COMPLETED | OUTPATIENT
Start: 2022-04-12 | End: 2022-04-12

## 2022-04-12 RX ORDER — NICOTINE POLACRILEX 4 MG
15-30 LOZENGE BUCCAL
Status: DISCONTINUED | OUTPATIENT
Start: 2022-04-12 | End: 2022-04-15 | Stop reason: HOSPADM

## 2022-04-12 RX ADMIN — FAMOTIDINE 20 MG: 20 TABLET ORAL at 20:23

## 2022-04-12 RX ADMIN — SODIUM CHLORIDE 84 ML: 900 INJECTION INTRAVENOUS at 12:40

## 2022-04-12 RX ADMIN — ROSUVASTATIN CALCIUM 5 MG: 5 TABLET, FILM COATED ORAL at 20:23

## 2022-04-12 RX ADMIN — AMLODIPINE BESYLATE 5 MG: 5 TABLET ORAL at 17:50

## 2022-04-12 RX ADMIN — POLYETHYLENE GLYCOL 3350 8.5 G: 17 POWDER, FOR SOLUTION ORAL at 20:21

## 2022-04-12 RX ADMIN — Medication 1 MG: at 23:35

## 2022-04-12 RX ADMIN — LISINOPRIL 40 MG: 40 TABLET ORAL at 17:50

## 2022-04-12 RX ADMIN — ASPIRIN 81 MG: 81 TABLET, COATED ORAL at 17:50

## 2022-04-12 RX ADMIN — LATANOPROST 1 DROP: 50 SOLUTION/ DROPS OPHTHALMIC at 20:23

## 2022-04-12 RX ADMIN — Medication 1 TABLET: at 20:23

## 2022-04-12 RX ADMIN — ENOXAPARIN SODIUM 40 MG: 40 INJECTION SUBCUTANEOUS at 18:28

## 2022-04-12 RX ADMIN — OXYCODONE HYDROCHLORIDE 5 MG: 5 TABLET ORAL at 20:23

## 2022-04-12 RX ADMIN — FUROSEMIDE 20 MG: 20 TABLET ORAL at 17:50

## 2022-04-12 RX ADMIN — FLUTICASONE FUROATE AND VILANTEROL TRIFENATATE 1 PUFF: 200; 25 POWDER RESPIRATORY (INHALATION) at 20:23

## 2022-04-12 RX ADMIN — IOPAMIDOL 57 ML: 755 INJECTION, SOLUTION INTRAVENOUS at 12:40

## 2022-04-12 RX ADMIN — FUROSEMIDE 20 MG: 10 INJECTION, SOLUTION INTRAVENOUS at 14:18

## 2022-04-12 ASSESSMENT — ACTIVITIES OF DAILY LIVING (ADL)
ADLS_ACUITY_SCORE: 8

## 2022-04-12 ASSESSMENT — ENCOUNTER SYMPTOMS
CONSTIPATION: 0
COUGH: 0
MYALGIAS: 0
SHORTNESS OF BREATH: 1
FATIGUE: 1
DIFFICULTY URINATING: 0
FEVER: 0

## 2022-04-12 ASSESSMENT — ANXIETY QUESTIONNAIRES: GAD7 TOTAL SCORE: 2

## 2022-04-12 NOTE — ED NOTES
[facility]  ED Nurse Handoff Report    ED Chief complaint: Dyspnea with exertion (Dyspnea with exertion.)      ED Diagnosis:   Final diagnoses:   None       Code Status: Full Code    Allergies:   Allergies   Allergen Reactions     Sulfamethoxazole Anaphylaxis and Hives       Patient Story: Patient has history of COPD. Went to primary yesterday and was prescribed lasix. Took first does this am. While ambulating to the bathroom, patient stated that her O2 dropped to 88%.   Focused Assessment:  Patient dropped into low 80's while ambulating to bathroom in ED. Patient is not on O2 here in ED and is around 94%.    Treatments and/or interventions provided: O2 after ambulation. Lasix.   Patient's response to treatments and/or interventions: Increased urine output. Increased O2 saturation.    To be done/followed up on inpatient unit:  See orders.     Does this patient have any cognitive concerns?: None    Activity level - Baseline/Home:  Walker  Activity Level - Current:   Walker    Patient's Preferred language: English   Needed?: No    Isolation: None  Infection: Not Applicable  Patient tested for COVID 19 prior to admission: YES  Bariatric?: No    Vital Signs:   Vitals:    04/12/22 0945   BP: (!) 156/72   Pulse: 84   Resp: 17   Temp: 97.8  F (36.6  C)   TempSrc: Oral   SpO2: 93%       Cardiac Rhythm:     Was the PSS-3 completed:   Yes  What interventions are required if any?               Family Comments: No family bedside. Patient   OBS brochure/video discussed/provided to patient/family: N/A              Name of person given brochure if not patient: N/A              Relationship to patient: N/A    For the majority of the shift this patient's behavior was Green.   Behavioral interventions performed were None. .    ED NURSE PHONE NUMBER: [phone]

## 2022-04-12 NOTE — H&P
Admitted: 04/12/2022    HISTORY OF PRESENT ILLNESS:  This is a 89-year-old female with history of severe COPD, hyperlipidemia, spinal stenosis, diabetes mellitus type 2, hypertension, recently admitted in the hospital from 02/21/2022 at 3:15 because of PJP infection and CELINE infection.  Was treated with atovaquone for 21 days and no prophylaxis.  Now comes to the ER with complaint of shortness of breath and found to be hypoxic on exertion.    According to the patient, she was doing fine until this morning when she woke up, she walked and she started getting short of breath to the point that she was unable to breathe.  She decided to come to the ED.  At scene, she was found to be hypoxic on exertion with saturation in mid 80s.  She did get better with resting, but on exertion, she did drop her oxygen and became more short of breath.  She denies any fever, chills, nausea, vomiting, headache, dizziness, lightheadedness.  No coughing, no chest pain, orthopnea, PND, palpitation or dysuria, hematuria, constipation, diarrhea.  She was recently found to have lower extremity swelling.  She was prescribed Lasix.  She has not taken it yet.  Rest of the review of system is negative.  She has a good appetite, eating well.  No other complaint.    ASSESSMENT AND PLAN:    1.  Acute versus chronic hypoxic respiratory failure with severe chronic obstructive pulmonary disease:  The patient has severe COPD.  She does not look like she is in any acute exacerbation.  Her lung sounds are clear.  Chest x-ray normal.  No wheezing, no crackles.  BNP is normal.  White cell counts are normal.  I think she most likely has chronic hypoxia on exertion.  It is causing the problem with shortness of breath as well.  She is 95-96% on room air at rest, but as soon as she starts moving, she drops down to 89, 88 and sometimes below that as well.  We will have her do incentive spirometry, flutter.  We will keep her on her inhalers.  She is on fluticasone,  salmeterol, Incruse Ellipta and albuterol and Spiriva.  I will continue with those.  We will consult Pulmonary to evaluate the patient as well.  In the meantime, we will do a 6-minute walk test.  If she qualifies for home oxygen, she may benefit from oxygen at home.  2.  Recent history of a lung abscess secondary to PJP/cavitary infection:  She was treated with three weeks of atovaquone given ALLERGIC TO SULFA DRUGS.  She is on prophylaxis for now.  Consult Infectious Disease to see if the patient need long term prophylaxis, if she is not on long-term prednisone or steroids, if she needs to be on long-term prophylaxis or not.  At this time, I will continue with atovaquone 750 mg daily.  3.  Hypertension:  Blood pressure slightly on the higher side.  She is on lisinopril 40, amlodipine 5.  I will continue with that for now.  4.  Diabetes mellitus type 2:  She is on glipizide and metformin.  I will hold it.  Keep her on sliding scale insulin for correction and hypoglycemia protocol.  5.  Hyperlipidemia, on Crestor.  We will continue with that.    DEEP THROMBOSIS PROPHYLAXIS:  Lovenox.    CODE STATUS:  DNR/DNI as per the patient wishes.    The case discussed with ER physician and the nursing staff taking care of the patient.    Adam Sykes MD        D: 2022   T: 2022   MT: [id number]    Name:     LEXII LIVINGSTON  MRN:      [id number]        Account:     [id number]   :      [id number]           Admitted:    2022       Document: [id number]

## 2022-04-12 NOTE — ED TRIAGE NOTES
Patient was concerned that her O2 dropped to 88% this am when she ambulated. She is not on O2 at home and her baseline is 92%.     Was just at MD yesterday and was started on lasix.    Desires to stay in hospital because she feels weaker than usual.

## 2022-04-12 NOTE — ED PROVIDER NOTES
History   Chief Complaint:  Dyspnea with exertion     The history is provided by the patient, the EMS personnel and medical records.      Celeste Chacko is a 89 year old female with history of COPD, diabetes mellitus type 2, chronic kidney disease, hypertension, and hyperlipidemia who presents with shortness of breath and fatigue. According to EMS, the patient noted increased shortness of breath and fatigue this morning. She states her SaO2 dropped to the high 80s from her baseline of 92% while she was ambulating. She is not on oxygen at home. She notes increased swelling in her bilateral ankles as well. She saw her primary care provider yesterday and was prescribed Lasix, and took her first dose today prior to EMS arrival. She has history of COPD and is not on oxygen at home. She denies fevers, cough, chest pain, leg pain, constipation, or difficulty urinating. She is able to ambulate at home with her walker. She lives independently at home. She is a former smoker, previously 1 pack per day for 50 years.     On chart review, the patient was admitted to the hospital from 02/21/2022-03/15/2022 due to lung abscess associated with cavitary nodules in the right upper lobe from a PJP infection. She was treated with Mepron 750 BID for 21 days, followed by 750 mg daily afterwards, as well as Prednisone in tapering doses during her hospital admission. She was discharged with pulmonary follow up, outpatient PFTs, along with bronchodilator adjustment. The patient was sent home with home health care and physical therapy.    Echocardiogram from 02/21/2022  Interpretation Summary  Hyperdynamic left ventricular function. The visual ejection fraction is >70%.  Right ventricular global function is normal.  No significant valvular abnormalities.  Pulmonary hypertension present. The right ventricular systolic pressure is  approximated at 39mmHg plus the right atrial pressure.  The inferior vena cava was normal in size with  preserved respiratory  variability.  There are no prior studies available for comparison.    Imaging from 03/02/2022  CT CHEST WITH CONTRAST  IMPRESSION:   1.  Right upper lobe nodules have not changed in size, but with  decreased cavitation compared to 2/21/2022.  2.  New mild diffuse groundglass opacity with interstitial septal  thickening and trace pleural effusions, suspicious for pulmonary  edema. No new nodules.  NEISHA WILLIS MD     Review of Systems   Constitutional: Positive for fatigue. Negative for fever.   Respiratory: Positive for shortness of breath. Negative for cough.    Cardiovascular: Positive for leg swelling. Negative for chest pain.   Gastrointestinal: Negative for constipation.   Genitourinary: Negative for difficulty urinating.   Musculoskeletal: Negative for myalgias.   All other systems reviewed and are negative.    Allergies:  Sulfamethoxazole    Medications:  Albuterol inhaler  Norvasc  Aspirin   Mepron   Pepcid  Lasix  Wixela inhaler  Glucotrol  Xalatan  Zestril  Metofrmin  Crestor  Spiriva   Incruse inhaler     Past Medical History:     Basal cell carcinoma  Complete rupture of rotator cuff  COPD  Blood transfusion  Hyperlipidemia  Osteoarthritis   Spinal stenosis of lumbar region  Diabetes mellitus, type 2  Narcotic dependence  Hypertension  Lumbar radiculopathy  Chronic kidney disease, stage 1      Past Surgical History:    Knee arthroplasty, left  Lumbar epidural  Breast biopsy   Pilonidal sinus repair  Tonsillectomy  Adenoidectomy  Shoulder arthroplasty, right     Family History:    Father - Arthritis  Brother - Diabetes Mellitus, Cancer, Cerebrovascular Disease     Social History:  The patient arrived to the emergency department via EMS.  She lives independently.     Physical Exam     Patient Vitals for the past 24 hrs:   BP Temp Temp src Pulse Resp SpO2   04/12/22 0945 (!) 156/72 97.8  F (36.6  C) Oral 84 17 93 %     Physical Exam  General: Alert and cooperative with exam.  Patient in mild distress. Normal mentation.  Head:  Scalp is NC/AT  Eyes:  No scleral icterus, PERRL  ENT:  The external nose and ears are normal. The oropharynx is normal and without erythema; mucus membranes are moist. Uvula midline, no evidence of deep space infection.  Neck:  Normal range of motion without rigidity.  CV:  Regular rate and rhythm    No pathologic murmur   Resp:  Breath sounds are clear bilaterally    Non-labored, no retractions or accessory muscle use  GI:  Abdomen is soft, no distension, no tenderness. No peritoneal signs  MS:  2+ pitting edema to her ankles/lower extremity.    Skin:  Warm and dry, No rash or lesions noted.  Neuro: Oriented x 3. No gross motor deficits.    Emergency Department Course   ECG  ECG obtained at 1110, ECG read at 1120  Normal sinus rhythm  Biatrial enlargement  Left axis deviation  Inferior infarct, old  Abnormal ECG   No significant change as compared to prior, dated 02/21/22.  Rate 81 bpm. ND interval 160 ms. QRS duration 82 ms. QT/QTc 362/420 ms. P-R-T axes 67 -37 15.     Imaging:  Chest XR,  PA & LAT   Final Result   IMPRESSION: There are no acute infiltrates. The cardiac silhouette is   not enlarged. Pulmonary vasculature is unremarkable.      NIKITA SCHMITZ MD            SYSTEM ID:  [id number]      Report per radiology    Laboratory:  Labs Ordered and Resulted from Time of ED Arrival to Time of ED Departure   BASIC METABOLIC PANEL - Abnormal       Result Value    Sodium 139      Potassium 3.7      Chloride 105      Carbon Dioxide (CO2) 28      Anion Gap 6      Urea Nitrogen 15      Creatinine 0.54      Calcium 9.3      Glucose 175 (*)     GFR Estimate 87     BLOOD GAS VENOUS WITH OXYHEMOGLOBIN - Abnormal    pH Venous 7.41      pCO2 Venous 46      pO2 Venous 29      Bicarbonate Venous 29 (*)     FIO2 0      Oxyhemoglobin Venous 53 (*)     Base Excess/Deficit (+/-) 3.6 (*)    CBC WITH PLATELETS AND DIFFERENTIAL - Abnormal    WBC Count 6.0      RBC Count 4.88       Hemoglobin 12.6      Hematocrit 41.1      MCV 84      MCH 25.8 (*)     MCHC 30.7 (*)     RDW 16.2 (*)     Platelet Count 298      % Neutrophils 59      % Lymphocytes 26      % Monocytes 12      % Eosinophils 1      % Basophils 1      % Immature Granulocytes 1      NRBCs per 100 WBC 0      Absolute Neutrophils 3.6      Absolute Lymphocytes 1.6      Absolute Monocytes 0.8      Absolute Eosinophils 0.1      Absolute Basophils 0.0      Absolute Immature Granulocytes 0.0      Absolute NRBCs 0.0     NT PROBNP INPATIENT - Normal    N terminal Pro BNP Inpatient 285     TROPONIN I - Normal    Troponin I High Sensitivity 6     ROUTINE UA WITH MICROSCOPIC - Normal    Color Urine Straw      Appearance Urine Clear      Glucose Urine Negative      Bilirubin Urine Negative      Ketones Urine Negative      Specific Gravity Urine 1.007      Blood Urine Negative      pH Urine 6.5      Protein Albumin Urine Negative      Urobilinogen Urine Normal      Nitrite Urine Negative      Leukocyte Esterase Urine Negative      RBC Urine 0      WBC Urine 1     COVID-19 VIRUS (CORONAVIRUS) BY PCR - Normal    SARS CoV2 PCR Negative        Emergency Department Course:     Reviewed:  I reviewed nursing notes, vitals, past medical history and Care Everywhere    Assessments:  0933 I obtained history and examined the patient as noted above.   1125 I rechecked the patient and explained findings.    Consults:  1143 I spoke to Dr. Sykes of the hospitalist service who accepts the patient for admission.     Interventions:  Lasix 20 mg IV    Disposition:  The patient was admitted to the hospital under the care of Dr. Sykes.     Impression & Plan   Medical Decision Making:  Patient is a 89-year-old female presents with dyspnea on exertion; history of recent hospitalization for lung abscess/PJP infection.  Initial consideration for, not limited to, ACS/MI, CHF exacerbation/hypervolemia, COPD exacerbation, infectious process, arrhythmia, among others.  Labs, EKG,  and imaging was obtained.  EKG demonstrates normal sinus rhythm without evidence of acute ischemia, infarction, or significant arrhythmia; no significant change from previous.  Chest x-ray clear and patient does not wheezy on exam.  She was noted to be hypoxic to the mid 80s on initial arrival with exertion.  She was placed on 2 L supplemental oxygen and later this was able to be titrated off.  Patient was noted on several occasions to drop her oxygen saturations into the mid to upper 80s with minimal exertion.  Clinically she demonstrates pitting edema to her lower extremities.  Provided 20 mg IV Lasix secondary to concern for hypervolemia.  BNP, troponin, and other labs without significant findings as noted above.  Considered PE though this is felt to be less likely given patient's history and exam.  No evidence of infectious process and patient is well-appearing.  Given patient's age and dyspnea on exertion with associated hypoxia, will admit to [facility] with the hospitalist service for further evaluation and care.    Diagnosis:    ICD-10-CM    1. Dyspnea on exertion  R06.00    2. Hypervolemia, unspecified hypervolemia type  E87.70    3. Hypoxia  R09.02      Scribe Disclosure:  Olinda MCCULLOUGH, am serving as a scribe at 9:36 AM on 4/12/2022 to document services personally performed by Olvin Quintana DO based on my observations and the provider's statements to me.      Olvin Quintana DO  04/12/22 1078

## 2022-04-12 NOTE — H&P
[facility]    History and Physical  Hospitalist       Date of Admission:  4/12/2022    Assessment & Plan     This is a 89-year-old female with history of severe COPD, hyperlipidemia, spinal stenosis, diabetes mellitus type 2, hypertension, recently admitted in the hospital from 02/21/2022 at 3:15 because of PJP infection and CELINE infection.  Was treated with atovaquone for 21 days and no prophylaxis.  Now comes to the ER with complaint of shortness of breath and found to be hypoxic on exertion.    ASSESSMENT AND PLAN:    1.  Acute versus chronic hypoxic respiratory failure with severe chronic obstructive pulmonary disease:  The patient has severe COPD.  She does not look like she is in any acute exacerbation.  Her lung sounds are clear.  Chest x-ray normal.  No wheezing, no crackles.  BNP is normal.  White cell counts are normal.  I think she most likely has chronic hypoxia on exertion.  It is causing the problem with shortness of breath as well.  She is 95-96% on room air at rest, but as soon as she starts moving, she drops down to 89, 88 and sometimes below that as well.  We will have her do incentive spirometry, flutter.  We will keep her on her inhalers.  She is on fluticasone, salmeterol, Incruse Ellipta and albuterol and Spiriva, need to use either Spiriva or Incuse Ellipta,  I will continue with those.  We will consult Pulmonary to evaluate the patient as well.  In the meantime, we will do a 6-minute walk test.  If she qualifies for home oxygen, she may benefit from oxygen at home.  2.  Recent history of a lung abscess secondary to PJP/cavitary infection:  She was treated with three weeks of atovaquone given ALLERGIC TO SULFA DRUGS.  She is on prophylaxis for now.  Consult Infectious Disease to see if the patient need long term prophylaxis, if she is not on long-term prednisone or steroids, if she needs to be on long-term prophylaxis or not.  At this time, I will continue with  atovaquone 750 mg daily.  3.  Hypertension:  Blood pressure slightly on the higher side.  She is on lisinopril 40, amlodipine 5.  I will continue with that for now.  4.  Diabetes mellitus type 2:  She is on glipizide and metformin.  I will hold it.  Keep her on sliding scale insulin for correction and hypoglycemia protocol.  5.  Hyperlipidemia, on Crestor.  We will continue with that.    DEEP THROMBOSIS PROPHYLAXIS:  Lovenox.    CODE STATUS:  DNR/DNI as per the patient wishes.    The case discussed with ER physician and the nursing staff taking care of the patient.    Adam Sykes MD    DVT Prophylaxis: Enoxaparin (Lovenox) SQ  Code Status: DNR / DNI    Disposition: Expected discharge in 2 days once stable    Adam Sykes MD    Primary Care Physician   Emily Marie    Chief Complaint   SOB and hypoxia     History is obtained from the patient    History of Present Illness   Admitted: 04/12/2022    HISTORY OF PRESENT ILLNESS:  This is a 89-year-old female with history of severe COPD, hyperlipidemia, spinal stenosis, diabetes mellitus type 2, hypertension, recently admitted in the hospital from 02/21/2022 at 3:15 because of PJP infection and CELINE infection.  Was treated with atovaquone for 21 days and no prophylaxis.  Now comes to the ER with complaint of shortness of breath and found to be hypoxic on exertion.    According to the patient, she was doing fine until this morning when she woke up, she walked and she started getting short of breath to the point that she was unable to breathe.  She decided to come to the ED.  At scene, she was found to be hypoxic on exertion with saturation in mid 80s.  She did get better with resting, but on exertion, she did drop her oxygen and became more short of breath.  She denies any fever, chills, nausea, vomiting, headache, dizziness, lightheadedness.  No coughing, no chest pain, orthopnea, PND, palpitation or dysuria, hematuria, constipation, diarrhea.  She was recently found to  have lower extremity swelling.  She was prescribed Lasix.  She has not taken it yet.  Rest of the review of system is negative.  She has a good appetite, eating well.  No other complaint.    Past Medical History    I have reviewed this patient's medical history and updated it with pertinent information if needed.   Past Medical History:   Diagnosis Date     Basal cell carcinoma      Chronic pain      Complete rupture of rotator cuff      COPD (chronic obstructive pulmonary disease) (H)      History of blood transfusion      Mixed hyperlipidemia 04/2000     Osteoarthritis      Personal history of other malignant neoplasm of skin      Spinal stenosis of lumbar region with neurogenic claudication      T2DM (type 2 diabetes mellitus) (H)        Past Surgical History   I have reviewed this patient's surgical history and updated it with pertinent information if needed.  Past Surgical History:   Procedure Laterality Date     ARTHROPLASTY KNEE Left 9/15/2014    Procedure: ARTHROPLASTY KNEE;  Surgeon: Carlos Alberto Kaminski MD;  Location:  OR     HEAD & NECK SURGERY  05/14/15    forehead-skin cancer removed     INJECT EPIDURAL LUMBAR / SACRAL SINGLE Left 7/14/2016    Procedure: INJECT EPIDURAL LUMBAR / SACRAL SINGLE;  Surgeon: Luisito New MD;  Location: [facility] OR     INJECT SACROILIAC JOINT N/A 12/1/2015    Procedure: INJECT SACROILIAC JOINT;  Surgeon: Luisito New MD;  Location: [facility] GI     INJECT SACROILIAC JOINT Bilateral 5/19/2016    Procedure: INJECT SACROILIAC JOINT;  Surgeon: Luisito New MD;  Location: [facility] OR     INJECT SACROILIAC JOINT Bilateral 11/25/2016    Procedure: INJECT SACROILIAC JOINT;  Surgeon: Elmira Bennett MD;  Location: [facility] OR     INJECT SACROILIAC JOINT Bilateral 4/25/2017    Procedure: INJECT SACROILIAC JOINT;  Bilateral Sacroiliac Joint Injection   Injection of local anesthetic and steroid into area around spine for pain relief;  Surgeon: Alvaro Holland MD;  Location: [facility] OR     INJECT  SACROILIAC JOINT Bilateral 7/28/2017    Procedure: INJECT SACROILIAC JOINT;  Bilateral Sacroiliac Joint Injection;  Surgeon: Elmira Bennett MD;  Location: [facility] OR     INJECT SACROILIAC JOINT Bilateral 11/10/2017    Procedure: INJECT SACROILIAC JOINT;  Bilateral Sacroiliac Joint Injection;  Surgeon: Elmira Bennett MD;  Location:  OR     [facility] NONSPECIFIC PROCEDURE      Breast biopsies      [facility] NONSPECIFIC PROCEDURE      Pilonidal sinus repair      [facility] NONSPECIFIC PROCEDURE      T & A      [facility] NONSPECIFIC PROCEDURE  5/02    Shoulder arthropasty     [facility] NONSPECIFIC PROCEDURE  5/07    Right shoulder replacement, RCT repair       Prior to Admission Medications   Prior to Admission Medications   Prescriptions Last Dose Informant Patient Reported? Taking?   ASPIRIN EC PO  Self Yes No   Sig: Take 81 mg by mouth daily   Acetaminophen (TYLENOL PO)  Self Yes No   Sig: Take 325 mg by mouth every 6 hours as needed Patient takes TID with Oxycodone.    Alcohol Swabs PADS   No No   Sig: Use to swab the area of the injection or kishan as directed Per insurance coverage   Calcium Polycarbophil (FIBERCON PO)  Self Yes No   Sig: Take 1 tablet by mouth daily    Multiple Vitamins-Minerals (MULTIVITAMIN ADULT PO)  Self Yes No   Sig: Take 1 tablet by mouth every evening    NONFORMULARY  Self Yes No   Sig: At Bedtime Natra sleeping med takes 1 at night.    Sharps Container MISC   No No   Sig: Use as directed to dispose of needles, lancets and other sharps Per Insurance coverage   albuterol (PROAIR HFA/PROVENTIL HFA/VENTOLIN HFA) 108 (90 Base) MCG/ACT inhaler  Self No No   Sig: INHALE 2 PUFFS INTO THE LUNGS EVERY 6 HOURS AS NEEDED FOR SHORTNESS OF BREATH OR DIFFICULT BREATHING OR WHEEZING   amLODIPine (NORVASC) 5 MG tablet   No No   Sig: Take 1 tablet (5 mg) by mouth daily   atovaquone (MEPRON) 750 MG/5ML suspension   No No   Sig: Take 5 mLs (750 mg) by mouth daily   blood glucose (NO BRAND SPECIFIED) lancets standard   No No   Sig: To use to  test glucose level in the blood Use to test blood 1sugar  3  times daily as directed. To accompany glucose monitor brands per insurance coverage.   blood glucose (NO BRAND SPECIFIED) test strip   No No   Sig: To use to test glucose level in the blood Use to test blood sugar  3 times daily as directed. To accompany glucose monitor brands per insurance coverage.   blood glucose calibration (NO BRAND SPECIFIED) solution   No No   Sig: Used to calibrate the blood glucose monitor as needed and as directed.  To accompany  blood glucose brands per insurance coverage   blood glucose monitoring (NO BRAND SPECIFIED) meter device kit   No No   Sig: Use as directed Per insurance coverage   calcium-vitamin D (CALTRATE) 600-400 MG-UNIT per tablet  Self Yes No   Sig: Take 1 tablet by mouth 2 times daily 1200mg   1000 mg of vitamin d   co-enzyme Q-10 100 MG CAPS capsule  Self Yes No   Sig: Take 100 mg by mouth daily   famotidine (PEPCID) 20 MG tablet   Yes No   Sig: Take 1 tablet (20 mg) by mouth 2 times daily   fluticasone-salmeterol (WIXELA INHUB) 500-50 MCG/DOSE inhaler   Yes No   Sig: Inhale 1 puff into the lungs 2 times daily ### DO NOT FILL NOW.  Please update patient's profile to reflect additional refills.  ####   furosemide (LASIX) 20 MG tablet   No No   Sig: Take 1 tablet (20 mg) by mouth in the morning.   glipiZIDE (GLUCOTROL XL) 5 MG 24 hr tablet   No No   Sig: Take 1 tablet (5 mg) by mouth daily (with breakfast)   glipiZIDE (GLUCOTROL) 5 MG tablet   No No   Sig: TAKE 1 TABLET BY MOUTH EVERY MORNING   glucosamine-chondroitin 500-400 MG CAPS per capsule  Self Yes No   Sig: Take 1 capsule by mouth 2 times daily    glucose 40 % (400 mg/mL) gel   No No   Sig: 15 g every 15 minutes by mouth as needed for low blood sugar.  Oral gel is preferable for conscious and able to swallow patient.   latanoprost (XALATAN) 0.005 % ophthalmic solution  Self Yes No   Sig: Place 1 drop Into the left eye every evening   lisinopril (ZESTRIL)  40 MG tablet   No No   Sig: Take 1 tablet (40 mg) by mouth daily   metFORMIN (GLUCOPHAGE) 850 MG tablet   No No   Sig: Take 1 tablet (850 mg) by mouth 2 times daily (with meals)   oxyCODONE (ROXICODONE) 5 MG tablet   No No   Sig: Take 1 tablet (5 mg) by mouth every 4 hours as needed for severe pain May take 5 tabs per day   polyethylene glycol (MIRALAX) 17 g packet  Self Yes No   Sig: Take 0.5 packets by mouth every evening    rosuvastatin (CRESTOR) 5 MG tablet  Self Yes No   Sig: Take 5 mg by mouth At Bedtime   tiotropium (SPIRIVA HANDIHALER) 18 MCG inhaled capsule   Yes No   Sig: INHALE THE CONTENTS OF 1 CAPSULE VIA INHALATION DEVICE DAILY   umeclidinium (INCRUSE ELLIPTA) 62.5 MCG/INH inhaler   Yes No   Sig: Inhale 1 puff into the lungs daily      Facility-Administered Medications: None     Allergies   Allergies   Allergen Reactions     Sulfamethoxazole Anaphylaxis and Hives       Social History   I have reviewed this patient's social history and updated it with pertinent information if needed. Celeste Chacko  reports that she quit smoking about 21 years ago. Her smoking use included cigarettes. She has a 27.00 pack-year smoking history. She has never used smokeless tobacco. She reports current alcohol use of about 0.8 standard drinks of alcohol per week. She reports that she does not use drugs.    Family History   I have reviewed this patient's family history and updated it with pertinent information if needed.   Family History   Problem Relation Age of Onset     Arthritis Father      Diabetes Brother      Cancer Brother         breast     Cerebrovascular Disease Brother        Review of Systems   CONSTITUTIONAL:  negative  EYES:  negative  HEENT:  negative  RESPIRATORY:  positive for  dyspnea  CARDIOVASCULAR:  negative  GASTROINTESTINAL:  negative  GENITOURINARY:  negative  INTEGUMENT/BREAST:  negative  HEMATOLOGIC/LYMPHATIC:  negative  ALLERGIC/IMMUNOLOGIC:  negative  ENDOCRINE:  negative  MUSCULOSKELETAL:   negative  NEUROLOGICAL:  negative  BEHAVIOR/PSYCH:  negative    Physical Exam   Temp: 97.8  F (36.6  C) Temp src: Oral BP: (!) 156/72 Pulse: 84   Resp: 17 SpO2: 93 % O2 Device: None (Room air)    Vital Signs with Ranges  Temp:  [97.8  F (36.6  C)] 97.8  F (36.6  C)  Pulse:  [84] 84  Resp:  [17] 17  BP: (156)/(72) 156/72  SpO2:  [93 %] 93 %  0 lbs 0 oz    Constitutional: Awake, alert, cooperative, no apparent distress.  Eyes: Conjunctiva and pupils examined and normal.  HEENT: Moist mucous membranes, normal dentition.  Respiratory: Clear to auscultation bilaterally, no crackles or wheezing.  Cardiovascular: Regular rate and rhythm, normal S1 and S2, and no murmur noted.  GI: Soft, non-distended, non-tender, normal bowel sounds.  Lymph/Hematologic: No anterior cervical or supraclavicular adenopathy.  Skin: No rashes, no cyanosis, 1-2+ LE edema.  Musculoskeletal: No joint swelling, erythema or tenderness.  Neurologic: Cranial nerves 2-12 intact, normal strength and sensation.  Psychiatric: Alert, oriented to person, place and time, no obvious anxiety or depression.    Data   Data reviewed today:  I personally reviewed the EKG tracing showing NSR, No acute ischemic changes .  Recent Labs   Lab 04/12/22  1024   WBC 6.0   HGB 12.6   MCV 84         POTASSIUM 3.7   CHLORIDE 105   CO2 28   BUN 15   CR 0.54   ANIONGAP 6   NARCISA 9.3   *       Recent Results (from the past 24 hour(s))   Chest XR,  PA & LAT    Narrative    CHEST TWO VIEWS April 12, 2022 10:38 AM     HISTORY: Shortness of breath. Congestive heart failure.    COMPARISON: February 19, 2022.       Impression    IMPRESSION: There are no acute infiltrates. The cardiac silhouette is  not enlarged. Pulmonary vasculature is unremarkable.    NIKITA SCHMITZ MD         SYSTEM ID:  [id number]

## 2022-04-12 NOTE — PLAN OF CARE
Alert and oriented x 4. VS stable, on RA and no complaints of pain. She is not on tele per md order. Neuro intact. Pan for pulmonology, and ID consult tomorrow.

## 2022-04-12 NOTE — TELEPHONE ENCOUNTER
Provider approval needed. This was last prescribed by the Hospital        Lab Results   Component Value Date    A1C 9.9 02/21/2022    A1C 7.1 01/25/2022    A1C 7.1 11/10/2021    A1C 7.2 05/05/2021    A1C 6.7 10/13/2020    A1C 7.1 04/09/2020    A1C 6.9 09/23/2019    A1C 6.9 03/21/2019

## 2022-04-12 NOTE — PROGRESS NOTES
RECEIVING UNIT ED HANDOFF REVIEW    ED Nurse Handoff Report was reviewed by: Lulu Monet on April 12, 2022 at 3:39 PM

## 2022-04-12 NOTE — PHARMACY-ADMISSION MEDICATION HISTORY
Pharmacy Medication History  Admission medication history interview status for the 4/12/2022  admission is complete. See EPIC admission navigator for prior to admission medications     Location of Interview: Patient room  Medication history sources: Patient - sure scripts    Significant changes made to the medication list:  Removed Incruse inhaler - This is a substitute for Spriva inhaler while pt was inpt last admission - Apparently this was sent home with pt at discharge and she believes she is using both - She did use both inhalers this AM - I did explain to pt that she should only be using 1 or the other     In the past week, patient estimated taking medication this percent of the time: greater than 90%    Additional medication history information:   As above    Medication reconciliation completed by provider prior to medication history? Yes    Time spent in this activity: 20 minutes    Prior to Admission medications    Medication Sig Last Dose Taking? Auth Provider   Acetaminophen (TYLENOL PO) Take 325 mg by mouth every 6 hours as needed Patient takes TID with Oxycodone. PRN Yes Reported, Patient   albuterol (PROAIR HFA/PROVENTIL HFA/VENTOLIN HFA) 108 (90 Base) MCG/ACT inhaler INHALE 2 PUFFS INTO THE LUNGS EVERY 6 HOURS AS NEEDED FOR SHORTNESS OF BREATH OR DIFFICULT BREATHING OR WHEEZING 4/12/2022 at AM Yes Emily Marie MD   amLODIPine (NORVASC) 5 MG tablet Take 1 tablet (5 mg) by mouth daily 4/11/2022 at Unknown time Yes Eulalia Gore MD   ASPIRIN EC PO Take 81 mg by mouth daily 4/11/2022 at Unknown time Yes Reported, Patient   atovaquone (MEPRON) 750 MG/5ML suspension Take 5 mLs (750 mg) by mouth daily 4/12/2022 at AM Yes Terell Dailey MD   Calcium Polycarbophil (FIBERCON PO) Take 1 tablet by mouth daily  4/11/2022 at Unknown time Yes Unknown, Entered By History   calcium-vitamin D (CALTRATE) 600-400 MG-UNIT per tablet Take 1 tablet by mouth 2 times daily 1200mg   1000 mg of vitamin d 4/11/2022 at  Unknown time Yes Reported, Patient   co-enzyme Q-10 100 MG CAPS capsule Take 100 mg by mouth daily 4/11/2022 at Unknown time Yes Unknown, Entered By History   famotidine (PEPCID) 20 MG tablet Take 20 mg by mouth 2 times daily as needed PRN Yes Cailin Ortez MD   fluticasone-salmeterol (ADVAIR) 500-50 MCG/DOSE inhaler Inhale 1 puff into the lungs 2 times daily ### DO NOT FILL NOW.  Please update patient's profile to reflect additional refills.  #### 4/12/2022 at AM Yes Terell Dailey MD   furosemide (LASIX) 20 MG tablet Take 1 tablet (20 mg) by mouth in the morning. 4/12/2022 at AM Yes Supriya Carpio PA-C   glipiZIDE (GLUCOTROL XL) 5 MG 24 hr tablet Take 1 tablet (5 mg) by mouth daily (with breakfast) 4/11/2022 at Unknown time Yes Eulalia oGre MD   glucosamine-chondroitin 500-400 MG CAPS per capsule Take 1 capsule by mouth 2 times daily  4/11/2022 at Unknown time Yes Reported, Patient   glucose 40 % (400 mg/mL) gel 15 g every 15 minutes by mouth as needed for low blood sugar.  Oral gel is preferable for conscious and able to swallow patient. PRN Yes Emily Millan MD   latanoprost (XALATAN) 0.005 % ophthalmic solution Place 1 drop Into the left eye every evening 4/11/2022 at Unknown time Yes Unknown, Entered By History   lisinopril (ZESTRIL) 40 MG tablet Take 1 tablet (40 mg) by mouth daily 4/11/2022 at Unknown time Yes Emily Marie MD   MELATONIN PO Take by mouth At Bedtime 4/11/2022 at Unknown time Yes Unknown, Entered By History   metFORMIN (GLUCOPHAGE) 850 MG tablet Take 1 tablet (850 mg) by mouth 2 times daily (with meals) 4/11/2022 at Unknown time Yes Eulalia Gore MD   Multiple Vitamins-Minerals (MULTIVITAMIN ADULT PO) Take 1 tablet by mouth every evening  4/11/2022 at Unknown time Yes Unknown, Entered By History   oxyCODONE (ROXICODONE) 5 MG tablet Take 1 tablet (5 mg) by mouth every 4 hours as needed for severe pain May take 5 tabs per day 4/11/2022 at Unknown time Yes Emily Marie,  MD   polyethylene glycol (MIRALAX) 17 g packet Take 0.5 packets by mouth every evening  4/11/2022 at Unknown time Yes Reported, Patient   rosuvastatin (CRESTOR) 5 MG tablet Take 5 mg by mouth At Bedtime 4/11/2022 at Unknown time Yes Unknown, Entered By History   tiotropium (SPIRIVA HANDIHALER) 18 MCG inhaled capsule INHALE THE CONTENTS OF 1 CAPSULE VIA INHALATION DEVICE DAILY 4/12/2022 at Unknown time Yes Terell Dailey MD   Alcohol Swabs PADS Use to swab the area of the injection or kishan as directed Per insurance coverage   Emily Millan MD   blood glucose (NO BRAND SPECIFIED) lancets standard To use to test glucose level in the blood Use to test blood 1sugar  3  times daily as directed. To accompany glucose monitor brands per insurance coverage.   Emily Millan MD   blood glucose (NO BRAND SPECIFIED) test strip To use to test glucose level in the blood Use to test blood sugar  3 times daily as directed. To accompany glucose monitor brands per insurance coverage.   Emily Millan MD   blood glucose calibration (NO BRAND SPECIFIED) solution Used to calibrate the blood glucose monitor as needed and as directed.  To accompany  blood glucose brands per insurance coverage   Emily Millan MD   blood glucose monitoring (NO BRAND SPECIFIED) meter device kit Use as directed Per insurance coverage   Emily Millan MD   Sharps Container MISC Use as directed to dispose of needles, lancets and other sharps Per Insurance coverage   Emily Millan MD       The information provided in this note is only as accurate as the sources available at the time of update(s)

## 2022-04-13 ENCOUNTER — APPOINTMENT (OUTPATIENT)
Dept: PHYSICAL THERAPY | Facility: CLINIC | Age: 87
End: 2022-04-13
Attending: INTERNAL MEDICINE
Payer: COMMERCIAL

## 2022-04-13 ENCOUNTER — APPOINTMENT (OUTPATIENT)
Dept: OCCUPATIONAL THERAPY | Facility: CLINIC | Age: 87
End: 2022-04-13
Attending: INTERNAL MEDICINE
Payer: COMMERCIAL

## 2022-04-13 LAB
ANION GAP SERPL CALCULATED.3IONS-SCNC: 8 MMOL/L (ref 3–14)
BUN SERPL-MCNC: 14 MG/DL (ref 7–30)
CALCIUM SERPL-MCNC: 9.3 MG/DL (ref 8.5–10.1)
CHLORIDE BLD-SCNC: 105 MMOL/L (ref 94–109)
CO2 SERPL-SCNC: 26 MMOL/L (ref 20–32)
CREAT SERPL-MCNC: 0.56 MG/DL (ref 0.52–1.04)
GFR SERPL CREATININE-BSD FRML MDRD: 86 ML/MIN/1.73M2
GLUCOSE BLD-MCNC: 127 MG/DL (ref 70–99)
GLUCOSE BLDC GLUCOMTR-MCNC: 121 MG/DL (ref 70–99)
GLUCOSE BLDC GLUCOMTR-MCNC: 147 MG/DL (ref 70–99)
GLUCOSE BLDC GLUCOMTR-MCNC: 152 MG/DL (ref 70–99)
GLUCOSE BLDC GLUCOMTR-MCNC: 175 MG/DL (ref 70–99)
GLUCOSE BLDC GLUCOMTR-MCNC: 253 MG/DL (ref 70–99)
MAGNESIUM SERPL-MCNC: 2.2 MG/DL (ref 1.6–2.3)
POTASSIUM BLD-SCNC: 3.2 MMOL/L (ref 3.4–5.3)
POTASSIUM BLD-SCNC: 3.3 MMOL/L (ref 3.4–5.3)
SODIUM SERPL-SCNC: 139 MMOL/L (ref 133–144)

## 2022-04-13 PROCEDURE — 83735 ASSAY OF MAGNESIUM: CPT | Performed by: INTERNAL MEDICINE

## 2022-04-13 PROCEDURE — 84132 ASSAY OF SERUM POTASSIUM: CPT | Performed by: INTERNAL MEDICINE

## 2022-04-13 PROCEDURE — 82962 GLUCOSE BLOOD TEST: CPT

## 2022-04-13 PROCEDURE — 97165 OT EVAL LOW COMPLEX 30 MIN: CPT | Mod: GO | Performed by: OCCUPATIONAL THERAPIST

## 2022-04-13 PROCEDURE — 250N000012 HC RX MED GY IP 250 OP 636 PS 637: Performed by: INTERNAL MEDICINE

## 2022-04-13 PROCEDURE — 250N000011 HC RX IP 250 OP 636: Performed by: INTERNAL MEDICINE

## 2022-04-13 PROCEDURE — 97535 SELF CARE MNGMENT TRAINING: CPT | Mod: GO | Performed by: OCCUPATIONAL THERAPIST

## 2022-04-13 PROCEDURE — 99207 PR CDG-CODE CATEGORY CHANGED: CPT | Performed by: INTERNAL MEDICINE

## 2022-04-13 PROCEDURE — G0378 HOSPITAL OBSERVATION PER HR: HCPCS

## 2022-04-13 PROCEDURE — 96372 THER/PROPH/DIAG INJ SC/IM: CPT | Performed by: INTERNAL MEDICINE

## 2022-04-13 PROCEDURE — 97116 GAIT TRAINING THERAPY: CPT | Mod: GP

## 2022-04-13 PROCEDURE — 97530 THERAPEUTIC ACTIVITIES: CPT | Mod: GP

## 2022-04-13 PROCEDURE — 97530 THERAPEUTIC ACTIVITIES: CPT | Mod: GO | Performed by: OCCUPATIONAL THERAPIST

## 2022-04-13 PROCEDURE — 80048 BASIC METABOLIC PNL TOTAL CA: CPT | Performed by: INTERNAL MEDICINE

## 2022-04-13 PROCEDURE — 97161 PT EVAL LOW COMPLEX 20 MIN: CPT | Mod: GP

## 2022-04-13 PROCEDURE — 99225 PR SUBSEQUENT OBSERVATION CARE,LEVEL II: CPT | Performed by: INTERNAL MEDICINE

## 2022-04-13 PROCEDURE — 250N000013 HC RX MED GY IP 250 OP 250 PS 637: Performed by: INTERNAL MEDICINE

## 2022-04-13 PROCEDURE — 36415 COLL VENOUS BLD VENIPUNCTURE: CPT | Performed by: INTERNAL MEDICINE

## 2022-04-13 RX ORDER — POTASSIUM CHLORIDE 1500 MG/1
20 TABLET, EXTENDED RELEASE ORAL ONCE
Status: COMPLETED | OUTPATIENT
Start: 2022-04-13 | End: 2022-04-13

## 2022-04-13 RX ORDER — POTASSIUM CHLORIDE 1.5 G/1.58G
20 POWDER, FOR SOLUTION ORAL ONCE
Status: COMPLETED | OUTPATIENT
Start: 2022-04-13 | End: 2022-04-13

## 2022-04-13 RX ADMIN — POLYETHYLENE GLYCOL 3350 8.5 G: 17 POWDER, FOR SOLUTION ORAL at 20:32

## 2022-04-13 RX ADMIN — INSULIN ASPART 1 UNITS: 100 INJECTION, SOLUTION INTRAVENOUS; SUBCUTANEOUS at 08:37

## 2022-04-13 RX ADMIN — POTASSIUM CHLORIDE 20 MEQ: 1.5 POWDER, FOR SOLUTION ORAL at 22:26

## 2022-04-13 RX ADMIN — ASPIRIN 81 MG: 81 TABLET, COATED ORAL at 08:38

## 2022-04-13 RX ADMIN — FUROSEMIDE 20 MG: 20 TABLET ORAL at 08:39

## 2022-04-13 RX ADMIN — AMLODIPINE BESYLATE 5 MG: 5 TABLET ORAL at 08:39

## 2022-04-13 RX ADMIN — POTASSIUM CHLORIDE 20 MEQ: 1500 TABLET, EXTENDED RELEASE ORAL at 17:41

## 2022-04-13 RX ADMIN — INSULIN ASPART 1 UNITS: 100 INJECTION, SOLUTION INTRAVENOUS; SUBCUTANEOUS at 18:24

## 2022-04-13 RX ADMIN — Medication 1 TABLET: at 08:38

## 2022-04-13 RX ADMIN — OXYCODONE HYDROCHLORIDE 5 MG: 5 TABLET ORAL at 16:23

## 2022-04-13 RX ADMIN — LISINOPRIL 40 MG: 40 TABLET ORAL at 08:39

## 2022-04-13 RX ADMIN — OXYCODONE HYDROCHLORIDE 5 MG: 5 TABLET ORAL at 11:44

## 2022-04-13 RX ADMIN — OXYCODONE HYDROCHLORIDE 5 MG: 5 TABLET ORAL at 21:03

## 2022-04-13 RX ADMIN — OXYCODONE HYDROCHLORIDE 5 MG: 5 TABLET ORAL at 00:46

## 2022-04-13 RX ADMIN — LATANOPROST 1 DROP: 50 SOLUTION/ DROPS OPHTHALMIC at 20:31

## 2022-04-13 RX ADMIN — FLUTICASONE FUROATE AND VILANTEROL TRIFENATATE 1 PUFF: 200; 25 POWDER RESPIRATORY (INHALATION) at 20:31

## 2022-04-13 RX ADMIN — INSULIN ASPART 2 UNITS: 100 INJECTION, SOLUTION INTRAVENOUS; SUBCUTANEOUS at 12:49

## 2022-04-13 RX ADMIN — FAMOTIDINE 20 MG: 20 TABLET ORAL at 08:39

## 2022-04-13 RX ADMIN — ACETAMINOPHEN 650 MG: 325 TABLET, FILM COATED ORAL at 16:16

## 2022-04-13 RX ADMIN — ATOVAQUONE 750 MG: 750 SUSPENSION ORAL at 08:41

## 2022-04-13 RX ADMIN — ENOXAPARIN SODIUM 40 MG: 40 INJECTION SUBCUTANEOUS at 17:41

## 2022-04-13 RX ADMIN — UMECLIDINIUM 1 PUFF: 62.5 AEROSOL, POWDER ORAL at 08:41

## 2022-04-13 RX ADMIN — ROSUVASTATIN CALCIUM 5 MG: 5 TABLET, FILM COATED ORAL at 21:03

## 2022-04-13 ASSESSMENT — ACTIVITIES OF DAILY LIVING (ADL)
WALKING_OR_CLIMBING_STAIRS: AMBULATION DIFFICULTY, REQUIRES EQUIPMENT;STAIR CLIMBING DIFFICULTY, ASSISTANCE 1 PERSON
WEAR_GLASSES_OR_BLIND: NO
ADLS_ACUITY_SCORE: 5
DRESSING/BATHING_DIFFICULTY: NO
ADLS_ACUITY_SCORE: 8
ADLS_ACUITY_SCORE: 5
ADLS_ACUITY_SCORE: 5
ADLS_ACUITY_SCORE: 8
DIFFICULTY_EATING/SWALLOWING: NO
TOILETING_ISSUES: NO
ADLS_ACUITY_SCORE: 5
TRANSFERRING: 0-->ASSISTANCE NEEDED (DEVELOPMETNALLY APPROPRIATE)
ADLS_ACUITY_SCORE: 5
ADLS_ACUITY_SCORE: 5
FALL_HISTORY_WITHIN_LAST_SIX_MONTHS: NO
ADLS_ACUITY_SCORE: 5
DOING_ERRANDS_INDEPENDENTLY_DIFFICULTY: NO
CONCENTRATING,_REMEMBERING_OR_MAKING_DECISIONS_DIFFICULTY: NO
WALKING_OR_CLIMBING_STAIRS_DIFFICULTY: YES
ADLS_ACUITY_SCORE: 5
ADLS_ACUITY_SCORE: 5
CHANGE_IN_FUNCTIONAL_STATUS_SINCE_ONSET_OF_CURRENT_ILLNESS/INJURY: NO
ADLS_ACUITY_SCORE: 5
ADLS_ACUITY_SCORE: 5
TRANSFERRING: 1-->ASSISTANCE (EQUIPMENT/PERSON) NEEDED
ADLS_ACUITY_SCORE: 5

## 2022-04-13 NOTE — PROGRESS NOTES
[facility]    Hospitalist Progress Note    Brief Summary:  This is a 89-year-old female with history of severe COPD, hyperlipidemia, spinal stenosis, diabetes mellitus type 2, hypertension, recently admitted in the hospital from 02/21/2022 at 3:15 because of PJP infection and CELINE infection.  Was treated with atovaquone for 21 days and no prophylaxis.  Now comes to the ER with complaint of shortness of breath and found to be hypoxic on exertion.    Assessment & Plan        1.  Acute versus chronic hypoxic respiratory failure with severe chronic obstructive pulmonary disease:  The patient has severe COPD.  She is not in  any acute exacerbation at this time.  Her lung sounds are clear.  Chest x-ray normal.  No wheezing, no crackles.  BNP is normal.  White cell counts are normal.  I think she most likely has chronic hypoxia on exertion.  It is causing the problem with shortness of breath as well.  She is 95-96% on room air at rest, but as soon as she starts moving, she drops down to 89, 88 and sometimes below that as well.  We will have her do incentive spirometry, flutter.  We will keep her on her inhalers.  She is on fluticasone/salmeterol, Incruse Ellipta and albuterol and Spiriva, need to use either Spiriva or Incuse Ellipta,  I will continue with those. Appreciate input from the Pulmonary.  Order 6-minute walk test.  If she qualifies for home oxygen, she may benefit from oxygen at home with exertion and possibly with sleeping as well.     2.  Recent history of a lung abscess secondary to PJP/cavitary infection:  She was treated with three weeks of atovaquone given ALLERGIC TO SULFA DRUGS.  She is on prophylaxis for now, needs indefinite Prophylaxis as per I.    3.  Hypertension:  Blood pressure slightly on the higher side.  She is on lisinopril 40, amlodipine 5.  I will continue with that for now, likely can increase the amlodipine to 10 mg daily if BP remain on higher side.     4.   Diabetes mellitus type 2:  She is on glipizide and metformin.  I will hold it.  Keep her on sliding scale insulin for correction and hypoglycemia protocol.    5.  Hyperlipidemia, on Crestor.  We will continue with that.     DVT Prophylaxis: Enoxaparin (Lovenox) SQ  Code Status: No CPR- Pre-arrest intubation OK    PT is recommending TCU at this time, SW/CC are consulted and awaiting placement.   Awaiting 6 min walk test or walking desaturation study as well     Disposition: Expected discharge awaiting placement.     Discussed with patient and the nursing staff taking care of the patient today     Adam Sykes MD  Text Page  (7am - 6pm)    Interval History   Patient slept well overnight, no SOB at rest and oxygen saturation well above 94% at rest, no fever, chills, chest pain, headache or dizziness at this time.     No other significant event overnight.     -Data reviewed today: I reviewed all new labs and imaging results over the last 24 hours. I personally reviewed no images or EKG's today.    Physical Exam   Temp: 97.9  F (36.6  C) Temp src: Oral BP: (!) 163/66 Pulse: 85   Resp: 18 SpO2: 94 % O2 Device: None (Room air)    Vitals:    04/12/22 1654 04/13/22 0126   Weight: 57.7 kg (127 lb 3.2 oz) 57.1 kg (125 lb 12.8 oz)     Vital Signs with Ranges  Temp:  [97.8  F (36.6  C)-98.8  F (37.1  C)] 97.9  F (36.6  C)  Pulse:  [85-94] 85  Resp:  [16-18] 18  BP: (135-163)/(58-72) 163/66  SpO2:  [92 %-94 %] 94 %  I/O last 3 completed shifts:  In: -   Out: 300 [Urine:300]    Constitutional: awake, alert, cooperative, no apparent distress, and appears stated age  Eyes: Lids and lashes normal, pupils equal, round and reactive to light, extra ocular muscles intact, sclera clear, conjunctiva normal  Respiratory: No increased work of breathing, good air exchange, clear to auscultation bilaterally, no crackles or wheezing  Cardiovascular: Normal apical impulse, regular rate and rhythm, normal S1 and S2, no S3 or S4, and no murmur  noted  GI: No scars, normal bowel sounds, soft, non-distended, non-tender, no masses palpated, no hepatosplenomegally  Skin: no bruising or bleeding  Musculoskeletal: no lower extremity pitting edema present  Neurologic: no focal deficit.     Medications       amLODIPine  5 mg Oral Daily     aspirin  81 mg Oral Daily     atovaquone  750 mg Oral Daily     calcium carbonate 600 mg-vitamin D 400 units  1 tablet Oral BID     enoxaparin ANTICOAGULANT  40 mg Subcutaneous Q24H     famotidine  20 mg Oral BID     fluticasone-vilanterol  1 puff Inhalation Daily     furosemide  20 mg Oral Daily     insulin aspart  1-7 Units Subcutaneous TID AC     insulin aspart  1-5 Units Subcutaneous At Bedtime     latanoprost  1 drop Left Eye QPM     lisinopril  40 mg Oral Daily     polyethylene glycol  8.5 g Oral QPM     rosuvastatin  5 mg Oral At Bedtime     sodium chloride (PF)  3 mL Intracatheter Q8H     umeclidinium  1 puff Inhalation Daily       Data   Recent Labs   Lab 04/13/22  1249 04/13/22  0804 04/13/22  0625 04/12/22  1735 04/12/22  1653 04/12/22  1024   WBC  --   --   --   --  6.6 6.0   HGB  --   --   --   --  12.3 12.6   MCV  --   --   --   --  84 84   PLT  --   --   --   --  315 298   NA  --   --  139  --   --  139   POTASSIUM  --   --  3.2*  --   --  3.7   CHLORIDE  --   --  105  --   --  105   CO2  --   --  26  --   --  28   BUN  --   --  14  --   --  15   CR  --   --  0.56  --  0.59 0.54   ANIONGAP  --   --  8  --   --  6   NARCISA  --   --  9.3  --   --  9.3   * 147* 127*   < >  --  175*    < > = values in this interval not displayed.       No results found for this or any previous visit (from the past 24 hour(s)).

## 2022-04-13 NOTE — CONSULTS
Pulmonary Medicine Consultation        Date of Admission: 4/12/2022  Primary Attending:  Adam Sykes MD  Consulting Physician: Terrance Arango MD      History:    Celeste is a 89-year-old female with past medical history of COPD, hyperlipidemia, basal cell carcinoma, type 2 diabetes, spinal stenosis known to our service who follows up with Dr. Peterson for her COPD.  This calendar year has had several admissions with COPD exacerbation and notable P CHRIS infection plus CELINE February 21, 2022.  She was treated with atovaquone for 21 days and no prophylaxis.  She was admitted on April 12, 2022 with dyspnea and hypoxia on exertion.  Suspected secondary to COPD exacerbation.  This morning she feels her breathing is more comfortable but she continues to get very short of breath with exertion.  Also complains of significant anxiety which she believes is making her dyspnea worse she underwent a CT scan of the chest which revealed: No evidence of acute pulmonary embolism.nodule in the left upper lobe has enlarged from 3/2/2022. suggests an infectious or inflammatory process. multiple other pulmonary nodules which have improved during the interval.  Significant decrease in interlobular septal thickening in both, lungs, possibly due to resolving edema.Were consulted for recommendations in the management of her COPD exacerbation.      Review of system:   ROS is negative except for items mentioned above and in HPI.           Prior medical history:  Past Medical History:   Diagnosis Date     Basal cell carcinoma      Chronic pain      Complete rupture of rotator cuff      COPD (chronic obstructive pulmonary disease) (H)      History of blood transfusion      Mixed hyperlipidemia 04/2000     Osteoarthritis      Personal history of other malignant neoplasm of skin      Spinal stenosis of lumbar region with neurogenic claudication      T2DM (type 2 diabetes mellitus) (H)        Past Surgical History:   Procedure Laterality Date      ARTHROPLASTY KNEE Left 9/15/2014    Procedure: ARTHROPLASTY KNEE;  Surgeon: Carlos Alberto Kaminski MD;  Location: [facility]     HEAD & NECK SURGERY  05/14/15    forehead-skin cancer removed     INJECT EPIDURAL LUMBAR / SACRAL SINGLE Left 7/14/2016    Procedure: INJECT EPIDURAL LUMBAR / SACRAL SINGLE;  Surgeon: Luisito New MD;  Location: [facility]     INJECT SACROILIAC JOINT N/A 12/1/2015    Procedure: INJECT SACROILIAC JOINT;  Surgeon: Luisito New MD;  Location: [facility]     INJECT SACROILIAC JOINT Bilateral 5/19/2016    Procedure: INJECT SACROILIAC JOINT;  Surgeon: Luisito New MD;  [facility]     INJECT SACROILIAC JOINT Bilateral 11/25/2016    Procedure: INJECT SACROILIAC JOINT;  Surgeon: Elmira Bennett MD;  [facility]: [facility]     INJECT SACROILIAC JOINT Bilateral 4/25/2017    Procedure: INJECT SACROILIAC JOINT;  Bilateral Sacroiliac Joint Injection   Injection of local anesthetic and steroid into area around spine for pain relief;  Surgeon: Alvaro Holland MD;  Location: [facility]     INJECT SACROILIAC JOINT Bilateral 7/28/2017    Procedure: INJECT SACROILIAC JOINT;  Bilateral Sacroiliac Joint Injection;  Surgeon: Elmira Bennett MD;  [facility]: [facility]     INJECT SACROILIAC JOINT Bilateral 11/10/2017    Procedure: INJECT SACROILIAC JOINT;  Bilateral Sacroiliac Joint Injection;  Surgeon: Elmira Bennett MD;  Location: [facility]     [facility] NONSPECIFIC PROCEDURE      Breast biopsies      [facility] NONSPECIFIC PROCEDURE      Pilonidal sinus repair      Z NONSPECIFIC PROCEDURE      T & A      Z NONSPECIFIC PROCEDURE  5/02    Shoulder arthropasty     Z NONSPECIFIC PROCEDURE  5/07    Right shoulder replacement, RCT repair       Patient Active Problem List   Diagnosis     Disorder of bone and cartilage     COPD, severe (H)     Spinal stenosis of lumbar region with neurogenic claudication     Type 2 diabetes mellitus with complication, without long-term current use of insulin (H)     HYPERLIPIDEMIA LDL GOAL <100     History of  "basal cell carcinoma     Controlled substance agreement signed     Chronic pain syndrome     Narcotic dependence (H)     Benign essential hypertension     Lumbar radiculopathy     CKD (chronic kidney disease) stage 1, GFR 90 ml/min or greater     Varicose veins of left lower extremity, unspecified whether complicated     Edema, unspecified type     Chronic, continuous use of opioids     Hypoxia     Dyspnea on exertion     Hypervolemia, unspecified hypervolemia type       Social History     Social History     Socioeconomic History     Marital status: Single     Spouse name: Not on file     Number of children: Not on file     Years of education: Not on file     Highest education level: Not on file   Occupational History     Not on file   Tobacco Use     Smoking status: Former Smoker     Packs/day: 0.50     Years: 54.00     Pack years: 27.00     Types: Cigarettes     Quit date: 2000     Years since quittin.9     Smokeless tobacco: Never Used     Tobacco comment: using nicotine gum   Vaping Use     Vaping Use: Never used   Substance and Sexual Activity     Alcohol use: Yes     Alcohol/week: 0.8 standard drinks     Types: 1 Glasses of wine per week     Comment: \"A glass of wine once a week.\"     Drug use: No     Sexual activity: Not Currently   Other Topics Concern     Parent/sibling w/ CABG, MI or angioplasty before 65F 55M? Not Asked   Social History Narrative     Not on file     Social Determinants of Health     Financial Resource Strain: Not on file   Food Insecurity: Not on file   Transportation Needs: Not on file   Physical Activity: Not on file   Stress: Not on file   Social Connections: Not on file   Intimate Partner Violence: Not on file   Housing Stability: Not on file         Family History  Family History   Problem Relation Age of Onset     Arthritis Father      Diabetes Brother      Cancer Brother         breast     Cerebrovascular Disease Brother            Medications  No current outpatient " medications on file.     Current Facility-Administered Medications Ordered in Epic   Medication Dose Route Frequency Last Rate Last Admin     acetaminophen (TYLENOL) tablet 650 mg  650 mg Oral Q6H PRN        Or     acetaminophen (TYLENOL) Suppository 650 mg  650 mg Rectal Q6H PRN         albuterol (PROVENTIL HFA/VENTOLIN HFA) inhaler  2 puff Inhalation Q4H PRN         amLODIPine (NORVASC) tablet 5 mg  5 mg Oral Daily   5 mg at 04/12/22 1750     aspirin EC tablet 81 mg  81 mg Oral Daily   81 mg at 04/12/22 1750     atovaquone (MEPRON) suspension 750 mg  750 mg Oral Daily         calcium carbonate 600 mg-vitamin D 400 units (CALTRATE) per tablet 1 tablet  1 tablet Oral BID   1 tablet at 04/12/22 2023     glucose gel 15-30 g  15-30 g Oral Q15 Min PRN        Or     dextrose 50 % injection 25-50 mL  25-50 mL Intravenous Q15 Min PRN        Or     glucagon injection 1 mg  1 mg Subcutaneous Q15 Min PRN         enoxaparin ANTICOAGULANT (LOVENOX) injection 40 mg  40 mg Subcutaneous Q24H   40 mg at 04/12/22 1828     famotidine (PEPCID) tablet 20 mg  20 mg Oral BID   20 mg at 04/12/22 2023     fluticasone-vilanterol (BREO ELLIPTA) 200-25 MCG/INH inhaler 1 puff  1 puff Inhalation Daily   1 puff at 04/12/22 2023     furosemide (LASIX) tablet 20 mg  20 mg Oral Daily   20 mg at 04/12/22 1750     insulin aspart (NovoLOG) injection (RAPID ACTING)  1-7 Units Subcutaneous TID AC         insulin aspart (NovoLOG) injection (RAPID ACTING)  1-5 Units Subcutaneous At Bedtime   1 Units at 04/12/22 2139     latanoprost (XALATAN) 0.005 % ophthalmic solution 1 drop  1 drop Left Eye QPM   1 drop at 04/12/22 2023     lidocaine (LMX4) cream   Topical Q1H PRN         lidocaine 1 % 0.1-1 mL  0.1-1 mL Other Q1H PRN         lisinopril (ZESTRIL) tablet 40 mg  40 mg Oral Daily   40 mg at 04/12/22 1750     melatonin tablet 1 mg  1 mg Oral At Bedtime PRN   1 mg at 04/12/22 2335     naloxone (NARCAN) injection 0.2 mg  0.2 mg Intravenous Q2 Min PRN         Or     naloxone (NARCAN) injection 0.4 mg  0.4 mg Intravenous Q2 Min PRN        Or     naloxone (NARCAN) injection 0.2 mg  0.2 mg Intramuscular Q2 Min PRN        Or     naloxone (NARCAN) injection 0.4 mg  0.4 mg Intramuscular Q2 Min PRN         ondansetron (ZOFRAN-ODT) ODT tab 4 mg  4 mg Oral Q6H PRN        Or     ondansetron (ZOFRAN) injection 4 mg  4 mg Intravenous Q6H PRN         oxyCODONE (ROXICODONE) tablet 5 mg  5 mg Oral Q4H PRN   5 mg at 04/13/22 0046     polyethylene glycol (MIRALAX) Packet 17 g  17 g Oral Daily PRN         polyethylene glycol (MIRALAX) Packet 8.5 g  8.5 g Oral QPM   8.5 g at 04/12/22 2021     prochlorperazine (COMPAZINE) injection 5 mg  5 mg Intravenous Q6H PRN        Or     prochlorperazine (COMPAZINE) tablet 5 mg  5 mg Oral Q6H PRN        Or     prochlorperazine (COMPAZINE) suppository 12.5 mg  12.5 mg Rectal Q12H PRN         rosuvastatin (CRESTOR) tablet 5 mg  5 mg Oral At Bedtime   5 mg at 04/12/22 2023     senna-docusate (SENOKOT-S/PERICOLACE) 8.6-50 MG per tablet 1 tablet  1 tablet Oral BID PRN        Or     senna-docusate (SENOKOT-S/PERICOLACE) 8.6-50 MG per tablet 2 tablet  2 tablet Oral BID PRN         sodium chloride (PF) 0.9% PF flush 3 mL  3 mL Intracatheter Q8H   3 mL at 04/12/22 1828     sodium chloride (PF) 0.9% PF flush 3 mL  3 mL Intracatheter q1 min prn         umeclidinium (INCRUSE ELLIPTA) 62.5 MCG/INH inhaler 1 puff  1 puff Inhalation Daily         No current Epic-ordered outpatient medications on file.       Allergies   Allergen Reactions     Sulfamethoxazole Anaphylaxis and Hives               Physical Examination:   Vitals:    04/12/22 1933 04/13/22 0048 04/13/22 0126 04/13/22 0757   BP: (!) 144/72 135/58     BP Location: Right arm Left arm     Pulse: 92 85     Resp: 16 18  18   Temp: 97.8  F (36.6  C) 98.1  F (36.7  C)  97.9  F (36.6  C)   TempSrc: Oral   Oral   SpO2: 92% 93%  94%   Weight:   57.1 kg (125 lb 12.8 oz)      Body mass index is 21.93 kg/m .  Temp  (24hrs), Av.1  F (36.7  C), Min:97.8  F (36.6  C), Max:98.8  F (37.1  C)        Constitutional:  Appears comfortable.  HENT:  mucous membranes moist.  Eyes: PERRLA, no icterus, no pallor.   Neck: No lymphadenopathy or thyromegaly, trachea midline, no carotid bruits.  Cardiovascular: Regular rate and rythym, no murmurs, rubs or gallops, no peripheral edema.  Respiratory/Chest: Vesicular breath sounds, but prolonged expiratory phase  Gastrointestinal: Abdomen was soft, non-tender, non-distended, no masses felt, no hepatosplenomegaly.  Musculoskeletal: No clubbing or cyanosis, full range of motion in all extremities.  Neurological: No focal motor or sensory deficits.   Skin: No skin rash, hives, petechiae, or breakdown.        CMP  Recent Labs   Lab 22  0625 22  0124 22  2105 22  1735 22  1653 22  1024     --   --   --   --  139   POTASSIUM 3.2*  --   --   --   --  3.7   CHLORIDE 105  --   --   --   --  105   CO2 26  --   --   --   --  28   ANIONGAP 8  --   --   --   --  6   * 121* 244* 139*  --  175*   BUN 14  --   --   --   --  15   CR 0.56  --   --   --  0.59 0.54   GFRESTIMATED 86  --   --   --  85 87   NARCISA 9.3  --   --   --   --  9.3   MAG 2.2  --   --   --   --   --      CBC  Recent Labs   Lab 22  1653 22  1024   WBC 6.6 6.0   RBC 4.75 4.88   HGB 12.3 12.6   HCT 39.7 41.1   MCV 84 84   MCH 25.9* 25.8*   MCHC 31.0* 30.7*   RDW 16.0* 16.2*    298     INRNo lab results found in last 7 days.  Arterial Blood Gas  Recent Labs   Lab 22  1024   O2PER 0     No results for input(s): CULT in the last 168 hours.    Diagnostic Studies:    CT CHEST 22    IMPRESSION:  1.  No evidence of acute pulmonary embolism.  2.  A single nodule in the left upper lobe has enlarged from 3/2/2022.  The relatively rapid enlargement suggests an infectious or  inflammatory process. This is likely related to multiple other  pulmonary nodules which have improved  during the interval.  3.  Significant decrease in interlobular septal thickening in both  lungs, possibly due to resolving edema.             Assessment:     89-year-old female with past medical history of COPD, hyperlipidemia, basal cell carcinoma, type 2 diabetes, spinal stenosis known to our service who follows up with Dr. Peterson for her COPD.  This calendar year has had several admissions with COPD exacerbation and notable P CHRIS infection plus CELINE February 21, 2022.  She was treated with atovaquone for 21 days and no prophylaxis.  She was admitted on April 12, 2022 with dyspnea and hypoxia on exertion.  Suspected secondary to COPD exacerbation.       Pulmonary Diagnoses: Abnl CT/CXR R91.8, COPD J44.9, COPD exacerb J44.1, BAKER R06.09 and Hpoxemia R09.02      Recommendations       Duonebs every four hours as needed   Stable oxygenation on room air at rest, recommend walking oximetry to determine if she needs with exertion prior to discharge.   If she does she should use it with exertion and sleep.  Patient complaining of anxiety, consider managing   Incruse once a day   Breo 200/25 once per day a day, rinse mouth after every use  Encourage IS Q4H  PREDNISONE TAPER  Give: 40 mg daily wm orally for 3 doses, Then give: 30 mg daily wm orally for 3 doses,  Then give: 20 mg daily wm orally for 3 doses,Then give: 10 mg daily wm orally for 3 doses.  Physical activity as tolerated  Case discussed in detail with patient's daughtersBeth  Has Scheduled follow-up with Dr. Christine, At that time can consider [facility]  No need to repeat CT chest April 22 since she had one yesterday.  But will likely need a repeat CT in 2 months  Please call if any questions       Terrance Wilhelm M.D.  Pulmonary, Critical Care and Sleep Medicine  [facility]   Pager: [phone]  Office:[phone]

## 2022-04-13 NOTE — PROGRESS NOTES
Care Management Follow Up    Length of Stay (days): 1    Expected Discharge Date: 04/14/2022     Concerns to be Addressed:   Discharge planning  Patient plan of care discussed at interdisciplinary rounds: Yes    Anticipated Discharge Disposition:  TCU placement     Anticipated Discharge Services:  therapy  Anticipated Discharge DME:  N/A    Patient/family educated on Medicare website which has current facility and service quality ratings:  N/A  Education Provided on the Discharge Plan:  yes  Patient/Family in Agreement with the Plan:  yes    Referrals Placed by CM/SW:  TCU referrals, homecare  Private pay costs discussed: Not applicable    Additional Information:  Received a call back from [facility], hey have clinically accepted patient pending pre-auth.  Updated patient and she is in agreement.  Patient also states that she is open to homecare, but she can not remember the name of the agency.  Reviewed chart.  Call placed to [facility].  Patient is open to them for RN/PT/OT.  Patient also had questions about observation status.  Answered patient's question.    Will continue to follow.      JAYLYN Martin, [facility]    [phone]  [facility]

## 2022-04-13 NOTE — PLAN OF CARE
A&Ox4. /58 (BP Location: Left arm)   Pulse 85   Temp 98.1  F (36.7  C)   Resp 18   Wt 57.1 kg (125 lb 12.8 oz)   SpO2 93%   BMI 21.93 kg/m   No tele ordered. RA. BAKER. PRN Oxycodone given for back pain. Up with A1 walker and ZOEY.Plan for ID and pulmonology consult.

## 2022-04-13 NOTE — PROGRESS NOTES
04/13/22 0823   Quick Adds   Type of Visit Initial Occupational Therapy Evaluation   Living Environment   People in Home alone   Current Living Arrangements condominium   Home Accessibility no concerns   Transportation Anticipated family or friend will provide   Living Environment Comments Pt lives in condo w/ elevator access. Walk in shower w/ chair and grab bars.   Self-Care   Usual Activity Tolerance good   Current Activity Tolerance fair   Equipment Currently Used at Home raised toilet seat;shower chair;grab bar, toilet;grab bar, tub/shower   Fall history within last six months no   Activity/Exercise/Self-Care Comment Pt reports independence w/ ADLs using FWW at baseline. Has been weaker for ~1 month since previous hospitalization.   Instrumental Activities of Daily Living (IADL)   Previous Responsibilities medication management;meal prep;laundry;finances   IADL Comments Daughter has been assisting w/ driving, grocery shopping. Pt has  1x/month.   General Information   Onset of Illness/Injury or Date of Surgery 04/12/22   Referring Physician Adam Sykes MD   Patient/Family Therapy Goal Statement (OT) Go to TCU   Additional Occupational Profile Info/Pertinent History of Current Problem This is a 89-year-old female with history of severe COPD, hyperlipidemia, spinal stenosis, diabetes mellitus type 2, hypertension, recently admitted in the hospital from 02/21/2022 at 3:15 because of PJP infection and CELINE infection.  Was treated with atovaquone for 21 days and no prophylaxis.  Now comes to the ER with complaint of shortness of breath and found to be hypoxic on exertion.   Existing Precautions/Restrictions cardiac   Cognitive Status Examination   Orientation Status orientation to person, place and time   Pain Assessment   Patient Currently in Pain Yes, see Vital Sign flowsheet   Range of Motion Comprehensive   General Range of Motion bilateral upper extremity ROM WFL   Strength Comprehensive  (MMT)   Comment, General Manual Muscle Testing (MMT) Assessment Generalized weakness   Bed Mobility   Supine-Sit [place] (Bed Mobility) independent   Sit-Supine [place] (Bed Mobility) independent   Lower Body Dressing Assessment/Training   [place] Level (Lower Body Dressing) supervision   Clinical Impression   Criteria for Skilled Therapeutic Interventions Met (OT) Yes, treatment indicated   OT Diagnosis Decreased ADL independence and activity navdeep   OT Problem List-Impairments impacting ADL problems related to;activity tolerance impaired;balance;mobility;pain   Assessment of Occupational Performance 1-3 Performance Deficits   Identified Performance Deficits IADLs, toileting, activity navdeep   Planned Therapy Interventions (OT) ADL retraining;transfer training;home program guidelines;progressive activity/exercise;risk factor education   Clinical Decision Making Complexity (OT) low complexity   Risk & Benefits of therapy have been explained evaluation/treatment results reviewed;care plan/treatment goals reviewed;risks/benefits reviewed;current/potential barriers reviewed;participants voiced agreement with care plan;participants included;patient   OT Discharge Planning   OT Discharge Recommendation (DC Rec) home with assist;home with home care occupational therapy   OT Rationale for DC Rec Pt presents below baseline for I/ADL performance. Limited by dyspnea and decreased activity navdeep. O2 sats drop to mid-80s w/ activity. Pt currently SBA w/ all functional mobility and ADLs. Would recommend assistance w/ heavier IADLs (cleaning, meal prep, laundry) and home OT to further address activity navdeep and safety. If level of assist isn't available, TCU should be considered.   Total Evaluation Time (Minutes)   Total Evaluation Time (Minutes) 8   OT Goals   Therapy Frequency (OT) Daily   OT Predicated Duration/Target Date for Goal Attainment 04/20/22   OT Goals Transfers;Aerobic Activity   OT: Hygiene/Grooming modified  independent;while standing   OT: Lower Body Dressing Modified independent;within precautions   OT: Transfer Modified independent;with assistive device   OT: Toilet Transfer/Toileting Modified independent;using adaptive equipment;cleaning and garment management;toilet transfer   OT: Perform aerobic activity with stable cardiovascular response 5 minutes;continuous activity;ambulation  (while keeping O2 sats >90%)

## 2022-04-13 NOTE — PROGRESS NOTES
04/13/22 0900   Quick Adds   Type of Visit Initial PT Evaluation   Living Environment   People in Home alone   Current Living Arrangements condominium   Home Accessibility no concerns   Transportation Anticipated family or friend will provide   Self-Care   Usual Activity Tolerance good   Current Activity Tolerance fair   Regular Exercise No   Equipment Currently Used at Home walker, rolling   Fall history within last six months no   General Information   Onset of Illness/Injury or Date of Surgery 04/12/22   Referring Physician Dr. Sykes   Patient/Family Therapy Goals Statement (PT) To walk   Pertinent History of Current Problem (include personal factors and/or comorbidities that impact the POC) Pt is a 89 year old female admitted with SOB   Existing Precautions/Restrictions fall   Cognition   Affect/Mental Status (Cognition) WFL   Orientation Status (Cognition) oriented x 3   Pain Assessment   Patient Currently in Pain No   Range of Motion (ROM)   Range of Motion ROM is WFL   Strength (Manual Muscle Testing)   Strength Comments Gross LE strength 4/5 bilaterally   Bed Mobility   Comment, (Bed Mobility) SBA   Transfers   Comment, (Transfers) CGA   Gait/Stairs (Locomotion)   Comment, (Gait/Stairs) 25' FWW CGA, decreased christiano and step length   Balance   Balance Comments Good in sitting, fair in standing   Clinical Impression   Criteria for Skilled Therapeutic Intervention Yes, treatment indicated   PT Diagnosis (PT) Impaired ambulation   Influenced by the following impairments Decreased strength, decreased endurance, decreased balance   Functional limitations due to impairments Difficulty with bed mobility, transfers, ambulation   Clinical Presentation (PT Evaluation Complexity) Stable/Uncomplicated   Clinical Presentation Rationale VSS, pain controlled   Clinical Decision Making (Complexity) low complexity   Planned Therapy Interventions (PT) balance training;bed mobility training;gait training;patient/family  education;strengthening;transfer training   Risk & Benefits of therapy have been explained evaluation/treatment results reviewed;care plan/treatment goals reviewed;risks/benefits reviewed;current/potential barriers reviewed;participants voiced agreement with care plan;participants included;patient   PT Discharge Planning   PT Discharge Recommendation (DC Rec) Transitional Care Facility   PT Rationale for DC Rec At baseline, pt lives alone in an apartment with elevator access. Pt with recent hospital stay and returned home, reporting signficant difficulties managing following her previous hospital stay. This date, patient is below baseline and has a significant decrease in activity tolerance. Pt requires assist with all mobility and presents as a fall risk. Pt will benefit from TCU prior to returning home to increase mobility and independence and improve safety to prevent falls and further hospitalization.   Plan of Care Review   Plan of Care Reviewed With patient   Total Evaluation Time   Total Evaluation Time (Minutes) 10   Physical Therapy Goals   PT Frequency Daily   PT Predicated Duration/Target Date for Goal Attainment 04/18/22   PT Goals Bed Mobility;Transfers;Gait   PT: Bed Mobility Independent;Supine to/from sit   PT: Transfers Modified independent;Sit to/from stand;Bed to/from chair;Assistive device   PT: Gait Modified independent;Rolling walker;100 feet

## 2022-04-13 NOTE — UTILIZATION REVIEW
"[facility]     Admission Status; Secondary Review Determination     Admission Date: 4/12/2022  9:31 AM      Under the authority of the Utilization Management Committee, the utilization review process indicated a secondary review on the above patient.  The review outcome is based on review of the medical records, discussions with staff, and applying clinical experience noted on the date of the review.          (x) Observation Status Appropriate - This patient does not meet hospital inpatient criteria and is placed in observation status. If this patient's primary payer is Medicare and was admitted as an inpatient, Condition Code 44 should be used and patient status changed to \"observation\".     RATIONALE FOR DETERMINATION   89-year-old female with a history of COPD, diabetes, spinal stenosis, and hyperlipidemia was admitted yesterday with shortness of breath.  She was found to have mild hypoxia.  She was not thought to be in acute COPD exacerbation.  She was given as needed nebulizers.  Pulmonology and infectious disease have been consulted.  She will complete a prednisone taper and follow-up with her outpatient pulmonologist.  Infectious disease will determine if she needs long-term antibiotic prophylaxis.  Patient is doing clinically well and expected to discharge today or tomorrow.  At the time of this review the patient does not meet medical necessity for inpatient hospitalization and observation status is recommended.      The severity of illness, intensity of service provided, expected LOS and risk for adverse outcome make the care appropriate for further observation; however, doesn't meet criteria for hospital inpatient admission.  Dr Sykes notified of this determination.        The information on this document is developed by the utilization review team in order for the business office to ensure compliance.  This only denotes the appropriateness of proper admission status and does not reflect " the quality of care rendered.         The definitions of Inpatient Status and Observation Status used in making the determination above are those provided in the CMS Coverage Manual, Chapter 1 and Chapter 6, section 70.4.      Sincerely,     Ralph Brito DO, MPH   Physician Advisor  Utilization Review  [facility]

## 2022-04-13 NOTE — CONSULTS
[facility]    Infectious Disease Consultation     Date of Admission:  4/12/2022  Date of Consult (When I saw the patient): 04/13/22    Assessment & Plan   Celeste Chacko is a 89 year old female who was admitted on 4/12/2022.     Impression:  1. 89 y.o with COPD  2. Diabetes   3. HTN, HLd  4. Admitted with shortness of breath.   5. Recent admission with similar complaints, was diagnosed with PJP pneumonia, started to on treatment with mepron and maintained on mepron for secondary prophylaxis indefinitely. I just had a virtual visit with her 2 weeks ago when she was doing well, able to live independently.   6. Also CELINE colonized.  7. She is not short of breath at rest, no fevers or chills     Recommendations:   Mepron 750 mg  daily for long term for secondary prophylaxis for PJP ( there is no end date to this)   I plan to see her in clinic in [facility] 3- 4 months   Will follow up on pulm consultation             Gunner Sainz MD    Reason for Consult   Reason for consult: I was asked to evaluate this patient for hypoxia.    Primary Care Physician   Emily Marie    Chief Complaint   Shortness of breath     History is obtained from the patient and medical records    History of Present Illness   Celeste Chacko is a 89 year old female severe COPD, hyperlipidemia, spinal stenosis, diabetes mellitus type 2, hypertension, recently admitted in the hospital from 02/21/2022 at 3:15 because of PJP infection and CELINE infection.  Was treated with atovaquone for 21 days and no prophylaxis.  Now comes to the ER with complaint of shortness of breath and found to be hypoxic on exertion.     According to the patient, she was doing fine until this morning when she woke up, she walked and she started getting short of breath to the point that she was unable to breathe.  She decided to come to the ED.  At scene, she was found to be hypoxic on exertion with saturation in mid 80s.  She did get better with resting, but  on exertion, she did drop her oxygen and became more short of breath.  She denies any fever, chills, nausea, vomiting, headache, dizziness, lightheadedness.  No coughing, no chest pain, orthopnea, PND, palpitation or dysuria, hematuria, constipation, diarrhea.  She was recently found to have lower extremity swelling.  She was prescribed Lasix.  She has not taken it yet.  Rest of the review of system is negative.  She has a good appetite, eating well.  No other complaint.       Past Medical History   I have reviewed this patient's medical history and updated it with pertinent information if needed.   Past Medical History:   Diagnosis Date     Basal cell carcinoma      Chronic pain      Complete rupture of rotator cuff      COPD (chronic obstructive pulmonary disease) (H)      History of blood transfusion      Mixed hyperlipidemia 04/2000     Osteoarthritis      Personal history of other malignant neoplasm of skin      Spinal stenosis of lumbar region with neurogenic claudication      T2DM (type 2 diabetes mellitus) (H)        Past Surgical History   I have reviewed this patient's surgical history and updated it with pertinent information if needed.  Past Surgical History:   Procedure Laterality Date     ARTHROPLASTY KNEE Left 9/15/2014    Procedure: ARTHROPLASTY KNEE;  Surgeon: Carlos Alberto Kaminski MD;  Location:  OR     HEAD & NECK SURGERY  05/14/15    forehead-skin cancer removed     INJECT EPIDURAL LUMBAR / SACRAL SINGLE Left 7/14/2016    Procedure: INJECT EPIDURAL LUMBAR / SACRAL SINGLE;  Surgeon: Luisito New MD;  [facility]     INJECT SACROILIAC JOINT N/A 12/1/2015    Procedure: INJECT SACROILIAC JOINT;  Surgeon: Luisito New MD;  Location: [facility] GI     INJECT SACROILIAC JOINT Bilateral 5/19/2016    Procedure: INJECT SACROILIAC JOINT;  Surgeon: Luisito New MD;  [facility]     INJECT SACROILIAC JOINT Bilateral 11/25/2016    Procedure: INJECT SACROILIAC JOINT;  Surgeon: Elmira Bennett MD;   [facility]     INJECT SACROILIAC JOINT Bilateral 4/25/2017    Procedure: INJECT SACROILIAC JOINT;  Bilateral Sacroiliac Joint Injection   Injection of local anesthetic and steroid into area around spine for pain relief;  Surgeon: Alvaro Holland MD;  [facility]     INJECT SACROILIAC JOINT Bilateral 7/28/2017    Procedure: INJECT SACROILIAC JOINT;  Bilateral Sacroiliac Joint Injection;  Surgeon: Elmira Bennett MD;  [facility]     INJECT SACROILIAC JOINT Bilateral 11/10/2017    Procedure: INJECT SACROILIAC JOINT;  Bilateral Sacroiliac Joint Injection;  Surgeon: Elmira Bennett MD;  Location:  OR     [facility] NONSPECIFIC PROCEDURE      Breast biopsies      [facility] NONSPECIFIC PROCEDURE      Pilonidal sinus repair      [facility] NONSPECIFIC PROCEDURE      T & A      [facility] NONSPECIFIC PROCEDURE  5/02    Shoulder arthropasty     [facility] NONSPECIFIC PROCEDURE  5/07    Right shoulder replacement, RCT repair       Prior to Admission Medications   Prior to Admission Medications   Prescriptions Last Dose Informant Patient Reported? Taking?   ASPIRIN EC PO 4/11/2022 at Unknown time Self Yes Yes   Sig: Take 81 mg by mouth daily   Acetaminophen (TYLENOL PO) PRN Self Yes Yes   Sig: Take 325 mg by mouth every 6 hours as needed Patient takes TID with Oxycodone.   Alcohol Swabs PADS  Self No No   Sig: Use to swab the area of the injection or kishan as directed Per insurance coverage   Calcium Polycarbophil (FIBERCON PO) 4/11/2022 at Unknown time Self Yes Yes   Sig: Take 1 tablet by mouth daily    MELATONIN PO 4/11/2022 at Unknown time Self Yes Yes   Sig: Take by mouth At Bedtime   Multiple Vitamins-Minerals (MULTIVITAMIN ADULT PO) 4/11/2022 at Unknown time Self Yes Yes   Sig: Take 1 tablet by mouth every evening    Sharps Container MISC  Self No No   Sig: Use as directed to dispose of needles, lancets and other sharps Per Insurance coverage   albuterol (PROAIR HFA/PROVENTIL HFA/VENTOLIN HFA) 108 (90 Base) MCG/ACT inhaler 4/12/2022 at  AM Self No Yes   Sig: INHALE 2 PUFFS INTO THE LUNGS EVERY 6 HOURS AS NEEDED FOR SHORTNESS OF BREATH OR DIFFICULT BREATHING OR WHEEZING   amLODIPine (NORVASC) 5 MG tablet 4/11/2022 at Unknown time Self No Yes   Sig: Take 1 tablet (5 mg) by mouth daily   atovaquone (MEPRON) 750 MG/5ML suspension 4/12/2022 at AM Self No Yes   Sig: Take 5 mLs (750 mg) by mouth daily   blood glucose (NO BRAND SPECIFIED) lancets standard  Self No No   Sig: To use to test glucose level in the blood Use to test blood 1sugar  3  times daily as directed. To accompany glucose monitor brands per insurance coverage.   blood glucose (NO BRAND SPECIFIED) test strip  Self No No   Sig: To use to test glucose level in the blood Use to test blood sugar  3 times daily as directed. To accompany glucose monitor brands per insurance coverage.   blood glucose calibration (NO BRAND SPECIFIED) solution  Self No No   Sig: Used to calibrate the blood glucose monitor as needed and as directed.  To accompany  blood glucose brands per insurance coverage   blood glucose monitoring (NO BRAND SPECIFIED) meter device kit  Self No No   Sig: Use as directed Per insurance coverage   calcium-vitamin D (CALTRATE) 600-400 MG-UNIT per tablet 4/11/2022 at Unknown time Self Yes Yes   Sig: Take 1 tablet by mouth 2 times daily 1200mg   1000 mg of vitamin d   co-enzyme Q-10 100 MG CAPS capsule 4/11/2022 at Unknown time Self Yes Yes   Sig: Take 100 mg by mouth daily   famotidine (PEPCID) 20 MG tablet PRN Self Yes Yes   Sig: Take 20 mg by mouth 2 times daily as needed   fluticasone-salmeterol (ADVAIR) 500-50 MCG/DOSE inhaler 4/12/2022 at AM Self Yes Yes   Sig: Inhale 1 puff into the lungs 2 times daily ### DO NOT FILL NOW.  Please update patient's profile to reflect additional refills.  ####   furosemide (LASIX) 20 MG tablet 4/12/2022 at AM Self No Yes   Sig: Take 1 tablet (20 mg) by mouth in the morning.   glipiZIDE (GLUCOTROL XL) 5 MG 24 hr tablet 4/11/2022 at Unknown time Self  No Yes   Sig: Take 1 tablet (5 mg) by mouth daily (with breakfast)   glucosamine-chondroitin 500-400 MG CAPS per capsule 4/11/2022 at Unknown time Self Yes Yes   Sig: Take 1 capsule by mouth 2 times daily    glucose 40 % (400 mg/mL) gel PRN Self No Yes   Sig: 15 g every 15 minutes by mouth as needed for low blood sugar.  Oral gel is preferable for conscious and able to swallow patient.   latanoprost (XALATAN) 0.005 % ophthalmic solution 4/11/2022 at Unknown time Self Yes Yes   Sig: Place 1 drop Into the left eye every evening   lisinopril (ZESTRIL) 40 MG tablet 4/11/2022 at Unknown time Self No Yes   Sig: Take 1 tablet (40 mg) by mouth daily   metFORMIN (GLUCOPHAGE) 850 MG tablet 4/11/2022 at Unknown time Self No Yes   Sig: Take 1 tablet (850 mg) by mouth 2 times daily (with meals)   oxyCODONE (ROXICODONE) 5 MG tablet 4/11/2022 at Unknown time Self No Yes   Sig: Take 1 tablet (5 mg) by mouth every 4 hours as needed for severe pain May take 5 tabs per day   polyethylene glycol (MIRALAX) 17 g packet 4/11/2022 at Unknown time Self Yes Yes   Sig: Take 0.5 packets by mouth every evening    rosuvastatin (CRESTOR) 5 MG tablet 4/11/2022 at Unknown time Self Yes Yes   Sig: Take 5 mg by mouth At Bedtime   tiotropium (SPIRIVA HANDIHALER) 18 MCG inhaled capsule 4/12/2022 at Unknown time Self Yes Yes   Sig: INHALE THE CONTENTS OF 1 CAPSULE VIA INHALATION DEVICE DAILY      Facility-Administered Medications: None     Allergies   Allergies   Allergen Reactions     Sulfamethoxazole Anaphylaxis and Hives       Immunization History   Immunization History   Administered Date(s) Administered     COVID-19,PF,Pfizer (12+ Yrs) 02/24/2021, 03/17/2021, 10/14/2021     HEPA 08/18/1999, 11/13/2001     Influenza (H1N1) 01/21/2010     Influenza (High Dose) 3 valent vaccine 10/01/2010, 10/20/2011, 09/27/2012, 10/04/2013, 09/17/2014, 10/02/2015, 10/17/2016, 09/01/2017, 09/10/2018, 09/30/2019     Influenza (IIV3) PF 11/01/2001, 12/03/2002,  11/04/2003, 10/25/2005, 10/21/2008, 11/23/2009     Influenza, Quad, High Dose, Pf, 65yr+ (Fluzone HD) 11/03/2020, 10/14/2021     Pneumo Conj 13-V (2010&after) 07/30/2015     Pneumococcal 23 valent 12/18/1996, 11/23/2007     TD (ADULT, 7+) 08/18/1999, 03/05/2009     TDAP Vaccine (Adacel) 04/02/2013     Zoster vaccine recombinant adjuvanted (SHINGRIX) 06/15/2018, 08/25/2018     Zoster vaccine, live 06/01/2011       Social History   I have reviewed this patient's social history and updated it with pertinent information if needed. Celeste Chacko  reports that she quit smoking about 21 years ago. Her smoking use included cigarettes. She has a 27.00 pack-year smoking history. She has never used smokeless tobacco. She reports current alcohol use of about 0.8 standard drinks of alcohol per week. She reports that she does not use drugs.    Family History   I have reviewed this patient's family history and updated it with pertinent information if needed.   Family History   Problem Relation Age of Onset     Arthritis Father      Diabetes Brother      Cancer Brother         breast     Cerebrovascular Disease Brother        Review of Systems   The 10 point Review of Systems is negative other than noted in the HPI or here.     Physical Exam   Temp: 97.9  F (36.6  C) Temp src: Oral BP: 135/58 Pulse: 85   Resp: 18 SpO2: 94 % O2 Device: None (Room air)    Vital Signs with Ranges  Temp:  [97.8  F (36.6  C)-98.8  F (37.1  C)] 97.9  F (36.6  C)  Pulse:  [84-94] 85  Resp:  [16-18] 18  BP: (135-159)/(58-72) 135/58  SpO2:  [92 %-94 %] 94 %  125 lbs 12.8 oz  Body mass index is 21.93 kg/m .    GENERAL APPEARANCE:  awake  EYES: Eyes grossly normal to inspection  NECK: no adenopathy  RESP: lungs clear   CV: regular rates and rhythm  LYMPHATICS: normal ant/post cervical and supraclavicular nodes  ABDOMEN: soft, nontender  MS: extremities normal  SKIN: no suspicious lesions or rashes  Psych: affect normal       Data   Lab Results   Component  Value Date    WBC 6.6 04/12/2022    HGB 12.3 04/12/2022    HCT 39.7 04/12/2022     04/12/2022     04/13/2022    POTASSIUM 3.2 (L) 04/13/2022    CHLORIDE 105 04/13/2022    CO2 26 04/13/2022    BUN 14 04/13/2022    CR 0.56 04/13/2022     (H) 04/13/2022    DD 0.60 (H) 02/21/2022    NTBNPI 285 04/12/2022    AST 15 02/21/2022    ALT 32 02/21/2022    ALKPHOS 58 02/21/2022    BILITOTAL 0.5 02/21/2022    INR 1.17 (H) 01/25/2022     No results for input(s): CULT in the last 168 hours.  Recent Labs   Lab Test 12/04/15  1112   CULT >100,000 colonies/mL Escherichia coli*

## 2022-04-13 NOTE — CONSULTS
Care Management Initial Consult    General Information  Assessment completed with: Patient,  (Patient)  Type of CM/SW Visit: Initial Assessment    Primary Care Provider verified and updated as needed: Yes   Readmission within the last 30 days:        Reason for Consult: discharge planning  Advance Care Planning: Advance Care Planning Reviewed: present on chart          Communication Assessment  Patient's communication style: spoken language (English or Bilingual)    Hearing Difficulty or Deaf: yes   Wear Glasses or Blind: no    Cognitive  Cognitive/Neuro/Behavioral: .WDL except, mood/behavior  Level of Consciousness: alert  Arousal Level: opens eyes spontaneously  Orientation: oriented x 4  Mood/Behavior: anxious  Best Language: 0 - No aphasia  Speech: clear    Living Environment:   People in home: alone     Current living Arrangements: apartment      Able to return to prior arrangements: yes       Family/Social Support:  Care provided by: self, child(ajay)  Provides care for: no one  Marital Status: Single  Children          Description of Support System: Supportive, Involved    Support Assessment: Adequate family and caregiver support, Adequate social supports    Current Resources:   Patient receiving home care services: No     Community Resources: None  Equipment currently used at home: shower chair, grab bar, toilet, grab bar, tub/shower, raised toilet seat  Supplies currently used at home:      Employment/Financial:  Employment Status: retired        Financial Concerns: No concerns identified           Lifestyle & Psychosocial Needs:  Social Determinants of Health     Tobacco Use: Medium Risk     Smoking Tobacco Use: Former Smoker     Smokeless Tobacco Use: Never Used   Alcohol Use: Not on file   Financial Resource Strain: Not on file   Food Insecurity: Not on file   Transportation Needs: Not on file   Physical Activity: Not on file   Stress: Not on file   Social Connections: Not on file   Intimate Partner  Violence: Not on file   Depression: Not at risk     PHQ-2 Score: 1   Housing Stability: Not on file       Functional Status:  Prior to admission patient needed assistance:              Mental Health Status:          Chemical Dependency Status:                Values/Beliefs:  Spiritual, Cultural Beliefs, Episcopal Practices, Values that affect care: no               Additional Information:  Per care management/social work consult for discharge planning and TCU placement on discharge.  Patient was admitted on 4-12-22 with hypoxia.  The tentative date of discharge is yet to be determined.  Reviewed chart and spoke with patient regarding discharge plans.  Per patient report, she lives alone in a condo with no steps and elevator access.  Patient uses a rolling walker at baseline.  Patient has a shower chair, raised toilet seat, and grab bars in the bathroom.  Patient's daughter assists with the grocery shopping.  Patient has a .  Reviewed the therapy discharge recommendations of TCU placement on discharge and patient is in agreement.  Patient states when she was here a few weeks ago her insurance denied the request to go to TCU, which she feels strongly about that she should not have went home.  Explained that with [facility] you need pre-auth therefore the same process needs to happen again.  Patient is not happy about this.  Patient is asking for referrals to be sent to [facility], [facility], and [facility].  Explained there are only certain facilities that are in network for [facility] so I can send the referral to [facility] and possibly [facility] as the [facility] may have the same or similar in network or out of network benefits.  Also discussed a referral to [facility].   Referrals sent, via discharge on the double, to check bed availability.    Will continue to follow.      JAYLYN Martin, [facility]    [phone]  [facility]

## 2022-04-13 NOTE — PROGRESS NOTES
"SPIRITUAL HEALTH SERVICES Progress Note  [facility]    Saw pt Celeste Chacko per admission request.      Illness Narrative - Shortness of breath       Distress - Celeste named feeling anxious about her health prognosis yesterday and having difficulty sleeping overnight, but is feeling better today after speaking with the doctor. She shared that she worried for her daughter \"taking on too much\" when it comes to caring for Celeste's needs. Celeste shared that \"she writes things down\" to assist her daughter regarding Celeste's finances. Celeste has been \"independent\" most of her life and is afraid of being a burden or overextending her daughter.       Coping - She named her daughter and a small group of friends in the building where she lives, but shared that \"at 89 most of my friends have  and siblings don't travel.\" She leans on her personal strength, independence and historyof resilience.      Meaning-Making - not named    Visit ended when Celeste needed to contact the nurse for personal cares. She is aware of how to request an [facility] visit in the future if desired.      Plan - [facility] remains available for support    Radha Wateramn MDiv  Associate   [phone] (pager)          "

## 2022-04-14 ENCOUNTER — APPOINTMENT (OUTPATIENT)
Dept: PHYSICAL THERAPY | Facility: CLINIC | Age: 87
End: 2022-04-14
Payer: COMMERCIAL

## 2022-04-14 ENCOUNTER — APPOINTMENT (OUTPATIENT)
Dept: OCCUPATIONAL THERAPY | Facility: CLINIC | Age: 87
End: 2022-04-14
Payer: COMMERCIAL

## 2022-04-14 LAB
GLUCOSE BLDC GLUCOMTR-MCNC: 124 MG/DL (ref 70–99)
GLUCOSE BLDC GLUCOMTR-MCNC: 126 MG/DL (ref 70–99)
GLUCOSE BLDC GLUCOMTR-MCNC: 133 MG/DL (ref 70–99)
GLUCOSE BLDC GLUCOMTR-MCNC: 167 MG/DL (ref 70–99)
GLUCOSE BLDC GLUCOMTR-MCNC: 188 MG/DL (ref 70–99)
GLUCOSE BLDC GLUCOMTR-MCNC: 218 MG/DL (ref 70–99)
MAGNESIUM SERPL-MCNC: 2.2 MG/DL (ref 1.6–2.3)
POTASSIUM BLD-SCNC: 3.4 MMOL/L (ref 3.4–5.3)
POTASSIUM BLD-SCNC: 3.6 MMOL/L (ref 3.4–5.3)

## 2022-04-14 PROCEDURE — 250N000011 HC RX IP 250 OP 636: Performed by: INTERNAL MEDICINE

## 2022-04-14 PROCEDURE — 97530 THERAPEUTIC ACTIVITIES: CPT | Mod: GP

## 2022-04-14 PROCEDURE — 99225 PR SUBSEQUENT OBSERVATION CARE,LEVEL II: CPT | Performed by: INTERNAL MEDICINE

## 2022-04-14 PROCEDURE — 99207 PR CDG-CODE CATEGORY CHANGED: CPT | Performed by: INTERNAL MEDICINE

## 2022-04-14 PROCEDURE — 97535 SELF CARE MNGMENT TRAINING: CPT | Mod: GO | Performed by: OCCUPATIONAL THERAPIST

## 2022-04-14 PROCEDURE — 36415 COLL VENOUS BLD VENIPUNCTURE: CPT | Performed by: INTERNAL MEDICINE

## 2022-04-14 PROCEDURE — 82962 GLUCOSE BLOOD TEST: CPT | Mod: 91

## 2022-04-14 PROCEDURE — 84132 ASSAY OF SERUM POTASSIUM: CPT | Performed by: INTERNAL MEDICINE

## 2022-04-14 PROCEDURE — 84132 ASSAY OF SERUM POTASSIUM: CPT | Mod: 91 | Performed by: INTERNAL MEDICINE

## 2022-04-14 PROCEDURE — 250N000013 HC RX MED GY IP 250 OP 250 PS 637: Performed by: INTERNAL MEDICINE

## 2022-04-14 PROCEDURE — G0378 HOSPITAL OBSERVATION PER HR: HCPCS

## 2022-04-14 PROCEDURE — 97116 GAIT TRAINING THERAPY: CPT | Mod: GP

## 2022-04-14 PROCEDURE — 83735 ASSAY OF MAGNESIUM: CPT | Performed by: INTERNAL MEDICINE

## 2022-04-14 PROCEDURE — 96372 THER/PROPH/DIAG INJ SC/IM: CPT | Performed by: INTERNAL MEDICINE

## 2022-04-14 RX ORDER — SERTRALINE HYDROCHLORIDE 25 MG/1
25 TABLET, FILM COATED ORAL DAILY
Status: DISCONTINUED | OUTPATIENT
Start: 2022-04-14 | End: 2022-04-15 | Stop reason: HOSPADM

## 2022-04-14 RX ORDER — POTASSIUM CHLORIDE 1500 MG/1
20 TABLET, EXTENDED RELEASE ORAL ONCE
Status: COMPLETED | OUTPATIENT
Start: 2022-04-14 | End: 2022-04-14

## 2022-04-14 RX ADMIN — ROSUVASTATIN CALCIUM 5 MG: 5 TABLET, FILM COATED ORAL at 20:45

## 2022-04-14 RX ADMIN — ATOVAQUONE 750 MG: 750 SUSPENSION ORAL at 10:11

## 2022-04-14 RX ADMIN — FLUTICASONE FUROATE AND VILANTEROL TRIFENATATE 1 PUFF: 200; 25 POWDER RESPIRATORY (INHALATION) at 20:46

## 2022-04-14 RX ADMIN — INSULIN ASPART 1 UNITS: 100 INJECTION, SOLUTION INTRAVENOUS; SUBCUTANEOUS at 18:01

## 2022-04-14 RX ADMIN — OXYCODONE HYDROCHLORIDE 5 MG: 5 TABLET ORAL at 11:52

## 2022-04-14 RX ADMIN — POTASSIUM CHLORIDE 20 MEQ: 1500 TABLET, EXTENDED RELEASE ORAL at 03:50

## 2022-04-14 RX ADMIN — ASPIRIN 81 MG: 81 TABLET, COATED ORAL at 10:13

## 2022-04-14 RX ADMIN — LATANOPROST 1 DROP: 50 SOLUTION/ DROPS OPHTHALMIC at 20:46

## 2022-04-14 RX ADMIN — OXYCODONE HYDROCHLORIDE 5 MG: 5 TABLET ORAL at 04:33

## 2022-04-14 RX ADMIN — ACETAMINOPHEN 650 MG: 325 TABLET, FILM COATED ORAL at 16:38

## 2022-04-14 RX ADMIN — UMECLIDINIUM 1 PUFF: 62.5 AEROSOL, POWDER ORAL at 10:14

## 2022-04-14 RX ADMIN — ACETAMINOPHEN 650 MG: 325 TABLET, FILM COATED ORAL at 23:27

## 2022-04-14 RX ADMIN — POLYETHYLENE GLYCOL 3350 8.5 G: 17 POWDER, FOR SOLUTION ORAL at 20:45

## 2022-04-14 RX ADMIN — OXYCODONE HYDROCHLORIDE 5 MG: 5 TABLET ORAL at 23:27

## 2022-04-14 RX ADMIN — OXYCODONE HYDROCHLORIDE 5 MG: 5 TABLET ORAL at 17:23

## 2022-04-14 RX ADMIN — LISINOPRIL 40 MG: 40 TABLET ORAL at 10:12

## 2022-04-14 RX ADMIN — FUROSEMIDE 20 MG: 20 TABLET ORAL at 10:13

## 2022-04-14 RX ADMIN — ENOXAPARIN SODIUM 40 MG: 40 INJECTION SUBCUTANEOUS at 18:03

## 2022-04-14 RX ADMIN — Medication 1 MG: at 22:36

## 2022-04-14 RX ADMIN — AMLODIPINE BESYLATE 5 MG: 5 TABLET ORAL at 10:12

## 2022-04-14 RX ADMIN — SERTRALINE HYDROCHLORIDE 25 MG: 25 TABLET ORAL at 20:47

## 2022-04-14 NOTE — PLAN OF CARE
Summary:  [facility] transfer, COPD exacerbation   DATE & TIME: 3110-8676   Cognitive Concerns/ Orientation : Alert and oriented x4   BEHAVIOR & AGGRESSION TOOL COLOR: Green  CIWA SCORE: N/A   ABNL VS/O2: VSS on RA at rest. Needs 2L NC w/ ambulation  MOBILITY: [facility] GB/W  PAIN MANAGMENT: Denies pain  DIET: Mod carb  BOWEL/BLADDER: Continent of B&B  ABNL LAB/BG: K 3.2, replaced 3.3, replacement ordered.  and 253.  DRAIN/DEVICES: PIV saline locked  TELEMETRY RHYTHM: N/A  SKIN: scattered bruising. Pale.  TESTS/PROCEDURES: none this shift  D/C DAY/GOALS/PLACE: accepted at [facility], needing insurance auth. Could  possibly go home with home cares. Maybe leave 4/14.  OTHER IMPORTANT INFO: Pt refusing medications she finds unnecessary d/t observation status and cost of care.

## 2022-04-14 NOTE — PLAN OF CARE
Summary:  [facility] transfer, COPD exacerbation   DATE & TIME: 2300-0730  Cognitive Concerns/ Orientation : Alert and oriented x4   BEHAVIOR & AGGRESSION TOOL COLOR: Green  CIWA SCORE: N/A   ABNL VS/O2: VSS on RA at rest. Needs 2L NC w/ ambulation  MOBILITY: [facility] GB/W  PAIN MANAGMENT: Back Pain, 1x oxycodone given this shift  DIET: Mod carb  BOWEL/BLADDER: Continent of B&B  ABNL LAB/BG: K 3.3, 3.4, replaced, recheck 0800.    DRAIN/DEVICES: PIV saline locked  TELEMETRY RHYTHM: N/A  SKIN: pale, scattered bruising, valeriy lower extremities, edema 2-3+ feet and ankles  TESTS/PROCEDURES: none this shift  D/C DAY/GOALS/PLACE: Discharge 1-2 days.  Accepted at [facility], awaiting insurance approval.  Could possibly go home with home cares.   OTHER IMPORTANT INFO: Pt refusing medications she finds unnecessary d/t observation status and cost of care.

## 2022-04-14 NOTE — PROGRESS NOTES
Care Management Follow Up    Length of Stay (days): 1    Expected Discharge Date: 04/14/2022     Concerns to be Addressed:       Patient plan of care discussed at interdisciplinary rounds: Yes    Anticipated Discharge Disposition:       Anticipated Discharge Services:    Anticipated Discharge DME:      Patient/family educated on Medicare website which has current facility and service quality ratings:    Education Provided on the Discharge Plan:    Patient/Family in Agreement with the Plan:      Referrals Placed by CM/SW:    Private pay costs discussed:     Additional Information:  Spoke with admissions at [facility].  They submitted pre-authorization to [facility] late yesterday afternoon.  [facility] staff will update writer as soon as they hear back from [facility].      KYRIE Barrientos

## 2022-04-14 NOTE — PROVIDER NOTIFICATION
MD Notification    Notified Person: MD    Notified Person Name: Mony    Notification Date/Time: 4/14/2022, 1635     Notification Interaction: web-based paging     Purpose of Notification: Hi, pt requesting medication for anxiety if possible. Patient says she has a lot on her mind and needs something to help her relax. Thank you!    Orders Received:   -zoloft ordered    Comments:

## 2022-04-14 NOTE — PLAN OF CARE
"Summary: SOB. COPD exacerbation   DATE & TIME: 04/14/22 7-3 pm  Cognitive Concerns/ Orientation : A & O x4  BEHAVIOR & AGGRESSION TOOL COLOR: Green  CIWA SCORE: N/A   ABNL VS/O2: VSS on RA at rest. Uses 2L NC w/ ambulation/activity.   MOBILITY: SBA GB/W. Steady.   PAIN MANAGMENT: C/O of chronic back pain, requested PRN oxycodone x1.   DIET: Mod carb  BOWEL/BLADDER: Continent of B&B. No issues.  ABNL LAB/BG: /133  DRAIN/DEVICES: PIV SL  TELEMETRY RHYTHM: N/A  SKIN: Flushed/valeriy in color, scattered bruising, valeriy lower extremities, edema +1-2 feet and ankles.   TESTS/PROCEDURES: None scheduled.   D/C DAY/GOALS/PLACE: Per MD note \"Therapy recommending TCU, awaiting insurance authorization\".   OTHER IMPORTANT INFO: Frustration voiced about room temperature and not sleeping well. Breathing stable at rest, ambulated pt in [address] x1. Did not tolerate that well, oxygen maintained above 88-90%. But pt stated she was having difficulty, rested and still did not feel better. Oxygen applied. Pt refusing a few medications she finds unnecessary.     "

## 2022-04-14 NOTE — PROGRESS NOTES
[facility]    Hospitalist Progress Note    Brief Summary:  This is a 89-year-old female with history of severe COPD, hyperlipidemia, spinal stenosis, diabetes mellitus type 2, hypertension, recently admitted in the hospital from 02/21/2022 at 3:15 because of PJP infection and CELINE infection.  Was treated with atovaquone for 21 days and no prophylaxis.  Now comes to the ER with complaint of shortness of breath and found to be hypoxic on exertion.    Assessment & Plan        1.  Acute versus chronic hypoxic respiratory failure with severe chronic obstructive pulmonary disease:  The patient has severe COPD.  She is not in  any acute exacerbation at this time.  Her lung sounds are clear.  Chest x-ray normal.  No wheezing, no crackles.  BNP is normal.  White cell counts are normal.  I think she most likely has chronic hypoxia on exertion.  It is causing the problem with shortness of breath as well.  She is 95-96% on room air at rest, but as soon as she starts moving, she drops down to 89, 88 and sometimes below that as well.  We will have her do incentive spirometry, flutter.  We will keep her on her inhalers.  She is on fluticasone/salmeterol, Incruse Ellipta and albuterol and Spiriva, need to use either Spiriva or Incuse Ellipta,  I will continue with those. Appreciate input from the Pulmonary.  Order 6-minute walk test.  If she qualifies for home oxygen, she may benefit from oxygen at home with exertion and possibly with sleeping as well. Unable to walk much without oxygen.       2.  Recent history of a lung abscess secondary to PJP/cavitary infection:  She was treated with three weeks of atovaquone given ALLERGIC TO SULFA DRUGS.  She is on prophylaxis for now, needs indefinite Prophylaxis as per I.    3.  Hypertension:  Blood pressure slightly on the higher side.  She is on lisinopril 40, amlodipine 5.  I will continue with that for now, likely can increase the amlodipine to 10 mg daily if BP  remain on higher side.     4.  Diabetes mellitus type 2:  She is on glipizide and metformin.  I will hold it.  Keep her on sliding scale insulin for correction and hypoglycemia protocol.    5.  Hyperlipidemia, on Crestor.  We will continue with that.    Therapy recommending TCU, awaiting insurance authorization.      DVT Prophylaxis: Enoxaparin (Lovenox) SQ  Code Status: No CPR- Pre-arrest intubation OK    PT is recommending TCU at this time, SW/CC are consulted and awaiting placement.   Awaiting 6 min walk test or walking desaturation study as well     Disposition: Expected discharge awaiting placement.     Discussed with patient and the nursing staff taking care of the patient today     Adam Sykes MD  Text Page  (7am - 6pm)    Interval History   Patient unable to walk much before getting SOB, saturation around 88%, need to walk more without oxygen to see if she drops, but for now use oxygen with exertion, denies any fever, chills, nausea, vomiting, headache or dizziness.     No other significant event overnight.     -Data reviewed today: I reviewed all new labs and imaging results over the last 24 hours. I personally reviewed no images or EKG's today.    Physical Exam   Temp: 97.8  F (36.6  C) Temp src: Oral BP: (!) 159/71 Pulse: 75   Resp: 18 SpO2: 91 % O2 Device: None (Room air)    Vitals:    04/12/22 1654 04/13/22 0126 04/14/22 0908   Weight: 57.7 kg (127 lb 3.2 oz) 57.1 kg (125 lb 12.8 oz) 55.9 kg (123 lb 3.2 oz)     Vital Signs with Ranges  Temp:  [97.8  F (36.6  C)-98.4  F (36.9  C)] 97.8  F (36.6  C)  Pulse:  [75-92] 75  Resp:  [18-20] 18  BP: (128-163)/(54-74) 159/71  SpO2:  [91 %-95 %] 91 %  I/O last 3 completed shifts:  In: 840 [P.O.:840]  Out: 1050 [Urine:1050]    Constitutional: awake, alert, cooperative, no apparent distress, and appears stated age  Eyes: Lids and lashes normal, pupils equal, round and reactive to light, extra ocular muscles intact, sclera clear, conjunctiva normal  Respiratory: No  increased work of breathing, good air exchange, clear to auscultation bilaterally, no crackles or wheezing  Cardiovascular: Normal apical impulse, regular rate and rhythm, normal S1 and S2, no S3 or S4, and no murmur noted  GI: No scars, normal bowel sounds, soft, non-distended, non-tender, no masses palpated, no hepatosplenomegally  Skin: no bruising or bleeding  Musculoskeletal: no lower extremity pitting edema present  Neurologic: no focal deficit.     Medications       amLODIPine  5 mg Oral Daily     aspirin  81 mg Oral Daily     atovaquone  750 mg Oral Daily     calcium carbonate 600 mg-vitamin D 400 units  1 tablet Oral BID     enoxaparin ANTICOAGULANT  40 mg Subcutaneous Q24H     famotidine  20 mg Oral BID     fluticasone-vilanterol  1 puff Inhalation Daily     furosemide  20 mg Oral Daily     insulin aspart  1-7 Units Subcutaneous TID AC     insulin aspart  1-5 Units Subcutaneous At Bedtime     latanoprost  1 drop Left Eye QPM     lisinopril  40 mg Oral Daily     polyethylene glycol  8.5 g Oral QPM     rosuvastatin  5 mg Oral At Bedtime     sodium chloride (PF)  3 mL Intracatheter Q8H     umeclidinium  1 puff Inhalation Daily       Data   Recent Labs   Lab 04/14/22  0907 04/14/22  0615 04/14/22  0304 04/14/22  0248 04/13/22  2146 04/13/22  2106 04/13/22  0804 04/13/22  0625 04/12/22  1735 04/12/22  1653 04/12/22  1024   WBC  --   --   --   --   --   --   --   --   --  6.6 6.0   HGB  --   --   --   --   --   --   --   --   --  12.3 12.6   MCV  --   --   --   --   --   --   --   --   --  84 84   PLT  --   --   --   --   --   --   --   --   --  315 298   NA  --   --   --   --   --   --   --  139  --   --  139   POTASSIUM  --  3.6  --  3.4 3.3*  --   --  3.2*  --   --  3.7   CHLORIDE  --   --   --   --   --   --   --  105  --   --  105   CO2  --   --   --   --   --   --   --  26  --   --  28   BUN  --   --   --   --   --   --   --  14  --   --  15   CR  --   --   --   --   --   --   --  0.56  --  0.59 0.54    ANIONGAP  --   --   --   --   --   --   --  8  --   --  6   NARCISA  --   --   --   --   --   --   --  9.3  --   --  9.3   *  --  126*  --   --  253*   < > 127*   < >  --  175*    < > = values in this interval not displayed.       No results found for this or any previous visit (from the past 24 hour(s)).

## 2022-04-14 NOTE — PLAN OF CARE
"Goal Outcome Evaluation:    Plan of Care Reviewed With: patient     Summary: SOB. COPD exacerbation   DATE & TIME: 04/14/22, 2490-8018  Cognitive Concerns/ Orientation : A & O x 4, anxious at times  BEHAVIOR & AGGRESSION TOOL COLOR: Green  CIWA SCORE: N/A   ABNL VS/O2: VSS on RA at rest. Uses 2L NC w/ ambulation/activity. BAKER, SOB at times  MOBILITY: SBA GB/W. Steady.   PAIN MANAGMENT: C/O of chronic back pain, PRN tylenol given, requested oxycodone. Cold packs provided  DIET: Mod carb, tolerating  BOWEL/BLADDER: Continent of B&B. No issues.  ABNL LAB/BG: BG- 188  DRAIN/DEVICES: PIV/SL  TELEMETRY RHYTHM: N/A  SKIN: Flushed/valeriy in color, scattered bruising, valeriy lower extremities, edema +1-2 feet and ankles. Intact   TESTS/PROCEDURES: None scheduled.   D/C DAY/GOALS/PLACE: Per MD note \"Therapy recommending TCU, awaiting insurance authorization\".   OTHER IMPORTANT INFO: pt. Reported increased anxiety this afternoon. Scheduled daily zoloft added this evening, pt, ended up refusing medication because anxiety decreased. Pulmonology and ID following.   "

## 2022-04-14 NOTE — PROGRESS NOTES
[facility]    Infectious Disease Progress Note    Date of Service (when I saw the patient): 04/14/2022     Assessment & Plan   Celeste Chacko is a 89 year old female who was admitted on 4/12/2022.   Impression:  1. 89 y.o with COPD  2. Diabetes   3. HTN, HLd  4. Admitted with shortness of breath.   5. Recent admission with similar complaints, was diagnosed with PJP pneumonia, started to on treatment with mepron and maintained on mepron for secondary prophylaxis indefinitely. I just had a virtual visit with her 2 weeks ago when she was doing well, able to live independently.   6. Also CELINE colonized.  7. She is not short of breath at rest, no fevers or chills      Recommendations:   Mepron 750 mg  daily for long term for secondary prophylaxis for PJP ( there is no end date planned to this)   I plan to see her in clinic in [facility] [place] 3- 4 months n            Gunner Sainz MD    Interval History     No new rashes or issues with antibiotics   Labs reviewed   Afebrile   On room air     Physical Exam   Temp: 97.8  F (36.6  C) Temp src: Oral BP: (!) 159/71 Pulse: 75   Resp: 18 SpO2: 91 % O2 Device: None (Room air)    Vitals:    04/12/22 1654 04/13/22 0126 04/14/22 0908   Weight: 57.7 kg (127 lb 3.2 oz) 57.1 kg (125 lb 12.8 oz) 55.9 kg (123 lb 3.2 oz)     Vital Signs with Ranges  Temp:  [97.8  F (36.6  C)-98.4  F (36.9  C)] 97.8  F (36.6  C)  Pulse:  [75-92] 75  Resp:  [18-20] 18  BP: (128-163)/(54-74) 159/71  SpO2:  [91 %-95 %] 91 %    Constitutional: Awake, alert, cooperative, no apparent distress  Lungs: Clear to auscultation bilaterally, no crackles or wheezing  Cardiovascular: Regular rate and rhythm, normal S1 and S2, and no murmur noted  Abdomen: Normal bowel sounds, soft, non-distended, non-tender  Skin: No rashes, no cyanosis, no edema  Other:    Medications       amLODIPine  5 mg Oral Daily     aspirin  81 mg Oral Daily     atovaquone  750 mg Oral Daily     calcium carbonate 600 mg-vitamin D  400 units  1 tablet Oral BID     enoxaparin ANTICOAGULANT  40 mg Subcutaneous Q24H     famotidine  20 mg Oral BID     fluticasone-vilanterol  1 puff Inhalation Daily     furosemide  20 mg Oral Daily     insulin aspart  1-7 Units Subcutaneous TID AC     insulin aspart  1-5 Units Subcutaneous At Bedtime     latanoprost  1 drop Left Eye QPM     lisinopril  40 mg Oral Daily     polyethylene glycol  8.5 g Oral QPM     rosuvastatin  5 mg Oral At Bedtime     sodium chloride (PF)  3 mL Intracatheter Q8H     umeclidinium  1 puff Inhalation Daily       Data   All microbiology laboratory data reviewed.  Recent Labs   Lab Test 04/12/22  1653 04/12/22  1024 03/04/22  0850   WBC 6.6 6.0 5.5   HGB 12.3 12.6 11.4*   HCT 39.7 41.1 36.4   MCV 84 84 81    298 244     Recent Labs   Lab Test 04/13/22  0625 04/12/22  1653 04/12/22  1024   CR 0.56 0.59 0.54     No lab results found.  Recent Labs   Lab Test 12/04/15  1112   CULT >100,000 colonies/mL Escherichia coli*

## 2022-04-15 ENCOUNTER — TELEPHONE (OUTPATIENT)
Dept: INTERNAL MEDICINE | Facility: CLINIC | Age: 87
End: 2022-04-15
Payer: COMMERCIAL

## 2022-04-15 ENCOUNTER — APPOINTMENT (OUTPATIENT)
Dept: PHYSICAL THERAPY | Facility: CLINIC | Age: 87
End: 2022-04-15
Payer: COMMERCIAL

## 2022-04-15 ENCOUNTER — PATIENT OUTREACH (OUTPATIENT)
Dept: CARE COORDINATION | Facility: CLINIC | Age: 87
End: 2022-04-15
Payer: COMMERCIAL

## 2022-04-15 ENCOUNTER — LAB REQUISITION (OUTPATIENT)
Dept: LAB | Facility: CLINIC | Age: 87
End: 2022-04-15

## 2022-04-15 VITALS
TEMPERATURE: 97.9 F | BODY MASS INDEX: 21.73 KG/M2 | HEART RATE: 81 BPM | SYSTOLIC BLOOD PRESSURE: 133 MMHG | WEIGHT: 124.6 LBS | OXYGEN SATURATION: 93 % | RESPIRATION RATE: 18 BRPM | DIASTOLIC BLOOD PRESSURE: 64 MMHG

## 2022-04-15 DIAGNOSIS — Z00.01 ENCOUNTER FOR GENERAL ADULT MEDICAL EXAMINATION WITH ABNORMAL FINDINGS: ICD-10-CM

## 2022-04-15 DIAGNOSIS — M48.062 SPINAL STENOSIS OF LUMBAR REGION WITH NEUROGENIC CLAUDICATION: ICD-10-CM

## 2022-04-15 LAB
CREAT SERPL-MCNC: 0.54 MG/DL (ref 0.52–1.04)
GFR SERPL CREATININE-BSD FRML MDRD: 87 ML/MIN/1.73M2
GLUCOSE BLDC GLUCOMTR-MCNC: 146 MG/DL (ref 70–99)
GLUCOSE BLDC GLUCOMTR-MCNC: 166 MG/DL (ref 70–99)
PLATELET # BLD AUTO: 318 10E3/UL (ref 150–450)

## 2022-04-15 PROCEDURE — 36415 COLL VENOUS BLD VENIPUNCTURE: CPT | Performed by: INTERNAL MEDICINE

## 2022-04-15 PROCEDURE — 85049 AUTOMATED PLATELET COUNT: CPT | Performed by: INTERNAL MEDICINE

## 2022-04-15 PROCEDURE — 97116 GAIT TRAINING THERAPY: CPT | Mod: GP

## 2022-04-15 PROCEDURE — U0005 INFEC AGEN DETEC AMPLI PROBE: HCPCS | Performed by: NURSE PRACTITIONER

## 2022-04-15 PROCEDURE — 96372 THER/PROPH/DIAG INJ SC/IM: CPT

## 2022-04-15 PROCEDURE — 82565 ASSAY OF CREATININE: CPT | Performed by: INTERNAL MEDICINE

## 2022-04-15 PROCEDURE — 99217 PR OBSERVATION CARE DISCHARGE: CPT | Performed by: INTERNAL MEDICINE

## 2022-04-15 PROCEDURE — 250N000013 HC RX MED GY IP 250 OP 250 PS 637: Performed by: INTERNAL MEDICINE

## 2022-04-15 PROCEDURE — 82962 GLUCOSE BLOOD TEST: CPT

## 2022-04-15 PROCEDURE — G0378 HOSPITAL OBSERVATION PER HR: HCPCS

## 2022-04-15 RX ORDER — SERTRALINE HYDROCHLORIDE 25 MG/1
25 TABLET, FILM COATED ORAL DAILY
DISCHARGE
Start: 2022-04-16 | End: 2022-04-22

## 2022-04-15 RX ORDER — OXYCODONE HYDROCHLORIDE 5 MG/1
5 TABLET ORAL EVERY 4 HOURS PRN
Qty: 10 TABLET | Refills: 0 | Status: SHIPPED | OUTPATIENT
Start: 2022-04-15 | End: 2022-04-15

## 2022-04-15 RX ORDER — INHALER, ASSIST DEVICES
1 SPACER (EA) MISCELLANEOUS ONCE
Status: DISCONTINUED | OUTPATIENT
Start: 2022-04-15 | End: 2022-04-15 | Stop reason: HOSPADM

## 2022-04-15 RX ORDER — INHALER, ASSIST DEVICES
1 SPACER (EA) MISCELLANEOUS ONCE
Qty: 1 EACH | Refills: 0 | Status: SHIPPED | OUTPATIENT
Start: 2022-04-15 | End: 2022-04-15

## 2022-04-15 RX ORDER — OXYCODONE HYDROCHLORIDE 5 MG/1
5 TABLET ORAL EVERY 4 HOURS PRN
Qty: 30 TABLET | Refills: 0 | Status: SHIPPED | OUTPATIENT
Start: 2022-04-15 | End: 2022-04-19

## 2022-04-15 RX ADMIN — ACETAMINOPHEN 650 MG: 325 TABLET, FILM COATED ORAL at 10:29

## 2022-04-15 RX ADMIN — OXYCODONE HYDROCHLORIDE 5 MG: 5 TABLET ORAL at 10:29

## 2022-04-15 RX ADMIN — INSULIN ASPART 1 UNITS: 100 INJECTION, SOLUTION INTRAVENOUS; SUBCUTANEOUS at 13:33

## 2022-04-15 RX ADMIN — ATOVAQUONE 750 MG: 750 SUSPENSION ORAL at 08:43

## 2022-04-15 RX ADMIN — INSULIN ASPART 1 UNITS: 100 INJECTION, SOLUTION INTRAVENOUS; SUBCUTANEOUS at 08:45

## 2022-04-15 RX ADMIN — FUROSEMIDE 20 MG: 20 TABLET ORAL at 08:32

## 2022-04-15 RX ADMIN — ASPIRIN 81 MG: 81 TABLET, COATED ORAL at 08:32

## 2022-04-15 RX ADMIN — SERTRALINE HYDROCHLORIDE 25 MG: 25 TABLET ORAL at 08:32

## 2022-04-15 RX ADMIN — LISINOPRIL 40 MG: 40 TABLET ORAL at 08:32

## 2022-04-15 RX ADMIN — FAMOTIDINE 20 MG: 20 TABLET ORAL at 08:32

## 2022-04-15 RX ADMIN — AMLODIPINE BESYLATE 5 MG: 5 TABLET ORAL at 08:32

## 2022-04-15 RX ADMIN — UMECLIDINIUM 1 PUFF: 62.5 AEROSOL, POWDER ORAL at 08:43

## 2022-04-15 NOTE — PROGRESS NOTES
Care Management Discharge Note    Discharge Date: 04/15/2022       Discharge Disposition:  [facility]    Discharge Services:      Discharge DME:      Discharge Transportation: [facility] staff    Private pay costs discussed: Not applicable    PAS Confirmation Code: [id number]  Patient/family educated on Medicare website which has current facility and service quality ratings:      Education Provided on the Discharge Plan:  yes  Persons Notified of Discharge Plans: patient  Patient/Family in Agreement with the Plan:      Handoff Referral Completed: Yes    Additional Information:  [facility] received [facility] Insurance authorization.   Discharge to [facility] into a semi-private room which is patient's preference.    Discharge orders and script sent via [employer] and fax to [facility] and discharge time coordinated.  [facility] transport time was 1600.  Prior to discharge patient was given the address and phone number for [facility] and she will communicate with her daughter regarding the discharge plan today.    Gave patient resources for transportation post TCU at her request.  Celeste explained she is trying to minimize the amount of help from her daughter and she has a  [employer] Term [employer] insurance policy.  She took the policy out years ago and has never use the policy.  She questions if she should terminate the policy.   Encouraged her to call and understand her benefits for home services.   Also explained the [facility] staff can give her a report at discharge which indicates if she does need assistance with IADL's or ADL's. She will need this if she applies for benefits.  Writer did speak with the [facility] MARCELO Peterson and asked her to follow up with Celeste.          ERICK BarrientosSW

## 2022-04-15 NOTE — PROGRESS NOTES
Clinic Care Coordination Contact  Care Team Conversations    Situation: RN Clinical Product Navigator following patient through TCU care progression.     Background: Patient with PMH of COPD, recently hospitalized for 3 weeks late 2/2022-3/15/2022 with PJP infection, COPD, and lung abscess. She was going to be discharged to TCU but ended up going home with Home Health Care.    Assessment: Patient was readmitted with worsening dyspnea. Thought to be multifactorial with underlying known pulmonology conditions. Will no be on prophylactic treatment for PJP infection; needs to see Infectious Disease in 3-4 months and should see pulmonology in 2 months as well.     Plan/Recommendations: RN Clinical Product Navigator will send TCU Care Team Care Management hand in communication and request involvement in discharge planning and coordination of care.       Sanam Welsh RN  Clinical Product Navigator

## 2022-04-15 NOTE — DISCHARGE SUMMARY
[facility]    Discharge Summary  Hospitalist    Date of Admission:  4/12/2022  Date of Discharge:  4/15/2022  Discharging Provider: Adam Sykes MD  Date of Service (when I saw the patient): 04/15/22    Discharge Diagnoses   Please refer below    History of Present Illness   Celeste Chacko is an 89 year old female who presented with dyspnea on exertion    Hospital Course   This is a 89-year-old female with history of severe COPD, hyperlipidemia, spinal stenosis, diabetes mellitus type 2, hypertension, recently admitted in the hospital from 02/21/2022 at 3:15 because of PJP infection and CELINE infection.  Was treated with atovaquone for 21 days and no prophylaxis.  Now comes to the ER with complaint of shortness of breath and found to be hypoxic on exertion.        Final discharge diagnosis and hospital course     1.  Chronic hypoxic respiratory failure with severe chronic obstructive pulmonary disease:  The patient has severe COPD.  She is not in  any acute exacerbation at this time.  Her lung sounds are clear.  Chest x-ray normal.  No wheezing, no crackles.  BNP is normal.  White cell counts are normal.  I think she most likely has chronic hypoxia on exertion.  It is causing the problem with shortness of breath as well.  She is 95-96% on room air at rest, but as soon as she starts moving, she drops down to below 88 and sometimes below that as well.  We will have her do incentive spirometry, flutter.  We will keep her on her inhalers.  She is on fluticasone/salmeterol,  albuterol and Spiriva, need to use either Spiriva or Incuse Ellipta,  I will continue with those. Appreciate input from the Pulmonary.  Order 6-minute walk test.  If she qualifies for home oxygen, she may benefit from oxygen at home with exertion and possibly with sleeping as well. Unable to walk much without oxygen.   Discussed with the patient again today, she needs to walk without oxygen to see if she drops her oxygen  before putting any oxygen on.  Patient understand and will do that today.    At this time the patient remained stable, without any worsening in her symptoms.  She will be discharged to TCU in stable condition recommend to use supplemental oxygen with exertion.    CT scan of the chest PE protocol was done on admission was negative for any acute PE, a single nodule in the left upper lobe has enlarged compared to March 2022 that needs follow-up.  All other pulmonary nodules patient improved during the interval.  She is to follow-up with the pulmonary as outpatient, and recommend follow-up CT scan in 2 months.        2.  Recent history of a lung abscess secondary to PJP/cavitary infection:  She was treated with three weeks of atovaquone given ALLERGIC TO SULFA DRUGS.  She is on prophylaxis for now, needs indefinite Prophylaxis as per ID  Appreciate input from the infectious disease,     3.  Hypertension:  Blood pressure slightly on the higher side.  She is on lisinopril 40, amlodipine 5.      4.  Diabetes mellitus type 2:  She is on glipizide and metformin.  Resume on discharge    5.  Hyperlipidemia, on Crestor.  We will continue with that.     PT and OT recommend TCU, the patient is now discharged to TCU in stable condition.    Adam Sykes MD, MD    Significant Results and Procedures       Pending Results   These results will be followed up by PCP  Unresulted Labs Ordered in the Past 30 Days of this Admission     Date and Time Order Name Status Description    2/23/2022 11:08 AM Acid-Fast Bacilli Culture and Stain In process     2/23/2022 12:02 AM Acid-Fast Bacilli Culture and Stain In process           Code Status   DNR       Primary Care Physician   Emily Marie    Physical Exam   Temp: 97.9  F (36.6  C) Temp src: Oral BP: 133/64 Pulse: 81   Resp: 18 SpO2: 93 % O2 Device: None (Room air)    Vitals:    04/13/22 0126 04/14/22 0908 04/15/22 0526   Weight: 57.1 kg (125 lb 12.8 oz) 55.9 kg (123 lb 3.2 oz) 56.5 kg (124 lb  9.6 oz)     Vital Signs with Ranges  Temp:  [97.8  F (36.6  C)-98.6  F (37  C)] 97.9  F (36.6  C)  Pulse:  [81-86] 81  Resp:  [18] 18  BP: (122-145)/(59-64) 133/64  SpO2:  [93 %-95 %] 93 %  I/O last 3 completed shifts:  In: 1220 [P.O.:1220]  Out: -     Constitutional: awake, alert, cooperative, no apparent distress, and appears stated age  Eyes: Lids and lashes normal, pupils equal, round and reactive to light, extra ocular muscles intact, sclera clear, conjunctiva normal  Respiratory: No increased work of breathing, good air exchange, clear to auscultation bilaterally, no crackles or wheezing  Cardiovascular: Normal apical impulse, regular rate and rhythm, normal S1 and S2, no S3 or S4, and no murmur noted  GI: No scars, normal bowel sounds, soft, non-distended, non-tender, no masses palpated, no hepatosplenomegally  Neurologic: No focal deficit    Discharge Disposition   Discharged to short-term care facility  Condition at discharge: Satisfactory    Consultations This Hospital Stay   CARE MANAGEMENT / SOCIAL WORK IP CONSULT  PHYSICAL THERAPY ADULT IP CONSULT  OCCUPATIONAL THERAPY ADULT IP CONSULT  PULMONARY IP CONSULT  INFECTIOUS DISEASES IP CONSULT  PHYSICAL THERAPY ADULT IP CONSULT  OCCUPATIONAL THERAPY ADULT IP CONSULT    Time Spent on this Encounter   IAdam MD, personally saw the patient today and spent greater than 30 minutes discharging this patient.    Discharge Orders      Medication Therapy Management Referral      General info for SNF    Length of Stay Estimate: Short Term Care: Estimated # of Days <30  Condition at Discharge: Stable  Level of care:skilled   Rehabilitation Potential: Good  Admission H&P remains valid and up-to-date: Yes  Recent Chemotherapy: N/A  Use Nursing Home Standing Orders: Yes     Mantoux instructions    Give two-step Mantoux (PPD) Per Facility Policy Yes     Follow Up and recommended labs and tests    Follow up with Nursing home physician.  No follow up labs or test  are needed.     Reason for your hospital stay    Hypoxia on exertion     Glucose monitor nursing POCT    Before meals and at bedtime     Daily weights    Call Provider for weight gain of more than 2 pounds per day or 5 pounds per week.     Activity - Up with assistive device     No CPR- Pre-arrest intubation OK     Physical Therapy Adult Consult    Evaluate and treat as clinically indicated.    Reason:  weakness     Occupational Therapy Adult Consult    Evaluate and treat as clinically indicated.    Reason:  weakness     Oxygen Adult/Peds     Diet    Follow this diet upon discharge: Orders Placed This Encounter      Combination Diet Regular Diet Adult; Moderate Consistent Carb (60 g CHO per Meal) Diet     Discharge Medications   Current Discharge Medication List      START taking these medications    Details   sertraline (ZOLOFT) 25 MG tablet Take 1 tablet (25 mg) by mouth daily    Associated Diagnoses: COPD, severe (H)      Spacer/Aero-Holding Chambers (AEROCHAMBER WITH MOUTHPIECE, NO MASK, - > 5 YEARS) MISC (aerochamber plus w/flowsignal) Inhale 1 each into the lungs once for 1 dose  Qty: 1 each, Refills: 0    Associated Diagnoses: Anxiety         CONTINUE these medications which have CHANGED    Details   oxyCODONE (ROXICODONE) 5 MG tablet Take 1 tablet (5 mg) by mouth every 4 hours as needed for severe pain May take 5 tabs per day  Qty: 10 tablet, Refills: 0    Associated Diagnoses: Spinal stenosis of lumbar region with neurogenic claudication         CONTINUE these medications which have NOT CHANGED    Details   Acetaminophen (TYLENOL PO) Take 325 mg by mouth every 6 hours as needed Patient takes TID with Oxycodone.      albuterol (PROAIR HFA/PROVENTIL HFA/VENTOLIN HFA) 108 (90 Base) MCG/ACT inhaler INHALE 2 PUFFS INTO THE LUNGS EVERY 6 HOURS AS NEEDED FOR SHORTNESS OF BREATH OR DIFFICULT BREATHING OR WHEEZING  Qty: 25.5 g, Refills: 3    Comments: **Patient requests 90 days supply**  Associated Diagnoses: COPD,  severe (H)      amLODIPine (NORVASC) 5 MG tablet Take 1 tablet (5 mg) by mouth daily  Qty: 30 tablet, Refills: 0    Comments: Future refills by PCP Dr. Emily Marie with phone number [phone].  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  Please update patient's profile to reflect additional refills.  ####  Qty: 180 each, Refills: 3    Associated Diagnoses: COPD, severe (H)      furosemide (LASIX) 20 MG tablet Take 1 tablet (20 mg) by mouth in the morning.  Qty: 90 tablet, Refills: 0    Associated Diagnoses: Peripheral edema      glipiZIDE (GLUCOTROL XL) 5 MG 24 hr tablet Take 1 tablet (5 mg) by mouth daily (with breakfast)  Qty: 30 tablet, Refills: 0    Comments: Future refills by PCP Dr. Emily Marie with phone number [phone].  Associated Diagnoses: Type 2 diabetes mellitus with complication, without long-term current use of insulin (H)      glucosamine-chondroitin 500-400 MG CAPS per capsule Take 1 capsule by mouth 2 times daily       glucose 40 % (400 mg/mL) gel 15 g every 15 minutes by mouth as needed for low blood sugar.  Oral gel is preferable for conscious and able to swallow patient.  Qty: 112.5 g, Refills: 0    Associated Diagnoses: Type 2 diabetes mellitus  with complication, without long-term current use of insulin (H)      latanoprost (XALATAN) 0.005 % ophthalmic solution Place 1 drop Into the left eye every evening      lisinopril (ZESTRIL) 40 MG tablet Take 1 tablet (40 mg) by mouth daily  Qty: 90 tablet, Refills: 3    Associated Diagnoses: Benign essential hypertension      MELATONIN PO Take by mouth At Bedtime      Multiple Vitamins-Minerals (MULTIVITAMIN ADULT PO) Take 1 tablet by mouth every evening       polyethylene glycol (MIRALAX) 17 g packet Take 0.5 packets by mouth every evening       rosuvastatin (CRESTOR) 5 MG tablet Take 5 mg by mouth At Bedtime      tiotropium (SPIRIVA HANDIHALER) 18 MCG inhaled capsule INHALE THE CONTENTS OF 1 CAPSULE VIA INHALATION DEVICE DAILY  Qty: 90 capsule, Refills: 3    Associated Diagnoses: COPD, severe (H)      Alcohol Swabs PADS Use to swab the area of the injection or kishan as directed Per insurance coverage  Qty: 100 each, Refills: 0    Associated Diagnoses: Type 2 diabetes mellitus with complication, without long-term current use of insulin (H)      blood glucose (NO BRAND SPECIFIED) lancets standard To use to test glucose level in the blood Use to test blood 1sugar  3  times daily as directed. To accompany glucose monitor brands per insurance coverage.  Qty: 100 each, Refills: 0    Associated Diagnoses: Type 2 diabetes mellitus with complication, without long-term current use of insulin (H)      blood glucose (NO BRAND SPECIFIED) test strip To use to test glucose level in the blood Use to test blood sugar  3 times daily as directed. To accompany glucose monitor brands per insurance coverage.  Qty: 100 strip, Refills: 0    Associated Diagnoses: Type 2 diabetes mellitus with complication, without long-term current use of insulin (H)      blood glucose calibration (NO BRAND SPECIFIED) solution Used to calibrate the blood glucose monitor as needed and as directed.  To accompany  blood glucose brands per insurance  coverage  Qty: 1 each, Refills: 0    Associated Diagnoses: Type 2 diabetes mellitus with complication, without long-term current use of insulin (H)      blood glucose monitoring (NO BRAND SPECIFIED) meter device kit Use as directed Per insurance coverage  Qty: 1 kit, Refills: 0    Associated Diagnoses: Type 2 diabetes mellitus with complication, without long-term current use of insulin (H)      metFORMIN (GLUCOPHAGE) 850 MG tablet Take 1 tablet (850 mg) by mouth 2 times daily (with meals)  Qty: 180 tablet, Refills: 1    Associated Diagnoses: Type 2 diabetes mellitus with complication, without long-term current use of insulin (H)      Sharps Container MISC Use as directed to dispose of needles, lancets and other sharps Per Insurance coverage  Qty: 1 each, Refills: 0    Associated Diagnoses: Type 2 diabetes mellitus with complication, without long-term current use of insulin (H)           Allergies   Allergies   Allergen Reactions     Sulfamethoxazole Anaphylaxis and Hives     Data   Most Recent 3 CBC's:Recent Labs   Lab Test 04/15/22  0822 04/12/22  1653 04/12/22  1024 03/04/22  0850   WBC  --  6.6 6.0 5.5   HGB  --  12.3 12.6 11.4*   MCV  --  84 84 81    315 298 244      Most Recent 3 BMP's:  Recent Labs   Lab Test 04/15/22  1204 04/15/22  0822 04/15/22  0808 04/14/22  2235 04/14/22  0907 04/14/22  0615 04/14/22  0304 04/14/22  0248 04/13/22  2146 04/13/22  0804 04/13/22  0625 04/12/22  1735 04/12/22  1653 04/12/22  1024 03/09/22  1203 03/09/22  0844   NA  --   --   --   --   --   --   --   --   --   --  139  --   --  139  --  136   POTASSIUM  --   --   --   --   --  3.6  --  3.4 3.3*  --  3.2*  --   --  3.7   < > 3.8   CHLORIDE  --   --   --   --   --   --   --   --   --   --  105  --   --  105  --  104   CO2  --   --   --   --   --   --   --   --   --   --  26  --   --  28  --  23   BUN  --   --   --   --   --   --   --   --   --   --  14  --   --  15  --  25   CR  --  0.54  --   --   --   --   --   --   --    --  0.56  --  0.59 0.54   < > 0.62   ANIONGAP  --   --   --   --   --   --   --   --   --   --  8  --   --  6  --  9   NARCISA  --   --   --   --   --   --   --   --   --   --  9.3  --   --  9.3  --  9.1   *  --  146* 167*   < >  --    < >  --   --    < > 127*   < >  --  175*   < > 287*    < > = values in this interval not displayed.     Most Recent 2 LFT's:  Recent Labs   Lab Test 02/21/22  1242 02/19/22  1645   AST 15 12   ALT 32 39   ALKPHOS 58 73   BILITOTAL 0.5 0.4     Most Recent INR's and Anticoagulation Dosing History:  Anticoagulation Dose History     Recent Dosing and Labs Latest Ref Rng & Units 9/9/2014 9/15/2014 9/16/2014 9/17/2014 9/18/2014 10/6/2014 1/25/2022    Warfarin 7.5 mg - - 7.5 mg 7.5 mg - - - -    INR 0.85 - 1.15 1.03 - 1.33(H) 2.32(H) 2.03(H) 1.07 1.17(H)        Most Recent 3 Troponin's:No lab results found.  Most Recent Cholesterol Panel:  Recent Labs   Lab Test 02/22/22  0559   CHOL 139   LDL 41   HDL 56   TRIG 211*     Most Recent 6 Bacteria Isolates From Any Culture (See EPIC Reports for Culture Details):  Recent Labs   Lab Test 12/04/15  1112   CULT >100,000 colonies/mL Escherichia coli*     Most Recent TSH, T4 and A1c Labs:  Recent Labs   Lab Test 02/21/22  1242   TSH 0.54   A1C 9.9*     Results for orders placed or performed during the hospital encounter of 04/12/22   Chest XR,  PA & LAT    Narrative    CHEST TWO VIEWS April 12, 2022 10:38 AM     HISTORY: Shortness of breath. Congestive heart failure.    COMPARISON: February 19, 2022.       Impression    IMPRESSION: There are no acute infiltrates. The cardiac silhouette is  not enlarged. Pulmonary vasculature is unremarkable.    NIKITA SCHMITZ MD         SYSTEM ID:  [id number]   CT Chest Pulmonary Embolism w Contrast    Narrative    CT CHEST PULMONARY EMBOLISM W CONTRAST 4/12/2022 12:50 PM    CLINICAL HISTORY:  TECHNIQUE: CT angiogram chest during arterial phase injection IV  contrast. 2D and 3D MIP reconstructions were performed by  the CT  technologist. Dose reduction techniques were used.     CONTRAST: 57ML isovue 370    COMPARISON: 3/2/2022, 2/21/2022    FINDINGS:  ANGIOGRAM CHEST: Pulmonary arteries are normal caliber and negative  for pulmonary emboli. The thoracic aorta is normal caliber with  moderate calcified atheromatous plaque. No evidence of dissection. No  CT evidence of right heart strain.    LUNGS AND PLEURA: A 16 x 9 mm cavitary nodule in the right upper lobe  on image 82 of series 7 has decreased in size having measured 26 x 14  mm on 3/2/2022. A 6 mm right upper lobe nodule on image 50 has  decreased in size having measured 9 mm previously. A 4 mm left upper  lobe nodule on image 95 is unchanged. A 10 x 6 mm nodule in the left  upper lobe on image 101 has enlarged having measured 4 x 3 mm  previously. Interlobular septal thickening in both lungs has nearly  resolved. No pleural effusions. Mild scarring bilaterally. No pleural  effusions.    MEDIASTINUM/AXILLAE: Normal heart size. No pericardial effusions. Mild  calcified coronary artery plaque. No adenopathy.    UPPER ABDOMEN: Normal.    MUSCULOSKELETAL: Degenerative hypertrophic changes in the thoracic  spine.      Impression    IMPRESSION:  1.  No evidence of acute pulmonary embolism.  2.  A single nodule in the left upper lobe has enlarged from 3/2/2022.  The relatively rapid enlargement suggests an infectious or  inflammatory process. This is likely related to multiple other  pulmonary nodules which have improved during the interval.  3.  Significant decrease in interlobular septal thickening in both  lungs, possibly due to resolving edema.    EVERARDO PARRA MD         SYSTEM ID:  [id number]     Most Recent 3 CBC's:Recent Labs   Lab Test 04/15/22  0822 04/12/22  1653 04/12/22  1024 03/04/22  0850   WBC  --  6.6 6.0 5.5   HGB  --  12.3 12.6 11.4*   MCV  --  84 84 81    315 298 244     Most Recent 3 BMP's:Recent Labs   Lab Test 04/15/22  1204 04/15/22  0822  04/15/22  0808 04/14/22  2235 04/14/22  0907 04/14/22  0615 04/14/22  0304 04/14/22  0248 04/13/22  2146 04/13/22  0804 04/13/22  0625 04/12/22  1735 04/12/22  1653 04/12/22  1024 03/09/22  1203 03/09/22  0844   NA  --   --   --   --   --   --   --   --   --   --  139  --   --  139  --  136   POTASSIUM  --   --   --   --   --  3.6  --  3.4 3.3*  --  3.2*  --   --  3.7   < > 3.8   CHLORIDE  --   --   --   --   --   --   --   --   --   --  105  --   --  105  --  104   CO2  --   --   --   --   --   --   --   --   --   --  26  --   --  28  --  23   BUN  --   --   --   --   --   --   --   --   --   --  14  --   --  15  --  25   CR  --  0.54  --   --   --   --   --   --   --   --  0.56  --  0.59 0.54   < > 0.62   ANIONGAP  --   --   --   --   --   --   --   --   --   --  8  --   --  6  --  9   NARCISA  --   --   --   --   --   --   --   --   --   --  9.3  --   --  9.3  --  9.1   *  --  146* 167*   < >  --    < >  --   --    < > 127*   < >  --  175*   < > 287*    < > = values in this interval not displayed.     Most Recent 2 LFT's:Recent Labs   Lab Test 02/21/22  1242 02/19/22  1645   AST 15 12   ALT 32 39   ALKPHOS 58 73   BILITOTAL 0.5 0.4

## 2022-04-15 NOTE — PLAN OF CARE
Goal Outcome Evaluation:      Summary: SOB. COPD exacerbation   DATE & TIME: 04/14/22, 4515-6612  Cognitive Concerns/ Orientation : A & O x 4, anxious at times  BEHAVIOR & AGGRESSION TOOL COLOR: Green  CIWA SCORE: N/A   ABNL VS/O2: VSS on RA at rest. Uses 2L NC w/ ambulation/activity.  MOBILITY: SBA with walker, refused GB- Steady. Ambulated in crooks x1  PAIN MANAGMENT: C/O of chronic back pain, PRN tylenol and oxy given x1  DIET: Mod carb, tolerating  BOWEL/BLADDER: Continent of B&B, voiding frequently  ABNL LAB/BG: BG- 167  DRAIN/DEVICES: PIV/SL  TELEMETRY RHYTHM: N/A  SKIN: Flushed/valeriy in color, scattered bruising, dusky BLE, edema +1-2 feet and ankles, improved with elevation.  TESTS/PROCEDURES: None scheduled.   D/C DAY/GOALS/PLACE: Pending further authorization at [facility]  OTHER IMPORTANT INFO: Scheduled daily zoloft added this evening, pt did request overnight. 6 min walk test completed 4/14. Pulmonology and ID following.

## 2022-04-15 NOTE — PROGRESS NOTES
Care Management Follow Up    Length of Stay (days): 1    Expected Discharge Date: 04/15/2022     Concerns to be Addressed:       Patient plan of care discussed at interdisciplinary rounds: Yes    Anticipated Discharge Disposition:       Anticipated Discharge Services:    Anticipated Discharge DME:      Patient/family educated on Medicare website which has current facility and service quality ratings:    Education Provided on the Discharge Plan:    Patient/Family in Agreement with the Plan:      Referrals Placed by CM/SW:    Private pay costs discussed: Not applicable    Additional Information:  Call placed to [facility] asking if they have heard back from [facility].    Roxana Zimmer, ERICKSW

## 2022-04-15 NOTE — PROGRESS NOTES
[facility]    Infectious Disease Progress Note    Date of Service (when I saw the patient): 04/15/2022     Assessment & Plan   Celeste Chacko is a 89 year old female who was admitted on 4/12/2022.   Impression:  1. 89 y.o with COPD  2. Diabetes   3. HTN, HLd  4. Admitted with shortness of breath.   5. Recent admission with similar complaints, was diagnosed with PJP pneumonia, started to on treatment with mepron and maintained on mepron for secondary prophylaxis indefinitely. I just had a virtual visit with her 2 weeks ago when she was doing well, able to live independently.   6. Also CELINE colonized.  7. She is not short of breath at rest, no fevers or chills      Recommendations:   Mepron 750 mg  daily for long term for secondary prophylaxis for PJP ( there is no end date planned to this)   Dr Nicole her in clinic in [facility] 3- 4 months n            Andrade Macias MD    Interval History     No new rashes or issues with antibiotics   Labs reviewed   Afebrile   On room air     Physical Exam   Temp: 97.9  F (36.6  C) Temp src: Oral BP: 133/64 Pulse: 81   Resp: 18 SpO2: 93 % O2 Device: None (Room air)    Vitals:    04/13/22 0126 04/14/22 0908 04/15/22 0526   Weight: 57.1 kg (125 lb 12.8 oz) 55.9 kg (123 lb 3.2 oz) 56.5 kg (124 lb 9.6 oz)     Vital Signs with Ranges  Temp:  [97.8  F (36.6  C)-98.6  F (37  C)] 97.9  F (36.6  C)  Pulse:  [81-86] 81  Resp:  [18] 18  BP: (122-145)/(59-64) 133/64  SpO2:  [93 %-95 %] 93 %    Constitutional: Awake, alert, cooperative, no apparent distress  Lungs: Clear to auscultation bilaterally, no crackles or wheezing  Cardiovascular: Regular rate and rhythm, normal S1 and S2, and no murmur noted  Abdomen: Normal bowel sounds, soft, non-distended, non-tender  Skin: No rashes, no cyanosis, no edema  Other:    Medications       amLODIPine  5 mg Oral Daily     aspirin  81 mg Oral Daily     atovaquone  750 mg Oral Daily     calcium carbonate 600 mg-vitamin D 400 units   1 tablet Oral BID     enoxaparin ANTICOAGULANT  40 mg Subcutaneous Q24H     famotidine  20 mg Oral BID     fluticasone-vilanterol  1 puff Inhalation Daily     furosemide  20 mg Oral Daily     insulin aspart  1-7 Units Subcutaneous TID AC     insulin aspart  1-5 Units Subcutaneous At Bedtime     latanoprost  1 drop Left Eye QPM     lisinopril  40 mg Oral Daily     polyethylene glycol  8.5 g Oral QPM     rosuvastatin  5 mg Oral At Bedtime     sertraline  25 mg Oral Daily     sodium chloride (PF)  3 mL Intracatheter Q8H     umeclidinium  1 puff Inhalation Daily       Data   All microbiology laboratory data reviewed.  Recent Labs   Lab Test 04/15/22  0822 04/12/22  1653 04/12/22  1024 03/04/22  0850   WBC  --  6.6 6.0 5.5   HGB  --  12.3 12.6 11.4*   HCT  --  39.7 41.1 36.4   MCV  --  84 84 81    315 298 244     Recent Labs   Lab Test 04/15/22  0822 04/13/22  0625 04/12/22  1653   CR 0.54 0.56 0.59     No lab results found.  Recent Labs   Lab Test 12/04/15  1112   CULT >100,000 colonies/mL Escherichia coli*

## 2022-04-15 NOTE — PROGRESS NOTES
[facility]    Hospitalist Progress Note    Brief Summary:  This is a 89-year-old female with history of severe COPD, hyperlipidemia, spinal stenosis, diabetes mellitus type 2, hypertension, recently admitted in the hospital from 02/21/2022 at 3:15 because of PJP infection and CELINE infection.  Was treated with atovaquone for 21 days and no prophylaxis.  Now comes to the ER with complaint of shortness of breath and found to be hypoxic on exertion.    Assessment & Plan        1.  Acute versus chronic hypoxic respiratory failure with severe chronic obstructive pulmonary disease:  The patient has severe COPD.  She is not in  any acute exacerbation at this time.  Her lung sounds are clear.  Chest x-ray normal.  No wheezing, no crackles.  BNP is normal.  White cell counts are normal.  I think she most likely has chronic hypoxia on exertion.  It is causing the problem with shortness of breath as well.  She is 95-96% on room air at rest, but as soon as she starts moving, she drops down to 89, 88 and sometimes below that as well.  We will have her do incentive spirometry, flutter.  We will keep her on her inhalers.  She is on fluticasone/salmeterol, Incruse Ellipta and albuterol and Spiriva, need to use either Spiriva or Incuse Ellipta,  I will continue with those. Appreciate input from the Pulmonary.  Order 6-minute walk test.  If she qualifies for home oxygen, she may benefit from oxygen at home with exertion and possibly with sleeping as well. Unable to walk much without oxygen.   Discussed with the patient again today, she needs to walk without oxygen to see if she drops her oxygen before putting any oxygen on.  Patient understand and will do that today.      2.  Recent history of a lung abscess secondary to PJP/cavitary infection:  She was treated with three weeks of atovaquone given ALLERGIC TO SULFA DRUGS.  She is on prophylaxis for now, needs indefinite Prophylaxis as per ID  Appreciate input  from the infectious disease,    3.  Hypertension:  Blood pressure slightly on the higher side.  She is on lisinopril 40, amlodipine 5.  I will continue with that for now, likely can increase the amlodipine to 10 mg daily if BP remain on higher side.     4.  Diabetes mellitus type 2:  She is on glipizide and metformin.  I will hold it.  Keep her on sliding scale insulin for correction and hypoglycemia protocol.    5.  Hyperlipidemia, on Crestor.  We will continue with that.    Therapy recommending TCU, awaiting insurance authorization.  Patient is ready to be discharged at this time.  We will do 6-minute walk test today     DVT Prophylaxis: Enoxaparin (Lovenox) SQ  Code Status: No CPR- Pre-arrest intubation OK    PT is recommending TCU at this time, SW/CC are consulted and awaiting placement.   Awaiting 6 min walk test or walking desaturation study as well     Disposition: Expected discharge awaiting placement.     Discussed with patient and the nursing staff taking care of the patient today     Adam Sykes MD  Text Page  (7am - 6pm)    Interval History   Patient not sleeping well in the hospital, have 5 to 6 hours of sleep, get short of breath on exertion, stable at rest.  Denies any fever chills nausea vomiting headache dizziness or lightheadedness at this time.    No other significant event overnight      -Data reviewed today: I reviewed all new labs and imaging results over the last 24 hours. I personally reviewed no images or EKG's today.    Physical Exam   Temp: 97.9  F (36.6  C) Temp src: Oral BP: 133/64 Pulse: 81   Resp: 18 SpO2: 93 % O2 Device: None (Room air)    Vitals:    04/13/22 0126 04/14/22 0908 04/15/22 0526   Weight: 57.1 kg (125 lb 12.8 oz) 55.9 kg (123 lb 3.2 oz) 56.5 kg (124 lb 9.6 oz)     Vital Signs with Ranges  Temp:  [97.8  F (36.6  C)-98.6  F (37  C)] 97.9  F (36.6  C)  Pulse:  [81-86] 81  Resp:  [18] 18  BP: (122-145)/(59-64) 133/64  SpO2:  [93 %-95 %] 93 %  I/O last 3 completed  shifts:  In: 1220 [P.O.:1220]  Out: -     Constitutional: awake, alert, cooperative, no apparent distress, and appears stated age  Eyes: Lids and lashes normal, pupils equal, round and reactive to light, extra ocular muscles intact, sclera clear, conjunctiva normal  Respiratory: No increased work of breathing, good air exchange, clear to auscultation bilaterally, no crackles or wheezing  Cardiovascular: Normal apical impulse, regular rate and rhythm, normal S1 and S2, no S3 or S4, and no murmur noted  GI: No scars, normal bowel sounds, soft, non-distended, non-tender, no masses palpated, no hepatosplenomegally  Skin: no bruising or bleeding  Musculoskeletal: no lower extremity pitting edema present  Neurologic: no focal deficit.     Medications       amLODIPine  5 mg Oral Daily     aspirin  81 mg Oral Daily     atovaquone  750 mg Oral Daily     calcium carbonate 600 mg-vitamin D 400 units  1 tablet Oral BID     enoxaparin ANTICOAGULANT  40 mg Subcutaneous Q24H     famotidine  20 mg Oral BID     fluticasone-vilanterol  1 puff Inhalation Daily     furosemide  20 mg Oral Daily     insulin aspart  1-7 Units Subcutaneous TID AC     insulin aspart  1-5 Units Subcutaneous At Bedtime     latanoprost  1 drop Left Eye QPM     lisinopril  40 mg Oral Daily     polyethylene glycol  8.5 g Oral QPM     rosuvastatin  5 mg Oral At Bedtime     sertraline  25 mg Oral Daily     sodium chloride (PF)  3 mL Intracatheter Q8H     umeclidinium  1 puff Inhalation Daily       Data   Recent Labs   Lab 04/15/22  0822 04/15/22  0808 04/14/22  2235 04/14/22  2126 04/14/22  0907 04/14/22  0615 04/14/22  0304 04/14/22  0248 04/13/22  2146 04/13/22  0804 04/13/22  0625 04/12/22  1735 04/12/22  1653 04/12/22  1024   WBC  --   --   --   --   --   --   --   --   --   --   --   --  6.6 6.0   HGB  --   --   --   --   --   --   --   --   --   --   --   --  12.3 12.6   MCV  --   --   --   --   --   --   --   --   --   --   --   --  84 84     --   --    --   --   --   --   --   --   --   --   --  315 298   NA  --   --   --   --   --   --   --   --   --   --  139  --   --  139   POTASSIUM  --   --   --   --   --  3.6  --  3.4 3.3*  --  3.2*  --   --  3.7   CHLORIDE  --   --   --   --   --   --   --   --   --   --  105  --   --  105   CO2  --   --   --   --   --   --   --   --   --   --  26  --   --  28   BUN  --   --   --   --   --   --   --   --   --   --  14  --   --  15   CR 0.54  --   --   --   --   --   --   --   --   --  0.56  --  0.59 0.54   ANIONGAP  --   --   --   --   --   --   --   --   --   --  8  --   --  6   NARCISA  --   --   --   --   --   --   --   --   --   --  9.3  --   --  9.3   GLC  --  146* 167* 218*   < >  --    < >  --   --    < > 127*   < >  --  175*    < > = values in this interval not displayed.       No results found for this or any previous visit (from the past 24 hour(s)).

## 2022-04-15 NOTE — PROGRESS NOTES
PAS-RR    D: Per DHS regulation, SW completed and submitted PAS-RR to [place] [employer] Direct Connect via the Senior LinkAge Line.  PAS-RR confirmation # is : [id number]    I: SW spoke with patient and they are aware a PAS-RR has been submitted.  SW reviewed with patient that they may be contacted for a follow up appointment within 10 days of hospital discharge if their SNF stay is < 30 days.  Contact information for Senior LinkAge Line was also provided.    A: patient verbalized understanding.    P: Further questions may be directed to Senior LinkAge Line at #[phone], option #4 for PAS-RR staff.    LYNDA Cartagena

## 2022-04-15 NOTE — PLAN OF CARE
Goal Outcome Evaluation:    Plan of Care Reviewed With: patient     Discharge    Patient discharged to [facility] via [facility] with volunteer  Care plan note done    Listed belongings gathered and given to patient (including from security/pharmacy). Yes  Care Plan and Patient education resolved: Yes  Prescriptions if needed, hard copies sent with patient  NA  Medication Bin checked and emptied on discharge Yes  SW/care coordinator/charge RN aware of discharge: Yes

## 2022-04-16 LAB — SARS-COV-2 RNA RESP QL NAA+PROBE: NEGATIVE

## 2022-04-16 NOTE — PLAN OF CARE
Occupational Therapy Discharge Summary    Reason for therapy discharge:    Discharged to transitional care facility.    Progress towards therapy goal(s). See goals on Care Plan in [facility] electronic health record for goal details.  Goals not met.  Barriers to achieving goals:   discharge from facility.    Therapy recommendation(s):    Continued therapy is recommended.  Rationale/Recommendations:  To receive OT from [facility].

## 2022-04-16 NOTE — PLAN OF CARE
Physical Therapy Discharge Summary     Reason for therapy discharge:    Discharged to transitional care facility.     Progress towards therapy goal(s). See goals on Care Plan in [facility] electronic health record for goal details.  Goals not met.  Barriers to achieving goals:   discharge from facility.     Therapy recommendation(s):    Continued therapy is recommended.  Rationale/Recommendations:  Patient would benefit from PT eval at [facility] in order to increase strength, activity tolerance, balance and independence with mobility.

## 2022-04-17 ENCOUNTER — LAB REQUISITION (OUTPATIENT)
Dept: LAB | Facility: CLINIC | Age: 87
End: 2022-04-17

## 2022-04-17 VITALS
SYSTOLIC BLOOD PRESSURE: 125 MMHG | DIASTOLIC BLOOD PRESSURE: 58 MMHG | RESPIRATION RATE: 18 BRPM | HEART RATE: 90 BPM | BODY MASS INDEX: 21.62 KG/M2 | TEMPERATURE: 98.5 F | OXYGEN SATURATION: 96 % | WEIGHT: 124 LBS

## 2022-04-17 DIAGNOSIS — Z00.01 ENCOUNTER FOR GENERAL ADULT MEDICAL EXAMINATION WITH ABNORMAL FINDINGS: ICD-10-CM

## 2022-04-18 ENCOUNTER — OFFICE VISIT (OUTPATIENT)
Dept: GERIATRICS | Facility: CLINIC | Age: 87
End: 2022-04-18
Payer: COMMERCIAL

## 2022-04-18 ENCOUNTER — TELEPHONE (OUTPATIENT)
Dept: INTERNAL MEDICINE | Facility: CLINIC | Age: 87
End: 2022-04-18
Payer: COMMERCIAL

## 2022-04-18 ENCOUNTER — LAB REQUISITION (OUTPATIENT)
Dept: LAB | Facility: CLINIC | Age: 87
End: 2022-04-18

## 2022-04-18 DIAGNOSIS — M48.00 SPINAL STENOSIS, UNSPECIFIED SPINAL REGION: ICD-10-CM

## 2022-04-18 DIAGNOSIS — E78.5 HYPERLIPIDEMIA, UNSPECIFIED HYPERLIPIDEMIA TYPE: ICD-10-CM

## 2022-04-18 DIAGNOSIS — E11.9 DIABETES MELLITUS WITH NO COMPLICATION (H): ICD-10-CM

## 2022-04-18 DIAGNOSIS — R91.8 PULMONARY NODULES: ICD-10-CM

## 2022-04-18 DIAGNOSIS — K59.01 SLOW TRANSIT CONSTIPATION: ICD-10-CM

## 2022-04-18 DIAGNOSIS — I10 PRIMARY HYPERTENSION: ICD-10-CM

## 2022-04-18 DIAGNOSIS — R53.81 PHYSICAL DECONDITIONING: ICD-10-CM

## 2022-04-18 DIAGNOSIS — J44.9 CHRONIC OBSTRUCTIVE PULMONARY DISEASE, UNSPECIFIED COPD TYPE (H): ICD-10-CM

## 2022-04-18 DIAGNOSIS — Z71.89 ADVANCED DIRECTIVES, COUNSELING/DISCUSSION: ICD-10-CM

## 2022-04-18 DIAGNOSIS — Z87.09 HISTORY OF LUNG ABSCESS: ICD-10-CM

## 2022-04-18 DIAGNOSIS — F51.01 PRIMARY INSOMNIA: ICD-10-CM

## 2022-04-18 DIAGNOSIS — Z00.01 ENCOUNTER FOR GENERAL ADULT MEDICAL EXAMINATION WITH ABNORMAL FINDINGS: ICD-10-CM

## 2022-04-18 DIAGNOSIS — F41.9 ANXIETY: ICD-10-CM

## 2022-04-18 DIAGNOSIS — J96.11 CHRONIC RESPIRATORY FAILURE WITH HYPOXIA (H): Primary | ICD-10-CM

## 2022-04-18 PROCEDURE — 86481 TB AG RESPONSE T-CELL SUSP: CPT | Performed by: NURSE PRACTITIONER

## 2022-04-18 PROCEDURE — 99310 SBSQ NF CARE HIGH MDM 45: CPT | Performed by: NURSE PRACTITIONER

## 2022-04-18 PROCEDURE — U0003 INFECTIOUS AGENT DETECTION BY NUCLEIC ACID (DNA OR RNA); SEVERE ACUTE RESPIRATORY SYNDROME CORONAVIRUS 2 (SARS-COV-2) (CORONAVIRUS DISEASE [COVID-19]), AMPLIFIED PROBE TECHNIQUE, MAKING USE OF HIGH THROUGHPUT TECHNOLOGIES AS DESCRIBED BY CMS-2020-01-R: HCPCS | Performed by: NURSE PRACTITIONER

## 2022-04-18 PROCEDURE — 36415 COLL VENOUS BLD VENIPUNCTURE: CPT | Performed by: NURSE PRACTITIONER

## 2022-04-18 NOTE — TELEPHONE ENCOUNTER
Patient called and would like Dr Marie to recommend a doctor a the [facility] because that is closer to her home. Patient asked us to call her daughter Irene back with this information.

## 2022-04-18 NOTE — PROGRESS NOTES
[facility] GERIATRICS    PRIMARY CARE PROVIDER AND CLINIC:  Emily Marie MD, [address] / [facility] [address]  Chief Complaint   Patient presents with     Hospital F/U      [facility] Record Number:  [id number]  Place of Service where encounter took place:  [facility] (TCU) [[id number]]    Celeste Chacko  is a 89 year old  (5/11/1931), admitted to the above facility from  [facility]. Hospital stay 4/12/22 through 4/15/22.  HPI:    89-year-old female with history of severe COPD, HLD, spinal stenosis, DM 2, HTN, recently admitted in the hospital from 02/21/2022 d/t PJP infection and CELINE infection, completed atovaquone for 21 days and now on prophylaxis. Hospitalized with SOB, hypoxic on exertion. Felt to have chronic resp failure, on fluticasone/salmeterol, albuterol and Spiriva. CT chest: left upper lobe nodule, needs outpatient CT 2 months and f/u pulmonary. HTN on lisinopril and norvasc. DM on glipizide and metformin. HLD on Crestor. To TCU for therapies.     Patient seen for initial TCU visit. Reviewed code status - Full Code confirmed. Reports dyspnea with any exertion, currently on 2 liters of oxygen per NC and sats 98% at rest. No headaches, dizziness, chest pain, bowel or bladder complaints. Sleeping well with melatonin. Recent BP range 117-155/58-69 and BG range 120-205 daily.     CODE STATUS/ADVANCE DIRECTIVES DISCUSSION:  Full Code Full Code  ALLERGIES:   Allergies   Allergen Reactions     Sulfamethoxazole Anaphylaxis and Hives      PAST MEDICAL HISTORY:   Past Medical History:   Diagnosis Date     Basal cell carcinoma      Chronic pain      Complete rupture of rotator cuff      COPD (chronic obstructive pulmonary disease) (H)      History of blood transfusion      Mixed hyperlipidemia 04/2000     Osteoarthritis      Personal history of other malignant neoplasm of skin      Spinal stenosis of lumbar region with neurogenic claudication      T2DM (type 2  diabetes mellitus) (H)       PAST SURGICAL HISTORY:   has a past surgical history that includes NONSPECIFIC PROCEDURE; NONSPECIFIC PROCEDURE; NONSPECIFIC PROCEDURE; NONSPECIFIC PROCEDURE (5/02); NONSPECIFIC PROCEDURE (5/07); Arthroplasty knee (Left, 9/15/2014); Head and neck surgery (05/14/15); Inject Sacroiliac Joint (N/A, 12/1/2015); Inject Sacroiliac Joint (Bilateral, 5/19/2016); Inject Epidural Lumbar / Sacral Single (Left, 7/14/2016); Inject Sacroiliac Joint (Bilateral, 11/25/2016); Inject Sacroiliac Joint (Bilateral, 4/25/2017); Inject Sacroiliac Joint (Bilateral, 7/28/2017); and Inject Sacroiliac Joint (Bilateral, 11/10/2017).  FAMILY HISTORY: family history includes Arthritis in her father; Cancer in her brother; Cerebrovascular Disease in her brother; Diabetes in her brother.  SOCIAL HISTORY:   reports that she quit smoking about 21 years ago. Her smoking use included cigarettes. She has a 27.00 pack-year smoking history. She has never used smokeless tobacco. She reports current alcohol use of about 0.8 standard drinks of alcohol per week. She reports that she does not use drugs.  Patient's living condition: lives alone    Post Discharge Medication Reconciliation Status: discharge medications reconciled, continue medications without change  Current Outpatient Medications   Medication Sig     Acetaminophen (TYLENOL PO) Take 325 mg by mouth every 6 hours as needed Patient takes TID with Oxycodone.     albuterol (PROAIR HFA/PROVENTIL HFA/VENTOLIN HFA) 108 (90 Base) MCG/ACT inhaler INHALE 2 PUFFS INTO THE LUNGS EVERY 6 HOURS AS NEEDED FOR SHORTNESS OF BREATH OR DIFFICULT BREATHING OR WHEEZING     amLODIPine (NORVASC) 5 MG tablet Take 1 tablet (5 mg) by mouth daily     ASPIRIN EC PO Take 81 mg by mouth daily     atovaquone (MEPRON) 750 MG/5ML suspension Take 5 mLs (750 mg) by mouth daily     calcium-vitamin D (CALTRATE) 600-400 MG-UNIT per tablet Take 1 tablet by mouth 2 times daily 1200mg   1000 mg of vitamin d      co-enzyme Q-10 100 MG CAPS capsule Take 100 mg by mouth daily     famotidine (PEPCID) 20 MG tablet Take 20 mg by mouth 2 times daily as needed     fluticasone-salmeterol (ADVAIR) 500-50 MCG/DOSE inhaler Inhale 1 puff into the lungs 2 times daily ### DO NOT FILL NOW.  Please update patient's profile to reflect additional refills.  ####     furosemide (LASIX) 20 MG tablet Take 1 tablet (20 mg) by mouth in the morning.     glipiZIDE (GLUCOTROL XL) 5 MG 24 hr tablet Take 1 tablet (5 mg) by mouth daily (with breakfast)     glucosamine-chondroitin 500-400 MG CAPS per capsule Take 1 capsule by mouth 2 times daily      glucose 40 % (400 mg/mL) gel 15 g every 15 minutes by mouth as needed for low blood sugar.  Oral gel is preferable for conscious and able to swallow patient.     latanoprost (XALATAN) 0.005 % ophthalmic solution Place 1 drop Into the left eye every evening     lisinopril (ZESTRIL) 40 MG tablet Take 1 tablet (40 mg) by mouth daily     MELATONIN PO Take 1 mg by mouth At Bedtime     metFORMIN (GLUCOPHAGE) 850 MG tablet Take 1 tablet (850 mg) by mouth 2 times daily (with meals)     Multiple Vitamins-Minerals (MULTIVITAMIN ADULT PO) Take 1 tablet by mouth every evening      polyethylene glycol (MIRALAX) 17 g packet Take 0.5 packets by mouth every evening      rosuvastatin (CRESTOR) 5 MG tablet Take 5 mg by mouth At Bedtime     sertraline (ZOLOFT) 25 MG tablet Take 1 tablet (25 mg) by mouth daily     tiotropium (SPIRIVA HANDIHALER) 18 MCG inhaled capsule INHALE THE CONTENTS OF 1 CAPSULE VIA INHALATION DEVICE DAILY     Alcohol Swabs PADS Use to swab the area of the injection or kishan as directed Per insurance coverage (Patient not taking: Reported on 4/18/2022)     blood glucose (NO BRAND SPECIFIED) lancets standard To use to test glucose level in the blood Use to test blood 1sugar  3  times daily as directed. To accompany glucose monitor brands per insurance coverage. (Patient not taking: Reported on 4/18/2022)      blood glucose (NO BRAND SPECIFIED) test strip To use to test glucose level in the blood Use to test blood sugar  3 times daily as directed. To accompany glucose monitor brands per insurance coverage. (Patient not taking: Reported on 4/18/2022)     blood glucose calibration (NO BRAND SPECIFIED) solution Used to calibrate the blood glucose monitor as needed and as directed.  To accompany  blood glucose brands per insurance coverage (Patient not taking: Reported on 4/18/2022)     blood glucose monitoring (NO BRAND SPECIFIED) meter device kit Use as directed Per insurance coverage (Patient not taking: Reported on 4/18/2022)     Calcium Polycarbophil (FIBERCON PO) Take 1 tablet by mouth daily  (Patient not taking: Reported on 4/18/2022)     oxyCODONE (ROXICODONE) 5 MG tablet Take 1 tablet (5 mg) by mouth every 4 hours as needed for severe pain May take 5 tabs per day     Sharps Container MISC Use as directed to dispose of needles, lancets and other sharps Per Insurance coverage (Patient not taking: Reported on 4/18/2022)     No current facility-administered medications for this visit.       ROS:  10 point ROS of systems including Constitutional, Eyes, Respiratory, Cardiovascular, Gastroenterology, Genitourinary, Integumentary, Musculoskeletal, Psychiatric were all negative except for pertinent positives noted in my HPI.    Vitals:  /58   Pulse 90   Temp 98.5  F (36.9  C)   Resp 18   Wt 56.2 kg (124 lb)   SpO2 96%   BMI 21.62 kg/m    Exam:  GENERAL APPEARANCE:  Alert, in no distress, pleasant, cooperative, oriented x 3  EYES:  EOM, lids, pupils and irises normal, sclera clear and conjunctiva normal, no discharge or mattering on lids or lashes noted  ENT:  Mouth normal, moist mucous membranes, nose normal without drainage or crusting, external ears without lesions, hearing acuity intact  NECK: supple, symmetrical, trachea midline  RESP:  respiratory effort normal, no chest wall tenderness, no respiratory  distress, Lung sounds diminished throughout, patient is on oxygen per NC  CV:  Auscultation of heart done, rate and rhythm controlled and regular, no murmur, no rub or gallop. Edema none bilateral lower extremities.   ABDOMEN:  normal bowel sounds, soft, nontender, no palpable masses.  M/S:   Gait and station not assessed, no tenderness or swelling of the joints; able to move all extremities, digits normal  NEURO: cranial nerves 2-12 grossly intact, no facial asymmetry, no speech deficits and able to follow directions, moves all extremities symmetrically  PSYCH:  insight and judgement and memory appear intact, affect and mood normal     Lab/Diagnostic data:  Most Recent 3 CBC's:  Recent Labs   Lab Test 04/15/22  0822 04/12/22  1653 04/12/22  1024 03/04/22  0850   WBC  --  6.6 6.0 5.5   HGB  --  12.3 12.6 11.4*   MCV  --  84 84 81    315 298 244     Most Recent 3 BMP's:  Recent Labs   Lab Test 04/15/22  1204 04/15/22  0822 04/15/22  0808 04/14/22  2235 04/14/22  0907 04/14/22  0615 04/14/22  0304 04/14/22  0248 04/13/22  2146 04/13/22  0804 04/13/22  0625 04/12/22  1735 04/12/22  1653 04/12/22  1024 03/09/22  1203 03/09/22  0844   NA  --   --   --   --   --   --   --   --   --   --  139  --   --  139  --  136   POTASSIUM  --   --   --   --   --  3.6  --  3.4 3.3*  --  3.2*  --   --  3.7   < > 3.8   CHLORIDE  --   --   --   --   --   --   --   --   --   --  105  --   --  105  --  104   CO2  --   --   --   --   --   --   --   --   --   --  26  --   --  28  --  23   BUN  --   --   --   --   --   --   --   --   --   --  14  --   --  15  --  25   CR  --  0.54  --   --   --   --   --   --   --   --  0.56  --  0.59 0.54   < > 0.62   ANIONGAP  --   --   --   --   --   --   --   --   --   --  8  --   --  6  --  9   NARCISA  --   --   --   --   --   --   --   --   --   --  9.3  --   --  9.3  --  9.1   *  --  146* 167*   < >  --    < >  --   --    < > 127*   < >  --  175*   < > 287*    < > = values in this interval  not displayed.     Most Recent TSH and T4:  Recent Labs   Lab Test 02/21/22  1242   TSH 0.54     Most Recent Hemoglobin A1c:  Recent Labs   Lab Test 02/21/22  1242   A1C 9.9*     ASSESSMENT/PLAN:  Chronic respiratory failure with hypoxia (H)  Chronic obstructive pulmonary disease, unspecified COPD type (H)  History of lung abscess  Pulmonary nodules  Chronic issues, continue PTA albuterol inhaler every 6 hrs PRN dyspnea, atovaquone 750 mg PO daily, Advair 500-50 mcg/dose 1 puff BID, Spiriva 18 mcg 1 capsule daily, oxygen as ordered. Monitor respiratory status and f/u pulmonology, ID as recommended. Repeat CT of chest as outpatient recommended. CBC this week.     Primary hypertension  Chronic, appears stable with PTA norvasc 5 mg daily, lasix 20 mg daily, lisinopril 40 mg daily, monitor vs and review next visit. BMP this week.     Diabetes mellitus with no complication (H)  Chronic, A1C 9.9 on 2/21/22. Continue glipizide 5 mg daily, metformin 850 mg BID, monitor BG and review ranges next visit.     Hyperlipidemia, unspecified hyperlipidemia type  Chronic, continue PTA asa 81 mg daily, Crestor 5 mg daily    Insomnia  Stable with melatonin 1 mg PO HS. Monitor.     Anxiety  Stable with PTA Zoloft 25 mg daily. Monitor.     Slow transit constipation  Chronic, well managed with 8.5 mg Miralax daily. Monitor.     Spinal stenosis, unspecified spinal region  Physical deconditioning  Acute on chronic. Pain managed with PTA tylenol 325 mg every 6 hrs PRN pain, oxycodone 5 mg every 4 hrs PRN (up to 5 tabs daily). Therapies as ordered and f/u with progress next visit.     Advanced directives, counseling/discussion  Reviewed code status - Full Code desired.     Orders:  1. Full Code  2. CBC, BMP 4/21 diagnosis weakness    Total unit/floor time of 38 minutes spent consisted of the following: examination of patient, reviewing the record including pertinent labs and imaging. More than 50% of this time was spent in coordination of  care with the patient, nursing staff, and other healthcare providers. This time was spent on discussing the care plan including labs, discharge/safe placement, and specialty follow up. Additional specific discussion included review of code status, management of dyspnea, oxygen therapy, pain medications, expected TCU course/routine/discharge planning as well as clarification of medications and orders with staff for a total of over 20 min.     Electronically signed by:  ALICIA Betancourt CNP

## 2022-04-19 ENCOUNTER — MEDICAL CORRESPONDENCE (OUTPATIENT)
Dept: HEALTH INFORMATION MANAGEMENT | Facility: CLINIC | Age: 87
End: 2022-04-19
Payer: COMMERCIAL

## 2022-04-19 DIAGNOSIS — M48.062 SPINAL STENOSIS OF LUMBAR REGION WITH NEUROGENIC CLAUDICATION: ICD-10-CM

## 2022-04-19 LAB
QUANTIFERON MITOGEN: 10 IU/ML
QUANTIFERON NIL TUBE: 0.06 IU/ML
QUANTIFERON TB1 TUBE: 0.07 IU/ML
QUANTIFERON TB2 TUBE: 0.08
SARS-COV-2 RNA RESP QL NAA+PROBE: NEGATIVE

## 2022-04-19 RX ORDER — OXYCODONE HYDROCHLORIDE 5 MG/1
5 TABLET ORAL EVERY 4 HOURS PRN
Qty: 30 TABLET | Refills: 0 | Status: SHIPPED | OUTPATIENT
Start: 2022-04-19 | End: 2022-04-27

## 2022-04-19 NOTE — TELEPHONE ENCOUNTER
I don't know who is taking new patients and don't know them all personally so I don't make specific recommendations. They are all internists so any could manage her.

## 2022-04-20 ENCOUNTER — LAB REQUISITION (OUTPATIENT)
Dept: LAB | Facility: CLINIC | Age: 87
End: 2022-04-20

## 2022-04-20 ENCOUNTER — PATIENT OUTREACH (OUTPATIENT)
Dept: NURSING | Facility: CLINIC | Age: 87
End: 2022-04-20
Attending: INTERNAL MEDICINE
Payer: COMMERCIAL

## 2022-04-20 DIAGNOSIS — R53.1 WEAKNESS: ICD-10-CM

## 2022-04-20 DIAGNOSIS — R06.09 DYSPNEA ON EXERTION: ICD-10-CM

## 2022-04-20 LAB
GAMMA INTERFERON BACKGROUND BLD IA-ACNC: 0.06 IU/ML
M TB IFN-G BLD-IMP: NEGATIVE
M TB IFN-G CD4+ BCKGRND COR BLD-ACNC: 9.94 IU/ML
MITOGEN IGNF BCKGRD COR BLD-ACNC: 0.01 IU/ML
MITOGEN IGNF BCKGRD COR BLD-ACNC: 0.02 IU/ML

## 2022-04-21 ENCOUNTER — PATIENT OUTREACH (OUTPATIENT)
Dept: CARE COORDINATION | Facility: CLINIC | Age: 87
End: 2022-04-21
Payer: COMMERCIAL

## 2022-04-21 VITALS
HEART RATE: 88 BPM | BODY MASS INDEX: 20.37 KG/M2 | WEIGHT: 116.8 LBS | OXYGEN SATURATION: 99 % | TEMPERATURE: 97.1 F | SYSTOLIC BLOOD PRESSURE: 128 MMHG | DIASTOLIC BLOOD PRESSURE: 65 MMHG | RESPIRATION RATE: 18 BRPM

## 2022-04-21 LAB
ANION GAP SERPL CALCULATED.3IONS-SCNC: 7 MMOL/L (ref 3–14)
BUN SERPL-MCNC: 39 MG/DL (ref 7–30)
CALCIUM SERPL-MCNC: 10.9 MG/DL (ref 8.5–10.1)
CHLORIDE BLD-SCNC: 99 MMOL/L (ref 94–109)
CO2 SERPL-SCNC: 31 MMOL/L (ref 20–32)
CREAT SERPL-MCNC: 0.79 MG/DL (ref 0.52–1.04)
ERYTHROCYTE [DISTWIDTH] IN BLOOD BY AUTOMATED COUNT: 14.9 % (ref 10–15)
GFR SERPL CREATININE-BSD FRML MDRD: 71 ML/MIN/1.73M2
GLUCOSE BLD-MCNC: 128 MG/DL (ref 70–99)
HCT VFR BLD AUTO: 37.2 % (ref 35–47)
HGB BLD-MCNC: 11.3 G/DL (ref 11.7–15.7)
MCH RBC QN AUTO: 25.6 PG (ref 26.5–33)
MCHC RBC AUTO-ENTMCNC: 30.4 G/DL (ref 31.5–36.5)
MCV RBC AUTO: 84 FL (ref 78–100)
PLATELET # BLD AUTO: 228 10E3/UL (ref 150–450)
POTASSIUM BLD-SCNC: 4 MMOL/L (ref 3.4–5.3)
RBC # BLD AUTO: 4.42 10E6/UL (ref 3.8–5.2)
SODIUM SERPL-SCNC: 137 MMOL/L (ref 133–144)
WBC # BLD AUTO: 6.2 10E3/UL (ref 4–11)

## 2022-04-21 PROCEDURE — P9604 ONE-WAY ALLOW PRORATED TRIP: HCPCS | Performed by: NURSE PRACTITIONER

## 2022-04-21 PROCEDURE — 36415 COLL VENOUS BLD VENIPUNCTURE: CPT | Performed by: NURSE PRACTITIONER

## 2022-04-21 PROCEDURE — 80048 BASIC METABOLIC PNL TOTAL CA: CPT | Performed by: NURSE PRACTITIONER

## 2022-04-21 PROCEDURE — 85027 COMPLETE CBC AUTOMATED: CPT | Performed by: NURSE PRACTITIONER

## 2022-04-21 NOTE — PROGRESS NOTES
Clinic Care Coordination Contact  Care Team Conversations    Situation: RN Clinical Product Navigator following patient through TCU care progression; attended TCU Care Conference today    Background: Patient presently recovering at [facility] [address] following hospitalization related to severe COPD, and recent PJP infection.     Assessment: In attendance for Care Conference today was patient, her daughter Irene, and TCU care team: SW, therapy, nursing.   Patient continues to demonstrate poor endurance and activity tolerance, her largest barrier and limitation is her persistent shortness of breath and decreased activity tolerance; this also has resulted in taking longer to complete other IADL tasks such as grooming, hygiene, dressing.   Patient's SLUMs score is 28/30, is independent with toileting and in room ambulation; dressing is stand by assistance.       At this time, patient's health plan has authorized continued TCU stay until Sunday 4/24.  Patient/family requests TCU care team to request additional days and they will do so; however, patient is able to transfer, ambulate, dress herself independently so they were educated that insurance may not authorize continued care on site.  The patient/family were understanding; patient will likely plan to discharge home on Sunday 4/24 with referral to home health care.    As a longer term plan, patient's daughter intends to investigate Assisted Living and long term care options, and patient does have a long term care policy.      Plan/Recommendations: RN Clinical Product Navigator will continue to monitor TCU Care Progression and initiate referral to appropriate post TCU resources.     Patient is currently scheduled to see PCP  She has been recommended to schedule with Infectious Disease for 3-4 months and Pulmonology for PFTs and follow up.  Will inquire with TCU care team if the ID and Pulmonology appointments have been made.       Sanam Welsh RN  Clinical Product  Navigator

## 2022-04-21 NOTE — PROGRESS NOTES
[facility] GERIATRICS DISCHARGE SUMMARY  PATIENT'S NAME: Celeste Chacko  YOB: 1931  MEDICAL RECORD NUMBER:  [id number]  Place of Service where encounter took place:  [facility] (TCU) [[id number]]    PRIMARY CARE PROVIDER AND CLINIC RESPONSIBLE AFTER TRANSFER:   Emily Marie MD, [address] [facility] [address] / [facility] [address]    [facility] [place]     Transferring providers: ALICIA Betancourt CNP, Dr. Sylvester MD  Recent Hospitalization/ED:  [facility] Hospital stay 4/12/22 to 4/15/22.  Date of SNF Admission: 4/15/22  Date of SNF (anticipated) Discharge: 4/26/22  Discharged to: previous independent home  Cognitive Scores: SLUMS: 28/30  Physical Function: Ambulating 50 ft with 4ww  DME: Home Oxygen    CODE STATUS/ADVANCE DIRECTIVES DISCUSSION:  Full Code   ALLERGIES: Sulfamethoxazole    NURSING FACILITY COURSE   Medication Changes/Rationale:     None    89-year-old female with history of severe COPD, HLD, spinal stenosis, DM 2, HTN, recently admitted in the hospital from 02/21/2022 d/t PJP infection and CELINE infection, completed atovaquone for 21 days and now on prophylaxis. Hospitalized with SOB, hypoxic on exertion. Felt to have chronic resp failure, on fluticasone/salmeterol, albuterol and Spiriva. CT chest: left upper lobe nodule, needs outpatient CT 2 months and f/u pulmonary. HTN on lisinopril and norvasc. DM on glipizide and metformin. HLD on Crestor. To TCU for therapies.     Progressed in therapies but unable to wean from oxygen. No headaches, dizziness, chest pain, bowel or bladder issues. Dyspnea on exertion. Wt down 3 lbs since admission. BP range 103-128/58-75 and sats 88% on room air. BG range 137-262. Home next week with home care as ordered below.     Summary of nursing facility stay:   Chronic respiratory failure with hypoxia (H)  Chronic obstructive pulmonary disease, unspecified COPD type (H)  History of lung abscess  Pulmonary nodules  Chronic issues,  continue PTA albuterol inhaler every 6 hrs PRN dyspnea, atovaquone 750 mg PO daily, Advair 500-50 mcg/dose 1 puff BID, Spiriva 18 mcg 1 capsule daily, oxygen as ordered 2 lpm per NC continuously. Monitor respiratory status and f/u pulmonology, ID as recommended. Repeat CT of chest as outpatient recommended. WBC stable at 6.2 on 4/21.     Primary hypertension  Chronic, appears stable with PTA norvasc 5 mg daily, lasix 20 mg daily, lisinopril 40 mg daily, monitor vs and review next visit. Cr stable at 0.79 on 4/21.     Diabetes mellitus with no complication (H)  Chronic, A1C 9.9 on 2/21/22. Continue glipizide 5 mg daily, metformin 850 mg BID, monitor BG once daily and review ranges next PCP visit.     Hyperlipidemia, unspecified hyperlipidemia type  Chronic, continue PTA asa 81 mg daily, Crestor 5 mg daily    Insomnia  Stable with melatonin 1 mg PO HS. Monitor.     Anxiety  Stable with PTA Zoloft 25 mg daily. Monitor.     Slow transit constipation  Chronic, well managed with 8.5 mg Miralax daily. Monitor.     Spinal stenosis, unspecified spinal region  Physical deconditioning  Acute on chronic. Pain managed with PTA tylenol 325 mg every 6 hrs PRN pain, oxycodone 5 mg every 4 hrs PRN (up to 5 tabs daily). Home with home care as ordered below.     Discharge Medications  Current Outpatient Medications   Medication Sig Dispense Refill     Acetaminophen (TYLENOL PO) Take 325 mg by mouth every 6 hours as needed Patient takes TID with Oxycodone.       albuterol (PROAIR HFA/PROVENTIL HFA/VENTOLIN HFA) 108 (90 Base) MCG/ACT inhaler INHALE 2 PUFFS INTO THE LUNGS EVERY 6 HOURS AS NEEDED FOR SHORTNESS OF BREATH OR DIFFICULT BREATHING OR WHEEZING 25.5 g 3     amLODIPine (NORVASC) 5 MG tablet Take 1 tablet (5 mg) by mouth daily 30 tablet 0     ASPIRIN EC PO Take 81 mg by mouth daily       atovaquone (MEPRON) 750 MG/5ML suspension Take 5 mLs (750 mg) by mouth daily 210 mL 1     Calcium Polycarbophil (FIBERCON PO) Take 1 tablet by  mouth daily       calcium-vitamin D (CALTRATE) 600-400 MG-UNIT per tablet Take 1 tablet by mouth 2 times daily 1200mg   1000 mg of vitamin d       co-enzyme Q-10 100 MG CAPS capsule Take 100 mg by mouth daily       famotidine (PEPCID) 20 MG tablet Take 20 mg by mouth 2 times daily as needed       fluticasone-salmeterol (ADVAIR) 500-50 MCG/DOSE inhaler Inhale 1 puff into the lungs 2 times daily ### DO NOT FILL NOW.  Please update patient's profile to reflect additional refills.  #### 180 each 3     furosemide (LASIX) 20 MG tablet Take 1 tablet (20 mg) by mouth in the morning. 90 tablet 0     glipiZIDE (GLUCOTROL XL) 5 MG 24 hr tablet Take 1 tablet (5 mg) by mouth daily (with breakfast) 30 tablet 0     glucosamine-chondroitin 500-400 MG CAPS per capsule Take 1 capsule by mouth 2 times daily        latanoprost (XALATAN) 0.005 % ophthalmic solution Place 1 drop Into the left eye every evening       lisinopril (ZESTRIL) 40 MG tablet Take 1 tablet (40 mg) by mouth daily 90 tablet 3     MELATONIN PO Take 1 mg by mouth At Bedtime       metFORMIN (GLUCOPHAGE) 850 MG tablet Take 1 tablet (850 mg) by mouth 2 times daily (with meals) 180 tablet 1     Multiple Vitamins-Minerals (MULTIVITAMIN ADULT PO) Take 1 tablet by mouth every evening        oxyCODONE (ROXICODONE) 5 MG tablet Take 1 tablet (5 mg) by mouth every 4 hours as needed for severe pain May take 5 tabs per day 30 tablet 0     polyethylene glycol (MIRALAX) 17 g packet Take 0.5 packets by mouth every evening        rosuvastatin (CRESTOR) 5 MG tablet Take 5 mg by mouth At Bedtime       tiotropium (SPIRIVA HANDIHALER) 18 MCG inhaled capsule INHALE THE CONTENTS OF 1 CAPSULE VIA INHALATION DEVICE DAILY 90 capsule 3     Alcohol Swabs PADS Use to swab the area of the injection or kishan as directed Per insurance coverage (Patient not taking: No sig reported) 100 each 0     blood glucose (NO BRAND SPECIFIED) lancets standard To use to test glucose level in the blood Use to test  blood 1sugar  3  times daily as directed. To accompany glucose monitor brands per insurance coverage. (Patient not taking: No sig reported) 100 each 0     blood glucose (NO BRAND SPECIFIED) test strip To use to test glucose level in the blood Use to test blood sugar  3 times daily as directed. To accompany glucose monitor brands per insurance coverage. (Patient not taking: No sig reported) 100 strip 0     blood glucose calibration (NO BRAND SPECIFIED) solution Used to calibrate the blood glucose monitor as needed and as directed.  To accompany  blood glucose brands per insurance coverage (Patient not taking: No sig reported) 1 each 0     blood glucose monitoring (NO BRAND SPECIFIED) meter device kit Use as directed Per insurance coverage (Patient not taking: No sig reported) 1 kit 0     glucose 40 % (400 mg/mL) gel 15 g every 15 minutes by mouth as needed for low blood sugar.  Oral gel is preferable for conscious and able to swallow patient. (Patient not taking: Reported on 4/22/2022) 112.5 g 0     sertraline (ZOLOFT) 25 MG tablet Take 1 tablet (25 mg) by mouth daily 30 tablet 0     Sharps Container MISC Use as directed to dispose of needles, lancets and other sharps Per Insurance coverage (Patient not taking: No sig reported) 1 each 0      Controlled medications:   not applicable/none     Past Medical History:   Past Medical History:   Diagnosis Date     Basal cell carcinoma      Chronic pain      Complete rupture of rotator cuff      COPD (chronic obstructive pulmonary disease) (H)      History of blood transfusion      Mixed hyperlipidemia 04/2000     Osteoarthritis      Personal history of other malignant neoplasm of skin      Spinal stenosis of lumbar region with neurogenic claudication      T2DM (type 2 diabetes mellitus) (H)      Physical Exam:   Vitals: /65   Pulse 88   Temp 97.1  F (36.2  C)   Resp 18   Wt 53 kg (116 lb 12.8 oz)   SpO2 99%   BMI 20.37 kg/m    BMI: Body mass index is 20.37  kg/m .  GENERAL APPEARANCE:  Alert, in no distress, pleasant, cooperative, oriented x 3  EYES:  EOM, lids, pupils and irises normal, sclera clear and conjunctiva normal, no discharge or mattering on lids or lashes noted  ENT:  Mouth normal, moist mucous membranes, nose normal without drainage or crusting, external ears without lesions, hearing acuity intact  RESP:  respiratory effort normal, no chest wall tenderness, no respiratory distress, Lung sounds diminished throughout, patient is on oxygen per NC  CV:  Auscultation of heart done, rate and rhythm controlled and regular, no murmur, no rub or gallop. Edema none bilateral lower extremities.   ABDOMEN:  normal bowel sounds, soft, nontender, no palpable masses.  M/S:   Gait and station not assessed, no tenderness or swelling of the joints; able to move all extremities, digits normal  NEURO: cranial nerves 2-12 grossly intact, no facial asymmetry, no speech deficits and able to follow directions, moves all extremities symmetrically  PSYCH:  insight and judgement and memory appear intact, affect and mood normal     SNF labs:   Most Recent 3 CBC's:Recent Labs   Lab Test 04/21/22  0645 04/15/22  0822 04/12/22  1653 04/12/22  1024   WBC 6.2  --  6.6 6.0   HGB 11.3*  --  12.3 12.6   MCV 84  --  84 84    318 315 298     Most Recent 3 BMP's:Recent Labs   Lab Test 04/21/22  0645 04/15/22  1204 04/15/22  0822 04/15/22  0808 04/14/22  0907 04/14/22  0615 04/14/22  0304 04/14/22  0248 04/13/22  0804 04/13/22  0625 04/12/22  1653 04/12/22  1024     --   --   --   --   --   --   --   --  139  --  139   POTASSIUM 4.0  --   --   --   --  3.6  --  3.4   < > 3.2*  --  3.7   CHLORIDE 99  --   --   --   --   --   --   --   --  105  --  105   CO2 31  --   --   --   --   --   --   --   --  26  --  28   BUN 39*  --   --   --   --   --   --   --   --  14  --  15   CR 0.79  --  0.54  --   --   --   --   --   --  0.56   < > 0.54   ANIONGAP 7  --   --   --   --   --   --   --    --  8  --  6   NARCISA 10.9*  --   --   --   --   --   --   --   --  9.3  --  9.3   * 166*  --  146*   < >  --    < >  --    < > 127*   < > 175*    < > = values in this interval not displayed.       DISCHARGE PLAN:    Follow up labs: No labs orders/due    Medical Follow Up:      Follow up with primary care provider in 2-3 weeks  Follow up with specialist pulmonologist as recommended   Follow up with consultant ID as recommended    Current [place] scheduled appointments:  Next 5 appointments (look out 90 days)    May 27, 2022  1:30 PM  (Arrive by 1:10 PM)  Provider Visit with Emily Marie MD  [facility] ([facility] [address] [facility] [id number] [phone]           Discharge Services: Home Care:  Occupational Therapy, Physical Therapy, Registered Nurse, Home Health Aide and From:  [facility]    Discharge Instructions Verbalized to Patient at Discharge:     Monitor blood glucose monitoring 1-2 times a day. Keep a record and bring it to your next primary provider visit.     Oxygen at 2 liters/minute via nasal cannula.     TOTAL DISCHARGE TIME:   Greater than 30 minutes  Electronically signed by:  ALICIA Betancourt CNP     Home care Face to Face documentation done in [employer] attached to Home care orders for [facility].       MEDICAL NECESSITY STATEMENT FOR DME    Demographic Information on Celeste Chacko:    Celeste Chacko  Gender: female  : 1931  [address]    [place] [address] [phone] (home)     Medical Record: [id number]  Social Security Number: [id number]  Primary Care Provider: Emily Marie  Insurance: Payor: [employer] / Plan: [facility] MEDICARE ADVANTAGE / Product Type: HMO /       Chronic respiratory failure with hypoxia (H) [J96.11]    DME:  OXYGEN: 2 lpm per NC continuous, tubing, portable tank  22 room air sats at rest 88%    VITAL SIGNS:  Vitals: BP  128/65   Pulse 88   Temp 97.1  F (36.2  C)   Resp 18   Wt 53 kg (116 lb 12.8 oz)   SpO2 99%   BMI 20.37 kg/m    BMI= Body mass index is 20.37 kg/m .   see exam in note above    MEDICAL NECESSITY STATEMENT length of need: 3 months  Chronic respiratory failure with hypoxia (H) [J96.11]     Chronic respiratory failure with hypoxia (H)  Chronic obstructive pulmonary disease, unspecified COPD type (H)  History of lung abscess  Pulmonary nodules  Primary hypertension  Diabetes mellitus with no complication (H)  Hyperlipidemia, unspecified hyperlipidemia type  Primary insomnia  Anxiety  Slow transit constipation  Spinal stenosis of lumbar region with neurogenic claudication  Physical deconditioning     ELECTRONICALLY SIGNED BY ADITYA CERTIFIED PROVIDER:  ALICIA Betancourt CNP   NPI [id number]   [facility]  [address] [place] [address], [address]  [place] [address]

## 2022-04-21 NOTE — PROGRESS NOTES
[facility]      OUTPATIENT OCCUPATIONAL THERAPY  EVALUATION  PLAN OF TREATMENT FOR OUTPATIENT REHABILITATION  (COMPLETE FOR INITIAL CLAIMS ONLY)  Patient's Last Name, First Name, M.I.  YOB: 1931  Celeste Chacko                          Provider's Name  [facility] Medical Record No.  [id number]                               Onset Date:  04/12/22   Start of Care Date:  (P) 04/13/22     Type:     ___PT   _X_OT   ___SLP Medical Diagnosis:                           OT Diagnosis:  Decreased ADL independence and activity navdeep   Visits from SOC:  1   _________________________________________________________________________________  Plan of Treatment/Functional Goals    Planned Interventions: ADL retraining, transfer training, home program guidelines, progressive activity/exercise, risk factor education   Goals: See Occupational Therapy Goals on Care Plan in [facility] electronic health record.    Therapy Frequency: Daily  Predicted Duration of Therapy Intervention: 04/20/22  _________________________________________________________________________________    I CERTIFY THE NEED FOR THESE SERVICES FURNISHED UNDER        THIS PLAN OF TREATMENT AND WHILE UNDER MY CARE     (Physician co-signature of this document indicates review and certification of the therapy plan).              Certification date from: (P) 04/13/22, Certification date to: (P) 04/15/22    Referring Physician: Adam Sykes MD            Initial Assessment        See Occupational Therapy evaluation dated (P) 04/13/22 in Epic electronic health record.

## 2022-04-21 NOTE — PROGRESS NOTES
Contacted central scheduling to assist in making proper post-TCU follow up appointment; no openings until 1 month with PCP, was transferred to clinic TC to assist.     Contacted clinic TC, Dr. Marie is only in office limited days within recommended post TCU follow up time frame, appointment was offered with alternate provider in PCP office:     So, instead I made the appointment for Dr. Harvey (PCP Colleague, same office: [facility]):    April 27th @ 2:40 PM   Dr. Harvey [facility]     Updated TCU Care team & again encourage them to either schedule recommended Infectious Disease and Pulmonology follow up prior to TCU discharge or alert this writer if there were any barriers to scheduling this.

## 2022-04-22 ENCOUNTER — DISCHARGE SUMMARY NURSING HOME (OUTPATIENT)
Dept: GERIATRICS | Facility: CLINIC | Age: 87
End: 2022-04-22
Payer: COMMERCIAL

## 2022-04-22 DIAGNOSIS — F41.9 ANXIETY: ICD-10-CM

## 2022-04-22 DIAGNOSIS — Z87.09 HISTORY OF LUNG ABSCESS: ICD-10-CM

## 2022-04-22 DIAGNOSIS — J96.11 CHRONIC RESPIRATORY FAILURE WITH HYPOXIA (H): Primary | ICD-10-CM

## 2022-04-22 DIAGNOSIS — R91.8 PULMONARY NODULES: ICD-10-CM

## 2022-04-22 DIAGNOSIS — I10 PRIMARY HYPERTENSION: ICD-10-CM

## 2022-04-22 DIAGNOSIS — J44.9 COPD, SEVERE (H): ICD-10-CM

## 2022-04-22 DIAGNOSIS — E11.9 DIABETES MELLITUS WITH NO COMPLICATION (H): ICD-10-CM

## 2022-04-22 DIAGNOSIS — K59.01 SLOW TRANSIT CONSTIPATION: ICD-10-CM

## 2022-04-22 DIAGNOSIS — E78.5 HYPERLIPIDEMIA, UNSPECIFIED HYPERLIPIDEMIA TYPE: ICD-10-CM

## 2022-04-22 DIAGNOSIS — J44.9 CHRONIC OBSTRUCTIVE PULMONARY DISEASE, UNSPECIFIED COPD TYPE (H): ICD-10-CM

## 2022-04-22 DIAGNOSIS — M48.062 SPINAL STENOSIS OF LUMBAR REGION WITH NEUROGENIC CLAUDICATION: ICD-10-CM

## 2022-04-22 DIAGNOSIS — F51.01 PRIMARY INSOMNIA: ICD-10-CM

## 2022-04-22 DIAGNOSIS — R53.81 PHYSICAL DECONDITIONING: ICD-10-CM

## 2022-04-22 PROCEDURE — 99316 NF DSCHRG MGMT 30 MIN+: CPT | Performed by: NURSE PRACTITIONER

## 2022-04-22 RX ORDER — SERTRALINE HYDROCHLORIDE 25 MG/1
25 TABLET, FILM COATED ORAL DAILY
Qty: 30 TABLET | Refills: 0 | Status: SHIPPED | OUTPATIENT
Start: 2022-04-22 | End: 2022-07-05

## 2022-04-25 ENCOUNTER — LAB REQUISITION (OUTPATIENT)
Dept: LAB | Facility: CLINIC | Age: 87
End: 2022-04-25

## 2022-04-25 DIAGNOSIS — Z00.01 ENCOUNTER FOR GENERAL ADULT MEDICAL EXAMINATION WITH ABNORMAL FINDINGS: ICD-10-CM

## 2022-04-25 PROCEDURE — U0005 INFEC AGEN DETEC AMPLI PROBE: HCPCS | Performed by: NURSE PRACTITIONER

## 2022-04-25 NOTE — PROGRESS NOTES
Date of Surgery Update:  Jamil Ly was seen and examined. History and physical has been reviewed. The patient has been examined.  There have been no significant clinical changes since the completion of the originally dated History and Physical.    Signed By: Waldo Wagoner MD     November 29, 2017 2:03 PM Update: patient will discharge on 4/26, this writer will initiate appropriate Care Management referrals within 1 day of discharge.     Sanam Welsh RN  Clinical Product Navigator   Ambulatory Care Coordination  [phone]

## 2022-04-26 LAB — SARS-COV-2 RNA RESP QL NAA+PROBE: NEGATIVE

## 2022-04-27 ENCOUNTER — PATIENT OUTREACH (OUTPATIENT)
Dept: CARE COORDINATION | Facility: CLINIC | Age: 87
End: 2022-04-27

## 2022-04-27 DIAGNOSIS — M48.062 SPINAL STENOSIS OF LUMBAR REGION WITH NEUROGENIC CLAUDICATION: ICD-10-CM

## 2022-04-27 DIAGNOSIS — J44.9 COPD, SEVERE (H): Primary | ICD-10-CM

## 2022-04-27 RX ORDER — OXYCODONE HYDROCHLORIDE 5 MG/1
5 TABLET ORAL EVERY 4 HOURS PRN
Qty: 30 TABLET | Refills: 0 | Status: SHIPPED | OUTPATIENT
Start: 2022-04-27 | End: 2022-06-07

## 2022-04-27 NOTE — PROGRESS NOTES
S-(situation): TCU Discharge    B-(background): Patient was inpatient at [facility] for 3 weeks late February to mid-March 2022 and discharged to home with home care at that time.   Then, patient was readmitted to hospital with worsening of her pulmonary/respiratory symptoms.   She was discharged to [place] [facility] TCU and completed her post acute recovery and rehabilitation.     A-(assessment): Patient was discharged to home with home care on 4/26/2022.  Patient's PCP is likely retiring, so patient has been cautiously considering establishing with a new provider and has an appointment with Dr Lantigua on 5/12/2022; this writer made a post hospital follow up with a PCP colleague, but patient requested this be cancelled.  In addition to primary care, patient has an appointment with her pulmonologist on 5/3/2022 and is recommended to follow up with infectious disease in the next 3 months.     R-(recommendations/plan): Care Coordination and MTM referral placed.  CPN will monitor for any newly identified care navigation needs in 1 month.    Sanam Welsh RN  Clinical Product Navigator   Ambulatory Care Coordination  [phone]

## 2022-04-27 NOTE — PLAN OF CARE
[facility]      OUTPATIENT PHYSICAL THERAPY EVALUATION  PLAN OF TREATMENT FOR OUTPATIENT REHABILITATION  (COMPLETE FOR INITIAL CLAIMS ONLY)  Patient's Last Name, First Name, M.I.  YOB: 1931  Celeste Chacko                        Provider's Name  [facility] Medical Record No.  [id number]                               Onset Date:  04/12/22   Start of Care Date:  04/13/22      Type:     _X_PT   ___OT   ___SLP Medical Diagnosis:  Dyspnea on exertion                        PT Diagnosis:  Impaired ambulation   Visits from SOC:  1   _________________________________________________________________________________  Plan of Treatment/Functional Goals    Planned Interventions: balance training, bed mobility training, gait training, patient/family education, strengthening, transfer training     Goals: See Physical Therapy Goals on Care Plan in [employer] electronic health record.    Therapy Frequency: Daily  Predicted Duration of Therapy Intervention: 04/18/22  _________________________________________________________________________________    I CERTIFY THE NEED FOR THESE SERVICES FURNISHED UNDER        THIS PLAN OF TREATMENT AND WHILE UNDER MY CARE     (Physician co-signature of this document indicates review and certification of the therapy plan).                Certification date from: 04/13/22, Certification date to: 04/13/22    Referring Physician: Dr. Sykes            Initial Assessment        See Physical Therapy evaluation dated 04/13/22 in Epic electronic health record.

## 2022-04-27 NOTE — TELEPHONE ENCOUNTER
States you have 5 pills remaining     Patient asking for a call to let her know  the script has been sent to the  Pharmacy    Best number to call back  [phone]

## 2022-04-28 ENCOUNTER — TELEPHONE (OUTPATIENT)
Dept: INTERNAL MEDICINE | Facility: CLINIC | Age: 87
End: 2022-04-28

## 2022-04-28 ENCOUNTER — PATIENT OUTREACH (OUTPATIENT)
Dept: NURSING | Facility: CLINIC | Age: 87
End: 2022-04-28
Payer: COMMERCIAL

## 2022-04-28 ENCOUNTER — TELEPHONE (OUTPATIENT)
Dept: CARE COORDINATION | Facility: CLINIC | Age: 87
End: 2022-04-28

## 2022-04-28 NOTE — PROGRESS NOTES
"Clinic Care Coordination Contact  Community Health Worker Initial Outreach    CHW Initial Information Gathering:  Referral Source: Other, specify (Clinical Product Navigator)  Preferred Hospital: [facility]  [phone]  Current living arrangement:: I live alone  Type of residence::  (Condo (ILF))  Community Resources: Home Care, Housekeeping/Chore Agency  Equipment Currently Used at Home: walker, rolling, grab bar, tub/shower, shower chair, commode chair  Informal Support system:: Family (Patient daughter)  No PCP office visit in Past Year: No  Transportation means:: Family       Patient accepts CC: Yes. Patient scheduled for assessment with CC RN on 5/4/2022 at 2:00 PM. Patient noted desire to discuss Care Coordination.     4-28, CHW:    Writer was able to connect with the patient and introduce self/care coordination.    Patient stated that they are planning to take a \"cab\" to their appointment if their daughter is unable to.     Patient reported that she is interested in discussing potential transportation options with CC.     Wanted to make CC appointment for next week as patient is experiencing a bit of weakness and wants to rest.     Home care started for patient on 4/26/2022    Patient may qualify for the [facility] Healthy at Home Program: \"12 one-way rides to medically related appointments and to the pharmacy when referred by a [facility] Advocate. Contact [employer] ** for additional details and to schedule your trip? once you have been referred into the program: [phone]\"    This may be the \"cab\" patient was referencing.     Writer inquired if the patient had any other questions or concerns at this time, however they did not.       CATA OrrS. Public Health  Community Health Worker  [place]  Clinic Care Coordination  [phone]        "

## 2022-04-28 NOTE — TELEPHONE ENCOUNTER
[facility] referral from: [facility] visit (referral by provider)    MT referral outreach attempt #1 on April 28, 2022 at 4:09 PM      Outcome: Spoke with patient she says she can't do an hour long phone call.  She also can't make it into the clinic.  She decided to opt out.    Terri Snyder [facility]

## 2022-05-03 ENCOUNTER — MEDICAL CORRESPONDENCE (OUTPATIENT)
Dept: HEALTH INFORMATION MANAGEMENT | Facility: CLINIC | Age: 87
End: 2022-05-03
Payer: COMMERCIAL

## 2022-05-04 ENCOUNTER — TELEPHONE (OUTPATIENT)
Dept: INTERNAL MEDICINE | Facility: CLINIC | Age: 87
End: 2022-05-04

## 2022-05-04 ENCOUNTER — PATIENT OUTREACH (OUTPATIENT)
Dept: NURSING | Facility: CLINIC | Age: 87
End: 2022-05-04
Payer: COMMERCIAL

## 2022-05-04 DIAGNOSIS — J44.9 COPD, SEVERE (H): ICD-10-CM

## 2022-05-04 ASSESSMENT — ACTIVITIES OF DAILY LIVING (ADL): DEPENDENT_IADLS:: INDEPENDENT

## 2022-05-04 NOTE — PROGRESS NOTES
Clinic Care Coordination Contact    Clinic Care Coordination Contact  OUTREACH    Referral Information:  Referral Source: Other, specify (Clinical Product Navigator)    Primary Diagnosis: COPD    Chief Complaint   Patient presents with     Clinic Care Coordination - Initial        Universal Utilization: Reviewed on May 4, 2022  Clinic Utilization  Difficulty keeping appointments:: No  Compliance Concerns: No  No-Show Concerns: No  No PCP office visit in Past Year: No  Utilization    Hospital Admissions  3             ED Visits  3             No Show Count (past year)  1                Current as of: 5/4/2022  2:13 AM              Clinical Concerns:  Current Medical Concerns:    Patient Active Problem List   Diagnosis     Disorder of bone and cartilage     COPD, severe (H)     Spinal stenosis of lumbar region with neurogenic claudication     Type 2 diabetes mellitus with complication, without long-term current use of insulin (H)     HYPERLIPIDEMIA LDL GOAL <100     History of basal cell carcinoma     Controlled substance agreement signed     Chronic pain syndrome     Narcotic dependence (H)     Benign essential hypertension     Lumbar radiculopathy     CKD (chronic kidney disease) stage 1, GFR 90 ml/min or greater     Varicose veins of left lower extremity, unspecified whether complicated     Edema, unspecified type     Chronic, continuous use of opioids     Hypoxia     Dyspnea on exertion     Hypervolemia, unspecified hypervolemia type         Current Behavioral Concerns: See above    Education Provided to patient: RN CC role and reason for call.    Pain  Pain (GOAL):: No  Health Maintenance Reviewed: Due/Overdue   Health Maintenance Due   Topic Date Due     MEDICARE ANNUAL WELLNESS VISIT  10/04/2012     DTAP/TDAP/TD IMMUNIZATION (1 - Tdap) 04/02/2013     COVID-19 Vaccine (4 - Booster for Pfizer series) 02/14/2022     MICROALBUMIN  05/05/2022     URINE DRUG SCREEN  05/10/2022     TREATMENT AGREEMENT FOR CHRONIC PAIN  MANAGEMENT  05/10/2022     Clinical Pathway: None    Medication Management:  Medication review status: Medications reviewed and no changes reported per patient.        Current Outpatient Medications   Medication Instructions     Acetaminophen (TYLENOL PO) 325 mg, Oral, EVERY 6 HOURS PRN, Patient takes TID with Oxycodone.      albuterol (PROAIR HFA/PROVENTIL HFA/VENTOLIN HFA) 108 (90 Base) MCG/ACT inhaler INHALE 2 PUFFS INTO THE LUNGS EVERY 6 HOURS AS NEEDED FOR SHORTNESS OF BREATH OR DIFFICULT BREATHING OR WHEEZING     Alcohol Swabs PADS Use to swab the area of the injection or kishan as directed Per insurance coverage     amLODIPine (NORVASC) 5 mg, Oral, DAILY     ASPIRIN EC PO 81 mg, Oral, DAILY     atovaquone (MEPRON) 750 mg, Oral, DAILY     blood glucose (NO BRAND SPECIFIED) lancets standard To use to test glucose level in the blood Use to test blood 1sugar  3  times daily as directed. To accompany glucose monitor brands per insurance coverage.     blood glucose (NO BRAND SPECIFIED) test strip To use to test glucose level in the blood Use to test blood sugar  3 times daily as directed. To accompany glucose monitor brands per insurance coverage.     blood glucose calibration (NO BRAND SPECIFIED) solution Used to calibrate the blood glucose monitor as needed and as directed.  To accompany  blood glucose brands per insurance coverage     blood glucose monitoring (NO BRAND SPECIFIED) meter device kit Use as directed Per insurance coverage     Calcium Polycarbophil (FIBERCON PO) 1 tablet, Oral, DAILY     calcium-vitamin D (CALTRATE) 600-400 MG-UNIT per tablet 1 tablet, Oral, 2 TIMES DAILY, 1200mg <BR>1000 mg of vitamin d     co-enzyme Q-10 100 mg, Oral, DAILY     famotidine (PEPCID) 20 mg, Oral, 2 TIMES DAILY PRN     fluticasone-salmeterol (ADVAIR) 500-50 MCG/DOSE inhaler 1 puff, Inhalation, 2 TIMES DAILY, ### DO NOT FILL NOW.  Please update patient's profile to reflect additional refills.  ####     furosemide (LASIX)  20 mg, Oral, DAILY     glipiZIDE (GLUCOTROL XL) 5 mg, Oral, DAILY WITH BREAKFAST     glucosamine-chondroitin 500-400 MG CAPS per capsule 1 capsule, Oral, 2 TIMES DAILY     glucose 40 % (400 mg/mL) gel 15 g every 15 minutes by mouth as needed for low blood sugar.  Oral gel is preferable for conscious and able to swallow patient.     latanoprost (XALATAN) 0.005 % ophthalmic solution 1 drop, Left Eye, EVERY EVENING     lisinopril (ZESTRIL) 40 mg, Oral, DAILY     MELATONIN PO 1 mg, Oral, AT BEDTIME     metFORMIN (GLUCOPHAGE) 850 mg, Oral, 2 TIMES DAILY WITH MEALS     Multiple Vitamins-Minerals (MULTIVITAMIN ADULT PO) 1 tablet, Oral, EVERY EVENING     oxyCODONE (ROXICODONE) 5 mg, Oral, EVERY 4 HOURS PRN, May take 5 tabs per day     polyethylene glycol (MIRALAX) 17 g packet 0.5 packets, Oral, EVERY EVENING     rosuvastatin (CRESTOR) 5 mg, Oral, AT BEDTIME     sertraline (ZOLOFT) 25 mg, Oral, DAILY     Sharps Container MISC Use as directed to dispose of needles, lancets and other sharps Per Insurance coverage     tiotropium (SPIRIVA HANDIHALER) 18 MCG inhaled capsule INHALE THE CONTENTS OF 1 CAPSULE VIA INHALATION DEVICE DAILY        Functional Status:  Dependent ADLs:: Independent  Dependent IADLs:: Independent  Bed or wheelchair confined:: No    Living Situation:  Current living arrangement:: I live alone  Type of residence::  [facility])    Lifestyle & Psychosocial Needs:    Social Determinants of Health     Tobacco Use: Medium Risk     Smoking Tobacco Use: Former Smoker     Smokeless Tobacco Use: Never Used   Alcohol Use: Not on file   Financial Resource Strain: Not on file   Food Insecurity: Not on file   Transportation Needs: Not on file   Physical Activity: Not on file   Stress: Not on file   Social Connections: Not on file   Intimate Partner Violence: Not on file   Depression: Not at risk     PHQ-2 Score: 1   Housing Stability: Not on file     Diet:: Regular  Inadequate nutrition (GOAL):: No  Tube Feeding:  No  Inadequate activity/exercise (GOAL):: No  Significant changes in sleep pattern (GOAL): No  Transportation means:: Family     Informal Support system:: Family (Patient daughter)        RN CC called and spoke with patient. Celeste states that she is doing well at home since TCU discharge. Feels that her weakness is improving slowly everyday. [facility] services have started, and Celeste notes that this is beneficial and that she feels well supported at home. Denies any current concerns or complaints. Denies any SOB, cough, chest tighness, edema or change in appetite. Patient confirms she is taking all medications as prescribed.     Discussed transportation resources available to patient. Celeste states that she is not interested in the [facility] Healthy at Home Program. Patient's daughter will provide transportation for 5/12/22 PCP follow up appointment. Patient would like additional transportation resources mailed to her to review and utilize as the needs arises.      Resources and Interventions:  Current Resources:   Skilled Home Care Services: Skilled Nursing, Physicial Therapy, Occupational Therapy  Community Resources: Home Care, Housekeeping/Chore Agency     Equipment Currently Used at Home: walker, rolling, grab bar, tub/shower, shower chair, commode chair  Employment Status: retired         Advance Care Plan/Directive  Advanced Care Plans/Directives on file:: Yes  Type Advanced Care Plans/Directives: Advanced Directive - On File  Advanced Care Plan/Directive Status: Not Applicable    Referrals Placed: None       Goals:    Goals        General     1. Transportation (pt-stated)      Notes - Note created  5/4/2022  2:17 PM by Dominga Rivera RN     Goal Statement: I would like alternate transportation resources.   Date Goal set: May 4, 2022  Barriers: Patient no longer driving.   Strengths: Patient is motivated and engaged in CC.  Date to Achieve By: 10/1/22  Patient expressed understanding of goal: Yes  Action  steps to achieve this goal:  1. I understand that RN CC will mail transportation resources.   2. I will review resources and utilize as I see fit.   3. I will continue to work with care coordination for any additional resources or support.                 Patient/Caregiver understanding: Patient reports understanding and denies any additional questions or concerns at this times. RN CC engaged in AIDET communication during encounter.      Outreach Frequency: monthly  Future Appointments              In 1 week Arely Lantigua MD [facility]          Plan: RN CC will mail patient care coordination introduction letter, above discussed resources and complex care plan. CHW will reach out to patient in one month and check on care plan goal status. RN CC will review chart in 6 weeks.     Veronika Rivera RN Care Coordinator  [facility]  Email: [email or web address]  Phone: [phone]

## 2022-05-04 NOTE — LETTER
[facility] CARE COORDINATION  [address]  [facility] [address]    May 4, 2022    Celeste Chacko  [address]    [place] [facility] [address]      Dear Celeste,    I am a clinic care coordinator who works with Emily Marie MD at [facility]. I wanted to thank you for spending the time to talk with me.  Below is a description of clinic care coordination and how I can further assist you.      The clinic care coordination team is made up of a registered nurse,  and community health worker who understand the health care system. The goal of clinic care coordination is to help you manage your health and improve access to the health care system in the most efficient manner. The team can assist you in meeting your health care goals by providing education, coordinating services, strengthening the communication among your providers and supporting you with any resource needs.    Please feel free to contact the Community Health Worker Kalani at [phone] with any questions or concerns. We are focused on providing you with the highest-quality healthcare experience possible and that all starts with you.     Sincerely,     Veronika Rivera RN Care Coordinator  [facility]  Email: [email or web address]  Phone: [phone]                            Here are the Transportation Resources we discussed:     [facility] Transportation Services    -Help At Your Door [phone] $10 per ride    -[facility] for Seniors [phone] Sliding Scale    -[employer] [place]  [phone] MEDICAL TRANSPORT ONLY    -Senior : [phone]    -Safe Ride [facility]: [phone] Medical transport: Accept medical assistance or private pay  Non wheelchair is $18 non wheelchair or $25 wheelchair $1.50  per mile  [facility]es [facility].        Enclosed: I have enclosed a copy of the Patient Centered Plan of Care. This  has helpful information and goals that we have talked about. Please keep this in an easy to access place to use as needed.

## 2022-05-04 NOTE — LETTER
[facility]  Patient Centered Plan of Care  About Me:        Patient Name:  Celeste Chacko    YOB: 1931  Age:         89 year old   Tereso MRN:    [id number] Telephone Information:  Home Phone [phone]   Mobile [phone]       Address:  [address]  [place] [address] Email address:  No e-mail address on record      Emergency Contact(s)    Name Relationship Lgl Grd Work Phone Home Phone Mobile Phone   1. HIRAM SOTO Daughter   [phone]   2. NACOH SOTO Grandjimmy   [phone]           Primary language:  English     needed? No   [facility] [employer] [facility]:  [phone] op. 1  Other communication barriers:None    Preferred Method of Communication:  Mail  Current living arrangement: I live alone  Health Maintenance  Health Maintenance Reviewed: Due/Overdue   Health Maintenance Due   Topic Date Due     MEDICARE ANNUAL WELLNESS VISIT  10/04/2012     DTAP/TDAP/TD IMMUNIZATION (1 - Tdap) 04/02/2013     COVID-19 Vaccine (4 - Booster for Pfizer series) 02/14/2022     MICROALBUMIN  05/05/2022     URINE DRUG SCREEN  05/10/2022     TREATMENT AGREEMENT FOR CHRONIC PAIN MANAGEMENT  05/10/2022                   My Access Plan  Medical Emergency 911   Primary Clinic Line [facility] - [phone]   24 Hour Appointment Line [phone] or  [phone]) (toll-free)   24 Hour Nurse Line [phone] (toll-free)   Preferred Urgent Care [facility], [phone]     Preferred Hospital [facility]  [phone]     Preferred Pharmacy [id number] - [place], [place] - [address] AT [facility] [address]     Behavioral Health Crisis Line The National Suicide Prevention Lifeline at 1-580.912.6249 or 911       My Care Team Members  Patient Care Team       Relationship Specialty Notifications Start End    Cynthia,  Emily LARSON MD PCP - General   3/20/03     Phone: [phone] Fax: [phone]         [address] [facility] [address]    Emily Marie MD Assigned PCP   10/4/12     Phone: [phone] Fax: [phone]         [address] [facility] [address]    Tristan Michael DPM Assigned Musculoskeletal Provider   10/23/20     Phone: [phone] Fax: [phone]         [address] [place] [address] [address] [facility] [address]    Josie Kerr MD Assigned Pediatric Specialist Provider   1/10/21     Phone: [phone] Fax: [phone]         [facility] [address] [facility] [address] [facility] [address]    Selam Pink MD Assigned Heart and Vascular Provider   6/27/21     Phone: [phone] Pager: [phone] Fax: [phone] [address] [place] [address]    Sanam Welsh, RAZIA Other (see comments) Primary Care - CC  3/3/22     Dominga Rivera RN Lead Care Coordinator Primary Care - CC Admissions 5/4/22     Kalani Headley MA Community Health Worker Primary Care - [facility]  5/4/22             My Care Plans  Self Management and Treatment Plan  Goals and (Comments)   Goals        General     1. Transportation (pt-stated)      Notes - Note created  5/4/2022  2:17 PM by Dominga Rivera RN     Goal Statement: I would like alternate transportation resources.   Date Goal set: May 4, 2022  Barriers: Patient no longer driving.   Strengths: Patient is motivated and engaged in CC.  Date to Achieve By: 10/1/22  Patient expressed understanding of goal: Yes  Action steps to achieve this goal:  1. I understand that RN CC will mail transportation resources.   2. I will review resources and utilize as I see fit.   3. I will continue to work with care coordination for any additional resources or support.                  Action Plans on File:                       Advance Care Plans/Directives Type:   Advanced Directive - On File    Current Medications and Allergies:  Current  Outpatient Medications   Medication Instructions     Acetaminophen (TYLENOL PO) 325 mg, Oral, EVERY 6 HOURS PRN, Patient takes TID with Oxycodone.      albuterol (PROAIR HFA/PROVENTIL HFA/VENTOLIN HFA) 108 (90 Base) MCG/ACT inhaler INHALE 2 PUFFS INTO THE LUNGS EVERY 6 HOURS AS NEEDED FOR SHORTNESS OF BREATH OR DIFFICULT BREATHING OR WHEEZING     Alcohol Swabs PADS Use to swab the area of the injection or kishan as directed Per insurance coverage     amLODIPine (NORVASC) 5 mg, Oral, DAILY     ASPIRIN EC PO 81 mg, Oral, DAILY     atovaquone (MEPRON) 750 mg, Oral, DAILY     blood glucose (NO BRAND SPECIFIED) lancets standard To use to test glucose level in the blood Use to test blood 1sugar  3  times daily as directed. To accompany glucose monitor brands per insurance coverage.     blood glucose (NO BRAND SPECIFIED) test strip To use to test glucose level in the blood Use to test blood sugar  3 times daily as directed. To accompany glucose monitor brands per insurance coverage.     blood glucose calibration (NO BRAND SPECIFIED) solution Used to calibrate the blood glucose monitor as needed and as directed.  To accompany  blood glucose brands per insurance coverage     blood glucose monitoring (NO BRAND SPECIFIED) meter device kit Use as directed Per insurance coverage     Calcium Polycarbophil (FIBERCON PO) 1 tablet, Oral, DAILY     calcium-vitamin D (CALTRATE) 600-400 MG-UNIT per tablet 1 tablet, Oral, 2 TIMES DAILY, 1200mg <BR>1000 mg of vitamin d     co-enzyme Q-10 100 mg, Oral, DAILY     famotidine (PEPCID) 20 mg, Oral, 2 TIMES DAILY PRN     fluticasone-salmeterol (ADVAIR) 500-50 MCG/DOSE inhaler 1 puff, Inhalation, 2 TIMES DAILY, ### DO NOT FILL NOW.  Please update patient's profile to reflect additional refills.  ####     furosemide (LASIX) 20 mg, Oral, DAILY     glipiZIDE (GLUCOTROL XL) 5 mg, Oral, DAILY WITH BREAKFAST     glucosamine-chondroitin 500-400 MG CAPS per capsule 1 capsule, Oral, 2 TIMES DAILY      glucose 40 % (400 mg/mL) gel 15 g every 15 minutes by mouth as needed for low blood sugar.  Oral gel is preferable for conscious and able to swallow patient.     latanoprost (XALATAN) 0.005 % ophthalmic solution 1 drop, Left Eye, EVERY EVENING     lisinopril (ZESTRIL) 40 mg, Oral, DAILY     MELATONIN PO 1 mg, Oral, AT BEDTIME     metFORMIN (GLUCOPHAGE) 850 mg, Oral, 2 TIMES DAILY WITH MEALS     Multiple Vitamins-Minerals (MULTIVITAMIN ADULT PO) 1 tablet, Oral, EVERY EVENING     oxyCODONE (ROXICODONE) 5 mg, Oral, EVERY 4 HOURS PRN, May take 5 tabs per day     polyethylene glycol (MIRALAX) 17 g packet 0.5 packets, Oral, EVERY EVENING     rosuvastatin (CRESTOR) 5 mg, Oral, AT BEDTIME     sertraline (ZOLOFT) 25 mg, Oral, DAILY     Sharps Container MISC Use as directed to dispose of needles, lancets and other sharps Per Insurance coverage     tiotropium (SPIRIVA HANDIHALER) 18 MCG inhaled capsule INHALE THE CONTENTS OF 1 CAPSULE VIA INHALATION DEVICE DAILY         Care Coordination Start Date: 5/4/2022   Frequency of Care Coordination: monthly     Form Last Updated: 05/04/2022

## 2022-05-05 ENCOUNTER — MEDICAL CORRESPONDENCE (OUTPATIENT)
Dept: HEALTH INFORMATION MANAGEMENT | Facility: CLINIC | Age: 87
End: 2022-05-05

## 2022-05-05 ENCOUNTER — TELEPHONE (OUTPATIENT)
Dept: INTERNAL MEDICINE | Facility: CLINIC | Age: 87
End: 2022-05-05
Payer: COMMERCIAL

## 2022-05-05 NOTE — TELEPHONE ENCOUNTER
SKILLED NURSING / OCCUPATIONAL THERAPY / PHYSICAL THERAPY put on HOLD; orders received via fax. Form in your mailbox to be signed.

## 2022-05-06 DIAGNOSIS — Z53.9 DIAGNOSIS NOT YET DEFINED: Primary | ICD-10-CM

## 2022-05-06 PROCEDURE — 99207 PR MD CERTIFICATION HHA PATIENT: CPT | Performed by: INTERNAL MEDICINE

## 2022-05-11 PROBLEM — J44.9 COPD (CHRONIC OBSTRUCTIVE PULMONARY DISEASE) (H): Status: ACTIVE | Noted: 2022-05-11

## 2022-05-11 PROBLEM — M85.80 OSTEOPENIA: Status: ACTIVE | Noted: 2022-05-11

## 2022-05-11 PROBLEM — E11.9 DIABETES MELLITUS, TYPE 2 (H): Status: RESOLVED | Noted: 2022-05-11 | Resolved: 2022-05-11

## 2022-05-11 PROBLEM — I10 BENIGN ESSENTIAL HYPERTENSION: Status: RESOLVED | Noted: 2018-05-15 | Resolved: 2022-05-11

## 2022-05-11 PROBLEM — R06.09 DYSPNEA ON EXERTION: Status: RESOLVED | Noted: 2022-04-12 | Resolved: 2022-05-11

## 2022-05-11 PROBLEM — R60.9 EDEMA, UNSPECIFIED TYPE: Status: RESOLVED | Noted: 2020-07-11 | Resolved: 2022-05-11

## 2022-05-11 PROBLEM — E87.70 HYPERVOLEMIA, UNSPECIFIED HYPERVOLEMIA TYPE: Status: RESOLVED | Noted: 2022-04-12 | Resolved: 2022-05-11

## 2022-05-11 PROBLEM — K59.09 CHRONIC CONSTIPATION: Status: ACTIVE | Noted: 2022-05-11

## 2022-05-11 PROBLEM — E11.9 TYPE 2 DIABETES MELLITUS (H): Status: ACTIVE | Noted: 2022-05-11

## 2022-05-11 PROBLEM — M54.16 LUMBAR RADICULOPATHY: Status: RESOLVED | Noted: 2018-10-31 | Resolved: 2022-05-11

## 2022-05-11 PROBLEM — E11.9 DIABETES MELLITUS, TYPE 2 (H): Status: ACTIVE | Noted: 2022-05-11

## 2022-05-11 PROBLEM — F11.20 NARCOTIC DEPENDENCE (H): Status: RESOLVED | Noted: 2017-06-19 | Resolved: 2022-05-11

## 2022-05-11 PROBLEM — I10 BENIGN ESSENTIAL HYPERTENSION: Status: ACTIVE | Noted: 2022-05-11

## 2022-05-11 PROBLEM — F41.1 GAD (GENERALIZED ANXIETY DISORDER): Status: ACTIVE | Noted: 2022-05-11

## 2022-05-11 PROBLEM — N18.1 CKD (CHRONIC KIDNEY DISEASE) STAGE 1, GFR 90 ML/MIN OR GREATER: Status: RESOLVED | Noted: 2019-04-05 | Resolved: 2022-05-11

## 2022-05-11 PROBLEM — F11.90 CHRONIC, CONTINUOUS USE OF OPIOIDS: Status: RESOLVED | Noted: 2021-03-16 | Resolved: 2022-05-11

## 2022-05-11 PROBLEM — R09.02 HYPOXIA: Status: RESOLVED | Noted: 2022-01-25 | Resolved: 2022-05-11

## 2022-05-11 PROBLEM — I83.92 VARICOSE VEINS OF LEFT LOWER EXTREMITY, UNSPECIFIED WHETHER COMPLICATED: Status: RESOLVED | Noted: 2020-07-11 | Resolved: 2022-05-11

## 2022-05-11 PROBLEM — E78.00 PURE HYPERCHOLESTEROLEMIA: Status: ACTIVE | Noted: 2022-05-11

## 2022-05-12 ENCOUNTER — OFFICE VISIT (OUTPATIENT)
Dept: INTERNAL MEDICINE | Facility: CLINIC | Age: 87
End: 2022-05-12
Payer: COMMERCIAL

## 2022-05-12 VITALS
TEMPERATURE: 98.4 F | SYSTOLIC BLOOD PRESSURE: 118 MMHG | HEART RATE: 86 BPM | WEIGHT: 120.7 LBS | DIASTOLIC BLOOD PRESSURE: 60 MMHG | OXYGEN SATURATION: 98 % | BODY MASS INDEX: 21.05 KG/M2

## 2022-05-12 DIAGNOSIS — I10 BENIGN ESSENTIAL HYPERTENSION: ICD-10-CM

## 2022-05-12 DIAGNOSIS — E11.9 TYPE 2 DIABETES MELLITUS WITHOUT COMPLICATION, WITHOUT LONG-TERM CURRENT USE OF INSULIN (H): ICD-10-CM

## 2022-05-12 DIAGNOSIS — Z23 HIGH PRIORITY FOR 2019-NCOV VACCINE: ICD-10-CM

## 2022-05-12 DIAGNOSIS — M54.50 CHRONIC LOW BACK PAIN, UNSPECIFIED BACK PAIN LATERALITY, UNSPECIFIED WHETHER SCIATICA PRESENT: ICD-10-CM

## 2022-05-12 DIAGNOSIS — Z00.00 MEDICARE ANNUAL WELLNESS VISIT, SUBSEQUENT: Primary | ICD-10-CM

## 2022-05-12 DIAGNOSIS — E78.00 PURE HYPERCHOLESTEROLEMIA: ICD-10-CM

## 2022-05-12 DIAGNOSIS — Z86.19 HISTORY OF PNEUMOCYSTIS JIROVECII PNEUMONIA: ICD-10-CM

## 2022-05-12 DIAGNOSIS — Z23 NEED FOR PROPHYLACTIC VACCINATION AND INOCULATION AGAINST INFLUENZA: ICD-10-CM

## 2022-05-12 DIAGNOSIS — K59.09 CHRONIC CONSTIPATION: ICD-10-CM

## 2022-05-12 DIAGNOSIS — F11.90 CHRONIC, CONTINUOUS USE OF OPIOIDS: ICD-10-CM

## 2022-05-12 DIAGNOSIS — G89.4 CHRONIC PAIN SYNDROME: ICD-10-CM

## 2022-05-12 DIAGNOSIS — F41.1 GAD (GENERALIZED ANXIETY DISORDER): ICD-10-CM

## 2022-05-12 DIAGNOSIS — J44.9 CHRONIC OBSTRUCTIVE PULMONARY DISEASE, UNSPECIFIED COPD TYPE (H): ICD-10-CM

## 2022-05-12 DIAGNOSIS — M85.80 OSTEOPENIA, UNSPECIFIED LOCATION: ICD-10-CM

## 2022-05-12 DIAGNOSIS — G89.29 CHRONIC LOW BACK PAIN, UNSPECIFIED BACK PAIN LATERALITY, UNSPECIFIED WHETHER SCIATICA PRESENT: ICD-10-CM

## 2022-05-12 PROCEDURE — 99397 PER PM REEVAL EST PAT 65+ YR: CPT | Mod: 25 | Performed by: INTERNAL MEDICINE

## 2022-05-12 PROCEDURE — 0054A COVID-19,PF,PFIZER (12+ YRS): CPT | Performed by: INTERNAL MEDICINE

## 2022-05-12 PROCEDURE — 91305 COVID-19,PF,PFIZER (12+ YRS): CPT | Performed by: INTERNAL MEDICINE

## 2022-05-12 NOTE — PROGRESS NOTES
ADDENDUM:    I certify that this patient, Celsete Chacko has been under my care (or a nurse practitioner or physican's assistant working with me). This is the face-to-face encounter for oxygen medical necessity.      Celeste Chacko is now in a chronic stable state and continues to require supplemental oxygen. Patient has continued oxygen desaturation due to COPD J44.9.    Alternative treatment(s) tried or considered and deemed clinically infective for treatment of COPD J44.9 include nebulizers, inhalers, steroids and pulmonary toileting.    If portability is ordered, is the patient mobile within the home? Yes    Arely Lantigua MD   [facility]  [address] [place] [id number]  T: [phone], F: [phone]    ASSESSMENT/PLAN                                                       (Z00.00) Medicare annual wellness visit, subsequent  (primary encounter diagnosis)  Comment: PMH, PSH, FH, SH, medications, allergies, immunizations, and preventative health measures reviewed and updated as appropriate.  Plan: see below for plans.      (Z23) High priority for 2019-nCoV vaccine  Plan: COVID-19 booster #2 today.    (J44.9) Chronic obstructive pulmonary disease, unspecified COPD type (H)  Comment: well-controlled on current regimen (Advair, Spiriva, and albuterol); on continuous oxygen (2LNC).   Plan: will work on getting patient a portable oxygen unit.     (F11.90) Chronic, continuous use of opioids  (G89.4) Chronic pain syndrome  (M54.50,  G89.29) Chronic low back pain, unspecified back pain laterality, unspecified whether sciatica present  Comment: due to spinal stenosis; well-controlled on current regimen (tylenol 325 mg every 6 hrs PRN pain, oxycodone 5 mg every 4 hrs PRN - up to 5 tabs daily).    (M85.80) Osteopenia, unspecified location  Comment: previously on Fosamax; currently on a Fosamax holiday.  Plan: DEXA in 1-2 years.    (K59.09) Chronic constipation  Comment: well-controlled on current  regimen (daily Miralax).    (E78.00) Pure hypercholesterolemia  Comment: well-controlled on current regimen (Crestor).    (I10) Benign essential hypertension  Comment: well-controlled on current regimen (amlodipine).    (E11.9) Type 2 diabetes mellitus without complication, without long-term current use of insulin (H)  Comment: poorly-controlled on current regimen (metformin and glipizide XL).  Plan: fasting diabetic labs in September; recommendations to follow.    (F41.1) NOEMÍ (generalized anxiety disorder)  Comment: well-controlled on current regimen (Zoloft).    (Z86.19) History of Pneumocystis jirovecii pneumonia  Comment: On atovaquone; followed by ID.    Appropriate preventive services were discussed with this patient, including applicable screening as appropriate for cardiovascular disease, diabetes, osteopenia/osteoporosis, and glaucoma.  As appropriate for age/gender, discussed screening for colorectal cancer, prostate cancer, breast cancer, and cervical cancer. Checklist reviewing preventive services available has been given to the patient.    Reviewed patients plan of care. The Basic Care Plan (routine screening as documented in Health Maintenance) for Celeste Chacko meets the Care Plan requirement. This Care Plan has been established and reviewed with the Patient and daughter    Arely Lantigua MD   [address] [address]  T: [phone], F: [phone]    SUBJECTIVE                                                      Celeste Chacko is a very pleasant 89 year old female who presents for her subsequent AWV:    Accompanied by daughter.    Current providers (other than myself): Alistair (ID)    PMH, PSH, FH, SH, medications, allergies, immunizations, preventative health, and health risk assessment reviewed and updated as appropriate.    Past Medical History:   Diagnosis Date     Benign essential hypertension      Chronic constipation      Chronic lower back pain      due to spinal stenosis     Chronic pain syndrome      Chronic, continuous use of opioids      COPD (chronic obstructive pulmonary disease) (H)     on continuous oxygen (2LNC)     NOEMÍ (generalized anxiety disorder)      History of Pneumocystis jirovecii pneumonia      Osteopenia      Pure hypercholesterolemia      Type 2 diabetes mellitus (H)      Past Surgical History:   Procedure Laterality Date     ARTHROPLASTY KNEE Left 09/15/2014    Procedure: ARTHROPLASTY KNEE;  Surgeon: Carlos Alberto Kaminski MD;  Location: [facility]     CATARACT EXTRACTION, BILATERAL       INJECT EPIDURAL LUMBAR / SACRAL SINGLE Left 07/14/2016    Procedure: INJECT EPIDURAL LUMBAR / SACRAL SINGLE;  Surgeon: Luisito New MD;  [facility]     INJECT SACROILIAC JOINT N/A 12/01/2015    Procedure: INJECT SACROILIAC JOINT;  Surgeon: Luisito New MD;  Location: [facility]     INJECT SACROILIAC JOINT Bilateral 05/19/2016    Procedure: INJECT SACROILIAC JOINT;  Surgeon: Luisito New MD;  [facility]     INJECT SACROILIAC JOINT Bilateral 11/25/2016    Procedure: INJECT SACROILIAC JOINT;  Surgeon: Elmira Bennett MD;  [facility]     INJECT SACROILIAC JOINT Bilateral 04/25/2017    Procedure: INJECT SACROILIAC JOINT;  Bilateral Sacroiliac Joint Injection   Injection of local anesthetic and steroid into area around spine for pain relief;  Surgeon: Alvaro Holland MD;  [facility]     INJECT SACROILIAC JOINT Bilateral 07/28/2017    Procedure: INJECT SACROILIAC JOINT;  Bilateral Sacroiliac Joint Injection;  Surgeon: Elmira Bennett MD;  [facility]     INJECT SACROILIAC JOINT Bilateral 11/10/2017    Procedure: INJECT SACROILIAC JOINT;  Bilateral Sacroiliac Joint Injection;  Surgeon: Elmira Bennett MD;  [facility]     TONSILLECTOMY & ADENOIDECTOMY       TOTAL SHOULDER ARTHROPLASTY Right      Family History   Problem Relation Age of Onset     Diabetes Type 2  Brother      Breast Cancer Brother      Cerebrovascular Disease Brother       Myocardial Infarction Brother      Coronary Artery Disease Early Onset No family hx of      Ovarian Cancer No family hx of      Colon Cancer No family hx of      Social History     Occupational History     Occupation: Retired - Banker   Tobacco Use     Smoking status: Former Smoker     Packs/day: 1.00     Years: 54.00     Pack years: 54.00     Types: Cigarettes     Quit date: 2000     Years since quittin.0     Smokeless tobacco: Never Used   Vaping Use     Vaping Use: Never used   Substance and Sexual Activity     Alcohol use: Yes     Comment: at most 1 glass/week     Drug use: No     Sexual activity: Not Currently   Social History Narrative    .    Lives in a condo - independent living.    2 adopted adult children.    2 grandchildren.    1 great grandchild.    Physical/occupational therapy exercise.      Allergies   Allergen Reactions     Sulfamethoxazole Anaphylaxis     Current Outpatient Medications   Medication Sig     Acetaminophen (TYLENOL PO) Take 325 mg by mouth every 6 hours as needed Patient takes TID with Oxycodone.     albuterol (PROAIR HFA/PROVENTIL HFA/VENTOLIN HFA) 108 (90 Base) MCG/ACT inhaler INHALE 2 PUFFS INTO THE LUNGS EVERY 6 HOURS AS NEEDED FOR SHORTNESS OF BREATH OR DIFFICULT BREATHING OR WHEEZING     amLODIPine (NORVASC) 5 MG tablet Take 1 tablet (5 mg) by mouth daily     ASPIRIN EC PO Take 81 mg by mouth daily     atovaquone (MEPRON) 750 MG/5ML suspension Take 5 mLs (750 mg) by mouth daily     blood glucose (NO BRAND SPECIFIED) lancets standard To use to test glucose level in the blood Use to test blood 1sugar  3  times daily as directed. To accompany glucose monitor brands per insurance coverage. (Patient taking differently: To use to test glucose level in the blood Use to test blood 1sugar  3  times daily as directed. To accompany glucose monitor brands per insurance coverage.)     blood glucose (NO BRAND SPECIFIED) test strip To use to test glucose level in the blood  Use to test blood sugar  3 times daily as directed. To accompany glucose monitor brands per insurance coverage. (Patient taking differently: To use to test glucose level in the blood Use to test blood sugar  3 times daily as directed. To accompany glucose monitor brands per insurance coverage.)     blood glucose calibration (NO BRAND SPECIFIED) solution Used to calibrate the blood glucose monitor as needed and as directed.  To accompany  blood glucose brands per insurance coverage (Patient taking differently: Used to calibrate the blood glucose monitor as needed and as directed.  To accompany  blood glucose brands per insurance coverage)     blood glucose monitoring (NO BRAND SPECIFIED) meter device kit Use as directed Per insurance coverage     Calcium Polycarbophil (FIBERCON PO) Take 1 tablet by mouth daily     calcium-vitamin D (CALTRATE) 600-400 MG-UNIT per tablet Take 1 tablet by mouth 2 times daily 1200mg   1000 mg of vitamin d     co-enzyme Q-10 100 MG CAPS capsule Take 100 mg by mouth daily     famotidine (PEPCID) 20 MG tablet Take 20 mg by mouth 2 times daily as needed     fluticasone-salmeterol (ADVAIR) 500-50 MCG/DOSE inhaler Inhale 1 puff into the lungs 2 times daily ### DO NOT FILL NOW.  Please update patient's profile to reflect additional refills.  ####     furosemide (LASIX) 20 MG tablet Take 1 tablet (20 mg) by mouth in the morning.     glipiZIDE (GLUCOTROL XL) 5 MG 24 hr tablet Take 1 tablet (5 mg) by mouth daily (with breakfast)     glucosamine-chondroitin 500-400 MG CAPS per capsule Take 1 capsule by mouth 2 times daily      glucose 40 % (400 mg/mL) gel 15 g every 15 minutes by mouth as needed for low blood sugar.  Oral gel is preferable for conscious and able to swallow patient. (Patient taking differently: 15 g every 15 minutes by mouth as needed for low blood sugar.  Oral gel is preferable for conscious and able to swallow patient.)     latanoprost (XALATAN) 0.005 % ophthalmic solution Place 1  drop Into the left eye every evening     lisinopril (ZESTRIL) 40 MG tablet Take 1 tablet (40 mg) by mouth daily     MELATONIN PO Take 1 mg by mouth At Bedtime     metFORMIN (GLUCOPHAGE) 850 MG tablet Take 1 tablet (850 mg) by mouth 2 times daily (with meals)     Multiple Vitamins-Minerals (MULTIVITAMIN ADULT PO) Take 1 tablet by mouth every evening      oxyCODONE (ROXICODONE) 5 MG tablet Take 1 tablet (5 mg) by mouth every 4 hours as needed for severe pain May take 5 tabs per day     polyethylene glycol (MIRALAX) 17 g packet Take 0.5 packets by mouth every evening      rosuvastatin (CRESTOR) 5 MG tablet Take 5 mg by mouth At Bedtime     sertraline (ZOLOFT) 25 MG tablet Take 1 tablet (25 mg) by mouth daily     Sharps Container MISC Use as directed to dispose of needles, lancets and other sharps Per Insurance coverage     tiotropium (SPIRIVA HANDIHALER) 18 MCG inhaled capsule INHALE THE CONTENTS OF 1 CAPSULE VIA INHALATION DEVICE DAILY     Alcohol Swabs PADS Use to swab the area of the injection or kishan as directed Per insurance coverage (Patient not taking: No sig reported)     Immunization History   Administered Date(s) Administered     COVID-19,PF,Pfizer (12+ Yrs) 02/24/2021, 03/17/2021, 10/14/2021     HEPA 08/18/1999, 11/13/2001     Influenza (H1N1) 01/21/2010     Influenza (High Dose) 3 valent vaccine 10/01/2010, 10/20/2011, 09/27/2012, 10/04/2013, 09/17/2014, 10/02/2015, 10/17/2016, 09/01/2017, 09/10/2018, 09/30/2019     Influenza (IIV3) PF 11/01/2001, 12/03/2002, 11/04/2003, 10/25/2005, 10/21/2008, 11/23/2009     Influenza, Quad, High Dose, Pf, 65yr+ (Fluzone HD) 11/03/2020, 10/14/2021     Pneumo Conj 13-V (2010&after) 07/30/2015     Pneumococcal 23 valent 12/18/1996, 11/23/2007     TD (ADULT, 7+) 08/18/1999, 03/05/2009     TDAP Vaccine (Adacel) 04/02/2013     Zoster vaccine recombinant adjuvanted (SHINGRIX) 06/15/2018, 08/25/2018     Zoster vaccine, live 06/01/2011     [facility]                       "                                BMI: within normal limits   Blood pressure: well-controlled on current regimen   Breast CA screening: screening no longer indicated  Colon CA screening: screening no longer indicated  Lung CA screening: screening no longer indicated  Dexa: not medically indicated at this time - on Fosamax holiday  Screening cholesterol: n/a - already being treated for this condition  Screening diabetes: n/a - already being treated for this condition  Alcohol misuse screening: alcohol use reviewed - no intervention indicated at this time  Immunizations: reviewed; COVID-19 booster #2 DUE    HEALTH RISK ASSESSMENT                                                      In general, how would you rate your overall physical health? good  Outside of work, how many days during the week do you exercise? 6-7 days/week  Outside of work, approximately how many minutes a day do you exercise? 15-30 minutes    If you drink alcohol do you typically have >3 drinks per day or >7 drinks per week? No  Do you usually eat at least 4 servings of fruit and vegetables a day, include whole grains & fiber and avoid regularly eating high fat or \"junk\" foods? Yes     Do you have any problems taking medications regularly? No  Do you have any side effects from medications? No    Assistance with daily activities: YES - assistance with transportation    Safety concerns: No    Fall risk assessment: completed today (see ambulatory assessments)    Hearing concerns: No    In the past 6 months, have you been bothered by leaking of urine: No    In general, how would you rate your overall mental or emotional health: good    PHQ-2/PHQ-9 assessment: completed today (see ambulatory assessments)    Additional concerns today: YES - interested in portable oxygen    OBJECTIVE                                                      /60   Pulse 86   Temp 98.4  F (36.9  C) (Tympanic)   Wt 54.7 kg (120 lb 11.2 oz)   SpO2 98%   BMI 21.05 kg/m   on " 2L NC  Constitutional: well-appearing  Head, Ears, and Eyes: normocephalic; normal external auditory canal and pinna; tympanic membranes visualized and normal; normal lids and conjunctivae  Neck: supple, symmetric, no thyromegaly or lymphadenopathy  Respiratory: normal respiratory effort; clear to auscultation bilaterally  Cardiovascular: regular rate and rhythm; no edema  Musculoskeletal: walks with walker  Psych: normal judgment and insight; normal mood and affect; recent and remote memory intact    Cognitive impairment noted: No  ---  (Note was completed, in part, with [employer] voice-recognition software. Documentation was reviewed, but some grammatical, spelling, and word errors may remain.)

## 2022-05-12 NOTE — PATIENT INSTRUCTIONS
Fasting labs in September.    COVID-19 booster #2 today.    I will work on portable oxygen order.

## 2022-05-16 ENCOUNTER — TELEPHONE (OUTPATIENT)
Dept: INTERNAL MEDICINE | Facility: CLINIC | Age: 87
End: 2022-05-16
Payer: COMMERCIAL

## 2022-05-16 NOTE — TELEPHONE ENCOUNTER
"Left message for [facility] to call back re: below.    [facility] is calling about Oxygen order, and wondering if walk test (home02 dot phrase) was completed at visit.    Reviewed chart and found several supporting items for home O2.  Did leave voicemail back for [facility] to let us know if this is enough information, otherwise might need exception for relaxed guidelines.     1. In most recent hospitalization, Dr. Sykes notes \" She is 95-96% on room air at rest, but as soon as she starts moving, she drops down to 89, 88.\"    2. See below screen shot of Home 02 test completed 3/7/22    "

## 2022-05-17 NOTE — TELEPHONE ENCOUNTER
Patient is interested in a small, portable oxygen unit. [facility] only has the rolling oxygen tanks (I spoke to them already). Was hoping [facility] could assist the patient with this need that cannot be done by her current oxygen provider.

## 2022-05-17 NOTE — TELEPHONE ENCOUNTER
[facility] staff called, stated that patient actually already has an oxygen unit through [facility]. Any orders need to be sent to them.

## 2022-05-17 NOTE — TELEPHONE ENCOUNTER
Nghia from home care called to say, they will be going with the relaxed oxygent guidelines due to less documentation needed.    Documentation does need to be with in 30 days. Going with relaxed guidelines.      Jacqueline Atkinson RN

## 2022-05-18 NOTE — TELEPHONE ENCOUNTER
Spoke to patient to relay update and providers recommendations.     Patient agrees to call around to other medical supply companies, will ask if currently accepting Oxygen Transfers and also if Pulse Dose Unit is available.

## 2022-05-18 NOTE — TELEPHONE ENCOUNTER
"Please let patient know that our home oxygen team does not have the capacity to take her on right now.    She is welcome to call around to see if another oxygen company is able to transfer her oxygen care to them AND have a smaller, lighter oxygen unit available (\"a pulse dose unit\").   "

## 2022-05-18 NOTE — TELEPHONE ENCOUNTER
Unfortunately we are not taking any oxygen transfers at the moment. When I spoke to Celeste yesterday, she states she's interested in something lighter and smaller, she referenced a pulse dose unit. I would suggest someone to call around if any other oxygen company are willing to take her on.

## 2022-05-25 ENCOUNTER — PATIENT OUTREACH (OUTPATIENT)
Dept: CARE COORDINATION | Facility: CLINIC | Age: 87
End: 2022-05-25
Payer: COMMERCIAL

## 2022-06-03 ENCOUNTER — MEDICAL CORRESPONDENCE (OUTPATIENT)
Dept: HEALTH INFORMATION MANAGEMENT | Facility: CLINIC | Age: 87
End: 2022-06-03

## 2022-06-07 DIAGNOSIS — M48.062 SPINAL STENOSIS OF LUMBAR REGION WITH NEUROGENIC CLAUDICATION: ICD-10-CM

## 2022-06-07 RX ORDER — OXYCODONE HYDROCHLORIDE 5 MG/1
5 TABLET ORAL EVERY 4 HOURS PRN
Qty: 150 TABLET | Refills: 0 | Status: SHIPPED | OUTPATIENT
Start: 2022-06-07 | End: 2022-07-05

## 2022-06-07 NOTE — TELEPHONE ENCOUNTER
"Pt called     1) states she gets O2 from [facility] - in order to get small/portable tank they need a \"conserving device assessment\"   Fax: [phone]   Direct to respiratory therapist     Unsure exactly what they are requesting, but did fax DME order there for O2     2) also requesting a refill on Oxycodone    Last OV 5/12/22     oxyCODONE (ROXICODONE) 5 MG tablet 30 tablet 0 4/27/2022  No   Sig - Route: Take 1 tablet (5 mg) by mouth every 4 hours as needed for severe pain May take 5 tabs per day - Oral   Sent to pharmacy as: oxyCODONE HCl 5 MG Oral Tablet (ROXICODONE)   Class: E-Prescribe   Earliest Fill Date: 4/27/2022   Order: [id number]   E-Prescribing Status: Receipt confirmed by pharmacy (4/27/2022  1:36 PM CDT       Penny RAMIREZ, Triage RN  [facility]     "

## 2022-06-14 ENCOUNTER — PATIENT OUTREACH (OUTPATIENT)
Dept: CARE COORDINATION | Facility: CLINIC | Age: 87
End: 2022-06-14
Payer: COMMERCIAL

## 2022-06-14 DIAGNOSIS — J44.9 CHRONIC OBSTRUCTIVE PULMONARY DISEASE, UNSPECIFIED COPD TYPE (H): Primary | ICD-10-CM

## 2022-06-14 NOTE — PROGRESS NOTES
Clinic Care Coordination Contact    Community Health Worker Follow Up    Care Gaps:     Health Maintenance Due   Topic Date Due     MICROALBUMIN  05/05/2022     URINE DRUG SCREEN  05/10/2022     TREATMENT AGREEMENT FOR CHRONIC PAIN MANAGEMENT  05/10/2022       Patient was focused on addressing their current goals    Goals:    Goals Addressed as of 6/14/2022 at 2:48 PM                    Today       1. Transportation (pt-stated)   20%    Added 5/4/22 by Dominga Rivera RN      Goal Statement: I would like alternate transportation resources.   Date Goal set: May 4, 2022  Barriers: Patient no longer driving.   Strengths: Patient is motivated and engaged in CC.  Date to Achieve By: 10/1/22  Patient expressed understanding of goal: Yes  Action steps to achieve this goal:  1. I understand that RN CC will mail transportation resources.   2. I will review resources and utilize as I see fit.   3. I will continue to work with care coordination for any additional resources or support.     6-14, CHW:    Patient reported that she did get the transportation resources sent by the CC RN in the mail. Patient endorsed that they were very helpful.    Patient has an upcoming appointment and she is planning to call out to the transportation agencies provided.    Patient shares that her daughter is able to drive her at night and on the weekends but not during business hours.                Intervention and Education during outreach: Writer inquired if the patient has any other questions or concerns, however the patient stated they do not. Writer encouraged the patient to reach out previous to next outreach if any questions arise after reaching out to resources; patient is agreeable to plan.     CHW Plan: Writer will reach out to the patient in 1 month to monitor the progression of the patient's goals.     ARIA Orr. Public Health  Community Health Worker  [facility]  Clinic Care  Coordination  [phone]  -------------------------------------------------------------------------------------  Next outreach: 7/14/2022  Preferred contact: [phone]    Enrollment: 5/04/2022  Last Care Plan Assessment: 5/04/2022

## 2022-06-15 RX ORDER — FLUTICASONE PROPIONATE AND SALMETEROL 500; 50 UG/1; UG/1
1 POWDER RESPIRATORY (INHALATION) 2 TIMES DAILY
Qty: 180 EACH | Refills: 3 | Status: SHIPPED | OUTPATIENT
Start: 2022-06-15 | End: 2024-02-13

## 2022-06-15 NOTE — TELEPHONE ENCOUNTER
Pt is requesting refill for Wixela inhaler refill. She uses 500-50 MCG/DOSE inhaler with directions Inhale 1 puff into the lungs 2 times daily. Pt has been out of the medication for 5 days. States he [facility] pharmacy called 2 days ago but has not received response.    Pt is home bound and daughter plans to  Rx today.     Pt would need a call back at [phone] once Rx is approved.     Routing refill request to provider for review/approval because:  Medication is reported/historical

## 2022-06-20 DIAGNOSIS — J44.9 COPD, SEVERE (H): ICD-10-CM

## 2022-06-21 ENCOUNTER — PATIENT OUTREACH (OUTPATIENT)
Dept: CARE COORDINATION | Facility: CLINIC | Age: 87
End: 2022-06-21
Payer: COMMERCIAL

## 2022-06-21 RX ORDER — TIOTROPIUM BROMIDE 18 UG/1
CAPSULE ORAL; RESPIRATORY (INHALATION)
Qty: 90 CAPSULE | Refills: 3 | OUTPATIENT
Start: 2022-06-21

## 2022-06-21 ASSESSMENT — ACTIVITIES OF DAILY LIVING (ADL): DEPENDENT_IADLS:: INDEPENDENT

## 2022-06-21 NOTE — PROGRESS NOTES
Care Coordination Clinician Chart Review     Situation: Patient chart reviewed by care coordinator.?     Background: Initial assessment and enrollment to Care Coordination was 5/4/22.?? Patient centered goals were developed with participation from patient.? Lead CC handed patient off to CHW for continued outreach every 30 days.??     Assessment: Per chart review, patient outreach completed by CC CHW on 6/14/22.? Patient is actively working to accomplish goal(s).? Patient's goal(s) remain(s) appropriate at this time.? Patient is not due for updated Plan of Care.? Annual assessment will be due 5/2023.      Goals        1. Transportation (pt-stated)       Goal Statement: I would like alternate transportation resources.   Date Goal set: May 4, 2022  Barriers: Patient no longer driving.   Strengths: Patient is motivated and engaged in CC.  Date to Achieve By: 10/1/22  Patient expressed understanding of goal: Yes  Action steps to achieve this goal:  1. I understand that RN CC will mail transportation resources.   2. I will review resources and utilize as I see fit.   3. I will continue to work with care coordination for any additional resources or support.             ??     Plan/Recommendations: The patient will continue working with Care Coordination to achieve above goal(s).? CHW will involve Lead CC as needed or if patient is ready to move to maintenance.? Lead CC will continue to monitor CHW s monthly outreaches and progress to goal(s) every 6 weeks.?     Plan of Care updated and sent to patient: No    Veronika Rivera RN Care Coordinator  [facility]  Email: [email or web address]  Phone: [phone]

## 2022-06-24 ENCOUNTER — TELEPHONE (OUTPATIENT)
Dept: INTERNAL MEDICINE | Facility: CLINIC | Age: 87
End: 2022-06-24

## 2022-06-24 DIAGNOSIS — J44.9 COPD, SEVERE (H): Primary | ICD-10-CM

## 2022-06-24 NOTE — TELEPHONE ENCOUNTER
This was addressed in May - please see TE 5/16/22. Unfortunately, I do not have any new recommendations.

## 2022-06-24 NOTE — TELEPHONE ENCOUNTER
"Pt called back regarding this     Reviewed notes from provider in May indicating [facility] didn't carry smaller portable O2     She said that is odd, she just talked to [facility] and they told her they do have \"small oxygen tanks\"     They told her to fax an order that states Size B or C tanks     Faxed to [facility]     Fax: [phone]    Penny RAMIREZ Triage RN  [facility]     "

## 2022-06-24 NOTE — TELEPHONE ENCOUNTER
Pt called the clinic stating [facility] needs a prescription for oxygen for the pt.  Pt stated her tank now is 8lbs and its too heavy for pt. Pt requesting a smaller tank.       Routing to PCP for review/recommendations.     Pt stated this is urgent and wants a call back today with information/recommendations on this.   Can we leave a detailed message on this number? YES  Phone number patient can be reached at: [phone].     Hazel Yap RN  [facility] Triage

## 2022-06-28 DIAGNOSIS — J44.9 COPD, SEVERE (H): ICD-10-CM

## 2022-06-28 RX ORDER — TIOTROPIUM BROMIDE 18 UG/1
CAPSULE ORAL; RESPIRATORY (INHALATION)
Qty: 90 CAPSULE | Refills: 3 | Status: ON HOLD | OUTPATIENT
Start: 2022-06-28 | End: 2023-12-13

## 2022-06-28 NOTE — TELEPHONE ENCOUNTER
Received a call from the patient stating she needs a refill of her Spiriva inhaler. Patient states her daughter is going to go  her medications tomorrow evening. Patient also wants to make sure that all of her refill requests go to Dr. Lantigua moving forward versus Dr. Marie. Pharmacy called and relayed message from the patient per pt request. Pharmacy states they are aware of the PCP change and will be sending refill requests to Dr. Lantigua.    Routing refill request to PCP as medication is listed as historical.     Annetta Simmons RN

## 2022-07-05 ENCOUNTER — TELEPHONE (OUTPATIENT)
Dept: INTERNAL MEDICINE | Facility: CLINIC | Age: 87
End: 2022-07-05

## 2022-07-05 DIAGNOSIS — J44.9 CHRONIC OBSTRUCTIVE PULMONARY DISEASE, UNSPECIFIED COPD TYPE (H): ICD-10-CM

## 2022-07-05 DIAGNOSIS — J44.9 COPD, SEVERE (H): ICD-10-CM

## 2022-07-05 DIAGNOSIS — M48.062 SPINAL STENOSIS OF LUMBAR REGION WITH NEUROGENIC CLAUDICATION: ICD-10-CM

## 2022-07-05 DIAGNOSIS — E11.8 TYPE 2 DIABETES MELLITUS WITH COMPLICATION, WITHOUT LONG-TERM CURRENT USE OF INSULIN (H): ICD-10-CM

## 2022-07-05 DIAGNOSIS — B59 PNEUMONIA DUE TO PNEUMOCYSTIS JIROVECII, UNSPECIFIED LATERALITY, UNSPECIFIED PART OF LUNG (H): ICD-10-CM

## 2022-07-05 DIAGNOSIS — I10 BENIGN ESSENTIAL HYPERTENSION: ICD-10-CM

## 2022-07-05 RX ORDER — ALBUTEROL SULFATE 90 UG/1
2 AEROSOL, METERED RESPIRATORY (INHALATION) EVERY 6 HOURS
Qty: 18 G | Refills: 0 | Status: SHIPPED | OUTPATIENT
Start: 2022-07-05

## 2022-07-05 RX ORDER — ALBUTEROL SULFATE 90 UG/1
AEROSOL, METERED RESPIRATORY (INHALATION)
Qty: 25.5 G | Refills: 3 | Status: CANCELLED | OUTPATIENT
Start: 2022-07-05

## 2022-07-05 RX ORDER — LISINOPRIL 40 MG/1
40 TABLET ORAL DAILY
Qty: 90 TABLET | Refills: 1 | Status: SHIPPED | OUTPATIENT
Start: 2022-07-05 | End: 2024-01-15

## 2022-07-05 RX ORDER — GLIPIZIDE 5 MG/1
5 TABLET, FILM COATED, EXTENDED RELEASE ORAL
Qty: 90 TABLET | Refills: 1 | Status: SHIPPED | OUTPATIENT
Start: 2022-07-05 | End: 2023-12-08

## 2022-07-05 RX ORDER — OXYCODONE HYDROCHLORIDE 5 MG/1
5 TABLET ORAL EVERY 4 HOURS PRN
Qty: 150 TABLET | Refills: 0 | Status: CANCELLED | OUTPATIENT
Start: 2022-07-05

## 2022-07-05 RX ORDER — OXYCODONE HYDROCHLORIDE 5 MG/1
5 TABLET ORAL EVERY 4 HOURS PRN
Qty: 150 TABLET | Refills: 0 | Status: ON HOLD | OUTPATIENT
Start: 2022-07-06 | End: 2023-12-13

## 2022-07-05 RX ORDER — AMLODIPINE BESYLATE 5 MG/1
5 TABLET ORAL DAILY
Qty: 90 TABLET | Refills: 1 | Status: SHIPPED | OUTPATIENT
Start: 2022-07-05 | End: 2023-12-08

## 2022-07-05 RX ORDER — ATOVAQUONE 750 MG/5ML
750 SUSPENSION ORAL DAILY
Qty: 420 ML | Refills: 1 | Status: SHIPPED | OUTPATIENT
Start: 2022-07-05 | End: 2022-09-07

## 2022-07-05 RX ORDER — SERTRALINE HYDROCHLORIDE 25 MG/1
25 TABLET, FILM COATED ORAL DAILY
Qty: 90 TABLET | Refills: 1 | Status: SHIPPED | OUTPATIENT
Start: 2022-07-05 | End: 2024-01-18

## 2022-07-05 RX ORDER — ROSUVASTATIN CALCIUM 5 MG/1
5 TABLET, COATED ORAL AT BEDTIME
Qty: 90 TABLET | Refills: 1 | Status: SHIPPED | OUTPATIENT
Start: 2022-07-05

## 2022-07-05 NOTE — TELEPHONE ENCOUNTER
Patient and Insurance employee calling clinic. Patient is calling back in regards to requested portable O2 tank similar to Inogen (shoulder bag). Inogen tank weighs 3.3lbs.   Patient states tank sizes B or C are too big/heavy and she is not able to ambulate freely.    Is it possible to have O2 order written out for Inogen tank?    Patient also requesting all medications to be sent to pharmacy under new PCP. Medications pended.         Insurance Employee recommends the following medical supply companies. RN called recommended suppliers for availability of smaller/lightweight O2 tanks.   [facility] [phone] NOT AVAILABLE  [facility] logistics [phone]  NOT AVAILABLE  [facility] [phone] NOT AVAILABLE

## 2022-07-05 NOTE — TELEPHONE ENCOUNTER
Refills approved.    Patient receives her oxygen from  [facility]. She cannot switch oxygen providers once established. This is true for all oxygen suppliers (I don't know why). Transfer of services/obtaining the Inogen tank through [facility] was attempted back in May when she established care with me (declined due to above). The  should be aware of this widespread policy.

## 2022-07-06 NOTE — TELEPHONE ENCOUNTER
Relayed providers message to patient. Patient understands the provider has done everything she can and is not upset with [facility]. Patient plans to discuss a possible waiver with insurance company since current oxygen provider is not able to provide a portable oxygen tank that is appropriate for patient.

## 2022-07-08 ENCOUNTER — NURSE TRIAGE (OUTPATIENT)
Dept: INTERNAL MEDICINE | Facility: CLINIC | Age: 87
End: 2022-07-08

## 2022-07-08 DIAGNOSIS — J44.1 CHRONIC OBSTRUCTIVE PULMONARY DISEASE WITH ACUTE EXACERBATION (H): Primary | ICD-10-CM

## 2022-07-08 RX ORDER — PREDNISONE 20 MG/1
TABLET ORAL
Qty: 21 TABLET | Refills: 0 | Status: SHIPPED | OUTPATIENT
Start: 2022-07-08 | End: 2022-07-22

## 2022-07-08 NOTE — TELEPHONE ENCOUNTER
Oral steroid taper prescribed. This is the best way to avoid an ER visit.    We can tolerate elevated blood sugars temporarily - no additional treatments recommended.

## 2022-07-08 NOTE — TELEPHONE ENCOUNTER
"Received a call from the patient and her daughter stating the patient woke up this morning and was feeling SOB. Patient checked her O2 sats and she was in the high-80s this morning, patient sat down and took deep breathes and it is now 91-92%, no fever, no heart racing. Took Spiriva, Advair, and Albuterol this morning-patient does not feel like it helped. Patient denies dizziness, light headedness, and chest pain.     1. RESPIRATORY STATUS: \"Describe your breathing?\" (e.g., wheezing, shortness of breath, unable to speak, severe coughing)       Shortness of breath,no wheezing present  2. ONSET: \"When did this breathing problem begin?\"       This morning  3. PATTERN \"Does the difficult breathing come and go, or has it been constant since it started?\"       Constant  4. SEVERITY: \"How bad is your breathing?\" (e.g., mild, moderate, severe)     - MILD: No SOB at rest, mild SOB with walking, speaks normally in sentences, can lay down, no retractions, pulse < 100.       When patient is not moving no SOB, mild SOB with walking (patient does drop down to 86-89%)  5. RECURRENT SYMPTOM: \"Have you had difficulty breathing before?\" If so, ask: \"When was the last time?\" and \"What happened that time?\"       COPD  6. CARDIAC HISTORY: \"Do you have any history of heart disease?\" (e.g., heart attack, angina, bypass surgery, angioplasty)       No  7. LUNG HISTORY: \"Do you have any history of lung disease?\"  (e.g., pulmonary embolus, asthma, emphysema)      Baseline 2L oxygen, COPD  8. CAUSE: \"What do you think is causing the breathing problem?\"       Unknown  9. OTHER SYMPTOMS: \"Do you have any other symptoms? (e.g., dizziness, runny nose, cough, chest pain, fever)      No dizziness, no light headedness, no chest pain  10. PREGNANCY: \"Is there any chance you are pregnant?\" \"When was your last menstrual period?\"        No  11. TRAVEL: \"Have you traveled out of the country in the last month?\" (e.g., travel history, exposures)        " "No    Triage protocol recommending patient go to the office now. Daughter states they can bring the patient to the emergency room but they were hoping to get some recommendations from Dr. Lantigua to avoid a trip to the hospital. Routing to PCP for review.     Please call the patient back with Dr. Lantigua's recommendations.     Reason for Disposition    MILD difficulty breathing (e.g., minimal/no SOB at rest, SOB with walking, pulse < 100) of new onset or worse than normal    Additional Information    Negative: Breathing stopped and hasn't returned    Negative: Choking on something    Negative: SEVERE difficulty breathing (e.g., struggling for each breath, speaks in single words, pulse > 120)    Negative: Bluish (or gray) lips or face    Negative: Difficult to awaken or acting confused (e.g., disoriented, slurred speech)    Negative: Passed out (i.e., fainted, collapsed and was not responding)    Negative: Wheezing started suddenly after medicine, an allergic food, or bee sting    Negative: Stridor    Negative: Slow, shallow and weak breathing    Negative: Sounds like a life-threatening emergency to the triager    Negative: Chest pain    Negative: Wheezing (high pitched whistling sound) and previous asthma attacks or use of asthma medicines    Negative: Difficulty breathing and only present when coughing    Negative: Difficulty breathing and only from stuffy or runny nose    Negative: Difficulty breathing and within 14 days of COVID-19 Exposure    Negative: MODERATE difficulty breathing (e.g., speaks in phrases, SOB even at rest, pulse 100-120) of new onset or worse than normal    Negative: Wheezing can be heard across the room    Negative: Drooling or spitting out saliva (because can't swallow)    Negative: Any history of prior \"blood clot\" in leg or lungs    Negative: Illness requiring prolonged bedrest in past month (e.g., immobilization, long hospital stay)    Negative: Hip or leg fracture (broken bone) in past month " "(or had cast on leg or ankle in past month)    Negative: Major surgery in the past month    Negative: Long-distance travel in past month (e.g., car, bus, train, plane; with trip lasting 6 or more hours)    Negative: Extra heart beats OR irregular heart beating   (i.e., \"palpitations\")    Negative: Fever > 103 F (39.4 C)    Negative: Fever > 101 F (38.3 C) and over 60 years of age    Negative: Fever > 100.0 F (37.8 C) and bedridden (e.g., nursing home patient, stroke, chronic illness, recovering from surgery)    Negative: Fever > 100.0 F (37.8 C) and diabetes mellitus or weak immune system (e.g., HIV positive, cancer chemo, splenectomy, organ transplant, chronic steroids)    Negative: Periods where breathing stops and then resumes normally and bedridden (e.g., nursing home patient, CVA)    Negative: Pregnant or postpartum (from 0 to 6 weeks after delivery)    Negative: Patient sounds very sick or weak to the triager    Protocols used: BREATHING DIFFICULTY-A-OH    Annetta Simmons RN    "

## 2022-07-08 NOTE — TELEPHONE ENCOUNTER
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 Daughter is hesitant to have patient try steroids again.     Patient in background stating she'd rather not go to the ER because she doesn't want to be admitted to the hospital again if she can avoid it. She feels this is COPD exacerbation. Patient would like to try the steroids and asking if theres anything she can take to help stabilize her BG?

## 2022-07-08 NOTE — TELEPHONE ENCOUNTER
Relayed providers message to daughter, Irene. Irene verbalized understanding and agrees to plan. If patient does not have improvement with Prednisone, will go to ER for evaluation.

## 2022-07-08 NOTE — TELEPHONE ENCOUNTER
Does she think it's a COPD exacerbation? COPD exacerbations generally present with SOB, cough, and wheezing...    If so, we can try steroids.    If not, ER is best.

## 2022-07-20 ENCOUNTER — NURSE TRIAGE (OUTPATIENT)
Dept: INTERNAL MEDICINE | Facility: CLINIC | Age: 87
End: 2022-07-20

## 2022-07-20 DIAGNOSIS — J44.1 CHRONIC OBSTRUCTIVE PULMONARY DISEASE WITH ACUTE EXACERBATION (H): ICD-10-CM

## 2022-07-20 RX ORDER — PREDNISONE 20 MG/1
TABLET ORAL
Qty: 21 TABLET | Refills: 0 | Status: CANCELLED
Start: 2022-07-20 | End: 2022-08-03

## 2022-07-20 NOTE — TELEPHONE ENCOUNTER
"Patient Contact    Attempt # 1    Was call answered?  Yes.  \"May I please speak with <patient name>\"  Is patient available?   Yes    Provider message relayed. Pt verbalizes understanding.     "

## 2022-07-20 NOTE — TELEPHONE ENCOUNTER
"Pt complains of SOB. Notes symptom is chronic for why she wants refill Rx for Prednisone 20 mg tablet..Pt denies chest pain or wheezing. She uses oxygen at 2.5 liters. Oxygen level at rest is 94%. Pt's daughter is able to  Prednisone Rx for pt if approved today. Please advice on Rx request. Medication and preferred pharmacy pended.     Reason for Disposition    MODERATE longstanding difficulty breathing (e.g., speaks in phrases, SOB even at rest, pulse 100-120) and SAME as normal    Additional Information    Negative: Breathing stopped and hasn't returned    Negative: Choking on something    Negative: SEVERE difficulty breathing (e.g., struggling for each breath, speaks in single words, pulse > 120)    Negative: Bluish (or gray) lips or face    Negative: Difficult to awaken or acting confused (e.g., disoriented, slurred speech)    Negative: Passed out (i.e., fainted, collapsed and was not responding)    Negative: Wheezing started suddenly after medicine, an allergic food, or bee sting    Negative: Stridor    Negative: Slow, shallow and weak breathing    Negative: Sounds like a life-threatening emergency to the triager    Negative: Chest pain    Negative: Wheezing (high pitched whistling sound) and previous asthma attacks or use of asthma medicines    Negative: Difficulty breathing and only present when coughing    Negative: Difficulty breathing and only from stuffy or runny nose    Negative: Difficulty breathing and within 14 days of COVID-19 Exposure    Negative: MODERATE difficulty breathing (e.g., speaks in phrases, SOB even at rest, pulse 100-120) of new onset or worse than normal    Negative: Wheezing can be heard across the room    Negative: Drooling or spitting out saliva (because can't swallow)    Negative: Any history of prior \"blood clot\" in leg or lungs    Negative: Illness requiring prolonged bedrest in past month (e.g., immobilization, long hospital stay)    Negative: Hip or leg fracture (broken " "bone) in past month (or had cast on leg or ankle in past month)    Negative: Major surgery in the past month    Negative: Long-distance travel in past month (e.g., car, bus, train, plane; with trip lasting 6 or more hours)    Negative: Extra heart beats OR irregular heart beating   (i.e., \"palpitations\")    Negative: Fever > 103 F (39.4 C)    Negative: Fever > 101 F (38.3 C) and over 60 years of age    Negative: Fever > 100.0 F (37.8 C) and bedridden (e.g., nursing home patient, stroke, chronic illness, recovering from surgery)    Negative: Fever > 100.0 F (37.8 C) and diabetes mellitus or weak immune system (e.g., HIV positive, cancer chemo, splenectomy, organ transplant, chronic steroids)    Negative: Periods where breathing stops and then resumes normally and bedridden (e.g., nursing home patient, CVA)    Negative: Pregnant or postpartum (from 0 to 6 weeks after delivery)    Negative: Patient sounds very sick or weak to the triager    Negative: MILD difficulty breathing (e.g., minimal/no SOB at rest, SOB with walking, pulse < 100) of new onset or worse than normal    Negative: Longstanding difficulty breathing (e.g., CHF, COPD, emphysema) and worse than normal    Negative: Longstanding difficulty breathing and not responding to usual therapy    Negative: Continuous (nonstop) coughing    Negative: Patient wants to be seen    Protocols used: BREATHING DIFFICULTY-A-OH      "

## 2022-07-22 ENCOUNTER — PATIENT OUTREACH (OUTPATIENT)
Dept: CARE COORDINATION | Facility: CLINIC | Age: 87
End: 2022-07-22

## 2022-07-22 ENCOUNTER — MEDICAL CORRESPONDENCE (OUTPATIENT)
Dept: HEALTH INFORMATION MANAGEMENT | Facility: CLINIC | Age: 87
End: 2022-07-22

## 2022-07-22 NOTE — PROGRESS NOTES
Clinic Care Coordination Contact  Community Health Worker Follow Up    Spoke to Patient (Celeste)     CHW Introduced intent of call regarding monthly follow up.     Patients reports that she is doing okay. Further expressed that she is currently on oxygen. CHW acknowledged.     CHW inquires if Patient has been able to review transportation resources and determine which one Patient will utilize. Patient expressed that she has been able to review resources and indicated that she currently has not utilized them due to not needing to go anywhere but Patient expressed that her upcoming appointment she will utilize a transportation option. Patient expressed that she can not remember the name of the transportation option that Patient will use. CHW acknowledged and encouraged Patient to contact CHW/ CCC if additional support is needed in contacting with Transportation resources. Patient acknowledged.     CHW inquires if Patient interested in scheduling the care gap (MICROALBUMIN). Patient expressed that she is not interested in scheduling for that appointment (MICROALBUMIN) at this time. CHW acknowledged.     CHW confirms Patient's upcoming appointment; CT Appointment 08/03/2022 7:45AM M [facility]. Patient acknowledged.       CHW encourages Patient to contact CHW/ CCC Team if additional support is needed. CHW provides Patient with CHW's contact information. Patient acknowledged.     Chart Review    Monthly Follow Up     Address Goal(s):   o Has Patient been able to review document that RN CC sent regarding  Transportation and to determine which resource is fitting for Patient?     Address Care Gap(s):   o Encourage Patient to schedule appointment for MICROALBUMIN via contacting the clinic (Or CHW can transfer Patient utilizing Back Door Scheduling Line (Non-Hospital Discharge Appointment; [phone])    Upcoming Appointments;   o CT Appointment 08/03/2022 7:45AM [facility]     Care Gaps:   Health Maintenance Due   Topic Date Due     MICROALBUMIN  05/05/2022     URINE DRUG SCREEN  05/10/2022     TREATMENT AGREEMENT FOR CHRONIC PAIN MANAGEMENT  05/10/2022     Declined due to Patient expressed that she is not interested in scheduling for that appointment (MICROALBUMIN) at this time.     Goals:    Goals Addressed as of 7/22/2022 at 12:45 PM                    Today    6/14/22       1. Transportation (pt-stated)   40%  20%    Added 5/4/22 by Dominga Rivera, RN      Goal Statement: I would like alternate transportation resources.   Date Goal set: May 4, 2022  Barriers: Patient no longer driving.   Strengths: Patient is motivated and engaged in CC.  Date to Achieve By: 10/1/22  Patient expressed understanding of goal: Yes  Action steps to achieve this goal:  1. I understand that RN CC will mail transportation resources.   2. I will review resources and utilize as I see fit.   3. I will continue to work with care coordination for any additional resources or support.               Intervention and Education during outreach: CHW encouraged Patient to contact CHW/ CCC Team if additional support is needed in inquiring transportation within resources sent from RNCC for upcoming appointment.     CHW Plan: Next CHW Outreach in One Month     WING Garrett  Clinic Care Coordination   [facility]   Phone: [phone]  [email or web address]

## 2022-08-02 DIAGNOSIS — B59 PNEUMONIA DUE TO PNEUMOCYSTIS JIROVECII, UNSPECIFIED LATERALITY, UNSPECIFIED PART OF LUNG (H): ICD-10-CM

## 2022-08-03 ENCOUNTER — TELEPHONE (OUTPATIENT)
Dept: INTERNAL MEDICINE | Facility: CLINIC | Age: 87
End: 2022-08-03

## 2022-08-03 NOTE — TELEPHONE ENCOUNTER
POLST signed, pages labeled and sent to scanning.     Original POLST form mailed  back to patient's home address.

## 2022-08-04 RX ORDER — ATOVAQUONE 750 MG/5ML
750 SUSPENSION ORAL DAILY
Qty: 420 ML | Refills: 1 | OUTPATIENT
Start: 2022-08-04

## 2022-08-04 NOTE — TELEPHONE ENCOUNTER
Atovaquone (Mepron) 750 mg/5 ml susp  Medication filled for 420 ml with 1 refill to same pharmacy 7/5/22. Refills on file.    LOV 05/2022

## 2022-08-10 ENCOUNTER — PATIENT OUTREACH (OUTPATIENT)
Dept: CARE COORDINATION | Facility: CLINIC | Age: 87
End: 2022-08-10

## 2022-08-10 ASSESSMENT — ACTIVITIES OF DAILY LIVING (ADL): DEPENDENT_IADLS:: INDEPENDENT

## 2022-08-10 NOTE — LETTER
[facility]  [address]  [facility] [address]    August 10, 2022        Celeste LARSON Ricco  [address]  [address]  [place] [facility] [address]          Dear Marina Alonso is an updated Complex Care Plan for your continued enrollment in Care Coordination. Please let us know if you have additional questions, concerns, or goals that we can assist with.    Sincerely,    Veronika Rivera RN Care Coordinator  [facility]  Email: [email or web address]  Phone: [phone]    [facility]  Patient Centered Plan of Care  About Me:        Patient Name:  Celeste Chacko    YOB: 1931  Age:         89 year old   [place] MRN:    [id number] Telephone Information:  Home Phone [phone]   Mobile [phone]       Address:  [address]  [address]  [place] [address] Email address:  No e-mail address on record      Emergency Contact(s)    Name Relationship Lgl Grd Work Phone Home Phone Mobile Phone   1. HRIAM SOTO Daughter   [phone]   2. NACHO SOTO Grandjimmy   [phone]           Primary language:  English     needed? No   [place] [facility]:  [phone] op. 1  Other communication barriers:None    Preferred Method of Communication:  Mail  Current living arrangement: I live alone    Mobility Status/ Medical Equipment: No data recorded    Health Maintenance  Health Maintenance Reviewed: Due/Overdue   Health Maintenance Due   Topic Date Due     MICROALBUMIN  05/05/2022     URINE DRUG SCREEN  05/10/2022     TREATMENT AGREEMENT FOR CHRONIC PAIN MANAGEMENT  05/10/2022     A1C  08/21/2022           My Access Plan  Medical Emergency 911   Primary Clinic Line [facility] - [phone]   24 Hour Appointment Line [phone] or  [phone]) (toll-free)   24 Hour Nurse Line [phone] (toll-free)    Preferred Urgent Care [facility] [place], [phone]     Preferred Hospital [facility]  [phone]     Preferred Pharmacy [id number] - [facility] [address] AT [facility] [address]     Behavioral Health Crisis Line The National Suicide Prevention Lifeline at 1-999.845.3449 or 988     My Care Team Members  Patient Care Team       Relationship Specialty Notifications Start End    Arely Lantigua MD PCP - General Internal Medicine  5/12/22     Phone: [phone] Fax: [phone]         [address] [place] [address]    Tristan Michael DPM Assigned Musculoskeletal Provider   10/23/20     Phone: [phone] Fax: [phone] [address] [facility] [address]    Selam Pink MD Assigned Heart and Vascular Provider   6/27/21     Phone: [phone] Pager: [phone] Fax: [phone] [address] [facility] [address] [facility] [address]    Dominga Rivera RN Lead Care Coordinator Primary Care - CC Admissions 5/4/22     Kalani Headley MA Community Health Worker Primary Care - CC  5/4/22     Arely Lantigua MD Assigned PCP   5/22/22     Phone: [phone] Fax: [phone]         [address] [place] [address]    Olinda Glover, [facility] Assigned MTM Pharmacist   5/28/22     Phone: [phone] Pager: [phone]         [address] [facility] [address] [facility] [address]            My Care Plans  Self Management and Treatment Plan  Goals and (Comments)   Goals        General     1. Transportation (pt-stated)      Notes - Note created  5/4/2022  2:17 PM by Dominga Rivera RN     Goal Statement: I would like alternate transportation resources.   Date Goal set: May 4, 2022  Barriers: Patient no longer driving.   Strengths: Patient is motivated and engaged in CC.  Date to Achieve By: 10/1/22  Patient expressed understanding of goal: Yes  Action steps to achieve this goal:  1. I understand that  RN CC will mail transportation resources.   2. I will review resources and utilize as I see fit.   3. I will continue to work with care coordination for any additional resources or support.                  Advance Care Plans/Directives Type:   Advanced Directive - On File    Current Medications and Allergies:    Current Outpatient Medications   Medication Instructions     Acetaminophen (TYLENOL PO) 325 mg, Oral, EVERY 6 HOURS PRN, Patient takes TID with Oxycodone.      albuterol (PROAIR HFA/PROVENTIL HFA/VENTOLIN HFA) 108 (90 Base) MCG/ACT inhaler 2 puffs, Inhalation, EVERY 6 HOURS     Alcohol Swabs PADS Use to swab the area of the injection or kishan as directed Per insurance coverage     amLODIPine (NORVASC) 5 mg, Oral, DAILY     ASPIRIN EC PO 81 mg, Oral, DAILY     atovaquone (MEPRON) 750 mg, Oral, DAILY     blood glucose (NO BRAND SPECIFIED) lancets standard To use to test glucose level in the blood Use to test blood 1sugar  3  times daily as directed. To accompany glucose monitor brands per insurance coverage.     blood glucose (NO BRAND SPECIFIED) test strip To use to test glucose level in the blood Use to test blood sugar  3 times daily as directed. To accompany glucose monitor brands per insurance coverage.     blood glucose calibration (NO BRAND SPECIFIED) solution Used to calibrate the blood glucose monitor as needed and as directed.  To accompany  blood glucose brands per insurance coverage     blood glucose monitoring (NO BRAND SPECIFIED) meter device kit Use as directed Per insurance coverage     Calcium Polycarbophil (FIBERCON PO) 1 tablet, Oral, DAILY     calcium-vitamin D (CALTRATE) 600-400 MG-UNIT per tablet 1 tablet, Oral, 2 TIMES DAILY, 1200mg <BR>1000 mg of vitamin d     co-enzyme Q-10 100 mg, Oral, DAILY     famotidine (PEPCID) 20 mg, Oral, 2 TIMES DAILY PRN     fluticasone-salmeterol (ADVAIR) 500-50 MCG/ACT inhaler 1 puff, Inhalation, 2 TIMES DAILY     furosemide (LASIX) 20 mg, Oral, DAILY      glipiZIDE (GLUCOTROL XL) 5 mg, Oral, DAILY WITH BREAKFAST     glucosamine-chondroitin 500-400 MG CAPS per capsule 1 capsule, Oral, 2 TIMES DAILY     glucose 40 % (400 mg/mL) gel 15 g every 15 minutes by mouth as needed for low blood sugar.  Oral gel is preferable for conscious and able to swallow patient.     latanoprost (XALATAN) 0.005 % ophthalmic solution 1 drop, Left Eye, EVERY EVENING     lisinopril (ZESTRIL) 40 mg, Oral, DAILY     MELATONIN PO 1 mg, Oral, AT BEDTIME     metFORMIN (GLUCOPHAGE) 850 mg, Oral, 2 TIMES DAILY WITH MEALS     Multiple Vitamins-Minerals (MULTIVITAMIN ADULT PO) 1 tablet, Oral, EVERY EVENING     oxyCODONE (ROXICODONE) 5 mg, Oral, EVERY 4 HOURS PRN, May take 5 tabs per day     polyethylene glycol (MIRALAX) 17 g packet 0.5 packets, Oral, EVERY EVENING     rosuvastatin (CRESTOR) 5 mg, Oral, AT BEDTIME     sertraline (ZOLOFT) 25 mg, Oral, DAILY     Sharps Container MISC Use as directed to dispose of needles, lancets and other sharps Per Insurance coverage     tiotropium (SPIRIVA HANDIHALER) 18 MCG inhaled capsule INHALE THE CONTENTS OF 1 CAPSULE VIA INHALATION DEVICE DAILY         Care Coordination Start Date: 5/4/2022   Frequency of Care Coordination: monthly     Form Last Updated: 08/10/2022

## 2022-08-10 NOTE — PROGRESS NOTES
Care Coordination Clinician Chart Review     Situation: Patient chart reviewed by care coordinator.?     Background: Initial assessment and enrollment to Care Coordination was 5/4/22.?? Patient centered goals were developed with participation from patient.? Lead CC handed patient off to CHW for continued outreach every 30 days.??     Assessment: Per chart review, patient outreach completed by CC CHW on 7/22/22.? Patient is actively working to accomplish goal(s).? Patient's goal(s) remain(s) appropriate at this time.? Patient is due for updated Plan of Care.? Annual assessment will be due 5/2023.      Goals        1. Transportation (pt-stated)       Goal Statement: I would like alternate transportation resources.   Date Goal set: May 4, 2022  Barriers: Patient no longer driving.   Strengths: Patient is motivated and engaged in CC.  Date to Achieve By: 10/1/22  Patient expressed understanding of goal: Yes  Action steps to achieve this goal:  1. I understand that RN CC will mail transportation resources.   2. I will review resources and utilize as I see fit.   3. I will continue to work with care coordination for any additional resources or support.             ??     Plan/Recommendations: The patient will continue working with Care Coordination to achieve above goal(s).? CHW will involve Lead CC as needed or if patient is ready to move to maintenance.? Lead CC will continue to monitor CHW s monthly outreaches and progress to goal(s) every 6 weeks.?     Plan of Care updated and sent to patient: Yes, via the mail.    Veronika Rivera RN Care Coordinator  [facility]  Email: [email or web address]  Phone: [phone]

## 2022-08-19 ENCOUNTER — PATIENT OUTREACH (OUTPATIENT)
Dept: CARE COORDINATION | Facility: CLINIC | Age: 87
End: 2022-08-19

## 2022-08-19 ASSESSMENT — ACTIVITIES OF DAILY LIVING (ADL): DEPENDENT_IADLS:: INDEPENDENT

## 2022-08-19 NOTE — PROGRESS NOTES
Care Coordination Clinician Chart Review      Situation: Patient chart reviewed by care coordinator for compass reji conversion and routine chart review.?      Background: Initial assessment and enrollment to Care Coordination was 5/4/22.?? Patient centered goals were developed with participation from patient.? Lead CC handed patient off to CHW for continued outreach every 30 days.??      Assessment: Per chart review, patient outreach completed by CC CHW on 7/22/22.? Patient is actively working to accomplish goal(s).? Patient's goal(s) remain(s) appropriate at this time.? Patient is due for updated Plan of Care.? Annual assessment will be due 5/2023.                  Goals                 1. Transportation (pt-stated)            Goal Statement: I would like alternate transportation resources.   Date Goal set: May 4, 2022  Barriers: Patient no longer driving.   Strengths: Patient is motivated and engaged in CC.  Date to Achieve By: 10/1/22  Patient expressed understanding of goal: Yes  Action steps to achieve this goal:  1. I understand that RN CC will mail transportation resources.   2. I will review resources and utilize as I see fit.   3. I will continue to work with care coordination for any additional resources or support.                  ??      Plan/Recommendations: The patient will continue working with Care Coordination to achieve above goal(s).? CHW will involve Lead CC as needed or if patient is ready to move to maintenance.? Lead CC will continue to monitor CHW s monthly outreaches and progress to goal(s) every 6 weeks.?      Plan of Care updated and sent to patient: Yes, via the mail on 8/10/22.     Veronika Rivera RN Care Coordinator  [facility]  Email: [email or web address]  Phone: [phone]

## 2022-08-25 ENCOUNTER — PATIENT OUTREACH (OUTPATIENT)
Dept: CARE COORDINATION | Facility: CLINIC | Age: 87
End: 2022-08-25

## 2022-08-25 NOTE — TELEPHONE ENCOUNTER
Approved for Maintenance. Thank you!    Veronika Rivera RN Care Coordinator  [facility]  Email: [email or web address]  Phone: [phone]

## 2022-08-25 NOTE — PROGRESS NOTES
Clinic Care Coordination Contact    Community Health Worker Follow Up    Care Gaps:     Health Maintenance Due   Topic Date Due     MICROALBUMIN  05/05/2022     URINE DRUG SCREEN  05/10/2022     TREATMENT AGREEMENT FOR CHRONIC PAIN MANAGEMENT  05/10/2022     A1C  08/21/2022     INFLUENZA VACCINE (1) 09/01/2022     EYE EXAM  09/15/2022       Pt declined addressing care gaps. Can be seen on CC outreach encounter 7/22/2022    Care Plan:   Care Plan: Alternate Transportation Completed 8/25/2022    Problem: Transportation  Resolved 8/25/2022    Goal: Alternate Transportation  Completed 8/25/2022    Start Date: 5/4/2022 Expected End Date: 10/1/2022    This Visit's Progress: 100%    Note:     Goal Statement: I would like alternate transportation resources.     Barriers: Patient no longer driving.   Strengths: Patient is motivated and engaged in CC.    Patient expressed understanding of goal: Yes  Action steps to achieve this goal:  1. I understand that RN CC will mail transportation resources.   2. I will review resources and utilize as I see fit.   3. I will continue to work with care coordination for any additional resources or support.     (Completed).                          Intervention and Education during outreach:     Pt states that they were able to access transportation through [employer]. Writer inquired if this a service they would be likely to access through [employer] in the future. Pt stated that they would utilize this resource again. Writer inquired if the Pt had any other questions or concerns, however they did not. We discussed grocery accessibility. Pt stated that their daughter assists with this.   Pt is agreeable to being outreached to in 2 months by CHW.       CHW Plan: Patient has completed all goals with Clinic Care Coordination.  Please review the chart and confirm if maintenance  is approved.      ARIA Orr. Public Health  Community Health Worker  Abdirashid  [place] [facility]  Clinic Care Coordination  [phone]

## 2022-09-02 DIAGNOSIS — B59 PNEUMONIA DUE TO PNEUMOCYSTIS JIROVECII, UNSPECIFIED LATERALITY, UNSPECIFIED PART OF LUNG (H): ICD-10-CM

## 2022-09-02 NOTE — TELEPHONE ENCOUNTER
Reason for Call:  Medication or medication refill:    Do you use a [facility]?  Name of the pharmacy and phone number for the current request:  [facility] on [address]     Name of the medication requested: atovaquone (MEPRON) 750 MG/5ML suspension    Other request: no    Can we leave a detailed message on this number? YES    Phone number patient can be reached at: Home number on file [phone] (home)    Best Time: anytime    Call taken on 9/2/2022 at 11:07 AM by Cris Swain

## 2022-09-07 RX ORDER — ATOVAQUONE 750 MG/5ML
750 SUSPENSION ORAL DAILY
Qty: 420 ML | Refills: 11 | Status: SHIPPED | OUTPATIENT
Start: 2022-09-07 | End: 2023-06-02

## 2022-09-12 DIAGNOSIS — M48.062 SPINAL STENOSIS OF LUMBAR REGION WITH NEUROGENIC CLAUDICATION: ICD-10-CM

## 2022-09-12 RX ORDER — OXYCODONE HYDROCHLORIDE 5 MG/1
5 TABLET ORAL EVERY 4 HOURS PRN
Qty: 150 TABLET | Refills: 0 | OUTPATIENT
Start: 2022-09-12

## 2022-09-12 NOTE — TELEPHONE ENCOUNTER
Patient has been getting controlled substances from other providers.    #90 Oxycodone filled 8/11/22 from Dr. Vega and #30 lorazepam from Nohe[facility] 8/2/22. I do not see any documentation in Epic explaining these refills.

## 2022-10-03 ENCOUNTER — PATIENT OUTREACH (OUTPATIENT)
Dept: CARE COORDINATION | Facility: CLINIC | Age: 87
End: 2022-10-03

## 2022-10-03 ASSESSMENT — ACTIVITIES OF DAILY LIVING (ADL): DEPENDENT_IADLS:: INDEPENDENT

## 2022-10-03 NOTE — PROGRESS NOTES
Care Coordination Clinician Chart Review  Situation: Patient chart reviewed by care coordinator.       Background: Care Coordination initial assessment and enrollment to Care Coordination was 5/4/22.   Patient centered goals were developed with participation from patient.  RN CC handed patient off to CHW for continued outreach every 30 days.        Assessment: Per chart review, patient outreach completed by CC CHW on 8/25/22.  All care plan goals have been completed. No new need for support or resources identified. Patient status transitioned to maintenance.       Goals    Completed/None             Plan/Recommendations: CHW will reach out to patient in two months and assess for readiness to graduate care coordination.  RN CC will review chart after CHW out reach.    Veronika Rivera RN Care Coordinator  [facility]  Email: [email or web address]  Phone: [phone]

## 2022-10-21 ENCOUNTER — PATIENT OUTREACH (OUTPATIENT)
Dept: CARE COORDINATION | Facility: CLINIC | Age: 87
End: 2022-10-21

## 2022-10-21 NOTE — PROGRESS NOTES
Clinic Care Coordination Contact  Patient has completed all goals with Clinic Care Coordination.  Please review the chart and confirm if graduation is approved.    10-21, CHW:    Writer was able to connect with the Pt and evaluate Pt needs and if any CC intervention needs to be taken. Writer reintroduced care coordination and gave examples as to what Care Coordination can assist with. Pt acknowledged, however stated she has no needs or concerns.     Pt shared that daughter is able to bring her to and from appointments if needed. Pt shares that she needs rides infrequently.     Writer ensured that Pt had Writer call back number, phone number given over the phone and Pt took note of it. Pt had no other questions or concerns.       ARIA Orr. Public Health  Community Health Worker  [facility]  Clinic Care Coordination  [phone]           Hydroxyzine Pregnancy And Lactation Text: This medication is not safe during pregnancy and should not be taken. It is also excreted in breast milk and breast feeding isn't recommended.

## 2022-10-21 NOTE — TELEPHONE ENCOUNTER
Approved for graduation. Thank you!    Veronika Rivera RN Care Coordinator  [facility]  Email: [email or web address]  Phone: [phone]

## 2022-11-09 ENCOUNTER — PATIENT OUTREACH (OUTPATIENT)
Dept: CARE COORDINATION | Facility: CLINIC | Age: 87
End: 2022-11-09

## 2022-11-09 NOTE — LETTER
[facility] CARE COORDINATION  [address] [facility] [address]    November 9, 2022    Celeste Chacko  [address]    [facility] [facility] [address]    Dear Celeste,  Your Care Team congratulates you on your journey to maintain wellness. This document will help guide you on your journey to maintain a healthy lifestyle.  You can use this to help you overcome any barriers you may encounter.  If you should have any questions or concerns, you can contact the members of your Care Team or contact your Primary Care Clinic for assistance.     Health Maintenance  Health Maintenance Reviewed:      My Access Plan  Medical Emergency 911   Primary Clinic Line [facility] - [phone]   24 Hour Appointment Line [phone] or  [phone]) (toll-free)   24 Hour Nurse Line [phone] (toll-free)   Preferred Urgent Care     Preferred Hospital     Preferred Pharmacy [id number] [place], [place] - [address] AT [facility] [address] [facility]     Behavioral Health Crisis Line The National Suicide Prevention Lifeline at 1-293.755.2635 or 911     My Care Team Members  Patient Care Team       Relationship Specialty Notifications Start End    Arely Lantigua MD PCP - General Internal Medicine  5/12/22     Phone: [phone] Fax: [phone]         [address] [address]    Tristan Michael DPM Assigned Musculoskeletal Provider   10/23/20     Phone: [phone] Fax: [phone]         [address] [facility] [address] [facility] [address]    Selam Pink MD Assigned Heart and Vascular Provider   6/27/21     Phone: [phone] Pager: [phone] Fax: [phone] [address] [facility] [address] [facility] [address]    Arely Lantigua MD Assigned PCP   5/22/22     Phone: [phone] Fax: [phone]         [address] [place] [address]    Olinda Glover, [facility] Assigned MTM Pharmacist   9/28/22     Phone:  [phone] Pager: [phone]         [address] [facility] [address] [facility] [address]               Goals    None          Advance Care Plans/Directives Type:      We notice that you do not have an Advance Directive on file. Upon completion of your Health Care Directive, please bring a copy with you to your next office visit.    It has been your Clinic Care Team's pleasure to work with you on your goals.    Regards,  Your Clinic Care Team

## 2022-11-09 NOTE — PROGRESS NOTES
Clinic Care Coordination Contact    Assessment: Care Coordinator contacted patient for 2 month follow up.  Patient has continued to follow the plan of care and assessment is negative for any new needs or concerns.    Enrollment status: Graduated.      Plan: No further outreaches at this time.  Patient will continue to follow the plan of care.  If new needs arise a new Care Coordination referral may be placed.      Veronika Rivera RN Care Coordinator  [facility]  Email: [email or web address]  Phone: [phone]

## 2023-01-13 ENCOUNTER — PATIENT OUTREACH (OUTPATIENT)
Dept: CARE COORDINATION | Facility: CLINIC | Age: 88
End: 2023-01-13

## 2023-01-13 DIAGNOSIS — Z23 NEED FOR INFLUENZA VACCINATION: Primary | ICD-10-CM

## 2023-01-13 DIAGNOSIS — Z23 NEED FOR PROPHYLACTIC VACCINATION AND INOCULATION AGAINST INFLUENZA: Primary | ICD-10-CM

## 2023-01-13 NOTE — ADDENDUM NOTE
Addended by: MIGUEL TREJO on: 1/13/2023 10:44 AM     Modules accepted: Orders     Brow Lift Text: A midfrontal incision was made medially to the defect to allow access to the tissues just superior to the left eyebrow. Following careful dissection inferiorly in a supraperiosteal plane to the level of the left eyebrow, several 3-0 monocryl sutures were used to resuspend the eyebrow orbicularis oculi muscular unit to the superior frontal bone periosteum. This resulted in an appropriate reapproximation of static eyebrow symmetry and correction of the left brow ptosis.

## 2023-01-13 NOTE — PROGRESS NOTES
Community Paramedic Program    CHW Community/Care Team Outreach    Received a call from Carol Dunaway, pt's [facility] Case Manager. She asked if the CP program could visit pt and provide a flu shot for her at home. Confirmed that pt's PCP is with [facility] and offered to reach out to the provider. Carol agreed and expressed appreciation. She said if the PCP agrees, I should reach out directly to the pt to schedule a CP visit. Confirmed that Carol has my contact information before ending the call and asked her to reach out with questions or updates. She agreed.    Routing to pt's PCP to request a Community Paramedic referral and an order for one of the following flu vaccines that we have in stock at our ambulance garage, if in agreement:      Fluzone Quadrivalent (age 14+ years)      FluBlok Quadrivalent (age 50-64 years) and age 18+ for egg allergy concerns      Fluzone High-Dose Quadrivalent (age 65+ years)    Will wait for PCP's response.     Emi Chacko  Community Health Worker  Community Paramedic program  [phone]  [email or web address]

## 2023-01-18 ENCOUNTER — PATIENT OUTREACH (OUTPATIENT)
Dept: CARE COORDINATION | Facility: CLINIC | Age: 88
End: 2023-01-18
Payer: COMMERCIAL

## 2023-01-18 NOTE — PROGRESS NOTES
"Community Paramedic Program  Flu Shot     Signed order in pt's chart: yes (1/13/23)     Visit scheduled: Monday, January 23rd / 3:30 pm / CP Lawanda (date/time/CP)     Additional information:   Spoke with pt. Confirmed that I spoke with her [facility] Case Manager Carol and that pt would like to get a flu shot at home. Pt agreed and said \"I will be so relieved to get that done.\" She requested an afternoon visit and agreed to schedule.    Confirmed pt's home address and apartment number [id number]). Pt said when the CP arrives, she'll need to use the intercom in the lobby to call pt (listed by name) so pt can buzz her inside. She said she'll be alone during the visit and that the CP can call her ([phone]) if she has any difficulties getting inside.    Provided my contact information to pt, and she wrote down the visit details during the call. I asked her to reach out with questions or to reschedule if needed. She agreed and expressed appreciation for the call and CP program.     "

## 2023-01-23 ENCOUNTER — ALLIED HEALTH/NURSE VISIT (OUTPATIENT)
Dept: OTHER | Facility: CLINIC | Age: 88
End: 2023-01-23
Payer: COMMERCIAL

## 2023-01-23 VITALS
HEART RATE: 88 BPM | DIASTOLIC BLOOD PRESSURE: 76 MMHG | TEMPERATURE: 98.8 F | OXYGEN SATURATION: 97 % | SYSTOLIC BLOOD PRESSURE: 139 MMHG

## 2023-01-23 DIAGNOSIS — Z23 NEED FOR INFLUENZA VACCINATION: ICD-10-CM

## 2023-01-23 PROCEDURE — G0008 ADMIN INFLUENZA VIRUS VAC: HCPCS | Performed by: INTERNAL MEDICINE

## 2023-01-23 PROCEDURE — 90662 IIV NO PRSV INCREASED AG IM: CPT | Performed by: INTERNAL MEDICINE

## 2023-01-23 PROCEDURE — 99207 PR COMMUNITY PARAMEDIC - PATIENT NOT BILLABLE: CPT

## 2023-01-23 NOTE — PROGRESS NOTES
"Community Paramedic One Time Visit    January 23, 2023; 4:52 PM    Celeste Chacko is a 89 year old year old adult being seen at home visit    Present at appointment:  patient    Vitals:  BP Readings from Last 1 Encounters:   01/23/23 139/76     Pulse Readings from Last 1 Encounters:   01/23/23 88     Wt Readings from Last 1 Encounters:   05/12/22 54.7 kg (120 lb 11.2 oz)     Ht Readings from Last 1 Encounters:   04/11/22 1.613 m (5' 3.5\")         Clinical Concerns:  Current Medical Concerns:  Need for (Vaccination; Lab draw/sample)    Education Provided to patient: Monitor for signs and symptoms of allergic reaction to vaccine, call 911 if needed   Flu Shot given: Yes  COVID Vaccine Given: No  Lab draw or specimen collection: No      Plan:     Time spent with patient: 30    The patient meets one or more of the following criteria:  * Requires services to prevent readmission to a nursing home or hospital    Acute concern/Follow-up recommendations: One- time visit, will need new CP referral if additional needs arise    Issues for Provider to follow up on: Community Paramedic visited patient in home, provided one-time visit.     A flu vaccination was administered without any adverse effects observed.  "

## 2023-01-26 ENCOUNTER — PATIENT OUTREACH (OUTPATIENT)
Dept: OTHER | Facility: CLINIC | Age: 88
End: 2023-01-26
Payer: COMMERCIAL

## 2023-01-26 NOTE — PROGRESS NOTES
Per chart review pt was a one time vaccination only.  Please place a new referral if future needs arise.

## 2023-02-11 NOTE — TELEPHONE ENCOUNTER
Call to pt and advised. She agrees to pick it up.    SH  Pt reassessed after albuterol treatment  no wheezing no retractions or increased work of breathing noted  Discussed with family plan to discharge, follow up with pediatrician outpatient   strict return precautions to the ED given SH  Pt reassessed after albuterol treatment  improvement in wheezing no retractions or increased work of breathing noted  will give another treatment of albuterol and reassess  strict return precautions to the ED given

## 2023-04-25 ENCOUNTER — MEDICAL CORRESPONDENCE (OUTPATIENT)
Dept: HEALTH INFORMATION MANAGEMENT | Facility: CLINIC | Age: 88
End: 2023-04-25

## 2023-06-01 DIAGNOSIS — B59 PNEUMONIA DUE TO PNEUMOCYSTIS JIROVECII, UNSPECIFIED LATERALITY, UNSPECIFIED PART OF LUNG (H): ICD-10-CM

## 2023-06-02 RX ORDER — ATOVAQUONE 750 MG/5ML
750 SUSPENSION ORAL DAILY
Qty: 420 ML | Refills: 11 | Status: SHIPPED | OUTPATIENT
Start: 2023-06-02 | End: 2024-09-09

## 2023-06-21 ENCOUNTER — MEDICAL CORRESPONDENCE (OUTPATIENT)
Dept: HEALTH INFORMATION MANAGEMENT | Facility: CLINIC | Age: 88
End: 2023-06-21

## 2023-07-01 ENCOUNTER — TRANSFERRED RECORDS (OUTPATIENT)
Dept: MULTI SPECIALTY CLINIC | Facility: CLINIC | Age: 88
End: 2023-07-01

## 2023-07-01 LAB — RETINOPATHY: NORMAL

## 2023-08-18 NOTE — PROCEDURE: COUNSELING
Administered B12 (cyanocobalamin 1000mcg/ml) shot in left deltoid - pt provided vial. 2 vials in  susan container, 1 vial used and other returned back to patient.     RX number [id number]     NDC [address]-28  LOT [id number]  Exp: 3/2025     Elizabeth Lazcano, PharmD, CSP  [facility]   Detail Level: Zone

## 2023-10-30 ENCOUNTER — MEDICAL CORRESPONDENCE (OUTPATIENT)
Dept: HEALTH INFORMATION MANAGEMENT | Facility: CLINIC | Age: 88
End: 2023-10-30

## 2023-12-08 ENCOUNTER — HOSPITAL ENCOUNTER (INPATIENT)
Facility: CLINIC | Age: 88
LOS: 5 days | Discharge: NURSING, REHAB OR LONG-TERM CARE | DRG: 193 | End: 2023-12-13
Attending: STUDENT IN AN ORGANIZED HEALTH CARE EDUCATION/TRAINING PROGRAM | Admitting: INTERNAL MEDICINE
Payer: MEDICARE

## 2023-12-08 ENCOUNTER — APPOINTMENT (OUTPATIENT)
Dept: GENERAL RADIOLOGY | Facility: CLINIC | Age: 88
DRG: 193 | End: 2023-12-08
Attending: STUDENT IN AN ORGANIZED HEALTH CARE EDUCATION/TRAINING PROGRAM
Payer: MEDICARE

## 2023-12-08 DIAGNOSIS — J18.9 PNEUMONIA DUE TO INFECTIOUS ORGANISM, UNSPECIFIED LATERALITY, UNSPECIFIED PART OF LUNG: ICD-10-CM

## 2023-12-08 DIAGNOSIS — I10 BENIGN ESSENTIAL HYPERTENSION: ICD-10-CM

## 2023-12-08 DIAGNOSIS — M48.062 SPINAL STENOSIS OF LUMBAR REGION WITH NEUROGENIC CLAUDICATION: ICD-10-CM

## 2023-12-08 DIAGNOSIS — J18.9 PNEUMONIA OF LEFT LUNG DUE TO INFECTIOUS ORGANISM, UNSPECIFIED PART OF LUNG: Primary | ICD-10-CM

## 2023-12-08 DIAGNOSIS — J44.9 CHRONIC OBSTRUCTIVE PULMONARY DISEASE, UNSPECIFIED COPD TYPE (H): ICD-10-CM

## 2023-12-08 DIAGNOSIS — G89.4 CHRONIC PAIN SYNDROME: ICD-10-CM

## 2023-12-08 DIAGNOSIS — F11.90 CHRONIC, CONTINUOUS USE OF OPIOIDS: ICD-10-CM

## 2023-12-08 LAB
ALBUMIN SERPL BCG-MCNC: 3.5 G/DL (ref 3.5–5.2)
ALP SERPL-CCNC: 114 U/L (ref 40–150)
ALT SERPL W P-5'-P-CCNC: 55 U/L (ref 0–50)
ANION GAP SERPL CALCULATED.3IONS-SCNC: 11 MMOL/L (ref 7–15)
AST SERPL W P-5'-P-CCNC: 37 U/L (ref 0–45)
ATRIAL RATE - MUSE: 102 BPM
BASOPHILS # BLD AUTO: 0.1 10E3/UL (ref 0–0.2)
BASOPHILS NFR BLD AUTO: 0 %
BILIRUB SERPL-MCNC: 0.9 MG/DL
BUN SERPL-MCNC: 22.5 MG/DL (ref 8–23)
CALCIUM SERPL-MCNC: 9.5 MG/DL (ref 8.2–9.6)
CHLORIDE SERPL-SCNC: 99 MMOL/L (ref 98–107)
CREAT SERPL-MCNC: 0.63 MG/DL (ref 0.51–0.95)
DEPRECATED HCO3 PLAS-SCNC: 26 MMOL/L (ref 22–29)
DIASTOLIC BLOOD PRESSURE - MUSE: NORMAL MMHG
EGFRCR SERPLBLD CKD-EPI 2021: 83 ML/MIN/1.73M2
EOSINOPHIL # BLD AUTO: 0 10E3/UL (ref 0–0.7)
EOSINOPHIL NFR BLD AUTO: 0 %
ERYTHROCYTE [DISTWIDTH] IN BLOOD BY AUTOMATED COUNT: 14 % (ref 10–15)
FLUAV RNA SPEC QL NAA+PROBE: NEGATIVE
FLUBV RNA RESP QL NAA+PROBE: NEGATIVE
GLUCOSE SERPL-MCNC: 301 MG/DL (ref 70–99)
HCO3 BLDV-SCNC: 29 MMOL/L (ref 21–28)
HCT VFR BLD AUTO: 33.3 % (ref 35–47)
HGB BLD-MCNC: 10.4 G/DL (ref 11.7–15.7)
HOLD SPECIMEN: NORMAL
HOLD SPECIMEN: NORMAL
IMM GRANULOCYTES # BLD: 0.2 10E3/UL
IMM GRANULOCYTES NFR BLD: 1 %
INTERPRETATION ECG - MUSE: NORMAL
LACTATE BLD-SCNC: 1.3 MMOL/L
LYMPHOCYTES # BLD AUTO: 0.7 10E3/UL (ref 0.8–5.3)
LYMPHOCYTES NFR BLD AUTO: 4 %
MCH RBC QN AUTO: 25.7 PG (ref 26.5–33)
MCHC RBC AUTO-ENTMCNC: 31.2 G/DL (ref 31.5–36.5)
MCV RBC AUTO: 82 FL (ref 78–100)
MONOCYTES # BLD AUTO: 1.7 10E3/UL (ref 0–1.3)
MONOCYTES NFR BLD AUTO: 11 %
NEUTROPHILS # BLD AUTO: 13.3 10E3/UL (ref 1.6–8.3)
NEUTROPHILS NFR BLD AUTO: 84 %
NRBC # BLD AUTO: 0 10E3/UL
NRBC BLD AUTO-RTO: 0 /100
P AXIS - MUSE: 56 DEGREES
PCO2 BLDV: 49 MM HG (ref 40–50)
PH BLDV: 7.38 [PH] (ref 7.32–7.43)
PLATELET # BLD AUTO: 212 10E3/UL (ref 150–450)
PO2 BLDV: 32 MM HG (ref 25–47)
POTASSIUM SERPL-SCNC: 4.7 MMOL/L (ref 3.4–5.3)
PR INTERVAL - MUSE: 142 MS
PROT SERPL-MCNC: 6.7 G/DL (ref 6.4–8.3)
QRS DURATION - MUSE: 84 MS
QT - MUSE: 326 MS
QTC - MUSE: 424 MS
R AXIS - MUSE: -29 DEGREES
RBC # BLD AUTO: 4.04 10E6/UL (ref 3.8–5.2)
RSV RNA SPEC NAA+PROBE: NEGATIVE
SAO2 % BLDV: 59 % (ref 94–100)
SARS-COV-2 RNA RESP QL NAA+PROBE: NEGATIVE
SODIUM SERPL-SCNC: 136 MMOL/L (ref 135–145)
SYSTOLIC BLOOD PRESSURE - MUSE: NORMAL MMHG
T AXIS - MUSE: 32 DEGREES
VENTRICULAR RATE- MUSE: 102 BPM
WBC # BLD AUTO: 15.9 10E3/UL (ref 4–11)

## 2023-12-08 PROCEDURE — 99291 CRITICAL CARE FIRST HOUR: CPT | Mod: 25

## 2023-12-08 PROCEDURE — 87637 SARSCOV2&INF A&B&RSV AMP PRB: CPT | Performed by: STUDENT IN AN ORGANIZED HEALTH CARE EDUCATION/TRAINING PROGRAM

## 2023-12-08 PROCEDURE — 85025 COMPLETE CBC W/AUTO DIFF WBC: CPT | Performed by: EMERGENCY MEDICINE

## 2023-12-08 PROCEDURE — 96365 THER/PROPH/DIAG IV INF INIT: CPT | Mod: 59

## 2023-12-08 PROCEDURE — 250N000012 HC RX MED GY IP 250 OP 636 PS 637: Performed by: STUDENT IN AN ORGANIZED HEALTH CARE EDUCATION/TRAINING PROGRAM

## 2023-12-08 PROCEDURE — 250N000011 HC RX IP 250 OP 636: Performed by: INTERNAL MEDICINE

## 2023-12-08 PROCEDURE — 71046 X-RAY EXAM CHEST 2 VIEWS: CPT

## 2023-12-08 PROCEDURE — 250N000013 HC RX MED GY IP 250 OP 250 PS 637: Performed by: INTERNAL MEDICINE

## 2023-12-08 PROCEDURE — 99223 1ST HOSP IP/OBS HIGH 75: CPT | Performed by: INTERNAL MEDICINE

## 2023-12-08 PROCEDURE — 250N000011 HC RX IP 250 OP 636: Performed by: STUDENT IN AN ORGANIZED HEALTH CARE EDUCATION/TRAINING PROGRAM

## 2023-12-08 PROCEDURE — 258N000003 HC RX IP 258 OP 636: Performed by: STUDENT IN AN ORGANIZED HEALTH CARE EDUCATION/TRAINING PROGRAM

## 2023-12-08 PROCEDURE — 82803 BLOOD GASES ANY COMBINATION: CPT

## 2023-12-08 PROCEDURE — 99292 CRITICAL CARE ADDL 30 MIN: CPT

## 2023-12-08 PROCEDURE — 80053 COMPREHEN METABOLIC PANEL: CPT | Performed by: STUDENT IN AN ORGANIZED HEALTH CARE EDUCATION/TRAINING PROGRAM

## 2023-12-08 PROCEDURE — 36415 COLL VENOUS BLD VENIPUNCTURE: CPT | Performed by: STUDENT IN AN ORGANIZED HEALTH CARE EDUCATION/TRAINING PROGRAM

## 2023-12-08 PROCEDURE — 93005 ELECTROCARDIOGRAM TRACING: CPT

## 2023-12-08 PROCEDURE — 250N000013 HC RX MED GY IP 250 OP 250 PS 637: Performed by: STUDENT IN AN ORGANIZED HEALTH CARE EDUCATION/TRAINING PROGRAM

## 2023-12-08 PROCEDURE — 120N000001 HC R&B MED SURG/OB

## 2023-12-08 RX ORDER — GABAPENTIN 100 MG/1
200 CAPSULE ORAL AT BEDTIME
COMMUNITY
End: 2024-01-15

## 2023-12-08 RX ORDER — OXYCODONE HYDROCHLORIDE 5 MG/1
5 TABLET ORAL ONCE
Status: COMPLETED | OUTPATIENT
Start: 2023-12-08 | End: 2023-12-08

## 2023-12-08 RX ORDER — PROCHLORPERAZINE 25 MG
12.5 SUPPOSITORY, RECTAL RECTAL EVERY 12 HOURS PRN
Status: DISCONTINUED | OUTPATIENT
Start: 2023-12-08 | End: 2023-12-13 | Stop reason: HOSPADM

## 2023-12-08 RX ORDER — ONDANSETRON 4 MG/1
4 TABLET, ORALLY DISINTEGRATING ORAL EVERY 6 HOURS PRN
Status: DISCONTINUED | OUTPATIENT
Start: 2023-12-08 | End: 2023-12-13 | Stop reason: HOSPADM

## 2023-12-08 RX ORDER — DEXTROSE MONOHYDRATE 25 G/50ML
25-50 INJECTION, SOLUTION INTRAVENOUS
Status: DISCONTINUED | OUTPATIENT
Start: 2023-12-08 | End: 2023-12-13 | Stop reason: HOSPADM

## 2023-12-08 RX ORDER — CEFTRIAXONE 1 G/1
1 INJECTION, POWDER, FOR SOLUTION INTRAMUSCULAR; INTRAVENOUS ONCE
Status: COMPLETED | OUTPATIENT
Start: 2023-12-08 | End: 2023-12-08

## 2023-12-08 RX ORDER — ONDANSETRON 2 MG/ML
4 INJECTION INTRAMUSCULAR; INTRAVENOUS EVERY 6 HOURS PRN
Status: DISCONTINUED | OUTPATIENT
Start: 2023-12-08 | End: 2023-12-13 | Stop reason: HOSPADM

## 2023-12-08 RX ORDER — AMOXICILLIN 250 MG
1 CAPSULE ORAL 2 TIMES DAILY PRN
Status: DISCONTINUED | OUTPATIENT
Start: 2023-12-08 | End: 2023-12-13 | Stop reason: HOSPADM

## 2023-12-08 RX ORDER — NICOTINE POLACRILEX 4 MG
15-30 LOZENGE BUCCAL
Status: DISCONTINUED | OUTPATIENT
Start: 2023-12-08 | End: 2023-12-13 | Stop reason: HOSPADM

## 2023-12-08 RX ORDER — FUROSEMIDE 10 MG/ML
20 INJECTION INTRAMUSCULAR; INTRAVENOUS ONCE
Status: COMPLETED | OUTPATIENT
Start: 2023-12-08 | End: 2023-12-08

## 2023-12-08 RX ORDER — PREDNISONE 20 MG/1
40 TABLET ORAL ONCE
Status: COMPLETED | OUTPATIENT
Start: 2023-12-08 | End: 2023-12-08

## 2023-12-08 RX ORDER — AZITHROMYCIN 500 MG/1
500 INJECTION, POWDER, LYOPHILIZED, FOR SOLUTION INTRAVENOUS ONCE
Status: COMPLETED | OUTPATIENT
Start: 2023-12-08 | End: 2023-12-08

## 2023-12-08 RX ORDER — AMLODIPINE BESYLATE 2.5 MG/1
2.5 TABLET ORAL DAILY
COMMUNITY
End: 2024-01-18

## 2023-12-08 RX ORDER — GLIPIZIDE 5 MG/1
5 TABLET ORAL DAILY
COMMUNITY
End: 2024-01-18

## 2023-12-08 RX ORDER — ASPIRIN 81 MG/1
81 TABLET, CHEWABLE ORAL DAILY
COMMUNITY
End: 2024-02-06

## 2023-12-08 RX ORDER — CALCIUM CARBONATE 500 MG/1
1000 TABLET, CHEWABLE ORAL 4 TIMES DAILY PRN
Status: DISCONTINUED | OUTPATIENT
Start: 2023-12-08 | End: 2023-12-13 | Stop reason: HOSPADM

## 2023-12-08 RX ORDER — PROCHLORPERAZINE MALEATE 5 MG
5 TABLET ORAL EVERY 6 HOURS PRN
Status: DISCONTINUED | OUTPATIENT
Start: 2023-12-08 | End: 2023-12-13 | Stop reason: HOSPADM

## 2023-12-08 RX ORDER — CALCIUM POLYCARBOPHIL 625 MG 625 MG/1
1 TABLET ORAL DAILY
COMMUNITY
End: 2024-02-06

## 2023-12-08 RX ORDER — LIDOCAINE 40 MG/G
CREAM TOPICAL
Status: DISCONTINUED | OUTPATIENT
Start: 2023-12-08 | End: 2023-12-13 | Stop reason: HOSPADM

## 2023-12-08 RX ORDER — IPRATROPIUM BROMIDE AND ALBUTEROL SULFATE 2.5; .5 MG/3ML; MG/3ML
3 SOLUTION RESPIRATORY (INHALATION)
Status: DISCONTINUED | OUTPATIENT
Start: 2023-12-08 | End: 2023-12-09

## 2023-12-08 RX ORDER — METHADONE HYDROCHLORIDE 5 MG/5ML
2.5 SOLUTION ORAL 2 TIMES DAILY
Status: ON HOLD | COMMUNITY
End: 2023-12-13

## 2023-12-08 RX ORDER — ACETAMINOPHEN 325 MG/1
650 TABLET ORAL EVERY 6 HOURS PRN
Status: DISCONTINUED | OUTPATIENT
Start: 2023-12-08 | End: 2023-12-13 | Stop reason: HOSPADM

## 2023-12-08 RX ORDER — LORAZEPAM 0.5 MG/1
0.5 TABLET ORAL EVERY 4 HOURS PRN
COMMUNITY
End: 2023-12-14

## 2023-12-08 RX ORDER — GABAPENTIN 100 MG/1
100 CAPSULE ORAL EVERY MORNING
COMMUNITY
End: 2024-02-06

## 2023-12-08 RX ORDER — AMOXICILLIN 250 MG
2 CAPSULE ORAL 2 TIMES DAILY PRN
Status: DISCONTINUED | OUTPATIENT
Start: 2023-12-08 | End: 2023-12-13 | Stop reason: HOSPADM

## 2023-12-08 RX ADMIN — OXYCODONE HYDROCHLORIDE 5 MG: 5 TABLET ORAL at 18:50

## 2023-12-08 RX ADMIN — CEFTRIAXONE SODIUM 1 G: 1 INJECTION, POWDER, FOR SOLUTION INTRAMUSCULAR; INTRAVENOUS at 19:50

## 2023-12-08 RX ADMIN — ACETAMINOPHEN 650 MG: 325 TABLET, FILM COATED ORAL at 22:27

## 2023-12-08 RX ADMIN — AZITHROMYCIN MONOHYDRATE 500 MG: 500 INJECTION, POWDER, LYOPHILIZED, FOR SOLUTION INTRAVENOUS at 21:50

## 2023-12-08 RX ADMIN — FUROSEMIDE 20 MG: 10 INJECTION, SOLUTION INTRAMUSCULAR; INTRAVENOUS at 21:50

## 2023-12-08 RX ADMIN — PREDNISONE 40 MG: 20 TABLET ORAL at 18:50

## 2023-12-08 RX ADMIN — SODIUM CHLORIDE 500 ML: 9 INJECTION, SOLUTION INTRAVENOUS at 19:50

## 2023-12-08 ASSESSMENT — ACTIVITIES OF DAILY LIVING (ADL)
ADLS_ACUITY_SCORE: 35

## 2023-12-08 NOTE — ED TRIAGE NOTES
Patient presents to ER via EMS. Per report, patient comes from home where she lives independently and has been on hospice since April. Hx of COPD. Home health nurse came to check on patient today and she was experiencing respiratory distress, 77% while on 5 liters. 3 duonebs given in route. Daughter out of town currently, so decided to revoke hospice and send into ED for further eval.      Triage Assessment (Adult)       Row Name 12/08/23 7346          Triage Assessment    Airway WDL WDL        Respiratory WDL    Respiratory WDL X;rhythm/pattern     Rhythm/Pattern, Respiratory shortness of breath;labored        Skin Circulation/Temperature WDL    Skin Circulation/Temperature WDL X;temperature     Skin Temperature warm        Cardiac WDL    Cardiac WDL X;rhythm     Pulse Rate & Regularity tachycardic        Peripheral/Neurovascular WDL    Peripheral Neurovascular WDL WDL        Cognitive/Neuro/Behavioral WDL    Cognitive/Neuro/Behavioral WDL WDL

## 2023-12-09 ENCOUNTER — APPOINTMENT (OUTPATIENT)
Dept: PHYSICAL THERAPY | Facility: CLINIC | Age: 88
DRG: 193 | End: 2023-12-09
Attending: INTERNAL MEDICINE
Payer: MEDICARE

## 2023-12-09 LAB
ANION GAP SERPL CALCULATED.3IONS-SCNC: 12 MMOL/L (ref 7–15)
BASOPHILS # BLD AUTO: 0 10E3/UL (ref 0–0.2)
BASOPHILS NFR BLD AUTO: 0 %
BUN SERPL-MCNC: 28.1 MG/DL (ref 8–23)
CALCIUM SERPL-MCNC: 9.1 MG/DL (ref 8.2–9.6)
CHLORIDE SERPL-SCNC: 102 MMOL/L (ref 98–107)
CREAT SERPL-MCNC: 0.58 MG/DL (ref 0.51–0.95)
DEPRECATED HCO3 PLAS-SCNC: 26 MMOL/L (ref 22–29)
EGFRCR SERPLBLD CKD-EPI 2021: 84 ML/MIN/1.73M2
EOSINOPHIL # BLD AUTO: 0 10E3/UL (ref 0–0.7)
EOSINOPHIL NFR BLD AUTO: 0 %
ERYTHROCYTE [DISTWIDTH] IN BLOOD BY AUTOMATED COUNT: 14 % (ref 10–15)
GLUCOSE BLDC GLUCOMTR-MCNC: 210 MG/DL (ref 70–99)
GLUCOSE BLDC GLUCOMTR-MCNC: 215 MG/DL (ref 70–99)
GLUCOSE BLDC GLUCOMTR-MCNC: 263 MG/DL (ref 70–99)
GLUCOSE BLDC GLUCOMTR-MCNC: 280 MG/DL (ref 70–99)
GLUCOSE BLDC GLUCOMTR-MCNC: 322 MG/DL (ref 70–99)
GLUCOSE BLDC GLUCOMTR-MCNC: 351 MG/DL (ref 70–99)
GLUCOSE BLDC GLUCOMTR-MCNC: 359 MG/DL (ref 70–99)
GLUCOSE BLDC GLUCOMTR-MCNC: 399 MG/DL (ref 70–99)
GLUCOSE SERPL-MCNC: 324 MG/DL (ref 70–99)
HCT VFR BLD AUTO: 30.9 % (ref 35–47)
HGB BLD-MCNC: 9.7 G/DL (ref 11.7–15.7)
IMM GRANULOCYTES # BLD: 0.2 10E3/UL
IMM GRANULOCYTES NFR BLD: 1 %
LYMPHOCYTES # BLD AUTO: 1 10E3/UL (ref 0.8–5.3)
LYMPHOCYTES NFR BLD AUTO: 6 %
MCH RBC QN AUTO: 25.8 PG (ref 26.5–33)
MCHC RBC AUTO-ENTMCNC: 31.4 G/DL (ref 31.5–36.5)
MCV RBC AUTO: 82 FL (ref 78–100)
MONOCYTES # BLD AUTO: 1 10E3/UL (ref 0–1.3)
MONOCYTES NFR BLD AUTO: 7 %
NEUTROPHILS # BLD AUTO: 12.6 10E3/UL (ref 1.6–8.3)
NEUTROPHILS NFR BLD AUTO: 86 %
NRBC # BLD AUTO: 0 10E3/UL
NRBC BLD AUTO-RTO: 0 /100
PLATELET # BLD AUTO: 195 10E3/UL (ref 150–450)
POTASSIUM SERPL-SCNC: 4.1 MMOL/L (ref 3.4–5.3)
RBC # BLD AUTO: 3.76 10E6/UL (ref 3.8–5.2)
SODIUM SERPL-SCNC: 140 MMOL/L (ref 135–145)
WBC # BLD AUTO: 14.8 10E3/UL (ref 4–11)

## 2023-12-09 PROCEDURE — 36415 COLL VENOUS BLD VENIPUNCTURE: CPT | Performed by: INTERNAL MEDICINE

## 2023-12-09 PROCEDURE — 94640 AIRWAY INHALATION TREATMENT: CPT

## 2023-12-09 PROCEDURE — 85025 COMPLETE CBC W/AUTO DIFF WBC: CPT | Performed by: INTERNAL MEDICINE

## 2023-12-09 PROCEDURE — 99233 SBSQ HOSP IP/OBS HIGH 50: CPT | Performed by: INTERNAL MEDICINE

## 2023-12-09 PROCEDURE — 97116 GAIT TRAINING THERAPY: CPT | Mod: GP

## 2023-12-09 PROCEDURE — 250N000009 HC RX 250: Performed by: INTERNAL MEDICINE

## 2023-12-09 PROCEDURE — 94640 AIRWAY INHALATION TREATMENT: CPT | Mod: 76

## 2023-12-09 PROCEDURE — 80048 BASIC METABOLIC PNL TOTAL CA: CPT | Performed by: INTERNAL MEDICINE

## 2023-12-09 PROCEDURE — 250N000011 HC RX IP 250 OP 636: Performed by: INTERNAL MEDICINE

## 2023-12-09 PROCEDURE — 120N000001 HC R&B MED SURG/OB

## 2023-12-09 PROCEDURE — 97161 PT EVAL LOW COMPLEX 20 MIN: CPT | Mod: GP

## 2023-12-09 PROCEDURE — 250N000013 HC RX MED GY IP 250 OP 250 PS 637: Performed by: INTERNAL MEDICINE

## 2023-12-09 PROCEDURE — 999N000157 HC STATISTIC RCP TIME EA 10 MIN

## 2023-12-09 PROCEDURE — 250N000012 HC RX MED GY IP 250 OP 636 PS 637: Performed by: INTERNAL MEDICINE

## 2023-12-09 PROCEDURE — 97530 THERAPEUTIC ACTIVITIES: CPT | Mod: GP

## 2023-12-09 RX ORDER — FUROSEMIDE 40 MG
40 TABLET ORAL DAILY
Status: DISCONTINUED | OUTPATIENT
Start: 2023-12-09 | End: 2023-12-09

## 2023-12-09 RX ORDER — AMLODIPINE BESYLATE 2.5 MG/1
2.5 TABLET ORAL DAILY
Status: DISCONTINUED | OUTPATIENT
Start: 2023-12-09 | End: 2023-12-09

## 2023-12-09 RX ORDER — LORAZEPAM 0.5 MG/1
0.5 TABLET ORAL EVERY 4 HOURS PRN
Status: DISCONTINUED | OUTPATIENT
Start: 2023-12-09 | End: 2023-12-13 | Stop reason: HOSPADM

## 2023-12-09 RX ORDER — GLIPIZIDE 5 MG/1
5 TABLET ORAL DAILY
Status: DISCONTINUED | OUTPATIENT
Start: 2023-12-09 | End: 2023-12-09

## 2023-12-09 RX ORDER — SERTRALINE HYDROCHLORIDE 25 MG/1
25 TABLET, FILM COATED ORAL DAILY
Status: DISCONTINUED | OUTPATIENT
Start: 2023-12-09 | End: 2023-12-13 | Stop reason: HOSPADM

## 2023-12-09 RX ORDER — GABAPENTIN 100 MG/1
200 CAPSULE ORAL AT BEDTIME
Status: DISCONTINUED | OUTPATIENT
Start: 2023-12-09 | End: 2023-12-13 | Stop reason: HOSPADM

## 2023-12-09 RX ORDER — NALOXONE HYDROCHLORIDE 0.4 MG/ML
0.4 INJECTION, SOLUTION INTRAMUSCULAR; INTRAVENOUS; SUBCUTANEOUS
Status: DISCONTINUED | OUTPATIENT
Start: 2023-12-09 | End: 2023-12-13 | Stop reason: HOSPADM

## 2023-12-09 RX ORDER — ATOVAQUONE 750 MG/5ML
750 SUSPENSION ORAL DAILY
Status: DISCONTINUED | OUTPATIENT
Start: 2023-12-09 | End: 2023-12-13 | Stop reason: HOSPADM

## 2023-12-09 RX ORDER — ASPIRIN 81 MG/1
81 TABLET ORAL DAILY
Status: DISCONTINUED | OUTPATIENT
Start: 2023-12-09 | End: 2023-12-09

## 2023-12-09 RX ORDER — OXYCODONE HYDROCHLORIDE 5 MG/1
5 TABLET ORAL EVERY 4 HOURS PRN
Status: DISCONTINUED | OUTPATIENT
Start: 2023-12-09 | End: 2023-12-13 | Stop reason: HOSPADM

## 2023-12-09 RX ORDER — CEFTRIAXONE 2 G/1
2 INJECTION, POWDER, FOR SOLUTION INTRAMUSCULAR; INTRAVENOUS EVERY 24 HOURS
Status: DISCONTINUED | OUTPATIENT
Start: 2023-12-09 | End: 2023-12-13 | Stop reason: HOSPADM

## 2023-12-09 RX ORDER — ENOXAPARIN SODIUM 100 MG/ML
40 INJECTION SUBCUTANEOUS EVERY 24 HOURS
Status: DISCONTINUED | OUTPATIENT
Start: 2023-12-09 | End: 2023-12-13 | Stop reason: HOSPADM

## 2023-12-09 RX ORDER — METHADONE HYDROCHLORIDE 5 MG/5ML
2.5 SOLUTION ORAL 2 TIMES DAILY
Status: DISCONTINUED | OUTPATIENT
Start: 2023-12-09 | End: 2023-12-09

## 2023-12-09 RX ORDER — NALOXONE HYDROCHLORIDE 0.4 MG/ML
0.2 INJECTION, SOLUTION INTRAMUSCULAR; INTRAVENOUS; SUBCUTANEOUS
Status: DISCONTINUED | OUTPATIENT
Start: 2023-12-09 | End: 2023-12-13 | Stop reason: HOSPADM

## 2023-12-09 RX ORDER — PREDNISONE 20 MG/1
40 TABLET ORAL DAILY
Status: DISCONTINUED | OUTPATIENT
Start: 2023-12-09 | End: 2023-12-10

## 2023-12-09 RX ORDER — POLYETHYLENE GLYCOL 3350 17 G/17G
8.5 POWDER, FOR SOLUTION ORAL EVERY EVENING
Status: DISCONTINUED | OUTPATIENT
Start: 2023-12-09 | End: 2023-12-13 | Stop reason: HOSPADM

## 2023-12-09 RX ORDER — FAMOTIDINE 20 MG/1
20 TABLET, FILM COATED ORAL 2 TIMES DAILY
Status: DISCONTINUED | OUTPATIENT
Start: 2023-12-09 | End: 2023-12-11

## 2023-12-09 RX ORDER — CALCIUM POLYCARBOPHIL 625 MG 625 MG/1
625 TABLET ORAL DAILY
Status: DISCONTINUED | OUTPATIENT
Start: 2023-12-09 | End: 2023-12-13 | Stop reason: HOSPADM

## 2023-12-09 RX ORDER — GUAIFENESIN AND DEXTROMETHORPHAN HYDROBROMIDE 600; 30 MG/1; MG/1
1 TABLET, EXTENDED RELEASE ORAL 2 TIMES DAILY PRN
Status: DISCONTINUED | OUTPATIENT
Start: 2023-12-09 | End: 2023-12-09

## 2023-12-09 RX ORDER — LATANOPROST 50 UG/ML
1 SOLUTION/ DROPS OPHTHALMIC EVERY EVENING
Status: DISCONTINUED | OUTPATIENT
Start: 2023-12-09 | End: 2023-12-13 | Stop reason: HOSPADM

## 2023-12-09 RX ORDER — FUROSEMIDE 10 MG/ML
20 INJECTION INTRAMUSCULAR; INTRAVENOUS
Status: DISCONTINUED | OUTPATIENT
Start: 2023-12-09 | End: 2023-12-10

## 2023-12-09 RX ORDER — ASPIRIN 81 MG/1
81 TABLET, CHEWABLE ORAL DAILY
Status: DISCONTINUED | OUTPATIENT
Start: 2023-12-10 | End: 2023-12-13 | Stop reason: HOSPADM

## 2023-12-09 RX ORDER — IPRATROPIUM BROMIDE AND ALBUTEROL SULFATE 2.5; .5 MG/3ML; MG/3ML
3 SOLUTION RESPIRATORY (INHALATION)
Status: DISCONTINUED | OUTPATIENT
Start: 2023-12-09 | End: 2023-12-13 | Stop reason: HOSPADM

## 2023-12-09 RX ORDER — GLIPIZIDE 5 MG/1
5 TABLET, FILM COATED, EXTENDED RELEASE ORAL
Status: DISCONTINUED | OUTPATIENT
Start: 2023-12-09 | End: 2023-12-13 | Stop reason: HOSPADM

## 2023-12-09 RX ORDER — FLUTICASONE FUROATE AND VILANTEROL 200; 25 UG/1; UG/1
1 POWDER RESPIRATORY (INHALATION) DAILY
Status: DISCONTINUED | OUTPATIENT
Start: 2023-12-09 | End: 2023-12-13 | Stop reason: HOSPADM

## 2023-12-09 RX ORDER — GUAIFENESIN 600 MG/1
600 TABLET, EXTENDED RELEASE ORAL 2 TIMES DAILY
Status: DISCONTINUED | OUTPATIENT
Start: 2023-12-09 | End: 2023-12-13 | Stop reason: HOSPADM

## 2023-12-09 RX ORDER — AZITHROMYCIN 250 MG/1
250 TABLET, FILM COATED ORAL DAILY
Status: COMPLETED | OUTPATIENT
Start: 2023-12-09 | End: 2023-12-12

## 2023-12-09 RX ORDER — GABAPENTIN 100 MG/1
100 CAPSULE ORAL EVERY MORNING
Status: DISCONTINUED | OUTPATIENT
Start: 2023-12-10 | End: 2023-12-13 | Stop reason: HOSPADM

## 2023-12-09 RX ORDER — LISINOPRIL 40 MG/1
40 TABLET ORAL DAILY
Status: DISCONTINUED | OUTPATIENT
Start: 2023-12-09 | End: 2023-12-13 | Stop reason: HOSPADM

## 2023-12-09 RX ORDER — AMLODIPINE BESYLATE 2.5 MG/1
2.5 TABLET ORAL DAILY
Status: DISCONTINUED | OUTPATIENT
Start: 2023-12-09 | End: 2023-12-13 | Stop reason: HOSPADM

## 2023-12-09 RX ADMIN — GLIPIZIDE 5 MG: 5 TABLET, FILM COATED, EXTENDED RELEASE ORAL at 08:14

## 2023-12-09 RX ADMIN — POLYETHYLENE GLYCOL 3350 8.5 G: 17 POWDER, FOR SOLUTION ORAL at 20:08

## 2023-12-09 RX ADMIN — IPRATROPIUM BROMIDE AND ALBUTEROL SULFATE 3 ML: .5; 3 SOLUTION RESPIRATORY (INHALATION) at 07:14

## 2023-12-09 RX ADMIN — GUAIFENESIN 600 MG: 600 TABLET, EXTENDED RELEASE ORAL at 08:12

## 2023-12-09 RX ADMIN — OXYCODONE HYDROCHLORIDE 5 MG: 5 TABLET ORAL at 02:08

## 2023-12-09 RX ADMIN — OXYCODONE HYDROCHLORIDE 5 MG: 5 TABLET ORAL at 20:09

## 2023-12-09 RX ADMIN — ATOVAQUONE 750 MG: 750 SUSPENSION ORAL at 08:18

## 2023-12-09 RX ADMIN — IPRATROPIUM BROMIDE AND ALBUTEROL SULFATE 3 ML: .5; 3 SOLUTION RESPIRATORY (INHALATION) at 12:49

## 2023-12-09 RX ADMIN — LISINOPRIL 40 MG: 40 TABLET ORAL at 15:05

## 2023-12-09 RX ADMIN — FAMOTIDINE 20 MG: 20 TABLET ORAL at 20:09

## 2023-12-09 RX ADMIN — CEFTRIAXONE SODIUM 2 G: 2 INJECTION, POWDER, FOR SOLUTION INTRAMUSCULAR; INTRAVENOUS at 20:48

## 2023-12-09 RX ADMIN — ASPIRIN 81 MG: 81 TABLET, COATED ORAL at 08:12

## 2023-12-09 RX ADMIN — FUROSEMIDE 20 MG: 10 INJECTION, SOLUTION INTRAMUSCULAR; INTRAVENOUS at 15:05

## 2023-12-09 RX ADMIN — OXYCODONE HYDROCHLORIDE 5 MG: 5 TABLET ORAL at 10:58

## 2023-12-09 RX ADMIN — INSULIN ASPART 7 UNITS: 100 INJECTION, SOLUTION INTRAVENOUS; SUBCUTANEOUS at 02:30

## 2023-12-09 RX ADMIN — GABAPENTIN 200 MG: 100 CAPSULE ORAL at 21:30

## 2023-12-09 RX ADMIN — ENOXAPARIN SODIUM 40 MG: 40 INJECTION SUBCUTANEOUS at 20:08

## 2023-12-09 RX ADMIN — SERTRALINE HYDROCHLORIDE 25 MG: 25 TABLET ORAL at 15:05

## 2023-12-09 RX ADMIN — PREDNISONE 40 MG: 20 TABLET ORAL at 08:13

## 2023-12-09 RX ADMIN — FAMOTIDINE 20 MG: 20 TABLET ORAL at 08:13

## 2023-12-09 RX ADMIN — LATANOPROST 1 DROP: 50 SOLUTION/ DROPS OPHTHALMIC at 21:30

## 2023-12-09 RX ADMIN — FLUTICASONE FUROATE AND VILANTEROL TRIFENATATE 1 PUFF: 200; 25 POWDER RESPIRATORY (INHALATION) at 08:19

## 2023-12-09 RX ADMIN — INSULIN ASPART 3 UNITS: 100 INJECTION, SOLUTION INTRAVENOUS; SUBCUTANEOUS at 21:34

## 2023-12-09 RX ADMIN — OXYCODONE HYDROCHLORIDE 5 MG: 5 TABLET ORAL at 06:27

## 2023-12-09 RX ADMIN — CALCIUM POLYCARBOPHIL 625 MG: 625 TABLET, FILM COATED ORAL at 08:14

## 2023-12-09 RX ADMIN — METHADONE HYDROCHLORIDE 2.5 MG: 5 TABLET ORAL at 20:08

## 2023-12-09 RX ADMIN — GUAIFENESIN 600 MG: 600 TABLET, EXTENDED RELEASE ORAL at 20:09

## 2023-12-09 RX ADMIN — AZITHROMYCIN DIHYDRATE 250 MG: 250 TABLET ORAL at 08:13

## 2023-12-09 RX ADMIN — FUROSEMIDE 40 MG: 40 TABLET ORAL at 08:13

## 2023-12-09 RX ADMIN — AMLODIPINE BESYLATE 2.5 MG: 2.5 TABLET ORAL at 08:12

## 2023-12-09 RX ADMIN — OXYCODONE HYDROCHLORIDE 5 MG: 5 TABLET ORAL at 15:05

## 2023-12-09 ASSESSMENT — ACTIVITIES OF DAILY LIVING (ADL)
ADLS_ACUITY_SCORE: 35
ADLS_ACUITY_SCORE: 23
ADLS_ACUITY_SCORE: 35
ADLS_ACUITY_SCORE: 23

## 2023-12-09 NOTE — PROGRESS NOTES
"   12/09/23 1430   Appointment Info   Signing Clinician's Name / Credentials (PT) Austin Curiel, PT, DPT       Present no   Living Environment   People in Home alone   Current Living Arrangements independent living facility   Home Accessibility no concerns   Transportation Anticipated family or friend will provide   Living Environment Comments pt lives alone in an IND living apartment. No ALIZA. Pt stating that her daughter provides rides at baseline.   Self-Care   Usual Activity Tolerance moderate   Current Activity Tolerance fair   Regular Exercise No   Equipment Currently Used at Home walker, rolling   Fall history within last six months no   Activity/Exercise/Self-Care Comment Pt reporting that they use a 4WW with all mobility at baseline. 2.5-3L/min O2 supp at baseline 2/2 chronic COPD.   General Information   Onset of Illness/Injury or Date of Surgery 12/08/23   Referring Physician Neva Smith, DO   Patient/Family Therapy Goals Statement (PT) get stronger   Pertinent History of Current Problem (include personal factors and/or comorbidities that impact the POC) Per chart review, \"Celeste Chacko is a 89 year old female admitted on 12/8/2023 with increasing SOB and weakness. Found to be in Acute on top of chronic hypoxic respiratory failure  secondary to community-acquired left lung pneumonia and acute diastolic congestive heart failure exacerbation.\"   Existing Precautions/Restrictions fall;oxygen therapy device and L/min   General Observations Pt on 4L/min O2 supp during session   Cognition   Affect/Mental Status (Cognition) WFL   Orientation Status (Cognition) oriented x 4   Follows Commands (Cognition) WFL   Integumentary/Edema   Integumentary/Edema no deficits were identifed   Posture    Posture Forward head position;Protracted shoulders   Range of Motion (ROM)   Range of Motion ROM is WFL   Strength (Manual Muscle Testing)   Strength (Manual Muscle Testing) Deficits observed " during functional mobility   Strength Comments mild functional strength deficits, moderate to severe functional endurance deficits   Bed Mobility   Comment, (Bed Mobility) CGA supine>sit   Transfers   Comment, (Transfers) CGA sit>stand w/ FWW   Gait/Stairs (Locomotion)   Distance in Feet (Gait) 10' eval   Comment, (Gait/Stairs) pt ambulated 10' w/ FWW and CGA for eval   Balance   Balance Comments dynamic balance impairment   Sensory Examination   Sensory Perception patient reports no sensory changes   Clinical Impression   Criteria for Skilled Therapeutic Intervention Yes, treatment indicated   PT Diagnosis (PT) impaired functional mobility   Influenced by the following impairments impaired strength, balance, activity tolerance   Functional limitations due to impairments limited IND with mobility   Clinical Presentation (PT Evaluation Complexity) stable   Clinical Presentation Rationale clinical judgement   Clinical Decision Making (Complexity) low complexity   Planned Therapy Interventions (PT) balance training;bed mobility training;gait training;home exercise program;patient/family education;ROM (range of motion);strengthening;transfer training;progressive activity/exercise;home program guidelines   Risk & Benefits of therapy have been explained evaluation/treatment results reviewed;care plan/treatment goals reviewed;risks/benefits reviewed;current/potential barriers reviewed;participants voiced agreement with care plan;participants included;patient   PT Total Evaluation Time   PT Eval, Low Complexity Minutes (75074) 5   Physical Therapy Goals   PT Frequency Daily   PT Predicted Duration/Target Date for Goal Attainment 12/16/23   PT Goals Bed Mobility;Transfers;Gait   PT: Bed Mobility Independent;Supine to/from sit   PT: Transfers Modified independent;Sit to/from stand;Bed to/from chair;Assistive device   PT: Gait Modified independent;Rolling walker;100 feet   Interventions   Interventions Quick Adds Gait  Training;Therapeutic Activity   Therapeutic Activity   Therapeutic Activities: dynamic activities to improve functional performance Minutes (60852) 12   Symptoms Noted During/After Treatment Fatigue   Treatment Detail/Skilled Intervention Greeted pt upon arrival to room. Pt agreeable to working with PT. Pt on 4L/min O2 supp during session. At 93% O2 sat prior to mobility, desatting to 90% with mobility, quickly recovering back to 93% with cueing for use of PLB. Supine>sit w/ CGA. Sit>stand w/ min A and FWW. Cueing for safe and efficient sit<>stand procedure including scooting to the front edge of the chair, to lean forward with nose over toes, and hand and foot placement. Following ambulation, stand>sit to recliner w/ FWW and SBA. Cueing for safe sitting procedures including feeling the recliner with both legs and reaching back with a hand to find an arm rest. Pt left with all needs met and call light within reach.   Gait Training   Gait Training Minutes (74013) 9   Symptoms Noted During/After Treatment (Gait Training) fatigue;shortness of breath   Treatment Detail/Skilled Intervention Pt ambulated 60' w/ FWW and CGA, progressing to SBA throughout. Noting pt demonstrating forward head  posture with head and eyes facing downward and stooped forward posture. Cueing to stand tall with head and eyes up. Improved upright posture following cueing. Cueing for use of standing rest breaks to improved ambulation distance.   Distance in Feet 60'   PT Discharge Planning   PT Plan PT: activity tolerance, independence with mobility, bring 4WW to ambulate with, monitor O2 sats   PT Discharge Recommendation (DC Rec) Transitional Care Facility;home with assist;home with home care physical therapy   PT Rationale for DC Rec Patient is below baseline mobility levels at this time. Currently requiring SBA/CGA with all mobility with FWW. Pt currently lives alone and would not be safe to discharge home alone at this time. At this time,  recommend discharge to TCU for continued strengthening and mobility progressions. Of note, patient was on hospice prior to admission, but was removed from hospice services 2/2 daughter being out of town. Patient may progress to safe discharge home if able to progress to mod I with use of 4WW.   PT Brief overview of current status SBA/CGA w/ FWW   Total Session Time   Timed Code Treatment Minutes 21   Total Session Time (sum of timed and untimed services) 26

## 2023-12-09 NOTE — ED PROVIDER NOTES
History     Chief Complaint:  Shortness of Breath       HPI   Celeste Chacko is a 89 year old female history of end-stage COPD, on hospice, on 2 L nasal cannula baseline, brought in by EMS for worsening shortness of breath over the last 3 days.  Patient's daughter revoked the hospice so that she could be seen in the ED today as the daughter is out of town.  Patient's O2 sats were 77% via EMS and she was placed on 5 L nasal cannula and then given 3 DuoNebs which significantly helped her symptoms.  No chest pain or abdominal pain.  No cough, but potentially some chills.  No reported fever.  She endorses feeling weak.  No leg swelling.  Does have baseline chronic pain and leg pain.      Independent Historian:    none    Review of External Notes:  Office note May 12, 2022-COPD with O2 requirement.    Allergies:  Sulfamethoxazole     Physical Exam   Patient Vitals for the past 24 hrs:   BP Temp Temp src Pulse Resp SpO2   12/08/23 1750 125/58 99.8  F (37.7  C) Oral 101 26 91 %        Physical Exam  GENERAL: Patient appears frail and slightly labored with breathing.  HEAD: Atraumatic.  NECK: No rigidity  CV: Tachycardic and regular, no murmurs rubs or gallops  PULM: Diminished breath sounds bilaterally.  Slight accessory muscle use.  ABD: Soft, nontender, nondistended, no guarding  DERM: No rash. Skin warm and dry  EXTREMITY: Moving all extremities without difficulty. No calf tenderness or peripheral edema         Emergency Department Course   ECG  ECG interpreted by me.  Time 1814  Sinus tach 102. No ST elevation or depression. Normal intervals.  Left axis deviation      Laboratory: Imaging:   Labs Ordered and Resulted from Time of ED Arrival to Time of ED Departure   COMPREHENSIVE METABOLIC PANEL - Abnormal       Result Value    Sodium 136      Potassium 4.7      Carbon Dioxide (CO2) 26      Anion Gap 11      Urea Nitrogen 22.5      Creatinine 0.63      GFR Estimate 83      Calcium 9.5      Chloride 99      Glucose 301  (*)     Alkaline Phosphatase 114      AST 37      ALT 55 (*)     Protein Total 6.7      Albumin 3.5      Bilirubin Total 0.9     CBC WITH PLATELETS AND DIFFERENTIAL - Abnormal    WBC Count 15.9 (*)     RBC Count 4.04      Hemoglobin 10.4 (*)     Hematocrit 33.3 (*)     MCV 82      MCH 25.7 (*)     MCHC 31.2 (*)     RDW 14.0      Platelet Count 212      % Neutrophils 84      % Lymphocytes 4      % Monocytes 11      % Eosinophils 0      % Basophils 0      % Immature Granulocytes 1      NRBCs per 100 WBC 0      Absolute Neutrophils 13.3 (*)     Absolute Lymphocytes 0.7 (*)     Absolute Monocytes 1.7 (*)     Absolute Eosinophils 0.0      Absolute Basophils 0.1      Absolute Immature Granulocytes 0.2      Absolute NRBCs 0.0     ISTAT GASES LACTATE VENOUS POCT - Abnormal    Lactic Acid POCT 1.3      Bicarbonate Venous POCT 29 (*)     O2 Sat, Venous POCT 59 (*)     pCO2 Venous POCT 49      pH Venous POCT 7.38      pO2 Venous POCT 32     INFLUENZA A/B, RSV, & SARS-COV2 PCR - Normal    Influenza A PCR Negative      Influenza B PCR Negative      RSV PCR Negative      SARS CoV2 PCR Negative       XR Chest 2 Views   Final Result   IMPRESSION: Heart normal in size. Increased airspace consolidation within the left mid and lower lung concerning for pneumonia. Small left effusion with atelectasis. Trace right effusion with atelectasis. Mild interstitial edema. Recommend a follow-up    chest x-ray in 4-6 weeks to assure complete resolution.                   Emergency Department Course & Assessments:             Interventions:  Medications   ipratropium - albuterol 0.5 mg/2.5 mg/3 mL (DUONEB) neb solution 3 mL (has no administration in time range)   azithromycin (ZITHROMAX) 500 mg vial to attach to  mL bag (has no administration in time range)   furosemide (LASIX) injection 20 mg (has no administration in time range)   predniSONE (DELTASONE) tablet 40 mg (40 mg Oral $Given 12/8/23 7260)   oxyCODONE (ROXICODONE) tablet 5 mg (5  mg Oral $Given 12/8/23 1850)   cefTRIAXone (ROCEPHIN) 1 g vial to attach to  mL bag for ADULTS or NS 50 mL bag for PEDS (0 g Intravenous Stopped 12/8/23 2040)   sodium chloride 0.9% BOLUS 500 mL (500 mLs Intravenous $New Bag 12/8/23 1950)        Assessments, Independent Interpretation, Consult/Discussion of ManagementTests:   Chest x-ray independently interpreted showing left-sided pneumonia.  Dr. Alaniz-hospitalist physician discussed with.  Social Determinants of Health affecting care:  none    Disposition:  The patient was admitted to the hospital under the care of Dr. Alaniz.     Impression & Plan         Medical Decision Making:  Symptoms consistent with pneumonia.   Chronic conditions complicating -COPD.  DDx considered ACS, PE, myocarditis, sepsis.  Empirically given additional DuoNebs as per patient that helped.  Also given prednisone in case there is a degree of COPD exacerbation.  Labs showing leukocytosis.  Lactate within normal limits.  Not hypotensive or septic.  Chest x-ray interpreted by me showed pneumonia.   Patient with new higher O2 requirement therefore, needs admission.  Patient high-risk for discharge.  Given IV ceftriaxone and azithromycin.  Discussed with hospital   Patient to be admitted.      Diagnosis:  No diagnosis found.     Discharge Medications:  New Prescriptions    No medications on file          12/8/2023   Terrance Valadez MD Foss, Kevin, MD  12/08/23 2043

## 2023-12-09 NOTE — PROGRESS NOTES
RECEIVING UNIT ED HANDOFF REVIEW    ED Nurse Handoff Report was reviewed by: Meghan Garcia RN on December 9, 2023 at 1:02 AM

## 2023-12-09 NOTE — H&P
[facility]    History and Physical - Hospitalist Service       Date of Admission:  12/8/2023    Assessment & Plan      Celeste Chacko is a 89 year old female admitted on 12/8/2023. She has end-stage COPD on 2 to 3 L of oxygen by nasal cannula at baseline, history of type 2 diabetes mellitus, history of PCP pneumonia, history of hypertension, hyperlipidemia was brought into the emergency room with concerns of worsening shortness of breath for the last 3 days.  Patient was hypoxic 77% per EMS on her baseline oxygen 1 she was placed on 5 L of oxygen nasal cannula sats improved.  She was given 3 DuoNebs prior to arrival to the ED.  She otherwise denies any fevers or chills.  Cough productive of mild greenish colored sputum.  Patient revoked hospice to be treated for her respiratory issues in the emergency room.  X-ray showed a left lung pneumonia and pulmonary edema    Acute hypoxic respiratory failure secondary to community-acquired left lung pneumonia  Acute diastolic congestive heart failure exacerbation;  Admit her to the hospital under inpatient status  Continue ceftriaxone Zithromax that was started in the emergency room  Prednisone 40 mg p.o. daily for 5-day course ordered  DuoNebs 4 times daily  Lasix 20 mg IV one-time dose given to help with the pulmonary edema and CHF exacerbation  Increase PTA Lasix to 40 mg p.o. daily  Type 2 diabetes mellitus;  Patient is at risk of hyperglycemia with prednisone  High-dose sliding scale with NovoLog ordered  History of hypertension;  While increasing the dose of Lasix will reduce Norvasc dose to 2.5 mg daily from PTA 5 mg p.o. daily   Continue other PTA meds once verified by pharmacy    History of PCP pneumonia;  Continue atovaquone once confirmed by pharmacy    History of hyperlipidemia;  Continue statin once confirmed by pharmacy     Diet:  Regular diet  DVT Prophylaxis: Ambulate every shift  Botello Catheter: Not present  Lines: None     Cardiac  Monitoring: None  Code Status:  DNR/DNI discussed with the patient on admission.  Patient was on hospice since April prior to hospitalization.  Patient revoked hospice on admission.  Needs hospice referral on discharge for reenrollment      Clinically Significant Risk Factors Present on Admission                # Drug Induced Platelet Defect: home medication list includes an antiplatelet medication   # Hypertension: Noted on problem list          # COPD: noted on problem list        Disposition Plan      Expected Discharge Date: 12/10/2023                  Kiley Alaniz MD  Hospitalist Service  [facility]  Securely message with [employer] (more info)  Text page via [employer] Paging/Directory     ______________________________________________________________________    Chief Complaint   Worsening shortness of breath in a patient with history of COPD on 2 L of nasal cannula was on hospice since April    History is obtained from the patient, electronic health record, and emergency department physician    History of Present Illness   Celeste Chacko is a 89 year old female with history of end-stage COPD on 2 to 3 L of oxygen by nasal cannula at baseline, history of type 2 diabetes mellitus, history of PCP pneumonia, history of hypertension, hyperlipidemia was brought into the emergency room with concerns of worsening shortness of breath for the last 3 days.  Patient was hypoxic 77% per EMS on her baseline oxygen 1 she was placed on 5 L of oxygen nasal cannula sats improved.  She was given 3 DuoNebs prior to arrival to the ED.  She otherwise denies any fevers or chills.  Cough productive of mild greenish colored sputum.  Patient revoked hospice to be treated for her respiratory issues in the emergency room.  In the emergency room she was evaluated by Dr. Terrance Valadez.  Her vital signs showed temperature 99.8  F low-grade fever, pulse rate 101, blood pressure 125/58 respirate of 26 she was satting 91% on 5  L of oxygen by nasal cannula  Her lab work showed VBG showed pH of 7.38 pCO2 49 pO2 59 bicarb elevated at 29 lactate was normal at 1.3  CBC showed WC count elevation at 15.9 hemoglobin 10.4 platelet count at 212  BMP showed sodium normal at 136 potassium 4.6 bicarb 26, anion gap of 11 BUN 22.5 creatinine normal at 0.63.  Blood sugar elevated at 301 all her LFTs are normal except ALT mildly elevated at 55.  Influenza, RSV COVID-19 testing returned negative  Chest x-ray done in the ED showed heart size is normal.  Increased airspace consolidation within the left mid and lower lung concerning for pneumonia.  Small left effusion with atelectasis.  Trace right effusion with atelectasis.  Mild interstitial edema  Patient was diagnosed with community-acquired pneumonia and was given a dose of IV ceftriaxone and Zithromax, prednisone 40 mg p.o. daily for acute COPD exacerbation and requested hospitalization was made  Patient tells me that her goal is to make through [place] this year and wants to be treated to get better.  Patient is independent at baseline and lives by herself at home.  Her daughter is on vacation out of town currently.      Past Medical History    Past Medical History:   Diagnosis Date    Benign essential hypertension     Chronic constipation     Chronic lower back pain     due to spinal stenosis    Chronic pain syndrome     Chronic, continuous use of opioids     COPD (chronic obstructive pulmonary disease) (H)     on continuous oxygen (2LNC)    NOEMÍ (generalized anxiety disorder)     History of Pneumocystis jirovecii pneumonia     Osteopenia     Pure hypercholesterolemia     Type 2 diabetes mellitus (H)        Past Surgical History   Past Surgical History:   Procedure Laterality Date    ARTHROPLASTY KNEE Left 09/15/2014    Procedure: ARTHROPLASTY KNEE;  Surgeon: Carlos Alberto Kaminski MD;  Location: [facility] OR    CATARACT EXTRACTION, BILATERAL      INJECT EPIDURAL LUMBAR / SACRAL SINGLE Left 07/14/2016     Procedure: INJECT EPIDURAL LUMBAR / SACRAL SINGLE;  Surgeon: Luisito New MD;  Location: [facility]    INJECT SACROILIAC JOINT N/A 12/01/2015    Procedure: INJECT SACROILIAC JOINT;  Surgeon: Luisito New MD;  Location: [facility] GI    INJECT SACROILIAC JOINT Bilateral 05/19/2016    Procedure: INJECT SACROILIAC JOINT;  Surgeon: Luisito New MD;  [facility]: [facility]    INJECT SACROILIAC JOINT Bilateral 11/25/2016    Procedure: INJECT SACROILIAC JOINT;  Surgeon: Elmira Bennett MD;  Location: [facility]    INJECT SACROILIAC JOINT Bilateral 04/25/2017    Procedure: INJECT SACROILIAC JOINT;  Bilateral Sacroiliac Joint Injection   Injection of local anesthetic and steroid into area around spine for pain relief;  Surgeon: Alvaro Holland MD;  Location: [facility]    INJECT SACROILIAC JOINT Bilateral 07/28/2017    Procedure: INJECT SACROILIAC JOINT;  Bilateral Sacroiliac Joint Injection;  Surgeon: Elmira Bennett MD;  Location: [facility]    INJECT SACROILIAC JOINT Bilateral 11/10/2017    Procedure: INJECT SACROILIAC JOINT;  Bilateral Sacroiliac Joint Injection;  Surgeon: Elmira Bennett MD;  Location: [facility] OR    TONSILLECTOMY & ADENOIDECTOMY      TOTAL SHOULDER ARTHROPLASTY Right        Prior to Admission Medications   Prior to Admission Medications   Prescriptions Last Dose Informant Patient Reported? Taking?   ASPIRIN EC PO  Self Yes No   Sig: Take 81 mg by mouth daily   Acetaminophen (TYLENOL PO)  Self Yes No   Sig: Take 325 mg by mouth every 6 hours as needed Patient takes TID with Oxycodone.   Alcohol Swabs PADS   No No   Sig: Use to swab the area of the injection or kishna as directed Per insurance coverage   Patient not taking: No sig reported   Calcium Polycarbophil (FIBERCON PO)   Yes No   Sig: Take 1 tablet by mouth daily   MELATONIN PO  Self Yes No   Sig: Take 1 mg by mouth At Bedtime   Multiple Vitamins-Minerals (MULTIVITAMIN ADULT PO)  Self Yes No   Sig: Take 1 tablet by mouth every evening    Sharps Container MISC   No No    Sig: Use as directed to dispose of needles, lancets and other sharps Per Insurance coverage   albuterol (PROAIR HFA/PROVENTIL HFA/VENTOLIN HFA) 108 (90 Base) MCG/ACT inhaler   No No   Sig: Inhale 2 puffs into the lungs every 6 hours   amLODIPine (NORVASC) 5 MG tablet   No No   Sig: Take 1 tablet (5 mg) by mouth daily   atovaquone (MEPRON) 750 MG/5ML suspension   No No   Sig: Take 5 mLs (750 mg) by mouth daily   blood glucose (NO BRAND SPECIFIED) lancets standard   No No   Sig: To use to test glucose level in the blood Use to test blood 1sugar  3  times daily as directed. To accompany glucose monitor brands per insurance coverage.   Patient taking differently: To use to test glucose level in the blood Use to test blood 1sugar  3  times daily as directed. To accompany glucose monitor brands per insurance coverage.   blood glucose (NO BRAND SPECIFIED) test strip   No No   Sig: To use to test glucose level in the blood Use to test blood sugar  3 times daily as directed. To accompany glucose monitor brands per insurance coverage.   Patient taking differently: To use to test glucose level in the blood Use to test blood sugar  3 times daily as directed. To accompany glucose monitor brands per insurance coverage.   blood glucose calibration (NO BRAND SPECIFIED) solution   No No   Sig: Used to calibrate the blood glucose monitor as needed and as directed.  To accompany  blood glucose brands per insurance coverage   Patient taking differently: Used to calibrate the blood glucose monitor as needed and as directed.  To accompany  blood glucose brands per insurance coverage   blood glucose monitoring (NO BRAND SPECIFIED) meter device kit   No No   Sig: Use as directed Per insurance coverage   calcium-vitamin D (CALTRATE) 600-400 MG-UNIT per tablet  Self Yes No   Sig: Take 1 tablet by mouth 2 times daily 1200mg   1000 mg of vitamin d   co-enzyme Q-10 100 MG CAPS capsule  Self Yes No   Sig: Take 100 mg by mouth daily    famotidine (PEPCID) 20 MG tablet  Self Yes No   Sig: Take 20 mg by mouth 2 times daily as needed   fluticasone-salmeterol (ADVAIR) 500-50 MCG/ACT inhaler   No No   Sig: Inhale 1 puff into the lungs 2 times daily   furosemide (LASIX) 20 MG tablet  Self No No   Sig: Take 1 tablet (20 mg) by mouth in the morning.   glipiZIDE (GLUCOTROL XL) 5 MG 24 hr tablet   No No   Sig: Take 1 tablet (5 mg) by mouth daily (with breakfast)   glucosamine-chondroitin 500-400 MG CAPS per capsule  Self Yes No   Sig: Take 1 capsule by mouth 2 times daily    glucose 40 % (400 mg/mL) gel   No No   Sig: 15 g every 15 minutes by mouth as needed for low blood sugar.  Oral gel is preferable for conscious and able to swallow patient.   Patient taking differently: 15 g every 15 minutes by mouth as needed for low blood sugar.  Oral gel is preferable for conscious and able to swallow patient.   latanoprost (XALATAN) 0.005 % ophthalmic solution  Self Yes No   Sig: Place 1 drop Into the left eye every evening   lisinopril (ZESTRIL) 40 MG tablet   No No   Sig: Take 1 tablet (40 mg) by mouth daily   metFORMIN (GLUCOPHAGE) 850 MG tablet   No No   Sig: Take 1 tablet (850 mg) by mouth 2 times daily (with meals)   oxyCODONE (ROXICODONE) 5 MG tablet   No No   Sig: Take 1 tablet (5 mg) by mouth every 4 hours as needed for severe pain May take 5 tabs per day   polyethylene glycol (MIRALAX) 17 g packet  Self Yes No   Sig: Take 0.5 packets by mouth every evening    rosuvastatin (CRESTOR) 5 MG tablet   No No   Sig: Take 1 tablet (5 mg) by mouth At Bedtime   sertraline (ZOLOFT) 25 MG tablet   No No   Sig: Take 1 tablet (25 mg) by mouth daily   tiotropium (SPIRIVA HANDIHALER) 18 MCG inhaled capsule   No No   Sig: INHALE THE CONTENTS OF 1 CAPSULE VIA INHALATION DEVICE DAILY      Facility-Administered Medications: None        Review of Systems    The 10 point Review of Systems is negative other than noted in the HPI     Social History   I have reviewed this  patient's social history and updated it with pertinent information if needed.  Social History     Tobacco Use    Smoking status: Former     Packs/day: 1.00     Years: 54.00     Additional pack years: 0.00     Total pack years: 54.00     Types: Cigarettes     Quit date: 2000     Years since quittin.6    Smokeless tobacco: Never   Vaping Use    Vaping Use: Never used   Substance Use Topics    Alcohol use: Yes     Comment: at most 1 glass/week    Drug use: No         Family History   I have reviewed this patient's family history and updated it with pertinent information if needed.  Family History   Problem Relation Age of Onset    Diabetes Type 2  Brother     Breast Cancer Brother     Cerebrovascular Disease Brother     Myocardial Infarction Brother     Coronary Artery Disease Early Onset No family hx of     Ovarian Cancer No family hx of     Colon Cancer No family hx of          Allergies   Allergies   Allergen Reactions    Sulfamethoxazole Anaphylaxis        Physical Exam   Vital Signs: Temp: 99.8  F (37.7  C) Temp src: Oral BP: 125/58 Pulse: 101   Resp: 26 SpO2: 91 % O2 Device: Nasal cannula Oxygen Delivery: 5 LPM  Weight: 0 lbs 0 oz    General Appearance: Patient is alert oriented pleasant female lying comfortably in bed not in acute distress  Eyes: Sclera no icterus conjunctiva no injection noted  HEENT: Head is atraumatic normocephalic  Respiratory: Bilateral crackles heard on auscultation, no wheezing currently  Cardiovascular: Normal rate rhythm regular, no murmurs  GI: Soft, nontender nondistended bowel sounds positive  Skin: Warm and dry  Musculoskeletal: No clubbing cyanosis or edema  Neurologic: No focal weakness  Psychiatric: Mood and affect are normal    Medical Decision Making       80  MINUTES SPENT BY ME on the date of service doing chart review, history, exam, documentation & further activities per the note.      Data     I have personally reviewed the following data over the past 24  hrs:    15.9 (H)  \   10.4 (L)   / 212     136 99 22.5 /  301 (H)   4.7 26 0.63 \     ALT: 55 (H) AST: 37 AP: 114 TBILI: 0.9   ALB: 3.5 TOT PROTEIN: 6.7 LIPASE: N/A     Procal: N/A CRP: N/A Lactic Acid: 1.3         Imaging results reviewed over the past 24 hrs:   Recent Results (from the past 24 hour(s))   XR Chest 2 Views    Narrative    EXAM: XR CHEST 2 VIEWS  LOCATION: [facility]  DATE: 12/8/2023    INDICATION: SOB r o pna  COMPARISON: 4/12/2022      Impression    IMPRESSION: Heart normal in size. Increased airspace consolidation within the left mid and lower lung concerning for pneumonia. Small left effusion with atelectasis. Trace right effusion with atelectasis. Mild interstitial edema. Recommend a follow-up   chest x-ray in 4-6 weeks to assure complete resolution.

## 2023-12-09 NOTE — PLAN OF CARE
Orientation: A&Ox4  Aggression Stop Light: green  Mobility: A1 GB+W  Pain Management: PRN oxy  Diet: regular   Bowel/Bladder: continent, up to BSC  Abnormal Lab/Assessments: , gave 7 units of insulin, recheck was 291, on 5L of O2  Skin/Wounds: R big toe tender, foam dressing in place  Drain/Device/Wound: PIV SL  Consults: N/A  D/C Day/Goals/Place: plan pending

## 2023-12-09 NOTE — PHARMACY-ADMISSION MEDICATION HISTORY
Pharmacist Admission Medication History    Admission medication history is complete. The information provided in this note is only as accurate as the sources available at the time of the update.    Information Source(s): Patient, Clinic records, and CareEverywhere/SureScripts via in-person    Pertinent Information: Patient thinks they discontinued the Rosuvastatin, but wasn't sure.    Changes made to PTA medication list:  Added: Lorazepam, Methadone, Cough Syrup  Deleted: Melatonin, Furosemide, Famotidine, CoEnzyme Q-10.  Changed: Amlodipine to 2.5 mg    Medication History Completed By: Della Lui RPH 12/8/2023 10:03 PM    PTA Med List   Medication Sig Last Dose    Acetaminophen (TYLENOL PO) Take 325 mg by mouth every 6 hours as needed Patient takes TID with Oxycodone.     albuterol (PROAIR HFA/PROVENTIL HFA/VENTOLIN HFA) 108 (90 Base) MCG/ACT inhaler Inhale 2 puffs into the lungs every 6 hours 12/8/2023    amLODIPine (NORVASC) 2.5 MG tablet Take 2.5 mg by mouth daily 12/8/2023    aspirin (ASA) 81 MG chewable tablet Take 81 mg by mouth daily 12/8/2023    atovaquone (MEPRON) 750 MG/5ML suspension Take 5 mLs (750 mg) by mouth daily 12/8/2023    calcium polycarbophil (FIBERCON) 625 MG tablet Take 1 tablet by mouth daily 12/8/2023    calcium-vitamin D (CALTRATE) 600-400 MG-UNIT per tablet Take 1 tablet by mouth 2 times daily 12/8/2023    fluticasone-salmeterol (ADVAIR) 500-50 MCG/ACT inhaler Inhale 1 puff into the lungs 2 times daily 12/8/2023 at am    gabapentin (NEURONTIN) 100 MG capsule Take 100 mg by mouth every morning 12/8/2023    gabapentin (NEURONTIN) 100 MG capsule Take 200 mg by mouth at bedtime 12/7/2023    glipiZIDE (GLUCOTROL) 5 MG tablet Take 5 mg by mouth daily 12/8/2023    glucosamine-chondroitin 500-400 MG CAPS per capsule Take 1 capsule by mouth 2 times daily  12/8/2023 at am    glucose 40 % (400 mg/mL) gel 15 g every 15 minutes by mouth as needed for low blood sugar.  Oral gel is preferable for  conscious and able to swallow patient. as needed    latanoprost (XALATAN) 0.005 % ophthalmic solution Place 1 drop Into the left eye every evening 12/7/2023    lisinopril (ZESTRIL) 40 MG tablet Take 1 tablet (40 mg) by mouth daily 12/8/2023    LORazepam (ATIVAN) 0.5 MG tablet Take 0.5 mg by mouth every 4 hours as needed for anxiety (restlessness) as needed    metFORMIN (GLUCOPHAGE) 850 MG tablet Take 1 tablet (850 mg) by mouth 2 times daily (with meals) 12/8/2023 at am    methadone (DOLOPHINE) 5 MG/5ML solution Take 2.5 mg by mouth 2 times daily 12/8/2023 at am    Multiple Vitamins-Minerals (MULTIVITAMIN ADULT PO) Take 1 tablet by mouth daily 12/8/2023    oxyCODONE (ROXICODONE) 5 MG tablet Take 1 tablet (5 mg) by mouth every 4 hours as needed for severe pain May take 5 tabs per day as needed    polyethylene glycol (MIRALAX) 17 g packet Take 0.5 packets by mouth every evening  12/7/2023    Pseudoephedrine-Codeine-GG 30- MG/5ML LIQD Take 10 mLs by mouth every 4 hours as needed (cough) as needed    sertraline (ZOLOFT) 25 MG tablet Take 1 tablet (25 mg) by mouth daily 12/8/2023    tiotropium (SPIRIVA HANDIHALER) 18 MCG inhaled capsule INHALE THE CONTENTS OF 1 CAPSULE VIA INHALATION DEVICE DAILY 12/8/2023

## 2023-12-09 NOTE — PROGRESS NOTES
"[facility]    Medicine Progress Note - Hospitalist Service    Date of Admission:  12/8/2023    Assessment & Plan   Celetse Chacko is a 89 year old female admitted on 12/8/2023 with increasing SOB and weakness.    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  She lives alone and was concerned about her worsening symptoms.  Patient was hypoxic 77% per EMS on 1L (? Baseline is 2-3L)  She was given 3 DuoNebs prior to arrival to the ED.      She decided to revoke HOSPICE and come to the hospital d/t her increasing respiratory issues in the emergency room.  X-ray showed a left lung pneumonia and pulmonary edema     Acute on top of chronic hypoxic respiratory failure  secondary to community-acquired left lung pneumonia and acute diastolic congestive heart failure exacerbation    She is feeling somewhat better today  Continue IV rocephin and po azithromycin today  Continue on po prednisone  Continue on duonebs qid  Start mucinex bid    Will re-order IV lasix bid today for further diuresis today (hold PTA po lasix for today)    Wean oxygen!!  Oxygen sats goal >/89%  She is also worried about getting \"too much oxygen\" today while in the hospital    Last ECHO on chart review - 2/2022 with evidence of pulmonary HTN and hyperdynamic left vent function    2. Type 2 diabetes mellitus  Patient is at risk of hyperglycemia with prednisone  High-dose sliding scale with NovoLog ordered  Will also order carb-counting insulin    Home med list has been reviewed now - will resume PTA glipizide  Hold PTA metformin today    3. History of hypertension;    Resume PTA norvasc (5mg) and lisinopril (40mg) today       4. History of PCP pneumonia;  Continue atovaquone 750mg po daily     5. History of hyperlipidemia;  Pt states that she is not currently taking a statin    6.  Weakness    This is her main concern about when she is ready to medically " "discharge  She does not feel safe discharging home alone with hospice  She would like to stay with her daughter but not sure that her large oxygen tank can be easily brought to her daughter's home    If not wanting hospice, can consider PT/OT and possible short TCU stay    SW consult for discharge planning      Diet:  diabetic diet  DVT Prophylaxis: Ambulate every shift  Botello Catheter: Not present  Lines: None     Cardiac Monitoring: None  Code Status:  DNR/DNI discussed with the patient on admission.  Patient was on hospice since April prior to hospitalization.  Patient revoked hospice on admission.      Medical Decision Making       53 MINUTES SPENT BY ME on the date of service doing chart review, history, exam, documentation & further activities per the note.        PPE Worn:  Mask, gloves     Diet: Combination Diet Regular Diet Adult    DVT Prophylaxis: Enoxaparin (Lovenox) SQ  Botello Catheter: Not present  Lines: None     Cardiac Monitoring: None  Code Status: No CPR- Do NOT Intubate      Clinically Significant Risk Factors Present on Admission                # Drug Induced Platelet Defect: home medication list includes an antiplatelet medication   # Hypertension: Noted on problem list          # COPD: noted on problem list        Disposition Plan      Expected Discharge Date: 12/10/2023                  Neva Smith DO  Hospitalist Service  [facility]  Securely message with Flex[employer] (more info)  Text page via [employer] Paging/Directory   ______________________________________________________________________    Interval History     Still feeling very weak and shaky---worried about going home alone.  \"Can't you see how shaky I am?\"    \"My breathing is a little better today\".  Has not been up out of bed yet this am to see how she feels with physical activity.  Min cough; no hemoptysis.  No N/V. No HA or new chest pressure.     Physical Exam   Vital Signs: Temp: 98.5  F (36.9  C) " Temp src: Oral BP: 125/79 Pulse: 95   Resp: 22 SpO2: 92 % O2 Device: Nasal cannula Oxygen Delivery: 5 LPM  Weight: 121 lbs 4.05 oz    GEN:  Alert, elderly female who appears chronically ill  Sitting almost fully upright in bed.  Moderate respiratory distress with mild, bilateral costal retractions, using some accessory muscles.   HEENT:  Normocephalic/atraumatic, no scleral icterus, no nasal discharge, membranes appear fairly moist  CV:  Regular rate and rhythm, no clear loud murmur   LUNGS:  dry bilateral crackles to post exam throughout the lower and middle lung fields.  No clear rhonchi or wheezing.  Post upper lung fields clear to ausc bilaterally.  ABD:  Active bowel sounds, soft, non-tender to light palpation throughout.  Mildly distended throughout.  No clear rebound/guarding/rigidity.  EXT:  No significant pretibial edema.  No cyanosis.  No acute joint synovitis noted.  SKIN:  Dry to touch, no exanthems noted in the visualized areas.  PSYCH:  pleasant, cooperative, appears worried about discharge.  Not tearful.  Not agitated.    Medications      amLODIPine  2.5 mg Oral Daily    aspirin  81 mg Oral Daily    atovaquone  750 mg Oral Daily    azithromycin  250 mg Oral Daily    calcium polycarbophil  625 mg Oral Daily    cefTRIAXone  2 g Intravenous Q24H    famotidine  20 mg Oral BID    fluticasone-vilanterol  1 puff Inhalation Daily    furosemide  40 mg Oral Daily    glipiZIDE  5 mg Oral Daily with breakfast    insulin aspart  1-10 Units Subcutaneous TID AC    insulin aspart  1-7 Units Subcutaneous At Bedtime    ipratropium - albuterol 0.5 mg/2.5 mg/3 mL  3 mL Nebulization 4x daily    latanoprost  1 drop Left Eye QPM    predniSONE  40 mg Oral Daily    sodium chloride (PF)  3 mL Intracatheter Q8H       Data     Labs and Imaging results below reviewed today.  Recent Labs   Lab 12/09/23  0911 12/08/23  1803   WBC 14.8* 15.9*   HGB 9.7* 10.4*   HCT 30.9* 33.3*   MCV 82 82    212     Recent Labs   Lab  "12/09/23  1231 12/09/23  0911 12/09/23  0755 12/09/23  0131 12/08/23  1803   NA  --  140  --   --  136   POTASSIUM  --  4.1  --   --  4.7   CHLORIDE  --  102  --   --  99   CO2  --  26  --   --  26   ANIONGAP  --  12  --   --  11   * 324* 210*   < > 301*   BUN  --  28.1*  --   --  22.5   CR  --  0.58  --   --  0.63   GFRESTIMATED  --  84  --   --  83   NARCISA  --  9.1  --   --  9.5    < > = values in this interval not displayed.     7-Day Micro Results       Collected Updated Procedure Result Status      12/08/2023 1818 12/08/2023 1904 Symptomatic Influenza A/B, RSV, & SARS-CoV2 PCR (COVID-19) Nose [[id number]]    Swab from Nose    Final result Component Value   Influenza A PCR Negative   Influenza B PCR Negative   RSV PCR Negative   SARS CoV2 PCR Negative   NEGATIVE: SARS-CoV-2 (COVID-19) RNA not detected, presumed negative.                  No results for input(s): \"INR\" in the last 168 hours.  No results for input(s): \"TSH\" in the last 168 hours.  No results for input(s): \"TROPONIN\", \"TROPI\", \"TROPR\", \"TROPONINIS\" in the last 168 hours.    Invalid input(s): \"TROPT\", \"TROP\", \"TROPONINIES\", \"TNIH\"    Recent Results (from the past 24 hour(s))   XR Chest 2 Views    Narrative    EXAM: XR CHEST 2 VIEWS  LOCATION: [facility]  DATE: 12/8/2023    INDICATION: SOB r o pna  COMPARISON: 4/12/2022      Impression    IMPRESSION: Heart normal in size. Increased airspace consolidation within the left mid and lower lung concerning for pneumonia. Small left effusion with atelectasis. Trace right effusion with atelectasis. Mild interstitial edema. Recommend a follow-up   chest x-ray in 4-6 weeks to assure complete resolution.        "

## 2023-12-09 NOTE — PLAN OF CARE
Goal Outcome Evaluation:  Diagnoses:Acute hypoxic respiratory failure secondary to community-acquired left lung pneumonia  Acute diastolic congestive heart failure exacerbation  Orientation: A&OX4  Aggression Stop Light: green  Mobility: SBA up to BSC  Pain Management: PRN oxycodone  Diet:Mod carb diet  Bowel/Bladder: Continent B&B  Abnormal Lab/Assessments:  gave 9 units of insulin, recheck was 280, on 4 L of O2  Skin/Wounds:valeriy  Drain/Device/Wound: PIV SL  Consults: N/A  D/C Day/Goals/Place: Pending improvement

## 2023-12-09 NOTE — ED NOTES
[facility]  ED Nurse Handoff Report    ED Chief complaint: Shortness of Breath      ED Diagnosis:   Final diagnoses:   None       Code Status: Full Code    Allergies:   Allergies   Allergen Reactions    Sulfamethoxazole Anaphylaxis       Patient Story: Per report, patient comes from home where she lives independently and has been on hospice since April. Hx of COPD. Home health nurse came to check on patient today and she was experiencing respiratory distress, 77% while on 5 liters. 3 duonebs given in route. Daughter out of town currently, so decided to revoke hospice and send into ED for further eval.      Focused Assessment:  89 year old female history of end-stage COPD, on hospice, on 2 L nasal cannula baseline, brought in by EMS for worsening shortness of breath over the last 3 days.  Patient's daughter revoked the hospice so that she could be seen in the ED today as the daughter is out of town.  Patient's O2 sats were 77% via EMS and she was placed on 5 L nasal cannula and then given 3 DuoNebs which significantly helped her symptoms.  No chest pain or abdominal pain.  No cough, but potentially some chills.  No reported fever.  She endorses feeling weak.  No leg swelling.  Does have baseline chronic pain and leg pain.     Treatments and/or interventions provided:   XR Chest 2 Views   Final Result   IMPRESSION: Heart normal in size. Increased airspace consolidation within the left mid and lower lung concerning for pneumonia. Small left effusion with atelectasis. Trace right effusion with atelectasis. Mild interstitial edema. Recommend a follow-up    chest x-ray in 4-6 weeks to assure complete resolution.         Labs Ordered and Resulted from Time of ED Arrival to Time of ED Departure   COMPREHENSIVE METABOLIC PANEL - Abnormal       Result Value    Sodium 136      Potassium 4.7      Carbon Dioxide (CO2) 26      Anion Gap 11      Urea Nitrogen 22.5      Creatinine 0.63      GFR Estimate 83       Calcium 9.5      Chloride 99      Glucose 301 (*)     Alkaline Phosphatase 114      AST 37      ALT 55 (*)     Protein Total 6.7      Albumin 3.5      Bilirubin Total 0.9     CBC WITH PLATELETS AND DIFFERENTIAL - Abnormal    WBC Count 15.9 (*)     RBC Count 4.04      Hemoglobin 10.4 (*)     Hematocrit 33.3 (*)     MCV 82      MCH 25.7 (*)     MCHC 31.2 (*)     RDW 14.0      Platelet Count 212      % Neutrophils 84      % Lymphocytes 4      % Monocytes 11      % Eosinophils 0      % Basophils 0      % Immature Granulocytes 1      NRBCs per 100 WBC 0      Absolute Neutrophils 13.3 (*)     Absolute Lymphocytes 0.7 (*)     Absolute Monocytes 1.7 (*)     Absolute Eosinophils 0.0      Absolute Basophils 0.1      Absolute Immature Granulocytes 0.2      Absolute NRBCs 0.0     ISTAT GASES LACTATE VENOUS POCT - Abnormal    Lactic Acid POCT 1.3      Bicarbonate Venous POCT 29 (*)     O2 Sat, Venous POCT 59 (*)     pCO2 Venous POCT 49      pH Venous POCT 7.38      pO2 Venous POCT 32     INFLUENZA A/B, RSV, & SARS-COV2 PCR - Normal    Influenza A PCR Negative      Influenza B PCR Negative      RSV PCR Negative      SARS CoV2 PCR Negative        Does this patient have any cognitive concerns?: Forgetful    Activity level - Baseline/Home:  Stand with Assist  Activity Level - Current:   Unknown    Patient's Preferred language: English   Needed?: No    Isolation: None  Infection: Not Applicable  Patient tested for COVID 19 prior to admission: NO  Bariatric?: No    Vital Signs:   Vitals:    12/08/23 1750   BP: 125/58   Pulse: 101   Resp: 26   Temp: 99.8  F (37.7  C)   TempSrc: Oral   SpO2: 91%     Was the PSS-3 completed:   Yes  Family Comments: Family updated prior to patient leaving the house   OBS brochure/video discussed/provided to patient/family: No    For the majority of the shift this patient's behavior was Green.   Behavioral interventions performed were Care explained.    ED NURSE PHONE NUMBER: [phone]

## 2023-12-09 NOTE — ED NOTES
Patient complaining that bed is uncomfortable, repositioned in bed with pillow under left hip to maximize patient comfort. Head of bed lowered and blanket applied

## 2023-12-10 ENCOUNTER — APPOINTMENT (OUTPATIENT)
Dept: OCCUPATIONAL THERAPY | Facility: CLINIC | Age: 88
DRG: 193 | End: 2023-12-10
Attending: INTERNAL MEDICINE
Payer: MEDICARE

## 2023-12-10 ENCOUNTER — APPOINTMENT (OUTPATIENT)
Dept: PHYSICAL THERAPY | Facility: CLINIC | Age: 88
DRG: 193 | End: 2023-12-10
Payer: MEDICARE

## 2023-12-10 LAB
ANION GAP SERPL CALCULATED.3IONS-SCNC: 12 MMOL/L (ref 7–15)
BUN SERPL-MCNC: 25.6 MG/DL (ref 8–23)
CALCIUM SERPL-MCNC: 9.1 MG/DL (ref 8.2–9.6)
CHLORIDE SERPL-SCNC: 102 MMOL/L (ref 98–107)
CREAT SERPL-MCNC: 0.55 MG/DL (ref 0.51–0.95)
DEPRECATED HCO3 PLAS-SCNC: 31 MMOL/L (ref 22–29)
EGFRCR SERPLBLD CKD-EPI 2021: 86 ML/MIN/1.73M2
GLUCOSE BLDC GLUCOMTR-MCNC: 164 MG/DL (ref 70–99)
GLUCOSE BLDC GLUCOMTR-MCNC: 225 MG/DL (ref 70–99)
GLUCOSE BLDC GLUCOMTR-MCNC: 308 MG/DL (ref 70–99)
GLUCOSE BLDC GLUCOMTR-MCNC: 360 MG/DL (ref 70–99)
GLUCOSE BLDC GLUCOMTR-MCNC: 93 MG/DL (ref 70–99)
GLUCOSE SERPL-MCNC: 95 MG/DL (ref 70–99)
MAGNESIUM SERPL-MCNC: 1.7 MG/DL (ref 1.7–2.3)
POTASSIUM SERPL-SCNC: 3.1 MMOL/L (ref 3.4–5.3)
POTASSIUM SERPL-SCNC: 3.8 MMOL/L (ref 3.4–5.3)
SODIUM SERPL-SCNC: 145 MMOL/L (ref 135–145)

## 2023-12-10 PROCEDURE — 250N000012 HC RX MED GY IP 250 OP 636 PS 637: Performed by: INTERNAL MEDICINE

## 2023-12-10 PROCEDURE — 80048 BASIC METABOLIC PNL TOTAL CA: CPT | Performed by: INTERNAL MEDICINE

## 2023-12-10 PROCEDURE — 84132 ASSAY OF SERUM POTASSIUM: CPT | Performed by: INTERNAL MEDICINE

## 2023-12-10 PROCEDURE — 97535 SELF CARE MNGMENT TRAINING: CPT | Mod: GO

## 2023-12-10 PROCEDURE — 250N000013 HC RX MED GY IP 250 OP 250 PS 637: Performed by: INTERNAL MEDICINE

## 2023-12-10 PROCEDURE — 120N000001 HC R&B MED SURG/OB

## 2023-12-10 PROCEDURE — 83735 ASSAY OF MAGNESIUM: CPT | Performed by: INTERNAL MEDICINE

## 2023-12-10 PROCEDURE — 97165 OT EVAL LOW COMPLEX 30 MIN: CPT | Mod: GO

## 2023-12-10 PROCEDURE — 99233 SBSQ HOSP IP/OBS HIGH 50: CPT | Performed by: INTERNAL MEDICINE

## 2023-12-10 PROCEDURE — 999N000157 HC STATISTIC RCP TIME EA 10 MIN

## 2023-12-10 PROCEDURE — 94640 AIRWAY INHALATION TREATMENT: CPT

## 2023-12-10 PROCEDURE — 250N000011 HC RX IP 250 OP 636: Performed by: INTERNAL MEDICINE

## 2023-12-10 PROCEDURE — 36415 COLL VENOUS BLD VENIPUNCTURE: CPT | Performed by: INTERNAL MEDICINE

## 2023-12-10 PROCEDURE — 250N000009 HC RX 250: Performed by: INTERNAL MEDICINE

## 2023-12-10 PROCEDURE — 94640 AIRWAY INHALATION TREATMENT: CPT | Mod: 76

## 2023-12-10 PROCEDURE — 97530 THERAPEUTIC ACTIVITIES: CPT | Mod: GP

## 2023-12-10 RX ORDER — FUROSEMIDE 10 MG/ML
20 INJECTION INTRAMUSCULAR; INTRAVENOUS
Status: COMPLETED | OUTPATIENT
Start: 2023-12-10 | End: 2023-12-10

## 2023-12-10 RX ORDER — PREDNISONE 20 MG/1
20 TABLET ORAL DAILY
Status: COMPLETED | OUTPATIENT
Start: 2023-12-11 | End: 2023-12-13

## 2023-12-10 RX ORDER — FUROSEMIDE 20 MG
20 TABLET ORAL DAILY
Status: DISCONTINUED | OUTPATIENT
Start: 2023-12-11 | End: 2023-12-13 | Stop reason: HOSPADM

## 2023-12-10 RX ORDER — POTASSIUM CHLORIDE 1500 MG/1
40 TABLET, EXTENDED RELEASE ORAL ONCE
Status: COMPLETED | OUTPATIENT
Start: 2023-12-10 | End: 2023-12-10

## 2023-12-10 RX ADMIN — FAMOTIDINE 20 MG: 20 TABLET ORAL at 08:38

## 2023-12-10 RX ADMIN — POTASSIUM CHLORIDE 40 MEQ: 1500 TABLET, EXTENDED RELEASE ORAL at 16:32

## 2023-12-10 RX ADMIN — IPRATROPIUM BROMIDE AND ALBUTEROL SULFATE 3 ML: .5; 3 SOLUTION RESPIRATORY (INHALATION) at 19:04

## 2023-12-10 RX ADMIN — ATOVAQUONE 750 MG: 750 SUSPENSION ORAL at 08:43

## 2023-12-10 RX ADMIN — METHADONE HYDROCHLORIDE 2.5 MG: 5 TABLET ORAL at 20:26

## 2023-12-10 RX ADMIN — LISINOPRIL 40 MG: 40 TABLET ORAL at 08:39

## 2023-12-10 RX ADMIN — GABAPENTIN 100 MG: 100 CAPSULE ORAL at 08:37

## 2023-12-10 RX ADMIN — OXYCODONE HYDROCHLORIDE 5 MG: 5 TABLET ORAL at 04:15

## 2023-12-10 RX ADMIN — SERTRALINE HYDROCHLORIDE 25 MG: 25 TABLET ORAL at 08:38

## 2023-12-10 RX ADMIN — CEFTRIAXONE SODIUM 2 G: 2 INJECTION, POWDER, FOR SOLUTION INTRAMUSCULAR; INTRAVENOUS at 20:32

## 2023-12-10 RX ADMIN — FAMOTIDINE 20 MG: 20 TABLET ORAL at 20:27

## 2023-12-10 RX ADMIN — IPRATROPIUM BROMIDE AND ALBUTEROL SULFATE 3 ML: .5; 3 SOLUTION RESPIRATORY (INHALATION) at 15:39

## 2023-12-10 RX ADMIN — LATANOPROST 1 DROP: 50 SOLUTION/ DROPS OPHTHALMIC at 20:27

## 2023-12-10 RX ADMIN — METHADONE HYDROCHLORIDE 2.5 MG: 5 TABLET ORAL at 08:38

## 2023-12-10 RX ADMIN — GLIPIZIDE 5 MG: 5 TABLET, FILM COATED, EXTENDED RELEASE ORAL at 08:38

## 2023-12-10 RX ADMIN — IPRATROPIUM BROMIDE AND ALBUTEROL SULFATE 3 ML: .5; 3 SOLUTION RESPIRATORY (INHALATION) at 11:45

## 2023-12-10 RX ADMIN — GUAIFENESIN 600 MG: 600 TABLET, EXTENDED RELEASE ORAL at 20:27

## 2023-12-10 RX ADMIN — INSULIN ASPART 5 UNITS: 100 INJECTION, SOLUTION INTRAVENOUS; SUBCUTANEOUS at 21:36

## 2023-12-10 RX ADMIN — OXYCODONE HYDROCHLORIDE 5 MG: 5 TABLET ORAL at 14:12

## 2023-12-10 RX ADMIN — METFORMIN HYDROCHLORIDE 850 MG: 850 TABLET, FILM COATED ORAL at 11:24

## 2023-12-10 RX ADMIN — FUROSEMIDE 20 MG: 10 INJECTION, SOLUTION INTRAMUSCULAR; INTRAVENOUS at 16:32

## 2023-12-10 RX ADMIN — AMLODIPINE BESYLATE 2.5 MG: 2.5 TABLET ORAL at 08:39

## 2023-12-10 RX ADMIN — AZITHROMYCIN DIHYDRATE 250 MG: 250 TABLET ORAL at 08:39

## 2023-12-10 RX ADMIN — ENOXAPARIN SODIUM 40 MG: 40 INJECTION SUBCUTANEOUS at 20:31

## 2023-12-10 RX ADMIN — ASPIRIN 81 MG CHEWABLE TABLET 81 MG: 81 TABLET CHEWABLE at 08:38

## 2023-12-10 RX ADMIN — POLYETHYLENE GLYCOL 3350 8.5 G: 17 POWDER, FOR SOLUTION ORAL at 20:27

## 2023-12-10 RX ADMIN — GABAPENTIN 200 MG: 100 CAPSULE ORAL at 21:34

## 2023-12-10 RX ADMIN — FUROSEMIDE 20 MG: 10 INJECTION, SOLUTION INTRAMUSCULAR; INTRAVENOUS at 11:32

## 2023-12-10 RX ADMIN — GUAIFENESIN 600 MG: 600 TABLET, EXTENDED RELEASE ORAL at 08:39

## 2023-12-10 RX ADMIN — METFORMIN HYDROCHLORIDE 850 MG: 850 TABLET, FILM COATED ORAL at 18:39

## 2023-12-10 RX ADMIN — FLUTICASONE FUROATE AND VILANTEROL TRIFENATATE 1 PUFF: 200; 25 POWDER RESPIRATORY (INHALATION) at 08:52

## 2023-12-10 RX ADMIN — CALCIUM POLYCARBOPHIL 625 MG: 625 TABLET, FILM COATED ORAL at 08:38

## 2023-12-10 RX ADMIN — OXYCODONE HYDROCHLORIDE 5 MG: 5 TABLET ORAL at 22:08

## 2023-12-10 RX ADMIN — PREDNISONE 40 MG: 20 TABLET ORAL at 08:38

## 2023-12-10 ASSESSMENT — ACTIVITIES OF DAILY LIVING (ADL)
ADLS_ACUITY_SCORE: 23
ADLS_ACUITY_SCORE: 24
PREVIOUS_RESPONSIBILITIES: MEAL PREP;HOUSEKEEPING;LAUNDRY;MEDICATION MANAGEMENT;FINANCES
ADLS_ACUITY_SCORE: 24

## 2023-12-10 NOTE — PROGRESS NOTES
12/10/23 1600   Appointment Info   Signing Clinician's Name / Credentials (OT) Mary Lang, OTR/L   Living Environment   People in Home alone   Current Living Arrangements independent living facility   Home Accessibility no concerns   Transportation Anticipated family or friend will provide   Living Environment Comments pt lives alone in an IND living apartment. No ALIZA. Pt stating that her daughter provides rides at baseline. On O2 at home. Walk in shower.   Self-Care   Usual Activity Tolerance moderate   Current Activity Tolerance fair   Regular Exercise No  (uses to do exercises, but now does not due to)   Equipment Currently Used at Home walker, rolling;grab bar, tub/shower;grab bar, toilet;shower chair;cane, straight;raised toilet seat   Fall history within last six months no   Activity/Exercise/Self-Care Comment some assistance for safe shower transfers 1x/week, otherwise mod I in ADLs   Instrumental Activities of Daily Living (IADL)   Previous Responsibilities meal prep;housekeeping;laundry;medication management;finances   IADL Comments has a cleaning lady, but does some clean up. gets most meals from ind living facility, however, sometimes does frozen meals. reported tremors in hands have made it difficult to write out checks, so she may have her daughter assist.   General Information   Onset of Illness/Injury or Date of Surgery 12/08/23   Referring Physician Neva Smith, DO   Patient/Family Therapy Goal Statement (OT) To return home, however, pt receptive to TCU if needed   Additional Occupational Profile Info/Pertinent History of Current Problem Celeste Chacko is a 89 year old female admitted on 12/8/2023 with increasing SOB and weakness.     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  She lives alone and was concerned about her worsening symptoms.  Patient was hypoxic 77% per EMS on 1L (? Baseline is 2-3L)  She  was given 3 DuoNebs prior to arrival to the ED.       She decided to revoke HOSPICE and come to the hospital d/t her increasing respiratory issues in the emergency room.  X-ray showed a left lung pneumonia and pulmonary edema   Existing Precautions/Restrictions fall   Cognitive Status Examination   Orientation Status orientation to person, place and time   Cognitive Status Comments WFL, good safety awareness   Visual Perception   Visual Impairment/Limitations corrective lenses for reading   Sensory   Sensory Quick Adds sensation intact   Sensory Comments some carpal tunnel symptoms, wears splint at night   Pain Assessment   Patient Currently in Pain No   Posture   Posture forward head position   Range of Motion Comprehensive   Comment, General Range of Motion WFL   Strength Comprehensive (MMT)   Comment, General Manual Muscle Testing (MMT) Assessment Generalized weakness   Coordination   Coordination Comments WFL   Transfers   Transfer Comments STS from higher recliner chair with CGA/walker   Balance   Balance Comments Poor balance, requires walker. Pt uses at all times at baseline.   Activities of Daily Living   BADL Assessment/Intervention bathing;upper body dressing;lower body dressing;grooming;toileting   Bathing Assessment/Intervention   [place] Level (Bathing) set up;minimum assist (75% patient effort);modified independence   Comment, (Bathing) Per clinical judgement - assistance for bathing/transfer at baseline 1x/week   Upper Body Dressing Assessment/Training   Comment, (Upper Body Dressing) Per clinical judgment   [place] Level (Upper Body Dressing) modified independence;contact guard assist  (CGA/walker for retreival)   Lower Body Dressing Assessment/Training   Comment, (Lower Body Dressing) Per clinical judgment   [place] Level (Lower Body Dressing) modified independence;contact guard assist  (CGA/walker for retreival)   Grooming Assessment/Training   [place] Level (Grooming) modified  independence;contact guard assist   Toileting   Comment, (Toileting) Min A/Mod I and cues for standard toilet seat (walker/grab bar), anticipate CGA/walker for raised toilet seat transfer   [place] Level (Toileting) modified independence;supervision;verbal cues;minimum assist (75% patient effort)   Clinical Impression   Criteria for Skilled Therapeutic Interventions Met (OT) Yes, treatment indicated   OT Diagnosis Pt presents with deconditioning/decreased activity tolerance impacting I/ADLs.   OT Problem List-Impairments impacting ADL problems related to;activity tolerance impaired;balance;mobility;strength   Assessment of Occupational Performance 3-5 Performance Deficits   Identified Performance Deficits home management, dressing, bathing, toileting, grooming/hygiene (standing)   Planned Therapy Interventions (OT) ADL retraining;IADL retraining;balance training;strengthening;transfer training;progressive activity/exercise   Clinical Decision Making Complexity (OT) problem focused assessment/low complexity   Risk & Benefits of therapy have been explained care plan/treatment goals reviewed;evaluation/treatment results reviewed;risks/benefits reviewed;current/potential barriers reviewed;participants voiced agreement with care plan;participants included;patient   Clinical Impression Comments Pt presents with deconditioning/decreased activity tolerance impacting I/ADLs. Pt would benefit from skilled IP OT to address safety and ind for I/ADLs and promote safe discharge plan.   OT Total Evaluation Time   OT Eval, Low Complexity Minutes (23554) 12   OT Goals   Therapy Frequency (OT) 5 times/week   OT Predicted Duration/Target Date for Goal Attainment 12/24/23   OT Goals Hygiene/Grooming;Upper Body Dressing;Lower Body Dressing;Toilet Transfer/Toileting;Home Management;OT Goal 1   OT: Hygiene/Grooming modified independent   OT: Upper Body Dressing Modified independent;including set-up/clothing retrieval   OT: Lower Body  "Dressing Modified independent;including set-up/clothing retrieval   OT: Toilet Transfer/Toileting Modified independent;cleaning and garment management  (raised toilet seat)   OT: Home Management Modified independent;with light demand household tasks   OT: Goal 1 Pt will verbalized EC strategies to promote safety with I/ADLs.   Interventions   Interventions Quick Adds Self-Care/Home Management   Self-Care/Home Management   Self-Care/Home Mgmt/ADL, Compensatory, Meal Prep Minutes (58523) 25   Symptoms Noted During/After Treatment (Meal Preparation/Planning Training) fatigue   Treatment Detail/Skilled Intervention Pt greeted for initial eval/treat. Pt aware of OT role and receptive to participate. Pt on 4 L O2 and VSS (O2 93-94%). STS with walker and CGA. Pt ambulated to bathroom (approx 2 x 15 ft this date), slow and cautions gait observed. CGA/walker provided. Pt demonstrated good cognition, evident via communication, orientation status, and ability to follow 1-2 step cues with accuracy. Pt reported, \"this is where I'll need your help\", in regard to lowering self to toilet. Pt demonstrated safety with hand placement (walker/grab bar), CGA-Min A required for descend due to poor strength in LEs. Pt has a raised toilet seat at home, however, raised commode seat in room did not have proper breaks, so standard toilet was used for session. Min A and walker for STS from toilet, pt observed anxious via observation of reaching both hands for things attached to wall. CGA provided and cues to reach for walker, reassurance to facilitate funcitonal performance. Standing g/h completed at sink with continued CGA. Edu and cues to bring walker all the way up to sink to promote stability. Pt returned to chair. BP demonstrated slight hypertensive response, however, mild (systolic 140s pre activity to 160s post activity). O2 stable on 4 L O2. Pt edu on differences between TCU and HH services and pending med managment, updated recs " throughout POC. Pt left with all needs met and alarm on. Pt thankful.   OT Discharge Planning   OT Plan Progress standing tolerance via g/h tasks, FB dressing with retreival of items (monitor O2 sats), edu on energy conservation   OT Discharge Recommendation (DC Rec) Transitional Care Facility;home with home care occupational therapy;home with assist   OT Rationale for DC Rec Pt currently below mod I baseline, limited by decreased activity tolerance and generalized weakness. If pt were to discharg this date, TCU rec. Pending med status/progress with therapies, pt may be appropriate to return home with  OT/PT. Pt stated it isn't feasible for her daughter to stay with her post discharge. It is understood pt was having home hospice services prior to admission, unsure what this level of support would entail for discharge. Somewhat receptive to TCU.   OT Brief overview of current status CGA/walker (2x 15 ft), Min A-CGA/mod I toilet transfer   OT Equipment Needed at Discharge   (all equipment needs met)   Total Session Time   Timed Code Treatment Minutes 25   Total Session Time (sum of timed and untimed services) 37

## 2023-12-10 NOTE — PLAN OF CARE
Goal Outcome Evaluation:    A&Ox4. VSS on 4L O2 at rest, 6-7L w/ activity. C/o generalized pain, PRN oxycodone available. LS clear, diminished in bases. Mod carb diet. BG AC & HS. Continent. Scattered bruising to BUE & BLE. PIV SL to L arm. Meds whole. K+ protocol. Discharge pending.

## 2023-12-10 NOTE — PLAN OF CARE
Goal Outcome Evaluation:      Plan of Care Reviewed With: patient      Summary:  12/09-12/10  [id number]    Primary Diagnosis: Acute hypoxic respiratory failure secondary to community-acquired left lung pneumonia  Orientation: A&Ox4  Aggression Stop Light: green  Mobility: A1 GB+W  Pain Management: PRN oxy x2   Diet: regular   Bowel/Bladder: continent,   Abnormal Lab/Assessments:  Skin/Wounds: R big toe tender, foam dressing in place, scattered bruises.  Drain/Device/Wound: PIV SL  Consults: N/A  D/C : TBD

## 2023-12-10 NOTE — PROGRESS NOTES
[facility]    Medicine Progress Note - Hospitalist Service    Date of Admission:  12/8/2023    Assessment & Plan   Celeste Chacko is a 89 year old female admitted on 12/8/2023 with increasing SOB and weakness.    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  She lives alone and was concerned about her worsening symptoms.  Patient was hypoxic 77% per EMS on 1L (? Baseline is 2-3L)  She was given 3 DuoNebs prior to arrival to the ED.      She decided to revoke HOSPICE and come to the hospital d/t her increasing respiratory issues in the emergency room.  X-ray showed a left lung pneumonia and pulmonary edema     Acute on top of chronic hypoxic respiratory failure  secondary to community-acquired left lung pneumonia and acute diastolic congestive heart failure exacerbation    She thinks that she is still feeling a little better today  Continue IV rocephin and po azithromycin   Continue on po prednisone -  but will decrease dose in the am d/t improved air exchange, feeling more tremulous and hyperglycemia (still continue with 5 days total of po prednisone)  Continue on duonebs qid  Continue with mucinex bid    Continue with IV lasix bid today for further diuresis   Plan to resume po lasix  am 12/11/23    Wean oxygen as able - d/w bedside RN this am  Oxygen sats goal >/89%    Last ECHO on chart review - 2/2022 with evidence of pulmonary HTN and hyperdynamic left vent function    2. Type 2 diabetes mellitus  Patient is at risk of hyperglycemia with prednisone  High-dose sliding scale with NovoLog ordered  Will also order carb-counting insulin    resumed PTA glipizide 12/9/23  Will resume PTA metformin today (12/10/23)    3. History of hypertension    Continue with PTA norvasc (5mg) and lisinopril (40mg) today       4. History of PCP pneumonia;  Continue atovaquone 750mg po daily     5. History of hyperlipidemia;  Pt states  that she is not currently taking a statin    6.  Weakness       DISCHARGE PLANNING    This is her main concern about when she is ready to medically discharge  She does not feel safe discharging home alone with hospice  She would like to stay with her daughter but not sure that her large oxygen tank can be easily brought to her daughter's home    If not wanting hospice, can consider PT/OT and possible short TCU stay  Saw PT who recommended that she go to TCU  She is agreeable to this today and appreciated  - but not certain of her decision when I mentioned that she would not be in hospice while at TCU as it would be a restorative goal for increased strength  She would like to discuss further with SW and family and I encouraged her to do so as she is getting closer to medically  discharge (likely  in the next 1-2 days)    SW consult for discharge planning -   pending yet today      Diet:  diabetic diet  DVT Prophylaxis: Ambulate every shift  Botello Catheter: Not present  Lines: None     Cardiac Monitoring: None  Code Status:  DNR/DNI discussed with the patient on admission.  Patient was on hospice since April prior to hospitalization.  Patient revoked hospice on admission.      Medical Decision Making       53 MINUTES SPENT BY ME on the date of service doing chart review, history, exam, documentation & further activities per the note.        PPE Worn:  Mask, gloves     Diet: Moderate Consistent Carb (60 g CHO per Meal) Diet    DVT Prophylaxis: Enoxaparin (Lovenox) SQ  Botello Catheter: Not present  Lines: None     Cardiac Monitoring: None  Code Status: No CPR- Do NOT Intubate      Clinically Significant Risk Factors        # Hypokalemia: Lowest K = 3.1 mmol/L in last 2 days, will replace as needed           # Hypertension: Noted on problem list            # COPD: noted on problem list        Disposition Plan      Expected Discharge Date: 12/12/2023                  Neva Smith DO  Hospitalist Service  M  "[facility]  Securely message with [employer] (more info)  Text page via [employer] Paging/Directory   ______________________________________________________________________    Interval History     Up in chair this am.  Feeling weak and \"shaky\".   No HA, chest pain or palpitations.   No F/C or  N/V.   Feeling constipated but no significant abd pain    Physical Exam   Vital Signs: Temp: 97.9  F (36.6  C) Temp src: Oral BP: (!) 168/67 Pulse: 94   Resp: 20 SpO2: 94 % O2 Device: Nasal cannula Oxygen Delivery: 5 LPM  Weight: 121 lbs 4.05 oz    GEN:  Alert, elderly female who appears chronically ill  Sitting up in recliner.     Noticeable decrease in her overall, respiratory effort this am - less accessory muscle use, min costal retractions this am  HEENT:  Normocephalic/atraumatic, no scleral icterus, no nasal discharge  CV:  Regular rate and rhythm, no clear loud murmur   LUNGS:  improved air exchange throughout to post lung fields  -  no rhonchi or wheezes.  Today on min bibasilar crackles to post exam   ABD:  Active bowel sounds, soft, non-tender to light palpation throughout.  Mildly distended throughout.  No clear rebound/guarding/rigidity.  EXT:  No significant pretibial edema.   PSYCH:  pleasant, cooperative and not agitated    Medications      amLODIPine  2.5 mg Oral Daily    aspirin  81 mg Oral Daily    atovaquone  750 mg Oral Daily    azithromycin  250 mg Oral Daily    calcium polycarbophil  625 mg Oral Daily    cefTRIAXone  2 g Intravenous Q24H    enoxaparin ANTICOAGULANT  40 mg Subcutaneous Q24H    famotidine  20 mg Oral BID    fluticasone-vilanterol  1 puff Inhalation Daily    furosemide  20 mg Intravenous BID    gabapentin  100 mg Oral QAM    gabapentin  200 mg Oral At Bedtime    glipiZIDE  5 mg Oral Daily with breakfast    guaiFENesin  600 mg Oral BID    insulin aspart  1-10 Units Subcutaneous TID AC    insulin aspart  1-7 Units Subcutaneous At Bedtime    ipratropium - albuterol 0.5 mg/2.5 " "mg/3 mL  3 mL Nebulization 4x daily    latanoprost  1 drop Left Eye QPM    lisinopril  40 mg Oral Daily    metFORMIN  850 mg Oral BID w/meals    methadone  2.5 mg Oral BID    polyethylene glycol  8.5 g Oral QPM    [START ON 12/11/2023] predniSONE  20 mg Oral Daily    sertraline  25 mg Oral Daily    sodium chloride (PF)  3 mL Intracatheter Q8H       Data     Labs and Imaging results below reviewed today.  Recent Labs   Lab 12/09/23  0911 12/08/23  1803   WBC 14.8* 15.9*   HGB 9.7* 10.4*   HCT 30.9* 33.3*   MCV 82 82    212     Recent Labs   Lab 12/10/23  1221 12/10/23  0816 12/10/23  0751 12/09/23  1231 12/09/23  0911 12/09/23  0131 12/08/23  1803   NA  --   --  145  --  140  --  136   POTASSIUM  --   --  3.1*  --  4.1  --  4.7   CHLORIDE  --   --  102  --  102  --  99   CO2  --   --  31*  --  26  --  26   ANIONGAP  --   --  12  --  12  --  11   * 93 95   < > 324*   < > 301*   BUN  --   --  25.6*  --  28.1*  --  22.5   CR  --   --  0.55  --  0.58  --  0.63   GFRESTIMATED  --   --  86  --  84  --  83   NARCISA  --   --  9.1  --  9.1  --  9.5    < > = values in this interval not displayed.     7-Day Micro Results       Collected Updated Procedure Result Status      12/08/2023 1818 12/08/2023 1904 Symptomatic Influenza A/B, RSV, & SARS-CoV2 PCR (COVID-19) Nose [[id number]]    Swab from Nose    Final result Component Value   Influenza A PCR Negative   Influenza B PCR Negative   RSV PCR Negative   SARS CoV2 PCR Negative   NEGATIVE: SARS-CoV-2 (COVID-19) RNA not detected, presumed negative.                  No results for input(s): \"INR\" in the last 168 hours.  No results for input(s): \"TSH\" in the last 168 hours.  No results for input(s): \"TROPONIN\", \"TROPI\", \"TROPR\", \"TROPONINIS\" in the last 168 hours.    Invalid input(s): \"TROPT\", \"TROP\", \"TROPONINIES\", \"TNIH\"    No results found for this or any previous visit (from the past 24 hour(s)).       "

## 2023-12-11 ENCOUNTER — APPOINTMENT (OUTPATIENT)
Dept: OCCUPATIONAL THERAPY | Facility: CLINIC | Age: 88
DRG: 193 | End: 2023-12-11
Payer: MEDICARE

## 2023-12-11 LAB
ANION GAP SERPL CALCULATED.3IONS-SCNC: 15 MMOL/L (ref 7–15)
BUN SERPL-MCNC: 26.3 MG/DL (ref 8–23)
CALCIUM SERPL-MCNC: 8.8 MG/DL (ref 8.2–9.6)
CHLORIDE SERPL-SCNC: 99 MMOL/L (ref 98–107)
CREAT SERPL-MCNC: 0.51 MG/DL (ref 0.51–0.95)
DEPRECATED HCO3 PLAS-SCNC: 26 MMOL/L (ref 22–29)
EGFRCR SERPLBLD CKD-EPI 2021: 87 ML/MIN/1.73M2
GLUCOSE BLDC GLUCOMTR-MCNC: 114 MG/DL (ref 70–99)
GLUCOSE BLDC GLUCOMTR-MCNC: 114 MG/DL (ref 70–99)
GLUCOSE BLDC GLUCOMTR-MCNC: 186 MG/DL (ref 70–99)
GLUCOSE BLDC GLUCOMTR-MCNC: 260 MG/DL (ref 70–99)
GLUCOSE SERPL-MCNC: 249 MG/DL (ref 70–99)
POTASSIUM SERPL-SCNC: 3.6 MMOL/L (ref 3.4–5.3)
SODIUM SERPL-SCNC: 140 MMOL/L (ref 135–145)

## 2023-12-11 PROCEDURE — 999N000157 HC STATISTIC RCP TIME EA 10 MIN

## 2023-12-11 PROCEDURE — 120N000001 HC R&B MED SURG/OB

## 2023-12-11 PROCEDURE — 250N000011 HC RX IP 250 OP 636: Performed by: INTERNAL MEDICINE

## 2023-12-11 PROCEDURE — 250N000013 HC RX MED GY IP 250 OP 250 PS 637: Performed by: INTERNAL MEDICINE

## 2023-12-11 PROCEDURE — 250N000012 HC RX MED GY IP 250 OP 636 PS 637: Performed by: INTERNAL MEDICINE

## 2023-12-11 PROCEDURE — 94640 AIRWAY INHALATION TREATMENT: CPT | Mod: 76

## 2023-12-11 PROCEDURE — 250N000009 HC RX 250: Performed by: INTERNAL MEDICINE

## 2023-12-11 PROCEDURE — 99232 SBSQ HOSP IP/OBS MODERATE 35: CPT | Performed by: INTERNAL MEDICINE

## 2023-12-11 PROCEDURE — 36415 COLL VENOUS BLD VENIPUNCTURE: CPT | Performed by: INTERNAL MEDICINE

## 2023-12-11 PROCEDURE — 80048 BASIC METABOLIC PNL TOTAL CA: CPT | Performed by: INTERNAL MEDICINE

## 2023-12-11 PROCEDURE — 97535 SELF CARE MNGMENT TRAINING: CPT | Mod: GO | Performed by: OCCUPATIONAL THERAPIST

## 2023-12-11 PROCEDURE — 999N000128 HC STATISTIC PERIPHERAL IV START W/O US GUIDANCE

## 2023-12-11 PROCEDURE — 94640 AIRWAY INHALATION TREATMENT: CPT

## 2023-12-11 RX ORDER — FAMOTIDINE 20 MG/1
20 TABLET, FILM COATED ORAL DAILY
Status: DISCONTINUED | OUTPATIENT
Start: 2023-12-12 | End: 2023-12-12

## 2023-12-11 RX ADMIN — ASPIRIN 81 MG CHEWABLE TABLET 81 MG: 81 TABLET CHEWABLE at 08:52

## 2023-12-11 RX ADMIN — ENOXAPARIN SODIUM 40 MG: 40 INJECTION SUBCUTANEOUS at 21:02

## 2023-12-11 RX ADMIN — AZITHROMYCIN DIHYDRATE 250 MG: 250 TABLET ORAL at 08:51

## 2023-12-11 RX ADMIN — GABAPENTIN 200 MG: 100 CAPSULE ORAL at 21:03

## 2023-12-11 RX ADMIN — IPRATROPIUM BROMIDE AND ALBUTEROL SULFATE 3 ML: .5; 3 SOLUTION RESPIRATORY (INHALATION) at 07:29

## 2023-12-11 RX ADMIN — FAMOTIDINE 20 MG: 20 TABLET ORAL at 08:51

## 2023-12-11 RX ADMIN — POLYETHYLENE GLYCOL 3350 8.5 G: 17 POWDER, FOR SOLUTION ORAL at 21:01

## 2023-12-11 RX ADMIN — GUAIFENESIN 600 MG: 600 TABLET, EXTENDED RELEASE ORAL at 21:01

## 2023-12-11 RX ADMIN — METHADONE HYDROCHLORIDE 2.5 MG: 5 TABLET ORAL at 08:51

## 2023-12-11 RX ADMIN — ATOVAQUONE 750 MG: 750 SUSPENSION ORAL at 08:53

## 2023-12-11 RX ADMIN — OXYCODONE HYDROCHLORIDE 5 MG: 5 TABLET ORAL at 16:07

## 2023-12-11 RX ADMIN — GUAIFENESIN 600 MG: 600 TABLET, EXTENDED RELEASE ORAL at 08:52

## 2023-12-11 RX ADMIN — AMLODIPINE BESYLATE 2.5 MG: 2.5 TABLET ORAL at 08:51

## 2023-12-11 RX ADMIN — OXYCODONE HYDROCHLORIDE 5 MG: 5 TABLET ORAL at 10:36

## 2023-12-11 RX ADMIN — LATANOPROST 1 DROP: 50 SOLUTION/ DROPS OPHTHALMIC at 22:03

## 2023-12-11 RX ADMIN — METHADONE HYDROCHLORIDE 2.5 MG: 5 TABLET ORAL at 21:01

## 2023-12-11 RX ADMIN — METFORMIN HYDROCHLORIDE 850 MG: 850 TABLET, FILM COATED ORAL at 08:51

## 2023-12-11 RX ADMIN — IPRATROPIUM BROMIDE AND ALBUTEROL SULFATE 3 ML: .5; 3 SOLUTION RESPIRATORY (INHALATION) at 11:00

## 2023-12-11 RX ADMIN — FUROSEMIDE 20 MG: 20 TABLET ORAL at 08:51

## 2023-12-11 RX ADMIN — CEFTRIAXONE SODIUM 2 G: 2 INJECTION, POWDER, FOR SOLUTION INTRAMUSCULAR; INTRAVENOUS at 22:26

## 2023-12-11 RX ADMIN — METFORMIN HYDROCHLORIDE 850 MG: 850 TABLET, FILM COATED ORAL at 18:36

## 2023-12-11 RX ADMIN — PREDNISONE 20 MG: 20 TABLET ORAL at 08:51

## 2023-12-11 RX ADMIN — IPRATROPIUM BROMIDE AND ALBUTEROL SULFATE 3 ML: .5; 3 SOLUTION RESPIRATORY (INHALATION) at 15:35

## 2023-12-11 RX ADMIN — GLIPIZIDE 5 MG: 5 TABLET, FILM COATED, EXTENDED RELEASE ORAL at 08:52

## 2023-12-11 RX ADMIN — LISINOPRIL 40 MG: 40 TABLET ORAL at 08:51

## 2023-12-11 RX ADMIN — GABAPENTIN 100 MG: 100 CAPSULE ORAL at 08:51

## 2023-12-11 RX ADMIN — SERTRALINE HYDROCHLORIDE 25 MG: 25 TABLET ORAL at 08:51

## 2023-12-11 RX ADMIN — FLUTICASONE FUROATE AND VILANTEROL TRIFENATATE 1 PUFF: 200; 25 POWDER RESPIRATORY (INHALATION) at 08:52

## 2023-12-11 RX ADMIN — OXYCODONE HYDROCHLORIDE 5 MG: 5 TABLET ORAL at 21:03

## 2023-12-11 RX ADMIN — CALCIUM POLYCARBOPHIL 625 MG: 625 TABLET, FILM COATED ORAL at 08:52

## 2023-12-11 ASSESSMENT — ACTIVITIES OF DAILY LIVING (ADL)
ADLS_ACUITY_SCORE: 27
ADLS_ACUITY_SCORE: 24
ADLS_ACUITY_SCORE: 27
ADLS_ACUITY_SCORE: 24
ADLS_ACUITY_SCORE: 27
ADLS_ACUITY_SCORE: 24
ADLS_ACUITY_SCORE: 24
ADLS_ACUITY_SCORE: 27
ADLS_ACUITY_SCORE: 24
ADLS_ACUITY_SCORE: 27

## 2023-12-11 NOTE — PROGRESS NOTES
[facility]    Medicine Progress Note - Hospitalist Service    Date of Admission:  12/8/2023    Assessment & Plan   Celeste Chacko is a 89 year old female admitted on 12/8/2023 with increasing SOB and weakness.    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  She lives alone and was concerned about her worsening symptoms.  Patient was hypoxic 77% per EMS on 1L (? Baseline is 2-3L) She was given 3 DuoNebs prior to arrival to the ED.      She decided to revoke HOSPICE and come to the hospital d/t her increasing respiratory issues. X-ray done in the ED showed a left lung pneumonia and pulmonary edema     Acute on chronic hypoxic respiratory failure  due to community-acquired left lung pneumonia and acute diastolic congestive heart failure exacerbation  *Most recent ECHO on chart review - 2/2022 with evidence of pulmonary HTN and hyperdynamic left vent function  Continue IV rocephin and po azithromycin   Continue prednisone; begin taper 12/11 since she has improved air exchange, feeling more tremulous and hyperglycemia (5 days total of po prednisone)  Continue on duonebs QID  Continue with mucinex BID  Continue IV lasix bid 12/10, change to oral lasix  am 12/11/23  Wean oxygen as able. Oxygen sats goal >/89%    Type 2 diabetes mellitus  Patient is at risk of hyperglycemia with prednisone  High-dose sliding scale with NovoLog ordered  Carbohydrate-based prandial dose insulin  resumed PTA glipizide   resume PTA metformin     History of hypertension  Continue with PTA norvasc (5mg) and lisinopril (40mg)       History of PCP pneumonia;  Continue atovaquone 750mg po daily     History of hyperlipidemia;  Pt states that she is not currently taking a statin    Weakness         DISCHARGE PLANNING  SW consult for discharge planning    Diet:  diabetic diet  DVT Prophylaxis: Ambulate every shift  Botello Catheter: Not present  Lines:  "None     Cardiac Monitoring: None  Code Status:  DNR/DNI discussed with the patient on admission.  Patient was on hospice since April prior to hospitalization.  Patient revoked hospice on admission.      Medical Decision Making       35 MINUTES SPENT BY ME on the date of service doing chart review, history, exam, documentation & further activities per the note, including discussion with social work        Diet: Moderate Consistent Carb (60 g CHO per Meal) Diet    DVT Prophylaxis: Enoxaparin (Lovenox) SQ  Botello Catheter: Not present  Lines: None     Cardiac Monitoring: None  Code Status: No CPR- Do NOT Intubate      Clinically Significant Risk Factors        # Hypokalemia: Lowest K = 3.1 mmol/L in last 2 days, will replace as needed           # Hypertension: Noted on problem list        # Cachexia: Estimated body mass index is 17.93 kg/m  as calculated from the following:    Height as of this encounter: 1.6 m (5' 3\").    Weight as of this encounter: 45.9 kg (101 lb 3.1 oz)., PRESENT ON ADMISSION     # COPD: noted on problem list        Disposition Plan      Expected Discharge Date: 12/12/2023                  Emily Millan MD  Hospitalist Service  [facility]  Securely message with [employer] (more info)  Text page via [employer] Paging/Directory   ______________________________________________________________________    Interval History   \"I want to go home for [place], but I am weak.\"  Celeste is focused on discharging home but also worries that she feels \"weak,\" and mentions that her daughter works full-time and can only assist her as needed.  She denies pain or shortness of breath, no GI complaints.    Physical Exam   Vital Signs: Temp: 98  F (36.7  C) Temp src: Oral BP: (!) 166/66 Pulse: 84   Resp: 18 SpO2: 95 % O2 Device: Nasal cannula Oxygen Delivery: 4 LPM  Weight: 101 lbs 3.06 oz    Constitutional: Awake, alert, cooperative, no apparent distress  Respiratory: Decreased breath sounds " throughout  Cardiovascular: Regular rate and rhythm  GI: Normal bowel sounds, soft, non-distended, non-tender  Skin/Integumen: No rash on exposed skin.  No lower extremity edema  Other: Mood is pleasant      Medications      amLODIPine  2.5 mg Oral Daily    aspirin  81 mg Oral Daily    atovaquone  750 mg Oral Daily    azithromycin  250 mg Oral Daily    calcium polycarbophil  625 mg Oral Daily    cefTRIAXone  2 g Intravenous Q24H    enoxaparin ANTICOAGULANT  40 mg Subcutaneous Q24H    [START ON 12/12/2023] famotidine  20 mg Oral Daily    fluticasone-vilanterol  1 puff Inhalation Daily    furosemide  20 mg Oral Daily    gabapentin  100 mg Oral QAM    gabapentin  200 mg Oral At Bedtime    glipiZIDE  5 mg Oral Daily with breakfast    guaiFENesin  600 mg Oral BID    insulin aspart  1-10 Units Subcutaneous TID AC    insulin aspart  1-7 Units Subcutaneous At Bedtime    ipratropium - albuterol 0.5 mg/2.5 mg/3 mL  3 mL Nebulization 4x daily    latanoprost  1 drop Left Eye QPM    lisinopril  40 mg Oral Daily    metFORMIN  850 mg Oral BID w/meals    methadone  2.5 mg Oral BID    polyethylene glycol  8.5 g Oral QPM    predniSONE  20 mg Oral Daily    sertraline  25 mg Oral Daily    sodium chloride (PF)  3 mL Intracatheter Q8H       Data     Labs and Imaging results below reviewed today.  Recent Labs   Lab 12/09/23  0911 12/08/23  1803   WBC 14.8* 15.9*   HGB 9.7* 10.4*   HCT 30.9* 33.3*   MCV 82 82    212     Recent Labs   Lab 12/11/23  0218 12/10/23  2126 12/10/23  2103 12/10/23  1833 12/10/23  0816 12/10/23  0751 12/09/23  1231 12/09/23  0911 12/09/23  0131 12/08/23  1803   NA  --   --   --   --   --  145  --  140  --  136   POTASSIUM  --   --  3.8  --   --  3.1*  --  4.1  --  4.7   CHLORIDE  --   --   --   --   --  102  --  102  --  99   CO2  --   --   --   --   --  31*  --  26  --  26   ANIONGAP  --   --   --   --   --  12  --  12  --  11   * 308*  --  360*   < > 95   < > 324*   < > 301*   BUN  --   --   --    "--   --  25.6*  --  28.1*  --  22.5   CR  --   --   --   --   --  0.55  --  0.58  --  0.63   GFRESTIMATED  --   --   --   --   --  86  --  84  --  83   NARCISA  --   --   --   --   --  9.1  --  9.1  --  9.5    < > = values in this interval not displayed.     7-Day Micro Results       Collected Updated Procedure Result Status      12/08/2023 1818 12/08/2023 1904 Symptomatic Influenza A/B, RSV, & SARS-CoV2 PCR (COVID-19) Nose [[id number]]    Swab from Nose    Final result Component Value   Influenza A PCR Negative   Influenza B PCR Negative   RSV PCR Negative   SARS CoV2 PCR Negative   NEGATIVE: SARS-CoV-2 (COVID-19) RNA not detected, presumed negative.                  No results for input(s): \"INR\" in the last 168 hours.  No results for input(s): \"TSH\" in the last 168 hours.  No results for input(s): \"TROPONIN\", \"TROPI\", \"TROPR\", \"TROPONINIS\" in the last 168 hours.    Invalid input(s): \"TROPT\", \"TROP\", \"TROPONINIES\", \"TNIH\"    No results found for this or any previous visit (from the past 24 hour(s)).       "

## 2023-12-11 NOTE — PLAN OF CARE
Goal Outcome Evaluation:      Plan of Care Reviewed With: patient      Summary:  12/10-12/11  [id number]    Primary Diagnosis: Acute hypoxic respiratory failure secondary to community-acquired left lung pneumonia  Orientation: A&Ox4  VS: VSS on 4L.Hypertensive  Aggression Stop Light: green  Mobility: A1 GB+W  Pain Management: PRN oxy x1    Diet: Mod Carb  Bowel/Bladder: continent B/B   Lab/Assessments: , K 3.8  Skin/Wounds: R big toe tender, foam dressing in place  Drain/Device/Wound: PIV SL  Consults: N/A  D/C : TBD

## 2023-12-11 NOTE — PLAN OF CARE
Goal Outcome Evaluation:       Primary Diagnosis: Acute hypoxic respiratory failure secondary to community-acquired left lung pneumonia  Orientation: A&Ox4  Aggression Stop Light: green  Mobility: A1 GB+W  Pain Management: PRN oxy x1    Diet: Mod Carb  Bowel/Bladder: continent B/B   Abnormal Lab/Assessments: ,186  Skin/Wounds: R big toe tender, foam dressing in place  Drain/Device/Wound: PIV SL  Consults: N/A  D/C : TBD

## 2023-12-12 ENCOUNTER — APPOINTMENT (OUTPATIENT)
Dept: PHYSICAL THERAPY | Facility: CLINIC | Age: 88
DRG: 193 | End: 2023-12-12
Payer: MEDICARE

## 2023-12-12 ENCOUNTER — APPOINTMENT (OUTPATIENT)
Dept: OCCUPATIONAL THERAPY | Facility: CLINIC | Age: 88
DRG: 193 | End: 2023-12-12
Payer: MEDICARE

## 2023-12-12 LAB
GLUCOSE BLDC GLUCOMTR-MCNC: 124 MG/DL (ref 70–99)
GLUCOSE BLDC GLUCOMTR-MCNC: 136 MG/DL (ref 70–99)
GLUCOSE BLDC GLUCOMTR-MCNC: 151 MG/DL (ref 70–99)
GLUCOSE BLDC GLUCOMTR-MCNC: 262 MG/DL (ref 70–99)
GLUCOSE BLDC GLUCOMTR-MCNC: 289 MG/DL (ref 70–99)
GLUCOSE BLDC GLUCOMTR-MCNC: 307 MG/DL (ref 70–99)
GLUCOSE BLDC GLUCOMTR-MCNC: 310 MG/DL (ref 70–99)
GLUCOSE BLDC GLUCOMTR-MCNC: 69 MG/DL (ref 70–99)
GLUCOSE BLDC GLUCOMTR-MCNC: 85 MG/DL (ref 70–99)
GLUCOSE SERPL-MCNC: 101 MG/DL (ref 70–99)
POTASSIUM SERPL-SCNC: 3.9 MMOL/L (ref 3.4–5.3)

## 2023-12-12 PROCEDURE — 94640 AIRWAY INHALATION TREATMENT: CPT

## 2023-12-12 PROCEDURE — 999N000128 HC STATISTIC PERIPHERAL IV START W/O US GUIDANCE

## 2023-12-12 PROCEDURE — 36415 COLL VENOUS BLD VENIPUNCTURE: CPT | Performed by: INTERNAL MEDICINE

## 2023-12-12 PROCEDURE — 97530 THERAPEUTIC ACTIVITIES: CPT | Mod: GP

## 2023-12-12 PROCEDURE — 250N000009 HC RX 250: Performed by: INTERNAL MEDICINE

## 2023-12-12 PROCEDURE — 94640 AIRWAY INHALATION TREATMENT: CPT | Mod: 76

## 2023-12-12 PROCEDURE — 250N000013 HC RX MED GY IP 250 OP 250 PS 637: Performed by: INTERNAL MEDICINE

## 2023-12-12 PROCEDURE — 120N000001 HC R&B MED SURG/OB

## 2023-12-12 PROCEDURE — 97535 SELF CARE MNGMENT TRAINING: CPT | Mod: GO | Performed by: OCCUPATIONAL THERAPIST

## 2023-12-12 PROCEDURE — 99232 SBSQ HOSP IP/OBS MODERATE 35: CPT | Performed by: INTERNAL MEDICINE

## 2023-12-12 PROCEDURE — 250N000011 HC RX IP 250 OP 636: Performed by: INTERNAL MEDICINE

## 2023-12-12 PROCEDURE — 250N000012 HC RX MED GY IP 250 OP 636 PS 637: Performed by: INTERNAL MEDICINE

## 2023-12-12 PROCEDURE — 999N000157 HC STATISTIC RCP TIME EA 10 MIN

## 2023-12-12 PROCEDURE — 84132 ASSAY OF SERUM POTASSIUM: CPT | Performed by: INTERNAL MEDICINE

## 2023-12-12 PROCEDURE — 82947 ASSAY GLUCOSE BLOOD QUANT: CPT | Performed by: INTERNAL MEDICINE

## 2023-12-12 RX ORDER — IPRATROPIUM BROMIDE AND ALBUTEROL SULFATE 2.5; .5 MG/3ML; MG/3ML
3 SOLUTION RESPIRATORY (INHALATION) 4 TIMES DAILY
Start: 2023-12-12

## 2023-12-12 RX ORDER — FUROSEMIDE 20 MG
20 TABLET ORAL DAILY
Start: 2023-12-13 | End: 2024-02-20

## 2023-12-12 RX ORDER — PREDNISONE 10 MG/1
TABLET ORAL
Start: 2023-12-13 | End: 2023-12-23

## 2023-12-12 RX ORDER — CEFUROXIME AXETIL 500 MG/1
500 TABLET ORAL 2 TIMES DAILY
Start: 2023-12-13 | End: 2023-12-15

## 2023-12-12 RX ORDER — FAMOTIDINE 20 MG/1
20 TABLET, FILM COATED ORAL 2 TIMES DAILY
Status: DISCONTINUED | OUTPATIENT
Start: 2023-12-12 | End: 2023-12-13 | Stop reason: HOSPADM

## 2023-12-12 RX ADMIN — POLYETHYLENE GLYCOL 3350 8.5 G: 17 POWDER, FOR SOLUTION ORAL at 20:15

## 2023-12-12 RX ADMIN — LISINOPRIL 40 MG: 40 TABLET ORAL at 09:40

## 2023-12-12 RX ADMIN — GUAIFENESIN 600 MG: 600 TABLET, EXTENDED RELEASE ORAL at 20:14

## 2023-12-12 RX ADMIN — FAMOTIDINE 20 MG: 20 TABLET ORAL at 20:15

## 2023-12-12 RX ADMIN — OXYCODONE HYDROCHLORIDE 5 MG: 5 TABLET ORAL at 16:21

## 2023-12-12 RX ADMIN — METHADONE HYDROCHLORIDE 2.5 MG: 5 TABLET ORAL at 20:14

## 2023-12-12 RX ADMIN — IPRATROPIUM BROMIDE AND ALBUTEROL SULFATE 3 ML: .5; 3 SOLUTION RESPIRATORY (INHALATION) at 19:16

## 2023-12-12 RX ADMIN — AZITHROMYCIN DIHYDRATE 250 MG: 250 TABLET ORAL at 09:40

## 2023-12-12 RX ADMIN — ACETAMINOPHEN 650 MG: 325 TABLET, FILM COATED ORAL at 17:04

## 2023-12-12 RX ADMIN — METFORMIN HYDROCHLORIDE 850 MG: 850 TABLET, FILM COATED ORAL at 18:16

## 2023-12-12 RX ADMIN — CALCIUM POLYCARBOPHIL 625 MG: 625 TABLET, FILM COATED ORAL at 09:39

## 2023-12-12 RX ADMIN — SERTRALINE HYDROCHLORIDE 25 MG: 25 TABLET ORAL at 09:40

## 2023-12-12 RX ADMIN — IPRATROPIUM BROMIDE AND ALBUTEROL SULFATE 3 ML: .5; 3 SOLUTION RESPIRATORY (INHALATION) at 15:53

## 2023-12-12 RX ADMIN — GLIPIZIDE 5 MG: 5 TABLET, FILM COATED, EXTENDED RELEASE ORAL at 09:40

## 2023-12-12 RX ADMIN — METFORMIN HYDROCHLORIDE 850 MG: 850 TABLET, FILM COATED ORAL at 09:40

## 2023-12-12 RX ADMIN — GABAPENTIN 100 MG: 100 CAPSULE ORAL at 09:40

## 2023-12-12 RX ADMIN — ACETAMINOPHEN 650 MG: 325 TABLET, FILM COATED ORAL at 00:46

## 2023-12-12 RX ADMIN — ASPIRIN 81 MG CHEWABLE TABLET 81 MG: 81 TABLET CHEWABLE at 09:40

## 2023-12-12 RX ADMIN — ATOVAQUONE 750 MG: 750 SUSPENSION ORAL at 10:24

## 2023-12-12 RX ADMIN — METHADONE HYDROCHLORIDE 2.5 MG: 5 TABLET ORAL at 09:38

## 2023-12-12 RX ADMIN — CEFTRIAXONE SODIUM 2 G: 2 INJECTION, POWDER, FOR SOLUTION INTRAMUSCULAR; INTRAVENOUS at 20:15

## 2023-12-12 RX ADMIN — OXYCODONE HYDROCHLORIDE 5 MG: 5 TABLET ORAL at 20:13

## 2023-12-12 RX ADMIN — ACETAMINOPHEN 650 MG: 325 TABLET, FILM COATED ORAL at 08:19

## 2023-12-12 RX ADMIN — GUAIFENESIN 600 MG: 600 TABLET, EXTENDED RELEASE ORAL at 09:40

## 2023-12-12 RX ADMIN — OXYCODONE HYDROCHLORIDE 5 MG: 5 TABLET ORAL at 08:19

## 2023-12-12 RX ADMIN — GABAPENTIN 200 MG: 100 CAPSULE ORAL at 22:04

## 2023-12-12 RX ADMIN — OXYCODONE HYDROCHLORIDE 5 MG: 5 TABLET ORAL at 00:48

## 2023-12-12 RX ADMIN — ENOXAPARIN SODIUM 40 MG: 40 INJECTION SUBCUTANEOUS at 20:15

## 2023-12-12 RX ADMIN — FUROSEMIDE 20 MG: 20 TABLET ORAL at 09:40

## 2023-12-12 RX ADMIN — IPRATROPIUM BROMIDE AND ALBUTEROL SULFATE 3 ML: .5; 3 SOLUTION RESPIRATORY (INHALATION) at 06:54

## 2023-12-12 RX ADMIN — PREDNISONE 20 MG: 20 TABLET ORAL at 09:40

## 2023-12-12 RX ADMIN — FLUTICASONE FUROATE AND VILANTEROL TRIFENATATE 1 PUFF: 200; 25 POWDER RESPIRATORY (INHALATION) at 10:24

## 2023-12-12 RX ADMIN — AMLODIPINE BESYLATE 2.5 MG: 2.5 TABLET ORAL at 09:40

## 2023-12-12 RX ADMIN — FAMOTIDINE 20 MG: 20 TABLET ORAL at 09:40

## 2023-12-12 RX ADMIN — LATANOPROST 1 DROP: 50 SOLUTION/ DROPS OPHTHALMIC at 20:16

## 2023-12-12 ASSESSMENT — ACTIVITIES OF DAILY LIVING (ADL)
ADLS_ACUITY_SCORE: 27
ADLS_ACUITY_SCORE: 23
ADLS_ACUITY_SCORE: 27
ADLS_ACUITY_SCORE: 23
ADLS_ACUITY_SCORE: 23
ADLS_ACUITY_SCORE: 27
ADLS_ACUITY_SCORE: 23
ADLS_ACUITY_SCORE: 27

## 2023-12-12 NOTE — PLAN OF CARE
Orientation: A&Ox4    Activity: Ax1 with GBW, pt ambulating frequently in halls     Diet/BS Checks: Mod carb, BG and sliding scale     Tele: N/A    IV Access/Drains: RPIV CDI SL    Pain Management: PRN oxy     Abnormal VS/Results: K WDL, recheck in the AM. Pt on 4L O2     Bowel/Bladder: Continent of both     Skin/Wounds: Intact    Consults: PT/OT    D/C Disposition: Pending TCU at discharge     Other Info: Pt has hx of COPD on 3 L O2 baseline

## 2023-12-12 NOTE — PROGRESS NOTES
Care Management Initial Consult    General Information  Assessment completed with: Patient,    Type of CM/SW Visit: Initial Assessment    Primary Care Provider verified and updated as needed: Yes   Readmission within the last 30 days: no previous admission in last 30 days      Reason for Consult: discharge planning  Advance Care Planning:            Communication Assessment  Patient's communication style: spoken language (English or Bilingual)    Hearing Difficulty or Deaf: no   Wear Glasses or Blind: no    Cognitive  Cognitive/Neuro/Behavioral: WDL                      Living Environment:   People in home: alone     Current living Arrangements: apartment      Able to return to prior arrangements: yes       Family/Social Support:  Care provided by: self  Provides care for: no one  Marital Status: Single  Children          Description of Support System: Supportive, Involved    Support Assessment: Adequate family and caregiver support, Adequate social supports    Current Resources:   Patient receiving home care services: No     Community Resources:    Equipment currently used at home: walker, rolling, grab bar, toilet, grab bar, tub/shower, shower chair  Supplies currently used at home:      Employment/Financial:  Employment Status:          Financial Concerns: none   Referral to Financial Worker: No       Does the patient's insurance plan have a 3 day qualifying hospital stay waiver?  Yes     Which insurance plan 3 day waiver is available? Alternative insurance waiver    Will the waiver be used for post-acute placement? Yes    Lifestyle & Psychosocial Needs:  Social Determinants of Health     Food Insecurity: Not on file   Depression: Not at risk (4/11/2022)    PHQ-2     PHQ-2 Score: 1   Housing Stability: Not on file   Tobacco Use: Medium Risk (1/23/2023)    Patient History     Smoking Tobacco Use: Former     Smokeless Tobacco Use: Never     Passive Exposure: Not on file   Financial Resource Strain: Not on file    Alcohol Use: Not on file   Transportation Needs: Not on file   Physical Activity: Not on file   Interpersonal Safety: Not on file   Stress: Not on file   Social Connections: Not on file       Functional Status:  Prior to admission patient needed assistance:              Mental Health Status:          Chemical Dependency Status:                Values/Beliefs:  Spiritual, Cultural Beliefs, [employer] Practices, Values that affect care:                 Additional Information:  Writer received consult for discharge planning. Per H&P, Patient is a 89 year old female admitted on 12/8/2023. She has end-stage COPD on 2 to 3 L of oxygen by nasal cannula at baseline, history of type 2 diabetes mellitus, history of PCP pneumonia, history of hypertension, hyperlipidemia was brought into the emergency room with concerns of worsening shortness of breath for the last 3 days.  Patient was hypoxic 77% per EMS on her baseline oxygen 1 she was placed on 5 L of oxygen nasal cannula sats improved.  She was given 3 DuoNebs prior to arrival to the ED.  She otherwise denies any fevers or chills.  Cough productive of mild greenish colored sputum.  Patient revoked hospice to be treated for her respiratory issues in the emergency room.     Writer met with patient and introduced self and role. Patient states that she currently lives alone in an apartment and had [facility]. Patient states she feels very good and really wants to get stronger and be home for [place]. Patient states that she feels she has been making strides with PT and is normally independent at baseline. Patient states she does her own  cooking and cleaning and wants to get back to that. Patient states she would like to go to [facility]. Patient will need O2 set up with Transport for when she is ready. She is aware of the possible [employer] expenses. Patient states she will update her daughter    Writer sent referral and spoke with Jessica. Plan for discharge  tomorrow to [facility] on [address] in a semi private room. Patient aware and in agreement. Jessica is getting authorization from insurance. Plan for discharge tomorrow. Writer set up wheelchair transport with O2 for tomorrow between [id number]. Jessica at [facility] aware. Writer paged MD with an update     PAS-RR    D: Per DHS regulation, MARCELO completed and submitted PAS-RR to [place] [employer] on [employer]ging Direct Connect via the Senior LinkAge Line.  PAS-RR confirmation # is : [id number]    P: Further questions may be directed to Senior LinkAge Line at #[phone], option #4 for PAS-RR staff.      LYNDA Cartagena

## 2023-12-12 NOTE — PROGRESS NOTES
[facility]    Medicine Progress Note - Hospitalist Service    Date of Admission:  12/8/2023    Assessment & Plan   Celeste Chacko is a 89 year old female admitted on 12/8/2023 with increasing SOB and weakness.    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  She lives alone and was concerned about her worsening symptoms.  Patient was hypoxic 77% per EMS on 1L (? Baseline is 2-3L) She was given 3 DuoNebs prior to arrival to the ED.      She decided to revoke HOSPICE and come to the hospital d/t her increasing respiratory issues. X-ray done in the ED showed a left lung pneumonia and pulmonary edema     Acute on chronic hypoxic respiratory failure  due to community-acquired left lung pneumonia and acute diastolic congestive heart failure exacerbation  *Most recent ECHO on chart review - 2/2022 with evidence of pulmonary HTN and hyperdynamic left vent function  Continue IV rocephin and po azithromycin (Azithromycin complete today)  Continue prednisone; dose [id number] since she has improved air exchange, feeling more tremulous and hyperglycemia (5 days total of po prednisone)  Continue on duonebs QID  Continue with mucinex BID  changed to oral lasix  am 12/11/23  Wean oxygen as able. Oxygen sats goal >/89%    Type 2 diabetes mellitus  Patient is at risk of hyperglycemia with prednisone  High-dose sliding scale with NovoLog ordered  Carbohydrate-based prandial dose insulin  resumed PTA glipizide   resume PTA metformin     History of hypertension  Continue with PTA norvasc (5mg) and lisinopril (40mg)       History of PCP pneumonia;  Continue atovaquone 750mg po daily     History of hyperlipidemia;  Pt states that she is not currently taking a statin    Weakness         DISCHARGE PLANNING  SW consult for discharge planning    Diet:  diabetic diet  DVT Prophylaxis: Ambulate every shift  Botello Catheter: Not  "present  Lines: None     Cardiac Monitoring: None  Code Status:  DNR/DNI discussed with the patient on admission.  Patient was on hospice since April prior to hospitalization.  Patient revoked hospice on admission.      Medical Decision Making       35 MINUTES SPENT BY ME on the date of service doing chart review, history, exam, documentation & further activities per the note, including discussion with social work        Diet: Moderate Consistent Carb (60 g CHO per Meal) Diet    DVT Prophylaxis: Enoxaparin (Lovenox) SQ  Botello Catheter: Not present  Lines: None     Cardiac Monitoring: None  Code Status: No CPR- Do NOT Intubate      Clinically Significant Risk Factors                  # Hypertension: Noted on problem list            # COPD: noted on problem list        Disposition Plan     Expected Discharge Date: 12/12/2023                  Emily Millan MD  Hospitalist Service  [facility]  Securely message with [employer] (more info)  Text page via [employer] Paging/Directory   ______________________________________________________________________    Interval History   \"I am weak, but I am getting stronger.\"  Celeste says her breathing is comfortable at rest, she feels short of breath with activity, \"but it has been this way.\"  She wants to go to TCU after discharge, build her strength.  She has no new GI complaints.    Physical Exam   Vital Signs: Temp: 98.1  F (36.7  C) Temp src: Oral BP: (!) 154/58 Pulse: 86   Resp: 18 SpO2: 98 % O2 Device: Nasal cannula Oxygen Delivery: 3 LPM  Weight: 125 lbs 1.6 oz    Constitutional: Awake, alert, cooperative, no apparent distress  Respiratory: Decreased breath sounds throughout  Cardiovascular: Regular rate and rhythm  GI: Normal bowel sounds, soft, non-distended, non-tender  Skin/Integumen: No rash on exposed skin.  No lower extremity edema  Other: Mood is particular      Medications      amLODIPine  2.5 mg Oral Daily    aspirin  81 mg Oral Daily    " atovaquone  750 mg Oral Daily    calcium polycarbophil  625 mg Oral Daily    cefTRIAXone  2 g Intravenous Q24H    enoxaparin ANTICOAGULANT  40 mg Subcutaneous Q24H    famotidine  20 mg Oral BID    fluticasone-vilanterol  1 puff Inhalation Daily    furosemide  20 mg Oral Daily    gabapentin  100 mg Oral QAM    gabapentin  200 mg Oral At Bedtime    glipiZIDE  5 mg Oral Daily with breakfast    guaiFENesin  600 mg Oral BID    insulin aspart  1-10 Units Subcutaneous TID AC    insulin aspart  1-7 Units Subcutaneous At Bedtime    ipratropium - albuterol 0.5 mg/2.5 mg/3 mL  3 mL Nebulization 4x daily    latanoprost  1 drop Left Eye QPM    lisinopril  40 mg Oral Daily    metFORMIN  850 mg Oral BID w/meals    methadone  2.5 mg Oral BID    polyethylene glycol  8.5 g Oral QPM    predniSONE  20 mg Oral Daily    sertraline  25 mg Oral Daily    sodium chloride (PF)  3 mL Intracatheter Q8H       Data     Labs and Imaging results below reviewed today.  Recent Labs   Lab 12/09/23  0911 12/08/23  1803   WBC 14.8* 15.9*   HGB 9.7* 10.4*   HCT 30.9* 33.3*   MCV 82 82    212     Recent Labs   Lab 12/12/23  0802 12/12/23  0311 12/12/23  0241 12/11/23  1233 12/11/23  1014 12/10/23  2126 12/10/23  2103 12/10/23  0816 12/10/23  0751 12/09/23  1231 12/09/23  0911   NA  --   --   --   --  140  --   --   --  145  --  140   POTASSIUM 3.9  --   --   --  3.6  --  3.8  --  3.1*  --  4.1   CHLORIDE  --   --   --   --  99  --   --   --  102  --  102   CO2  --   --   --   --  26  --   --   --  31*  --  26   ANIONGAP  --   --   --   --  15  --   --   --  12  --  12   * 124* 85   < > 249*   < >  --    < > 95   < > 324*   BUN  --   --   --   --  26.3*  --   --   --  25.6*  --  28.1*   CR  --   --   --   --  0.51  --   --   --  0.55  --  0.58   GFRESTIMATED  --   --   --   --  87  --   --   --  86  --  84   NARCISA  --   --   --   --  8.8  --   --   --  9.1  --  9.1    < > = values in this interval not displayed.     7-Day Micro Results        "Collected Updated Procedure Result Status      12/08/2023 1818 12/08/2023 1904 Symptomatic Influenza A/B, RSV, & SARS-CoV2 PCR (COVID-19) Nose [[id number]]    Swab from Nose    Final result Component Value   Influenza A PCR Negative   Influenza B PCR Negative   RSV PCR Negative   SARS CoV2 PCR Negative   NEGATIVE: SARS-CoV-2 (COVID-19) RNA not detected, presumed negative.                  No results for input(s): \"INR\" in the last 168 hours.  No results for input(s): \"TSH\" in the last 168 hours.  No results for input(s): \"TROPONIN\", \"TROPI\", \"TROPR\", \"TROPONINIS\" in the last 168 hours.    Invalid input(s): \"TROPT\", \"TROP\", \"TROPONINIES\", \"TNIH\"    No results found for this or any previous visit (from the past 24 hour(s)).       "

## 2023-12-12 NOTE — PLAN OF CARE
Orientation: A&Ox4     Activity: Ax1 with GBW,      Diet/BS Checks: Mod carb, BG and sliding scale      Tele: N/A     IV Access/Drains: RPIV CDI SL     Pain Management: PRN oxy      Abnormal VS/Results: K WDL. Pt on 4L O2      Bowel/Bladder: Continent of both      Skin/Wounds: Intact     Consults: PT/OT     D/C Disposition: Pending TCU at discharge      Other Info: Pt has hx of COPD on 3 L O2 baselin

## 2023-12-12 NOTE — PROGRESS NOTES
Shift Summary [id number]    Admitting Diagnosis: Pneumonia [J18.9]   Vitals : WNL, except for elevated BP at shift change.    Pain 2-8/10. Taking Oxycodone PRN Q4.   A&Ox4  Voiding : wnl.   Mobility : walker BG. AX1.   Tele : na.   CMS : wnl.   Lung Sounds diminished at the bases. Clear in upper lobes.   on 2L via N/C.   Orders Placed This Encounter      Moderate Consistent Carb (60 g CHO per Meal) Diet       Plan:   Discharge to TCU tomorrow.

## 2023-12-13 ENCOUNTER — LAB REQUISITION (OUTPATIENT)
Dept: LAB | Facility: CLINIC | Age: 88
End: 2023-12-13

## 2023-12-13 VITALS
BODY MASS INDEX: 21.74 KG/M2 | OXYGEN SATURATION: 94 % | HEART RATE: 90 BPM | RESPIRATION RATE: 16 BRPM | DIASTOLIC BLOOD PRESSURE: 59 MMHG | TEMPERATURE: 98 F | SYSTOLIC BLOOD PRESSURE: 156 MMHG | HEIGHT: 63 IN | WEIGHT: 122.7 LBS

## 2023-12-13 VITALS
DIASTOLIC BLOOD PRESSURE: 70 MMHG | HEIGHT: 63 IN | HEART RATE: 86 BPM | OXYGEN SATURATION: 94 % | BODY MASS INDEX: 21.48 KG/M2 | RESPIRATION RATE: 18 BRPM | TEMPERATURE: 97 F | SYSTOLIC BLOOD PRESSURE: 140 MMHG | WEIGHT: 121.2 LBS

## 2023-12-13 DIAGNOSIS — Z11.1 ENCOUNTER FOR SCREENING FOR RESPIRATORY TUBERCULOSIS: ICD-10-CM

## 2023-12-13 LAB
GLUCOSE BLDC GLUCOMTR-MCNC: 189 MG/DL (ref 70–99)
GLUCOSE BLDC GLUCOMTR-MCNC: 72 MG/DL (ref 70–99)
PLATELET # BLD AUTO: 311 10E3/UL (ref 150–450)
POTASSIUM SERPL-SCNC: 3.6 MMOL/L (ref 3.4–5.3)

## 2023-12-13 PROCEDURE — 36415 COLL VENOUS BLD VENIPUNCTURE: CPT | Performed by: INTERNAL MEDICINE

## 2023-12-13 PROCEDURE — 999N000157 HC STATISTIC RCP TIME EA 10 MIN

## 2023-12-13 PROCEDURE — 250N000013 HC RX MED GY IP 250 OP 250 PS 637: Performed by: INTERNAL MEDICINE

## 2023-12-13 PROCEDURE — 250N000009 HC RX 250: Performed by: INTERNAL MEDICINE

## 2023-12-13 PROCEDURE — 84132 ASSAY OF SERUM POTASSIUM: CPT | Performed by: INTERNAL MEDICINE

## 2023-12-13 PROCEDURE — 85049 AUTOMATED PLATELET COUNT: CPT | Performed by: INTERNAL MEDICINE

## 2023-12-13 PROCEDURE — 250N000012 HC RX MED GY IP 250 OP 636 PS 637: Performed by: INTERNAL MEDICINE

## 2023-12-13 PROCEDURE — 94640 AIRWAY INHALATION TREATMENT: CPT

## 2023-12-13 RX ORDER — OXYCODONE HYDROCHLORIDE 5 MG/1
5 TABLET ORAL EVERY 4 HOURS PRN
Qty: 30 TABLET | Refills: 0 | Status: SHIPPED | OUTPATIENT
Start: 2023-12-13 | End: 2023-12-27

## 2023-12-13 RX ORDER — METHADONE HYDROCHLORIDE 5 MG/5ML
2.5 SOLUTION ORAL 2 TIMES DAILY
Qty: 70 ML | Refills: 0 | Status: SHIPPED | OUTPATIENT
Start: 2023-12-13 | End: 2023-12-27

## 2023-12-13 RX ADMIN — PREDNISONE 20 MG: 20 TABLET ORAL at 09:26

## 2023-12-13 RX ADMIN — ACETAMINOPHEN 650 MG: 325 TABLET, FILM COATED ORAL at 00:16

## 2023-12-13 RX ADMIN — CALCIUM POLYCARBOPHIL 625 MG: 625 TABLET, FILM COATED ORAL at 09:26

## 2023-12-13 RX ADMIN — SERTRALINE HYDROCHLORIDE 25 MG: 25 TABLET ORAL at 09:27

## 2023-12-13 RX ADMIN — GUAIFENESIN 600 MG: 600 TABLET, EXTENDED RELEASE ORAL at 09:27

## 2023-12-13 RX ADMIN — FUROSEMIDE 20 MG: 20 TABLET ORAL at 09:29

## 2023-12-13 RX ADMIN — AMLODIPINE BESYLATE 2.5 MG: 2.5 TABLET ORAL at 09:29

## 2023-12-13 RX ADMIN — ATOVAQUONE 750 MG: 750 SUSPENSION ORAL at 09:33

## 2023-12-13 RX ADMIN — METHADONE HYDROCHLORIDE 2.5 MG: 5 TABLET ORAL at 09:29

## 2023-12-13 RX ADMIN — OXYCODONE HYDROCHLORIDE 5 MG: 5 TABLET ORAL at 09:26

## 2023-12-13 RX ADMIN — LISINOPRIL 40 MG: 40 TABLET ORAL at 09:27

## 2023-12-13 RX ADMIN — FLUTICASONE FUROATE AND VILANTEROL TRIFENATATE 1 PUFF: 200; 25 POWDER RESPIRATORY (INHALATION) at 09:33

## 2023-12-13 RX ADMIN — GABAPENTIN 100 MG: 100 CAPSULE ORAL at 09:29

## 2023-12-13 RX ADMIN — FAMOTIDINE 20 MG: 20 TABLET ORAL at 09:29

## 2023-12-13 RX ADMIN — ASPIRIN 81 MG CHEWABLE TABLET 81 MG: 81 TABLET CHEWABLE at 09:29

## 2023-12-13 RX ADMIN — IPRATROPIUM BROMIDE AND ALBUTEROL SULFATE 3 ML: .5; 3 SOLUTION RESPIRATORY (INHALATION) at 07:57

## 2023-12-13 RX ADMIN — METFORMIN HYDROCHLORIDE 850 MG: 850 TABLET, FILM COATED ORAL at 09:27

## 2023-12-13 RX ADMIN — GLIPIZIDE 5 MG: 5 TABLET, FILM COATED, EXTENDED RELEASE ORAL at 09:27

## 2023-12-13 ASSESSMENT — ACTIVITIES OF DAILY LIVING (ADL)
ADLS_ACUITY_SCORE: 23

## 2023-12-13 NOTE — PROGRESS NOTES
Care Management Discharge Note    Discharge Date: 12/13/2023       Discharge Disposition: Transitional Care    Discharge Services: None    Discharge DME: Oxygen    Discharge Transportation: health plan transportation    Private pay costs discussed: transportation costs    Does the patient's insurance plan have a 3 day qualifying hospital stay waiver?  No    PAS Confirmation Code: [id number]  Patient/family educated on Medicare website which has current facility and service quality ratings: yes    Education Provided on the Discharge Plan: Yes  Persons Notified of Discharge Plans: Patient   Patient/Family in Agreement with the Plan: yes    Handoff Referral Completed: Yes    Additional Information:  Patient will be discharging today between [id number] to [place] via [facility]. Orders faxed. PAS completed. Patient aware and in agreement with plan for discharge     LYNDA Cartagena

## 2023-12-13 NOTE — PROGRESS NOTES
[id number]    Orientation: A&Ox4  Activity: Ax1 with GBW,   Diet/BS Checks: Mod carb, BG and sliding scale   Tele: N/A  IV Access/Drains: RPIV CDI SL  Pain Management: PRN oxy   Abnormal VS/Results: K WDL. Pt on 2L O2   Bowel/Bladder: Continent of both   Skin/Wounds: Intact  Consults: PT/OT  D/C Disposition: Pending TCU at discharge   Other Info: Pt has hx of COPD on 3 L O2 baseline

## 2023-12-13 NOTE — PLAN OF CARE
Occupational Therapy Discharge Summary    Reason for therapy discharge:    Discharged to transitional care facility.    Progress towards therapy goal(s). See goals on Care Plan in [facility] electronic health record for goal details.  Goals partially met.  Barriers to achieving goals:   discharge from facility.    Therapy recommendation(s):    Continued therapy is recommended.  Rationale/Recommendations:  Pt appears nearing her baseline with progressing activity tolerance. PT would prefer discharging home with continued assist for bathing and HHOT to progress activity tolerance needed for ADL independence. OT will continue to follow and update as needed.

## 2023-12-13 NOTE — PLAN OF CARE
Physical Therapy Discharge Summary    Reason for therapy discharge:    Discharged to transitional care facility.    Progress towards therapy goal(s). See goals on Care Plan in [facility] electronic health record for goal details.  Goals partially met.  Barriers to achieving goals:   discharge from facility.    Therapy recommendation(s):    Continued therapy is recommended.  Rationale/Recommendations:  Patient is below baseline mobility levels at this time. Currently requiring SBA/CGA with all mobility with FWW. Pt currently lives alone and would not be safe to discharge home alone at this time. Recommend TCU for continued strengthening and mobility progressions. Of note, patient was on hospice prior to admission, but was removed from hospice services 2/2 daughter being out of town. Patient may progress to safe discharge home if able to progress to mod I with use of 4WW..

## 2023-12-13 NOTE — PLAN OF CARE
[id number]    Orientation: A&Ox4  Activity: Ax1 gait belt and walker  Diet/BS Checks: mod carb diet with blood sugar checks. BS 72, apple juice given  Tele:  none   IV Access/Drains: R PIV SL  Pain Management: reported 3/10 pain in back, managed with PRN Tylenol x1  Abnormal VS/Results: VSS on 2L O2 NC except HTN  Bowel/Bladder: continent of B/B. No BM this shift.   Skin/Wounds:   Consults: PT/OT  D/C Disposition: pending TCU placement     Other Info:   A&Ox4. VSS on 2L O2 NC except for HTN. Reported 3/10 pain in back, managed with PRN Tylenol x1. Up Ax1 gait belt and walker. Continent of B/B. No Bm this shift. Mod carb diet with blood sugar checks. R PIV SL. Discharge pending.

## 2023-12-13 NOTE — DISCHARGE SUMMARY
Discharge Note    Patient discharged to TCU via transportation service  accompanied by no family/friend .  IV: Discontinued  Prescriptions faxed to pharmacy.   Belongings reviewed and sent with patient.   Home medications returned to patient: NA  Equipment sent with: patient, N/A.   patient verbalizes understanding of discharge instructions. AVS given to patient.

## 2023-12-13 NOTE — TELEPHONE ENCOUNTER
Let's keep on Lisinopril 10 mg daily and watch blood pressure, call if still not controlled > 170. Needs follow up with PCP for recheck.    Called by primary team for failed kiran attempt in urinary retention pt. Patient was seen and examined at bedside, phimosis noted and was prepped and draped with sterile technique. Lidocaine jelly 2% inserted for local anesthetic. 18f Coude (catheter) inserted through urethral meatus with immediate return of clear urine. Balloon inflated with 10cc sterile water and kiran was secured to leg. Patient tolerated procedure well. Kiran management per primary team.

## 2023-12-13 NOTE — DISCHARGE SUMMARY
[facility]  Hospitalist Discharge Summary      Date of Admission:  12/8/2023  Date of Discharge:  12/13/2023  Discharging Provider: Emily Millan MD  Discharge Service: Hospitalist Service    Discharge Diagnoses   Acute on chronic hypoxic respiratory failure due to community-acquired pneumonia of the left lung, acute diastolic heart failure exacerbation and COPD exacerbation  Type 2 diabetes mellitus  History of hypertension  History of PCP pneumonia  History of hyperlipidemia    Clinically Significant Risk Factors          Follow-ups Needed After Discharge   Follow-up Appointments     Follow Up and recommended labs and tests      Follow up with Nursing home physician.  Recheck potassium on Friday,   12/15.        Please resume Spiriva if/when patient is off scheduled Duonebs (e.g. avoid duplication)    Unresulted Labs Ordered in the Past 30 Days of this Admission       No orders found from 11/8/2023 to 12/9/2023.            Discharge Disposition   Discharged to short-term care facility  Condition at discharge: Stable    Hospital Course   Celeste Chacko is a 89 year old female admitted on 12/8/2023 with increasing SOB and weakness.    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  She lives alone and was concerned about her worsening symptoms.  Patient was hypoxic 77% per EMS on 1L (? Baseline is 2-3L) She was given 3 DuoNebs prior to arrival to the ED.      She decided to revoke HOSPICE and come to the hospital d/t her increasing respiratory issues. X-ray done in the ED showed a left lung pneumonia and pulmonary edema     Acute on chronic hypoxic respiratory failure  due to community-acquired left lung pneumonia and acute diastolic congestive heart failure exacerbation  *Most recent ECHO on chart review - 2/2022 with evidence of pulmonary HTN and hyperdynamic left vent function  Treated with Rocephin and po  azithromycin; will complete a course of Ceftin  Continue prednisone; dose [id number] since she has improved air exchange, feeling more tremulous and hyperglycemia   Continue on duonebs QID  Continue with mucinex BID  changed to oral lasix  am 12/11/23  Continue supplemental oxygen    Type 2 diabetes mellitus  Patient is at risk of hyperglycemia with prednisone  High-dose sliding scale with NovoLog ordered  Carbohydrate-based prandial dose insulin  resumed PTA glipizide   resume PTA metformin     History of hypertension  Continue with PTA norvasc (5mg) and lisinopril (40mg)       History of PCP pneumonia;  Continue atovaquone 750mg po daily     History of hyperlipidemia;  Pt states that she is not currently taking a statin    Weakness    Consultations This Hospital Stay   PHYSICAL THERAPY ADULT IP CONSULT  CARE MANAGEMENT / SOCIAL WORK IP CONSULT  OCCUPATIONAL THERAPY ADULT IP CONSULT  CARE MANAGEMENT / SOCIAL WORK IP CONSULT  PHYSICAL THERAPY ADULT IP CONSULT  SOCIAL WORK IP CONSULT  CARE MANAGEMENT / SOCIAL WORK IP CONSULT  VASCULAR ACCESS ADULT IP CONSULT  PHYSICAL THERAPY ADULT IP CONSULT  OCCUPATIONAL THERAPY ADULT IP CONSULT  VASCULAR ACCESS ADULT IP CONSULT    Code Status   No CPR- Do NOT Intubate    Time Spent on this Encounter   I, Emily Millan MD, personally saw the patient today and spent greater than 30 minutes discharging this patient.       Emily Millan MD  [place] [facility] [facility]  [address] [place] [address], [address]  [facility] [address]  Phone: [phone]  ______________________________________________________________________    Physical Exam   Vital Signs: Temp: 98  F (36.7  C) Temp src: Oral BP: (!) 156/59 Pulse: 90   Resp: 16 SpO2: 94 % O2 Device: Nasal cannula Oxygen Delivery: 2 LPM  Weight: 122 lbs 11.2 oz  I saw and examined the patient on the date of discharge.           Primary Care Physician   Arely Lantigua    Discharge Orders      General info for SNF    Length of  Stay Estimate: Short Term Care: Estimated # of Days <30  Condition at Discharge: Improving  Level of care:skilled   Rehabilitation Potential: Excellent  Admission H&P remains valid and up-to-date: Yes  Recent Chemotherapy: N/A  Use Nursing Home Standing Orders: Yes     Mantoux instructions    Give two-step Mantoux (PPD) Per Facility Policy Yes     Follow Up and recommended labs and tests    Follow up with Nursing home physician.  Recheck potassium on Friday, 12/15.     Reason for your hospital stay    You felt short of breath due to pneumonia and COPD exacerbation.     Activity - Up with nursing assistance     Physical Therapy Adult Consult    Evaluate and treat as clinically indicated.    Reason:  deconditioning     Occupational Therapy Adult Consult    Evaluate and treat as clinically indicated.    Reason:  deconditioning     Oxygen (SNF/TCU) Discharge     Diet    Follow this diet upon discharge: Orders Placed This Encounter      Moderate Consistent Carb (60 g CHO per Meal) Diet       Significant Results and Procedures   Most Recent 3 CBC's:  Recent Labs   Lab Test 12/13/23  0739 12/09/23  0911 12/08/23  1803 04/21/22  0645   WBC  --  14.8* 15.9* 6.2   HGB  --  9.7* 10.4* 11.3*   MCV  --  82 82 84    195 212 228     Most Recent 3 BMP's:  Recent Labs   Lab Test 12/13/23  0821 12/13/23  0739 12/13/23  0205 12/12/23  2116 12/12/23  1343 12/12/23  0802 12/11/23  1233 12/11/23  1014 12/10/23  0816 12/10/23  0751 12/09/23  1231 12/09/23  0911   NA  --   --   --   --   --   --   --  140  --  145  --  140   POTASSIUM  --  3.6  --   --   --  3.9  --  3.6   < > 3.1*  --  4.1   CHLORIDE  --   --   --   --   --   --   --  99  --  102  --  102   CO2  --   --   --   --   --   --   --  26  --  31*  --  26   BUN  --   --   --   --   --   --   --  26.3*  --  25.6*  --  28.1*   CR  --   --   --   --   --   --   --  0.51  --  0.55  --  0.58   ANIONGAP  --   --   --   --   --   --   --  15  --  12  --  12   NARCISA  --   --   --    --   --   --   --  8.8  --  9.1  --  9.1   *  --  72 151*   < > 101*   < > 249*   < > 95   < > 324*    < > = values in this interval not displayed.     Most Recent 3 BNP's:  Recent Labs   Lab Test 04/12/22  1024 02/21/22  1242 01/25/22  1404   NTBNPI [phone]     Most Recent Hemoglobin A1c:  Recent Labs   Lab Test 02/21/22  1242   A1C 9.9*   ,   Results for orders placed or performed during the hospital encounter of 12/08/23   XR Chest 2 Views    Narrative    EXAM: XR CHEST 2 VIEWS  LOCATION: [facility]  DATE: 12/8/2023    INDICATION: SOB r o pna  COMPARISON: 4/12/2022      Impression    IMPRESSION: Heart normal in size. Increased airspace consolidation within the left mid and lower lung concerning for pneumonia. Small left effusion with atelectasis. Trace right effusion with atelectasis. Mild interstitial edema. Recommend a follow-up   chest x-ray in 4-6 weeks to assure complete resolution.       Discharge Medications   Current Discharge Medication List        START taking these medications    Details   cefuroxime (CEFTIN) 500 MG tablet Take 1 tablet (500 mg) by mouth 2 times daily for 2 days    Associated Diagnoses: Pneumonia of left lung due to infectious organism, unspecified part of lung      furosemide (LASIX) 20 MG tablet Take 1 tablet (20 mg) by mouth daily    Associated Diagnoses: Benign essential hypertension      ipratropium - albuterol 0.5 mg/2.5 mg/3 mL (DUONEB) 0.5-2.5 (3) MG/3ML neb solution Take 1 vial (3 mLs) by nebulization 4 times daily    Associated Diagnoses: Chronic obstructive pulmonary disease, unspecified COPD type (H)      predniSONE (DELTASONE) 10 MG tablet Take 2 tablets (20 mg) by mouth daily for 3 days, THEN 1 tablet (10 mg) daily for 4 days, THEN 0.5 tablets (5 mg) daily for 3 days.    Associated Diagnoses: Chronic obstructive pulmonary disease, unspecified COPD type (H)           CONTINUE these medications which have CHANGED    Details   methadone  (DOLOPHINE) 5 MG/5ML solution Take 2.5 mLs (2.5 mg) by mouth 2 times daily  Qty: 70 mL, Refills: 0    Associated Diagnoses: Chronic, continuous use of opioids; Chronic pain syndrome      oxyCODONE (ROXICODONE) 5 MG tablet Take 1 tablet (5 mg) by mouth every 4 hours as needed for severe pain May take 5 tabs per day  Qty: 30 tablet, Refills: 0    Associated Diagnoses: Spinal stenosis of lumbar region with neurogenic claudication           CONTINUE these medications which have NOT CHANGED    Details   Acetaminophen (TYLENOL PO) Take 325 mg by mouth every 6 hours as needed Patient takes TID with Oxycodone.      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 gabapentin (NEURONTIN) 100 MG capsule Take 100 mg by mouth every morning      !! gabapentin (NEURONTIN) 100 MG capsule Take 200 mg by mouth at bedtime      glipiZIDE (GLUCOTROL) 5 MG tablet Take 5 mg by mouth  daily      glucosamine-chondroitin 500-400 MG CAPS per capsule Take 1 capsule by mouth 2 times daily       glucose 40 % (400 mg/mL) gel 15 g every 15 minutes by mouth as needed for low blood sugar.  Oral gel is preferable for conscious and able to swallow patient.  Qty: 112.5 g, Refills: 0    Associated Diagnoses: Type 2 diabetes mellitus with complication, without long-term current use of insulin (H)      latanoprost (XALATAN) 0.005 % ophthalmic solution Place 1 drop Into the left eye every evening      lisinopril (ZESTRIL) 40 MG tablet Take 1 tablet (40 mg) by mouth daily  Qty: 90 tablet, Refills: 1    Associated Diagnoses: Benign essential hypertension      LORazepam (ATIVAN) 0.5 MG tablet Take 0.5 mg by mouth every 4 hours as needed for anxiety (restlessness)      metFORMIN (GLUCOPHAGE) 850 MG tablet Take 1 tablet (850 mg) by mouth 2 times daily (with meals)  Qty: 180 tablet, Refills: 1    Associated Diagnoses: Type 2 diabetes mellitus with complication, without long-term current use of insulin (H)      Multiple Vitamins-Minerals (MULTIVITAMIN ADULT PO) Take 1 tablet by mouth daily      polyethylene glycol (MIRALAX) 17 g packet Take 0.5 packets by mouth every evening       sertraline (ZOLOFT) 25 MG tablet Take 1 tablet (25 mg) by mouth daily  Qty: 90 tablet, Refills: 1    Associated Diagnoses: COPD, severe (H)      tiotropium (SPIRIVA HANDIHALER) 18 MCG inhaled capsule INHALE THE CONTENTS OF 1 CAPSULE VIA INHALATION DEVICE DAILY  Qty: 90 capsule, Refills: 3    Associated Diagnoses: COPD, severe (H)      blood glucose (NO BRAND SPECIFIED) lancets standard To use to test glucose level in the blood Use to test blood 1sugar  3  times daily as directed. To accompany glucose monitor brands per insurance coverage.  Qty: 100 each, Refills: 0    Associated Diagnoses: Type 2 diabetes mellitus with complication, without long-term current use of insulin (H)      blood glucose (NO BRAND SPECIFIED) test strip To use to test  glucose level in the blood Use to test blood sugar  3 times daily as directed. To accompany glucose monitor brands per insurance coverage.  Qty: 100 strip, Refills: 0    Associated Diagnoses: Type 2 diabetes mellitus with complication, without long-term current use of insulin (H)      blood glucose calibration (NO BRAND SPECIFIED) solution Used to calibrate the blood glucose monitor as needed and as directed.  To accompany  blood glucose brands per insurance coverage  Qty: 1 each, Refills: 0    Associated Diagnoses: Type 2 diabetes mellitus with complication, without long-term current use of insulin (H)      blood glucose monitoring (NO BRAND SPECIFIED) meter device kit Use as directed Per insurance coverage  Qty: 1 kit, Refills: 0    Associated Diagnoses: Type 2 diabetes mellitus with complication, without long-term current use of insulin (H)      rosuvastatin (CRESTOR) 5 MG tablet Take 1 tablet (5 mg) by mouth At Bedtime  Qty: 90 tablet, Refills: 1    Associated Diagnoses: Type 2 diabetes mellitus with complication, without long-term current use of insulin (H)       !! - Potential duplicate medications found. Please discuss with provider.        STOP taking these medications       famotidine (PEPCID) 20 MG tablet Comments:   Reason for Stopping:         glipiZIDE (GLUCOTROL XL) 5 MG 24 hr tablet Comments:   Reason for Stopping:         Pseudoephedrine-Codeine-GG 30- MG/5ML LIQD Comments:   Reason for Stopping:             Allergies   Allergies   Allergen Reactions    Sulfamethoxazole Anaphylaxis

## 2023-12-14 ENCOUNTER — TRANSITIONAL CARE UNIT VISIT (OUTPATIENT)
Dept: GERIATRICS | Facility: CLINIC | Age: 88
End: 2023-12-14
Payer: MEDICARE

## 2023-12-14 DIAGNOSIS — Z09 HOSPITAL DISCHARGE FOLLOW-UP: ICD-10-CM

## 2023-12-14 DIAGNOSIS — R53.81 PHYSICAL DECONDITIONING: ICD-10-CM

## 2023-12-14 DIAGNOSIS — J96.11 CHRONIC RESPIRATORY FAILURE WITH HYPOXIA (H): Primary | ICD-10-CM

## 2023-12-14 DIAGNOSIS — F41.9 ANXIETY: ICD-10-CM

## 2023-12-14 DIAGNOSIS — K59.01 SLOW TRANSIT CONSTIPATION: ICD-10-CM

## 2023-12-14 DIAGNOSIS — M48.062 SPINAL STENOSIS OF LUMBAR REGION WITH NEUROGENIC CLAUDICATION: ICD-10-CM

## 2023-12-14 DIAGNOSIS — J18.9 COMMUNITY ACQUIRED PNEUMONIA OF LEFT LUNG, UNSPECIFIED PART OF LUNG: ICD-10-CM

## 2023-12-14 DIAGNOSIS — Z71.89 ADVANCED DIRECTIVES, COUNSELING/DISCUSSION: ICD-10-CM

## 2023-12-14 DIAGNOSIS — E78.5 HYPERLIPIDEMIA, UNSPECIFIED HYPERLIPIDEMIA TYPE: ICD-10-CM

## 2023-12-14 DIAGNOSIS — J81.0 ACUTE PULMONARY EDEMA (H): ICD-10-CM

## 2023-12-14 DIAGNOSIS — J96.11 CHRONIC RESPIRATORY FAILURE WITH HYPOXIA (H): ICD-10-CM

## 2023-12-14 DIAGNOSIS — J44.9 COPD, SEVERE (H): Primary | ICD-10-CM

## 2023-12-14 DIAGNOSIS — E11.9 DIABETES MELLITUS WITH NO COMPLICATION (H): ICD-10-CM

## 2023-12-14 DIAGNOSIS — I10 PRIMARY HYPERTENSION: ICD-10-CM

## 2023-12-14 PROCEDURE — 86481 TB AG RESPONSE T-CELL SUSP: CPT | Performed by: NURSE PRACTITIONER

## 2023-12-14 PROCEDURE — P9604 ONE-WAY ALLOW PRORATED TRIP: HCPCS | Performed by: NURSE PRACTITIONER

## 2023-12-14 PROCEDURE — 99310 SBSQ NF CARE HIGH MDM 45: CPT | Performed by: NURSE PRACTITIONER

## 2023-12-14 PROCEDURE — 36415 COLL VENOUS BLD VENIPUNCTURE: CPT | Performed by: NURSE PRACTITIONER

## 2023-12-14 RX ORDER — LORAZEPAM 0.5 MG/1
0.5 TABLET ORAL EVERY 4 HOURS PRN
Qty: 30 TABLET | Refills: 0 | Status: SHIPPED | OUTPATIENT
Start: 2023-12-14 | End: 2023-12-27

## 2023-12-14 NOTE — PROGRESS NOTES
[facility] GERIATRICS    PRIMARY CARE PROVIDER AND CLINIC:  Arely Lantigua MD, [address] [place] [address] / [facility] [id number]  Chief Complaint   Patient presents with    Hospital F/U      [place] Medical Record Number:  [id number]  Place of Service where encounter took place:  [facility] (TCU) [[id number]]    Celeste Chacko  is a 89 year old  (5/11/1931), admitted to the above facility from  [facility]. Hospital stay 12/8/23 through 12/13/23..   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 On oxygen 2-3 lpm per NC, sats 77% at admission. In hospital found to have left lung pneumonia and pulmonary edema. Acute on chronic hypoxic respiratory failure treated with Rocephin, azithromycin and Ceftin. Remains on prednisone, DuoNebs and Mucinex. Remains on lasix. DM2 on glipizide, metformin and Novolog sliding scale. HTN on Norvasc and lisinopril. PCP pneumonia on atovaquone. HLD no longer on statin. To TCU for rehab.     Seen for initial TCU visit. Asks to be DNR/DNI. No headaches, dizziness, chest pain, bowel or bladder issues. Dyspnea with any exertion. BP range 139-167/65-79 and sats 95% room air. BG range 128-150. Reports fatigue.         CODE STATUS/ADVANCE DIRECTIVES DISCUSSION:  Prior  DNR / DNI  ALLERGIES:   Allergies   Allergen Reactions    Sulfamethoxazole Anaphylaxis      PAST MEDICAL HISTORY:   Past Medical History:   Diagnosis Date    Benign essential hypertension     Chronic constipation     Chronic lower back pain     due to spinal stenosis    Chronic pain syndrome     Chronic, continuous use of opioids     COPD (chronic obstructive pulmonary disease) (H)     on continuous oxygen (2LNC)    NOEMÍ (generalized anxiety disorder)     History of Pneumocystis jirovecii pneumonia     Osteopenia     Pure hypercholesterolemia     Type 2 diabetes mellitus (H)       PAST  SURGICAL HISTORY:   has a past surgical history that includes tonsillectomy & adenoidectomy; Total Shoulder Arthroplasty (Right); Arthroplasty knee (Left, 09/15/2014); Inject Sacroiliac Joint (N/A, 12/01/2015); Inject Sacroiliac Joint (Bilateral, 05/19/2016); Inject Epidural Lumbar / Sacral Single (Left, 07/14/2016); Inject Sacroiliac Joint (Bilateral, 11/25/2016); Inject Sacroiliac Joint (Bilateral, 04/25/2017); Inject Sacroiliac Joint (Bilateral, 07/28/2017); Inject Sacroiliac Joint (Bilateral, 11/10/2017); and Cataract Extraction, Bilateral.  FAMILY HISTORY: family history includes Breast Cancer in her brother; Cerebrovascular Disease in her brother; Diabetes Type 2  in her brother; Myocardial Infarction in her brother.  SOCIAL HISTORY:   reports that she quit smoking about 23 years ago. Her smoking use included cigarettes. She has a 54.00 pack-year smoking history. She has never used smokeless tobacco. She reports current alcohol use. She reports that she does not use drugs.  Patient's living condition: lives alone    Post Discharge Medication Reconciliation Status:   MED REC REQUIRED  Post Medication Reconciliation Status:  Discharge medications reconciled, continue medications without change       Current Outpatient Medications   Medication Sig    Acetaminophen (TYLENOL PO) Take 325 mg by mouth every 6 hours as needed Patient takes TID with Oxycodone.    albuterol (PROAIR HFA/PROVENTIL HFA/VENTOLIN HFA) 108 (90 Base) MCG/ACT inhaler Inhale 2 puffs into the lungs every 6 hours    amLODIPine (NORVASC) 2.5 MG tablet Take 2.5 mg by mouth daily    aspirin (ASA) 81 MG chewable tablet Take 81 mg by mouth daily    atovaquone (MEPRON) 750 MG/5ML suspension Take 5 mLs (750 mg) by mouth daily    calcium polycarbophil (FIBERCON) 625 MG tablet Take 1 tablet by mouth daily    calcium-vitamin D (CALTRATE) 600-400 MG-UNIT per tablet Take 1 tablet by mouth 2 times daily    cefuroxime (CEFTIN) 500 MG tablet Take 1 tablet (500  mg) by mouth 2 times daily for 2 days    fluticasone-salmeterol (ADVAIR) 500-50 MCG/ACT inhaler Inhale 1 puff into the lungs 2 times daily    furosemide (LASIX) 20 MG tablet Take 1 tablet (20 mg) by mouth daily    gabapentin (NEURONTIN) 100 MG capsule Take 100 mg by mouth every morning    gabapentin (NEURONTIN) 100 MG capsule Take 200 mg by mouth at bedtime    glipiZIDE (GLUCOTROL) 5 MG tablet Take 5 mg by mouth daily    glucosamine-chondroitin 500-400 MG CAPS per capsule Take 1 capsule by mouth 2 times daily     glucose 40 % (400 mg/mL) gel 15 g every 15 minutes by mouth as needed for low blood sugar.  Oral gel is preferable for conscious and able to swallow patient.    ipratropium - albuterol 0.5 mg/2.5 mg/3 mL (DUONEB) 0.5-2.5 (3) MG/3ML neb solution Take 1 vial (3 mLs) by nebulization 4 times daily    latanoprost (XALATAN) 0.005 % ophthalmic solution Place 1 drop Into the left eye every evening    lisinopril (ZESTRIL) 40 MG tablet Take 1 tablet (40 mg) by mouth daily    metFORMIN (GLUCOPHAGE) 850 MG tablet Take 1 tablet (850 mg) by mouth 2 times daily (with meals)    methadone (DOLOPHINE) 5 MG/5ML solution Take 2.5 mLs (2.5 mg) by mouth 2 times daily    Multiple Vitamins-Minerals (MULTIVITAMIN ADULT PO) Take 1 tablet by mouth daily    oxyCODONE (ROXICODONE) 5 MG tablet Take 1 tablet (5 mg) by mouth every 4 hours as needed for severe pain May take 5 tabs per day    polyethylene glycol (MIRALAX) 17 g packet Take 0.5 packets by mouth every evening     predniSONE (DELTASONE) 10 MG tablet Take 2 tablets (20 mg) by mouth daily for 3 days, THEN 1 tablet (10 mg) daily for 4 days, THEN 0.5 tablets (5 mg) daily for 3 days.    rosuvastatin (CRESTOR) 5 MG tablet Take 1 tablet (5 mg) by mouth At Bedtime    sertraline (ZOLOFT) 25 MG tablet Take 1 tablet (25 mg) by mouth daily    blood glucose (NO BRAND SPECIFIED) lancets standard To use to test glucose level in the blood Use to test blood 1sugar  3  times daily as directed.  "To accompany glucose monitor brands per insurance coverage. (Patient not taking: Reported on 12/14/2023)    blood glucose (NO BRAND SPECIFIED) test strip To use to test glucose level in the blood Use to test blood sugar  3 times daily as directed. To accompany glucose monitor brands per insurance coverage. (Patient not taking: Reported on 12/14/2023)    blood glucose calibration (NO BRAND SPECIFIED) solution Used to calibrate the blood glucose monitor as needed and as directed.  To accompany  blood glucose brands per insurance coverage (Patient not taking: Reported on 12/14/2023)    blood glucose monitoring (NO BRAND SPECIFIED) meter device kit Use as directed Per insurance coverage (Patient not taking: Reported on 12/14/2023)    LORazepam (ATIVAN) 0.5 MG tablet Take 1 tablet (0.5 mg) by mouth every 4 hours as needed for anxiety (restlessness)     No current facility-administered medications for this visit.       ROS:  10 point ROS of systems including Constitutional, Eyes, Respiratory, Cardiovascular, Gastroenterology, Genitourinary, Integumentary, Musculoskeletal, Psychiatric were all negative except for pertinent positives noted in my HPI.    Vitals:  BP (!) 140/70   Pulse 86   Temp 97  F (36.1  C)   Resp 18   Ht 1.6 m (5' 3\")   Wt 55 kg (121 lb 3.2 oz)   SpO2 94%   BMI 21.47 kg/m    Exam:  GENERAL APPEARANCE:  Alert, in no distress, pleasant, cooperative, oriented x self, place and recent events  EYES:  EOM, lids, pupils and irises normal, sclera clear and conjunctiva normal, no discharge or mattering on lids or lashes noted  ENT:  Mouth normal, moist mucous membranes, nose normal without drainage or crusting, external ears without lesions, hearing acuity intact  NECK: supple, symmetrical, trachea midline  RESP:  respiratory effort normal, no chest wall tenderness, no respiratory distress, Lung sounds diminished throughout, patient is on oxygen per NC  CV:  Auscultation of heart done, rate and rhythm " controlled and regular, no murmur, no rub or gallop. Edema none bilateral lower extremities.  ABDOMEN:  normal bowel sounds, soft, nontender, no palpable masses.  M/S:   Gait and station unsafe without assistance, no tenderness or swelling of the joints; able to move all extremities, digits normal  NEURO: cranial nerves 2-12 grossly intact, no facial asymmetry, no speech deficits and able to follow directions, moves all extremities symmetrically  PSYCH:  insight and judgement and memory appear intact, affect and mood normal     Lab/Diagnostic data:  Most Recent 3 CBC's:  Recent Labs   Lab Test 12/13/23  0739 12/09/23  0911 12/08/23  1803 04/21/22  0645   WBC  --  14.8* 15.9* 6.2   HGB  --  9.7* 10.4* 11.3*   MCV  --  82 82 84    195 212 228     Most Recent 3 BMP's:  Recent Labs   Lab Test 12/13/23  0821 12/13/23  0739 12/13/23  0205 12/12/23  2116 12/12/23  1343 12/12/23  0802 12/11/23  1233 12/11/23  1014 12/10/23  0816 12/10/23  0751 12/09/23  1231 12/09/23  0911   NA  --   --   --   --   --   --   --  140  --  145  --  140   POTASSIUM  --  3.6  --   --   --  3.9  --  3.6   < > 3.1*  --  4.1   CHLORIDE  --   --   --   --   --   --   --  99  --  102  --  102   CO2  --   --   --   --   --   --   --  26  --  31*  --  26   BUN  --   --   --   --   --   --   --  26.3*  --  25.6*  --  28.1*   CR  --   --   --   --   --   --   --  0.51  --  0.55  --  0.58   ANIONGAP  --   --   --   --   --   --   --  15  --  12  --  12   NARCISA  --   --   --   --   --   --   --  8.8  --  9.1  --  9.1   *  --  72 151*   < > 101*   < > 249*   < > 95   < > 324*    < > = values in this interval not displayed.     Most Recent TSH and T4:  Recent Labs   Lab Test 02/21/22  1242   TSH 0.54     Most Recent Hemoglobin A1c:  Recent Labs   Lab Test 02/21/22  1242   A1C 9.9*       ASSESSMENT/PLAN:  COPD, severe (H)  Community acquired pneumonia of left lung, unspecified part of lung  Acute pulmonary edema (H)  Chronic respiratory failure  with hypoxia (H)  Hx PCP pneumonia  Acute on chronic. Continue albuterol inhaler 2 puffs every 6 hrs, mepron 750 mg daily, Advair 500-50 mcg/act 1 puff BID, lasix 20 mg daily, DuoNebs QID, ativan 0.5 mg every 4 hrs PRN dyspnea or anxiety, prednisone taper as ordered, monitor respiratory status. Continue oxygen 2-3 lpm per NC.     Primary hypertension  Chronic, continue Norvasc 2.5 mg daily, lisinopril 40 mg daily, monitor vs. Check BMP PRN.     Diabetes mellitus with no complication (H)  Chronic, continue glipizide 5 mg daily, monitor BG twice daily. Last A1C 9.9 on 2/21/22    Hyperlipidemia, unspecified hyperlipidemia type  Chronic, continue asa 81 mg daily, Crestor 5 mg daily, monitor per home routine.     Anxiety  Chronic, continue ativan PRN as above, Zoloft 25 mg daily, monitor mood.     Slow transit constipation  Continue Miralax 8.5 mg daily, Fibercon 625 mg daily, monitor bowel status.     Spinal stenosis of lumbar region with neurogenic claudication  Chronic, continue tylenol 325 mg QID PRN pain, Neurontin 100 mg in AM and 200 mg HS, methadone 2.5 mg BID, oxycodone 5 mg every 4 hrs PRN, monitor symptoms.     Physical deconditioning  Acute on chronic. Therapies as ordered and f/u progress.     Advanced directives, counseling/discussion  Asks to be DNR/DNI    Orders:  DNR/DNI  Lorazepam 0.5 mg PO every 4 hrs PRN anxiety, dyspnea  CBC and BMP on 12/14/23 diagnosis pneumonia and HTN    Electronically signed by:  ALICIA Betancourt CNP

## 2023-12-15 ENCOUNTER — PATIENT OUTREACH (OUTPATIENT)
Dept: CARE COORDINATION | Facility: CLINIC | Age: 88
End: 2023-12-15
Payer: COMMERCIAL

## 2023-12-15 ENCOUNTER — DOCUMENTATION ONLY (OUTPATIENT)
Dept: OTHER | Facility: CLINIC | Age: 88
End: 2023-12-15
Payer: COMMERCIAL

## 2023-12-15 LAB
GAMMA INTERFERON BACKGROUND BLD IA-ACNC: 0.02 IU/ML
M TB IFN-G BLD-IMP: NEGATIVE
M TB IFN-G CD4+ BCKGRND COR BLD-ACNC: 1.78 IU/ML
MITOGEN IGNF BCKGRD COR BLD-ACNC: -0.01 IU/ML
MITOGEN IGNF BCKGRD COR BLD-ACNC: 0 IU/ML
QUANTIFERON MITOGEN: 1.8 IU/ML
QUANTIFERON NIL TUBE: 0.02 IU/ML
QUANTIFERON TB1 TUBE: 0.02 IU/ML
QUANTIFERON TB2 TUBE: 0.01

## 2023-12-15 NOTE — PROGRESS NOTES
Clinic Care Coordination Contact    Clinic Care Coordination Contact  Care Team Conversations    Situation: M[facility]O CC following patient through TCU care progression.     Background: Admitted on 12/8/2023 with increasing SOB and weakness.     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  She lives alone and was concerned about her worsening symptoms.  Patient was hypoxic 77% per EMS on 1L (? Baseline is 2-3L) She was given 3 DuoNebs prior to arrival to the ED.      Assessment: Discharged to [facility]    Plan/Recommendations: MARCELO NEVAREZ will send TCU Care Team Care Management hand in communication and request involvement in discharge planning and coordination of care.      LYNDA Carrasco Care Coordination Team  [phone]

## 2023-12-17 ENCOUNTER — LAB REQUISITION (OUTPATIENT)
Dept: LAB | Facility: CLINIC | Age: 88
End: 2023-12-17

## 2023-12-17 DIAGNOSIS — J18.9 PNEUMONIA, UNSPECIFIED ORGANISM: ICD-10-CM

## 2023-12-18 VITALS
HEART RATE: 109 BPM | TEMPERATURE: 97.9 F | OXYGEN SATURATION: 93 % | BODY MASS INDEX: 21.23 KG/M2 | WEIGHT: 119.8 LBS | SYSTOLIC BLOOD PRESSURE: 121 MMHG | RESPIRATION RATE: 18 BRPM | HEIGHT: 63 IN | DIASTOLIC BLOOD PRESSURE: 60 MMHG

## 2023-12-18 LAB
ANION GAP SERPL CALCULATED.3IONS-SCNC: 13 MMOL/L (ref 7–15)
BUN SERPL-MCNC: 32.7 MG/DL (ref 8–23)
CALCIUM SERPL-MCNC: 10.5 MG/DL (ref 8.2–9.6)
CHLORIDE SERPL-SCNC: 97 MMOL/L (ref 98–107)
CREAT SERPL-MCNC: 0.66 MG/DL (ref 0.51–0.95)
DEPRECATED HCO3 PLAS-SCNC: 33 MMOL/L (ref 22–29)
EGFRCR SERPLBLD CKD-EPI 2021: 82 ML/MIN/1.73M2
ERYTHROCYTE [DISTWIDTH] IN BLOOD BY AUTOMATED COUNT: 14.6 % (ref 10–15)
GLUCOSE SERPL-MCNC: 120 MG/DL (ref 70–99)
HCT VFR BLD AUTO: 38.7 % (ref 35–47)
HGB BLD-MCNC: 11.5 G/DL (ref 11.7–15.7)
MCH RBC QN AUTO: 25.3 PG (ref 26.5–33)
MCHC RBC AUTO-ENTMCNC: 29.7 G/DL (ref 31.5–36.5)
MCV RBC AUTO: 85 FL (ref 78–100)
PLATELET # BLD AUTO: 317 10E3/UL (ref 150–450)
POTASSIUM SERPL-SCNC: 4.3 MMOL/L (ref 3.4–5.3)
RBC # BLD AUTO: 4.54 10E6/UL (ref 3.8–5.2)
SODIUM SERPL-SCNC: 143 MMOL/L (ref 135–145)
WBC # BLD AUTO: 9.1 10E3/UL (ref 4–11)

## 2023-12-18 PROCEDURE — 85041 AUTOMATED RBC COUNT: CPT | Performed by: NURSE PRACTITIONER

## 2023-12-18 PROCEDURE — 36415 COLL VENOUS BLD VENIPUNCTURE: CPT | Performed by: NURSE PRACTITIONER

## 2023-12-18 PROCEDURE — P9604 ONE-WAY ALLOW PRORATED TRIP: HCPCS | Performed by: NURSE PRACTITIONER

## 2023-12-18 PROCEDURE — 80048 BASIC METABOLIC PNL TOTAL CA: CPT | Performed by: NURSE PRACTITIONER

## 2023-12-18 PROCEDURE — 85014 HEMATOCRIT: CPT | Performed by: NURSE PRACTITIONER

## 2023-12-19 ENCOUNTER — TRANSITIONAL CARE UNIT VISIT (OUTPATIENT)
Dept: GERIATRICS | Facility: CLINIC | Age: 88
End: 2023-12-19
Payer: MEDICARE

## 2023-12-19 DIAGNOSIS — J81.0 ACUTE PULMONARY EDEMA (H): ICD-10-CM

## 2023-12-19 DIAGNOSIS — J96.11 CHRONIC RESPIRATORY FAILURE WITH HYPOXIA (H): Primary | ICD-10-CM

## 2023-12-19 DIAGNOSIS — F41.9 ANXIETY: ICD-10-CM

## 2023-12-19 DIAGNOSIS — R53.81 PHYSICAL DECONDITIONING: ICD-10-CM

## 2023-12-19 DIAGNOSIS — E11.9 DIABETES MELLITUS WITH NO COMPLICATION (H): ICD-10-CM

## 2023-12-19 DIAGNOSIS — K59.01 SLOW TRANSIT CONSTIPATION: ICD-10-CM

## 2023-12-19 DIAGNOSIS — M48.062 SPINAL STENOSIS OF LUMBAR REGION WITH NEUROGENIC CLAUDICATION: ICD-10-CM

## 2023-12-19 DIAGNOSIS — J18.9 COMMUNITY ACQUIRED PNEUMONIA OF LEFT LUNG, UNSPECIFIED PART OF LUNG: ICD-10-CM

## 2023-12-19 DIAGNOSIS — J44.9 COPD, SEVERE (H): ICD-10-CM

## 2023-12-19 DIAGNOSIS — E78.5 HYPERLIPIDEMIA, UNSPECIFIED HYPERLIPIDEMIA TYPE: ICD-10-CM

## 2023-12-19 DIAGNOSIS — I10 PRIMARY HYPERTENSION: ICD-10-CM

## 2023-12-19 PROCEDURE — 99309 SBSQ NF CARE MODERATE MDM 30: CPT | Performed by: NURSE PRACTITIONER

## 2023-12-19 NOTE — PROGRESS NOTES
"[facility] GERIATRICS    Chief Complaint   Patient presents with    RECHECK     HPI:  Celeste Chacko is a 89 year old  (5/11/1931), who is being seen today for an episodic care visit at: [facility] (TCU) [[id number]].     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 On oxygen 2-3 lpm per NC, sats 77% at admission. In hospital found to have left lung pneumonia and pulmonary edema. Acute on chronic hypoxic respiratory failure treated with Rocephin, azithromycin and Ceftin. Remains on prednisone, DuoNebs and Mucinex. Remains on lasix. DM2 on glipizide, metformin and Novolog sliding scale. HTN on Norvasc and lisinopril. PCP pneumonia on atovaquone. HLD no longer on statin. To TCU for rehab.     Today's concern is: episodic f/u respiratory status, vs, mobility. Reports respiratory status feels back to baseline, comfortable with oxygen at 2 lpm per NC and sats are 96%. No cough. BP range 104-147/60-82 and HR  bpm. Walks 50 ft with WC. Plan is to discharge home with hospice when ready.     Allergies, and PMH/PSH reviewed in EPIC today.  REVIEW OF SYSTEMS:  4 point ROS including Respiratory, CV, GI and , other than that noted in the HPI,  is negative    Objective:   /60   Pulse 109   Temp 97.9  F (36.6  C)   Resp 18   Ht 1.6 m (5' 3\")   Wt 54.3 kg (119 lb 12.8 oz)   SpO2 93%   BMI 21.22 kg/m    GENERAL APPEARANCE:  Alert, in no distress, cooperative  ENT:  Mouth normal, moist mucous membranes, normal hearing acuity  EYES:  Conjunctiva and lids normal  RESP:  no respiratory distress, on oxygen and LS diminished throughout  CV: HRR, tachy, no edema  NEURO:   No facial asymmetry, speech clear  PSYCH:  oriented X 3, affect and mood normal     Most Recent 3 CBC's:  Recent Labs   Lab Test 12/18/23  0950 12/13/23  0739 12/09/23  0911 12/08/23  1803   WBC " 9.1  --  14.8* 15.9*   HGB 11.5*  --  9.7* 10.4*   MCV 85  --  82 82    311 195 212     Most Recent 3 BMP's:  Recent Labs   Lab Test 12/18/23  0950 12/13/23  0821 12/13/23  0739 12/13/23  0205 12/12/23  1343 12/12/23  0802 12/11/23  1233 12/11/23  1014 12/10/23  0816 12/10/23  0751     --   --   --   --   --   --  140  --  145   POTASSIUM 4.3  --  3.6  --   --  3.9  --  3.6   < > 3.1*   CHLORIDE 97*  --   --   --   --   --   --  99  --  102   CO2 33*  --   --   --   --   --   --  26  --  31*   BUN 32.7*  --   --   --   --   --   --  26.3*  --  25.6*   CR 0.66  --   --   --   --   --   --  0.51  --  0.55   ANIONGAP 13  --   --   --   --   --   --  15  --  12   NARCISA 10.5*  --   --   --   --   --   --  8.8  --  9.1   * 189*  --  72   < > 101*   < > 249*   < > 95    < > = values in this interval not displayed.     Most Recent TSH and T4:  Recent Labs   Lab Test 02/21/22  1242   TSH 0.54       Assessment/Plan:  COPD, severe (H)  Community acquired pneumonia of left lung, unspecified part of lung  Acute pulmonary edema (H)  Chronic respiratory failure with hypoxia (H)  Hx PCP pneumonia  Acute on chronic. Continue albuterol inhaler 2 puffs every 6 hrs, mepron 750 mg daily, Advair 500-50 mcg/act 1 puff BID, lasix 20 mg daily, DuoNebs QID, ativan 0.5 mg every 4 hrs PRN dyspnea or anxiety, prednisone taper as ordered, monitor respiratory status. Continue oxygen 2-3 lpm per NC.     Primary hypertension  Chronic, continue Norvasc 2.5 mg daily, lisinopril 40 mg daily, monitor vs. Check BMP PRN.     Diabetes mellitus with no complication (H)  Chronic, continue glipizide 5 mg daily, monitor BG twice daily. Last A1C 9.9 on 2/21/22. No routine labs as patient on hospice.    Hyperlipidemia, unspecified hyperlipidemia type  Chronic, continue asa 81 mg daily, Crestor 5 mg daily, monitor per home routine.     Anxiety  Chronic, continue ativan PRN as above, Zoloft 25 mg daily, monitor mood.     Slow transit  constipation  Continue Miralax 8.5 mg daily, Fibercon 625 mg daily, monitor bowel status.     Spinal stenosis of lumbar region with neurogenic claudication  Chronic, continue tylenol 325 mg QID PRN pain, Neurontin 100 mg in AM and 200 mg HS, methadone 2.5 mg BID, oxycodone 5 mg every 4 hrs PRN, monitor symptoms.     Physical deconditioning  Acute on chronic. Therapies as ordered and f/u progress.     MED REC REQUIRED  Post Medication Reconciliation Status:  Discharge medications reconciled, continue medications without change    Orders:  No new orders    Electronically signed by: ALICIA Betancourt CNP

## 2023-12-22 ENCOUNTER — DISCHARGE SUMMARY NURSING HOME (OUTPATIENT)
Dept: GERIATRICS | Facility: CLINIC | Age: 88
End: 2023-12-22
Payer: MEDICARE

## 2023-12-22 VITALS
RESPIRATION RATE: 18 BRPM | HEIGHT: 63 IN | WEIGHT: 119.8 LBS | TEMPERATURE: 98.4 F | SYSTOLIC BLOOD PRESSURE: 146 MMHG | DIASTOLIC BLOOD PRESSURE: 65 MMHG | OXYGEN SATURATION: 95 % | BODY MASS INDEX: 21.23 KG/M2 | HEART RATE: 97 BPM

## 2023-12-22 DIAGNOSIS — K59.01 SLOW TRANSIT CONSTIPATION: ICD-10-CM

## 2023-12-22 DIAGNOSIS — I10 PRIMARY HYPERTENSION: ICD-10-CM

## 2023-12-22 DIAGNOSIS — J81.0 ACUTE PULMONARY EDEMA (H): ICD-10-CM

## 2023-12-22 DIAGNOSIS — E78.5 HYPERLIPIDEMIA, UNSPECIFIED HYPERLIPIDEMIA TYPE: ICD-10-CM

## 2023-12-22 DIAGNOSIS — M48.062 SPINAL STENOSIS OF LUMBAR REGION WITH NEUROGENIC CLAUDICATION: ICD-10-CM

## 2023-12-22 DIAGNOSIS — E11.9 DIABETES MELLITUS WITH NO COMPLICATION (H): ICD-10-CM

## 2023-12-22 DIAGNOSIS — J18.9 COMMUNITY ACQUIRED PNEUMONIA OF LEFT LUNG, UNSPECIFIED PART OF LUNG: ICD-10-CM

## 2023-12-22 DIAGNOSIS — F41.9 ANXIETY: ICD-10-CM

## 2023-12-22 DIAGNOSIS — J44.9 COPD, SEVERE (H): ICD-10-CM

## 2023-12-22 DIAGNOSIS — R53.81 PHYSICAL DECONDITIONING: ICD-10-CM

## 2023-12-22 DIAGNOSIS — J96.11 CHRONIC RESPIRATORY FAILURE WITH HYPOXIA (H): Primary | ICD-10-CM

## 2023-12-22 PROCEDURE — 99316 NF DSCHRG MGMT 30 MIN+: CPT | Performed by: NURSE PRACTITIONER

## 2023-12-22 NOTE — PROGRESS NOTES
[facility] GERIATRICS DISCHARGE SUMMARY  PATIENT'S NAME: Celeste Chacko  YOB: 1931  MEDICAL RECORD NUMBER:  [id number]  Place of Service where encounter took place:  [facility] ([facility]) [[id number]]    PRIMARY CARE PROVIDER AND CLINIC RESPONSIBLE AFTER TRANSFER:   Arely Lantigua MD, [address]  / [facility] [address]    [facility] [place]     Transferring providers: ALICIA Betancourt CNP, Lupe Phan MD  Recent Hospitalization/ED:  [facility] stay 12/8/23 to 12/13/23.  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 On oxygen 2-3 lpm per NC, sats 77% at admission. In hospital found to have left lung pneumonia and pulmonary edema. Acute on chronic hypoxic respiratory failure treated with Rocephin, azithromycin and Ceftin. Remains on prednisone, DuoNebs and Mucinex. Remains on lasix. DM2 on glipizide, metformin and Novolog sliding scale. HTN on Norvasc and lisinopril. PCP pneumonia on atovaquone. HLD no longer on statin. To TCU for rehab.     Seen for discharge visit. No new concerns. BP range 103-150/49-82 and sats 94% room air. Plans to discharge home and resume hospice.     Summary of nursing facility stay:   COPD, severe (H)  Community acquired pneumonia of left lung, unspecified part of lung  Acute pulmonary edema (H)  Chronic respiratory failure with hypoxia (H)  Hx PCP pneumonia  Acute on chronic. Improved to  baseline. Continue albuterol inhaler 2 puffs every 6 hrs, mepron 750 mg daily, Advair 500-50 mcg/act 1 puff BID, lasix 20 mg daily, DuoNebs QID, ativan 0.5 mg every 4 hrs PRN dyspnea or anxiety, prednisone taper as ordered, monitor respiratory status. Continue oxygen 2-3 lpm per NC. Home with hospice consult.     Primary hypertension  Chronic, continue Norvasc 2.5 mg daily, lisinopril 40 mg daily, monitor vs. Check BMP PRN.     Diabetes mellitus with no complication (H)  Chronic, continue glipizide 5 mg daily, monitor BG twice daily. Last A1C 9.9 on 2/21/22    Hyperlipidemia, unspecified hyperlipidemia type  Chronic, continue asa 81 mg daily, Crestor 5 mg daily, monitor per home routine.     Anxiety  Chronic, continue ativan PRN as above, Zoloft 25 mg daily, monitor mood.     Slow transit constipation  Continue Miralax 8.5 mg daily, Fibercon 625 mg daily, monitor bowel status.     Spinal stenosis of lumbar region with neurogenic claudication  Chronic, continue tylenol 325 mg QID PRN pain, Neurontin 100 mg in AM and 200 mg HS, methadone 2.5 mg BID, oxycodone 5 mg every 4 hrs PRN, monitor symptoms.     Physical deconditioning  Acute on chronic. Back to baseline. Home with hospice.     Discharge Medications:  MED REC REQUIRED  Post Medication Reconciliation Status:  Discharge medications reconciled and changed, see notes/orders    Current Outpatient Medications   Medication Sig Dispense Refill    Acetaminophen (TYLENOL PO) Take 325 mg by mouth every 6 hours as needed Patient takes TID with Oxycodone.      albuterol (PROAIR HFA/PROVENTIL HFA/VENTOLIN HFA) 108 (90 Base) MCG/ACT inhaler Inhale 2 puffs into the lungs every 6 hours 18 g 0    amLODIPine (NORVASC) 2.5 MG tablet Take 2.5 mg by mouth daily      aspirin (ASA) 81 MG chewable tablet Take 81 mg by mouth daily      atovaquone (MEPRON) 750 MG/5ML suspension Take 5 mLs (750 mg) by mouth daily 420 mL 11    blood glucose (NO BRAND SPECIFIED) lancets standard To use to  test glucose level in the blood Use to test blood 1sugar  3  times daily as directed. To accompany glucose monitor brands per insurance coverage. (Patient not taking: Reported on 12/14/2023) 100 each 0    blood glucose (NO BRAND SPECIFIED) test strip To use to test glucose level in the blood Use to test blood sugar  3 times daily as directed. To accompany glucose monitor brands per insurance coverage. (Patient not taking: Reported on 12/14/2023) 100 strip 0    blood glucose calibration (NO BRAND SPECIFIED) solution Used to calibrate the blood glucose monitor as needed and as directed.  To accompany  blood glucose brands per insurance coverage (Patient not taking: Reported on 12/14/2023) 1 each 0    blood glucose monitoring (NO BRAND SPECIFIED) meter device kit Use as directed Per insurance coverage (Patient not taking: Reported on 12/14/2023) 1 kit 0    calcium polycarbophil (FIBERCON) 625 MG tablet Take 1 tablet by mouth daily      calcium-vitamin D (CALTRATE) 600-400 MG-UNIT per tablet Take 1 tablet by mouth 2 times daily      fluticasone-salmeterol (ADVAIR) 500-50 MCG/ACT inhaler Inhale 1 puff into the lungs 2 times daily 180 each 3    furosemide (LASIX) 20 MG tablet Take 1 tablet (20 mg) by mouth daily      gabapentin (NEURONTIN) 100 MG capsule Take 100 mg by mouth every morning      gabapentin (NEURONTIN) 100 MG capsule Take 200 mg by mouth at bedtime      glipiZIDE (GLUCOTROL) 5 MG tablet Take 5 mg by mouth daily      glucosamine-chondroitin 500-400 MG CAPS per capsule Take 1 capsule by mouth 2 times daily       glucose 40 % (400 mg/mL) gel 15 g every 15 minutes by mouth as needed for low blood sugar.  Oral gel is preferable for conscious and able to swallow patient. 112.5 g 0    ipratropium - albuterol 0.5 mg/2.5 mg/3 mL (DUONEB) 0.5-2.5 (3) MG/3ML neb solution Take 1 vial (3 mLs) by nebulization 4 times daily      latanoprost (XALATAN) 0.005 % ophthalmic solution Place 1 drop Into the left eye every evening   "    lisinopril (ZESTRIL) 40 MG tablet Take 1 tablet (40 mg) by mouth daily 90 tablet 1    LORazepam (ATIVAN) 0.5 MG tablet Take 1 tablet (0.5 mg) by mouth every 4 hours as needed for anxiety (restlessness) 30 tablet 0    metFORMIN (GLUCOPHAGE) 850 MG tablet Take 1 tablet (850 mg) by mouth 2 times daily (with meals) 180 tablet 1    methadone (DOLOPHINE) 5 MG/5ML solution Take 2.5 mLs (2.5 mg) by mouth 2 times daily 70 mL 0    Multiple Vitamins-Minerals (MULTIVITAMIN ADULT PO) Take 1 tablet by mouth daily      oxyCODONE (ROXICODONE) 5 MG tablet Take 1 tablet (5 mg) by mouth every 4 hours as needed for severe pain May take 5 tabs per day 30 tablet 0    polyethylene glycol (MIRALAX) 17 g packet Take 0.5 packets by mouth every evening       predniSONE (DELTASONE) 10 MG tablet Take 2 tablets (20 mg) by mouth daily for 3 days, THEN 1 tablet (10 mg) daily for 4 days, THEN 0.5 tablets (5 mg) daily for 3 days.      rosuvastatin (CRESTOR) 5 MG tablet Take 1 tablet (5 mg) by mouth At Bedtime 90 tablet 1    sertraline (ZOLOFT) 25 MG tablet Take 1 tablet (25 mg) by mouth daily 90 tablet 1        Controlled medications:   Oxycodone 5 mg Tabs #15 no refills sent to pharmacy  Methadone 5 mg/ml liquid 30 ml no refills sent to pharmacy  Ativan 0.5 mg tabs #15 no refills sent to pharmacy     Past Medical History:   Past Medical History:   Diagnosis Date    Benign essential hypertension     Chronic constipation     Chronic lower back pain     due to spinal stenosis    Chronic pain syndrome     Chronic, continuous use of opioids     COPD (chronic obstructive pulmonary disease) (H)     on continuous oxygen (2LNC)    NOEMÍ (generalized anxiety disorder)     History of Pneumocystis jirovecii pneumonia     Osteopenia     Pure hypercholesterolemia     Type 2 diabetes mellitus (H)      Physical Exam:   Vitals: BP (!) 146/65   Pulse 97   Temp 98.4  F (36.9  C)   Resp 18   Ht 1.6 m (5' 3\")   Wt 54.3 kg (119 lb 12.8 oz)   SpO2 95%   BMI " 21.22 kg/m    BMI: Body mass index is 21.22 kg/m .  GENERAL APPEARANCE:  Alert, in no distress, cooperative  ENT:  Mouth normal, moist mucous membranes, normal hearing acuity  EYES:  Conjunctiva and lids normal  RESP:  no respiratory distress, on oxygen and LS diminished throughout  CV: HRR, tachy, no edema  NEURO:   No facial asymmetry, speech clear  PSYCH:  oriented X 3, affect and mood normal     SNF labs: Most Recent 3 CBC's:  Recent Labs   Lab Test 12/18/23  0950 12/13/23  0739 12/09/23  0911 12/08/23  1803   WBC 9.1  --  14.8* 15.9*   HGB 11.5*  --  9.7* 10.4*   MCV 85  --  82 82    311 195 212     Most Recent 3 BMP's:  Recent Labs   Lab Test 12/18/23  0950 12/13/23  0821 12/13/23  0739 12/13/23  0205 12/12/23  1343 12/12/23  0802 12/11/23  1233 12/11/23  1014 12/10/23  0816 12/10/23  0751     --   --   --   --   --   --  140  --  145   POTASSIUM 4.3  --  3.6  --   --  3.9  --  3.6   < > 3.1*   CHLORIDE 97*  --   --   --   --   --   --  99  --  102   CO2 33*  --   --   --   --   --   --  26  --  31*   BUN 32.7*  --   --   --   --   --   --  26.3*  --  25.6*   CR 0.66  --   --   --   --   --   --  0.51  --  0.55   ANIONGAP 13  --   --   --   --   --   --  15  --  12   NARCISA 10.5*  --   --   --   --   --   --  8.8  --  9.1   * 189*  --  72   < > 101*   < > 249*   < > 95    < > = values in this interval not displayed.       DISCHARGE PLAN:  Follow up labs: No labs orders/due  Medical Follow Up:      Follow up with primary care provider in 2-3 weeks  Current [place] scheduled appointments:   No future appointments.   Discharge Services: [facility] hospice  Discharge Instructions Verbalized to Patient at Discharge:   Oxygen at 2-3 liters/minute via nasal cannula.     TOTAL DISCHARGE TIME:   Greater than 30 minutes  Electronically signed by:  ALICIA Betancourt CNP     Addendum  Patient elected to pursue home care for PT and OT, ordered placed 1/12/24  Did not sign on to hospice.    F/U per PCP routine.     Electronically signed by ALICIA Betancourt, KENZIE

## 2023-12-27 DIAGNOSIS — J96.11 CHRONIC RESPIRATORY FAILURE WITH HYPOXIA (H): ICD-10-CM

## 2023-12-27 DIAGNOSIS — G89.4 CHRONIC PAIN SYNDROME: ICD-10-CM

## 2023-12-27 DIAGNOSIS — F11.90 CHRONIC, CONTINUOUS USE OF OPIOIDS: ICD-10-CM

## 2023-12-27 DIAGNOSIS — M48.062 SPINAL STENOSIS OF LUMBAR REGION WITH NEUROGENIC CLAUDICATION: ICD-10-CM

## 2023-12-27 DIAGNOSIS — F41.9 ANXIETY: ICD-10-CM

## 2023-12-27 RX ORDER — METHADONE HYDROCHLORIDE 5 MG/5ML
2.5 SOLUTION ORAL 2 TIMES DAILY
Qty: 30 ML | Refills: 0 | Status: SHIPPED | OUTPATIENT
Start: 2023-12-27 | End: 2024-02-06

## 2023-12-27 RX ORDER — LORAZEPAM 0.5 MG/1
0.5 TABLET ORAL EVERY 4 HOURS PRN
Qty: 15 TABLET | Refills: 0 | Status: SHIPPED | OUTPATIENT
Start: 2023-12-27 | End: 2024-01-15

## 2023-12-27 RX ORDER — OXYCODONE HYDROCHLORIDE 5 MG/1
5 TABLET ORAL EVERY 4 HOURS PRN
Qty: 15 TABLET | Refills: 0 | Status: SHIPPED | OUTPATIENT
Start: 2023-12-27 | End: 2024-01-15

## 2024-01-02 DIAGNOSIS — J44.9 COPD, SEVERE (H): ICD-10-CM

## 2024-01-02 RX ORDER — TIOTROPIUM BROMIDE 18 UG/1
CAPSULE ORAL; RESPIRATORY (INHALATION)
Qty: 90 CAPSULE | Refills: 3 | Status: SHIPPED | OUTPATIENT
Start: 2024-01-02 | End: 2024-02-13

## 2024-01-09 ENCOUNTER — PATIENT OUTREACH (OUTPATIENT)
Dept: CARE COORDINATION | Facility: CLINIC | Age: 89
End: 2024-01-09
Payer: COMMERCIAL

## 2024-01-09 NOTE — PROGRESS NOTES
Clinic Care Coordination Contact  Care Coordination Clinician Chart Review    Situation: Patient chart reviewed by care coordinator.    Background: Clinic Care Coordination Referral received from inpatient care team for transition handoff communication following hospital admission.    Assessment: Upon chart review, patient is not a candidate for Primary Care Clinic Care Coordination enrollment due to reason stated below:  Patient enrolled into hospice.    Plan/Recommendations: Clinic Care Coordination Referral/order cancelled. RN/SW CC will perform no further monitoring/outreaches at this time and will remain available as needed. If new needs arise, a new Care Coordination Referral may be placed.    LYNDA Carrasco Care Coordination Team  [phone]

## 2024-01-12 ENCOUNTER — TELEPHONE (OUTPATIENT)
Dept: INTERNAL MEDICINE | Facility: CLINIC | Age: 89
End: 2024-01-12
Payer: COMMERCIAL

## 2024-01-12 NOTE — TELEPHONE ENCOUNTER
Home Care is calling regarding an established patient with [facility].       Requesting orders from: Arely Lantigua  Provider is following patient: Yes  Is this a 60-day recertification request?  No    Orders Requested    Physical Therapy  Request for delay in care, service is not able to be provided within same scheduled day.   Delay in start of care to 1/15/24    Occupational Therapy  Request for delay in care, service is not able to be provided within same scheduled day.   Delay in start of care to 1/15/24    Information was gathered and will be sent to provider for review.  RN will contact Home Care with information after provider review.  Confirmed ok to leave a detailed message with call back.  Contact information confirmed and updated as needed.    Annetta Simmons RN

## 2024-01-12 NOTE — TELEPHONE ENCOUNTER
Left detailed message relaying providers approval for requested home care orders, and info to call clinic back at [phone] if any questions.

## 2024-01-15 ENCOUNTER — TELEPHONE (OUTPATIENT)
Dept: INTERNAL MEDICINE | Facility: CLINIC | Age: 89
End: 2024-01-15
Payer: COMMERCIAL

## 2024-01-15 DIAGNOSIS — K59.09 CHRONIC CONSTIPATION: Primary | ICD-10-CM

## 2024-01-15 DIAGNOSIS — G89.4 CHRONIC PAIN SYNDROME: ICD-10-CM

## 2024-01-15 DIAGNOSIS — E11.8 TYPE 2 DIABETES MELLITUS WITH COMPLICATION, WITHOUT LONG-TERM CURRENT USE OF INSULIN (H): ICD-10-CM

## 2024-01-15 DIAGNOSIS — J44.9 COPD, SEVERE (H): ICD-10-CM

## 2024-01-15 DIAGNOSIS — F41.9 ANXIETY: ICD-10-CM

## 2024-01-15 DIAGNOSIS — I10 BENIGN ESSENTIAL HYPERTENSION: ICD-10-CM

## 2024-01-15 DIAGNOSIS — J96.11 CHRONIC RESPIRATORY FAILURE WITH HYPOXIA (H): ICD-10-CM

## 2024-01-15 RX ORDER — SENNA AND DOCUSATE SODIUM 50; 8.6 MG/1; MG/1
1 TABLET, FILM COATED ORAL 2 TIMES DAILY PRN
Qty: 60 TABLET | Refills: 0 | Status: SHIPPED | OUTPATIENT
Start: 2024-01-15 | End: 2024-02-06

## 2024-01-15 RX ORDER — LISINOPRIL 40 MG/1
40 TABLET ORAL DAILY
Qty: 90 TABLET | Refills: 1 | Status: SHIPPED | OUTPATIENT
Start: 2024-01-15 | End: 2024-09-16

## 2024-01-15 RX ORDER — TIOTROPIUM BROMIDE 18 UG/1
CAPSULE ORAL; RESPIRATORY (INHALATION)
Qty: 90 CAPSULE | Refills: 3 | OUTPATIENT
Start: 2024-01-15

## 2024-01-15 RX ORDER — GABAPENTIN 100 MG/1
CAPSULE ORAL
Qty: 90 CAPSULE | Refills: 0 | Status: SHIPPED | OUTPATIENT
Start: 2024-01-15 | End: 2024-03-12

## 2024-01-15 RX ORDER — LORAZEPAM 0.5 MG/1
0.5 TABLET ORAL EVERY 4 HOURS PRN
Qty: 30 TABLET | Refills: 0 | Status: SHIPPED | OUTPATIENT
Start: 2024-01-15 | End: 2024-03-12

## 2024-01-15 NOTE — TELEPHONE ENCOUNTER
Left detailed message relaying providers approval for requested home care orders, and info to call clinic back at [phone] if any questions.

## 2024-01-15 NOTE — TELEPHONE ENCOUNTER
Patient called the clinic and stated that she is needing a refill of medications. Medications are pended along with pharmacy.     Gabapentin is historical   Sig of how patient has been taking gabapentin: Take 100 mg AM and 200 mg HS (sig was updated to reflect this)    Patient requesting a refill of Senna (not on med list)  Sig of senna was updated and order is pended.    Claudia KRAFT RN  [facility] Triage Team

## 2024-01-15 NOTE — TELEPHONE ENCOUNTER
Home Care is calling regarding an established patient with [facility].       Requesting orders from: Arely Lantigua  Provider is following patient: Yes  Is this a 60-day recertification request?  No    Orders Requested    Physical Therapy  Request for initial certification (first set of orders)   Frequency:  2/15/24 start date, 1x/week for 3 weeks    Occupational Therapy  Request for initial evaluation and treatment (one time)     HHA (Home Health Aide)  Request for initial certification (first set of orders)   Frequency:  1x/week for 3 weeks    Verbal orders given for OT.  Information was gathered for PT and HHA.  Provider review needed.  RN will contact Home Care with information after provider review.  Confirmed ok to leave a detailed message with call back.  Contact information confirmed and updated as needed.    EVIE MESA RN

## 2024-01-16 NOTE — TELEPHONE ENCOUNTER
Pt revoked her hospice benefit when she went to he ER in Dec.   Pt is running out of some of her medications.   Pt was advised to call us to have PCP reorder meds.     Per chart review- pt called yesterday and medications were sent in.   Relayed this message to Ralph. Ralph stated we can disregard message then.

## 2024-01-18 DIAGNOSIS — J44.9 COPD, SEVERE (H): ICD-10-CM

## 2024-01-18 DIAGNOSIS — G89.4 CHRONIC PAIN SYNDROME: ICD-10-CM

## 2024-01-18 DIAGNOSIS — E11.8 TYPE 2 DIABETES MELLITUS WITH COMPLICATION, WITHOUT LONG-TERM CURRENT USE OF INSULIN (H): ICD-10-CM

## 2024-01-18 DIAGNOSIS — F11.90 CHRONIC, CONTINUOUS USE OF OPIOIDS: ICD-10-CM

## 2024-01-18 DIAGNOSIS — I10 BENIGN ESSENTIAL HYPERTENSION: Primary | ICD-10-CM

## 2024-01-18 RX ORDER — GLIPIZIDE 5 MG/1
5 TABLET ORAL DAILY
Qty: 90 TABLET | Refills: 0 | Status: SHIPPED | OUTPATIENT
Start: 2024-01-18 | End: 2024-02-13

## 2024-01-18 RX ORDER — AMLODIPINE BESYLATE 2.5 MG/1
2.5 TABLET ORAL DAILY
Qty: 90 TABLET | Refills: 0 | Status: SHIPPED | OUTPATIENT
Start: 2024-01-18 | End: 2024-05-08

## 2024-01-18 RX ORDER — LATANOPROST 50 UG/ML
1 SOLUTION/ DROPS OPHTHALMIC EVERY EVENING
Status: CANCELLED | OUTPATIENT
Start: 2024-01-18

## 2024-01-18 RX ORDER — CALCIUM POLYCARBOPHIL 625 MG 625 MG/1
1 TABLET ORAL DAILY
Status: CANCELLED | OUTPATIENT
Start: 2024-01-18

## 2024-01-18 RX ORDER — SERTRALINE HYDROCHLORIDE 25 MG/1
25 TABLET, FILM COATED ORAL DAILY
Qty: 90 TABLET | Refills: 0 | Status: SHIPPED | OUTPATIENT
Start: 2024-01-18 | End: 2024-05-08

## 2024-01-18 RX ORDER — METHADONE HYDROCHLORIDE 5 MG/5ML
2.5 SOLUTION ORAL 2 TIMES DAILY
Qty: 30 ML | Refills: 0 | Status: CANCELLED | OUTPATIENT
Start: 2024-01-18

## 2024-01-18 NOTE — TELEPHONE ENCOUNTER
Medication Question or Refill        What medication are you calling about (include dose and sig)?: Pended    Preferred Pharmacy:   [id number] - [place], [place] - [address] AT [facility] [address]  [facility] [address]  Phone: [phone] Fax: [phone]    Controlled Substance Agreement on file:   CSA -- Patient Level:     [Media Unavailable] Controlled Substance Agreement - Opioid - Scan on 5/11/2021  2:01 PM: OPIOD       Who prescribed the medication?: PCP    Do you need a refill? Yes    When did you use the medication last? N/A    Patient offered an appointment? No    Do you have any questions or concerns?  No      Okay to leave a detailed message?: Yes at Cell number on file:    Telephone Information:   Mobile [phone]

## 2024-01-23 DIAGNOSIS — M48.062 SPINAL STENOSIS OF LUMBAR REGION WITH NEUROGENIC CLAUDICATION: ICD-10-CM

## 2024-01-23 RX ORDER — OXYCODONE HYDROCHLORIDE 5 MG/1
5 TABLET ORAL EVERY 4 HOURS PRN
Qty: 30 TABLET | Refills: 0 | OUTPATIENT
Start: 2024-01-23

## 2024-01-24 NOTE — TELEPHONE ENCOUNTER
I do not feel comfortable prescribing #30 oxycodone/week and I do not prescribe methadone.    I understand that she was receiving these through hospice so I recommend that she transition to a pain clinic for ongoing refills.

## 2024-01-25 ENCOUNTER — TELEPHONE (OUTPATIENT)
Dept: INTERNAL MEDICINE | Facility: CLINIC | Age: 89
End: 2024-01-25
Payer: COMMERCIAL

## 2024-01-25 DIAGNOSIS — M48.062 SPINAL STENOSIS OF LUMBAR REGION WITH NEUROGENIC CLAUDICATION: ICD-10-CM

## 2024-01-25 RX ORDER — OXYCODONE HYDROCHLORIDE 5 MG/1
5 TABLET ORAL EVERY 4 HOURS PRN
Qty: 120 TABLET | Refills: 0 | Status: SHIPPED | OUTPATIENT
Start: 2024-01-25 | End: 2024-02-20

## 2024-01-25 RX ORDER — OXYCODONE HYDROCHLORIDE 5 MG/1
5 TABLET ORAL EVERY 4 HOURS PRN
Qty: 30 TABLET | Refills: 0 | Status: CANCELLED | OUTPATIENT
Start: 2024-01-25

## 2024-01-25 NOTE — TELEPHONE ENCOUNTER
Health Call Center    Phone Message    May a detailed message be left on voicemail: yes     Reason for Call: Medication Refill Request    Has the patient contacted the pharmacy for the refill? Yes   Name of medication being requested: OXYCONDONE 5 MG  Provider who prescribed the medication: MIGUEL TREJO MD  Pharmacy: [id number] [place], [place] - [address] AT [facility] [address], [place], [place], [address]  Date medication is needed: 1/27/24       Action Taken: Other:  PRIMARY CARE CLINIC POOL    Travel Screening: Not Applicable

## 2024-01-25 NOTE — TELEPHONE ENCOUNTER
Understood.     I can continue to prescribe oxycodone then BUT I do not prescribe methadone. A pain clinic provider could do both.    If she would like to continue with oxycodone alone, we can do that. #120 (to last one month) sent in.    If she would like to continue both oxycodone and methadone, I can place the pain clinic referral.

## 2024-01-25 NOTE — TELEPHONE ENCOUNTER
"Patient Contact    Attempt # 1    Was call answered?  Yes.  Pt verified name and date of birth.     Relayed provider message. Pt stating \"that is not fair\". She has \"been on oxycodone a year or two\". She has had \"chronic back pain from an injury\". Provider message reinforced and # given for pain clinic:      [facility] [place]  [phone]  [email or web address]  Open   Closes 7 PM      Pt requesting to speak to her provider; routing request to MD. Please advise.       "

## 2024-01-25 NOTE — TELEPHONE ENCOUNTER
Home Care is calling regarding an established patient with [facility].       Requesting orders from: Arely Lantigua  Provider is following patient: Yes  Is this a 60-day recertification request?  No    Orders Requested    Occupational Therapy  Request for initial certification (first set of orders)   Frequency:  1x/wk for 2 wks, EOW for 6 weeks for ADLs and breathing exercises      Information was gathered and will be sent to provider for review.  RN will contact Home Care with information after provider review.  Confirmed ok to leave a detailed message with call back.  Contact information confirmed and updated as needed.    EVIE MESA RN

## 2024-01-26 NOTE — TELEPHONE ENCOUNTER
See telephone encounter from 1/25/24.       Patient confirmed she does not need the Methadone. Patient is very appreciative for the refill of the Oxycodone. Patient declined a referral to the pain clinic since she does not need the Methadone.     Closing encounter.    Annetta Simmons RN

## 2024-01-26 NOTE — TELEPHONE ENCOUNTER
Called and left message for Sanam with the provider's response. Advised Sanam to call the clinic back with any additional questions and/or concerns.     Annetta Simmons RN

## 2024-02-05 ENCOUNTER — TELEPHONE (OUTPATIENT)
Dept: INTERNAL MEDICINE | Facility: CLINIC | Age: 89
End: 2024-02-05
Payer: COMMERCIAL

## 2024-02-05 PROBLEM — J18.9 PNEUMONIA: Status: RESOLVED | Noted: 2023-12-08 | Resolved: 2024-02-05

## 2024-02-05 NOTE — TELEPHONE ENCOUNTER
Home Care is calling regarding an established patient with [facility].       Requesting orders from: Arely Lantigua  Provider is following patient: Yes  Is this a 60-day recertification request?  No    Orders Requested    Skilled Nursing  Request for initial evaluation and treatment (one time)   - Patient revoked hospice. Missing a lot of medications, having issues setting up.    Verbal orders given.  Home Care will send orders for provider to sign.  Confirmed ok to leave a detailed message with call back.  Contact information confirmed and updated as needed.    Darlyn Lerner RN

## 2024-02-06 ENCOUNTER — OFFICE VISIT (OUTPATIENT)
Dept: INTERNAL MEDICINE | Facility: CLINIC | Age: 89
End: 2024-02-06
Payer: COMMERCIAL

## 2024-02-06 ENCOUNTER — TELEPHONE (OUTPATIENT)
Dept: INTERNAL MEDICINE | Facility: CLINIC | Age: 89
End: 2024-02-06

## 2024-02-06 VITALS
DIASTOLIC BLOOD PRESSURE: 64 MMHG | OXYGEN SATURATION: 96 % | RESPIRATION RATE: 18 BRPM | SYSTOLIC BLOOD PRESSURE: 138 MMHG | BODY MASS INDEX: 21.79 KG/M2 | HEIGHT: 63 IN | HEART RATE: 88 BPM | WEIGHT: 123 LBS

## 2024-02-06 DIAGNOSIS — K59.09 CHRONIC CONSTIPATION: ICD-10-CM

## 2024-02-06 DIAGNOSIS — M54.50 CHRONIC BILATERAL LOW BACK PAIN WITHOUT SCIATICA: ICD-10-CM

## 2024-02-06 DIAGNOSIS — I10 BENIGN ESSENTIAL HYPERTENSION: ICD-10-CM

## 2024-02-06 DIAGNOSIS — Z00.00 MEDICARE ANNUAL WELLNESS VISIT, SUBSEQUENT: Primary | ICD-10-CM

## 2024-02-06 DIAGNOSIS — E11.9 TYPE 2 DIABETES MELLITUS WITHOUT COMPLICATION, WITHOUT LONG-TERM CURRENT USE OF INSULIN (H): ICD-10-CM

## 2024-02-06 DIAGNOSIS — F41.1 GAD (GENERALIZED ANXIETY DISORDER): ICD-10-CM

## 2024-02-06 DIAGNOSIS — J44.9 CHRONIC OBSTRUCTIVE PULMONARY DISEASE, UNSPECIFIED COPD TYPE (H): ICD-10-CM

## 2024-02-06 DIAGNOSIS — G89.29 CHRONIC BILATERAL LOW BACK PAIN WITHOUT SCIATICA: ICD-10-CM

## 2024-02-06 DIAGNOSIS — G89.4 CHRONIC PAIN SYNDROME: ICD-10-CM

## 2024-02-06 DIAGNOSIS — E78.00 PURE HYPERCHOLESTEROLEMIA: ICD-10-CM

## 2024-02-06 DIAGNOSIS — F11.90 CHRONIC, CONTINUOUS USE OF OPIOIDS: ICD-10-CM

## 2024-02-06 LAB
ERYTHROCYTE [DISTWIDTH] IN BLOOD BY AUTOMATED COUNT: 13.8 % (ref 10–15)
HBA1C MFR BLD: 6.9 % (ref 0–5.6)
HCT VFR BLD AUTO: 39.1 % (ref 35–47)
HGB BLD-MCNC: 12.2 G/DL (ref 11.7–15.7)
MCH RBC QN AUTO: 25.6 PG (ref 26.5–33)
MCHC RBC AUTO-ENTMCNC: 31.2 G/DL (ref 31.5–36.5)
MCV RBC AUTO: 82 FL (ref 78–100)
PLATELET # BLD AUTO: 200 10E3/UL (ref 150–450)
RBC # BLD AUTO: 4.76 10E6/UL (ref 3.8–5.2)
WBC # BLD AUTO: 7.8 10E3/UL (ref 4–11)

## 2024-02-06 PROCEDURE — 80061 LIPID PANEL: CPT | Performed by: INTERNAL MEDICINE

## 2024-02-06 PROCEDURE — 36415 COLL VENOUS BLD VENIPUNCTURE: CPT | Performed by: INTERNAL MEDICINE

## 2024-02-06 PROCEDURE — 80053 COMPREHEN METABOLIC PANEL: CPT | Performed by: INTERNAL MEDICINE

## 2024-02-06 PROCEDURE — 99214 OFFICE O/P EST MOD 30 MIN: CPT | Mod: 25 | Performed by: INTERNAL MEDICINE

## 2024-02-06 PROCEDURE — G0439 PPPS, SUBSEQ VISIT: HCPCS | Performed by: INTERNAL MEDICINE

## 2024-02-06 PROCEDURE — 85027 COMPLETE CBC AUTOMATED: CPT | Performed by: INTERNAL MEDICINE

## 2024-02-06 PROCEDURE — 83036 HEMOGLOBIN GLYCOSYLATED A1C: CPT | Performed by: INTERNAL MEDICINE

## 2024-02-06 SDOH — HEALTH STABILITY: PHYSICAL HEALTH: ON AVERAGE, HOW MANY DAYS PER WEEK DO YOU ENGAGE IN MODERATE TO STRENUOUS EXERCISE (LIKE A BRISK WALK)?: 0 DAYS

## 2024-02-06 ASSESSMENT — ANXIETY QUESTIONNAIRES
GAD7 TOTAL SCORE: 0
8. IF YOU CHECKED OFF ANY PROBLEMS, HOW DIFFICULT HAVE THESE MADE IT FOR YOU TO DO YOUR WORK, TAKE CARE OF THINGS AT HOME, OR GET ALONG WITH OTHER PEOPLE?: NOT DIFFICULT AT ALL
7. FEELING AFRAID AS IF SOMETHING AWFUL MIGHT HAPPEN: NOT AT ALL
1. FEELING NERVOUS, ANXIOUS, OR ON EDGE: NOT AT ALL
IF YOU CHECKED OFF ANY PROBLEMS ON THIS QUESTIONNAIRE, HOW DIFFICULT HAVE THESE PROBLEMS MADE IT FOR YOU TO DO YOUR WORK, TAKE CARE OF THINGS AT HOME, OR GET ALONG WITH OTHER PEOPLE: NOT DIFFICULT AT ALL
6. BECOMING EASILY ANNOYED OR IRRITABLE: NOT AT ALL
GAD7 TOTAL SCORE: 0
7. FEELING AFRAID AS IF SOMETHING AWFUL MIGHT HAPPEN: NOT AT ALL
3. WORRYING TOO MUCH ABOUT DIFFERENT THINGS: NOT AT ALL
5. BEING SO RESTLESS THAT IT IS HARD TO SIT STILL: NOT AT ALL
GAD7 TOTAL SCORE: 0
2. NOT BEING ABLE TO STOP OR CONTROL WORRYING: NOT AT ALL
4. TROUBLE RELAXING: NOT AT ALL

## 2024-02-06 ASSESSMENT — PATIENT HEALTH QUESTIONNAIRE - PHQ9
10. IF YOU CHECKED OFF ANY PROBLEMS, HOW DIFFICULT HAVE THESE PROBLEMS MADE IT FOR YOU TO DO YOUR WORK, TAKE CARE OF THINGS AT HOME, OR GET ALONG WITH OTHER PEOPLE: NOT DIFFICULT AT ALL
SUM OF ALL RESPONSES TO PHQ QUESTIONS 1-9: 1
SUM OF ALL RESPONSES TO PHQ QUESTIONS 1-9: 1

## 2024-02-06 ASSESSMENT — SOCIAL DETERMINANTS OF HEALTH (SDOH): HOW OFTEN DO YOU GET TOGETHER WITH FRIENDS OR RELATIVES?: TWICE A WEEK

## 2024-02-06 NOTE — PROGRESS NOTES
ASSESSMENT/PLAN                                                       (Z00.00) Medicare annual wellness visit, subsequent  (primary encounter diagnosis)  Comment: PMH, PSH, FH, SH, medications, allergies, immunizations, and preventative health measures reviewed and updated as appropriate.  Plan: see below for plans.      (E11.9) Type 2 diabetes mellitus without complication, without long-term current use of insulin (H)  (E78.00) Pure hypercholesterolemia  Comment: on Crestor 5mg daily, metformin 850mg twice daily, and glipizide XL 5mg daily.  Plan: non-fasting labs today; recommendations to follow; referred for annual diabetic eye exam - patient will be contacted to schedule; patient should continue to monitor blood glucose levels once daily.      (I10) Benign essential hypertension  Comment: well-controlled on amlodipine and lisinopril.    (F41.1) NOEMÍ (generalized anxiety disorder)  Comment: well-controlled on Zoloft and Ativan as needed.     (G89.4) Chronic pain syndrome  (M54.50,  G89.29) Chronic bilateral low back pain without sciatica  (F11.90) Chronic, continuous use of opioids  Comment: well-controlled on #120 oxycodone 5mg/month.     (J44.9) Chronic obstructive pulmonary disease, unspecified COPD type (H)  Comment: well-controlled on Spiriva and Advair.     (K59.09) Chronic constipation  Comment: well-controlled on Miralax.     Appropriate preventive services were discussed with this patient, including applicable screening as appropriate for cardiovascular disease, diabetes, osteopenia/osteoporosis, and glaucoma.  As appropriate for age/gender, discussed screening for colorectal cancer, prostate cancer, breast cancer, and cervical cancer. Checklist reviewing preventive services available has been given to the patient.    Reviewed patients plan of care. The Basic Care Plan (routine screening as documented in Health Maintenance) for Celeste Chacko meets the Care Plan requirement. This Care Plan has been  established and reviewed with the Patient and daughter.    Arely Lantigua MD   [address] [address]  T: [phone], F: [phone]    SUBJECTIVE                                                      Celeste Chacko is a very pleasant 89 year old female who presents for her subsequent AWV:    Accompanied by daughter.     Current providers (other than myself): n/a     PMH, PSH, , SH, medications, allergies, immunizations, preventative health, and health risk assessment reviewed and updated as appropriate.    Past Medical History:   Diagnosis Date    Benign essential hypertension     Chronic constipation     Chronic lower back pain     due to spinal stenosis    Chronic pain syndrome     Chronic, continuous use of opioids     COPD (chronic obstructive pulmonary disease) (H)     on continuous oxygen (2LNC)    NOEMÍ (generalized anxiety disorder)     History of Pneumocystis jirovecii pneumonia     Osteopenia     Pure hypercholesterolemia     Type 2 diabetes mellitus (H)      Past Surgical History:   Procedure Laterality Date    ARTHROPLASTY KNEE Left 09/15/2014    Procedure: ARTHROPLASTY KNEE;  Surgeon: Carlos Alberto Kaminski MD;  Location: [facility]    CATARACT EXTRACTION, BILATERAL      INJECT EPIDURAL LUMBAR / SACRAL SINGLE Left 07/14/2016    Procedure: INJECT EPIDURAL LUMBAR / SACRAL SINGLE;  Surgeon: Luisito New MD;  [facility]    INJECT SACROILIAC JOINT N/A 12/01/2015    Procedure: INJECT SACROILIAC JOINT;  Surgeon: Luisito New MD;  Location: [facility]    INJECT SACROILIAC JOINT Bilateral 05/19/2016    Procedure: INJECT SACROILIAC JOINT;  Surgeon: Luisito New MD;  [facility]    INJECT SACROILIAC JOINT Bilateral 11/25/2016    Procedure: INJECT SACROILIAC JOINT;  Surgeon: Elmira Bennett MD;  [facility]    INJECT SACROILIAC JOINT Bilateral 04/25/2017    Procedure: INJECT SACROILIAC JOINT;  Bilateral Sacroiliac Joint Injection   Injection of local  anesthetic and steroid into area around spine for pain relief;  Surgeon: Alvaro Holland MD;  [facility]    INJECT SACROILIAC JOINT Bilateral 2017    Procedure: INJECT SACROILIAC JOINT;  Bilateral Sacroiliac Joint Injection;  Surgeon: Elmira Bennett MD;  [facility]    INJECT SACROILIAC JOINT Bilateral 11/10/2017    Procedure: INJECT SACROILIAC JOINT;  Bilateral Sacroiliac Joint Injection;  Surgeon: Elmira Bennett MD;  [facility]    TONSILLECTOMY & ADENOIDECTOMY      TOTAL SHOULDER ARTHROPLASTY Right      Family History   Problem Relation Age of Onset    Diabetes Type 2  Brother     Breast Cancer Brother     Cerebrovascular Disease Brother     Myocardial Infarction Brother     Coronary Artery Disease Early Onset No family hx of     Ovarian Cancer No family hx of     Colon Cancer No family hx of      Social History     Occupational History    Occupation: Retired - Banker   Tobacco Use    Smoking status: Former     Packs/day: 1.00     Years: 54.00     Additional pack years: 0.00     Total pack years: 54.00     Types: Cigarettes     Quit date: 2000     Years since quittin.7    Smokeless tobacco: Never   Vaping Use    Vaping Use: Never used   Substance and Sexual Activity    Alcohol use: Yes     Comment: rare    Drug use: No    Sexual activity: Not Currently   Social History Narrative    .    Lives in a condo - independent living.    2 adopted adult children.    2 grandchildren.    1 great grandchild.    Physical/occupational therapy exercise.      Allergies   Allergen Reactions    Sulfamethoxazole Anaphylaxis     Current Outpatient Medications   Medication Sig    Acetaminophen (TYLENOL PO) Take 325 mg by mouth every 6 hours as needed Patient takes TID with Oxycodone.    albuterol (PROAIR HFA/PROVENTIL HFA/VENTOLIN HFA) 108 (90 Base) MCG/ACT inhaler Inhale 2 puffs into the lungs every 6 hours    amLODIPine (NORVASC) 2.5 MG tablet Take 1 tablet (2.5 mg) by mouth daily     atovaquone (MEPRON) 750 MG/5ML suspension Take 5 mLs (750 mg) by mouth daily    calcium-vitamin D (CALTRATE) 600-400 MG-UNIT per tablet Take 1 tablet by mouth 2 times daily    fluticasone-salmeterol (ADVAIR) 500-50 MCG/ACT inhaler Inhale 1 puff into the lungs 2 times daily    furosemide (LASIX) 20 MG tablet Take 1 tablet (20 mg) by mouth daily    gabapentin (NEURONTIN) 100 MG capsule Take 1 tablet (100 mg) in the morning and take 2 tablets (200 mg) at bedtime    glipiZIDE (GLUCOTROL) 5 MG tablet Take 1 tablet (5 mg) by mouth daily    ipratropium - albuterol 0.5 mg/2.5 mg/3 mL (DUONEB) 0.5-2.5 (3) MG/3ML neb solution Take 1 vial (3 mLs) by nebulization 4 times daily    latanoprost (XALATAN) 0.005 % ophthalmic solution Place 1 drop Into the left eye every evening    lisinopril (ZESTRIL) 40 MG tablet Take 1 tablet (40 mg) by mouth daily    LORazepam (ATIVAN) 0.5 MG tablet Take 1 tablet (0.5 mg) by mouth every 4 hours as needed for anxiety (restlessness)    metFORMIN (GLUCOPHAGE) 850 MG tablet Take 1 tablet (850 mg) by mouth 2 times daily (with meals)    oxyCODONE (ROXICODONE) 5 MG tablet Take 1 tablet (5 mg) by mouth every 4 hours as needed for severe pain May take 5 tabs per day    polyethylene glycol (MIRALAX) 17 g packet Take 0.5 packets by mouth every evening     rosuvastatin (CRESTOR) 5 MG tablet Take 1 tablet (5 mg) by mouth At Bedtime    sertraline (ZOLOFT) 25 MG tablet Take 1 tablet (25 mg) by mouth daily    tiotropium (SPIRIVA) 18 MCG inhaled capsule INHALE THE CONTENTS OF 1 CAPSULE VIA INHALATION DEVICE DAILY     Immunization History   Administered Date(s) Administered    COVID-19 12+ (2023-24) (Pfizer) 12/26/2023    COVID-19 Bivalent 12+ (Pfizer) 01/12/2023    COVID-19 MONOVALENT 12+ (Pfizer) 02/24/2021, 03/17/2021, 10/14/2021    COVID-19 Monovalent 12+ (Pfizer 2022) 05/12/2022    HEPA 08/18/1999, 11/13/2001    Influenza (H1N1) 01/21/2010    Influenza (High Dose) 3 valent vaccine 10/01/2010, 10/20/2011,  "09/27/2012, 10/04/2013, 09/17/2014, 10/02/2015, 10/17/2016, 09/01/2017, 09/10/2018, 09/30/2019    Influenza (IIV3) PF 11/01/2001, 12/03/2002, 11/04/2003, 10/25/2005, 10/21/2008, 11/23/2009    Influenza Vaccine 65+ (Fluzone HD) 11/03/2020, 10/14/2021, 01/23/2023, 12/26/2023    Pneumo Conj 13-V (2010&after) 07/30/2015    Pneumococcal 23 valent 12/18/1996, 11/23/2007    TD,PF 7+ (Tenivac) 08/18/1999, 03/05/2009    TDAP Vaccine (Adacel) 04/02/2013    Zoster recombinant adjuvanted (SHINGRIX) 06/15/2018, 08/25/2018    Zoster vaccine, live 06/01/2011     PREVENTATIVE HEALTH                                                      BMI: within normal limits   Blood pressure: well-controlled on current regimen   Breast CA screening: screening no longer indicated  Colon CA screening: screening no longer indicated  Lung CA screening: patient does not meet screening criteria  Dexa: DUE - patient declines  Screening cholesterol: n/a - already being treated for this condition  Screening diabetes: n/a - already being treated for this condition  Alcohol misuse screening: alcohol use reviewed - no intervention indicated at this time  Immunizations: reviewed; up to date     HEALTH RISK ASSESSMENT                                                      In general, how would you rate your overall physical health? good  Outside of work, how many days during the week do you exercise? None  Outside of work, approximately how many minutes a day do you exercise? n/a     If you drink alcohol do you typically have >3 drinks per day or >7 drinks per week? No  Do you usually eat at least 4 servings of fruit and vegetables a day, include whole grains & fiber and avoid regularly eating high fat or \"junk\" foods? No     Do you have any problems taking medications regularly? No  Do you have any side effects from medications? No    Assistance with daily activities: YES - assistance with driving    Safety concerns: No    Fall risk assessment: completed today " "(see ambulatory assessments)    Hearing concerns: No    In the past 6 months, have you been bothered by leaking of urine: No    In general, how would you rate your overall mental or emotional health: very good    Do you have a current opioid prescription? (!) YES   How severe is your pain on a scale from 1-10? 1/10   Patient declined to complete Opioid Risk Tool today  Patient declined to complete RIOSORD today    Do you use any other controlled substances or medications that are not prescribed by a provider? None    PHQ-2/PHQ-9 assessment: completed today (see ambulatory assessments)    Additional concerns today: No    OBJECTIVE                                                      /64   Pulse 88   Resp 18   Ht 1.6 m (5' 3\")   Wt 55.8 kg (123 lb)   SpO2 96%   BMI 21.79 kg/m    Constitutional: well-appearing  Head, Ears, and Eyes: normocephalic; normal external auditory canal and pinna; tympanic membranes visualized and normal; normal lids and conjunctivae  Neck: supple, symmetric, no thyromegaly or lymphadenopathy  Respiratory: normal respiratory effort; clear to auscultation bilaterally  Cardiovascular: regular rate and rhythm; no edema  Musculoskeletal: walks with walker  Psych: normal judgment and insight; normal mood and affect; recent and remote memory intact    Cognitive impairment noted: No  ---  (Note was completed, in part, with [employer] voice-recognition software. Documentation was reviewed, but some grammatical, spelling, and word errors may remain.)  "

## 2024-02-06 NOTE — LETTER
February 8, 2024      Celeste Chacko  [address]    [facility] [address]        Dear ,    We are writing to inform you of your test results.    Your diabetes is well-controlled on your current regimen.      Your cholesterol is poorly-controlled on rosuvastatin 5mg daily. Have you been taking this medication daily and consistently? If so, I would recommend increasing your dose from 5 to 10mg daily (contact our office to send this prescription in). If you have not been taking it, please restart it.     The remainder of your labs are within or near normal limits.     Resulted Orders   CBC with platelets   Result Value Ref Range    WBC Count 7.8 4.0 - 11.0 10e3/uL    RBC Count 4.76 3.80 - 5.20 10e6/uL    Hemoglobin 12.2 11.7 - 15.7 g/dL    Hematocrit 39.1 35.0 - 47.0 %    MCV 82 78 - 100 fL    MCH 25.6 (L) 26.5 - 33.0 pg    MCHC 31.2 (L) 31.5 - 36.5 g/dL    RDW 13.8 10.0 - 15.0 %    Platelet Count 200 150 - 450 10e3/uL   Comprehensive metabolic panel   Result Value Ref Range    Sodium 142 135 - 145 mmol/L    Potassium 4.2 3.4 - 5.3 mmol/L    Carbon Dioxide (CO2) 27 22 - 29 mmol/L    Anion Gap 11 7 - 15 mmol/L    Urea Nitrogen 23.0 8.0 - 23.0 mg/dL    Creatinine 0.53 0.51 - 0.95 mg/dL    GFR Estimate 86 >60 mL/min/1.73m2    Calcium 9.8 (H) 8.2 - 9.6 mg/dL    Chloride 104 98 - 107 mmol/L    Glucose 123 (H) 70 - 99 mg/dL    Alkaline Phosphatase 66 40 - 150 U/L    AST 23 0 - 45 U/L    ALT 20 0 - 50 U/L    Protein Total 7.2 6.4 - 8.3 g/dL    Albumin 4.4 3.5 - 5.2 g/dL    Bilirubin Total 0.3 <=1.2 mg/dL   Hemoglobin A1c   Result Value Ref Range    Hemoglobin A1C 6.9 (H) 0.0 - 5.6 %   Lipid Profile   Result Value Ref Range    Cholesterol 231 (H) <200 mg/dL    Triglycerides 162 (H) <150 mg/dL    Direct Measure HDL 51 >=50 mg/dL    LDL Cholesterol Calculated 148 (H) <=100 mg/dL    Non HDL Cholesterol 180 (H) <130 mg/dL     If you have any questions or concerns, please call the clinic at the number  listed above.       Sincerely,      Arely Lantigua MD

## 2024-02-06 NOTE — TELEPHONE ENCOUNTER
Ledy ARNDLE calling from [facility] is calling regarding an established patient with [facility].       Requesting orders from: Arely Lantigua  Provider is following patient: Yes  Is this a 60-day recertification request?  No    Orders Requested    Skilled Nursing  Request for initial certification (first set of orders)  Frequency: 1x per week for 3 weeks, every other week for 6 weeks, and 3 prn visits    Social Work  Request for initial evaluation and treatment (one time)     Routing to PCP to please review and advise on above requested home care orders - thank you!    Callback to Ledy [phone] - ok to leave detailed VM     Clifford Andujar, RAZIA  [facility]

## 2024-02-06 NOTE — TELEPHONE ENCOUNTER
Called and left detailed VM for Ledy with provider approval of requested home care orders.    Thank you,  Michell Duffy RN

## 2024-02-08 ENCOUNTER — TELEPHONE (OUTPATIENT)
Dept: INTERNAL MEDICINE | Facility: CLINIC | Age: 89
End: 2024-02-08
Payer: COMMERCIAL

## 2024-02-08 LAB
ALBUMIN SERPL BCG-MCNC: 4.4 G/DL (ref 3.5–5.2)
ALP SERPL-CCNC: 66 U/L (ref 40–150)
ALT SERPL W P-5'-P-CCNC: 20 U/L (ref 0–50)
ANION GAP SERPL CALCULATED.3IONS-SCNC: 11 MMOL/L (ref 7–15)
AST SERPL W P-5'-P-CCNC: 23 U/L (ref 0–45)
BILIRUB SERPL-MCNC: 0.3 MG/DL
BUN SERPL-MCNC: 23 MG/DL (ref 8–23)
CALCIUM SERPL-MCNC: 9.8 MG/DL (ref 8.2–9.6)
CHLORIDE SERPL-SCNC: 104 MMOL/L (ref 98–107)
CHOLEST SERPL-MCNC: 231 MG/DL
CREAT SERPL-MCNC: 0.53 MG/DL (ref 0.51–0.95)
DEPRECATED HCO3 PLAS-SCNC: 27 MMOL/L (ref 22–29)
EGFRCR SERPLBLD CKD-EPI 2021: 86 ML/MIN/1.73M2
FASTING STATUS PATIENT QL REPORTED: NO
GLUCOSE SERPL-MCNC: 123 MG/DL (ref 70–99)
HDLC SERPL-MCNC: 51 MG/DL
LDLC SERPL CALC-MCNC: 148 MG/DL
NONHDLC SERPL-MCNC: 180 MG/DL
POTASSIUM SERPL-SCNC: 4.2 MMOL/L (ref 3.4–5.3)
PROT SERPL-MCNC: 7.2 G/DL (ref 6.4–8.3)
SODIUM SERPL-SCNC: 142 MMOL/L (ref 135–145)
TRIGL SERPL-MCNC: 162 MG/DL

## 2024-02-08 NOTE — TELEPHONE ENCOUNTER
FYI to PCP, patient is considering transitioning back to Hospice at end of the month.       Home Care is calling regarding an established patient with [facility].       Requesting orders from: Arely Lantigua  Provider is following patient: Yes  Is this a 60-day recertification request?  No    Orders Requested    HHA (Home Health Aide)  Request for continuation of care with no increase or decrease in frequency  Frequency: 1w3          Verbal orders given.  Home Care will send orders for provider to sign.  Confirmed ok to leave a detailed message with call back.  Contact information confirmed and updated as needed.    Darlyn Lerner RN

## 2024-02-09 ENCOUNTER — DOCUMENTATION ONLY (OUTPATIENT)
Dept: OTHER | Facility: CLINIC | Age: 89
End: 2024-02-09
Payer: COMMERCIAL

## 2024-02-13 DIAGNOSIS — J44.9 COPD, SEVERE (H): ICD-10-CM

## 2024-02-13 DIAGNOSIS — J44.9 CHRONIC OBSTRUCTIVE PULMONARY DISEASE, UNSPECIFIED COPD TYPE (H): ICD-10-CM

## 2024-02-13 DIAGNOSIS — I10 BENIGN ESSENTIAL HYPERTENSION: ICD-10-CM

## 2024-02-13 DIAGNOSIS — E11.8 TYPE 2 DIABETES MELLITUS WITH COMPLICATION, WITHOUT LONG-TERM CURRENT USE OF INSULIN (H): ICD-10-CM

## 2024-02-13 RX ORDER — GLIPIZIDE 5 MG/1
5 TABLET ORAL DAILY
Qty: 90 TABLET | Refills: 0 | Status: SHIPPED | OUTPATIENT
Start: 2024-02-13 | End: 2024-05-28

## 2024-02-13 RX ORDER — TIOTROPIUM BROMIDE 18 UG/1
CAPSULE ORAL; RESPIRATORY (INHALATION)
Qty: 90 CAPSULE | Refills: 3 | Status: SHIPPED | OUTPATIENT
Start: 2024-02-13

## 2024-02-13 RX ORDER — LATANOPROST 50 UG/ML
1 SOLUTION/ DROPS OPHTHALMIC EVERY EVENING
OUTPATIENT
Start: 2024-02-13

## 2024-02-13 RX ORDER — FLUTICASONE PROPIONATE AND SALMETEROL 500; 50 UG/1; UG/1
1 POWDER RESPIRATORY (INHALATION) 2 TIMES DAILY
Qty: 180 EACH | Refills: 3 | Status: SHIPPED | OUTPATIENT
Start: 2024-02-13

## 2024-02-20 DIAGNOSIS — I10 BENIGN ESSENTIAL HYPERTENSION: ICD-10-CM

## 2024-02-20 DIAGNOSIS — M48.062 SPINAL STENOSIS OF LUMBAR REGION WITH NEUROGENIC CLAUDICATION: ICD-10-CM

## 2024-02-20 RX ORDER — OXYCODONE HYDROCHLORIDE 5 MG/1
5 TABLET ORAL EVERY 4 HOURS PRN
Qty: 120 TABLET | Refills: 0 | Status: SHIPPED | OUTPATIENT
Start: 2024-02-25 | End: 2024-03-21

## 2024-02-20 RX ORDER — FUROSEMIDE 20 MG
20 TABLET ORAL DAILY
Qty: 90 TABLET | Refills: 0 | Status: SHIPPED | OUTPATIENT
Start: 2024-02-20 | End: 2024-06-04

## 2024-02-20 NOTE — TELEPHONE ENCOUNTER
Patient reporting to PCP she is not taking rosuvastatin, states this was discontinued by Dr. Marie before patient established with current PCP. She will restart it if PCP would like her to take it.     Patient needing refill of furosemide and oxycodone.

## 2024-02-21 ENCOUNTER — TELEPHONE (OUTPATIENT)
Dept: INTERNAL MEDICINE | Facility: CLINIC | Age: 89
End: 2024-02-21
Payer: COMMERCIAL

## 2024-02-21 NOTE — TELEPHONE ENCOUNTER
Patient called as she got a letter that lorazepam and spariva are not covered by insurance    Instructed patient to call insurance for covered alternatives then give us a call back    Erick Alvarez RN

## 2024-03-12 DIAGNOSIS — G89.4 CHRONIC PAIN SYNDROME: ICD-10-CM

## 2024-03-12 RX ORDER — GABAPENTIN 100 MG/1
CAPSULE ORAL
Qty: 90 CAPSULE | Refills: 0 | Status: SHIPPED | OUTPATIENT
Start: 2024-03-12 | End: 2024-04-16

## 2024-03-20 ENCOUNTER — TRANSFERRED RECORDS (OUTPATIENT)
Dept: HEALTH INFORMATION MANAGEMENT | Facility: CLINIC | Age: 89
End: 2024-03-20
Payer: COMMERCIAL

## 2024-03-20 LAB — RETINOPATHY: NEGATIVE

## 2024-03-21 DIAGNOSIS — M48.062 SPINAL STENOSIS OF LUMBAR REGION WITH NEUROGENIC CLAUDICATION: ICD-10-CM

## 2024-03-21 RX ORDER — OXYCODONE HYDROCHLORIDE 5 MG/1
5 TABLET ORAL EVERY 4 HOURS PRN
Qty: 120 TABLET | Refills: 0 | Status: SHIPPED | OUTPATIENT
Start: 2024-03-25 | End: 2024-04-25

## 2024-03-29 NOTE — PLAN OF CARE
Goal Outcome Evaluation:     Plan of Care Reviewed With: patient     Overall Patient Progress: improving    Neuro:intact  CV/Rhythm:SR  Resp/02:2 L NC, 87% on RA on arrival from ED  GI/Diet:mod carb  :voiding  Skin/Incisions/Sites:noted on admission L buttocks pin point open area, mepilex applied, sticky note to MD requesting WOC   Pulses/CMS:intact  Edema:BLE PO lasix  Activity/Falls Risk: fall risk, encouraged to turn due to wound, walker  Lines/Drains/IVs:PIV  Labs/BGM:trop now 48 from 75 =(H)  Test/Procedures:abd US done, echo 2/22, ID 2/22  VS/Pain:HTN, 8/10 back pain - oxycodone PRN with improvement  DC Plan:obs  Other:daughter aware here                  done

## 2024-04-01 ENCOUNTER — TELEPHONE (OUTPATIENT)
Dept: INTERNAL MEDICINE | Facility: CLINIC | Age: 89
End: 2024-04-01
Payer: COMMERCIAL

## 2024-04-01 NOTE — TELEPHONE ENCOUNTER
Prior Authorization Specialty Medication Request    Patient states that she has 5 tablets left. Needs PA as soon as possible.    Medication/Dose: Spiriva  Diagnosis and ICD code (if different than what is on RX):    New/renewal/insurance change PA/secondary ins. PA:  Previously Tried and Failed:      Important Lab Values:   Rationale:     Insurance [facility]  Primary:   Insurance ID:      Secondary (if applicable):  Insurance ID:      Pharmacy Information (if different than what is on RX)  Name:    Phone:    Fax:    Anabela Espinoza RN

## 2024-04-01 NOTE — TELEPHONE ENCOUNTER
Prior Authorization Not Needed per Insurance    Medication: SPIRIVA HANDIHALER 18 MCG IN CAPS  Insurance Company: [employer] Part D - Phone [phone] Fax [phone]  Expected CoPay: $    Pharmacy Filling the Rx: [id number] [place], [place] - [address] AT [facility] [address]  Pharmacy Notified: Yes They filled 90 days supply on 02/20/2024. Patient should have plenty.  Patient Notified: No Clinic should verify with patient why she only has 5 capsules.

## 2024-04-02 NOTE — TELEPHONE ENCOUNTER
Anabela,   This medication does not require a PA.  I called the pharmacy and on 02/20/2024 they filled this for 90 days for the patient.   This comes in 30 capsules per box with the inhalation device.  Is it possible that she is missing two of the boxes she received in February?   Yessenia Taveras   Retail Pharmacy Prior Authorization Team   Phone: [phone]     Patient was able to find 2 boxes of Spiriva capsules that she had forgotten that she put in the cupboard. Patient was very sorry to bother everyone.  Anabela Espinoza RN

## 2024-04-02 NOTE — PATIENT INSTRUCTIONS
Alternative to Spiriva is Incruse Ellipta.     Alternatives to Advair: Symbicort, Dulera, Breo Ellipta    When you do your eye exam next fall, ask them to send a copy of the report.        Routing to Dr. Mohamud since Dr. Gross is not in clinic.     Patient is calling for refill of Norco - he has one pill left.   He called last week because he was advise by someone to call in advance for refills to allow time to process.    Please advise     Last given 3/4/24

## 2024-04-16 DIAGNOSIS — G89.4 CHRONIC PAIN SYNDROME: ICD-10-CM

## 2024-04-16 RX ORDER — GABAPENTIN 100 MG/1
CAPSULE ORAL
Qty: 90 CAPSULE | Refills: 0 | Status: SHIPPED | OUTPATIENT
Start: 2024-04-16 | End: 2024-06-13

## 2024-04-25 DIAGNOSIS — M48.062 SPINAL STENOSIS OF LUMBAR REGION WITH NEUROGENIC CLAUDICATION: ICD-10-CM

## 2024-04-25 RX ORDER — OXYCODONE HYDROCHLORIDE 5 MG/1
5 TABLET ORAL EVERY 4 HOURS PRN
Qty: 120 TABLET | Refills: 0 | Status: SHIPPED | OUTPATIENT
Start: 2024-04-25 | End: 2024-05-24

## 2024-04-25 NOTE — TELEPHONE ENCOUNTER
Celeste is asking to have this medication refilled by Saturday 4/27 as her daughter will be picking up for her.

## 2024-05-08 DIAGNOSIS — J44.9 COPD, SEVERE (H): ICD-10-CM

## 2024-05-08 DIAGNOSIS — I10 BENIGN ESSENTIAL HYPERTENSION: ICD-10-CM

## 2024-05-08 RX ORDER — SERTRALINE HYDROCHLORIDE 25 MG/1
25 TABLET, FILM COATED ORAL DAILY
Qty: 90 TABLET | Refills: 2 | Status: SHIPPED | OUTPATIENT
Start: 2024-05-08

## 2024-05-08 RX ORDER — AMLODIPINE BESYLATE 2.5 MG/1
2.5 TABLET ORAL DAILY
Qty: 90 TABLET | Refills: 2 | Status: SHIPPED | OUTPATIENT
Start: 2024-05-08

## 2024-05-08 NOTE — TELEPHONE ENCOUNTER
Prescription approved per [facility] Refill Protocol.  Annetta Simmons, RN  [facility] Triage Nurse

## 2024-05-24 DIAGNOSIS — M48.062 SPINAL STENOSIS OF LUMBAR REGION WITH NEUROGENIC CLAUDICATION: ICD-10-CM

## 2024-05-25 ENCOUNTER — NURSE TRIAGE (OUTPATIENT)
Dept: NURSING | Facility: CLINIC | Age: 89
End: 2024-05-25
Payer: COMMERCIAL

## 2024-05-25 NOTE — TELEPHONE ENCOUNTER
Nurse Triage SBAR    Situation: Medication refill    Background: Patient calling. Patient is calling back again for her oxycodone refill.    Assessment: Patient states she has not gotten her refill yet.     Recommendation: Patient was informed that oxycodone cannot be refilled after clinic hours. Patient stated understanding. Refill request already submitted on Friday.     Gretchen Chicas RN Nursing Advisor 5/25/2024 5:02 PM     Reason for Disposition   Caller requesting a CONTROLLED substance prescription refill (e.g., narcotics, ADHD medicines)    Additional Information   Negative: New-onset or worsening symptoms, see that guideline (e.g., diarrhea, runny nose, sore throat)   Negative: Medicine question not related to refill or renewal   Negative: Caller (e.g., patient or pharmacist) requesting information about a new medicine   Negative: Caller requesting information unrelated to medicine   Negative: [1] Prescription refill request for ESSENTIAL medicine (i.e., likelihood of harm to patient if not taken) AND [2] triager unable to refill per department policy   Negative: [1] Prescription not at pharmacy AND [2] was prescribed by PCP recently  (Exception: Triager has access to EMR and prescription is recorded there. Go to Home Care and confirm for pharmacy.)   Negative: [1] Pharmacy calling with prescription questions AND [2] triager unable to answer question   Negative: Prescription request for new medicine (not a refill)    Protocols used: Medication Refill and Renewal Call-A-

## 2024-05-26 RX ORDER — OXYCODONE HYDROCHLORIDE 5 MG/1
5 TABLET ORAL EVERY 4 HOURS PRN
Qty: 120 TABLET | Refills: 0 | Status: SHIPPED | OUTPATIENT
Start: 2024-05-26 | End: 2024-06-27

## 2024-05-27 DIAGNOSIS — E11.8 TYPE 2 DIABETES MELLITUS WITH COMPLICATION, WITHOUT LONG-TERM CURRENT USE OF INSULIN (H): ICD-10-CM

## 2024-05-28 RX ORDER — GLIPIZIDE 5 MG/1
5 TABLET ORAL DAILY
Qty: 90 TABLET | Refills: 0 | Status: SHIPPED | OUTPATIENT
Start: 2024-05-28 | End: 2024-10-03

## 2024-06-03 DIAGNOSIS — G89.4 CHRONIC PAIN SYNDROME: ICD-10-CM

## 2024-06-03 DIAGNOSIS — I10 BENIGN ESSENTIAL HYPERTENSION: ICD-10-CM

## 2024-06-04 RX ORDER — GABAPENTIN 100 MG/1
CAPSULE ORAL
Qty: 90 CAPSULE | Refills: 0 | OUTPATIENT
Start: 2024-06-04

## 2024-06-04 RX ORDER — FUROSEMIDE 20 MG
20 TABLET ORAL DAILY
Qty: 90 TABLET | Refills: 2 | Status: SHIPPED | OUTPATIENT
Start: 2024-06-04

## 2024-06-13 DIAGNOSIS — G89.4 CHRONIC PAIN SYNDROME: ICD-10-CM

## 2024-06-13 RX ORDER — GABAPENTIN 100 MG/1
CAPSULE ORAL
Qty: 90 CAPSULE | Refills: 0 | Status: SHIPPED | OUTPATIENT
Start: 2024-06-13 | End: 2024-08-12

## 2024-06-27 DIAGNOSIS — M48.062 SPINAL STENOSIS OF LUMBAR REGION WITH NEUROGENIC CLAUDICATION: ICD-10-CM

## 2024-06-27 RX ORDER — OXYCODONE HYDROCHLORIDE 5 MG/1
5 TABLET ORAL EVERY 4 HOURS PRN
Qty: 120 TABLET | Refills: 0 | Status: SHIPPED | OUTPATIENT
Start: 2024-06-27 | End: 2024-08-02

## 2024-08-02 DIAGNOSIS — M48.062 SPINAL STENOSIS OF LUMBAR REGION WITH NEUROGENIC CLAUDICATION: ICD-10-CM

## 2024-08-02 RX ORDER — OXYCODONE HYDROCHLORIDE 5 MG/1
5 TABLET ORAL EVERY 4 HOURS PRN
Qty: 120 TABLET | Refills: 0 | Status: SHIPPED | OUTPATIENT
Start: 2024-08-02 | End: 2024-08-22

## 2024-08-08 ENCOUNTER — TELEPHONE (OUTPATIENT)
Dept: INTERNAL MEDICINE | Facility: CLINIC | Age: 89
End: 2024-08-08
Payer: COMMERCIAL

## 2024-08-08 NOTE — TELEPHONE ENCOUNTER
[employer] ([employer]) faxed form to be completed by PCP regarding patient's oxygen orders.Their request is unclear.     Called [employer] to clarify what exactly they are needing from PCP.     They stated that faxed form is a cover page informing the clinic that the pt has begun new services with [employer].   The numbered items on the form are needed for services to be covered by the pt's insurance.     They are needing all 3 numbered points on form completed, or recent OV addended to prove that this level of oxygen testing was completed, and then faxed back to [employer].   They will then fax oxygen order for PCP to sign.     Form returned to provider bin for review.   Thank you,  Michell Duffy RN

## 2024-08-08 NOTE — TELEPHONE ENCOUNTER
Please schedule an office visit for oxygen testing (required for coverage).    Can double book anywhere.

## 2024-08-12 DIAGNOSIS — G89.4 CHRONIC PAIN SYNDROME: ICD-10-CM

## 2024-08-12 RX ORDER — GABAPENTIN 100 MG/1
CAPSULE ORAL
Qty: 90 CAPSULE | Refills: 0 | Status: SHIPPED | OUTPATIENT
Start: 2024-08-12 | End: 2024-09-16

## 2024-08-19 NOTE — PROGRESS NOTES
"[facility]    Hospitalist Progress Note    Date of Service (when I saw the patient): 03/08/2022  Admit date: 2/21/2022    Assessment & Plan   Celeste Chacko is a very pleasant 89 year old woman with h/o COPD, chronic back pain, DM2, HTN, HLD who was brought to the ER on 2/21 for evaluation of exertional dyspnea and fatigue.       Possible Lung abscess- cavitary nodules in RUL  PJP infection  CELINE (chronic, acute treatment indicated)   Recent admission for COPD exacerbation treated with a steroid but ongoing dyspnea mostly with exertion but without fever, cough, weight loss or night sweats.  No wheezing.  No chest pain.   * CT Chest on 2/21: No PE but 2 new cavitary nodules in the right upper lobe. This is concerning for aggressive, atypical infection, possibly fungal or tubercular.  Patient has been on steroid for almost a month although currently off of it.    * CT Chest 3/2/22 due to worsening hypoxia: pulmonary edema and improvement in cavitation.   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 1 more test not completed due to inaccurate specimen. Patient initially taken off isolation given all other negative factors per ID,  however AFB culture came back positive on 3/4/22 and hence back in isolation for TB pending speciation.  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 \"Discontinue iso, this may be relevant long term but not an acute issues and no tx\"  * fungal antigen for histo, cocci, blasto and Aspergillus all negative   * BCx negative to date  * PCR positive for PJP, which explains the hypoxia  * Patient is allergic to sulfa  - treat with Mepron 750 mg BID for 21 days (will be complete on 3/23) followed by 750 mg daily for long term.  Follow up with Dr. Sainz in clinic in 6 weeks.  I appreciate her followup.  - With steroids:   40 mg orally twice daily for five days, followed by  40 mg orally once daily for five " form  routed to my MA's desk    "days, followed by  20 mg orally once daily for 11 days     - Pharmacy liaison consult requested.  Dr. Sainz notes that Mepron is very expensive.  - On Unasyn for now for possible bacterial cavitary infection. Per Dr. Wilhelm, \"followed by Augmentin x 4 weeks for possible cavitary bacterial infection.\"      Acute pulmonary edema   Type II MI Minimally abnormal troponin (max 78 and down trending) Unlikely ACS.    Essential hypertension  Hyperlipidemia  * CT on 3/2 showed pulmonary edema as above.   * Echo 2/21/22: EF > 70%, RV normal. No significant valve abnormalities. Pulmonary HTN. IVC normal size and preserved respiratory variability.     - Diuresed with IV lasix initially with good response, switched furosemide 40 mg daily.   - No evidence of fluid overload on 03/07/22. Stopped furosemide.    - Continue PTA Norvasc 5 mg p.o. daily.   - Plan is for discharge to TCU. Patient agrees. Needs 24 hour assist.   - Continues on ASA.      Acute hypoxic respiratory failure secondary to above   COPD Not on home O2. Her current presentation not c/w COPD exacerbation,     - Continue PTA is on fluticasone/salmeterol, DuoNeb, Spiriva inhalers.  - oxygen saturation is 96% on RA, at rest, dipped to 88% with ambulation but recovered quickly, per my discussion with RN .     Coverage Issues   - Appreciate pharmacy/care coordinators to look at ins coverage for the very expensive mepron. She will need this post discharge with probable prolonged prophylaxis.      Right eye conjunctivitis :  - Started Ofloxacin 0.3% ophthalmic solution.     DM2:  Takes glipizide PTA.  Hyperglycemia related to infection and steroids.  Hemoglobin A1C POCT   Date Value Ref Range Status   05/05/2021 7.2 (H) 0 - 5.6 % Final     Comment:     Normal <5.7% Prediabetes 5.7-6.4%  Diabetes 6.5% or higher - adopted from ADA   consensus guidelines.       Hemoglobin A1C   Date Value Ref Range Status   02/21/2022 9.9 (H) 0.0 - 5.6 % Final     Comment:     Normal <5.7% " "  Prediabetes 5.7-6.4%    Diabetes 6.5% or higher     Note: Adopted from ADA consensus guidelines.     Recent Labs   Lab 03/08/22  0742 03/08/22  0216 03/07/22  2143 03/07/22  1815 03/07/22  1314 03/07/22  1126   * 201* 257* 196* 198* 232*       -- PTA glipizide held  -- Started glargine 20 units afternoon of 3/6/22, increase to 24 units, beginning 3/9  -- Increased prandial novolog to 2 unit / 15 g carbs.    -- Continue with sliding scale insulin coverage  -- Will need to adjust as prednisone dose decreases.     Diet: Orders Placed This Encounter      Moderate Consistent Carb (60 g CHO per Meal) Diet      Diet     IVF: None  Botello Catheter: Not present     DVT Prophylaxis: ASA, PCDs, ambulation.   Code Status: No CPR- Do NOT Intubate     Disposition:  Medically ready for discharge when TCU is available/authorization has been received    Communication: Discussed with patient and bedside RN, also .  Dr. Lugo will call daughter later today.     [facility]  [facility]  Securely message with the Vocera Web Console (learn more here)  Text page via [employer] Paging/Directory    Interval History   Events over last 24 hours as outlined above.   \"I walked all the way down the crooks.\"  Patient says she feels weak but her strength is improving.  She denies feeling short of breath at rest or with exertion.  Does have occasional cough.  No chest pain, no GI complaints.    -Data reviewed today: I reviewed all new labs and imaging results over the last 24 hours. I personally reviewed no images or EKG's today.    Physical Exam   Temp: 97.3  F (36.3  C) Temp src: Oral BP: (!) 146/71 Pulse: 77   Resp: 18 SpO2: 97 % O2 Device: Nasal cannula Oxygen Delivery: 1.5 LPM  Vitals:    03/05/22 0629 03/06/22 0615 03/07/22 0705   Weight: 60.9 kg (134 lb 4.2 oz) 57.8 kg (127 lb 8 oz) 57.8 kg (127 lb 6.4 oz)     Vital Signs with Ranges  Temp:  [97  F (36.1  C)-98  F (36.7  C)] 97.3 "  F (36.3  C)  Pulse:  [77-91] 77  Resp:  [18] 18  BP: (122-146)/(56-71) 146/71  SpO2:  [88 %-97 %] 97 %  I/O last 3 completed shifts:  In: 460 [P.O.:460]  Out: 1400 [Urine:1400]    Today's Exam  Constitutional:  NAD,  Frail, appears stated age  Neuropsyche:  alert and oriented, answers questions appropriately.   Respiratory: Breathing comfortably on RA, marked decreased air exchange, no wheezes, no crackles.   Cardiovascular:  Regular rate and rhythm, no edema.  GI: soft, NT/ND, BS normal  Skin/Integumen: Scattered bruises No acute rash or sign of bleeding.     Medications   All medications reviewed on 03/07/22       amLODIPine  5 mg Oral Daily     ampicillin-sulbactam (UNASYN) IV  3 g Intravenous Q6H     aspirin  81 mg Oral Daily     atovaquone  750 mg Oral BID w/meals     calcium carbonate 600 mg-vitamin D 400 units  1 tablet Oral BID     calcium polycarbophil  625 mg Oral Daily     famotidine  20 mg Oral BID     fluticasone-vilanterol  1 puff Inhalation Daily     insulin aspart  1-7 Units Subcutaneous TID AC     insulin aspart  1-5 Units Subcutaneous At Bedtime     insulin aspart   Subcutaneous TID AC     insulin glargine  20 Units Subcutaneous QAM AC     latanoprost  1 drop Left Eye QPM     multivitamin, therapeutic  1 tablet Oral QPM     polyethylene glycol  8.5 g Oral QPM     predniSONE  40 mg Oral BID    Followed by     [START ON 3/9/2022] predniSONE  40 mg Oral Daily    Followed by     [START ON 3/14/2022] predniSONE  20 mg Oral Daily     rosuvastatin  5 mg Oral At Bedtime     sodium chloride (PF)  3 mL Intracatheter Q8H     umeclidinium  1 puff Inhalation Daily       Data   Recent Labs   Lab 03/08/22  0742 03/08/22  0216 03/07/22  2143 03/06/22  1722 03/06/22  1555 03/06/22  1123 03/06/22  1000 03/04/22  1226 03/04/22  0850 03/03/22  1234 03/03/22  1002   WBC  --   --   --   --   --   --   --   --  5.5  --  5.8   HGB  --   --   --   --   --   --   --   --  11.4*  --  10.8*   MCV  --   --   --   --   --   --    --   --  81  --  83   PLT  --   --   --   --   --   --   --   --  244  --  229   NA  --   --   --   --   --   --  135  --  140  --  136   POTASSIUM  --   --   --   --  3.6  --  3.1*  --  3.4  --  3.2*   CHLORIDE  --   --   --   --   --   --  97  --  104  --  101   CO2  --   --   --   --   --   --  29  --  31  --  31   BUN  --   --   --   --   --   --  30  --  17  --  14   CR  --   --   --   --   --   --  0.65  --  0.71  --  0.66   ANIONGAP  --   --   --   --   --   --  9  --  5  --  4   NARCISA  --   --   --   --   --   --  8.7  --  9.0  --  8.8   * 201* 257*   < >  --    < > 425*   < > 203*   < > 260*    < > = values in this interval not displayed.       No results found for this or any previous visit (from the past 24 hour(s)).

## 2024-08-22 ENCOUNTER — TELEPHONE (OUTPATIENT)
Dept: INTERNAL MEDICINE | Facility: CLINIC | Age: 89
End: 2024-08-22
Payer: COMMERCIAL

## 2024-08-22 DIAGNOSIS — M48.062 SPINAL STENOSIS OF LUMBAR REGION WITH NEUROGENIC CLAUDICATION: ICD-10-CM

## 2024-08-22 RX ORDER — OXYCODONE HYDROCHLORIDE 5 MG/1
5 TABLET ORAL EVERY 4 HOURS PRN
Qty: 120 TABLET | Refills: 0 | Status: SHIPPED | OUTPATIENT
Start: 2024-09-02 | End: 2024-10-02

## 2024-08-22 NOTE — TELEPHONE ENCOUNTER
Medication Question or Refill    Contacts       Contact Date/Time Type Contact Phone/Fax    08/22/2024 03:30 PM CDT Phone (Incoming) Celeste Chacko (Self) [phone] (M)            What medication are you calling about (include dose and sig)?: Oxycodone 5mg, take 1 tablet by mouth as needed every 4 hours as needed for pain, can take up to 5 a day     Preferred Pharmacy:   [id number] - [place], [place] - [address] AT [facility] [address] [facility]  [address]  [facility] [address]  Phone: [phone] Fax: [phone]      Controlled Substance Agreement on file:   CSA -- Patient Level:     [Media Unavailable] Controlled Substance Agreement - Opioid - Scan on 5/11/2021  2:01 PM: OPIOD       Who prescribed the medication?: Dr. Lantigua     Do you need a refill? Yes    When did you use the medication last? Today, 8/22/2024     Patient offered an appointment? Yes: did not need an appointment at this time     Do you have any questions or concerns?  No      Okay to leave a detailed message?: Yes at Cell number on file:    Telephone Information:   Mobile [phone]

## 2024-08-27 ENCOUNTER — TELEPHONE (OUTPATIENT)
Dept: INTERNAL MEDICINE | Facility: CLINIC | Age: 89
End: 2024-08-27
Payer: COMMERCIAL

## 2024-08-27 NOTE — TELEPHONE ENCOUNTER
General Call    Reason for Call:  [employer] - Oxygen equipment f    What are your questions or concerns:    Pt doesn't have a car.   Pt is 89 yo.    Getting in for oxygen testing is very difficult.  Can Home Care go to home for testing.      Pt needs equipment ASAP because of potential black outs.     Electrical outages are affecting her oxygen as well.     Date of last appointment with provider:   OPAL 2/6/24    Okay to leave a detailed message?: Yes at landline umber on file:    Telephone Information:   Landline [phone]     Irene Ramsey on 8/27/2024 at 5:55 PM

## 2024-08-27 NOTE — TELEPHONE ENCOUNTER
Patient called the clinic back clinic back and and provider's message was relayed to her. She stated that she will need to call her daughter to see what time she is available and patient will call the clinic back.     Routing to triage to call patient if patient does not call back.    Claudia KRAFT RN  [facility] Triage Team

## 2024-08-27 NOTE — TELEPHONE ENCOUNTER
Please see telephone encounter 8/8.    Claudia KRAFT RN  [facility] Triage Team

## 2024-08-28 NOTE — TELEPHONE ENCOUNTER
Patient calling back requesting to schedule an appointment with PCP.    Patient requesting for appt around 11-noon next Wednesday, did overbook upcoming appt with PCP as approved by Dr. Lantigua:    9/4/2024 11:30 AM (Arrive by 11:10 AM) Arely Lantigua MD [facility]     Routing back to PCP as FYI update - please route back if scheduling needs to be adjusted - thank you!     Serena RANDLE  [facility]

## 2024-09-04 ENCOUNTER — OFFICE VISIT (OUTPATIENT)
Dept: INTERNAL MEDICINE | Facility: CLINIC | Age: 89
End: 2024-09-04
Payer: COMMERCIAL

## 2024-09-04 VITALS
BODY MASS INDEX: 23.75 KG/M2 | TEMPERATURE: 97.7 F | DIASTOLIC BLOOD PRESSURE: 56 MMHG | HEART RATE: 94 BPM | SYSTOLIC BLOOD PRESSURE: 124 MMHG | WEIGHT: 134.1 LBS | OXYGEN SATURATION: 94 %

## 2024-09-04 DIAGNOSIS — Z99.81 OXYGEN DEPENDENT: ICD-10-CM

## 2024-09-04 DIAGNOSIS — J44.9 CHRONIC OBSTRUCTIVE PULMONARY DISEASE, UNSPECIFIED COPD TYPE (H): Primary | ICD-10-CM

## 2024-09-04 DIAGNOSIS — E11.8 TYPE 2 DIABETES MELLITUS WITH COMPLICATION, WITHOUT LONG-TERM CURRENT USE OF INSULIN (H): ICD-10-CM

## 2024-09-04 LAB — HBA1C MFR BLD: 7.1 % (ref 0–5.6)

## 2024-09-04 PROCEDURE — 99214 OFFICE O/P EST MOD 30 MIN: CPT | Performed by: INTERNAL MEDICINE

## 2024-09-04 PROCEDURE — G2211 COMPLEX E/M VISIT ADD ON: HCPCS | Performed by: INTERNAL MEDICINE

## 2024-09-04 PROCEDURE — 36415 COLL VENOUS BLD VENIPUNCTURE: CPT | Performed by: INTERNAL MEDICINE

## 2024-09-04 PROCEDURE — 83036 HEMOGLOBIN GLYCOSYLATED A1C: CPT | Performed by: INTERNAL MEDICINE

## 2024-09-04 NOTE — PROGRESS NOTES
ASSESSMENT/PLAN                                                      (J44.9) Chronic obstructive pulmonary disease, unspecified COPD type (H)  (primary encounter diagnosis)  (Z99.81) Oxygen dependent  Comment: patient requires 2L of oxygen via nasal cannula with activity.    (E11.8) Type 2 diabetes mellitus with complication, without long-term current use of insulin (H)  Comment: should be checking blood glucose levels once daily.   Plan: HgbA1c today; recommendations to follow.    The longitudinal plan of care for the diagnosis(es)/condition(s) as documented were addressed during this visit. Due to the added complexity in care, I will continue to support Celeste in the subsequent management and with ongoing continuity of care.    Arely Lantigua MD   [address] [address]  T: [phone], F: [phone]    SUBJECTIVE                                                      Celeste Chacko is a very pleasant 89 year old female who presents for oxygen testing and diabetes follow-up:    Accompanied by daughter and great granddaughter.    Patient has COPD and requires oxygen with activity.    Patient's current diabetes regimen is metformin 850 mg twice a day and glipizide 5 mg daily. She is adherent to her diabetic regimen and denies episodes of hypoglycemia. She is adherent to her diabetic diet. No diabetic complications. Annual diabetic eye exam is up to date. She is checking her feet regularly. Her pertinent vaccinations (Pneumovax, influenza) are up to date. She is on a statin and an ACE-I or ARB. She is NOT on a daily baby aspirin. Blood pressure is well-controlled on current medications.      OBJECTIVE                                                      /56   Pulse 94   Temp 97.7  F (36.5  C) (Temporal)   Wt 60.8 kg (134 lb 1.6 oz)   SpO2 94%   BMI 23.75 kg/m    Oxygen on RA at rest 92-94%  Oxygen on RA with walking 87%  Oxygen on 2L NC with walking  92-93%    Constitutional: well-appearing  Respiratory: normal respiratory effort  Musculoskeletal: walks with walker  Psych: normal judgment and insight; normal mood and affect; recent and remote memory intact    ---    (Note documentation was completed, in part, with GetMy[employer] voice-recognition software. Documentation was reviewed, but some grammatical, spelling, and word errors may remain.)

## 2024-09-09 DIAGNOSIS — B59 PNEUMONIA DUE TO PNEUMOCYSTIS JIROVECII, UNSPECIFIED LATERALITY, UNSPECIFIED PART OF LUNG (H): ICD-10-CM

## 2024-09-09 RX ORDER — ATOVAQUONE 750 MG/5ML
750 SUSPENSION ORAL DAILY
Qty: 420 ML | Refills: 11 | Status: SHIPPED | OUTPATIENT
Start: 2024-09-09

## 2024-09-10 ENCOUNTER — MEDICAL CORRESPONDENCE (OUTPATIENT)
Dept: HEALTH INFORMATION MANAGEMENT | Facility: CLINIC | Age: 89
End: 2024-09-10

## 2024-09-16 DIAGNOSIS — G89.4 CHRONIC PAIN SYNDROME: ICD-10-CM

## 2024-09-16 DIAGNOSIS — I10 BENIGN ESSENTIAL HYPERTENSION: ICD-10-CM

## 2024-09-16 RX ORDER — LISINOPRIL 40 MG/1
40 TABLET ORAL DAILY
Qty: 90 TABLET | Refills: 1 | Status: SHIPPED | OUTPATIENT
Start: 2024-09-16

## 2024-09-16 RX ORDER — GABAPENTIN 100 MG/1
CAPSULE ORAL
Qty: 90 CAPSULE | Refills: 0 | Status: SHIPPED | OUTPATIENT
Start: 2024-09-16

## 2024-10-02 ENCOUNTER — TELEPHONE (OUTPATIENT)
Dept: NURSING | Facility: CLINIC | Age: 89
End: 2024-10-02
Payer: COMMERCIAL

## 2024-10-02 DIAGNOSIS — M48.062 SPINAL STENOSIS OF LUMBAR REGION WITH NEUROGENIC CLAUDICATION: ICD-10-CM

## 2024-10-02 RX ORDER — OXYCODONE HYDROCHLORIDE 5 MG/1
5 TABLET ORAL EVERY 4 HOURS PRN
Qty: 120 TABLET | Refills: 0 | Status: SHIPPED | OUTPATIENT
Start: 2024-10-02 | End: 2024-10-23

## 2024-10-02 NOTE — TELEPHONE ENCOUNTER
Patient requesting Oxycodone be sent to pharmacy on file. Last Rx sent with instructions to start 9/2. Please call patient once this has been sent in. Patient reports she is nearly out.     Anya Griggs RN 10/02/24 7:05 PM   [facility] Triage Nurse Advisor

## 2024-10-03 DIAGNOSIS — E11.8 TYPE 2 DIABETES MELLITUS WITH COMPLICATION, WITHOUT LONG-TERM CURRENT USE OF INSULIN (H): ICD-10-CM

## 2024-10-03 RX ORDER — GLIPIZIDE 5 MG/1
5 TABLET ORAL DAILY
Qty: 90 TABLET | Refills: 0 | Status: SHIPPED | OUTPATIENT
Start: 2024-10-03

## 2024-10-03 NOTE — TELEPHONE ENCOUNTER
Called and spoke with pt to inform her medication has been sent to the pharmacy.   She verbalized understanding.     Thank you,  Michell Duffy RN

## 2024-10-13 ENCOUNTER — APPOINTMENT (OUTPATIENT)
Dept: CT IMAGING | Facility: CLINIC | Age: 89
End: 2024-10-13
Attending: EMERGENCY MEDICINE
Payer: COMMERCIAL

## 2024-10-13 ENCOUNTER — HOSPITAL ENCOUNTER (EMERGENCY)
Facility: CLINIC | Age: 89
Discharge: HOME | End: 2024-10-14
Attending: EMERGENCY MEDICINE | Admitting: EMERGENCY MEDICINE
Payer: COMMERCIAL

## 2024-10-13 VITALS
DIASTOLIC BLOOD PRESSURE: 78 MMHG | RESPIRATION RATE: 18 BRPM | SYSTOLIC BLOOD PRESSURE: 155 MMHG | WEIGHT: 135 LBS | OXYGEN SATURATION: 97 % | BODY MASS INDEX: 23.05 KG/M2 | HEART RATE: 72 BPM | TEMPERATURE: 96.9 F | HEIGHT: 64 IN

## 2024-10-13 DIAGNOSIS — M54.50 ACUTE MIDLINE LOW BACK PAIN WITHOUT SCIATICA: ICD-10-CM

## 2024-10-13 DIAGNOSIS — M25.551 BILATERAL HIP PAIN: ICD-10-CM

## 2024-10-13 DIAGNOSIS — M25.552 BILATERAL HIP PAIN: ICD-10-CM

## 2024-10-13 DIAGNOSIS — R10.2 PELVIC PAIN: ICD-10-CM

## 2024-10-13 PROCEDURE — 72192 CT PELVIS W/O DYE: CPT

## 2024-10-13 PROCEDURE — 250N000013 HC RX MED GY IP 250 OP 250 PS 637: Performed by: EMERGENCY MEDICINE

## 2024-10-13 PROCEDURE — 72131 CT LUMBAR SPINE W/O DYE: CPT

## 2024-10-13 PROCEDURE — 99284 EMERGENCY DEPT VISIT MOD MDM: CPT | Mod: 25

## 2024-10-13 RX ORDER — OXYCODONE HYDROCHLORIDE 5 MG/1
5 TABLET ORAL ONCE
Status: COMPLETED | OUTPATIENT
Start: 2024-10-13 | End: 2024-10-13

## 2024-10-13 RX ADMIN — OXYCODONE HYDROCHLORIDE 5 MG: 5 TABLET ORAL at 22:20

## 2024-10-13 ASSESSMENT — COLUMBIA-SUICIDE SEVERITY RATING SCALE - C-SSRS
1. IN THE PAST MONTH, HAVE YOU WISHED YOU WERE DEAD OR WISHED YOU COULD GO TO SLEEP AND NOT WAKE UP?: NO
6. HAVE YOU EVER DONE ANYTHING, STARTED TO DO ANYTHING, OR PREPARED TO DO ANYTHING TO END YOUR LIFE?: NO
2. HAVE YOU ACTUALLY HAD ANY THOUGHTS OF KILLING YOURSELF IN THE PAST MONTH?: NO

## 2024-10-13 ASSESSMENT — ACTIVITIES OF DAILY LIVING (ADL)
ADLS_ACUITY_SCORE: 38
ADLS_ACUITY_SCORE: 38
ADLS_ACUITY_SCORE: 39
ADLS_ACUITY_SCORE: 38
ADLS_ACUITY_SCORE: 38

## 2024-10-13 NOTE — ED TRIAGE NOTES
Pt presents today with bilateral hip and buttocks pain that started yesterday rated 7/10.  Pt denies recent fall or injuries.  Pt reports taking oxycodone for chronic back pain.      Triage Assessment (Adult)       Row Name 10/13/24 1829          Triage Assessment    Airway WDL WDL        Respiratory WDL    Respiratory WDL WDL        Skin Circulation/Temperature WDL    Skin Circulation/Temperature WDL WDL        Cardiac WDL    Cardiac WDL WDL        Peripheral/Neurovascular WDL    Peripheral Neurovascular WDL WDL        Cognitive/Neuro/Behavioral WDL    Cognitive/Neuro/Behavioral WDL WDL

## 2024-10-14 ENCOUNTER — PATIENT OUTREACH (OUTPATIENT)
Dept: INTERNAL MEDICINE | Facility: CLINIC | Age: 89
End: 2024-10-14
Payer: COMMERCIAL

## 2024-10-14 NOTE — TELEPHONE ENCOUNTER
Transitions of Care Outreach  Chief Complaint   Patient presents with    Hospital F/U       Most Recent Admission Date: 10/13/2024   Most Recent Admission Diagnosis:      Most Recent Discharge Date: 10/14/2024   Most Recent Discharge Diagnosis: Acute midline low back pain without sciatica - M54.50  Bilateral hip pain - M25.551, M25.552  Pelvic pain - R10.2     Transitions of Care Assessment    Discharge Assessment  How are you doing now that you are home?: She stated that it is better  How are your symptoms? (Red Flag symptoms escalate to triage hotline per guidelines): Improved  Do you know how to contact your clinic care team if you have future questions or changes to your health status? : Yes  Does the patient have their discharge instructions? : Yes  Does the patient have questions regarding their discharge instructions? : No  Were you started on any new medications or were there changes to any of your previous medications? : No  Does the patient have all of their medications?: Yes  Do you have questions regarding any of your medications? : No  Do you have all of your needed medical supplies or equipment (DME)?  (i.e. oxygen tank, CPAP, cane, etc.): Yes    Follow up Plan     Discharge Follow-Up  Discharge follow up appointment scheduled in alignment with recommended follow up timeframe or Transitions of Risk Category? (Low = within 30 days; Moderate= within 14 days; High= within 7 days): No  Patient's follow up appointment not scheduled: Patient declined scheduling support. Education on the importance of transitions of care follow up. Provided scheduling phone number.    No future appointments.    Outpatient Plan as outlined on AVS reviewed with patient.    For any urgent concerns, please contact our 24 hour nurse triage line: [phone] ([phone])       Claudia Nolasco RN

## 2024-10-14 NOTE — ED PROVIDER NOTES
"  Emergency Department Note      History of Present Illness     Chief Complaint   Hip Pain      HPI   Celeste Chacko is a 89 year old female with a history of hypertension, diabetes mellitus, and hyperlipidemia, presenting to the emergency department for evaluation of back pain and hip pain. The patient reports that her lower back pain, which radiates to her hips bilaterally, began yesterday and she is unsure of the origin of this pain. She denies any recent falls or injuries, but reports injuring her coccyx many years ago. She notes that her current pain worsens as the day progresses. She denies any abdominal pain, chest pain, shortness of breath, vomiting, diarrhea, fever, dysuria, polyuria, or change in bowel movements. She notes that she has been taking Oxycodone for the past few years due to her previous back injury.     Independent Historian   None    Review of External Notes   None    Past Medical History     Medical History and Problem List   Hypertension  COPD  Anxiety   Pneumocystis jirovecii pneumonia   Osteopenia   Hyperlipidemia   Diabetes mellitus       Medications   Norvasc   Mepron  Caltrate  Advair  Lasix  Neurontin  Glucotrol  Duoneb  Zestril  Ativan  Metformin  Oxycodone  Crestor  Zoloft  Spiriva  Albuterol  Xalatan       Surgical History   Arthroplasty knee   Cataract extraction bilateral  Inject epidural lumbar/ sacral single   Inject sacroiliac joint   Tonsillectomy and adenoidectomy   Total shoulder arthroplasty    Physical Exam     Patient Vitals for the past 24 hrs:   BP Temp Temp src Pulse Resp SpO2 Height Weight   10/13/24 2330 (!) 155/78 -- -- -- 18 97 % -- --   10/13/24 2213 -- -- -- -- -- -- 1.626 m (5' 4\") 61.2 kg (135 lb)   10/13/24 2200 (!) 183/72 -- -- 72 22 94 % -- --   10/13/24 1828 (!) 200/66 96.9  F (36.1  C) Temporal 84 20 92 % -- --     Physical Exam  General: Alert, appears elderly, otherwise well-developed and well-nourished. Cooperative.     In mild " distress  HEENT:  Head:  Atraumatic  Ears:  External ears are normal  Mouth/Throat:  Oropharynx is without erythema or exudate and mucous membranes are moist.   Eyes:   Conjunctivae normal and EOM are normal. No scleral icterus.  CV:  Normal rate, regular rhythm, normal heart sounds and radial pulses are 2+ and symmetric.  No murmur.  Resp:  Breath sounds are clear bilaterally, on 2LPM O2 by NC.    Non-labored, no retractions or accessory muscle use  GI:  Abdomen is soft, no distension, no tenderness. No rebound or guarding.  No CVA tenderness bilaterally  MS:  Normal range of motion. No edema.    Normal strength in all 4 extremities.     Back atraumatic.    Midline L spine tenderness to palpation with no stepoffs.  There is lateral hip pain/pelvic pain to palpation.  No obvious deformity or hematoma.  Tailbone tender to palpation.  Skin:  Warm and dry.  No rash or lesions noted.  Neuro:   Alert. Normal strength.  Sensation intact in all 4 extremities. GCS: 15  Psych: Normal mood and affect.      Diagnostics     Lab Results   Labs Ordered and Resulted from Time of ED Arrival to Time of ED Departure - No data to display    Imaging   CT Lumbar Spine w/o Contrast   Final Result   IMPRESSION:   1.  No fracture or posttraumatic subluxation.   2.  At L4-5 there is severe central canal stenosis.   3.  At L3-4 there is moderate to severe central canal stenosis.         CT Pelvis Bone wo Contrast   Final Result   IMPRESSION:   1.  No fracture.          EKG   None     Independent Interpretation   None    ED Course      Medications Administered   Medications   oxyCODONE (ROXICODONE) tablet 5 mg (5 mg Oral $Given 10/13/24 2220)       Procedures   Procedures     Discussion of Management   None    ED Course   ED Course as of 10/14/24 0011   Sun Oct 13, 2024   2208 I obtained history and examined the patient as noted above.        Additional Documentation  None    Medical Decision Making / Diagnosis     CMS Diagnoses: None    MIPS        None    [facility]   Celeste Chacko is a 89 year old female with a complex past medical history pertinent for chronic back pain on chronic opiate pain medication as well as COPD, hypertension, diabetes, and anxiety who presents with acute on chronic low back discomfort and pelvic discomfort.  Patient notes that in the last 2 days she has noted significant low back discomfort.  She denies any urinary complaints.  She denies any abdominal pain.  She does have tenderness to palpation of bilateral hips and the midline L-spine.  We did obtain CT imaging of the pelvic bone as well as L-spine.  Thankfully no acute fracture or posttraumatic subluxation of the lumbar spine.  There is severe central canal stenosis likely consistent with the patient's chronic low back discomfort.  CT pelvic bones shows no acute fracture.  Unclear to unifying etiology for the patient's back discomfort but low suspicion for sinister pathologies necessitating further emergent workup or hospitalization at this time.  She was given a single dose of oxycodone here which is consistent with her normally prescribed oxycodone at home.  Encouraged to follow-up closely with primary care as physical therapy referral may be necessary.  She does have a walker available at home.  She was safe with walker ambulation here in the emergency department.  After all questions answered and return precautions understood, discharged home in care of daughter.    Disposition   The patient was discharged.     Diagnosis     ICD-10-CM    1. Acute midline low back pain without sciatica  M54.50       2. Bilateral hip pain  M25.551     M25.552       3. Pelvic pain  R10.2            Discharge Medications   New Prescriptions    No medications on file         Scribe Disclosure:  Scooter MCCULLOUGH, am serving as a scribe at 12:11 AM on 10/14/2024 to document services personally performed by Bob Zhao MD based on my observations and the provider's statements to me.        Pavel  MD Bob  10/14/24 0547

## 2024-10-23 DIAGNOSIS — M48.062 SPINAL STENOSIS OF LUMBAR REGION WITH NEUROGENIC CLAUDICATION: ICD-10-CM

## 2024-10-23 RX ORDER — OXYCODONE HYDROCHLORIDE 5 MG/1
5 TABLET ORAL EVERY 4 HOURS PRN
Qty: 120 TABLET | Refills: 0 | Status: SHIPPED | OUTPATIENT
Start: 2024-11-01 | End: 2024-10-28

## 2024-10-23 NOTE — TELEPHONE ENCOUNTER
#120 filled 10/3/24 - to last one month.    Next refill is not due until 11/2/24.    Refill sent in, but cannot fill until 11/1/2024.

## 2024-10-23 NOTE — TELEPHONE ENCOUNTER
Patient stated she has about 5days left with her current prescription but would like it filled by Saturday 10/26 so her daughter can pick it up for her.

## 2024-10-25 NOTE — TELEPHONE ENCOUNTER
The prescription was written on 10/2/2024 and filled on 10/3/2024.     It is 1 tab every 4 hours as needed - not scheduled. Yes, she may use up to 5 tabs daily but that should not be the norm. Using 0-4 tabs/day, with infrequent days of 5 tab/day use is expected based on the current prescription.    Each prescription is meant to last 1 entire month (30 days) and HAS been lasting 1 entire month historically for as long as I have been prescribing this.     This is a highly controlled, monitored, and regulated medication whose frequency of refills are closely monitored by myself, [place], and the federal government.     Refill date of 11/2/2024 stands.    If this current prescription is not meeting her pain control needs, it may be reasonable to get our pain management experts involved (pain clinic referral).

## 2024-10-25 NOTE — TELEPHONE ENCOUNTER
Pt called the clinic back stating:     She wont have enough to last her until 11/2/24.     Pt stated on 10/2/24 she got a 24 day supply (120 tabs- pt takes 5 tabs/day).     Routing to PCP to review and advise.     Please call pt back with PCPs recommendations.

## 2024-10-25 NOTE — TELEPHONE ENCOUNTER
Patient is upset, says she needs to take 5 tabs each day and very rarely does she only need 3 per day.  Patient says she is 89 years old and with chronic back pain, not providing pain medication is cruel.  She is not interested in seeing the pain clinic.     Patient requesting PCP to call patient directly.

## 2024-10-28 RX ORDER — OXYCODONE HYDROCHLORIDE 5 MG/1
5 TABLET ORAL EVERY 4 HOURS PRN
Qty: 120 TABLET | Refills: 0 | Status: SHIPPED | OUTPATIENT
Start: 2024-10-31

## 2024-10-28 NOTE — TELEPHONE ENCOUNTER
Talked to patient.    Refill approved for 10/31.    Patient educated that each refill of #120 tabs is to last 30 days and future early refills will not be approved.

## 2024-10-28 NOTE — TELEPHONE ENCOUNTER
Patient called the clinic. She was informed oxycodone 5 mg was approved for 11/1/24. Patient said she is taking 5 a day and will be completely out on Wednesday. She is requesting to refill it on 10/30/24.     Patient requesting a phone call when done.

## 2024-11-05 DIAGNOSIS — G89.4 CHRONIC PAIN SYNDROME: ICD-10-CM

## 2024-11-05 RX ORDER — GABAPENTIN 100 MG/1
CAPSULE ORAL
Qty: 90 CAPSULE | Refills: 0 | Status: SHIPPED | OUTPATIENT
Start: 2024-11-05

## 2024-11-07 NOTE — TELEPHONE ENCOUNTER
Call to patient, she reports she uses this externally only.     Per 7/10/20 office visit note, primary care provider was going to write an prescription for this medication for patient. Will route to covering provider.    Religion/ethnic/cultural/personal food preferences

## 2024-12-16 DIAGNOSIS — G89.4 CHRONIC PAIN SYNDROME: ICD-10-CM

## 2024-12-16 RX ORDER — GABAPENTIN 100 MG/1
CAPSULE ORAL
Qty: 90 CAPSULE | Refills: 0 | Status: SHIPPED | OUTPATIENT
Start: 2024-12-16

## 2024-12-30 DIAGNOSIS — M48.062 SPINAL STENOSIS OF LUMBAR REGION WITH NEUROGENIC CLAUDICATION: ICD-10-CM

## 2024-12-30 RX ORDER — OXYCODONE HYDROCHLORIDE 5 MG/1
5 TABLET ORAL EVERY 4 HOURS PRN
Qty: 120 TABLET | Refills: 0 | Status: SHIPPED | OUTPATIENT
Start: 2025-01-02

## 2024-12-30 NOTE — TELEPHONE ENCOUNTER
Patient will need medication filled by Wednesday 1/1/25 as her daughter comes to visit on weds and  for her.

## 2025-02-03 DIAGNOSIS — M48.062 SPINAL STENOSIS OF LUMBAR REGION WITH NEUROGENIC CLAUDICATION: ICD-10-CM

## 2025-02-03 RX ORDER — OXYCODONE HYDROCHLORIDE 5 MG/1
5 TABLET ORAL EVERY 4 HOURS PRN
Qty: 120 TABLET | Refills: 0 | Status: SHIPPED | OUTPATIENT
Start: 2025-02-03

## 2025-03-04 DIAGNOSIS — M48.062 SPINAL STENOSIS OF LUMBAR REGION WITH NEUROGENIC CLAUDICATION: ICD-10-CM

## 2025-03-04 RX ORDER — OXYCODONE HYDROCHLORIDE 5 MG/1
5 TABLET ORAL EVERY 4 HOURS PRN
Qty: 120 TABLET | Refills: 0 | Status: SHIPPED | OUTPATIENT
Start: 2025-03-04

## 2025-04-02 DIAGNOSIS — M48.062 SPINAL STENOSIS OF LUMBAR REGION WITH NEUROGENIC CLAUDICATION: ICD-10-CM

## 2025-04-02 RX ORDER — OXYCODONE HYDROCHLORIDE 5 MG/1
5 TABLET ORAL EVERY 4 HOURS PRN
Qty: 120 TABLET | Refills: 0 | Status: SHIPPED | OUTPATIENT
Start: 2025-04-04

## 2025-04-02 NOTE — TELEPHONE ENCOUNTER
Refill provided BUT patient is due for her AWV. Please assist with scheduling per my usual:    May use any virtual or virtual release spot for an in-person visit.     Patient may also be seen at noon (arrival time 11:40am) Mondays, Wednesdays, Thursdays, and Fridays OR at 1:00pm (arrival time 12:40pm) on Thursdays (during the huddle).    Please do not schedule in a same day, next day, or hospital follow-up slot.    Okay to double book lunch slot (but patient should still arrive by 11:40am).

## 2025-04-02 NOTE — TELEPHONE ENCOUNTER
Patient would like prescription to be called in by Friday, April 4thfor her daughter to be able to  on Saturday.

## 2025-04-02 NOTE — TELEPHONE ENCOUNTER
Left message for pt to call back to schedule an annual wellness visit. Please use spots ok'd by pcp. Mentioned that RX for oxycodone was approved and sent to her preferred pharmacy.

## 2025-04-07 DIAGNOSIS — J44.9 COPD, SEVERE (H): ICD-10-CM

## 2025-04-07 RX ORDER — TIOTROPIUM BROMIDE 18 UG/1
CAPSULE ORAL; RESPIRATORY (INHALATION)
Qty: 90 CAPSULE | Refills: 3 | OUTPATIENT
Start: 2025-04-07

## 2025-04-07 NOTE — TELEPHONE ENCOUNTER
She is overdue for her annual wellness exam and diabetes follow-up. Please ask her to schedule this appointment ASAP. Can refill medication temporarily if she anticipates running out prior to scheduled appointment.     Thank you.     ---    May use any virtual or virtual release spot for an in-person visit.     Patient may also be seen at noon (arrival time 11:40am) Mondays, Wednesdays, Thursdays, and Fridays OR at 1:00pm (arrival time 12:40pm) on Thursdays (during the huddle).    Please do not schedule in a same day, next day, or hospital follow-up slot.    Okay to double book lunch slot (but patient should still arrive by 11:40am).

## 2025-04-10 NOTE — TELEPHONE ENCOUNTER
Called patient today. She has to call her daughter and will call back to schedule Annual Wellness Check

## 2025-05-06 ENCOUNTER — TELEPHONE (OUTPATIENT)
Dept: INTERNAL MEDICINE | Facility: CLINIC | Age: OVER 89
End: 2025-05-06
Payer: COMMERCIAL

## 2025-05-06 DIAGNOSIS — M48.062 SPINAL STENOSIS OF LUMBAR REGION WITH NEUROGENIC CLAUDICATION: ICD-10-CM

## 2025-05-06 DIAGNOSIS — I10 BENIGN ESSENTIAL HYPERTENSION: ICD-10-CM

## 2025-05-06 DIAGNOSIS — J44.9 COPD, SEVERE (H): ICD-10-CM

## 2025-05-06 RX ORDER — AMLODIPINE BESYLATE 2.5 MG/1
2.5 TABLET ORAL DAILY
Qty: 90 TABLET | Refills: 2 | Status: SHIPPED | OUTPATIENT
Start: 2025-05-06

## 2025-05-06 RX ORDER — OXYCODONE HYDROCHLORIDE 5 MG/1
5 TABLET ORAL EVERY 4 HOURS PRN
Qty: 120 TABLET | Refills: 0 | Status: SHIPPED | OUTPATIENT
Start: 2025-05-06 | End: 2025-06-05

## 2025-05-06 RX ORDER — SERTRALINE HYDROCHLORIDE 25 MG/1
25 TABLET, FILM COATED ORAL DAILY
Qty: 90 TABLET | Refills: 2 | Status: SHIPPED | OUTPATIENT
Start: 2025-05-06

## 2025-05-06 NOTE — TELEPHONE ENCOUNTER
Medication Refill & Appointment Request    What medication are you calling about (include dose and sig)?:   amLODIPine (NORVASC) 2.5 MG tablet    oxyCODONE (ROXICODONE) 5 MG tablet    sertraline (ZOLOFT) 25 MG tablet     Preferred Pharmacy:  [id number] - [place], [place] - [address] AT [facility] [address]  [facility] [address]  Phone: [phone] Fax: [phone]    Controlled Substance Agreement on file:   CSA -- Patient Level:     [Media Unavailable] Controlled Substance Agreement - Opioid - Scan on 5/11/2021  2:01 PM: OPIOD     Who prescribed the medication?:   Arely Lantigua MD     Do you need a refill? Yes, almost out of Rx    PT REQUESTING REFILL ASAP.    When did you use the medication last? Has enough for maybe 4 more days (oxycodone).    Patient offered an appointment? Yes,     Pt scheduled for AWV 8/11/25.    Need permission to schedule AWV sooner.   - Pt stated 11:10am check-in is too late (for fasting). Gets shaky.    - Okay with early morning (8am-codie)  - Okay with afternoon appts (but unable to fast).     NEED TO CALL BACK TO SCHEDULE    Do you have any questions or concerns?  No    Okay to leave a detailed message?: Yes at Home number on file [phone] (home)    Irene Ramsey on 5/6/2025 at 4:25 PM

## 2025-05-06 NOTE — TELEPHONE ENCOUNTER
Patient does not need to be fasting for her visit.    ---    May use any virtual or virtual release spot for an in-person visit.     Patient may also be seen at noon (arrival time 11:40am) Mondays, Wednesdays, Thursdays, and Fridays OR at 1:00pm (arrival time 12:40pm) on Thursdays (during the huddle).    Please do not schedule in a same day, next day, or hospital follow-up slot.    Okay to double book lunch slot (but patient should still arrive by 11:40am).

## 2025-05-12 DIAGNOSIS — J44.9 CHRONIC OBSTRUCTIVE PULMONARY DISEASE, UNSPECIFIED COPD TYPE (H): ICD-10-CM

## 2025-05-13 RX ORDER — FLUTICASONE PROPIONATE AND SALMETEROL 500; 50 UG/1; UG/1
1 POWDER RESPIRATORY (INHALATION) 2 TIMES DAILY
OUTPATIENT
Start: 2025-05-13

## 2025-05-15 ENCOUNTER — TELEPHONE (OUTPATIENT)
Dept: INTERNAL MEDICINE | Facility: CLINIC | Age: OVER 89
End: 2025-05-15
Payer: COMMERCIAL

## 2025-05-15 DIAGNOSIS — J44.9 CHRONIC OBSTRUCTIVE PULMONARY DISEASE, UNSPECIFIED COPD TYPE (H): Primary | ICD-10-CM

## 2025-05-15 RX ORDER — FLUTICASONE PROPIONATE AND SALMETEROL 250; 50 UG/1; UG/1
1 POWDER RESPIRATORY (INHALATION) EVERY 12 HOURS
Qty: 180 EACH | Refills: 3 | Status: SHIPPED | OUTPATIENT
Start: 2025-05-15

## 2025-05-15 NOTE — TELEPHONE ENCOUNTER
Patient called about needing a refill of WIXELA INHUB 250/50 for her COPD. Was unable to find the medication for a refill and the pharmacy said she has no more refills.  Asked pt if Dr. Lantigua prescribed the medication and she confirmed that she did. Asked when the last time she had a refill and she couldn't remember.    Pt is currently out of medication and would need a satnam refill. Has appt for annual wellness in August.    She did not want to be transferred to an RN.  Would like her script scent to [facility] in [place] on [address]. Was unable to get an answer if she'd like a phone call about her script due to pt ending the call.    -Payal Hammond, TC

## 2025-06-03 DIAGNOSIS — I10 BENIGN ESSENTIAL HYPERTENSION: ICD-10-CM

## 2025-06-04 RX ORDER — FUROSEMIDE 20 MG/1
20 TABLET ORAL DAILY
Qty: 90 TABLET | Refills: 2 | Status: SHIPPED | OUTPATIENT
Start: 2025-06-04

## 2025-06-05 DIAGNOSIS — M48.062 SPINAL STENOSIS OF LUMBAR REGION WITH NEUROGENIC CLAUDICATION: ICD-10-CM

## 2025-06-05 RX ORDER — OXYCODONE HYDROCHLORIDE 5 MG/1
5 TABLET ORAL EVERY 4 HOURS PRN
Qty: 120 TABLET | Refills: 0 | Status: SHIPPED | OUTPATIENT
Start: 2025-06-06 | End: 2025-07-08

## 2025-06-06 ENCOUNTER — TELEPHONE (OUTPATIENT)
Dept: INTERNAL MEDICINE | Facility: CLINIC | Age: OVER 89
End: 2025-06-06
Payer: COMMERCIAL

## 2025-06-06 NOTE — TELEPHONE ENCOUNTER
Fax came in from [facility]. about oxygen orders. Included the most recent face to face that was done on 9/4/24.    Will fax back to [phone] once completed.  Placed in provider basket.

## 2025-06-07 DIAGNOSIS — I10 BENIGN ESSENTIAL HYPERTENSION: ICD-10-CM

## 2025-06-08 ENCOUNTER — MEDICAL CORRESPONDENCE (OUTPATIENT)
Dept: HEALTH INFORMATION MANAGEMENT | Facility: CLINIC | Age: OVER 89
End: 2025-06-08
Payer: COMMERCIAL

## 2025-06-09 RX ORDER — FUROSEMIDE 20 MG/1
20 TABLET ORAL DAILY
Qty: 90 TABLET | Refills: 2 | OUTPATIENT
Start: 2025-06-09

## 2025-06-19 ENCOUNTER — NURSE TRIAGE (OUTPATIENT)
Dept: INTERNAL MEDICINE | Facility: CLINIC | Age: OVER 89
End: 2025-06-19
Payer: COMMERCIAL

## 2025-06-19 NOTE — TELEPHONE ENCOUNTER
Patients daughter ad srivastava as patient was out of her furosemide for about a week she has been back on it for a week but it is not taking down the swelling in her feet an ankles as well prior to the break in therapy    Daughter wondering if they can try 40mg for a few days until the swelling returns to baseline?     No SOB no redness equal swelling on both sides     Reason for Disposition   Mild swelling of both ankles and chronic (unchanged)    Additional Information   Negative: Sounds like a life-threatening emergency to the triager   Negative: Chest pain   Negative: Followed an insect bite and has localized swelling (e.g., small area of puffy or swollen skin)   Negative: Followed a knee injury   Negative: Ankle injury   Negative: Pregnant with leg swelling or edema   Negative: Difficulty breathing at rest   Negative: Entire foot is cool or blue in comparison to other side   Negative: Cast on leg or ankle and has increasing pain   Negative: Can't walk or can barely stand (new-onset)   Negative: Fever and red area (or area very tender to touch)   Negative: Patient sounds very sick or weak to the triager   Negative: MILD swelling of both ankles (i.e., pedal edema) and caused by hot weather   Negative: Mild swelling of both ankles and varicose veins   Negative: MILD swelling of both ankles (i.e., pedal edema) AND new-onset or getting worse   Negative: SEVERE swelling (e.g., swelling extends above knee, entire leg is swollen, weeping fluid)   Negative: Pregnant 20 or more weeks and sudden weight gain (i.e., > 2 lbs, 1 kg in one week)   Negative: Thigh or calf pain and only 1 side and present > 1 hour   Negative: Thigh, calf, or ankle swelling in only one leg   Negative: Thigh, calf, or ankle swelling in both legs, but one side is definitely more swollen (Exception: Longstanding difference between legs.)   Negative: Difficulty breathing with exertion AND new-onset or getting worse   Negative: MODERATE swelling of  both ankles (e.g., swelling extends up to the knees) AND new-onset or getting worse   Negative: Swelling of face, arm or hands  (Exception: Slight puffiness of fingers during hot weather.)   Negative: Looks like a boil, infected sore, deep ulcer, or other infected rash (spreading redness, pus)   Negative: Patient wants to be seen    Protocols used: Leg Swelling and Edema-A-OH     Oculoplastic Surgeon Procedure Text (D): After obtaining clear surgical margins the patient was sent to oculoplastics for surgical repair.  The patient understands they will receive post-surgical care and follow-up from the referring physician's office.

## 2025-06-19 NOTE — TELEPHONE ENCOUNTER
Relayed response to daughter. Daughter will adjust furosemide to 40 mg for a few days until back to baseline, if no improvement then daughter will call clinic back.

## 2025-07-08 ENCOUNTER — TELEPHONE (OUTPATIENT)
Dept: INTERNAL MEDICINE | Facility: CLINIC | Age: OVER 89
End: 2025-07-08
Payer: COMMERCIAL

## 2025-07-08 DIAGNOSIS — M48.062 SPINAL STENOSIS OF LUMBAR REGION WITH NEUROGENIC CLAUDICATION: ICD-10-CM

## 2025-07-08 RX ORDER — OXYCODONE HYDROCHLORIDE 5 MG/1
5 TABLET ORAL EVERY 4 HOURS PRN
Qty: 120 TABLET | Refills: 0 | Status: SHIPPED | OUTPATIENT
Start: 2025-07-08

## 2025-07-08 NOTE — TELEPHONE ENCOUNTER
Medication Question or Refill    Contacts       Contact Date/Time Type Contact Phone/Fax    07/08/2025 03:10 PM CDT Phone (Incoming) Celeste Chacko (Self) [phone] (H)            What medication are you calling about (include dose and sig)?: oxycodone     Preferred Pharmacy:   [id number] - [place], [place] - [address] AT [facility] [address]  [facility] [address]  Phone: [phone] Fax: [phone]    Controlled Substance Agreement on file:   CSA -- Patient Level:     [Media Unavailable] Controlled Substance Agreement - Opioid - Scan on 5/11/2021  2:01 PM: OPIOD       Who prescribed the medication?: Dr. Lantigua     Do you need a refill? Yes    When did you use the medication last? Has about 3 days     Patient offered an appointment? No    Do you have any questions or concerns?  Yes, the patient requests that her daughter be able to pick it up tomorrow. Otherwise, she won't see her until Saturday.     Please advise.     Patient schedule to be seen in Aug.     Okay to leave a detailed message?: Yes at Home number on file [phone] (home)

## 2025-07-19 ENCOUNTER — APPOINTMENT (OUTPATIENT)
Dept: CT IMAGING | Facility: CLINIC | Age: OVER 89
End: 2025-07-19
Attending: PHYSICIAN ASSISTANT
Payer: COMMERCIAL

## 2025-07-19 ENCOUNTER — APPOINTMENT (OUTPATIENT)
Dept: GENERAL RADIOLOGY | Facility: CLINIC | Age: OVER 89
End: 2025-07-19
Attending: PHYSICIAN ASSISTANT
Payer: COMMERCIAL

## 2025-07-19 ENCOUNTER — APPOINTMENT (OUTPATIENT)
Dept: ULTRASOUND IMAGING | Facility: CLINIC | Age: OVER 89
End: 2025-07-19
Attending: PHYSICIAN ASSISTANT
Payer: COMMERCIAL

## 2025-07-19 ENCOUNTER — HOSPITAL ENCOUNTER (EMERGENCY)
Facility: CLINIC | Age: OVER 89
Discharge: HOME | End: 2025-07-19
Attending: PHYSICIAN ASSISTANT
Payer: COMMERCIAL

## 2025-07-19 VITALS
TEMPERATURE: 97.2 F | OXYGEN SATURATION: 95 % | SYSTOLIC BLOOD PRESSURE: 160 MMHG | RESPIRATION RATE: 16 BRPM | HEART RATE: 91 BPM | DIASTOLIC BLOOD PRESSURE: 63 MMHG

## 2025-07-19 DIAGNOSIS — R41.3 MEMORY CHANGES: ICD-10-CM

## 2025-07-19 DIAGNOSIS — E78.00 PURE HYPERCHOLESTEROLEMIA: ICD-10-CM

## 2025-07-19 DIAGNOSIS — R53.1 GENERALIZED WEAKNESS: ICD-10-CM

## 2025-07-19 DIAGNOSIS — M79.89 LEFT LEG SWELLING: ICD-10-CM

## 2025-07-19 DIAGNOSIS — E11.9 TYPE 2 DIABETES MELLITUS WITHOUT COMPLICATION, WITHOUT LONG-TERM CURRENT USE OF INSULIN (H): ICD-10-CM

## 2025-07-19 LAB
ALBUMIN SERPL BCG-MCNC: 4.1 G/DL (ref 3.5–5.2)
ALBUMIN UR-MCNC: NEGATIVE MG/DL
ALP SERPL-CCNC: 76 U/L (ref 40–150)
ALT SERPL W P-5'-P-CCNC: 23 U/L (ref 0–50)
ANION GAP SERPL CALCULATED.3IONS-SCNC: 12 MMOL/L (ref 7–15)
APPEARANCE UR: CLEAR
AST SERPL W P-5'-P-CCNC: 21 U/L (ref 0–45)
ATRIAL RATE - MUSE: 87 BPM
BASOPHILS # BLD AUTO: 0 10E3/UL (ref 0–0.2)
BASOPHILS NFR BLD AUTO: 0 %
BILIRUB SERPL-MCNC: 0.3 MG/DL
BILIRUB UR QL STRIP: NEGATIVE
BUN SERPL-MCNC: 20.2 MG/DL (ref 8–23)
CALCIUM SERPL-MCNC: 10.1 MG/DL (ref 8.8–10.4)
CHLORIDE SERPL-SCNC: 101 MMOL/L (ref 98–107)
COLOR UR AUTO: ABNORMAL
CREAT SERPL-MCNC: 0.71 MG/DL (ref 0.51–0.95)
DIASTOLIC BLOOD PRESSURE - MUSE: NORMAL MMHG
EGFRCR SERPLBLD CKD-EPI 2021: 78 ML/MIN/1.73M2
EOSINOPHIL # BLD AUTO: 0.1 10E3/UL (ref 0–0.7)
EOSINOPHIL NFR BLD AUTO: 1 %
ERYTHROCYTE [DISTWIDTH] IN BLOOD BY AUTOMATED COUNT: 13.3 % (ref 10–15)
FLUAV RNA SPEC QL NAA+PROBE: NEGATIVE
FLUBV RNA RESP QL NAA+PROBE: NEGATIVE
GLUCOSE SERPL-MCNC: 130 MG/DL (ref 70–99)
GLUCOSE UR STRIP-MCNC: NEGATIVE MG/DL
HCO3 SERPL-SCNC: 29 MMOL/L (ref 22–29)
HCT VFR BLD AUTO: 34.4 % (ref 35–47)
HGB BLD-MCNC: 11 G/DL (ref 11.7–15.7)
HGB UR QL STRIP: NEGATIVE
HOLD SPECIMEN: NORMAL
HOLD SPECIMEN: NORMAL
HYALINE CASTS: 1 /LPF
IMM GRANULOCYTES # BLD: 0.1 10E3/UL
IMM GRANULOCYTES NFR BLD: 1 %
INTERPRETATION ECG - MUSE: NORMAL
KETONES UR STRIP-MCNC: NEGATIVE MG/DL
LEUKOCYTE ESTERASE UR QL STRIP: NEGATIVE
LYMPHOCYTES # BLD AUTO: 1.2 10E3/UL (ref 0.8–5.3)
LYMPHOCYTES NFR BLD AUTO: 12 %
MCH RBC QN AUTO: 26.4 PG (ref 26.5–33)
MCHC RBC AUTO-ENTMCNC: 32 G/DL (ref 31.5–36.5)
MCV RBC AUTO: 83 FL (ref 78–100)
MONOCYTES # BLD AUTO: 0.9 10E3/UL (ref 0–1.3)
MONOCYTES NFR BLD AUTO: 9 %
NEUTROPHILS # BLD AUTO: 7.8 10E3/UL (ref 1.6–8.3)
NEUTROPHILS NFR BLD AUTO: 77 %
NITRATE UR QL: NEGATIVE
NRBC # BLD AUTO: 0 10E3/UL
NRBC BLD AUTO-RTO: 0 /100
NT-PROBNP SERPL-MCNC: 624 PG/ML (ref 0–624)
P AXIS - MUSE: 74 DEGREES
PH UR STRIP: 7.5 [PH] (ref 5–7)
PLATELET # BLD AUTO: 184 10E3/UL (ref 150–450)
POTASSIUM SERPL-SCNC: 4.1 MMOL/L (ref 3.4–5.3)
PR INTERVAL - MUSE: 164 MS
PROT SERPL-MCNC: 7.5 G/DL (ref 6.4–8.3)
QRS DURATION - MUSE: 88 MS
QT - MUSE: 354 MS
QTC - MUSE: 425 MS
R AXIS - MUSE: -11 DEGREES
RBC # BLD AUTO: 4.16 10E6/UL (ref 3.8–5.2)
RBC URINE: 1 /HPF
RSV RNA SPEC NAA+PROBE: NEGATIVE
SARS-COV-2 RNA RESP QL NAA+PROBE: NEGATIVE
SODIUM SERPL-SCNC: 142 MMOL/L (ref 135–145)
SP GR UR STRIP: 1.01 (ref 1–1.03)
SQUAMOUS EPITHELIAL: <1 /HPF
SYSTOLIC BLOOD PRESSURE - MUSE: NORMAL MMHG
T AXIS - MUSE: 48 DEGREES
TROPONIN T SERPL HS-MCNC: 36 NG/L
TROPONIN T SERPL HS-MCNC: 40 NG/L
TSH SERPL DL<=0.005 MIU/L-ACNC: 1.3 UIU/ML (ref 0.3–4.2)
UROBILINOGEN UR STRIP-MCNC: NORMAL MG/DL
VENTRICULAR RATE- MUSE: 87 BPM
WBC # BLD AUTO: 10.1 10E3/UL (ref 4–11)
WBC URINE: 3 /HPF

## 2025-07-19 PROCEDURE — 70450 CT HEAD/BRAIN W/O DYE: CPT

## 2025-07-19 PROCEDURE — 71046 X-RAY EXAM CHEST 2 VIEWS: CPT

## 2025-07-19 PROCEDURE — 87637 SARSCOV2&INF A&B&RSV AMP PRB: CPT | Performed by: PHYSICIAN ASSISTANT

## 2025-07-19 PROCEDURE — 81001 URINALYSIS AUTO W/SCOPE: CPT | Performed by: PHYSICIAN ASSISTANT

## 2025-07-19 PROCEDURE — 84484 ASSAY OF TROPONIN QUANT: CPT | Performed by: PHYSICIAN ASSISTANT

## 2025-07-19 PROCEDURE — 36415 COLL VENOUS BLD VENIPUNCTURE: CPT | Performed by: PHYSICIAN ASSISTANT

## 2025-07-19 PROCEDURE — 93971 EXTREMITY STUDY: CPT | Mod: LT

## 2025-07-19 PROCEDURE — 84443 ASSAY THYROID STIM HORMONE: CPT | Performed by: PHYSICIAN ASSISTANT

## 2025-07-19 PROCEDURE — 83036 HEMOGLOBIN GLYCOSYLATED A1C: CPT

## 2025-07-19 PROCEDURE — 99285 EMERGENCY DEPT VISIT HI MDM: CPT | Mod: 25 | Performed by: PHYSICIAN ASSISTANT

## 2025-07-19 PROCEDURE — 83880 ASSAY OF NATRIURETIC PEPTIDE: CPT | Performed by: PHYSICIAN ASSISTANT

## 2025-07-19 PROCEDURE — 80053 COMPREHEN METABOLIC PANEL: CPT | Performed by: PHYSICIAN ASSISTANT

## 2025-07-19 PROCEDURE — 83718 ASSAY OF LIPOPROTEIN: CPT

## 2025-07-19 PROCEDURE — 85025 COMPLETE CBC W/AUTO DIFF WBC: CPT | Performed by: PHYSICIAN ASSISTANT

## 2025-07-19 PROCEDURE — 93005 ELECTROCARDIOGRAM TRACING: CPT

## 2025-07-19 ASSESSMENT — COLUMBIA-SUICIDE SEVERITY RATING SCALE - C-SSRS
6. HAVE YOU EVER DONE ANYTHING, STARTED TO DO ANYTHING, OR PREPARED TO DO ANYTHING TO END YOUR LIFE?: NO
2. HAVE YOU ACTUALLY HAD ANY THOUGHTS OF KILLING YOURSELF IN THE PAST MONTH?: NO
1. IN THE PAST MONTH, HAVE YOU WISHED YOU WERE DEAD OR WISHED YOU COULD GO TO SLEEP AND NOT WAKE UP?: NO

## 2025-07-19 ASSESSMENT — ACTIVITIES OF DAILY LIVING (ADL)
ADLS_ACUITY_SCORE: 57

## 2025-07-19 NOTE — ED PROVIDER NOTES
Emergency Department Note      History of Present Illness     Chief Complaint   Leg Swelling      HPI   Celeste Chacko is a 89 year old female with a history of chronic lower back pain, HTN, osteopenia, and DM2 presenting to the ED for leg swelling. She is accompanied with her daughter who states that she's had increased bilateral leg swelling, moreso on the left, for the past week. In the past, she has missed a couple doses of her 20 mg Furosemide, which have led to increased leg swelling. When followed up with her primary care, they have directed her to increase the dose, though it wasn't necessary as it seemed to resolve on it's own. Daughter is also concerned about increased forgetfulness over the past few days, as she was prescribed 120 pills of Oxycodone on 7/9, but is now down to 11 currently. She is supposed to take 4 pills daily. She also notes that patient was without her 2L of O2 for an unknown period of time, which may be contributing to her forgetfulness. Patient endorses slight back pain, which is not new, and occasional shortness of breath also not new.  Is at baseline O2. Denies difficulty ambulating or chest pain. No fever, or increased cough.  No chest pain, abdominal pain, falls, headache, neck pain, vomiting, diarrhea or fever.    Independent Historian   Daughter as detailed above.    Review of External Notes   Reviewed Dr. Lantigua visit from yesterday regarding refilling oxycodone early.  Large quantity of medication and refill not approved.    Past Medical History     Medical History and Problem List   Benign essential hypertension  Chronic constipation  Chronic lower back pain  Chronic pain syndrome  Chronic  COPD  NOEMÍ   Pneumocystis jirovecii pneumonia  Osteopenia  Hypercholesterolemia  DM2    Medications   Albuterol  Norvasc  Mepron  Lasix  Neurontin  Glucotrol  Duoneb  Zestril  Ativan  Glucophage   Roxicodone   Crestor  Zoloft   Spiriva     Surgical History   Left knee arthroplasty  Cataract  extraction, bilateral  Inject epidural lumbar, left   Inject sacroiliac joint, bilateral, 6x   Tonsillectomy and adenoidectomy    Right shoulder arthroplasty     Physical Exam     Patient Vitals for the past 24 hrs:   BP Temp Temp src Pulse Resp SpO2   07/19/25 1730 (!) 162/67 -- -- 83 -- 96 %   07/19/25 1722 -- -- -- -- -- 94 %   07/19/25 1721 (!) 151/74 -- -- -- 18 95 %   07/19/25 1450 (!) 151/64 97.2  F (36.2  C) Temporal 90 16 92 %     Physical Exam  General: Awake, alert, non-toxic.  Head:  Scalp is NC/AT  Eyes:  Conjunctiva normal appearing and track normally.  PERRL  ENT:  The external nose and ears are normal.     Oropharynx clear, uvula midline.  Neck:  Normal range of motion without rigidity.  CV:  Regular rate and rhythm    No pathologic murmur, rubs, or gallops.  Resp:  Breath sounds are clear bilaterally. 2L NC in place (baseline)    Non-labored, no retractions or accessory muscle use  Abdomen: Abdomen is soft, no distension, no tenderness, no masses. No CVA tenderness.  MS:  1+LLE pitting edema below knee.  RLE normal.  . No midline cervical, thoracic, or lumbar tenderness.  Extremities without joint swelling or redness.  Skin:  Warm and dry, No rash or lesions noted.  Neuro:  Poor short term recall but answers basic questions appropriately.   Moves all extremities normal.  No facial asymmetry. Speech not slurred. Gait steady with walker.  Psych:  Awake. Alert. Normal affect. Appropriate interactions.      Diagnostics     Lab Results   Labs Ordered and Resulted from Time of ED Arrival to Time of ED Departure   COMPREHENSIVE METABOLIC PANEL - Abnormal       Result Value    Sodium 142      Potassium 4.1      Carbon Dioxide (CO2) 29      Anion Gap 12      Urea Nitrogen 20.2      Creatinine 0.71      GFR Estimate 78      Calcium 10.1      Chloride 101      Glucose 130 (*)     Alkaline Phosphatase 76      AST 21      ALT 23      Protein Total 7.5      Albumin 4.1      Bilirubin Total 0.3     TROPONIN T,  HIGH SENSITIVITY - Abnormal    Troponin T, High Sensitivity 40 (*)    ROUTINE UA WITH MICROSCOPIC REFLEX TO CULTURE - Abnormal    Color Urine Light Yellow      Appearance Urine Clear      Glucose Urine Negative      Bilirubin Urine Negative      Ketones Urine Negative      Specific Gravity Urine 1.013      Blood Urine Negative      pH Urine 7.5 (*)     Protein Albumin Urine Negative      Urobilinogen Urine Normal      Nitrite Urine Negative      Leukocyte Esterase Urine Negative      RBC Urine 1      WBC Urine 3      Squamous Epithelials Urine <1      Hyaline Casts Urine 1     CBC WITH PLATELETS AND DIFFERENTIAL - Abnormal    WBC Count 10.1      RBC Count 4.16      Hemoglobin 11.0 (*)     Hematocrit 34.4 (*)     MCV 83      MCH 26.4 (*)     MCHC 32.0      RDW 13.3      Platelet Count 184      % Neutrophils 77      % Lymphocytes 12      % Monocytes 9      % Eosinophils 1      % Basophils 0      % Immature Granulocytes 1      NRBCs per 100 WBC 0      Absolute Neutrophils 7.8      Absolute Lymphocytes 1.2      Absolute Monocytes 0.9      Absolute Eosinophils 0.1      Absolute Basophils 0.0      Absolute Immature Granulocytes 0.1      Absolute NRBCs 0.0     TROPONIN T, HIGH SENSITIVITY - Abnormal    Troponin T, High Sensitivity 36 (*)    TSH WITH FREE T4 REFLEX - Normal    TSH 1.30     NT-PROBNP - Normal    NT-proBNP 624     INFLUENZA A/B, RSV AND SARS-COV2 PCR - Normal    Influenza A PCR Negative      Influenza B PCR Negative      RSV PCR Negative      SARS CoV2 PCR Negative         Imaging   Chest XR,  PA & LAT   Final Result   IMPRESSION: Bullae in the left lower lung is noted. There is surrounding adjacent opacities. Differential diagnosis includes atelectasis/scarring or less likely infectious or inflammatory process difficult to entirely exclude. No effusion or    pneumothorax. Normal cardiac size.      Head CT w/o contrast   Final Result   IMPRESSION:   1.  No evidence of acute intracranial abnormality.      US  Lower Extremity Venous Duplex Left   Final Result   IMPRESSION:   No deep venous thrombosis in the left lower extremity.          EKG   ECG taken at 1548, ECG read at 1550  Normal sinus rhythm  Right atrial enlargement  Borderline ECG   No significant change as compared to prior, dated 12/8/23.  Rate 87 bpm. NE interval 164 ms. QRS duration 88 ms. QT/QTc 354/425 ms. P-R-T axes 74 -11 48.  Interpreted and signed by Dr. Boss    Independent Interpretation   CT Head: No intracranial hemorrhage or midline shift.    ED Course      Medications Administered   Medications - No data to display    Procedures   Procedures     Discussion of Management   None    ED Course   ED Course as of 07/19/25 1856   Sat Jul 19, 2025   1522 I obtained history and examined the patient as noted above.     1849 I rechecked the patient.        Additional Documentation  None    Medical Decision Making / Diagnosis     CMS Diagnoses: None    MIPS   None               MDM   Celeste Chacko is a 89 year old female who presents with daughter for several complaints some left leg swelling in the setting of generalized intermittent edema that is Lasix responsive, some increased cognitive decline and generalized weakness.  Broad differential considered.  On exam patient well-appearing stable vitals on her baseline 2 L nasal cannula.  Does not really endorse any increased shortness of breath.  Ultrasound negative for DVT.  BNP normal no evidence of CHF.  Kidney and liver function baseline.  EKG with no significant change from prior shows normal sinus rhythm high-sensitivity troponins are detectable but flat and no chest pain doubt cardiac event likewise low suspicion for PE given no tachycardia or increased oxygen requirements no DVT in the leg.  There is some likely atelectasis and a bullae on the chest x-ray but she has no cough fever leukocytosis or increased oxygen requirements I highly doubt this represents pneumonia would not antibiosis at this  time.  No evidence of UTI or other infectious process.  Head scan negative no falls or trauma no focal neurologic deficits to suggest stroke or spinal lesion.  Does appear to have some recall/memory issues but able to get around and ambulate with walker.  Sounds like they are working on getting her into higher level of care though she is reluctant to do this at this time some concerns about being able to stay alone at home is able to stay with daughter temporarily though not likely a long-term solution and will place referral for outpatient social work/care coordination they will also work with her primary.      The daughter did express concern about the patient's oxycodone prescription being missing and her being down to 11 pills.  Requested refill which I explained that I am unable to provide as this needs to come from the long-term prescriber.  I also discussed that it is certainly possible that this level of opioid use in a patient her age could be contributing to cognitive impairment/decline as well.  The patient currently denies any significant pain at present, appears comfortable and likewise has no evidence of opioid withdrawal.  Either way has enough pills for the weekend until primary clinic open Monday.   Daughter indicated that they would be back at the ER on Monday if unable to get it refilled though primary though I explained that it is extremely unlikely that any emergency department provider would refill this.  I obviously urged them to return if other concerns or new or worsening symptoms and they will reach out to the primary on Monday ambulated well with walker and I think she is safe for discharge to stay with family at this time.    Disposition   The patient was discharged.     Diagnosis     ICD-10-CM    1. Memory changes  R41.3 Primary Care - Care Coordination Referral      2. Left leg swelling  M79.89       3. Generalized weakness  R53.1 Primary Care - Care Coordination Referral            Discharge Medications   New Prescriptions    No medications on file         Scribe Disclosure:  I, Edward Stewart, am serving as a scribe at 3:18 PM on 7/19/2025 to document services personally performed by Bernabe Guardado PA-C based on my observations and the provider's statements to me.        Bernabe Guardado PA-C  07/19/25 0415

## 2025-07-19 NOTE — ED NOTES
Patient up to bathroom stand by assist. Reported feeling a little weak but minimal assistance (held hand) required.

## 2025-07-19 NOTE — ED TRIAGE NOTES
BLE swelling that has increased over the last week. Family member states the patient is more forgetful than normal. Also, patient was given a script of 120 oxycodone tablets in June and she only has 11 left.      Triage Assessment (Adult)       Row Name 07/19/25 1459          Triage Assessment    Airway WDL WDL        Respiratory WDL    Respiratory WDL X  on 2L baseline        Cardiac WDL    Cardiac WDL WDL        Peripheral/Neurovascular WDL    Peripheral Neurovascular WDL X  LLE edema        Cognitive/Neuro/Behavioral WDL    Cognitive/Neuro/Behavioral WDL WDL

## 2025-07-21 ENCOUNTER — PATIENT OUTREACH (OUTPATIENT)
Dept: CARE COORDINATION | Facility: CLINIC | Age: OVER 89
End: 2025-07-21
Payer: COMMERCIAL

## 2025-07-21 ENCOUNTER — TELEPHONE (OUTPATIENT)
Dept: INTERNAL MEDICINE | Facility: CLINIC | Age: OVER 89
End: 2025-07-21
Payer: COMMERCIAL

## 2025-07-21 DIAGNOSIS — E78.00 PURE HYPERCHOLESTEROLEMIA: ICD-10-CM

## 2025-07-21 DIAGNOSIS — E11.9 TYPE 2 DIABETES MELLITUS WITHOUT COMPLICATION, WITHOUT LONG-TERM CURRENT USE OF INSULIN (H): Primary | ICD-10-CM

## 2025-07-21 LAB
CHOLEST SERPL-MCNC: 239 MG/DL
EST. AVERAGE GLUCOSE BLD GHB EST-MCNC: 183 MG/DL
HBA1C MFR BLD: 8 %
HDLC SERPL-MCNC: 56 MG/DL
LDLC SERPL CALC-MCNC: 161 MG/DL
NONHDLC SERPL-MCNC: 183 MG/DL
TRIGL SERPL-MCNC: 109 MG/DL

## 2025-07-21 NOTE — LETTER
[facility]  [address]  [facility] [address]    July 21, 2025    Celeste Chacko  [address]   [facility] [address]      Dear Celeste,    I am a clinic care coordinator who works with Arely Lantigua MD with the [facility]. I wanted to thank you for spending the time to talk with me.  Below is a description of clinic care coordination and how I can further assist you.       The clinic care coordination team is made up of a registered nurse, , financial resource worker and community health worker who understand the health care system. The goal of clinic care coordination is to help you manage your health and improve access to the health care system. Our team works alongside your provider to assist you in determining your health and social needs. We can help you obtain health care and community resources, providing you with necessary information and education. We can work with you through any barriers and develop a care plan that helps coordinate and strengthen the communication between you and your care team.  Our services are voluntary and are offered without charge to you personally.    Please feel free to contact me with any questions or concerns regarding care coordination and what we can offer.      We are focused on providing you with the highest-quality healthcare experience possible.    Sincerely,     Sapna Robleod RN Clinic Care Coordinator  [facility]: [place], [place] (on-site Wednesdays), [place] (on-site Thursdays) & [facility].  [email or web address]  Phone: [phone]       Additional Information: I have enclosed some resources. Also, a copy of the Patient Centered Plan of Care. This has helpful information and goals that we have talked about. Please keep this in an easy to access place to use as needed.                [facility]  https://www.[facility].org/services/older-adults/help-your-home  From companionship services to hot meals delivered to your door, [facility] offers a variety of services to help older adults remain active and independent in their homes. Learn about our home-based service options below.  580.242.7020    Help at your Door  https://helpatyourdoor.org/  Our grocery assistance, home support and transportation services provide help with in-home tasks and chores.    Our Help At Your Door team is made up of hundreds of dedicated volunteers, a small group of caring staff, a board of directors filled with skill and expertise and generous donors who together collectively make our non-profit services possible.  677.973.8968    Darts  https://Bokecc.org/  DARTS is a nonprofit organization that has provided personalized and professional services for older adults and caregivers in the [facility] for over 50 years!  562.424.5651    Senior Community Services  https://[facility].org/our-services/  Relieving the burden of difficult and expensive household chores with cost-effective options. We offer leaf raking, snow removal, minor repairs, lawn mowing, housekeeping, and more.  [phone]      PRIVATE PAY HELP AT HOME  Here some companies who provide Private pay services. You will need to reach out to them individually about cost and availability.  [employer] two week minimum $520/day $715 couple $400 RN Case Management  Home Instead  [phone]  In-Home Senior Care  Home Instead  [facility] ([phone]  https://[employer].com/     [employer] (835) 931-6579  https://www.comfortkeepers.com/offices/[place]/[facility]  (709) 611-4135  https://www.[place].com/offices/[place]/luis/4120-leyqew-vliim/adult-nursing     [facility] (556) 607-8842  https://INBEP.com/skilled-nursing     [facility] (448)  [phone]  https://www.Vusay/home-care-and-healthcare-staffing-services/private-duty-nursing     [facility] (887) 664-1925  https://alliancehcns.net/privateduty/      PCA COMPANY'S (From [place].gov/adresources)    [employer]  A trained worker who provides assistance to persons with disabilities, living independently in the community. This includes the elderly and others with special health care needs.  ([phone] [address] [place], [place], [address]  https://[employer]/pca_services.html    [facility]  A trained worker who provides assistance to persons with disabilities, living independently in the community. This includes the elderly and others with special health care needs.  ([phone] [address] [place], [place], [address]    [facility]  A trained worker who provides assistance to persons with disabilities, living independently in the community. This includes the elderly and others with special health care needs.  ([phone] [phone] [place], [place], [address]               [facility]  Patient Centered Plan of Care  About Me:        Patient Name:  Celeste Chacko    YOB: 1931  Age:         89 year old   [place] MRN:    [id number] Telephone Information:  Home Phone [phone]   Mobile Not on file.       Address:  [address]  [place] [address] Email address:  No e-mail address on record      Emergency Contact(s)    Name Relationship Lgl Grd Work Phone Home Phone Mobile Phone   1. HIRAM SOTO Daughter No   [phone]   2. NACHO SOTO Grandchild No  [phone]            Primary language:  English     needed? No   [place] [facility]:  [phone] op. 1  Other communication barriers:None    Preferred Method of Communication:  Mail  Current living arrangement: I live alone    Mobility Status/ Medical Equipment: Independent  w/Device        Health Maintenance  Health Maintenance Reviewed: Due/Overdue   Health Maintenance Due   Topic Date Due    PHQ-2 (once per calendar year)  01/01/2025    MEDICARE ANNUAL WELLNESS VISIT  02/06/2025    NOEMÍ ASSESSMENT  02/06/2025    PHQ-9  02/06/2025    EYE EXAM  03/20/2025    COVID-19 VACCINE (8 - 2024-25 season) 04/20/2025          My Access Plan  Medical Emergency 911   Primary Clinic Line [facility] - [phone]   24 Hour Appointment Line [phone] or  [phone]) (toll-free)   24 Hour Nurse Line [phone] (toll-free)   Preferred Urgent Care [facility], [phone]     [facility]  [phone]     Preferred Pharmacy [id number] - [facility] [address] AT [facility] [address] [facility]     Behavioral Health Crisis Line The National Suicide Prevention Lifeline at 1-473.572.3286 or Text/Call 936           My Care Team Members  Patient Care Team         Relationship Specialty Notifications Start End    Arely Lantigua MD PCP - General Internal Medicine  5/12/22     Phone: [phone] Fax: [phone]         [address] [place] [address]    Arely Lantigua MD Assigned PCP   5/22/22     Phone: [phone] Fax: [phone]         [address] [place] [address]    [facility] Pharmacist Pharmacist  7/31/23     Phone: [phone] Fax: [phone]         [address] [address]    Arely Lantigua MD Assigned Pain Medication Provider   12/16/23     Phone: [phone] Fax: [phone]         [address] [place] [address]    Sapna Robledo, RN Lead Care Coordinator  Admissions 7/21/25                 My Care Plans  Self Management and Treatment Plan    Care Plan  Care Plan: Medication Regimen       Problem: Medication Adherence       Goal: Consistently take Medications as Prescribed       Start Date: 7/21/2025  Expected End Date: 1/21/2026    This Visit's Progress: 10%    Note:     Barriers: Multiple Chronic Diagnoses  Strengths: Engaged in Care Coordination   Patient expressed understanding of goal: Yes  Action steps to achieve this goal:  1. I will take my medications as prescribed.   2. I will follow up with my Providers as advised and scheduled.   3. I will contact my pharmacy or the [facility] (#[phone]) for refills.   4. I will alert Care Coordination with any barriers, questions or concerns. RAZIA Alejo [phone]                              Care Plan: Community Resources       Problem: Lack of transportation               Problem: Unable to prepare meals               Problem: Reliable food source               Problem: Insufficient In-home support       Goal: Establish adequate home support       Start Date: 7/21/2025 Expected End Date: 1/21/2026    This Visit's Progress: 10%    Note:     Barriers: Multiple Chronic Diagnoses  Strengths: Engaged in Care Coordination   Patient expressed understanding of goal: Yes  Action steps to achieve this goal:  1. I will review resources for private pay home care & pca company's.   2. I will review resources for senior companions.   3. I will follow up with Care Coordination with any barriers, questions or concerns. Sapna RANDLE [phone].                                 Action Plans on File:                       Advance Care Plans/Directives:   Advanced Care Plan/Directives on file: Yes    Status of Document(s): On File and Validated    Advanced Care Plan/Directives Type: POLST             My Medical and Care Information  Problem List   Patient Active Problem List   Diagnosis    COPD (chronic obstructive pulmonary disease) (H)    Benign essential hypertension    Type 2 diabetes mellitus (H)    Pure hypercholesterolemia    NOEMÍ (generalized anxiety disorder)    Chronic constipation    Osteopenia    Chronic lower back pain    Chronic pain syndrome    Chronic,  continuous use of opioids    History of Pneumocystis jirovecii pneumonia      Current Medications:  Please refer to the most recent medication list provided to you by your medical team and reach out to your provider with any questions or to make any corrections.    Care Coordination Start Date: 7/21/2025   Frequency of Care Coordination: monthly, more frequently as needed     Form Last Updated: 07/21/2025

## 2025-07-21 NOTE — TELEPHONE ENCOUNTER
Daughter calling asking for pain medication refill plan for August due to lost pills. Went to ED for same yesterday but ED provider declined to provide medication. Daughter asking for additional home support such as home care, etc. For patient. Daughter will be able to provide more in-person care in September but asking for assistance now due to cognitive decline, etc. RN scheduled VV appointment with PCP tomorrow. RN was going to enter Care Coordination referral but per chart review this was placed yesterday by ED provider.     Ludmila Feliciano RN

## 2025-07-21 NOTE — Clinical Note
[facility]  ***  July 21, 2025    Celeste Chacko  [address]   [facility] [address]      Dear Celeste,    I am a {clinic role :[id number]}

## 2025-07-21 NOTE — PROGRESS NOTES
Clinic Care Coordination Contact  Clinic Care Coordination Contact  OUTREACH with Post Discharge Assessment    Referral Information:  Referral Source: ED Follow-Up    Primary Diagnosis: Cognitive Impairment    Chief Complaint   Patient presents with    Clinic Care Coordination - Post Hospital    Clinic Care Coordination - Initial       Spoke with patient.   Overall, she is doing well now that she is home.   Daughter is dropping off 2 Oxycodone tabs a take for her to take.   She has a cleaning lady.   Her Daughter helps her with groceries.   She likes assistance getting in and out of the shower. Daughter helps her with this.     Spoke with Daughter.   She believes patient threw away her Oxycodone tabs.   Has noticed occasionally patient will miss taking her medications.   Patient sets up medications in a pill box.     Daughter plans to monitor medications more closely and help with medications.   Reviewed option for automated pill dispensers.     Daughter notices some memory changes.   Believes if the patient had more of a consistent schedule and people coming in regularly, this would help with her memory.   She thinks patient would thrive more with people coming in and more companionship.     Daughter open to home care for medication management and HHA for showers. She'd also like it if someone would help walk her around her [place] building.     Reviewed there will need to be a skilled need in order for home care to be covered by insurance. They would likely not be able to walk patient around the building.   Reviewed there are pca company's along with private pay home care agencies that would help with her needs.   Reviewed some senior resources that have companionship options.     Daughter understands.   Patient will see Dr. Lantigua tomorrow and they will discuss.   Also sent a message to Dr. Lantigua reviewing above.            Universal Utilization: 64.7%  Clinic Utilization  No-Show Concerns: No  No PCP office visit in  Past Year: No  Utilization      No Show Count (past year)  0             ED Visits  2             Hospital Admissions  0                    Current as of: 7/21/2025  5:11 PM                Clinical Concerns:  Current Medical Concerns:  Cognitive changes    Current Behavioral Concerns: Cognitive changes    Education Provided to patient: Care Coordination role, home care role, pca role   Pain  Pain (GOAL):: No  Health Maintenance Reviewed: Due/Overdue   Health Maintenance Due   Topic Date Due    PHQ-2 (once per calendar year)  01/01/2025    MEDICARE ANNUAL WELLNESS VISIT  02/06/2025    NOEMÍ ASSESSMENT  02/06/2025    PHQ-9  02/06/2025    EYE EXAM  03/20/2025    COVID-19 VACCINE (8 - 2024-25 season) 04/20/2025      Clinical Pathway: None    Transitions of Care Outreach    Most Recent Admission Date: 7/19/2025   Most Recent Admission Diagnosis:      Most Recent Discharge Date: 7/19/2025   Most Recent Discharge Diagnosis: Memory changes - R41.3  Left leg swelling - M79.89  Generalized weakness - R53.1  Pure hypercholesterolemia - E78.00  Type 2 diabetes mellitus without complication, without long-term current use of insulin (H) - E11.9     Transitions of Care Assessment    Discharge Assessment  How are you doing now that you are home?: Doing well.  How are your symptoms? (Red Flag symptoms escalate to triage hotline per guidelines): Improved  Do you know how to contact your clinic care team if you have future questions or changes to your health status? : Yes  Does the patient have their discharge instructions? : Yes  Does the patient have questions regarding their discharge instructions? : No  Were you started on any new medications or were there changes to any of your previous medications? : No  Does the patient have all of their medications?: Yes  Do you have questions regarding any of your medications? : No  Do you have all of your needed medical supplies or equipment (DME)?  (i.e. oxygen tank, CPAP, cane, etc.):  Yes         Post-op (Clinicians Only)  Did the patient have surgery or a procedure: No  Fever: No  Chills: No  Eating & Drinking: eating and drinking without complaints/concerns  PO Intake: regular diet  Additional Symptoms:  (N/A)  Bowel Function: normal  Date of last BM: 07/20/25  Urinary Status: voiding without complaint/concerns    Care Management   None    Medication Management:  Medication review status: Medications reviewed and no changes reported per patient.           Functional Status:  Dependent ADLs:: Ambulation-walker  Dependent IADLs:: Cleaning, Shopping, Transportation, Money Management  Mobility Status: Independent w/Device  Fallen 2 or more times in the past year?: No  Any fall with injury in the past year?: No    Living Situation:  Current living arrangement:: I live alone  Type of residence:: Apartment - handicap accessible    Lifestyle & Psychosocial Needs:    Social Drivers of Health     Food Insecurity: Low Risk  (2/6/2024)    Food Insecurity     Within the past 12 months, did you worry that your food would run out before you got money to buy more?: No     Within the past 12 months, did the food you bought just not last and you didn t have money to get more?: No   Depression: Not at risk (2/6/2024)    PHQ-2     PHQ-2 Score: 0   Housing Stability: Low Risk  (2/6/2024)    Housing Stability     Do you have housing? : Yes     Are you worried about losing your housing?: No   Tobacco Use: Medium Risk (10/13/2024)    Patient History     Smoking Tobacco Use: Former     Smokeless Tobacco Use: Never     Passive Exposure: Not on file   Financial Resource Strain: Low Risk  (2/6/2024)    Financial Resource Strain     Within the past 12 months, have you or your family members you live with been unable to get utilities (heat, electricity) when it was really needed?: No   Alcohol Use: Not on file   Transportation Needs: Low Risk  (2/6/2024)    Transportation Needs     Within the past 12 months, has lack of  transportation kept you from medical appointments, getting your medicines, non-medical meetings or appointments, work, or from getting things that you need?: No   Physical Activity: Unknown (2/6/2024)    Exercise Vital Sign     Days of Exercise per Week: 0 days     Minutes of Exercise per Session: Not on file   Interpersonal Safety: Low Risk  (9/4/2024)    Interpersonal Safety     Do you feel physically and emotionally safe where you currently live?: Yes     Within the past 12 months, have you been hit, slapped, kicked or otherwise physically hurt by someone?: No     Within the past 12 months, have you been humiliated or emotionally abused in other ways by your partner or ex-partner?: No   Stress: No Stress Concern Present (2/6/2024)    [place] of Occupational [employer] - Occupational Stress Questionnaire     Feeling of Stress : Only a little   Social Connections: Unknown (2/6/2024)    Social Connection and Isolation Panel [NHANES]     Frequency of Communication with Friends and Family: Not on file     Frequency of Social Gatherings with Friends and Family: Twice a week     Attends Alevism Services: Not on file     Active Member of Clubs or Organizations: Not on file     Attends Club or Organization Meetings: Not on file     Marital Status: Not on file   Health Literacy: Not on file     Diet:: Regular  Inadequate nutrition (GOAL):: No  Inadequate activity/exercise (GOAL):: No  Significant changes in sleep pattern (GOAL): No  Transportation means:: Regular car     Alevism or spiritual beliefs that impact treatment:: No  Mental health DX:: No  Mental health management concern (GOAL):: No  Chemical Dependency Status: No Current Concerns  Informal Support system:: Family      Resources and Interventions:  Current Resources:      Community Resources: DME, Housekeeping/Chore Agency  Supplies Currently Used at Home: Compression Stockings, Oxygen Tubing/Supplies, Diabetic Supplies  Equipment Currently Used at  Home: walker, rolling, grab bar, toilet, grab bar, tub/shower, shower chair, glucometer  Employment Status: retired         Advance Care Plan/Directive  Advanced Care Plans/Directives on file:: Yes  Status of record:: On File and Validated  Type Advanced Care Plans/Directives: POLST    Referrals Placed: None     Care Plan:   Care Plan: Medication Regimen       Problem: Medication Adherence       Goal: Consistently take Medications as Prescribed       Start Date: 7/21/2025 Expected End Date: 1/21/2026    This Visit's Progress: 10%    Note:     Barriers: Multiple Chronic Diagnoses  Strengths: Engaged in Care Coordination   Patient expressed understanding of goal: Yes  Action steps to achieve this goal:  1. I will take my medications as prescribed.   2. I will follow up with my Providers as advised and scheduled.   3. I will contact my pharmacy or the [facility] (#[phone]) for refills.   4. I will alert Care Coordination with any barriers, questions or concerns. RAZIA Alejo [phone]                              Care Plan: Community Resources       Problem: Lack of transportation               Problem: Unable to prepare meals               Problem: Reliable food source               Problem: Insufficient In-home support       Goal: Establish adequate home support       Start Date: 7/21/2025 Expected End Date: 1/21/2026    This Visit's Progress: 10%    Note:     Barriers: Multiple Chronic Diagnoses  Strengths: Engaged in Care Coordination   Patient expressed understanding of goal: Yes  Action steps to achieve this goal:  1. I will review resources for private pay home care & pca company's.   2. I will review resources for senior companions.   3. I will follow up with Care Coordination with any barriers, questions or concerns. Sapna RANDLE [phone].                                 Patient/Caregiver understanding: As above. CC role, goal(s), clinic after hours, appointments, discussed/reviewed. Support  provided.     Outreach Frequency: monthly, more frequently as needed  Future Appointments                Tomorrow Arely Lantigua MD [facility]    In 3 weeks Arely Lantigua MD [facility]            Follow up Plan     Discharge Follow-Up  Discharge follow up appointment scheduled in alignment with recommended follow up timeframe or Transitions of Risk Category? (Low = within 30 days; Moderate= within 14 days; High= within 7 days): Yes  Discharge Follow Up Appointment Date: 07/22/25  Discharge Follow Up Appointment Scheduled with?: Primary Care Provider    Future Appointments   Date Time Provider Department Center   7/22/2025  5:30 PM Arely Lantigua MD [facility]   8/11/2025 10:00 AM Arely Lantigua MD [facility]       Outpatient Plan as outlined on AVS reviewed with patient.    For any urgent concerns, please contact our 24 hour nurse triage line: [phone] ([address] [place] [address])

## 2025-07-22 ENCOUNTER — VIRTUAL VISIT (OUTPATIENT)
Dept: INTERNAL MEDICINE | Facility: CLINIC | Age: OVER 89
End: 2025-07-22
Payer: COMMERCIAL

## 2025-07-22 ENCOUNTER — TELEPHONE (OUTPATIENT)
Dept: INTERNAL MEDICINE | Facility: CLINIC | Age: OVER 89
End: 2025-07-22

## 2025-07-22 ENCOUNTER — TELEPHONE (OUTPATIENT)
Dept: NURSING | Facility: CLINIC | Age: OVER 89
End: 2025-07-22

## 2025-07-22 DIAGNOSIS — R68.89 FORGETFULNESS: ICD-10-CM

## 2025-07-22 DIAGNOSIS — F11.90 CHRONIC, CONTINUOUS USE OF OPIOIDS: ICD-10-CM

## 2025-07-22 DIAGNOSIS — E78.00 PURE HYPERCHOLESTEROLEMIA: ICD-10-CM

## 2025-07-22 DIAGNOSIS — E11.8 TYPE 2 DIABETES MELLITUS WITH COMPLICATION, WITHOUT LONG-TERM CURRENT USE OF INSULIN (H): ICD-10-CM

## 2025-07-22 DIAGNOSIS — J44.9 CHRONIC OBSTRUCTIVE PULMONARY DISEASE, UNSPECIFIED COPD TYPE (H): ICD-10-CM

## 2025-07-22 DIAGNOSIS — J96.11 CHRONIC RESPIRATORY FAILURE WITH HYPOXIA (H): ICD-10-CM

## 2025-07-22 DIAGNOSIS — G89.4 CHRONIC PAIN SYNDROME: Primary | ICD-10-CM

## 2025-07-22 PROCEDURE — 98006 SYNCH AUDIO-VIDEO EST MOD 30: CPT | Performed by: INTERNAL MEDICINE

## 2025-07-22 RX ORDER — ROSUVASTATIN CALCIUM 5 MG/1
5 TABLET, COATED ORAL AT BEDTIME
Qty: 90 TABLET | Refills: 1 | Status: SHIPPED | OUTPATIENT
Start: 2025-07-22

## 2025-07-22 RX ORDER — ROSUVASTATIN CALCIUM 20 MG/1
20 TABLET, COATED ORAL DAILY
Qty: 90 TABLET | Refills: 3 | Status: SHIPPED | OUTPATIENT
Start: 2025-07-22

## 2025-07-22 RX ORDER — OXYCODONE HYDROCHLORIDE 5 MG/1
5 TABLET ORAL 2 TIMES DAILY PRN
Qty: 60 TABLET | Refills: 0 | Status: SHIPPED | OUTPATIENT
Start: 2025-07-22

## 2025-07-22 NOTE — TELEPHONE ENCOUNTER
Pharmacy calling. They received a prescription for oxycodone 5 mg tablets today. The prescription was received too soon after the last refill.     Per the video visit notes, the prescription was sent to replace lost pills. Pharmacist states he will refill the prescription. No triage.     Deanne Carpio RN  [facility]  July 22, 2025, 6:46 PM

## 2025-07-22 NOTE — TELEPHONE ENCOUNTER
Medication Question or Refill    Contacts       Contact Date/Time Type Contact Phone/Fax    07/22/2025 06:02 PM CDT Phone (Incoming) Irene Mac (Emergency Contact) [phone] (M)            What medication are you calling about (include dose and sig)?:  oxycodone   vin wont fill but they need the Dr to call    Preferred Pharmacy:   [id number] - [facility] [address] [facility]  [address]  [facility] [address]  Phone: [phone] Fax: [phone]                  Controlled Substance Agreement on file:   CSA -- Patient Level:     [Media Unavailable] Controlled Substance Agreement - Opioid - Scan on 5/11/2021  2:01 PM: OPIOD       Who prescribed the medication?:  Dr Lantigua    Do you need a refill? Yes    When did you use the medication last?  today    Patient offered an appointment? Yes:     Do you have any questions or concerns?  Yes:      Okay to leave a detailed message?: Yes at Other phone number:[phone]

## 2025-07-22 NOTE — PROGRESS NOTES
VIDEO VISIT                                                       ASSESSMENT/PLAN                                                      (G89.4) Chronic pain syndrome  (primary encounter diagnosis)  (F11.90) Chronic, continuous use of opioids  (R68.89) Forgetfulness  Comment: needs close monitoring of all medications, including oxycodone, and would benefit from home health services for regular monitoring and care; has been doing well on reduced frequency of oxycodone.  Plan: #60 oxycodone provided - to be taken only twice a day as needed; may use Tylenol in between doses as needed.    (E11.8) Type 2 diabetes mellitus with complication, without long-term current use of insulin (H)  Comment: poorly-controlled on current regimen.   Plan: INCREASE Metformin from 850mg bid to 1000mg bid; CONTINUE glipizide XL without change.      (E78.00) Pure hypercholesterolemia  Comment: poorly-controlled on current regimen.   Plan: INCREASE rosuvastatin from 5 to 20mg once daily.     (J96.11) Chronic respiratory failure with hypoxia (H)  Comment: 2/2 to COPD; oxygen dependent.    (J44.9) Chronic obstructive pulmonary disease, unspecified COPD type (H)  Comment: well-controlled on Advair, Spiriva, and albuterol as needed.     Total time of video call between patient and provider was 8 minutes (5:20-5:28pm). Provider location: office. Patient location: home. Platform: [employer].     The longitudinal plan of care for the diagnosis(es)/condition(s) as documented were addressed during this visit. Due to the added complexity in care, I will continue to support Celeste in the subsequent management and with ongoing continuity of care.     Arely Lantigua MD   [address] [address]  T: [phone], F: [phone]    SUBJECTIVE                                                      Celeste Chacko is a very pleasant 89 year old female who requested a video visit to discuss pain management:    Patient  was made aware that this visit will be billed the same as an in-person visit and has given verbal consent to proceed with this video visit.     Patient's daughter contacted our office 7/18/2025 requesting an early refill on oxycodone. Patient's daughter reported that the patient only had #10 pills left and uses 4 pills daily. Patient received #120 pills 7/9/2025.  Daughter does not know where the remainder of pills went.    Patient's daughter also reported, during that same phone call, that the patient's oxygen device had not been working and that the patient had missed her medications for a couple of days, resulting in leg edema. Medications restarted and oxygen machine working again as of that day.    Daughter took patient to the ER 7/19/2025 for further evaluation of her leg swelling and memory changes. Evaluation including labs, EKG, CXR, head CT, and venous duplex unremarkable. Daughter requested refill from ER provider and stated that she were to return in 2 days for refill if her request was declined.  ER provider reported that patient appears comfortable without evidence of opioid withdrawal.    Patient/daughter have been rationing oxycodone since last week - only taking oxycodone twice a day as needed. Patient reports that she is doing well at this reduced frequency and is pain-free at the time of today's visit. She uses Tylenol in between oxycodone as needed with adequate relief of symptoms.     Unrelated to above, patient's recent HgbA1c and LDL level demonstrate sub-optimal control of her diabetes and cholesterol, respectively.    OBJECTIVE                                                       Vitals: No vitals were obtained today due to virtual visit.    General: appears healthy, alert and no distress  Psychiatric: mentation normal, affect normal/bright, judgement and insight intact, normal speech and appearance well-groomed    ---    (Note was completed, in part, with [employer] voice-recognition software.  Documentation reviewed, but some grammatical, spelling, and word errors may remain.)

## 2025-07-23 NOTE — TELEPHONE ENCOUNTER
Pharmacy called about this medication refill and reviewed the visit notes from provider with RN.  Refill was given. See encounter 7/22/2025 from RAZIA COOK

## 2025-07-28 ENCOUNTER — TELEPHONE (OUTPATIENT)
Dept: INTERNAL MEDICINE | Facility: CLINIC | Age: OVER 89
End: 2025-07-28
Payer: COMMERCIAL

## 2025-07-28 DIAGNOSIS — E11.8 TYPE 2 DIABETES MELLITUS WITH COMPLICATION, WITHOUT LONG-TERM CURRENT USE OF INSULIN (H): Primary | ICD-10-CM

## 2025-07-28 RX ORDER — LANCETS
EACH MISCELLANEOUS
Qty: 100 EACH | Refills: 6 | Status: CANCELLED | OUTPATIENT
Start: 2025-07-28

## 2025-07-28 NOTE — TELEPHONE ENCOUNTER
I don't know what this means - the ER did not give her any medications and no changes were made at that time.

## 2025-07-28 NOTE — TELEPHONE ENCOUNTER
Dr. Lantigua    Gave verbal for all the hc orders.  Main question from homecare nurse is should pt be checking blood sugars?   Looks like per notes below you would prefer pt checking sugars, but not sure if there was a barrier there.    Homecare nurse reports pt said she didn't need to check sugars.     Script and pharmacy pended if you think it would be worth having the homecare nurse try to get pt to check sugars?    Your Notes from 2024 visit:   Comment: should be checking blood glucose levels once daily.   Plan: HgbA1c today; recommendations to follow.        Home Care is calling regarding an established patient with [facility].  Requesting orders from: Arely Lantigua  RN APPROVED: RN able to provide verbal orders.  Home Care will send orders for signature.  RN will close encounter.  Is this a request for a temporary pause in the home care episode?  No    Orders Requested    Skilled Nursing  Request for initial certification (first set of orders)   Frequency: 1 w 3 eow 6 3prn   RN gave verbal order: Yes      Physical Therapy  Request for initial evaluation and treatment (one time)   RN gave verbal order: Yes      HHA (Home Health Aide)  Request for initial certification (first set of orders)   Frequency: 1w8  RN gave verbal order: Yes        Contacts       Contact Date/Time Type Contact Phone/Fax    07/28/2025 10:05 AM CDT Phone (Incoming) Samantha ZAVALA (Home Care) [phone]     ask for Babak Marley RN

## 2025-07-28 NOTE — TELEPHONE ENCOUNTER
Reason for call:  Other   Patient called regarding (reason for call): call back  Additional comments: patient asking if you want her to take all the meds given on July 19th from hospital    Phone number to reach patient:  Home number on file [phone] (home)    Best Time:  any    Can we leave a detailed message on this number?  YES    Travel screening: Not Applicable

## 2025-07-28 NOTE — TELEPHONE ENCOUNTER
No barrier. She's 89 and prefers a minimalist approach to her care. If she's okay not checking her sugars, that is fine by me too. Her recent HgbA1c indicates that her average blood glucose levels are running on the higher side (rather than the lower side) so she is at low risk of hypoglycemia on her current regimen.

## 2025-07-28 NOTE — TELEPHONE ENCOUNTER
Called and left Rosalie (per message, can talk to any nurse) a VM with providers recommendations from below. Advised to her to call us back with any questions.

## 2025-07-29 NOTE — TELEPHONE ENCOUNTER
Sarah NAVARRO calling for orders. Confirmed that orders were approved yesterday. Anabela Espinoza RN

## 2025-07-29 NOTE — TELEPHONE ENCOUNTER
Patient Contact    Attempt # 1    Was call answered?  No.  Left message on voicemail with information to call clinic back.    Sugey Landis RN

## 2025-07-31 NOTE — TELEPHONE ENCOUNTER
Patient Contact    Attempt # 3    Was call answered?  No.  Left message on voicemail with information to call me back.      IreneKRISTINA on file. [phone].    Late afternoon is best time to call pt. Discussed request: pt was asking provider to confirm taking medication as prescribed on hospital discharge instructions. Pt has a follow up appointment 8/11.     Pt's daughter states that the pt plans to call the clinic today and request refills: plan for pt to discuss increasing oxycodone and request a refill for metformin.

## 2025-08-05 ENCOUNTER — TELEPHONE (OUTPATIENT)
Dept: INTERNAL MEDICINE | Facility: CLINIC | Age: OVER 89
End: 2025-08-05
Payer: COMMERCIAL

## 2025-08-11 ENCOUNTER — OFFICE VISIT (OUTPATIENT)
Dept: INTERNAL MEDICINE | Facility: CLINIC | Age: OVER 89
End: 2025-08-11
Payer: COMMERCIAL

## 2025-08-11 VITALS
RESPIRATION RATE: 19 BRPM | WEIGHT: 139.1 LBS | OXYGEN SATURATION: 90 % | SYSTOLIC BLOOD PRESSURE: 115 MMHG | DIASTOLIC BLOOD PRESSURE: 50 MMHG | HEIGHT: 64 IN | TEMPERATURE: 97.1 F | BODY MASS INDEX: 23.75 KG/M2 | HEART RATE: 81 BPM

## 2025-08-11 DIAGNOSIS — M85.80 OSTEOPENIA, UNSPECIFIED LOCATION: ICD-10-CM

## 2025-08-11 DIAGNOSIS — Z86.19 HISTORY OF PNEUMOCYSTIS JIROVECII PNEUMONIA: ICD-10-CM

## 2025-08-11 DIAGNOSIS — F11.90 CHRONIC, CONTINUOUS USE OF OPIOIDS: ICD-10-CM

## 2025-08-11 DIAGNOSIS — R14.0 ABDOMINAL DISTENSION: ICD-10-CM

## 2025-08-11 DIAGNOSIS — M54.50 CHRONIC LOW BACK PAIN, UNSPECIFIED BACK PAIN LATERALITY, UNSPECIFIED WHETHER SCIATICA PRESENT: ICD-10-CM

## 2025-08-11 DIAGNOSIS — F41.1 GAD (GENERALIZED ANXIETY DISORDER): ICD-10-CM

## 2025-08-11 DIAGNOSIS — G89.4 CHRONIC PAIN SYNDROME: ICD-10-CM

## 2025-08-11 DIAGNOSIS — R41.3 SHORT-TERM MEMORY LOSS: ICD-10-CM

## 2025-08-11 DIAGNOSIS — E11.9 TYPE 2 DIABETES MELLITUS WITHOUT COMPLICATION, WITHOUT LONG-TERM CURRENT USE OF INSULIN (H): ICD-10-CM

## 2025-08-11 DIAGNOSIS — J44.9 CHRONIC OBSTRUCTIVE PULMONARY DISEASE, UNSPECIFIED COPD TYPE (H): ICD-10-CM

## 2025-08-11 DIAGNOSIS — I10 BENIGN ESSENTIAL HYPERTENSION: ICD-10-CM

## 2025-08-11 DIAGNOSIS — G89.29 CHRONIC LOW BACK PAIN, UNSPECIFIED BACK PAIN LATERALITY, UNSPECIFIED WHETHER SCIATICA PRESENT: ICD-10-CM

## 2025-08-11 DIAGNOSIS — E78.00 PURE HYPERCHOLESTEROLEMIA: ICD-10-CM

## 2025-08-11 DIAGNOSIS — Z23 HIGH PRIORITY FOR 2019-NCOV VACCINE: ICD-10-CM

## 2025-08-11 DIAGNOSIS — Z00.00 MEDICARE ANNUAL WELLNESS VISIT, SUBSEQUENT: Primary | ICD-10-CM

## 2025-08-11 PROBLEM — K59.09 CHRONIC CONSTIPATION: Status: RESOLVED | Noted: 2022-05-11 | Resolved: 2025-08-11

## 2025-08-11 PROCEDURE — 3074F SYST BP LT 130 MM HG: CPT | Performed by: INTERNAL MEDICINE

## 2025-08-11 PROCEDURE — G0439 PPPS, SUBSEQ VISIT: HCPCS | Performed by: INTERNAL MEDICINE

## 2025-08-11 PROCEDURE — 90480 ADMN SARSCOV2 VAC 1/ONLY CMP: CPT | Performed by: INTERNAL MEDICINE

## 2025-08-11 PROCEDURE — 91320 SARSCV2 VAC 30MCG TRS-SUC IM: CPT | Performed by: INTERNAL MEDICINE

## 2025-08-11 PROCEDURE — 3078F DIAST BP <80 MM HG: CPT | Performed by: INTERNAL MEDICINE

## 2025-08-11 RX ORDER — GLIPIZIDE 5 MG/1
5 TABLET, FILM COATED, EXTENDED RELEASE ORAL DAILY
Qty: 90 TABLET | Refills: 3 | Status: SHIPPED | OUTPATIENT
Start: 2025-08-11

## (undated) DEVICE — LINEN TOWEL PACK X5 5464

## (undated) DEVICE — DRSG BANDAID 1X3" FABRIC CURITY LATEX FREE KC44101

## (undated) DEVICE — GLOVE PROTEXIS POWDER FREE SMT 6.5  2D72PT65X

## (undated) DEVICE — SYR 10ML LL W/O NDL

## (undated) DEVICE — DRSG GAUZE 4X4" 2187

## (undated) DEVICE — TUBING IV EXT SET MICRO VOLUME MALE LL ADAPTER 36" 2N3345

## (undated) DEVICE — DRAPE BACK TABLE  44X90" 8377

## (undated) DEVICE — PAIN PACK

## (undated) DEVICE — NDL COUNTER 20CT 31142493

## (undated) DEVICE — NDL 25GA 1.5" 305127

## (undated) DEVICE — GLOVE PROTEXIS MICRO 8.0  2D73PM80

## (undated) DEVICE — PREP CHLORAPREP W/ORANGE TINT 10.5ML 260715

## (undated) DEVICE — NDL 18GAX1.5" 305185

## (undated) DEVICE — GLOVE ESTEEM POWDER FREE SMT 8.0  2D72PT80

## (undated) DEVICE — GLOVE PROTEXIS BLUE W/NEU-THERA 7.0  2D73EB70

## (undated) DEVICE — NDL SPINAL 25GA 3.5" QUINCKE 405180

## (undated) DEVICE — SYR 05ML LL W/O NDL

## (undated) DEVICE — LABEL MEDICATION SYSTEM 3303-P

## (undated) DEVICE — SYR 03ML LL W/O NDL 309657

## (undated) DEVICE — Device

## (undated) DEVICE — NDL SPINAL 22GA 3.5" QUINCKE 405181

## (undated) DEVICE — GLOVE PROTEXIS POWDER FREE SMT 7.0  2D72PT70X

## (undated) RX ORDER — HEPARIN SODIUM 1000 [USP'U]/ML
INJECTION, SOLUTION INTRAVENOUS; SUBCUTANEOUS
Status: DISPENSED
Start: 2022-04-12

## (undated) RX ORDER — PROTAMINE SULFATE 10 MG/ML
INJECTION, SOLUTION INTRAVENOUS
Status: DISPENSED
Start: 2022-04-12